# Patient Record
Sex: MALE | NOT HISPANIC OR LATINO | URBAN - METROPOLITAN AREA
[De-identification: names, ages, dates, MRNs, and addresses within clinical notes are randomized per-mention and may not be internally consistent; named-entity substitution may affect disease eponyms.]

---

## 2018-06-05 ENCOUNTER — INPATIENT (INPATIENT)
Facility: HOSPITAL | Age: 73
LOS: 2 days | Discharge: HOME | DRG: 313 | End: 2018-06-08
Attending: INTERNAL MEDICINE | Admitting: INTERNAL MEDICINE
Payer: COMMERCIAL

## 2018-06-05 VITALS
RESPIRATION RATE: 17 BRPM | HEART RATE: 76 BPM | TEMPERATURE: 98 F | DIASTOLIC BLOOD PRESSURE: 98 MMHG | OXYGEN SATURATION: 100 % | SYSTOLIC BLOOD PRESSURE: 185 MMHG

## 2018-06-05 DIAGNOSIS — R07.9 CHEST PAIN, UNSPECIFIED: ICD-10-CM

## 2018-06-05 DIAGNOSIS — H26.9 UNSPECIFIED CATARACT: Chronic | ICD-10-CM

## 2018-06-05 LAB
ALBUMIN SERPL ELPH-MCNC: 4.5 G/DL — SIGNIFICANT CHANGE UP (ref 3.3–5)
ALP SERPL-CCNC: 64 U/L — SIGNIFICANT CHANGE UP (ref 40–120)
ALT FLD-CCNC: 21 U/L — SIGNIFICANT CHANGE UP (ref 10–45)
ANION GAP SERPL CALC-SCNC: 12 MMOL/L — SIGNIFICANT CHANGE UP (ref 5–17)
AST SERPL-CCNC: 18 U/L — SIGNIFICANT CHANGE UP (ref 10–40)
BASOPHILS # BLD AUTO: 0.1 K/UL — SIGNIFICANT CHANGE UP (ref 0–0.2)
BASOPHILS NFR BLD AUTO: 0.9 % — SIGNIFICANT CHANGE UP (ref 0–2)
BILIRUB SERPL-MCNC: 0.3 MG/DL — SIGNIFICANT CHANGE UP (ref 0.2–1.2)
BUN SERPL-MCNC: 14 MG/DL — SIGNIFICANT CHANGE UP (ref 7–23)
CALCIUM SERPL-MCNC: 10 MG/DL — SIGNIFICANT CHANGE UP (ref 8.4–10.5)
CHLORIDE SERPL-SCNC: 97 MMOL/L — SIGNIFICANT CHANGE UP (ref 96–108)
CO2 SERPL-SCNC: 24 MMOL/L — SIGNIFICANT CHANGE UP (ref 22–31)
CREAT SERPL-MCNC: 1.35 MG/DL — HIGH (ref 0.5–1.3)
D DIMER BLD IA.RAPID-MCNC: <150 NG/ML DDU — SIGNIFICANT CHANGE UP
EOSINOPHIL # BLD AUTO: 1 K/UL — HIGH (ref 0–0.5)
EOSINOPHIL NFR BLD AUTO: 10.2 % — HIGH (ref 0–6)
GLUCOSE BLDC GLUCOMTR-MCNC: 95 MG/DL — SIGNIFICANT CHANGE UP (ref 70–99)
GLUCOSE SERPL-MCNC: 115 MG/DL — HIGH (ref 70–99)
HCT VFR BLD CALC: 42.6 % — SIGNIFICANT CHANGE UP (ref 39–50)
HGB BLD-MCNC: 15 G/DL — SIGNIFICANT CHANGE UP (ref 13–17)
LYMPHOCYTES # BLD AUTO: 3.5 K/UL — HIGH (ref 1–3.3)
LYMPHOCYTES # BLD AUTO: 36.9 % — SIGNIFICANT CHANGE UP (ref 13–44)
MCHC RBC-ENTMCNC: 32.3 PG — SIGNIFICANT CHANGE UP (ref 27–34)
MCHC RBC-ENTMCNC: 35.1 GM/DL — SIGNIFICANT CHANGE UP (ref 32–36)
MCV RBC AUTO: 91.9 FL — SIGNIFICANT CHANGE UP (ref 80–100)
MONOCYTES # BLD AUTO: 0.8 K/UL — SIGNIFICANT CHANGE UP (ref 0–0.9)
MONOCYTES NFR BLD AUTO: 8.4 % — SIGNIFICANT CHANGE UP (ref 2–14)
NEUTROPHILS # BLD AUTO: 4.2 K/UL — SIGNIFICANT CHANGE UP (ref 1.8–7.4)
NEUTROPHILS NFR BLD AUTO: 43.5 % — SIGNIFICANT CHANGE UP (ref 43–77)
PLATELET # BLD AUTO: 396 K/UL — SIGNIFICANT CHANGE UP (ref 150–400)
POTASSIUM SERPL-MCNC: 4.7 MMOL/L — SIGNIFICANT CHANGE UP (ref 3.5–5.3)
POTASSIUM SERPL-SCNC: 4.7 MMOL/L — SIGNIFICANT CHANGE UP (ref 3.5–5.3)
PROT SERPL-MCNC: 7.9 G/DL — SIGNIFICANT CHANGE UP (ref 6–8.3)
RBC # BLD: 4.64 M/UL — SIGNIFICANT CHANGE UP (ref 4.2–5.8)
RBC # FLD: 11.7 % — SIGNIFICANT CHANGE UP (ref 10.3–14.5)
SODIUM SERPL-SCNC: 133 MMOL/L — LOW (ref 135–145)
TROPONIN T SERPL-MCNC: <0.01 NG/ML — SIGNIFICANT CHANGE UP (ref 0–0.06)
WBC # BLD: 9.5 K/UL — SIGNIFICANT CHANGE UP (ref 3.8–10.5)
WBC # FLD AUTO: 9.5 K/UL — SIGNIFICANT CHANGE UP (ref 3.8–10.5)

## 2018-06-05 PROCEDURE — 99285 EMERGENCY DEPT VISIT HI MDM: CPT | Mod: 25

## 2018-06-05 PROCEDURE — 74174 CTA ABD&PLVS W/CONTRAST: CPT | Mod: 26

## 2018-06-05 PROCEDURE — 93010 ELECTROCARDIOGRAM REPORT: CPT

## 2018-06-05 PROCEDURE — 71275 CT ANGIOGRAPHY CHEST: CPT | Mod: 26

## 2018-06-05 RX ORDER — SODIUM CHLORIDE 9 MG/ML
3 INJECTION INTRAMUSCULAR; INTRAVENOUS; SUBCUTANEOUS ONCE
Qty: 0 | Refills: 0 | Status: COMPLETED | OUTPATIENT
Start: 2018-06-05 | End: 2018-06-05

## 2018-06-05 RX ORDER — ASPIRIN/CALCIUM CARB/MAGNESIUM 324 MG
325 TABLET ORAL ONCE
Qty: 0 | Refills: 0 | Status: COMPLETED | OUTPATIENT
Start: 2018-06-05 | End: 2018-06-05

## 2018-06-05 RX ADMIN — SODIUM CHLORIDE 3 MILLILITER(S): 9 INJECTION INTRAMUSCULAR; INTRAVENOUS; SUBCUTANEOUS at 18:51

## 2018-06-05 NOTE — ED ADULT NURSE REASSESSMENT NOTE - NS ED NURSE REASSESS COMMENT FT1
Received report from Gifty FUENTES; patient rates chest pain 3/10- PA Suzan at the bedside. 18 g IV in the L AC patent and site WNL. VSS. Will continue to monitor and patient safety maintained.

## 2018-06-05 NOTE — ED PROVIDER NOTE - OBJECTIVE STATEMENT
72y male , PMH HTN,HLD,HLD, PSH Cataracts priscilla. Pt comes to ed complains of chest pain sharp needle rad to left arm, felt slightly unbalnaced, no loc, shortness of breath,nv,abd pain,fc,cough,sob,hemoptysis, See by pmd and referred to ed. 4/10, Improves with deep breath, no change with exercise onset this am.

## 2018-06-05 NOTE — ED PROVIDER NOTE - PROGRESS NOTE DETAILS
PT referred to herminia Mccormack.  Edgar Alejandra MD, Facep Discussed with Dr SIMI sanchez CT chest ro aortic dz  Edgar Alejandra MD, Facep

## 2018-06-06 DIAGNOSIS — E11.8 TYPE 2 DIABETES MELLITUS WITH UNSPECIFIED COMPLICATIONS: ICD-10-CM

## 2018-06-06 DIAGNOSIS — Q44.1 OTHER CONGENITAL MALFORMATIONS OF GALLBLADDER: ICD-10-CM

## 2018-06-06 DIAGNOSIS — N17.9 ACUTE KIDNEY FAILURE, UNSPECIFIED: ICD-10-CM

## 2018-06-06 DIAGNOSIS — R07.89 OTHER CHEST PAIN: ICD-10-CM

## 2018-06-06 DIAGNOSIS — I10 ESSENTIAL (PRIMARY) HYPERTENSION: ICD-10-CM

## 2018-06-06 DIAGNOSIS — E11.9 TYPE 2 DIABETES MELLITUS WITHOUT COMPLICATIONS: ICD-10-CM

## 2018-06-06 DIAGNOSIS — R07.9 CHEST PAIN, UNSPECIFIED: ICD-10-CM

## 2018-06-06 DIAGNOSIS — E04.9 NONTOXIC GOITER, UNSPECIFIED: ICD-10-CM

## 2018-06-06 LAB
ALBUMIN SERPL ELPH-MCNC: 4.2 G/DL — SIGNIFICANT CHANGE UP (ref 3.3–5)
ALP SERPL-CCNC: 59 U/L — SIGNIFICANT CHANGE UP (ref 40–120)
ALT FLD-CCNC: 21 U/L — SIGNIFICANT CHANGE UP (ref 10–45)
ANION GAP SERPL CALC-SCNC: 12 MMOL/L — SIGNIFICANT CHANGE UP (ref 5–17)
APTT BLD: 29.8 SEC — SIGNIFICANT CHANGE UP (ref 27.5–37.4)
AST SERPL-CCNC: 16 U/L — SIGNIFICANT CHANGE UP (ref 10–40)
BASOPHILS # BLD AUTO: 0.1 K/UL — SIGNIFICANT CHANGE UP (ref 0–0.2)
BASOPHILS NFR BLD AUTO: 1 % — SIGNIFICANT CHANGE UP (ref 0–2)
BILIRUB SERPL-MCNC: 0.4 MG/DL — SIGNIFICANT CHANGE UP (ref 0.2–1.2)
BUN SERPL-MCNC: 13 MG/DL — SIGNIFICANT CHANGE UP (ref 7–23)
CALCIUM SERPL-MCNC: 9.7 MG/DL — SIGNIFICANT CHANGE UP (ref 8.4–10.5)
CHLORIDE SERPL-SCNC: 97 MMOL/L — SIGNIFICANT CHANGE UP (ref 96–108)
CHOLEST SERPL-MCNC: 209 MG/DL — HIGH (ref 10–199)
CK MB BLD-MCNC: 1.5 % — SIGNIFICANT CHANGE UP (ref 0–3.5)
CK MB CFR SERPL CALC: 1.5 NG/ML — SIGNIFICANT CHANGE UP (ref 0–6.7)
CK SERPL-CCNC: 103 U/L — SIGNIFICANT CHANGE UP (ref 30–200)
CO2 SERPL-SCNC: 25 MMOL/L — SIGNIFICANT CHANGE UP (ref 22–31)
CREAT SERPL-MCNC: 1.22 MG/DL — SIGNIFICANT CHANGE UP (ref 0.5–1.3)
EOSINOPHIL # BLD AUTO: 1.1 K/UL — HIGH (ref 0–0.5)
EOSINOPHIL NFR BLD AUTO: 11 % — HIGH (ref 0–6)
GAS PNL BLDV: SIGNIFICANT CHANGE UP
GLUCOSE BLDC GLUCOMTR-MCNC: 141 MG/DL — HIGH (ref 70–99)
GLUCOSE BLDC GLUCOMTR-MCNC: 75 MG/DL — SIGNIFICANT CHANGE UP (ref 70–99)
GLUCOSE BLDC GLUCOMTR-MCNC: 96 MG/DL — SIGNIFICANT CHANGE UP (ref 70–99)
GLUCOSE BLDC GLUCOMTR-MCNC: 99 MG/DL — SIGNIFICANT CHANGE UP (ref 70–99)
GLUCOSE SERPL-MCNC: 134 MG/DL — HIGH (ref 70–99)
HBA1C BLD-MCNC: 6.4 % — HIGH (ref 4–5.6)
HBA1C BLD-MCNC: 6.5 % — HIGH (ref 4–5.6)
HCT VFR BLD CALC: 43 % — SIGNIFICANT CHANGE UP (ref 39–50)
HDLC SERPL-MCNC: 39 MG/DL — LOW (ref 40–125)
HGB BLD-MCNC: 14.8 G/DL — SIGNIFICANT CHANGE UP (ref 13–17)
INR BLD: 1.04 RATIO — SIGNIFICANT CHANGE UP (ref 0.88–1.16)
LIPID PNL WITH DIRECT LDL SERPL: 128 MG/DL — SIGNIFICANT CHANGE UP
LYMPHOCYTES # BLD AUTO: 4 K/UL — HIGH (ref 1–3.3)
LYMPHOCYTES # BLD AUTO: 40.3 % — SIGNIFICANT CHANGE UP (ref 13–44)
MAGNESIUM SERPL-MCNC: 2.3 MG/DL — SIGNIFICANT CHANGE UP (ref 1.6–2.6)
MCHC RBC-ENTMCNC: 31.7 PG — SIGNIFICANT CHANGE UP (ref 27–34)
MCHC RBC-ENTMCNC: 34.5 GM/DL — SIGNIFICANT CHANGE UP (ref 32–36)
MCV RBC AUTO: 92 FL — SIGNIFICANT CHANGE UP (ref 80–100)
MONOCYTES # BLD AUTO: 0.8 K/UL — SIGNIFICANT CHANGE UP (ref 0–0.9)
MONOCYTES NFR BLD AUTO: 8.1 % — SIGNIFICANT CHANGE UP (ref 2–14)
NEUTROPHILS # BLD AUTO: 3.9 K/UL — SIGNIFICANT CHANGE UP (ref 1.8–7.4)
NEUTROPHILS NFR BLD AUTO: 39.6 % — LOW (ref 43–77)
NT-PROBNP SERPL-SCNC: 28 PG/ML — SIGNIFICANT CHANGE UP (ref 0–300)
PHOSPHATE SERPL-MCNC: 3.5 MG/DL — SIGNIFICANT CHANGE UP (ref 2.5–4.5)
PLATELET # BLD AUTO: 375 K/UL — SIGNIFICANT CHANGE UP (ref 150–400)
POTASSIUM SERPL-MCNC: 4.5 MMOL/L — SIGNIFICANT CHANGE UP (ref 3.5–5.3)
POTASSIUM SERPL-SCNC: 4.5 MMOL/L — SIGNIFICANT CHANGE UP (ref 3.5–5.3)
PROT SERPL-MCNC: 7.3 G/DL — SIGNIFICANT CHANGE UP (ref 6–8.3)
PROTHROM AB SERPL-ACNC: 11.2 SEC — SIGNIFICANT CHANGE UP (ref 9.8–12.7)
RBC # BLD: 4.68 M/UL — SIGNIFICANT CHANGE UP (ref 4.2–5.8)
RBC # FLD: 11.7 % — SIGNIFICANT CHANGE UP (ref 10.3–14.5)
SODIUM SERPL-SCNC: 134 MMOL/L — LOW (ref 135–145)
T3 SERPL-MCNC: 69 NG/DL — LOW (ref 80–200)
T4 AB SER-ACNC: 4.8 UG/DL — SIGNIFICANT CHANGE UP (ref 4.6–12)
TOTAL CHOLESTEROL/HDL RATIO MEASUREMENT: 5.4 RATIO — SIGNIFICANT CHANGE UP (ref 3.4–9.6)
TRIGL SERPL-MCNC: 208 MG/DL — HIGH (ref 10–149)
TROPONIN T SERPL-MCNC: <0.01 NG/ML — SIGNIFICANT CHANGE UP (ref 0–0.06)
TSH SERPL-MCNC: 2.12 UIU/ML — SIGNIFICANT CHANGE UP (ref 0.27–4.2)
TSH SERPL-MCNC: 2.81 UIU/ML — SIGNIFICANT CHANGE UP (ref 0.27–4.2)
WBC # BLD: 9.8 K/UL — SIGNIFICANT CHANGE UP (ref 3.8–10.5)
WBC # FLD AUTO: 9.8 K/UL — SIGNIFICANT CHANGE UP (ref 3.8–10.5)

## 2018-06-06 PROCEDURE — 99223 1ST HOSP IP/OBS HIGH 75: CPT

## 2018-06-06 PROCEDURE — 93306 TTE W/DOPPLER COMPLETE: CPT | Mod: 26

## 2018-06-06 PROCEDURE — 76536 US EXAM OF HEAD AND NECK: CPT | Mod: 26

## 2018-06-06 PROCEDURE — 76700 US EXAM ABDOM COMPLETE: CPT | Mod: 26

## 2018-06-06 PROCEDURE — 93010 ELECTROCARDIOGRAM REPORT: CPT

## 2018-06-06 RX ORDER — HEPARIN SODIUM 5000 [USP'U]/ML
5000 INJECTION INTRAVENOUS; SUBCUTANEOUS EVERY 8 HOURS
Qty: 0 | Refills: 0 | Status: DISCONTINUED | OUTPATIENT
Start: 2018-06-06 | End: 2018-06-08

## 2018-06-06 RX ORDER — DEXTROSE 50 % IN WATER 50 %
15 SYRINGE (ML) INTRAVENOUS ONCE
Qty: 0 | Refills: 0 | Status: DISCONTINUED | OUTPATIENT
Start: 2018-06-06 | End: 2018-06-08

## 2018-06-06 RX ORDER — SODIUM CHLORIDE 9 MG/ML
1000 INJECTION INTRAMUSCULAR; INTRAVENOUS; SUBCUTANEOUS ONCE
Qty: 0 | Refills: 0 | Status: COMPLETED | OUTPATIENT
Start: 2018-06-06 | End: 2018-06-06

## 2018-06-06 RX ORDER — METFORMIN HYDROCHLORIDE 850 MG/1
1 TABLET ORAL
Qty: 0 | Refills: 0 | COMMUNITY

## 2018-06-06 RX ORDER — PANTOPRAZOLE SODIUM 20 MG/1
40 TABLET, DELAYED RELEASE ORAL
Qty: 0 | Refills: 0 | Status: DISCONTINUED | OUTPATIENT
Start: 2018-06-06 | End: 2018-06-08

## 2018-06-06 RX ORDER — SENNA PLUS 8.6 MG/1
2 TABLET ORAL AT BEDTIME
Qty: 0 | Refills: 0 | Status: DISCONTINUED | OUTPATIENT
Start: 2018-06-06 | End: 2018-06-08

## 2018-06-06 RX ORDER — DOCUSATE SODIUM 100 MG
100 CAPSULE ORAL THREE TIMES A DAY
Qty: 0 | Refills: 0 | Status: DISCONTINUED | OUTPATIENT
Start: 2018-06-06 | End: 2018-06-08

## 2018-06-06 RX ORDER — SODIUM CHLORIDE 9 MG/ML
1000 INJECTION, SOLUTION INTRAVENOUS
Qty: 0 | Refills: 0 | Status: DISCONTINUED | OUTPATIENT
Start: 2018-06-06 | End: 2018-06-08

## 2018-06-06 RX ORDER — INSULIN LISPRO 100/ML
VIAL (ML) SUBCUTANEOUS
Qty: 0 | Refills: 0 | Status: DISCONTINUED | OUTPATIENT
Start: 2018-06-06 | End: 2018-06-08

## 2018-06-06 RX ORDER — LOSARTAN POTASSIUM 100 MG/1
25 TABLET, FILM COATED ORAL DAILY
Qty: 0 | Refills: 0 | Status: DISCONTINUED | OUTPATIENT
Start: 2018-06-06 | End: 2018-06-07

## 2018-06-06 RX ORDER — DEXTROSE 50 % IN WATER 50 %
12.5 SYRINGE (ML) INTRAVENOUS ONCE
Qty: 0 | Refills: 0 | Status: DISCONTINUED | OUTPATIENT
Start: 2018-06-06 | End: 2018-06-08

## 2018-06-06 RX ORDER — LOSARTAN POTASSIUM 100 MG/1
1 TABLET, FILM COATED ORAL
Qty: 0 | Refills: 0 | COMMUNITY

## 2018-06-06 RX ORDER — OMEGA-3 ACID ETHYL ESTERS 1 G
2 CAPSULE ORAL
Qty: 0 | Refills: 0 | COMMUNITY

## 2018-06-06 RX ORDER — INSULIN LISPRO 100/ML
VIAL (ML) SUBCUTANEOUS AT BEDTIME
Qty: 0 | Refills: 0 | Status: DISCONTINUED | OUTPATIENT
Start: 2018-06-06 | End: 2018-06-08

## 2018-06-06 RX ADMIN — PANTOPRAZOLE SODIUM 40 MILLIGRAM(S): 20 TABLET, DELAYED RELEASE ORAL at 05:34

## 2018-06-06 RX ADMIN — HEPARIN SODIUM 5000 UNIT(S): 5000 INJECTION INTRAVENOUS; SUBCUTANEOUS at 05:33

## 2018-06-06 RX ADMIN — Medication 100 MILLIGRAM(S): at 05:33

## 2018-06-06 RX ADMIN — SODIUM CHLORIDE 500 MILLILITER(S): 9 INJECTION INTRAMUSCULAR; INTRAVENOUS; SUBCUTANEOUS at 03:24

## 2018-06-06 RX ADMIN — HEPARIN SODIUM 5000 UNIT(S): 5000 INJECTION INTRAVENOUS; SUBCUTANEOUS at 21:08

## 2018-06-06 RX ADMIN — Medication 100 MILLIGRAM(S): at 21:08

## 2018-06-06 NOTE — CONSULT NOTE ADULT - ASSESSMENT
72 year old male with HTN, DM, HLD, cataract surgery, renal cyst presents with left sided chest pain radiating to his left arm a/w lightheadedness.    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 Baseline creatinine ~ 1.2; received CT I+ on 6/5  Urologic: right renal cyst 6.4 cm  Endocrine: thyroid nodule 3 cm  CV: BP acceptable, euvolemic on exam    RECOMMEND:  - no objection to resume ARB (losartan)  - follow up abdominal ultrasound  - follow up stress test  - send urine creatinine, urine protein  - daily BMP while admitted  - avoid NSAIDs and nephrotoxins as able   - dose medications for a GFR ~ 55 cc/min    Thank you for the courtesy of this consultation.    Dania Michelle NP  [facility]  [phone] 72 year old male with HTN, DM, HLD, cataract surgery, renal cyst presents with left sided chest pain radiating to his left arm a/w lightheadedness.    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 Baseline creatinine ~ 1.2; received CT I+ on 6/5  Urologic: right renal cyst 6.4 cm  Endocrine: thyroid nodule 3 cm  CV: BP elevated, euvolemic on exam, EF 65-70%    RECOMMEND:  - resume losartan 25 mg po daily (home dose)  - follow up abdominal ultrasound  - follow up stress test  - send urine creatinine, urine protein  - daily BMP while admitted  - avoid NSAIDs and nephrotoxins as able   - dose medications for a GFR ~ 55 cc/min    Thank you for the courtesy of this consultation.    Dania Michelle NP  [facility]  [phone] 72 year old male with HTN, DM, HLD, cataract surgery, renal cyst presents with left sided chest pain radiating to his left arm a/w lightheadedness.    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 Baseline creatinine ~ 1.2; received CT I+ on 6/5  Urologic: right renal cyst 6.4 cm  Endocrine: thyroid nodule 3 cm  CV: BP elevated, euvolemic on exam, EF 65-70%    RECOMMEND:  - resume losartan 25 mg po daily (home dose)  - follow up abdominal ultrasound  - follow up stress test  - send urine creatinine, urine protein  - daily BMP while admitted  - avoid NSAIDs and nephrotoxins as able   - dose medications for a GFR ~ 55 cc/min      Thank you for the courtesy of this consultation.    Dania Michelle NP  [facility]  [phone]           ATTENDING NOTE:    Feels well today. No CP/SOB.    PHYSICAL EXAM:  VS: 160s/70s; 70s; 17  Neck:  No JVD  Respiratory: CTA-B/L  Cardiovascular: S1 and S2 RRR no murmur  Gastrointestinal: BS+, soft, NT/ND  Extremities: No peripheral edema    IMPRESSION:  72M w/ HTN, DM, HLD, 6/5/18 a/w CP radiating to LUE and with lightheadedness    (1)Renal - simple right kidney cyst. GFR>60.  (2)Cardiac - a/w CP - undergoing ischemic workup  (3)HTN - on low-dose Cozaar at home.    RECOMMEND:  (1)Cozaar 25qd as taken at home  (2)Potential beta-blocker per Cardiology  (3)No further workup required for renal cyst  (4)Dose new meds for GFR>60  (5)No prophylaxis required if goes for cardiac cath.    Thank you for involving [facility] in this patient's care.  With warm regards,    Yamil Humphreys MD

## 2018-06-06 NOTE — CONSULT NOTE ADULT - ATTENDING COMMENTS
patient seen and examined  ct reviewed patient abnormal gb ? calcifications  need u/s may need mri   will follow with you patient seen and examined  ct reviewed patient abnormal gb ? calcifications  u/s suggestive of porcelain gb  recommend surgery eval for cholecystectomy as this incurs about a 3% risk of malignant transformation

## 2018-06-06 NOTE — H&P ADULT - NSHPREVIEWOFSYSTEMS_GEN_ALL_CORE
REVIEW OF SYSTEMS:  CONSTITUTIONAL: No weakness. No fevers. No chills. No rigors. No weight loss. No poor appetite.  EYES: No visual changes. No eye pain.  ENT: No hearing difficulty. No vertigo. No dysphagia. No Sinusitis/rhinorrhea.  NECK: No pain. No stiffness/rigidity.  CARDIAC: +chest pain. No palpitations.  RESPIRATORY: No cough. No SOB. No hemoptysis.  GASTROINTESTINAL: No abdominal pain. No nausea. No vomiting. No hematemesis. No diarrhea. No constipation. No melena. No hematochezia.  GENITOURINARY: No dysuria. +frequency. +hesitancy. No hematuria.  NEUROLOGICAL: No numbness/tingling. No focal weakness. No incontinence. No headache.  BACK: No back pain.  EXTREMITIES: No lower extremity edema. Full ROM.  SKIN: No rashes. No itching. No other lesions.  PSYCHIATRIC: No depression. No anxiety. No SI/HI.  ALLERGIC: No lip swelling. No hives.  All other review of systems is negative unless indicated above.  Unless indicated above, unable to assess ROS 2/2

## 2018-06-06 NOTE — CONSULT NOTE ADULT - PROBLEM SELECTOR RECOMMENDATION 9
-CTA chest with incidental finding of a foci of calcifications within the gallbladder wall with some areas of tethering. There is a stone within the neck of the gallbladder. No evidence of acute cholecystitis.   -check abdominal U/S  -LFTs are normal, monitor daily  -Pt may require MRI abdomen for further characterization pending results of U/S
Will continue current insulin regimen for now. Will continue monitoring FS, log, will Follow up.  Patient counseled for compliance with consistent low carb diet.

## 2018-06-06 NOTE — H&P ADULT - NSHPLABSRESULTS_GEN_ALL_CORE
Personally reviewed labs.   Personally reviewed imaging.   Personally reviewed EKG. NSR, rate 75, . No ST or TW changes.                          15.0   9.5   )-----------( 396      ( 05 Jun 2018 18:59 )             42.6       06-05    133<L>  |  97  |  14  ----------------------------<  115<H>  4.7   |  24  |  1.35<H>    Ca    10.0      05 Jun 2018 18:59    TPro  7.9  /  Alb  4.5  /  TBili  0.3  /  DBili  x   /  AST  18  /  ALT  21  /  AlkPhos  64  06-05      CARDIAC MARKERS ( 05 Jun 2018 18:59 )  x     / <0.01 ng/mL / x     / x     / x            LIVER FUNCTIONS - ( 05 Jun 2018 18:59 )  Alb: 4.5 g/dL / Pro: 7.9 g/dL / ALK PHOS: 64 U/L / ALT: 21 U/L / AST: 18 U/L / GGT: x

## 2018-06-06 NOTE — CONSULT NOTE ADULT - ASSESSMENT
72 year old male with HTN HLD DM ( dx ~ 7 months ago), chronic low back pain,  with no known h/o CAD/MI who presented with chest pain and admitted for cardiac work up. GI consulted for gallbladder abnormality found on imaging.

## 2018-06-06 NOTE — H&P ADULT - HISTORY OF PRESENT ILLNESS
72M c hx HTN, HLD, DM2, presents with left sided chest pain.    Pt states his chest pain started yesterday morning, with radiation to left arm. The pain is sharp, not exertional, not pleuritic, not positional. It has remained constant throughout the day, and is present currently, but is slowly improving. Only associated symptom is a little lightheadedness. Denies diaphoresis, palpitations, N/V, SOB, fevers, chills, diarrhea, URI symptoms. Reports chronic urinary frequency, weak stream, nocturia. Last stress test was 5 years ago that was reportedly normal. Pt states he went to see his cardiologist yesterday, who performed an EKG and in-office TTE that were both reportedly normal, but told to come to the hospital. He is able to ambulate up multiple flights of stairs without SOB or chest pain.    VS: Tm 98.8, P 82, /98, R 18, 96% RA  In the ED, received .

## 2018-06-06 NOTE — H&P ADULT - PROBLEM SELECTOR PLAN 2
- pt has never been told he has kidney problems  - last took 3 tabs of advil 4 days ago.  - reports BPH symptoms  - will give IVF, since pt received IV contrast  - hold losartan  - recheck BMP  - need to f/u with PMD for recheck of Cr as outpt

## 2018-06-06 NOTE — H&P ADULT - NSHPPHYSICALEXAM_GEN_ALL_CORE
PHYSICAL EXAM:   GENERAL: Alert. Not confused. No acute distress. Not thin. Not cachectic. Not obese.  HEAD:  Atraumatic. Normocephalic.  EYES: EOMI. PERRLA. Normal conjunctiva/sclera.  ENT: Neck supple. No JVD. Moist oral mucosa. Not edentulous. No thrush.  LYMPH: Normal supraclavicular/cervical lymph nodes.   CARDIAC: Not tachy, Not chloe. Regular rate. Not irregularly irregular. S1. S2. No murmur. No rub. No distant heart sounds.  LUNG/CHEST: CTAB. BS equal bilaterally. No wheezes. No rales. No rhonchi.  ABDOMEN: Soft. No tenderness. No distension. No fluid wave. Normal bowel sounds.  BACK: No midline/vertebral tenderness. No flank tenderness.  VASCULAR: +2 b/l radial or ulnar pulses. Palpable DP pulses.  EXTREMITIES:  No clubbing. No cyanosis. No edema. Moving all 4.  NEUROLOGY: A&Ox3. Non-focal exam. Cranial nerves intact. Normal speech. Sensation intact.  PSYCH: Normal behavior. Normal affect.  SKIN: No jaundice. No erythema. No rash/lesion.  Vascular Access:     ICU Vital Signs Last 24 Hrs  T(C): 37.1 (05 Jun 2018 22:39), Max: 37.1 (05 Jun 2018 22:39)  T(F): 98.8 (05 Jun 2018 22:39), Max: 98.8 (05 Jun 2018 22:39)  HR: 72 (05 Jun 2018 22:39) (72 - 82)  BP: 154/83 (05 Jun 2018 22:39) (154/83 - 185/98)  BP(mean): --  ABP: --  ABP(mean): --  RR: 16 (05 Jun 2018 22:39) (16 - 18)  SpO2: 97% (05 Jun 2018 22:39) (96% - 100%)      I&O's Summary

## 2018-06-06 NOTE — CONSULT NOTE ADULT - ATTENDING COMMENTS
Patient seen and examined, agree with above assessment and plan as transcribed above.    - f/u stress  - f/u renal, GI and endo eval  - further recc pending above    Nathanael Ohara MD, FACC

## 2018-06-06 NOTE — CONSULT NOTE ADULT - PROBLEM SELECTOR RECOMMENDATION 2
-The patient has ruled out for ACS  -echo/ Exercise NST pending  -follow cardiology recommendations
Monitored and followed up by primary/cardiology team

## 2018-06-06 NOTE — CONSULT NOTE ADULT - ASSESSMENT
Assessment  DMT2: 72y Male with DM T2 with hyperglycemia on insulin, blood sugars in acceptable range, no hypoglycemic episode,  eating meals, compliant with low carb diet.  Chest pain: On medications, stable, monitored.  Thyroid nodule:  Asymptomatic, TFTs not available at this time.  HTN: Controlled, On med.

## 2018-06-06 NOTE — H&P ADULT - NSHPSOCIALHISTORY_GEN_ALL_CORE
Social History:    Marital Status: (  ) , (  ) Single, (  ) , ( x ) , wife passed last year, (  )   # of Children: 2 daughters  Lives with: ( x ) alone, (  ) children, (  ) spouse, (  ) parents, (  ) siblings, (  ) friends, (  ) other:   Occupation:  with [employer]    Substance Use/Illicit Drugs: (  ) never used vs other:   Tobacco Usage: ( x ) never smoked, (  ) former smoker, (  ) current smoker and Total Pack-Years:   Last Alcohol Usage/Frequency/Amount/Withdrawal/Hx of Abuse:  once a week, last drink saturday  Foreign travel/Animal exposure:

## 2018-06-07 LAB
ANION GAP SERPL CALC-SCNC: 13 MMOL/L — SIGNIFICANT CHANGE UP (ref 5–17)
BUN SERPL-MCNC: 25 MG/DL — HIGH (ref 7–23)
CALCIUM SERPL-MCNC: 9.5 MG/DL — SIGNIFICANT CHANGE UP (ref 8.4–10.5)
CHLORIDE SERPL-SCNC: 101 MMOL/L — SIGNIFICANT CHANGE UP (ref 96–108)
CHOLEST SERPL-MCNC: 181 MG/DL — SIGNIFICANT CHANGE UP (ref 10–199)
CO2 SERPL-SCNC: 22 MMOL/L — SIGNIFICANT CHANGE UP (ref 22–31)
CREAT SERPL-MCNC: 1.79 MG/DL — HIGH (ref 0.5–1.3)
GLUCOSE BLDC GLUCOMTR-MCNC: 100 MG/DL — HIGH (ref 70–99)
GLUCOSE BLDC GLUCOMTR-MCNC: 133 MG/DL — HIGH (ref 70–99)
GLUCOSE BLDC GLUCOMTR-MCNC: 141 MG/DL — HIGH (ref 70–99)
GLUCOSE BLDC GLUCOMTR-MCNC: 148 MG/DL — HIGH (ref 70–99)
GLUCOSE SERPL-MCNC: 103 MG/DL — HIGH (ref 70–99)
HCT VFR BLD CALC: 39.1 % — SIGNIFICANT CHANGE UP (ref 39–50)
HDLC SERPL-MCNC: 32 MG/DL — LOW (ref 40–125)
HGB BLD-MCNC: 13.3 G/DL — SIGNIFICANT CHANGE UP (ref 13–17)
LIPID PNL WITH DIRECT LDL SERPL: 103 MG/DL — SIGNIFICANT CHANGE UP
MAGNESIUM SERPL-MCNC: 2.4 MG/DL — SIGNIFICANT CHANGE UP (ref 1.6–2.6)
MCHC RBC-ENTMCNC: 30.8 PG — SIGNIFICANT CHANGE UP (ref 27–34)
MCHC RBC-ENTMCNC: 34 GM/DL — SIGNIFICANT CHANGE UP (ref 32–36)
MCV RBC AUTO: 90.5 FL — SIGNIFICANT CHANGE UP (ref 80–100)
PLATELET # BLD AUTO: 332 K/UL — SIGNIFICANT CHANGE UP (ref 150–400)
POTASSIUM SERPL-MCNC: 4.9 MMOL/L — SIGNIFICANT CHANGE UP (ref 3.5–5.3)
POTASSIUM SERPL-SCNC: 4.9 MMOL/L — SIGNIFICANT CHANGE UP (ref 3.5–5.3)
RBC # BLD: 4.32 M/UL — SIGNIFICANT CHANGE UP (ref 4.2–5.8)
RBC # FLD: 13.4 % — SIGNIFICANT CHANGE UP (ref 10.3–14.5)
SODIUM SERPL-SCNC: 136 MMOL/L — SIGNIFICANT CHANGE UP (ref 135–145)
TOTAL CHOLESTEROL/HDL RATIO MEASUREMENT: 5.7 RATIO — SIGNIFICANT CHANGE UP (ref 3.4–9.6)
TRIGL SERPL-MCNC: 228 MG/DL — HIGH (ref 10–149)
WBC # BLD: 9.9 K/UL — SIGNIFICANT CHANGE UP (ref 3.8–10.5)
WBC # FLD AUTO: 9.9 K/UL — SIGNIFICANT CHANGE UP (ref 3.8–10.5)

## 2018-06-07 PROCEDURE — 93018 CV STRESS TEST I&R ONLY: CPT

## 2018-06-07 PROCEDURE — 78452 HT MUSCLE IMAGE SPECT MULT: CPT | Mod: 26

## 2018-06-07 PROCEDURE — 93016 CV STRESS TEST SUPVJ ONLY: CPT

## 2018-06-07 RX ORDER — ATORVASTATIN CALCIUM 80 MG/1
40 TABLET, FILM COATED ORAL AT BEDTIME
Qty: 0 | Refills: 0 | Status: DISCONTINUED | OUTPATIENT
Start: 2018-06-07 | End: 2018-06-08

## 2018-06-07 RX ORDER — SODIUM CHLORIDE 9 MG/ML
500 INJECTION INTRAMUSCULAR; INTRAVENOUS; SUBCUTANEOUS ONCE
Qty: 0 | Refills: 0 | Status: COMPLETED | OUTPATIENT
Start: 2018-06-07 | End: 2018-06-07

## 2018-06-07 RX ORDER — ASPIRIN/CALCIUM CARB/MAGNESIUM 324 MG
81 TABLET ORAL DAILY
Qty: 0 | Refills: 0 | Status: DISCONTINUED | OUTPATIENT
Start: 2018-06-07 | End: 2018-06-08

## 2018-06-07 RX ADMIN — HEPARIN SODIUM 5000 UNIT(S): 5000 INJECTION INTRAVENOUS; SUBCUTANEOUS at 13:02

## 2018-06-07 RX ADMIN — LOSARTAN POTASSIUM 25 MILLIGRAM(S): 100 TABLET, FILM COATED ORAL at 05:33

## 2018-06-07 RX ADMIN — HEPARIN SODIUM 5000 UNIT(S): 5000 INJECTION INTRAVENOUS; SUBCUTANEOUS at 05:33

## 2018-06-07 RX ADMIN — PANTOPRAZOLE SODIUM 40 MILLIGRAM(S): 20 TABLET, DELAYED RELEASE ORAL at 05:33

## 2018-06-07 RX ADMIN — SODIUM CHLORIDE 500 MILLILITER(S): 9 INJECTION INTRAMUSCULAR; INTRAVENOUS; SUBCUTANEOUS at 14:53

## 2018-06-07 RX ADMIN — HEPARIN SODIUM 5000 UNIT(S): 5000 INJECTION INTRAVENOUS; SUBCUTANEOUS at 23:49

## 2018-06-07 RX ADMIN — ATORVASTATIN CALCIUM 40 MILLIGRAM(S): 80 TABLET, FILM COATED ORAL at 22:20

## 2018-06-07 RX ADMIN — Medication 100 MILLIGRAM(S): at 05:33

## 2018-06-07 RX ADMIN — Medication 81 MILLIGRAM(S): at 13:02

## 2018-06-07 NOTE — CONSULT NOTE ADULT - ATTENDING COMMENTS
pt seen and examined.  agree with note above  f/u mri to better assess nature of gallbladder calcifications / possibility of malignancy  d/w pt & cards team in detail  will f/u after MRI

## 2018-06-07 NOTE — CONSULT NOTE ADULT - CONSULT REASON
Porcelain gallbladder
Thyroid nodule
abnormal gallbladder imaging
renal cyst, elevated creatinine
chest pain

## 2018-06-07 NOTE — CONSULT NOTE ADULT - SUBJECTIVE AND OBJECTIVE BOX
HISTORY OF PRESENT ILLNESS:  This is a 72 year old male with HTN HLD DM ( dx ~ 7 months ago), chronic low back pain,  with no known h/o CAD/MI - reportedly normal stress test about 5 years ago- admitted with complaints of chest pain. The patient states the chest pain is left sided and sharp in nature and occasionally radiated to his left arm but not to his back or jaw. It began yesterday after he woke up.  It last all day and was not worse with exertion nor was it associated with dyspnea , palpitations, syncope or near syncope. The patient notes he walks 1/2 hour daily with no symptoms.  He went to see his new cardiologist yesterday Dr Francis ([facility]) and although his EKG and TTE were reportedly normal, he was referred to the ER given the new onset of his symptoms and commorbidities.  Currently he is asymptomatic.     PAST MEDICAL & SURGICAL HISTORY:  DM (diabetes mellitus)  HLD (hyperlipidemia)  HTN (hypertension)  Cataract    	    MEDICATIONS:  heparin  Injectable 5000 Unit(s) SubCutaneous every 8 hours  docusate sodium 100 milliGRAM(s) Oral three times a day  pantoprazole    Tablet 40 milliGRAM(s) Oral before breakfast  senna 2 Tablet(s) Oral at bedtime PRN  dextrose 40% Gel 15 Gram(s) Oral once PRN  dextrose 50% Injectable 12.5 Gram(s) IV Push once  insulin lispro (HumaLOG) corrective regimen sliding scale   SubCutaneous three times a day before meals  insulin lispro (HumaLOG) corrective regimen sliding scale   SubCutaneous at bedtime  dextrose 5%. 1000 milliLiter(s) IV Continuous <Continuous>      Allergies  No Known Allergies      FAMILY HISTORY:  no premature CAD  No pertinent family history in first degree relatives      SOCIAL HISTORY:    [+ ] Non-smoker  [ ] Smoker  [+  ] Alcohol - rare use      REVIEW OF SYSTEMS:  chronic low back pain, sharp chest pain non exertional in nature  otherwise negative    PHYSICAL EXAM:  T(C): 36.4 (06-06-18 @ 04:37), Max: 37.1 (06-05-18 @ 22:39)  HR: 64 (06-06-18 @ 04:37) (64 - 82)  BP: 117/58 (06-06-18 @ 04:37) (117/58 - 185/98)  RR: 18 (06-06-18 @ 04:37) (16 - 18)  SpO2: 100% (06-06-18 @ 04:37) (96% - 100%)  Wt(kg): --  I&O's Summary    05 Jun 2018 07:01  -  06 Jun 2018 07:00  --------------------------------------------------------  IN: 1240 mL / OUT: 500 mL / NET: 740 mL        Appearance: Normal	  HEENT:   Normal oral mucosa, PERRL, EOMI	  Lymphatic: No lymphadenopathy  Cardiovascular: Normal S1 S2, No JVD, No murmurs, No edema  Respiratory: Lungs clear to auscultation	  Psychiatry: A & O x 3, Mood & affect appropriate  Gastrointestinal:  Soft, Non-tender, + BS	  Skin: No rashes, No ecchymoses, No cyanosis	  Neurologic: Non-focal  Extremities: Normal range of motion, No clubbing, cyanosis or edema  Vascular: Peripheral pulses palpable 2+ bilaterally    TELEMETRY: NSR 55-80  	    ECG:   sinus chloe 55 no ischemia narrow QRS	    RADIOLOGY:  < from: CT Angio Abdomen and Pelvis w/ IV Cont (06.05.18 @ 20:01) >  IMPRESSION:  No aortic dissection.    KIDNEYS/URETERS: 6.4 cm right renal cyst. Symmetric renal function. No   hydronephrosis or abnormal parenchymal enhancement.      Coronary artery atherosclerotic disease.    Tree-in-bud opacities at the right lung base representing foci of   impacted distal airways which may be due to mucous or may be of   infectious etiology.    Large amount of stool within the rectum.    3 cm nodule within the left lobe of the thyroid gland. Ultrasound is   recommended for further evaluation.    Irregularity of the gallbladder as described above. A nonemergent   ultrasound can be performed for complete evaluation.      	  	  LABS:	 	    CARDIAC MARKERS: negative x 2                            14.8   9.8   )-----------( 375      ( 06 Jun 2018 03:07 )             43.0       06-06    134<L>  |  97  |  13  ----------------------------<  134<H>  4.5   |  25  |  1.22    Ca    9.7      06 Jun 2018 03:07  Phos  3.5     06-06  Mg     2.3     06-06    TPro  7.3  /  Alb  4.2  /  TBili  0.4  /  DBili  x   /  AST  16  /  ALT  21  /  AlkPhos  59  06-06      proBNP: Serum Pro-Brain Natriuretic Peptide: 28 pg/mL (06-06 @ 03:07)      Lipid Profile: pending    HgA1c: pending    TSH: pending    Thyroid U/s : pending  Abdominal/renal u/s : pending    NST/TTE: pending       ASSESSMENT/PLAN: 	This is a 72 year old male with HTN HLD DM ( dx ~ 7 months ago), chronic low back pain,  with no known h/o CAD/MI - reportedly normal stress test about 5 years ago- admitted with complaints of chest pain. The patient states the chest pain is left sided and sharp in nature and occasionally radiated to his left arm but not to his back or jaw. It began yesterday after he woke up.  It last all day and was not worse with exertion nor was it associated with dyspnea , palpitations, syncope or near syncope. The patient notes he walks 1/2 hour daily with no symptoms.  He went to see his new cardiologist yesterday Dr Francis ([place]) and although his EKG and TTE were reportedly normal, he was referred to the ER given the new onset of his symptoms and commorbidities.  Currently he is asymptomatic.     -- The patient has ruled out for acute coronary syndrome with negative serial cardiac enzymes  -- EKG with no ischemia  -- CTA c/a/p with no dissection   -- TTE / Exercise NST pending  -- Thyroid nodule - TFTs and thyroid u/s pending  -- Renal cyst / GB "tethering" - Abdominal u/s pending  -- HD stable no evidence CHF  -- final recs pending above    Soraya Elaine RPA-C
Chief Complaint:  Patient is a 72y old  Male who presents with a chief complaint of chest pain (2018 02:50)      HPI: 72M c hx HTN, HLD, DM2, presents with left sided chest pain. Pt states his chest pain started yesterday morning, with radiation to left arm. The pain is sharp, not exertional, not pleuritic, not positional. It has remained constant throughout the day, and is present currently, but is slowly improving. Only associated symptom is a little lightheadedness. Denies diaphoresis, palpitations, N/V, SOB, fevers, chills, diarrhea, URI symptoms. Reports chronic urinary frequency, weak stream, nocturia. Last stress test was 5 years ago that was reportedly normal. Pt states he went to see his cardiologist yesterday, who performed an EKG and in-office TTE that were both reportedly normal, but told to come to the hospital. He is able to ambulate up multiple flights of stairs without SOB or chest pain.    GI consulted for incidental finding of foci of calcifications within the gallbladder wall with some areas of tethering. There is a stone within the neck of the gallbladder. Pt denies abdominal pain, N/V/D/C BRBPR/ melena, history of EGD. Pt had a colonoscopy 4 years ago which was reportedly normal.       Allergies:  No Known Allergies      Medications:  dextrose 40% Gel 15 Gram(s) Oral once PRN  dextrose 5%. 1000 milliLiter(s) IV Continuous <Continuous>  dextrose 50% Injectable 12.5 Gram(s) IV Push once  docusate sodium 100 milliGRAM(s) Oral three times a day  heparin  Injectable 5000 Unit(s) SubCutaneous every 8 hours  insulin lispro (HumaLOG) corrective regimen sliding scale   SubCutaneous three times a day before meals  insulin lispro (HumaLOG) corrective regimen sliding scale   SubCutaneous at bedtime  pantoprazole    Tablet 40 milliGRAM(s) Oral before breakfast  senna 2 Tablet(s) Oral at bedtime PRN      PMHX/PSHX:  DM (diabetes mellitus)  HLD (hyperlipidemia)  HTN (hypertension)  Cataract      Family history:  No pertinent family history in first degree relatives      Social History: denies tobacco use, + social etoh use.     ROS:     General:  No wt loss, fevers, chills, night sweats, fatigue,   Eyes:  Good vision, no reported pain  ENT:  No sore throat, pain, runny nose, dysphagia  CV:  No pain, palpitations, hypo/hypertension  Resp:  No dyspnea, cough, tachypnea, wheezing  GI:  No pain, No nausea, No vomiting, No diarrhea, No constipation, No weight loss, No fever, No pruritis, No rectal bleeding, No tarry stools, No dysphagia,  :  No pain, bleeding, incontinence, nocturia  Muscle:  No pain, weakness  Neuro:  No weakness, tingling, memory problems  Psych:  No fatigue, insomnia, mood problems, depression  Endocrine:  No polyuria, polydipsia, cold/heat intolerance  Heme:  No petechiae, ecchymosis, easy bruisability  Skin:  No rash, tattoos, scars, edema      PHYSICAL EXAM:   Vital Signs:  Vital Signs Last 24 Hrs  T(C): 36.3 (2018 13:55), Max: 37.1 (2018 22:39)  T(F): 97.4 (2018 13:55), Max: 98.8 (2018 22:39)  HR: 73 (2018 13:55) (64 - 82)  BP: 161/71 (2018 13:55) (117/58 - 185/98)  BP(mean): --  RR: 17 (2018 13:55) (16 - 18)  SpO2: 99% (2018 13:55) (96% - 100%)  Daily Height in cm: 165.1 (2018 22:39)    Daily Weight in k.2 (2018 10:42)    GENERAL:  Appears stated age, well-groomed, well-nourished, no distress  HEENT:  NC/AT,  conjunctivae clear and pink, no thyromegaly, nodules, adenopathy, no JVD, sclera -anicteric  CHEST:  Full & symmetric excursion, no increased effort, breath sounds clear  HEART:  Regular rhythm, S1, S2, no murmur/rub/S3/S4, no abdominal bruit, no edema  ABDOMEN:  Soft, non-tender, non-distended, normoactive bowel sounds,  no masses ,no hepato-splenomegaly, no signs of chronic liver disease  EXTEREMITIES:  no cyanosis,clubbing or edema  SKIN:  No rash/erythema/ecchymoses/petechiae/wounds/abscess/warm/dry  NEURO:  Alert, oriented, no asterixis, no tremor, no encephalopathy    LABS:                        14.8   9.8   )-----------( 375      ( 2018 03:07 )             43.0     06-    134<L>  |  97  |  13  ----------------------------<  134<H>  4.5   |  25  |  1.22    Ca    9.7      2018 03:07  Phos  3.5     -  Mg     2.3     -    TPro  7.3  /  Alb  4.2  /  TBili  0.4  /  DBili  x   /  AST  16  /  ALT  21  /  AlkPhos  59  06-06    LIVER FUNCTIONS - ( 2018 03:07 )  Alb: 4.2 g/dL / Pro: 7.3 g/dL / ALK PHOS: 59 U/L / ALT: 21 U/L / AST: 16 U/L / GGT: x           PT/INR - ( 2018 03:07 )   PT: 11.2 sec;   INR: 1.04 ratio         PTT - ( 2018 03:07 )  PTT:29.8 sec        Imaging:
HPI: 73 yo man with HTN, HLD, DM who presented with chest pain with hospital course c/b JUSTINO likely 2/2 contrast nephropathy. Cardiology evaluation determined that patient does not have ACS; TTE normal, stress test "probably normal study". Patient underwent an ultrasound abdomen and CT that indicated calcifications within the gallbladder fossa. Patient without recent weight loss, abdominal pain, or N/ V. Patient is tolerating PO diet well without a reduction in his appetite. 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     PMHx: HTN, HLD, DM, chronic low back pain     PSHx: Remote cataract surgery    Medications (inpatient): aspirin enteric coated 81 milliGRAM(s) Oral daily  atorvastatin 40 milliGRAM(s) Oral at bedtime  dextrose 5%. 1000 milliLiter(s) IV Continuous <Continuous>  dextrose 50% Injectable 12.5 Gram(s) IV Push once  docusate sodium 100 milliGRAM(s) Oral three times a day  heparin  Injectable 5000 Unit(s) SubCutaneous every 8 hours  insulin lispro (HumaLOG) corrective regimen sliding scale   SubCutaneous three times a day before meals  insulin lispro (HumaLOG) corrective regimen sliding scale   SubCutaneous at bedtime  pantoprazole    Tablet 40 milliGRAM(s) Oral before breakfast    Medications (PRN):dextrose 40% Gel 15 Gram(s) Oral once PRN  senna 2 Tablet(s) Oral at bedtime PRN    Allergies: No Known Allergies  (Intolerances: )    Social Hx: Social cigarette use. Patient has not smoked in approximately 6 years. Social EtOH.     Physical Exam  T(C): 36.3 (06-07-18 @ 14:13)  HR: 68 (06-07-18 @ 14:13) (68 - 71)  BP: 134/62 (06-07-18 @ 14:13) (103/58 - 134/62)  RR: 17 (06-07-18 @ 14:13) (17 - 18)  SpO2: 93% (06-07-18 @ 14:13) (93% - 98%)  Tmax: T(C): , Max: 36.7 (06-07-18 @ 05:00)    06-06-18  -  06-07-18  --------------------------------------------------------  IN:    Oral Fluid: 780 mL  Total IN: 780 mL    OUT:    Voided: 650 mL  Total OUT: 650 mL    Total NET: 130 mL      06-07-18  -  06-07-18  --------------------------------------------------------  IN:    Oral Fluid: 960 mL  Total IN: 960 mL    OUT:  Total OUT: 0 mL    Total NET: 960 mL        General: well developed, well nourished, NAD  Neuro: alert and oriented, no focal deficits, moves all extremities spontaneously  HEENT: NCAT, EOMI, anicteric, mucosa moist  Respiratory: airway patent, respirations unlabored  CVS: regular rate and rhythm  Abdomen: soft, nontender, nondistended. No abdominal scars  Extremities: no edema, sensation and movement grossly intact  Skin: warm, dry, appropriate color    Labs:                        13.3   9.90  )-----------( 332      ( 07 Jun 2018 07:42 )             39.1     PT/INR - ( 06 Jun 2018 03:07 )   PT: 11.2 sec;   INR: 1.04 ratio         PTT - ( 06 Jun 2018 03:07 )  PTT:29.8 sec  06-07    136  |  101  |  25<H>  ----------------------------<  103<H>  4.9   |  22  |  1.79<H>    Ca    9.5      07 Jun 2018 05:47  Phos  3.5     06-06  Mg     2.4     06-07    TPro  7.3  /  Alb  4.2  /  TBili  0.4  /  DBili  x   /  AST  16  /  ALT  21  /  AlkPhos  59  06-06    Imaging and other studies: < from: CT Angio Abdomen and Pelvis w/ IV Cont (06.05.18 @ 20:01) >    EXAM:  CT ANGIO ABD PELV (W)AW IC                          EXAM:  CT ANGIO CHEST (W)AW IC                            PROCEDURE DATE:  06/05/2018            INTERPRETATION:  CLINICAL INFORMATION: Chest pain radiates to left arm.   Dizziness.    COMPARISON: None    PROCEDURE:   Gated noncontrast CT of the chest and CTA of the chest, abdomen and   pelvis.  Precontrast imaging was performed through the chest followed by arterial   phase imaging of the chest, abdomen and pelvis in the arterial phase. MIP   images were obtained.  Intravenous contrast: 90 ml Omnipaque 350. 10 ml discarded.  Oral contrast:None.  Sagittal and coronal reformats were performed.    FINDINGS:    CHEST:     LUNGS, PLEURA, AND LARGE AIRWAYS: Bilateral calcified granulomas  measuring up to 5 mm within the left lower lobe. Right lower lobe   peripheral tree-in-bud opacities or 2 mm nodules representing foci of   impacted distal airways. Few foci of impacted distal airways are also   noted at the left lung base. No pleural effusion or pneumothorax. Patent   central airways. Linear branching opacities in the right lower lobe   likely mucoid impacted airways.  VESSELS: Normal size thoracic aorta and main pulmonary artery. No   intramural hematoma, dissection, or aortic aneurysm. Atherosclerotic   disease of the aorta and the branch vessels. There is atherosclerotic   disease involving the coronary arteries including the proximal and mid   segments of the left anterior descending artery and the proximal segment   of the left circumflex artery. Please note that this study was not   tailored for evaluation of the coronary arteries.  HEART: Normal size. No pericardial effusion.  Coronary artery   calcifications. Atherosclerotic disease of the coronary arteries.   MEDIASTINUM AND CARLOS MANUEL: No lymphadenopathy.  CHEST WALL AND LOWER NECK: Heterogeneous enlargement of the left lobe of   the thyroid gland with left thyroid nodule measuring approximately 3 cm.    ABDOMEN AND PELVIS:    LIVER: Hepatic steatosis.  BILE DUCTS: Normal caliber.  GALLBLADDER: Foci of calcifications within the gallbladder wall with some   areas of tethering. There is a stone within the neck of the gallbladder.   The gallbladder is not dilated. There is no pericholecystic fluid to   suggestacute cholecystitis.  SPLEEN: Within normal limits.  PANCREAS: Within normal limits.  ADRENALS: Within normal limits.  KIDNEYS/URETERS: 6.4 cm right renal cyst. Symmetric renal function. No   hydronephrosis or abnormal parenchymal enhancement.    BLADDER: Within normal limits.  REPRODUCTIVE ORGANS: Mild enlarged prostate. Seminal vesicles are normal.    BOWEL: Large colonic stool burden within the rectum. Normal appendix. No   bowel obstruction or abnormal thickening.   PERITONEUM: No free air orfluid.  VESSELS:  Normal size abdominal aorta. No aortic dissection. Patent   celiac axis, SMA, LINDA, left and right renal arteries. Replaced right   hepatic artery. Mild atherosclerotic disease of the lower abdominal aorta   and the bilateral common iliac arteries.  RETROPERITONEUM: No lymphadenopathy.    ABDOMINAL WALL: Tiny fat-containing umbilical hernia.  BONES: Degenerative changes.    IMPRESSION:  No aortic dissection.    Coronary artery atherosclerotic disease.    Tree-in-bud opacities at the right lung base representing foci of   impacted distal airways which may be due to mucous or may be of   infectious etiology.    Large amount of stool within the rectum.    3 cm nodule within the left lobe of the thyroid gland. Ultrasound is   recommended for further evaluation.    Irregularity of the gallbladder as described above. A nonemergent   ultrasound can be performed for complete evaluation.        SEVERIANO IRVING M.D., RADIOLOGY RESIDENT  This document has been electronically signed.  NIRANJAN GARZA M.D. ATTENDING RADIOLOGIST  This document has been electronically signed. Jun 5 2018  9:45PM                < end of copied text >
NEPHROLOGY - NSN    Patient seen and examined.    HPI:  72 year old male with HTN, DM, HLD, cataract surgery, renal cyst presents with left sided chest pain radiating to his left arm a/w lightheadedness. In ER he received ASA, IVF NS 1L and 90 mL of Omnipaque for a CTA C/A/P which revealed an irregularity in the gallbladder, a thyroid nodule and a right renal cyst (6.4 cm). Found to have an elevated creatinine on admission labs. A renal consult was called for further evaluation of elevated creatinine and renal cyst finding. He reports he followed with a urologist for a renal cyst which he was told was benign ~ 6 years ago. He reports no history of kidney disease, kidney stones or other urologic issues. He has remote use of NSAIDs Advil a few days prior to arrival). His previous medications included an ARB (losartan); denies any other nephrotoxins or herbal medications. 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 His blood pressure is well controlled. He denies chest pain, SOB, REAL, headache, dizziness, abdominal pain, n/v/d, dysuria, hematuria, fever or chills.     PAST MEDICAL & SURGICAL HISTORY:  DM (diabetes mellitus)  HLD (hyperlipidemia)  HTN (hypertension)  Cataract  Renal Cyst    MEDICATIONS  (STANDING):  dextrose 5%. 1000 milliLiter(s) (50 mL/Hr) IV Continuous <Continuous>  dextrose 50% Injectable 12.5 Gram(s) IV Push once  docusate sodium 100 milliGRAM(s) Oral three times a day  heparin  Injectable 5000 Unit(s) SubCutaneous every 8 hours  insulin lispro (HumaLOG) corrective regimen sliding scale   SubCutaneous three times a day before meals  insulin lispro (HumaLOG) corrective regimen sliding scale   SubCutaneous at bedtime  pantoprazole    Tablet 40 milliGRAM(s) Oral before breakfast    Allergies  No Known Allergies    SOCIAL HISTORY:  Denies alcohol abuse, drug abuse or tobacco usage.     FAMILY HISTORY:  No pertinent family history in first degree relatives    REVIEW OF SYSTEMS:  Denies any nausea, vomiting, diarrhea, fever or chills. Denies chest pain, SOB, focal weakness, hematuria or dysuria. Good oral intake and denies fatigue or weakness. All other pertinent systems are reviewed and are negative.    PHYSICAL EXAM:  Constitutional: NAD  HEENT: PERRLA    Neck:  No JVD  Respiratory: CTAB/L  Cardiovascular: S1 and S2 RRR no murmur  Gastrointestinal: BS+, soft, NT/ND  Extremities: No peripheral edema  Neurological: A/O x 3, no focal deficits  Psychiatric: Normal mood, normal affect  : No Mac  Skin: No rashes  Access: Not applicable    VITALS:  T(C): , Max: 37.1 (06-05-18 @ 22:39)  T(F): , Max: 98.8 (06-05-18 @ 22:39)  HR: 73 (06-06-18 @ 13:55)  BP: 161/71 (06-06-18 @ 13:55)  BP(mean): --  RR: 17 (06-06-18 @ 13:55)  SpO2: 99% (06-06-18 @ 13:55)  Wt(kg): --    I and O's:    06-05 @ 07:01  -  06-06 @ 07:00  --------------------------------------------------------  IN: 1240 mL / OUT: 500 mL / NET: 740 mL    06-06 @ 07:01  -  06-06 @ 14:52  --------------------------------------------------------  IN: 240 mL / OUT: 300 mL / NET: -60 mL      Height (cm): 165.1 (06-05 @ 22:39)  Weight (kg): 70.9 (06-05 @ 22:39)  BMI (kg/m2): 26 (06-05 @ 22:39)  BSA (m2): 1.78 (06-05 @ 22:39)    LABS:                        14.8   9.8   )-----------( 375      ( 06 Jun 2018 03:07 )             43.0     06-06    134<L>  |  97  |  13  ----------------------------<  134<H>  4.5   |  25  |  1.22    Ca    9.7      06 Jun 2018 03:07  Phos  3.5     06-06  Mg     2.3     06-06    TPro  7.3  /  Alb  4.2  /  TBili  0.4  /  DBili  x   /  AST  16  /  ALT  21  /  AlkPhos  59  06-06    BASELINE LABS:  10/2015 1.28/10  10/2016 1.16/12  10/2017 1.23/10    RADIOLOGY & ADDITIONAL STUDIES:  < from: CT Angio Abdomen and Pelvis w/ IV Cont (06.05.18 @ 20:01) >    EXAM:  CT ANGIO ABD PELV (W)AW IC                          EXAM:  CT ANGIO CHEST (W)AW IC                            PROCEDURE DATE:  06/05/2018            INTERPRETATION:  CLINICAL INFORMATION: Chest pain radiates to left arm.   Dizziness.    COMPARISON: None    PROCEDURE:   Gated noncontrast CT of the chest and CTA of the chest, abdomen and   pelvis.  Precontrast imaging was performed through the chest followed by arterial   phase imaging of the chest, abdomen and pelvis in the arterial phase. MIP   images were obtained.  Intravenous contrast: 90 ml Omnipaque 350. 10 ml discarded.  Oral contrast:None.  Sagittal and coronal reformats were performed.    FINDINGS:    CHEST:     LUNGS, PLEURA, AND LARGE AIRWAYS: Bilateral calcified granulomas  measuring up to 5 mm within the left lower lobe. Right lower lobe   peripheral tree-in-bud opacities or 2 mm nodules representing foci of   impacted distal airways. Few foci of impacted distal airways are also   noted at the left lung base. No pleural effusion or pneumothorax. Patent   central airways. Linear branching opacities in the right lower lobe   likely mucoid impacted airways.  VESSELS: Normal size thoracic aorta and main pulmonary artery. No   intramural hematoma, dissection, or aortic aneurysm. Atherosclerotic   disease of the aorta and the branch vessels. There is atherosclerotic   disease involving the coronary arteries including the proximal and mid   segments of the left anterior descending artery and the proximal segment   of the left circumflex artery. Please note that this study was not   tailored for evaluation of the coronary arteries.  HEART: Normal size. No pericardial effusion.  Coronary artery   calcifications. Atherosclerotic disease of the coronary arteries.   MEDIASTINUM AND CARLOS MANUEL: No lymphadenopathy.  CHEST WALL AND LOWER NECK: Heterogeneous enlargement of the left lobe of   the thyroid gland with left thyroid nodule measuring approximately 3 cm.    ABDOMEN AND PELVIS:    LIVER: Hepatic steatosis.  BILE DUCTS: Normal caliber.  GALLBLADDER: Foci of calcifications within the gallbladder wall with some   areas of tethering. There is a stone within the neck of the gallbladder.   The gallbladder is not dilated. There is no pericholecystic fluid to   suggestacute cholecystitis.  SPLEEN: Within normal limits.  PANCREAS: Within normal limits.  ADRENALS: Within normal limits.  KIDNEYS/URETERS: 6.4 cm right renal cyst. Symmetric renal function. No   hydronephrosis or abnormal parenchymal enhancement.    BLADDER: Within normal limits.  REPRODUCTIVE ORGANS: Mild enlarged prostate. Seminal vesicles are normal.    BOWEL: Large colonic stool burden within the rectum. Normal appendix. No   bowel obstruction or abnormal thickening.   PERITONEUM: No free air orfluid.  VESSELS:  Normal size abdominal aorta. No aortic dissection. Patent   celiac axis, SMA, LINDA, left and right renal arteries. Replaced right   hepatic artery. Mild atherosclerotic disease of the lower abdominal aorta   and the bilateral common iliac arteries.  RETROPERITONEUM: No lymphadenopathy.    ABDOMINAL WALL: Tiny fat-containing umbilical hernia.  BONES: Degenerative changes.    IMPRESSION:  No aortic dissection.    Coronary artery atherosclerotic disease.    Tree-in-bud opacities at the right lung base representing foci of   impacted distal airways which may be due to mucous or may be of   infectious etiology.    Large amount of stool within the rectum.    3 cm nodule within the left lobe of the thyroid gland. Ultrasound is   recommended for further evaluation.    Irregularity of the gallbladder as described above. A nonemergent   ultrasound can be performed for complete evaluation.                  SEVERIANO IRVING M.D., RADIOLOGY RESIDENT  This document has been electronically signed.  NIRANJAN GARZA M.D. ATTENDING RADIOLOGIST  This document has been electronically signed. Jun 5 2018  9:45PM                < end of copied text >  	  < from: Transthoracic Echocardiogram (06.06.18 @ 13:26) >    Patient name: ISIS CHEEK  YOB: 1945   Age: 72 (M)   MR#: [id number]  Study Date: 6/6/2018  Location: [address] [place], [place] [id number]: Lenore Howell RDCS  Study quality: Technically fair  Referring Physician: Nathanael Ohara MD  Blood Pressure: 131/75 mmHg  Height: 165 cm  Weight: 71 kg  BSA: 1.8 m2  ------------------------------------------------------------------------  PROCEDURE: Transthoracic echocardiogram with 2-D, M-Mode  and complete spectral and color flow Doppler.  INDICATION: Chest pain, unspecified (R07.9)  ------------------------------------------------------------------------  Dimensions:    Normal Values:  LA:     2.9    2.0 - 4.0 cm  Ao:     3.3    2.0 - 3.8 cm  SEPTUM: 0.8    0.6 - 1.2 cm  PWT:    0.9    0.6 - 1.1cm  LVIDd:  4.3    3.0 - 5.6 cm  LVIDs:  2.7    1.8 - 4.0 cm  Derived variables:  LVMI: 64 g/m2  RWT: 0.41  EF (Visual Estimate): 65-70 %  ------------------------------------------------------------------------  Observations:  Mitral Valve: Mitral annular calcification. Minimal mitral  regurgitation.  Aortic Valve/Aorta: Normal trileaflet aortic valve. Minimal  aortic regurgitation.  Normal aortic root size. (Ao: 3.3 cm at the sinuses of  Valsalva).  Left Atrium: Normal left atrium.  LA volume index = 16  cc/m2.  Left Ventricle: Normal left ventricular systolic function.  No segmental wall motion abnormalities. Normal left  ventricular internal dimensions and wall thicknesses.  Reversal of the E-A waves of the mitral inflow pattern  consistent with reduced compliance of the left ventricle.  Right Heart: Normal right atrium. The right ventricle is  not well visualized; grossly normal right ventricular  systolic function. Normal tricuspid valve. Minimal  tricuspid regurgitation. Pulmonic valve not well  visualized, probably normal.  Pericardium/Pleura: No pericardial effusion seen.  Hemodynamic: Estimated right atrial pressure is 8 mm Hg.  Estimated right ventricular systolic pressure equals 27 mm  Hg, assuming right atrial pressure equals 8 mm Hg,  consistent with normal pulmonary pressures.  ------------------------------------------------------------------------  Conclusions:  1. Normal left ventricular systolic function. No segmental  wall motion abnormalities.  2. Reversal of the E-A waves of the mitral inflow pattern  consistent with reduced compliance of the left ventricle.  3. The right ventricle is not well visualized; grossly  normal right ventricular systolic function.  4. No pericardial effusion seen.  *** No previous Echo exam.  ------------------------------------------------------------------------  Confirmed on  6/6/2018 - 15:04:06 by Huseyin Mendoza M.D.  ------------------------------------------------------------------------    < end of copied text >
HPI:  72M c hx HTN, HLD, DM2, presents with left sided chest pain.    Pt states his chest pain started yesterday morning, with radiation to left arm. The pain is sharp, not exertional, not pleuritic, not positional. It has remained constant throughout the day, and is present currently, but is slowly improving. Only associated symptom is a little lightheadedness. Denies diaphoresis, palpitations, N/V, SOB, fevers, chills, diarrhea, URI symptoms. Reports chronic urinary frequency, weak stream, nocturia. Last stress test was 5 years ago that was reportedly normal. Pt states he went to see his cardiologist yesterday, who performed an EKG and in-office TTE that were both reportedly normal, but told to come to the hospital. He is able to ambulate up multiple flights of stairs without SOB or chest pain.    VS: Tm 98.8, P 82, /98, R 18, 96% RA  In the ED, received . (06 Jun 2018 02:50)  Patient has history of diabetes, on oral meds at home, no recent hypoglycemic episodes, no polyuria polydipsia. Patient follows up with PCP for diabetes management. No history of thyroid nodules, no neck pain, no neck swelling, no neck radiation.    PAST MEDICAL & SURGICAL HISTORY:  DM (diabetes mellitus)  HLD (hyperlipidemia)  HTN (hypertension)  Cataract      FAMILY HISTORY:  No pertinent family history in first degree relatives      Social History:    Outpatient Medications:    MEDICATIONS  (STANDING):  dextrose 5%. 1000 milliLiter(s) (50 mL/Hr) IV Continuous <Continuous>  dextrose 50% Injectable 12.5 Gram(s) IV Push once  docusate sodium 100 milliGRAM(s) Oral three times a day  heparin  Injectable 5000 Unit(s) SubCutaneous every 8 hours  insulin lispro (HumaLOG) corrective regimen sliding scale   SubCutaneous three times a day before meals  insulin lispro (HumaLOG) corrective regimen sliding scale   SubCutaneous at bedtime  pantoprazole    Tablet 40 milliGRAM(s) Oral before breakfast    MEDICATIONS  (PRN):  dextrose 40% Gel 15 Gram(s) Oral once PRN Blood Glucose LESS THAN 70 milliGRAM(s)/deciliter  senna 2 Tablet(s) Oral at bedtime PRN Constipation      Allergies    No Known Allergies    Intolerances      Review of Systems:  Constitutional: No fever, no chills  Eyes: No blurry vision  Neuro: No tremors  HEENT: No pain, no neck swelling  Cardiovascular: No chest pain, no palpitations  Respiratory: Has SOB, no cough  GI: No nausea, vomiting, abdominal pain  : No dysuria  Skin: no rash  MSK: Has leg swelling.  Psych: no depression  Endocrine: no polyuria, polydipsia    ALL OTHER SYSTEMS REVIEWED AND NEGATIVE    UNABLE TO OBTAIN    PHYSICAL EXAM:  VITALS: T(C): 36.3 (06-06-18 @ 13:55)  T(F): 97.4 (06-06-18 @ 13:55), Max: 98.8 (06-05-18 @ 22:39)  HR: 73 (06-06-18 @ 13:55) (64 - 82)  BP: 161/71 (06-06-18 @ 13:55) (117/58 - 185/98)  RR:  (16 - 18)  SpO2:  (96% - 100%)  Wt(kg): --  GENERAL: NAD, well-groomed, well-developed  EYES: No proptosis, no lid lag  HEENT:  Atraumatic, Normocephalic  THYROID: Normal size, no palpable nodules  RESPIRATORY: Clear to auscultation bilaterally; No rales, rhonchi, wheezing  CARDIOVASCULAR: Si S2, No murmurs;  GI: Soft, non distended, normal bowel sounds  SKIN: Dry, intact, No rashes or lesions  MUSCULOSKELETAL: Has BL lower extremity edema.  NEURO:  no tremor, sensation decreased in feet BL,    POCT Blood Glucose.: 96 mg/dL (06-06-18 @ 12:00)  POCT Blood Glucose.: 99 mg/dL (06-06-18 @ 07:50)  POCT Blood Glucose.: 95 mg/dL (06-05-18 @ 22:41)                            14.8   9.8   )-----------( 375      ( 06 Jun 2018 03:07 )             43.0       06-06    134<L>  |  97  |  13  ----------------------------<  134<H>  4.5   |  25  |  1.22    EGFR if : 68  EGFR if non : 59<L>    Ca    9.7      06-06  Mg     2.3     06-06  Phos  3.5     06-06    TPro  7.3  /  Alb  4.2  /  TBili  0.4  /  DBili  x   /  AST  16  /  ALT  21  /  AlkPhos  59  06-06      Thyroid Function Tests:  06-06 @ 06:32 TSH 2.81 FreeT4 -- T3 -- Anti TPO -- Anti Thyroglobulin Ab -- TSI --      Hemoglobin A1C, Whole Blood: 6.4 % <H> [4.0 - 5.6] (06-06-18 @ 06:32)      06-06 Chol 209<H>  HDL 39<L> Trig 208<H>    Radiology:

## 2018-06-07 NOTE — CONSULT NOTE ADULT - ASSESSMENT
71 yo man with incidental finding of gallbadder fossa calcifications concerning for porcelain gallbladder    - MR abdomen  - Cardiac stratification for possible lap fátima  - Discussed above with Dr. 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 MILADYS Back MD PGY2  Red: [id number]

## 2018-06-08 ENCOUNTER — TRANSCRIPTION ENCOUNTER (OUTPATIENT)
Age: 73
End: 2018-06-08

## 2018-06-08 VITALS — HEART RATE: 73 BPM | SYSTOLIC BLOOD PRESSURE: 146 MMHG | DIASTOLIC BLOOD PRESSURE: 72 MMHG

## 2018-06-08 LAB
ANION GAP SERPL CALC-SCNC: 9 MMOL/L — SIGNIFICANT CHANGE UP (ref 5–17)
BUN SERPL-MCNC: 14 MG/DL — SIGNIFICANT CHANGE UP (ref 7–23)
CALCIUM SERPL-MCNC: 9.3 MG/DL — SIGNIFICANT CHANGE UP (ref 8.4–10.5)
CHLORIDE SERPL-SCNC: 103 MMOL/L — SIGNIFICANT CHANGE UP (ref 96–108)
CO2 SERPL-SCNC: 24 MMOL/L — SIGNIFICANT CHANGE UP (ref 22–31)
CREAT SERPL-MCNC: 1.3 MG/DL — SIGNIFICANT CHANGE UP (ref 0.5–1.3)
GLUCOSE BLDC GLUCOMTR-MCNC: 100 MG/DL — HIGH (ref 70–99)
GLUCOSE BLDC GLUCOMTR-MCNC: 84 MG/DL — SIGNIFICANT CHANGE UP (ref 70–99)
GLUCOSE BLDC GLUCOMTR-MCNC: 92 MG/DL — SIGNIFICANT CHANGE UP (ref 70–99)
GLUCOSE SERPL-MCNC: 106 MG/DL — HIGH (ref 70–99)
POTASSIUM SERPL-MCNC: 4.8 MMOL/L — SIGNIFICANT CHANGE UP (ref 3.5–5.3)
POTASSIUM SERPL-SCNC: 4.8 MMOL/L — SIGNIFICANT CHANGE UP (ref 3.5–5.3)
SODIUM SERPL-SCNC: 136 MMOL/L — SIGNIFICANT CHANGE UP (ref 135–145)

## 2018-06-08 PROCEDURE — 83880 ASSAY OF NATRIURETIC PEPTIDE: CPT

## 2018-06-08 PROCEDURE — 76700 US EXAM ABDOM COMPLETE: CPT

## 2018-06-08 PROCEDURE — 99285 EMERGENCY DEPT VISIT HI MDM: CPT | Mod: 25

## 2018-06-08 PROCEDURE — 83735 ASSAY OF MAGNESIUM: CPT

## 2018-06-08 PROCEDURE — 74174 CTA ABD&PLVS W/CONTRAST: CPT

## 2018-06-08 PROCEDURE — 74181 MRI ABDOMEN W/O CONTRAST: CPT

## 2018-06-08 PROCEDURE — A9500: CPT

## 2018-06-08 PROCEDURE — 80061 LIPID PANEL: CPT

## 2018-06-08 PROCEDURE — 85610 PROTHROMBIN TIME: CPT

## 2018-06-08 PROCEDURE — 83036 HEMOGLOBIN GLYCOSYLATED A1C: CPT

## 2018-06-08 PROCEDURE — 84295 ASSAY OF SERUM SODIUM: CPT

## 2018-06-08 PROCEDURE — 74181 MRI ABDOMEN W/O CONTRAST: CPT | Mod: 26

## 2018-06-08 PROCEDURE — 76536 US EXAM OF HEAD AND NECK: CPT

## 2018-06-08 PROCEDURE — 93306 TTE W/DOPPLER COMPLETE: CPT

## 2018-06-08 PROCEDURE — 82330 ASSAY OF CALCIUM: CPT

## 2018-06-08 PROCEDURE — 93017 CV STRESS TEST TRACING ONLY: CPT

## 2018-06-08 PROCEDURE — 85730 THROMBOPLASTIN TIME PARTIAL: CPT

## 2018-06-08 PROCEDURE — 71275 CT ANGIOGRAPHY CHEST: CPT

## 2018-06-08 PROCEDURE — 85014 HEMATOCRIT: CPT

## 2018-06-08 PROCEDURE — 85027 COMPLETE CBC AUTOMATED: CPT

## 2018-06-08 PROCEDURE — 82550 ASSAY OF CK (CPK): CPT

## 2018-06-08 PROCEDURE — 82803 BLOOD GASES ANY COMBINATION: CPT

## 2018-06-08 PROCEDURE — 93005 ELECTROCARDIOGRAM TRACING: CPT

## 2018-06-08 PROCEDURE — 84436 ASSAY OF TOTAL THYROXINE: CPT

## 2018-06-08 PROCEDURE — 78452 HT MUSCLE IMAGE SPECT MULT: CPT

## 2018-06-08 PROCEDURE — 84132 ASSAY OF SERUM POTASSIUM: CPT

## 2018-06-08 PROCEDURE — 80053 COMPREHEN METABOLIC PANEL: CPT

## 2018-06-08 PROCEDURE — 80048 BASIC METABOLIC PNL TOTAL CA: CPT

## 2018-06-08 PROCEDURE — 82947 ASSAY GLUCOSE BLOOD QUANT: CPT

## 2018-06-08 PROCEDURE — 84443 ASSAY THYROID STIM HORMONE: CPT

## 2018-06-08 PROCEDURE — G0378: CPT

## 2018-06-08 PROCEDURE — 85379 FIBRIN DEGRADATION QUANT: CPT

## 2018-06-08 PROCEDURE — 82435 ASSAY OF BLOOD CHLORIDE: CPT

## 2018-06-08 PROCEDURE — 84480 ASSAY TRIIODOTHYRONINE (T3): CPT

## 2018-06-08 PROCEDURE — 84484 ASSAY OF TROPONIN QUANT: CPT

## 2018-06-08 PROCEDURE — 84100 ASSAY OF PHOSPHORUS: CPT

## 2018-06-08 PROCEDURE — 82553 CREATINE MB FRACTION: CPT

## 2018-06-08 PROCEDURE — 82962 GLUCOSE BLOOD TEST: CPT

## 2018-06-08 PROCEDURE — 83605 ASSAY OF LACTIC ACID: CPT

## 2018-06-08 RX ORDER — PANTOPRAZOLE SODIUM 20 MG/1
1 TABLET, DELAYED RELEASE ORAL
Qty: 30 | Refills: 0 | OUTPATIENT
Start: 2018-06-08 | End: 2018-07-07

## 2018-06-08 RX ORDER — DOCUSATE SODIUM 100 MG
1 CAPSULE ORAL
Qty: 30 | Refills: 0 | OUTPATIENT
Start: 2018-06-08 | End: 2018-06-17

## 2018-06-08 RX ORDER — ASPIRIN/CALCIUM CARB/MAGNESIUM 324 MG
1 TABLET ORAL
Qty: 30 | Refills: 0 | OUTPATIENT
Start: 2018-06-08 | End: 2018-07-07

## 2018-06-08 RX ORDER — SENNA PLUS 8.6 MG/1
2 TABLET ORAL
Qty: 20 | Refills: 0 | OUTPATIENT
Start: 2018-06-08 | End: 2018-06-17

## 2018-06-08 RX ORDER — ATORVASTATIN CALCIUM 80 MG/1
1 TABLET, FILM COATED ORAL
Qty: 30 | Refills: 0 | OUTPATIENT
Start: 2018-06-08 | End: 2018-07-07

## 2018-06-08 RX ADMIN — Medication 81 MILLIGRAM(S): at 12:49

## 2018-06-08 RX ADMIN — HEPARIN SODIUM 5000 UNIT(S): 5000 INJECTION INTRAVENOUS; SUBCUTANEOUS at 05:17

## 2018-06-08 RX ADMIN — PANTOPRAZOLE SODIUM 40 MILLIGRAM(S): 20 TABLET, DELAYED RELEASE ORAL at 05:18

## 2018-06-08 NOTE — DISCHARGE NOTE ADULT - MEDICATION SUMMARY - MEDICATIONS TO TAKE
I will START or STAY ON the medications listed below when I get home from the hospital:    aspirin 81 mg oral delayed release tablet  -- 1 tab(s) by mouth once a day  -- Indication: For Cardiac protection and stroke prevention    losartan 25 mg oral tablet  -- 1 tab(s) by mouth once a day  -- Indication: For Blood pressure control    metFORMIN 500 mg oral tablet  -- 1 tab(s) by mouth once a day  -- Indication: For Blood sugar control    atorvastatin 40 mg oral tablet  -- 1 tab(s) by mouth once a day (at bedtime)  -- Indication: For Cholesterol control    Lovaza 1000 mg oral capsule  -- 2 cap(s) by mouth 2 times a day  -- Indication: For Cholesterol control    pantoprazole 40 mg oral delayed release tablet  -- 1 tab(s) by mouth once a day (before a meal)  -- Indication: For stomach protection

## 2018-06-08 NOTE — DISCHARGE NOTE ADULT - PLAN OF CARE
Free of chest pain For the next few days, avoid physical activities that bring on chest pain. Continue physical activities as directed.  Do not smoke.  Avoid drinking alcohol.   Only take over-the-counter or prescription medicine for pain, discomfort, or fever as directed by your caregiver.  Follow your caregiver's suggestions for further testing if your chest pain does not go away.  Keep any follow-up appointments you made. If you do not go to an appointment, you could develop lasting (chronic) problems with pain. If there is any problem keeping an appointment, you must call to reschedule.   seek medical help for recurrence of symptoms Follow up with Surgery - Dr. Jerez in 1 week - call for appointment Resolved Thyroid nodule - normal TSH - will need outpatient thryoid biopsy - patient understands and agrees  Follow up PMD Take medications for your blood pressure as recommended.  Eat a heart healthy diet that is low in saturated fats and salt, and includes whole grains, fruits, vegetables and lean protein   Exercise regularly (consult with your physician or cardiologist first); maintain a heart healthy weight.   If you smoke - quit (A resource to help you stop smoking is the [facility] – phone number [phone].). Continue to follow with your primary physician or cardiologist.   Seek medical help for dizziness, Lightheadedness, Blurry vision, Headache, Chest pain, Shortness of breath  Follow up with your medical doctor to establish long term blood pressure treatment goals. Follow up with PMD in 1 week  HgA1C this admission - 6.5  Make sure you get your HgA1c checked every three months.  If you take oral diabetes medications, check your blood glucose two times a day.  It's important not to skip any meals.  Keep a log of your blood glucose results and always take it with you to your doctor appointments.  Keep a list of your current medications including injectables and over the counter medications and bring this medication list with you to all your doctor appointments.  If you have not seen your ophthalmologist this year call for appointment.  Check your feet daily for redness, sores, or openings. Do not self treat. If no improvement in two days call your primary care physician for an appointment.  Low blood sugar (hypoglycemia) is a blood sugar below 70mg/dl. Check your blood sugar if you feel signs/symptoms of hypoglycemia. If your blood sugar is below 70 take 15 grams of carbohydrates (ex 4 oz of apple juice, 3-4 glucose tablets, or 4-6 oz of regular soda) wait 15 minutes and repeat blood sugar to make sure it comes up above 70.  If your blood sugar is above 70 and you are due for a meal, have a meal.  If you are not due for a meal have a snack.  This snack helps keeps your blood sugar at a safe range. Continue with your cholesterol medications. Eat a heart healthy diet that is low in saturated fats and salt, and includes whole grains, fruits, vegetables and lean protein; exercise regularly (consult with your physician or cardiologist first); maintain a heart healthy weight; if you smoke - quit (A resource to help you stop smoking is the [facility] – phone number [phone].). Continue to follow with your primary physician or cardiologist.  Seek medical help for dizziness, Lightheadedness, Blurry vision, Headache, Chest pain, Shortness of breath

## 2018-06-08 NOTE — DISCHARGE NOTE ADULT - SECONDARY DIAGNOSIS.
Porcelain gallbladder JUSTINO (acute kidney injury) Thyroid nodule Essential hypertension Type 2 diabetes mellitus Hyperlipidemia, unspecified hyperlipidemia type

## 2018-06-08 NOTE — DISCHARGE NOTE ADULT - HOSPITAL COURSE
This is a 72 year old male with HTN HLD DM ( dx ~ 7 months ago), chronic low back pain,  with no known h/o CAD/MI - reportedly normal stress test about 5 years ago- admitted with complaints of chest pain. The patient states the chest pain is left sided and sharp in nature and occasionally radiated to his left arm but not to his back or jaw. It began yesterday after he woke up. The patient notes he walks 1/2 hour daily with no symptoms.  He went to see his new cardiologist yesterday Dr Francis ([place]) and although his EKG and TTE were reportedly normal, he was referred to the ER given the new onset of his symptoms and commorbidities.  He ruled out for ACS with no ischemia on EKG and CTA c/a/p with no dissection :   --  TTE with normal BI-V function   -- NST with no evidence of ischemia   -- c/w ASA/statin (no GI objection to statin as LFTS WNL)  -- Porcelain gallbladder - surgery appreciated - MRCP Abdomen showed limited study without intravenous contrast. Contracted gallbladder with a   small focus of restricted diffusion, underlying neoplasm is not excluded.- given 2-3% rate of malignant transformation yearly for possible eventual cholecystectomy. Follow up with Surgery - Dr. Jerez in 1 week   -- JUSTINO - resolved ; likely secondary to contrast induced nephropathy. Renal resumed Cozaar and Metformin and also regarding the right sided kidney cyst   -- Thyroid nodule - normal TSH - will need outpt thryoid biopsy - patient understands and agrees  -- HD Stable  with no evidence of CHF chronically This is a 72 year old male with HTN HLD DM ( dx ~ 7 months ago), chronic low back pain,  with no known h/o CAD/MI - reportedly normal stress test about 5 years ago- admitted with complaints of chest pain. The patient states the chest pain is left sided and sharp in nature and occasionally radiated to his left arm but not to his back or jaw. It began yesterday after he woke up. The patient notes he walks 1/2 hour daily with no symptoms.  He went to see his new cardiologist yesterday Dr Francis ([place]) and although his EKG and TTE were reportedly normal, he was referred to the ER given the new onset of his symptoms and commorbidities.  He ruled out for ACS with no ischemia on EKG and CTA c/a/p with no dissection :   --  TTE with normal BI-V function   -- NST with no evidence of ischemia   -- c/w ASA/statin (no GI objection to statin as LFTS WNL)  -- Porcelain gallbladder - surgery appreciated - MRCP Abdomen showed limited study without intravenous contrast. Contracted gallbladder with a   small focus of restricted diffusion, underlying neoplasm is not excluded.- given 2-3% rate of malignant transformation yearly for possible eventual cholecystectomy. Follow up with Surgery - Dr. Jerez in 1 week   -- JUSTINO - resolved ; likely secondary to contrast induced nephropathy. Renal resumed Cozaar and Metformin and also regarding the right sided kidney cyst   -- Thyroid nodule - normal TSH - will need outpt thryoid biopsy - patient understands and agrees  -- HD Stable  with no evidence of CHF chronically     Medically cleared for discharge with Follow up with Surgery - Dr. Jerez in 1 week This is a 72 year old male with HTN HLD DM ( dx ~ 7 months ago), chronic low back pain,  with no known h/o CAD/MI - reportedly normal stress test about 5 years ago- admitted with complaints of chest pain. The patient states the chest pain is left sided and sharp in nature and occasionally radiated to his left arm but not to his back or jaw. It began yesterday after he woke up. The patient notes he walks 1/2 hour daily with no symptoms.  He went to see his new cardiologist yesterday Dr Francis ([place]) and although his EKG and TTE were reportedly normal, he was referred to the ER given the new onset of his symptoms and commorbidities.  He ruled out for ACS with no ischemia on EKG and CTA c/a/p with no dissection :   --  TTE with normal BI-V function   -- NST with no evidence of ischemia   -- c/w ASA/statin (no GI objection to statin as LFTS WNL)  -- Porcelain gallbladder - surgery appreciated - MRCP Abdomen showed limited study without intravenous contrast. Contracted gallbladder with a   small focus of restricted diffusion, underlying neoplasm is not excluded.- given 2-3% rate of malignant transformation yearly for possible eventual cholecystectomy. Follow up with Surgery - Dr. Jerez in 1 week   -- JUSTINO - resolved ; likely secondary to contrast induced nephropathy. Renal resumed Cozaar and Metformin and also regarding the right sided kidney cyst   -- Thyroid nodule - normal TSH - will need outpt thryoid biopsy - patient understands and agrees  -- HD Stable  with no evidence of CHF chronically     Medically cleared for discharge with Follow up with Surgery - Dr. Jerez in 1 week and cardiology follow up

## 2018-06-08 NOTE — DISCHARGE NOTE ADULT - CARE PLAN
Principal Discharge DX:	Atypical chest pain  Goal:	Free of chest pain  Assessment and plan of treatment:	For the next few days, avoid physical activities that bring on chest pain. Continue physical activities as directed.  Do not smoke.  Avoid drinking alcohol.   Only take over-the-counter or prescription medicine for pain, discomfort, or fever as directed by your caregiver.  Follow your caregiver's suggestions for further testing if your chest pain does not go away.  Keep any follow-up appointments you made. If you do not go to an appointment, you could develop lasting (chronic) problems with pain. If there is any problem keeping an appointment, you must call to reschedule.   seek medical help for recurrence of symptoms  Secondary Diagnosis:	Porcelain gallbladder  Assessment and plan of treatment:	Follow up with Surgery - Dr. Jerez in 1 week - call for appointment  Secondary Diagnosis:	JUSTINO (acute kidney injury)  Assessment and plan of treatment:	Resolved  Secondary Diagnosis:	Thyroid nodule  Assessment and plan of treatment:	Thyroid nodule - normal TSH - will need outpatient thryoid biopsy - patient understands and agrees  Follow up PMD  Secondary Diagnosis:	Essential hypertension  Assessment and plan of treatment:	Take medications for your blood pressure as recommended.  Eat a heart healthy diet that is low in saturated fats and salt, and includes whole grains, fruits, vegetables and lean protein   Exercise regularly (consult with your physician or cardiologist first); maintain a heart healthy weight.   If you smoke - quit (A resource to help you stop smoking is the [facility] – phone number [phone].). Continue to follow with your primary physician or cardiologist.   Seek medical help for dizziness, Lightheadedness, Blurry vision, Headache, Chest pain, Shortness of breath  Follow up with your medical doctor to establish long term blood pressure treatment goals.  Secondary Diagnosis:	Type 2 diabetes mellitus  Assessment and plan of treatment:	Follow up with PMD in 1 week  HgA1C this admission - 6.5  Make sure you get your HgA1c checked every three months.  If you take oral diabetes medications, check your blood glucose two times a day.  It's important not to skip any meals.  Keep a log of your blood glucose results and always take it with you to your doctor appointments.  Keep a list of your current medications including injectables and over the counter medications and bring this medication list with you to all your doctor appointments.  If you have not seen your ophthalmologist this year call for appointment.  Check your feet daily for redness, sores, or openings. Do not self treat. If no improvement in two days call your primary care physician for an appointment.  Low blood sugar (hypoglycemia) is a blood sugar below 70mg/dl. Check your blood sugar if you feel signs/symptoms of hypoglycemia. If your blood sugar is below 70 take 15 grams of carbohydrates (ex 4 oz of apple juice, 3-4 glucose tablets, or 4-6 oz of regular soda) wait 15 minutes and repeat blood sugar to make sure it comes up above 70.  If your blood sugar is above 70 and you are due for a meal, have a meal.  If you are not due for a meal have a snack.  This snack helps keeps your blood sugar at a safe range.  Secondary Diagnosis:	Hyperlipidemia, unspecified hyperlipidemia type  Assessment and plan of treatment:	Continue with your cholesterol medications. Eat a heart healthy diet that is low in saturated fats and salt, and includes whole grains, fruits, vegetables and lean protein; exercise regularly (consult with your physician or cardiologist first); maintain a heart healthy weight; if you smoke - quit (A resource to help you stop smoking is the [facility] – phone number [phone].). Continue to follow with your primary physician or cardiologist.  Seek medical help for dizziness, Lightheadedness, Blurry vision, Headache, Chest pain, Shortness of breath

## 2018-06-08 NOTE — PROGRESS NOTE ADULT - PROBLEM SELECTOR PLAN 3
-defer to primary medicine team.
-defer to primary medicine team.
On meds primary team following up
On meds primary team following up

## 2018-06-08 NOTE — DISCHARGE NOTE ADULT - ADDITIONAL INSTRUCTIONS
Follow up with Surgery - Dr. Jerez in 1 week - call for appointment  Will need outpatient thryoid biopsy - patient understands and agrees. Follow up PMD  Follow up with cardiology - Dr. Francis in 1 week - call for appointment Follow up with Surgery - Dr. Jerez in 1 week - call for appointment  Will need outpatient thryoid biopsy - patient understands and agrees. Follow up PMD  Follow up with cardiology - Dr. Francis in 1 week - call for appointment  Follow up with gastroenterology in 1 week - call for appointment

## 2018-06-08 NOTE — PROGRESS NOTE ADULT - PROBLEM SELECTOR PLAN 1
Will continue current insulin regimen for now. Will continue monitoring FS, log, will Follow up.  DC home on home DM meds. FU 1 month.  Patient counseled for compliance with consistent low carb diet.
Will continue current insulin regimen for now. Will continue monitoring FS, log, will Follow up.  Patient counseled for compliance with consistent low carb diet.
ct reviewed patient abnormal gb ? calcifications  u/s suggestive of porcelain gb  recommend surgery eval for cholecystectomy as this incurs about a 3% risk of malignant transformation
ct reviewed patient abnormal gb ? calcifications  u/s suggestive of porcelain gb  surgery eval for cholecystectomy appreciated as this incurs about a 3% risk of malignant transformation  f/u MRI abd without contrast

## 2018-06-08 NOTE — PROGRESS NOTE ADULT - SUBJECTIVE AND OBJECTIVE BOX
Chief complaint  Patient is a 72y old  Male who presents with a chief complaint of chest pain (06 Jun 2018 02:50)   Review of systems  Patient in bed, looks comfortable, no fever, no hypoglycemia.    Labs and Fingersticks  CAPILLARY BLOOD GLUCOSE      POCT Blood Glucose.: 100 mg/dL (07 Jun 2018 07:46)  POCT Blood Glucose.: 141 mg/dL (06 Jun 2018 21:26)  POCT Blood Glucose.: 75 mg/dL (06 Jun 2018 16:14)  POCT Blood Glucose.: 96 mg/dL (06 Jun 2018 12:00)      Anion Gap, Serum: 13 (06-07 @ 05:47)  Anion Gap, Serum: 12 (06-06 @ 03:07)  Anion Gap, Serum: 12 (06-05 @ 18:59)    Hemoglobin A1C, Whole Blood: 6.5 <H> (06-06 @ 13:48)  Hemoglobin A1C, Whole Blood: 6.4 <H> (06-06 @ 06:32)    Calcium, Total Serum: 9.5 (06-07 @ 05:47)  Calcium, Total Serum: 9.7 (06-06 @ 03:07)  Calcium, Total Serum: 10.0 (06-05 @ 18:59)  Albumin, Serum: 4.2 (06-06 @ 03:07)  Albumin, Serum: 4.5 (06-05 @ 18:59)    Alanine Aminotransferase (ALT/SGPT): 21 (06-06 @ 03:07)  Alanine Aminotransferase (ALT/SGPT): 21 (06-05 @ 18:59)  Alkaline Phosphatase, Serum: 59 (06-06 @ 03:07)  Alkaline Phosphatase, Serum: 64 (06-05 @ 18:59)  Aspartate Aminotransferase (AST/SGOT): 16 (06-06 @ 03:07)  Aspartate Aminotransferase (AST/SGOT): 18 (06-05 @ 18:59)        06-07    136  |  101  |  25<H>  ----------------------------<  103<H>  4.9   |  22  |  1.79<H>    Ca    9.5      07 Jun 2018 05:47  Phos  3.5     06-06  Mg     2.4     06-07    TPro  7.3  /  Alb  4.2  /  TBili  0.4  /  DBili  x   /  AST  16  /  ALT  21  /  AlkPhos  59  06-06                        13.3   9.90  )-----------( 332      ( 07 Jun 2018 07:42 )             39.1     Medications  MEDICATIONS  (STANDING):  aspirin enteric coated 81 milliGRAM(s) Oral daily  dextrose 5%. 1000 milliLiter(s) (50 mL/Hr) IV Continuous <Continuous>  dextrose 50% Injectable 12.5 Gram(s) IV Push once  docusate sodium 100 milliGRAM(s) Oral three times a day  heparin  Injectable 5000 Unit(s) SubCutaneous every 8 hours  insulin lispro (HumaLOG) corrective regimen sliding scale   SubCutaneous three times a day before meals  insulin lispro (HumaLOG) corrective regimen sliding scale   SubCutaneous at bedtime  pantoprazole    Tablet 40 milliGRAM(s) Oral before breakfast      Physical Exam  General: Patient comfortable in bed  Vital Signs Last 12 Hrs  T(F): 98 (06-07-18 @ 05:00), Max: 98 (06-07-18 @ 05:00)  HR: 71 (06-07-18 @ 05:00) (71 - 71)  BP: 109/59 (06-07-18 @ 05:00) (109/59 - 109/59)  BP(mean): --  RR: 18 (06-07-18 @ 05:00) (18 - 18)  SpO2: 98% (06-07-18 @ 05:00) (98% - 98%)  Neck: No palpable thyroid nodules.  CVS: S1S2, No murmurs  Respiratory: No wheezing, no crepitations  GI: Abdomen soft, bowel sounds positive  Musculoskeletal:  edema lower extremities.   Skin: No skin rashes, no ecchymosis    Diagnostics
Chief complaint  Patient is a 72y old  Male who presents with a chief complaint of chest pain (06 Jun 2018 02:50)   Review of systems  Patient in bed, looks comfortable, no fever, no hypoglycemia.    Labs and Fingersticks  CAPILLARY BLOOD GLUCOSE      POCT Blood Glucose.: 100 mg/dL (08 Jun 2018 07:43)  POCT Blood Glucose.: 148 mg/dL (07 Jun 2018 22:16)  POCT Blood Glucose.: 133 mg/dL (07 Jun 2018 16:36)  POCT Blood Glucose.: 141 mg/dL (07 Jun 2018 12:06)      Anion Gap, Serum: 9 (06-08 @ 06:10)  Anion Gap, Serum: 13 (06-07 @ 05:47)    Hemoglobin A1C, Whole Blood: 6.5 <H> (06-06 @ 13:48)    Calcium, Total Serum: 9.3 (06-08 @ 06:10)  Calcium, Total Serum: 9.5 (06-07 @ 05:47)          06-08    136  |  103  |  14  ----------------------------<  106<H>  4.8   |  24  |  1.30    Ca    9.3      08 Jun 2018 06:10  Mg     2.4     06-07                          13.3   9.90  )-----------( 332      ( 07 Jun 2018 07:42 )             39.1     Medications  MEDICATIONS  (STANDING):  aspirin enteric coated 81 milliGRAM(s) Oral daily  atorvastatin 40 milliGRAM(s) Oral at bedtime  dextrose 5%. 1000 milliLiter(s) (50 mL/Hr) IV Continuous <Continuous>  dextrose 50% Injectable 12.5 Gram(s) IV Push once  docusate sodium 100 milliGRAM(s) Oral three times a day  heparin  Injectable 5000 Unit(s) SubCutaneous every 8 hours  insulin lispro (HumaLOG) corrective regimen sliding scale   SubCutaneous three times a day before meals  insulin lispro (HumaLOG) corrective regimen sliding scale   SubCutaneous at bedtime  pantoprazole    Tablet 40 milliGRAM(s) Oral before breakfast      Physical Exam  General: Patient comfortable in bed  Vital Signs Last 12 Hrs  T(F): 97.7 (06-08-18 @ 04:19), Max: 97.7 (06-08-18 @ 04:19)  HR: 70 (06-08-18 @ 04:19) (70 - 70)  BP: 125/81 (06-08-18 @ 04:19) (125/81 - 125/81)  BP(mean): --  RR: 17 (06-08-18 @ 04:19) (17 - 17)  SpO2: 99% (06-08-18 @ 04:19) (99% - 99%)  Neck: No palpable thyroid nodules.  CVS: S1S2, No murmurs  Respiratory: No wheezing, no crepitations  GI: Abdomen soft, bowel sounds positive  Musculoskeletal:  edema lower extremities.   Skin: No skin rashes, no ecchymosis    Diagnostics
INTERVAL HPI/OVERNIGHT EVENTS: Pt seen and examined. Feeling well. Denies abdominal pain. CT and US showed calcified gallbladder, awaiting MRI.     MEDICATIONS  (STANDING):  aspirin enteric coated 81 milliGRAM(s) Oral daily  atorvastatin 40 milliGRAM(s) Oral at bedtime  dextrose 5%. 1000 milliLiter(s) (50 mL/Hr) IV Continuous <Continuous>  dextrose 50% Injectable 12.5 Gram(s) IV Push once  docusate sodium 100 milliGRAM(s) Oral three times a day  heparin  Injectable 5000 Unit(s) SubCutaneous every 8 hours  insulin lispro (HumaLOG) corrective regimen sliding scale   SubCutaneous three times a day before meals  insulin lispro (HumaLOG) corrective regimen sliding scale   SubCutaneous at bedtime  pantoprazole    Tablet 40 milliGRAM(s) Oral before breakfast    MEDICATIONS  (PRN):  dextrose 40% Gel 15 Gram(s) Oral once PRN Blood Glucose LESS THAN 70 milliGRAM(s)/deciliter  senna 2 Tablet(s) Oral at bedtime PRN Constipation      Vital Signs Last 24 Hrs  T(C): 36.5 (08 Jun 2018 04:19), Max: 36.5 (07 Jun 2018 20:02)  T(F): 97.7 (08 Jun 2018 04:19), Max: 97.7 (07 Jun 2018 20:02)  HR: 70 (08 Jun 2018 04:19) (68 - 70)  BP: 125/81 (08 Jun 2018 04:19) (125/81 - 134/66)  BP(mean): --  RR: 17 (08 Jun 2018 04:19) (17 - 17)  SpO2: 99% (08 Jun 2018 04:19) (93% - 99%)    PHYSICAL EXAM:      Constitutional: NAD    Gastrointestinal: Abd soft, NT, ND            I&O's Detail    07 Jun 2018 07:01  -  08 Jun 2018 07:00  --------------------------------------------------------  IN:    Oral Fluid: 1560 mL  Total IN: 1560 mL    OUT:    Voided: 500 mL  Total OUT: 500 mL    Total NET: 1060 mL      08 Jun 2018 07:01  -  08 Jun 2018 10:31  --------------------------------------------------------  IN:    Oral Fluid: 240 mL  Total IN: 240 mL    OUT:  Total OUT: 0 mL    Total NET: 240 mL          LABS:                        13.3   9.90  )-----------( 332      ( 07 Jun 2018 07:42 )             39.1     06-08    136  |  103  |  14  ----------------------------<  106<H>  4.8   |  24  |  1.30    Ca    9.3      08 Jun 2018 06:10  Mg     2.4     06-07            RADIOLOGY & ADDITIONAL STUDIES:
Interval Events: No new overnight event.  No N/V/D.  Tolerating diet.      MEDICATIONS  (STANDING):  aspirin enteric coated 81 milliGRAM(s) Oral daily  dextrose 5%. 1000 milliLiter(s) (50 mL/Hr) IV Continuous <Continuous>  dextrose 50% Injectable 12.5 Gram(s) IV Push once  docusate sodium 100 milliGRAM(s) Oral three times a day  heparin  Injectable 5000 Unit(s) SubCutaneous every 8 hours  insulin lispro (HumaLOG) corrective regimen sliding scale   SubCutaneous three times a day before meals  insulin lispro (HumaLOG) corrective regimen sliding scale   SubCutaneous at bedtime  pantoprazole    Tablet 40 milliGRAM(s) Oral before breakfast    MEDICATIONS  (PRN):  dextrose 40% Gel 15 Gram(s) Oral once PRN Blood Glucose LESS THAN 70 milliGRAM(s)/deciliter  senna 2 Tablet(s) Oral at bedtime PRN Constipation      Allergies    No Known Allergies    Intolerances        Review of Systems:    General:  No wt loss, fevers, chills, night sweats,fatigue,   Eyes:  Good vision, no reported pain  ENT:  No sore throat, pain, runny nose, dysphagia  CV:  No pain, palpitations, hypo/hypertension  Resp:  No dyspnea, cough, tachypnea, wheezing  GI:  No pain, No nausea, No vomiting, No diarrhea, No constipation, No weight loss, No fever, No pruritis, No rectal bleeding, No melena, No dysphagia  :  No pain, bleeding, incontinence, nocturia  Muscle:  No pain, weakness  Neuro:  No weakness, tingling, memory problems  Psych:  No fatigue, insomnia, mood problems, depression  Endocrine:  No polyuria, polydypsia, cold/heat intolerance  Heme:  No petechiae, ecchymosis, easy bruisability  Skin:  No rash, tattoos, scars, edema      Vital Signs Last 24 Hrs  T(C): 36.7 (07 Jun 2018 05:00), Max: 36.7 (07 Jun 2018 05:00)  T(F): 98 (07 Jun 2018 05:00), Max: 98 (07 Jun 2018 05:00)  HR: 71 (07 Jun 2018 05:00) (70 - 73)  BP: 109/59 (07 Jun 2018 05:00) (103/58 - 161/71)  BP(mean): --  RR: 18 (07 Jun 2018 05:00) (17 - 18)  SpO2: 98% (07 Jun 2018 05:00) (97% - 99%)    PHYSICAL EXAM:    Constitutional: NAD, well-developed  HEENT: EOMI, throat clear  Neck: No LAD, supple  Respiratory: CTA and P  Cardiovascular: S1 and S2, RRR, no M  Gastrointestinal: BS+, soft, NT/ND, neg HSM,  Extremities: No peripheral edema, neg clubing, cyanosis  Vascular: 2+ peripheral pulses  Neurological: A/O x 3, no focal deficits  Psychiatric: Normal mood, normal affect  Skin: No rashes      LABS:                        13.3   9.90  )-----------( 332      ( 07 Jun 2018 07:42 )             39.1     06-07    136  |  101  |  25<H>  ----------------------------<  103<H>  4.9   |  22  |  1.79<H>    Ca    9.5      07 Jun 2018 05:47  Phos  3.5     06-06  Mg     2.4     06-07    TPro  7.3  /  Alb  4.2  /  TBili  0.4  /  DBili  x   /  AST  16  /  ALT  21  /  AlkPhos  59  06-06    PT/INR - ( 06 Jun 2018 03:07 )   PT: 11.2 sec;   INR: 1.04 ratio         PTT - ( 06 Jun 2018 03:07 )  PTT:29.8 sec      RADIOLOGY & ADDITIONAL TESTS:
No pain, no shortness of breath      VITAL:  T(C): , Max: 36.5 (06-07-18 @ 20:02)  T(F): , Max: 97.7 (06-07-18 @ 20:02)  HR: 70 (06-08-18 @ 04:19)  BP: 125/81 (06-08-18 @ 04:19)  BP(mean): --  RR: 17 (06-08-18 @ 04:19)  SpO2: 99% (06-08-18 @ 04:19)      PHYSICAL EXAM:  Constitutional: NAD  HEENT: PERRLA    Neck:  No JVD  Respiratory: CTAB/L  Cardiovascular: S1 and S2 RRR no murmur  Gastrointestinal: BS+, soft, NT/ND  Extremities: No peripheral edema  Neurological: A/O x 3, no focal deficits  Psychiatric: Normal mood, normal affect  : No Mac  Skin: No rashes      LABS:                        13.3   9.90  )-----------( 332      ( 07 Jun 2018 07:42 )             39.1     Na(136)/K(4.8)/Cl(103)/HCO3(24)/BUN(14)/Cr(1.30)Glu(106)/Ca(9.3)/Mg(--)/PO4(--)    06-08 @ 06:10  Na(136)/K(4.9)/Cl(101)/HCO3(22)/BUN(25)/Cr(1.79)Glu(103)/Ca(9.5)/Mg(2.4)/PO4(--)    06-07 @ 05:47  Na(134)/K(4.5)/Cl(97)/HCO3(25)/BUN(13)/Cr(1.22)Glu(134)/Ca(9.7)/Mg(2.3)/PO4(3.5)    06-06 @ 03:07  Na(133)/K(4.7)/Cl(97)/HCO3(24)/BUN(14)/Cr(1.35)Glu(115)/Ca(10.0)/Mg(--)/PO4(--)    06-05 @ 18:59      IMPRESSION: 72M w/ HTN, DM, and HLD, 6/5/18 a/w CP/lightheadedness, c/b JUSTINO  (1)Renal - JUSTINO - contrast nephropathy from CT - resolving  (2)Cardiac - a/w CP - no need for cardiac cath at this point; stress test was a "probably normal study"     RECOMMEND:  (1)Can add back low-dose ARB  (2)Can add back metformin  (3)No renal objection to discharge - could f/u at my office in 1-2 months              Yamil Humphreys MD  [facility]  [phone]
No pain, no shortness of breath      VITAL:  T(C): , Max: 36.7 (06-07-18 @ 05:00)  T(F): , Max: 98 (06-07-18 @ 05:00)  HR: 68 (06-07-18 @ 14:13)  BP: 134/62 (06-07-18 @ 14:13)  BP(mean): --  RR: 17 (06-07-18 @ 14:13)  SpO2: 93% (06-07-18 @ 14:13)      PHYSICAL EXAM:  Constitutional: NAD  HEENT: PERRLA    Neck:  No JVD  Respiratory: CTAB/L  Cardiovascular: S1 and S2 RRR no murmur  Gastrointestinal: BS+, soft, NT/ND  Extremities: No peripheral edema  Neurological: A/O x 3, no focal deficits  Psychiatric: Normal mood, normal affect  : No Mac  Skin: No rashes      LABS:                        13.3   9.90  )-----------( 332      ( 07 Jun 2018 07:42 )             39.1     Na(136)/K(4.9)/Cl(101)/HCO3(22)/BUN(25)/Cr(1.79)Glu(103)/Ca(9.5)/Mg(2.4)/PO4(--)    06-07 @ 05:47  Na(134)/K(4.5)/Cl(97)/HCO3(25)/BUN(13)/Cr(1.22)Glu(134)/Ca(9.7)/Mg(2.3)/PO4(3.5)    06-06 @ 03:07  Na(133)/K(4.7)/Cl(97)/HCO3(24)/BUN(14)/Cr(1.35)Glu(115)/Ca(10.0)/Mg(--)/PO4(--)    06-05 @ 18:59        IMPRESSION: 72M w/ HTN, DM, and HLD, 6/5/18 a/w CP/lightheadedness, now c/b JUSTINO    (1)Renal - JUSTINO as of today - highly likely contrast nephropathy  (2)Cardiac - a/w CP - no need for cardiac cath at this point; stress test was a "probably normal study"     RECOMMEND:  (1)No ACEI/ARB for now  (2)No IVF/No diuretics for now  (3)BMP daily  (4)No discharge for now          Yamil Humphreys MD  [facility],   [phone]
SUBJECTIVE: Patient with no anginal chest pain or shortness of breath  ROS otherwise negative       MEDICATIONS  (STANDING):  aspirin enteric coated 81 milliGRAM(s) Oral daily  atorvastatin 40 milliGRAM(s) Oral at bedtime  dextrose 5%. 1000 milliLiter(s) (50 mL/Hr) IV Continuous <Continuous>  dextrose 50% Injectable 12.5 Gram(s) IV Push once  docusate sodium 100 milliGRAM(s) Oral three times a day  heparin  Injectable 5000 Unit(s) SubCutaneous every 8 hours  insulin lispro (HumaLOG) corrective regimen sliding scale   SubCutaneous three times a day before meals  insulin lispro (HumaLOG) corrective regimen sliding scale   SubCutaneous at bedtime  pantoprazole    Tablet 40 milliGRAM(s) Oral before breakfast    MEDICATIONS  (PRN):  dextrose 40% Gel 15 Gram(s) Oral once PRN Blood Glucose LESS THAN 70 milliGRAM(s)/deciliter  senna 2 Tablet(s) Oral at bedtime PRN Constipation      LABS:                        13.3   9.90  )-----------( 332      ( 07 Jun 2018 07:42 )             39.1     Hemoglobin: 13.3 g/dL (06-07 @ 07:42)  Hemoglobin: 14.8 g/dL (06-06 @ 03:07)  Hemoglobin: 15.0 g/dL (06-05 @ 18:59)    06-08    136  |  103  |  14  ----------------------------<  106<H>  4.8   |  24  |  1.30    Ca    9.3      08 Jun 2018 06:10  Mg     2.4     06-07      Creatinine Trend: 1.30<--, 1.79<--, 1.22<--, 1.35<--     CARDIAC MARKERS ( 06 Jun 2018 03:07 )  x     / <0.01 ng/mL / 103 U/L / x     / 1.5 ng/mL  CARDIAC MARKERS ( 05 Jun 2018 18:59 )  x     / <0.01 ng/mL / x     / x     / x            PHYSICAL EXAM  Vital Signs Last 24 Hrs  T(C): 36.5 (08 Jun 2018 04:19), Max: 36.5 (07 Jun 2018 20:02)  T(F): 97.7 (08 Jun 2018 04:19), Max: 97.7 (07 Jun 2018 20:02)  HR: 70 (08 Jun 2018 04:19) (68 - 70)  BP: 125/81 (08 Jun 2018 04:19) (125/81 - 134/66)  BP(mean): --  RR: 17 (08 Jun 2018 04:19) (17 - 17)  SpO2: 99% (08 Jun 2018 04:19) (93% - 99%)          HEENT: Normal Oral mucosa, PERRL, EOMI  Lymphatic: No obvious lymphadenopathy, No edema  Cardiovascular: Normal S1S2, No JVD, 1/6 DARYA, Peripheral pulses palpable 2+ B/L  Respiratory: Lungs clear to auscultation, normal effort  Gastrointestinal: Abdomen soft, ND, NT, +BS  Skin: Warm, dry, intact. No cyanosis, No rash.  Musculoskeletal: Normal ROM, normal strength  Psychiatric: Appropriate Mood and Affect      DIAGNOSTIC DATA  TELEMETRY: NSR 60-70s     RADIOLOGY:    CT Angio Abdomen and Pelvis w/ IV Cont (06.05.18 @ 20:01) >  IMPRESSION:  No aortic dissection.    Coronary artery atherosclerotic disease.    Tree-in-bud opacities at the right lung base representing foci of   impacted distal airways which may be due to mucous or may be of   infectious etiology.    Large amount of stool within the rectum.    3 cm nodule within the left lobe of the thyroid gland. Ultrasound is   recommended for further evaluation.    Irregularity of the gallbladder as described above. A nonemergent   ultrasound can be performed for complete evaluation.    Abdominal U/s:  Diffuse echogenicity of the hepatic parenchyma was consistent with   diffuse hepatic steatosis.    Calcification in the gallbladder fossa posterior acoustical shadowing   appears to represent cholelithiasis and gallbladder wall calcification.   In view of the fact that there is partial calcification of the   gallbladder wall which is identified on CT examination, a porcelain   gallbladder is diagnosed. Recommend continued close follow-up.       Right kidney: 10.4 cm. No hydronephrosis. Cannot exclude mild increase in   the echogenicity of the renal parenchyma. There is a mid pole right renal   cyst measuring 4.6 cm x 4.4 cm x 4.1 cm.    Left kidney: 10.7 cm.  No hydronephrosis. Cannot exclude mild increase in   the echogenicity of the renal parenchyma.        Thyroid U/s:   Left lobe measures 6.4 cm x 3.3 cm x 2.5 cm. There is a solid and cystic   nodule located inferiorly at the left lobe of the thyroid gland measuring   4.1 cm x 4.2 cm x 2.8 cm.   Recommend ultrasound-guided biopsy.      Transthoracic Echocardiogram (06.06.18 @ 13:26) >  Conclusions:  1. Normal left ventricular systolic function. No segmental  wall motion abnormalities.  2. Reversal of the E-A waves of the mitral inflow pattern  consistent with reduced compliance of the left ventricle.  3. The right ventricle is not well visualized; grossly  normal right ventricular systolic function.  4. No pericardial effusion seen.  *** No previous Echo exam.    < from: Nuclear Stress Test-Exercise (06.07.18 @ 10:42) >  NUCLEAR FINDINGS:  Review of raw data shows: Minor motion artifact.  The left ventricle was normal in size. There are mild  defects in the inferior, inferoapical, apical lateral, and  basal anterolateral walls that are fixed, predominantly  correct with prone imaging, and demonstrate preserved wall  motion suggestive of attenuation artifact.  No overt perfusion evidence of myocardial ischemia.    < end of copied text >      ASSESSMENT AND PLAN: This is a 72 year old male with HTN HLD DM ( dx ~ 7 months ago), chronic low back pain,  with no known h/o CAD/MI - reportedly normal stress test about 5 years ago- admitted with complaints of chest pain. The patient states the chest pain is left sided and sharp in nature and occasionally radiated to his left arm but not to his back or jaw. It began yesterday after he woke up.  It last all day and was not worse with exertion nor was it associated with dyspnea , palpitations, syncope or near syncope. The patient notes he walks 1/2 hour daily with no symptoms.  He went to see his new cardiologist yesterday Dr Francis ([place]) and although his EKG and TTE were reportedly normal, he was referred to the ER given the new onset of his symptoms and commorbidities.  He ruled out for ACS with no ischemia on EKG and CTA c/a/p with no dissection :     --  TTE with normal BI-V function   -- NST with no evidence of ischemia   -- c/w ASA/statin (no GI objection to statin as LFTS WNL)  -- Porcelain gallbladder - surgery appreciated - f/u MRCP - given 2-3% rate of malignant transformation yearly for possible eventual cholecystectomy   -- JUSTINO - resolved ; likely secondary to contrast induced nephropathy            - f/u renal regarding when the patient can resume Cozaar and Metformin and also regarding the right sided kidney cyst   -- Thyroid nodule - normal TSH - will need outpt thryoid biopsy - patient understands and agrees  -- HD Stable  with no evidence of CHF chronically   -- dc planning when cleared by surgery and renal    Soraya Elaine RPA-C
SUBJECTIVE: Patient with no anginal chest pain or shortness of breath  ROS otherwise negative       MEDICATIONS  (STANDING):  aspirin enteric coated 81 milliGRAM(s) Oral daily  dextrose 5%. 1000 milliLiter(s) (50 mL/Hr) IV Continuous <Continuous>  dextrose 50% Injectable 12.5 Gram(s) IV Push once  docusate sodium 100 milliGRAM(s) Oral three times a day  heparin  Injectable 5000 Unit(s) SubCutaneous every 8 hours  insulin lispro (HumaLOG) corrective regimen sliding scale   SubCutaneous three times a day before meals  insulin lispro (HumaLOG) corrective regimen sliding scale   SubCutaneous at bedtime  losartan 25 milliGRAM(s) Oral daily  pantoprazole    Tablet 40 milliGRAM(s) Oral before breakfast    MEDICATIONS  (PRN):  dextrose 40% Gel 15 Gram(s) Oral once PRN Blood Glucose LESS THAN 70 milliGRAM(s)/deciliter  senna 2 Tablet(s) Oral at bedtime PRN Constipation      LABS:                        14.8   9.8   )-----------( 375      ( 06 Jun 2018 03:07 )             43.0       06-07    136  |  101  |  25<H>  ----------------------------<  103<H>  4.9   |  22  |  1.79<H>    Ca    9.5      07 Jun 2018 05:47  Phos  3.5     06-06  Mg     2.4     06-07    TPro  7.3  /  Alb  4.2  /  TBili  0.4  /  DBili  x   /  AST  16  /  ALT  21  /  AlkPhos  59  06-06        CARDIAC MARKERS ( 06 Jun 2018 03:07 )  x     / <0.01 ng/mL / 103 U/L / x     / 1.5 ng/mL  CARDIAC MARKERS ( 05 Jun 2018 18:59 )  x     / <0.01 ng/mL / x     / x     / x          Serum Pro-Brain Natriuretic Peptide: 28 pg/mL (06-06 @ 03:07)    Thyroid Stimulating Hormone, Serum: 2.12 uIU/mL (06.06.18 @ 13:32)      PHYSICAL EXAM:  Vital Signs Last 24 Hrs  T(C): 36.7 (07 Jun 2018 05:00), Max: 36.7 (07 Jun 2018 05:00)  T(F): 98 (07 Jun 2018 05:00), Max: 98 (07 Jun 2018 05:00)  HR: 71 (07 Jun 2018 05:00) (70 - 73)  BP: 109/59 (07 Jun 2018 05:00) (103/58 - 161/71)  BP(mean): --  RR: 18 (07 Jun 2018 05:00) (17 - 18)  SpO2: 98% (07 Jun 2018 05:00) (97% - 99%)    HEENT: Normal Oral mucosa, PERRL, EOMI  Lymphatic: No obvious lymphadenopathy, No edema  Cardiovascular: Normal S1S2, No JVD, 1/6 DARYA, Peripheral pulses palpable 2+ B/L  Respiratory: Lungs clear to auscultation, normal effort  Gastrointestinal: Abdomen soft, ND, NT, +BS  Skin: Warm, dry, intact. No cyanosis, No rash.  Musculoskeletal: Normal ROM, normal strength  Psychiatric: Appropriate Mood and Affect      DIAGNOSTIC DATA  TELEMETRY: NSR 60-70s     RADIOLOGY:    CT Angio Abdomen and Pelvis w/ IV Cont (06.05.18 @ 20:01) >  IMPRESSION:  No aortic dissection.    Coronary artery atherosclerotic disease.    Tree-in-bud opacities at the right lung base representing foci of   impacted distal airways which may be due to mucous or may be of   infectious etiology.    Large amount of stool within the rectum.    3 cm nodule within the left lobe of the thyroid gland. Ultrasound is   recommended for further evaluation.    Irregularity of the gallbladder as described above. A nonemergent   ultrasound can be performed for complete evaluation.    Abdominal U/s:  Diffuse echogenicity of the hepatic parenchyma was consistent with   diffuse hepatic steatosis.    Calcification in the gallbladder fossa posterior acoustical shadowing   appears to represent cholelithiasis and gallbladder wall calcification.   In view of the fact that there is partial calcification of the   gallbladder wall which is identified on CT examination, a porcelain   gallbladder is diagnosed. Recommend continued close follow-up.       Right kidney: 10.4 cm. No hydronephrosis. Cannot exclude mild increase in   the echogenicity of the renal parenchyma. There is a mid pole right renal   cyst measuring 4.6 cm x 4.4 cm x 4.1 cm.    Left kidney: 10.7 cm.  No hydronephrosis. Cannot exclude mild increase in   the echogenicity of the renal parenchyma.        Thyroid U/s:   Left lobe measures 6.4 cm x 3.3 cm x 2.5 cm. There is a solid and cystic   nodule located inferiorly at the left lobe of the thyroid gland measuring   4.1 cm x 4.2 cm x 2.8 cm.   Recommend ultrasound-guided biopsy.      Transthoracic Echocardiogram (06.06.18 @ 13:26) >  Conclusions:  1. Normal left ventricular systolic function. No segmental  wall motion abnormalities.  2. Reversal of the E-A waves of the mitral inflow pattern  consistent with reduced compliance of the left ventricle.  3. The right ventricle is not well visualized; grossly  normal right ventricular systolic function.  4. No pericardial effusion seen.  *** No previous Echo exam.        ASSESSMENT AND PLAN: This is a 72 year old male with HTN HLD DM ( dx ~ 7 months ago), chronic low back pain,  with no known h/o CAD/MI - reportedly normal stress test about 5 years ago- admitted with complaints of chest pain. The patient states the chest pain is left sided and sharp in nature and occasionally radiated to his left arm but not to his back or jaw. It began yesterday after he woke up.  It last all day and was not worse with exertion nor was it associated with dyspnea , palpitations, syncope or near syncope. The patient notes he walks 1/2 hour daily with no symptoms.  He went to see his new cardiologist yesterday Dr Francis ([place]) and although his EKG and TTE were reportedly normal, he was referred to the ER given the new onset of his symptoms and commorbidities.  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
Interval Events: Pt seen laying comfortably in bed, he is without complaints.       MEDICATIONS  (STANDING):  aspirin enteric coated 81 milliGRAM(s) Oral daily  atorvastatin 40 milliGRAM(s) Oral at bedtime  dextrose 5%. 1000 milliLiter(s) (50 mL/Hr) IV Continuous <Continuous>  dextrose 50% Injectable 12.5 Gram(s) IV Push once  docusate sodium 100 milliGRAM(s) Oral three times a day  heparin  Injectable 5000 Unit(s) SubCutaneous every 8 hours  insulin lispro (HumaLOG) corrective regimen sliding scale   SubCutaneous three times a day before meals  insulin lispro (HumaLOG) corrective regimen sliding scale   SubCutaneous at bedtime  pantoprazole    Tablet 40 milliGRAM(s) Oral before breakfast    MEDICATIONS  (PRN):  dextrose 40% Gel 15 Gram(s) Oral once PRN Blood Glucose LESS THAN 70 milliGRAM(s)/deciliter  senna 2 Tablet(s) Oral at bedtime PRN Constipation      Allergies    No Known Allergies    Intolerances        Review of Systems:    General:  No wt loss, fevers, chills, night sweats,fatigue,   Eyes:  Good vision, no reported pain  ENT:  No sore throat, pain, runny nose, dysphagia  CV:  No pain, palpitations, hypo/hypertension  Resp:  No dyspnea, cough, tachypnea, wheezing  GI:  No pain, No nausea, No vomiting, No diarrhea, No constipation, No weight loss, No fever, No pruritis, No rectal bleeding, No melena, No dysphagia  :  No pain, bleeding, incontinence, nocturia  Muscle:  No pain, weakness  Neuro:  No weakness, tingling, memory problems  Psych:  No fatigue, insomnia, mood problems, depression  Endocrine:  No polyuria, polydypsia, cold/heat intolerance  Heme:  No petechiae, ecchymosis, easy bruisability  Skin:  No rash, tattoos, scars, edema      Vital Signs Last 24 Hrs  T(C): 36.5 (08 Jun 2018 04:19), Max: 36.5 (07 Jun 2018 20:02)  T(F): 97.7 (08 Jun 2018 04:19), Max: 97.7 (07 Jun 2018 20:02)  HR: 70 (08 Jun 2018 04:19) (68 - 70)  BP: 125/81 (08 Jun 2018 04:19) (125/81 - 134/66)  BP(mean): --  RR: 17 (08 Jun 2018 04:19) (17 - 17)  SpO2: 99% (08 Jun 2018 04:19) (93% - 99%)    PHYSICAL EXAM:    Constitutional: NAD, well-developed  HEENT: EOMI, throat clear  Neck: No LAD, supple  Respiratory: CTA and P  Cardiovascular: S1 and S2, RRR, no M  Gastrointestinal: BS+, soft, NT/ND, neg HSM,  Extremities: No peripheral edema, neg clubing, cyanosis  Vascular: 2+ peripheral pulses  Neurological: A/O x 3, no focal deficits  Psychiatric: Normal mood, normal affect  Skin: No rashes      LABS:                        13.3   9.90  )-----------( 332      ( 07 Jun 2018 07:42 )             39.1     06-08    136  |  103  |  14  ----------------------------<  106<H>  4.8   |  24  |  1.30    Ca    9.3      08 Jun 2018 06:10  Mg     2.4     06-07            RADIOLOGY & ADDITIONAL TESTS:

## 2018-06-08 NOTE — PROGRESS NOTE ADULT - ASSESSMENT
72 year old male with HTN HLD DM ( dx ~ 7 months ago), chronic low back pain,  with no known h/o CAD/MI who presented with chest pain and admitted for cardiac work up. GI consulted for gallbladder abnormality found on imaging.
72M with calcified gallbladder    - Cont care per MD  - Follow up MRI  - Trend LFTs
Assessment  DMT2: 72y Male with DM T2 with hyperglycemia on insulin, blood sugars in acceptable range, no hypoglycemic episode,  eating meals, compliant with low carb diet.  Chest pain: On medications, stable, monitored.  Thyroid nodule:  Asymptomatic, euthyroid.  HTN: Controlled, On med.
Assessment  DMT2: 72y Male with DM T2 with hyperglycemia on insulin, blood sugars in acceptable range, no hypoglycemic episode,  eating meals, compliant with low carb diet.  Chest pain: On medications, stable, monitored.  Thyroid nodule:  Asymptomatic, euthyroid.  HTN: Controlled, On med.
72 year old male with HTN HLD DM ( dx ~ 7 months ago), chronic low back pain,  with no known h/o CAD/MI who presented with chest pain and admitted for cardiac work up. GI consulted for gallbladder abnormality found on imaging.

## 2018-06-08 NOTE — DISCHARGE NOTE ADULT - CARE PROVIDER_API CALL
Chi Jerez (MD), Surgery  [address] [facility]  [address]  [place] [address]  Phone: [phone]  Fax: [phone]    Harish Aponte (DO), Gastroenterology; Internal Medicine  [address] [place] [address]  Phone: [phone]  Fax: [phone]

## 2018-06-08 NOTE — DISCHARGE NOTE ADULT - PATIENT PORTAL LINK FT
You can access the [facility] Patient Portal, offered by [facility], by registering with the following website: [email or web address]

## 2018-06-08 NOTE — CHART NOTE - NSCHARTNOTEFT_GEN_A_CORE
Request from Dr. Mayda Arnold NP to facilitate patient discharge. Medication reconciliation reviewed, revised, and resolved with Dr. Dr. Mayda Arnold NP who had medically cleared patient for discharge with follow-up as advised. Please refer to discharge note for detailed hospital course. Patient is currently stable for discharge to home at this time.    Contact # [phone]

## 2018-06-08 NOTE — PROGRESS NOTE ADULT - ATTENDING COMMENTS
Patient seen and examined, agree with above assessment and plan as transcribed above.    - D/W Dr. augustin results of MRCP, [facility]    Nathanael Ohara MD, FACC
Patient seen and examined, agree with above assessment and plan as transcribed above.    - f/u MRCP per surgery  - D/C planning when ok with renal    Nathanael Ohara MD, FACC
pt seen and examined.    d/w pt mri results.    recommended elective lap fátima to r/o gb mass.    pt to f/u in office for further or planning.    contact info given to pt.    [phone]

## 2018-06-08 NOTE — PROGRESS NOTE ADULT - PROBLEM SELECTOR PLAN 2
The patient has ruled out for ACS  - TTE with normal BI-V function   - NST without ischemia  - follow cardiology recommendations.
On medications, monitored and followed up by primary/cardiology team
On medications, monitored and followed up by primary/cardiology team
The patient has ruled out for ACS  -underwent echo/ Exercise NST, f/u results  -follow cardiology recommendations.

## 2018-06-08 NOTE — PROGRESS NOTE ADULT - PROVIDER SPECIALTY LIST ADULT
Cardiology
Cardiology
Endocrinology
Endocrinology
Gastroenterology
Nephrology
Nephrology
Surgery
Gastroenterology

## 2018-12-24 ENCOUNTER — RESULT REVIEW (OUTPATIENT)
Age: 73
End: 2018-12-24

## 2019-02-18 NOTE — DISCHARGE NOTE ADULT - VISION (WITH CORRECTIVE LENSES IF THE PATIENT USUALLY WEARS THEM):
----- Message from Denise Cadet sent at 2/18/2019  1:10 PM EST -----  Contact: ANAIS, PATIENT  PATIENT CURRENTLY WEIGHS 178 POUNDS HE  POUNDS, HE WAS DISCHARGED FROM [facility] WITH CONFLICTING DOSAGES FOR  HIS MEDS, CAN WE CALL IN 20MG FLUOXETINE (PROZAC) THEY CALLED IN 10MG FOR SOME REASON    [facility] [address]     [phone] MAY LEAVE A MESSAGE  
Sent in  
Normal vision: sees adequately in most situations; can see medication labels, newsprint

## 2022-01-10 PROBLEM — I10 ESSENTIAL (PRIMARY) HYPERTENSION: Chronic | Status: ACTIVE | Noted: 2018-06-05

## 2022-01-10 PROBLEM — E78.5 HYPERLIPIDEMIA, UNSPECIFIED: Chronic | Status: ACTIVE | Noted: 2018-06-05

## 2022-01-10 PROBLEM — E11.9 TYPE 2 DIABETES MELLITUS WITHOUT COMPLICATIONS: Chronic | Status: ACTIVE | Noted: 2018-06-05

## 2022-01-11 ENCOUNTER — APPOINTMENT (OUTPATIENT)
Dept: UROLOGY | Facility: CLINIC | Age: 77
End: 2022-01-11
Payer: MEDICARE

## 2022-01-11 DIAGNOSIS — Z78.9 OTHER SPECIFIED HEALTH STATUS: ICD-10-CM

## 2022-01-11 PROCEDURE — 99204 OFFICE O/P NEW MOD 45 MIN: CPT

## 2022-01-11 RX ORDER — TAMSULOSIN HYDROCHLORIDE 0.4 MG/1
0.4 CAPSULE ORAL
Qty: 30 | Refills: 2 | Status: ACTIVE | COMMUNITY
Start: 2022-01-11 | End: 1900-01-01

## 2022-01-11 RX ORDER — METFORMIN HYDROCHLORIDE 625 MG/1
TABLET ORAL
Refills: 0 | Status: ACTIVE | COMMUNITY

## 2022-01-11 NOTE — HISTORY OF PRESENT ILLNESS
[FreeTextEntry1] : cc bph\par pt is  yo male referred for bph . The patient reports frequency, urgency and uui  incomplete emptying, intermittency, straining,weak stream. . He denies  hematuria and dysuria  The patient states symptoms are of moderate severity. The symptoms have been present for years . He reports a history of no complicating symptoms. He denies flank pain, gross hematuria, kidney stones and recurrent UTI.\par has chronic low back pain \par  Prior treatments include none\par last psa 2.6\par The is no family history of prostate cancer\par

## 2022-01-11 NOTE — REVIEW OF SYSTEMS
[both] : pain during and after intercourse [denies] : denies pain with orgasm [base] : pain in base of penis [Slow urine stream] : slow urine stream [Leakage of urine with urgency] : leakage of urine with urgency [Leakage of urine with straining, coughing, laughing] : leakage of urine with straining, coughing, laughing [Negative] : Heme/Lymph

## 2022-01-18 ENCOUNTER — APPOINTMENT (OUTPATIENT)
Dept: UROLOGY | Facility: CLINIC | Age: 77
End: 2022-01-18
Payer: MEDICARE

## 2022-01-18 PROCEDURE — 99214 OFFICE O/P EST MOD 30 MIN: CPT

## 2022-01-18 NOTE — HISTORY OF PRESENT ILLNESS
[FreeTextEntry1] : cc bph\par pt is  yo male referred for bph . The patient reports frequency, urgency and uui  incomplete emptying, intermittency, straining,weak stream. . He denies  hematuria and dysuria  The patient states symptoms are of moderate severity. The symptoms have been present for years . He reports a history of no complicating symptoms. He denies flank pain, gross hematuria, kidney stones and recurrent UTI.\par has chronic low back pain \par last psa 2.6\par The is no family history of prostate cancer\par started on tamsulosin but c/o congestion/decreased ejaculation/cloudiness/blood in stool \par

## 2022-01-18 NOTE — ASSESSMENT
[FreeTextEntry1] : will cont to hold tamsulosin\par will start daily tadalfil - already taking intermittnet for ed \par f/u gi for blood in stool

## 2022-04-08 ENCOUNTER — NON-APPOINTMENT (OUTPATIENT)
Age: 77
End: 2022-04-08

## 2022-04-11 ENCOUNTER — APPOINTMENT (OUTPATIENT)
Dept: VASCULAR SURGERY | Facility: CLINIC | Age: 77
End: 2022-04-11
Payer: MEDICARE

## 2022-04-11 VITALS
DIASTOLIC BLOOD PRESSURE: 75 MMHG | BODY MASS INDEX: 26.66 KG/M2 | SYSTOLIC BLOOD PRESSURE: 152 MMHG | WEIGHT: 160 LBS | HEART RATE: 92 BPM | HEIGHT: 65 IN

## 2022-04-11 DIAGNOSIS — R73.03 PREDIABETES.: ICD-10-CM

## 2022-04-11 DIAGNOSIS — I10 ESSENTIAL (PRIMARY) HYPERTENSION: ICD-10-CM

## 2022-04-11 PROCEDURE — 93970 EXTREMITY STUDY: CPT

## 2022-04-11 PROCEDURE — 99204 OFFICE O/P NEW MOD 45 MIN: CPT

## 2022-04-11 NOTE — CONSULT LETTER
[Dear  ___] : Dear  [unfilled], [Consult Letter:] : I had the pleasure of evaluating your patient, [unfilled]. [Please see my note below.] : Please see my note below. [Consult Closing:] : Thank you very much for allowing me to participate in the care of this patient.  If you have any questions, please do not hesitate to contact me. [Sincerely,] : Sincerely, [FreeTextEntry3] : Sly Cabrera M.D., F.KIKES., R.P.SIMI.I.\par  of Vascular Surgery\par Assistant Professor of Radiology\par Director of Endovascular Program/ Vascular Access Center\par [facility]
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
## 2022-04-11 NOTE — PHYSICAL EXAM
[JVD] : no jugular venous distention  [Normal Breath Sounds] : Normal breath sounds [Normal Heart Sounds] : normal heart sounds [2+] : left 2+ [Varicose Veins Of Lower Extremities] : bilaterally [Ankle Swelling On The Right] : mild [Abdomen Masses] : No abdominal masses [Skin Ulcer] : ulcer

## 2022-04-11 NOTE — ASSESSMENT
[FreeTextEntry1] : Patient with slowly healing right lateral malleolus ulceration with history of prediabetic.  No evidence of significant arterial insufficiency.  No evidence of DVT or venous insufficiency.\par \par Recommend continuation of Metformin for good control of diabetes and/sugars.\par \par No vascular intervention necessary.\par \par Follow-up with the podiatry team.

## 2022-04-12 ENCOUNTER — APPOINTMENT (OUTPATIENT)
Dept: UROLOGY | Facility: CLINIC | Age: 77
End: 2022-04-12

## 2022-05-20 ENCOUNTER — APPOINTMENT (OUTPATIENT)
Dept: UROLOGY | Facility: CLINIC | Age: 77
End: 2022-05-20
Payer: MEDICARE

## 2022-05-20 PROCEDURE — 99213 OFFICE O/P EST LOW 20 MIN: CPT

## 2022-05-31 NOTE — HISTORY OF PRESENT ILLNESS
[FreeTextEntry1] : cc bph\par pt is  yo male referred for bph . The patient reports frequency, urgency and uui  incomplete emptying, intermittency, straining,weak stream. . He denies  hematuria and dysuria  The patient states symptoms are of moderate severity. The symptoms have been present for years . He reports a history of no complicating symptoms. He denies flank pain, gross hematuria, kidney stones and recurrent UTI.\par has chronic low back pain \par last psa 2.6\par The is no family history of prostate cancer\par started on tamsulosin but c/o congestion/decreased ejaculation/cloudiness/blood in stool \par \par doing well with daily tadalfil \par erections not great \par

## 2023-03-01 NOTE — ED PROVIDER NOTE - TOBACCO USE
Patient calls back stating he meant to ask for Clobetasol, not Lotrisone. Patient states he is no longer using Lotrisone. New script sent for Clobetasol. Patient has an upcoming CPE.    Never smoker

## 2023-03-17 ENCOUNTER — NON-APPOINTMENT (OUTPATIENT)
Age: 78
End: 2023-03-17

## 2023-03-20 ENCOUNTER — APPOINTMENT (OUTPATIENT)
Dept: PULMONOLOGY | Facility: CLINIC | Age: 78
End: 2023-03-20
Payer: MEDICARE

## 2023-03-20 VITALS
WEIGHT: 163 LBS | SYSTOLIC BLOOD PRESSURE: 152 MMHG | DIASTOLIC BLOOD PRESSURE: 52 MMHG | OXYGEN SATURATION: 99 % | HEART RATE: 59 BPM | HEIGHT: 65 IN | TEMPERATURE: 97.5 F | BODY MASS INDEX: 27.16 KG/M2

## 2023-03-20 PROCEDURE — 99204 OFFICE O/P NEW MOD 45 MIN: CPT | Mod: 25

## 2023-03-20 PROCEDURE — 94060 EVALUATION OF WHEEZING: CPT

## 2023-03-20 PROCEDURE — 94727 GAS DIL/WSHOT DETER LNG VOL: CPT

## 2023-03-20 PROCEDURE — 94729 DIFFUSING CAPACITY: CPT

## 2023-03-21 NOTE — REVIEW OF SYSTEMS
[Cough] : cough [Fever] : no fever [Nasal Congestion] : no nasal congestion [Postnasal Drip] : no postnasal drip [Hemoptysis] : no hemoptysis [Sputum] : no sputum [Dyspnea] : no dyspnea [Wheezing] : no wheezing [Chest Discomfort] : no chest discomfort [GERD] : no gerd [Arthralgias] : no arthralgias [Myalgias] : no myalgias [Rash] : no rash [Anemia] : no anemia [Headache] : no headache [Anxiety] : no anxiety [Thyroid Problem] : no thyroid problem

## 2023-03-21 NOTE — PHYSICAL EXAM
[No Acute Distress] : no acute distress [Normal Appearance] : normal appearance [No Neck Mass] : no neck mass [Normal Rate/Rhythm] : normal rate/rhythm [Normal S1, S2] : normal s1, s2 [No Resp Distress] : no resp distress [Clear to Auscultation Bilaterally] : clear to auscultation bilaterally [No HSM] : no hsm [Normal Gait] : normal gait [No Clubbing] : no clubbing [No Edema] : no edema [Normal Turgor] : normal turgor [Oriented x3] : oriented x3 [Normal Affect] : normal affect

## 2023-03-21 NOTE — PROCEDURE
[FreeTextEntry1] : CT Chest 3/16/23: Calcified and non calcified pulmonary nodules (measuring up to 7 mm). \par \par PFT 3/20/23: Minimal obstruction. Significant improvement after bronchodilator noted.

## 2023-03-21 NOTE — DISCUSSION/SUMMARY
[FreeTextEntry1] : He is a 77-year-old man.  He has been coughing for the past several months.  The cough is nonproductive.  He denied any wheezing or shortness of breath.  No constitutional symptoms.  No prior history of pulmonary disease.  He never smoked.His wife  from pulmonary fibrosis and he is concerned about this condition.\par \par On physical examination his lungs were clear.  Pulmonary function testing is consistent with reactive airways disease.  He was given a trial of Breztri. \par \par CT showed pulmonary nodules. Follow up CT of the Chest in three months.

## 2023-06-19 ENCOUNTER — APPOINTMENT (OUTPATIENT)
Dept: PULMONOLOGY | Facility: CLINIC | Age: 78
End: 2023-06-19

## 2023-06-20 ENCOUNTER — APPOINTMENT (OUTPATIENT)
Dept: UROLOGY | Facility: CLINIC | Age: 78
End: 2023-06-20
Payer: COMMERCIAL

## 2023-06-20 DIAGNOSIS — N52.9 MALE ERECTILE DYSFUNCTION, UNSPECIFIED: ICD-10-CM

## 2023-06-20 DIAGNOSIS — N13.8 BENIGN PROSTATIC HYPERPLASIA WITH LOWER URINARY TRACT SYMPMS: ICD-10-CM

## 2023-06-20 DIAGNOSIS — N40.1 BENIGN PROSTATIC HYPERPLASIA WITH LOWER URINARY TRACT SYMPMS: ICD-10-CM

## 2023-06-20 PROCEDURE — 99214 OFFICE O/P EST MOD 30 MIN: CPT

## 2023-06-20 RX ORDER — TADALAFIL 5 MG/1
5 TABLET ORAL
Qty: 90 | Refills: 3 | Status: ACTIVE | COMMUNITY
Start: 2022-01-18 | End: 1900-01-01

## 2023-06-20 NOTE — ASSESSMENT
[FreeTextEntry1] : will cont  daily tadalafil \par increase dose for intercourse \par reduce caffeine intake

## 2023-06-23 ENCOUNTER — APPOINTMENT (OUTPATIENT)
Dept: PULMONOLOGY | Facility: CLINIC | Age: 78
End: 2023-06-23
Payer: MEDICARE

## 2023-06-23 VITALS
OXYGEN SATURATION: 99 % | HEART RATE: 53 BPM | WEIGHT: 163 LBS | DIASTOLIC BLOOD PRESSURE: 52 MMHG | BODY MASS INDEX: 27.12 KG/M2 | SYSTOLIC BLOOD PRESSURE: 136 MMHG | TEMPERATURE: 96.4 F

## 2023-06-23 PROCEDURE — 99213 OFFICE O/P EST LOW 20 MIN: CPT

## 2023-06-23 NOTE — REVIEW OF SYSTEMS
[Fever] : no fever [Nasal Congestion] : no nasal congestion [Postnasal Drip] : no postnasal drip [Cough] : no cough [Sputum] : no sputum [Wheezing] : no wheezing [Chest Discomfort] : no chest discomfort [GERD] : no gerd [Arthralgias] : no arthralgias [Myalgias] : no myalgias [Rash] : no rash [Anemia] : no anemia [Headache] : no headache [Anxiety] : no anxiety [Thyroid Problem] : no thyroid problem

## 2023-06-23 NOTE — PHYSICAL EXAM
[No Acute Distress] : no acute distress [Normal Rate/Rhythm] : normal rate/rhythm [Normal S1, S2] : normal s1, s2 [No Resp Distress] : no resp distress [Clear to Auscultation Bilaterally] : clear to auscultation bilaterally [No HSM] : no hsm [Normal Gait] : normal gait [No Clubbing] : no clubbing [No Edema] : no edema [Normal Turgor] : normal turgor [Oriented x3] : oriented x3 [Normal Affect] : normal affect [Normal Appearance] : normal appearance [Benign] : benign

## 2023-06-23 NOTE — PROCEDURE
[FreeTextEntry1] : CT Chest 3/16/23: Calcified and non calcified pulmonary nodules (measuring up to 7 mm). \par \par CT Chest 6/13/23: Multiple nodules, stable. \par \par PFT 3/20/23: Minimal obstruction. Significant improvement after bronchodilator noted. Mild restriction. .scn Significant improvement after bronchodilator noted.

## 2023-06-23 NOTE — DISCUSSION/SUMMARY
[FreeTextEntry1] : He is a 77 year-old man.  He has been coughing for the past several months.  The cough is nonproductive.  He denied any wheezing or shortness of breath.  No constitutional symptoms.  No prior history of pulmonary disease.  He never smoked.His wife  from pulmonary fibrosis and he is concerned about this condition.\par \par Impression: \par Small pulmonary nodules, stable\par Minimal reticular infiltrates\par - unchanged over three months\par \par Recommend: \par Start on albuterol as needed \par - was on Breztri\par - had stopped it\par Follow up CT in 6 months\par Will see him at that time

## 2024-01-11 ENCOUNTER — APPOINTMENT (OUTPATIENT)
Dept: PULMONOLOGY | Facility: CLINIC | Age: 79
End: 2024-01-11
Payer: MEDICARE

## 2024-01-11 VITALS
OXYGEN SATURATION: 99 % | TEMPERATURE: 97.1 F | DIASTOLIC BLOOD PRESSURE: 62 MMHG | HEART RATE: 90 BPM | WEIGHT: 157 LBS | SYSTOLIC BLOOD PRESSURE: 150 MMHG | BODY MASS INDEX: 26.13 KG/M2

## 2024-01-11 DIAGNOSIS — R05.3 CHRONIC COUGH: ICD-10-CM

## 2024-01-11 DIAGNOSIS — R91.8 OTHER NONSPECIFIC ABNORMAL FINDING OF LUNG FIELD: ICD-10-CM

## 2024-01-11 PROCEDURE — 99214 OFFICE O/P EST MOD 30 MIN: CPT

## 2024-01-11 RX ORDER — BUDESONIDE, GLYCOPYRROLATE, AND FORMOTEROL FUMARATE 160; 9; 4.8 UG/1; UG/1; UG/1
160-9-4.8 AEROSOL, METERED RESPIRATORY (INHALATION)
Qty: 1 | Refills: 2 | Status: COMPLETED | COMMUNITY
Start: 2023-03-20 | End: 2024-01-11

## 2024-01-11 RX ORDER — ALBUTEROL SULFATE 90 UG/1
108 (90 BASE) INHALANT RESPIRATORY (INHALATION)
Qty: 1 | Refills: 2 | Status: COMPLETED | COMMUNITY
Start: 2023-06-23 | End: 2024-01-11

## 2024-01-11 NOTE — REVIEW OF SYSTEMS
[Fever] : no fever [Nasal Congestion] : no nasal congestion [Postnasal Drip] : no postnasal drip [Cough] : no cough [Sputum] : no sputum [Dyspnea] : no dyspnea [Wheezing] : no wheezing [Chest Discomfort] : no chest discomfort [GERD] : no gerd [Arthralgias] : no arthralgias [Myalgias] : no myalgias [Rash] : no rash [Anemia] : no anemia [Headache] : no headache [Anxiety] : no anxiety [Thyroid Problem] : no thyroid problem

## 2024-01-11 NOTE — DISCUSSION/SUMMARY
[FreeTextEntry1] : He is a 78 year-old man.  He presented with a cough which has resolved.  He is not using any medication for it.  He complains of postnasal drip.  No constitutional symptoms.  No prior history of pulmonary disease.  He never smoked. His wife  from pulmonary fibrosis and he is concerned about this condition.  Impression:  Cough -Resolved Small pulmonary nodules -Stable on follow-up CT 2023 Postnasal drip  Recommend:  Follow-up CT of the chest requested -I will call him with the results Can try nasal steroid -Can also try nasal rinses ENT follow-up suggested Follow-up in 6 months -Sooner if necessary

## 2024-01-11 NOTE — PHYSICAL EXAM
[No Acute Distress] : no acute distress [Normal Appearance] : normal appearance [Normal Rate/Rhythm] : normal rate/rhythm [Normal S1, S2] : normal s1, s2 [No Resp Distress] : no resp distress [Clear to Auscultation Bilaterally] : clear to auscultation bilaterally [Normal Gait] : normal gait [No Clubbing] : no clubbing [No Edema] : no edema [Normal Turgor] : normal turgor [Oriented x3] : oriented x3 [Normal Affect] : normal affect [Not Tender] : not tender

## 2024-01-11 NOTE — HISTORY OF PRESENT ILLNESS
[Never] : never [TextBox_4] : He is feeling well. No complaint of cough, wheezing or shortness of breath. No fever, chills or sweats.   He is feeling well.  He denies any cough, wheezing or shortness of breath.  He is not using any inhalers.  He complains of a postnasal drip.  He has not seen an ENT physician in the recent past. [Difficulty Initiating Sleep] : does not have difficulty initiating sleep [Difficulty Maintaining Sleep] : does not have difficulty maintaining sleep

## 2024-01-19 ENCOUNTER — NON-APPOINTMENT (OUTPATIENT)
Age: 79
End: 2024-01-19

## 2024-07-09 ENCOUNTER — RX RENEWAL (OUTPATIENT)
Age: 79
End: 2024-07-09

## 2024-07-09 ENCOUNTER — APPOINTMENT (OUTPATIENT)
Dept: UROLOGY | Facility: CLINIC | Age: 79
End: 2024-07-09
Payer: MEDICARE

## 2024-07-09 VITALS
WEIGHT: 160 LBS | DIASTOLIC BLOOD PRESSURE: 63 MMHG | TEMPERATURE: 97.2 F | HEART RATE: 51 BPM | SYSTOLIC BLOOD PRESSURE: 156 MMHG | BODY MASS INDEX: 26.63 KG/M2

## 2024-07-09 DIAGNOSIS — N13.8 BENIGN PROSTATIC HYPERPLASIA WITH LOWER URINARY TRACT SYMPMS: ICD-10-CM

## 2024-07-09 DIAGNOSIS — N52.9 MALE ERECTILE DYSFUNCTION, UNSPECIFIED: ICD-10-CM

## 2024-07-09 DIAGNOSIS — N40.1 BENIGN PROSTATIC HYPERPLASIA WITH LOWER URINARY TRACT SYMPMS: ICD-10-CM

## 2024-07-09 PROCEDURE — G2211 COMPLEX E/M VISIT ADD ON: CPT

## 2024-07-09 PROCEDURE — 99214 OFFICE O/P EST MOD 30 MIN: CPT

## 2024-07-09 RX ORDER — ATORVASTATIN CALCIUM 20 MG/1
20 TABLET, FILM COATED ORAL DAILY
Refills: 0 | Status: ACTIVE | COMMUNITY

## 2024-07-09 RX ORDER — LOSARTAN POTASSIUM 100 MG/1
100 TABLET, FILM COATED ORAL DAILY
Refills: 0 | Status: ACTIVE | COMMUNITY

## 2024-07-09 RX ORDER — DAPAGLIFLOZIN 10 MG/1
10 TABLET, FILM COATED ORAL DAILY
Refills: 0 | Status: ACTIVE | COMMUNITY

## 2024-07-11 ENCOUNTER — APPOINTMENT (OUTPATIENT)
Dept: PULMONOLOGY | Facility: CLINIC | Age: 79
End: 2024-07-11
Payer: MEDICARE

## 2024-07-11 VITALS
SYSTOLIC BLOOD PRESSURE: 138 MMHG | HEART RATE: 92 BPM | DIASTOLIC BLOOD PRESSURE: 64 MMHG | BODY MASS INDEX: 26.63 KG/M2 | OXYGEN SATURATION: 99 % | WEIGHT: 160 LBS | TEMPERATURE: 97.9 F

## 2024-07-11 DIAGNOSIS — R05.3 CHRONIC COUGH: ICD-10-CM

## 2024-07-11 DIAGNOSIS — R91.8 OTHER NONSPECIFIC ABNORMAL FINDING OF LUNG FIELD: ICD-10-CM

## 2024-07-11 PROCEDURE — 94060 EVALUATION OF WHEEZING: CPT

## 2024-07-11 PROCEDURE — 94729 DIFFUSING CAPACITY: CPT

## 2024-07-11 PROCEDURE — 99214 OFFICE O/P EST MOD 30 MIN: CPT | Mod: 25

## 2024-07-11 PROCEDURE — 94727 GAS DIL/WSHOT DETER LNG VOL: CPT

## 2024-07-12 LAB
ANION GAP SERPL CALC-SCNC: 13 MMOL/L
APPEARANCE: CLEAR
BACTERIA: NEGATIVE /HPF
BILIRUBIN URINE: NEGATIVE
BLOOD URINE: NEGATIVE
BUN SERPL-MCNC: 10 MG/DL
CALCIUM SERPL-MCNC: 9.8 MG/DL
CAST: 0 /LPF
CHLORIDE SERPL-SCNC: 98 MMOL/L
CO2 SERPL-SCNC: 22 MMOL/L
COLOR: YELLOW
CREAT SERPL-MCNC: 1.21 MG/DL
EGFR: 61 ML/MIN/1.73M2
EPITHELIAL CELLS: 0 /HPF
GLUCOSE QUALITATIVE U: 100 MG/DL
GLUCOSE SERPL-MCNC: 117 MG/DL
KETONES URINE: NEGATIVE MG/DL
LEUKOCYTE ESTERASE URINE: NEGATIVE
MICROSCOPIC-UA: NORMAL
NITRITE URINE: NEGATIVE
PH URINE: 7.5
POTASSIUM SERPL-SCNC: 5.5 MMOL/L
PROTEIN URINE: 30 MG/DL
PSA SERPL-MCNC: 2.97 NG/ML
RED BLOOD CELLS URINE: 0 /HPF
SODIUM SERPL-SCNC: 133 MMOL/L
SPECIFIC GRAVITY URINE: 1.01
UROBILINOGEN URINE: 0.2 MG/DL
WHITE BLOOD CELLS URINE: 0 /HPF

## 2024-07-16 ENCOUNTER — NON-APPOINTMENT (OUTPATIENT)
Age: 79
End: 2024-07-16

## 2024-07-27 ENCOUNTER — TRANSCRIPTION ENCOUNTER (OUTPATIENT)
Age: 79
End: 2024-07-27

## 2025-01-27 ENCOUNTER — APPOINTMENT (OUTPATIENT)
Dept: PULMONOLOGY | Facility: CLINIC | Age: 80
End: 2025-01-27
Payer: MEDICARE

## 2025-01-27 VITALS
WEIGHT: 158 LBS | OXYGEN SATURATION: 95 % | TEMPERATURE: 97.7 F | SYSTOLIC BLOOD PRESSURE: 140 MMHG | BODY MASS INDEX: 26.33 KG/M2 | DIASTOLIC BLOOD PRESSURE: 60 MMHG | HEART RATE: 88 BPM | HEIGHT: 65 IN

## 2025-01-27 DIAGNOSIS — R05.3 CHRONIC COUGH: ICD-10-CM

## 2025-01-27 DIAGNOSIS — R91.8 OTHER NONSPECIFIC ABNORMAL FINDING OF LUNG FIELD: ICD-10-CM

## 2025-01-27 PROCEDURE — 99214 OFFICE O/P EST MOD 30 MIN: CPT

## 2025-04-23 ENCOUNTER — EMERGENCY (EMERGENCY)
Facility: HOSPITAL | Age: 80
LOS: 1 days | End: 2025-04-23
Attending: EMERGENCY MEDICINE
Payer: COMMERCIAL

## 2025-04-23 VITALS
DIASTOLIC BLOOD PRESSURE: 75 MMHG | SYSTOLIC BLOOD PRESSURE: 153 MMHG | HEIGHT: 65 IN | RESPIRATION RATE: 20 BRPM | WEIGHT: 139.99 LBS | OXYGEN SATURATION: 97 % | TEMPERATURE: 98 F | HEART RATE: 128 BPM

## 2025-04-23 DIAGNOSIS — E78.5 HYPERLIPIDEMIA, UNSPECIFIED: ICD-10-CM

## 2025-04-23 DIAGNOSIS — E11.9 TYPE 2 DIABETES MELLITUS WITHOUT COMPLICATIONS: ICD-10-CM

## 2025-04-23 DIAGNOSIS — M46.40 DISCITIS, UNSPECIFIED, SITE UNSPECIFIED: ICD-10-CM

## 2025-04-23 DIAGNOSIS — I10 ESSENTIAL (PRIMARY) HYPERTENSION: ICD-10-CM

## 2025-04-23 DIAGNOSIS — G06.2 EXTRADURAL AND SUBDURAL ABSCESS, UNSPECIFIED: ICD-10-CM

## 2025-04-23 DIAGNOSIS — H26.9 UNSPECIFIED CATARACT: Chronic | ICD-10-CM

## 2025-04-23 LAB
ALBUMIN SERPL ELPH-MCNC: 2.8 G/DL — LOW (ref 3.3–5)
ALP SERPL-CCNC: 141 U/L — HIGH (ref 40–120)
ALT FLD-CCNC: 44 U/L — SIGNIFICANT CHANGE UP (ref 10–45)
ANION GAP SERPL CALC-SCNC: 15 MMOL/L — SIGNIFICANT CHANGE UP (ref 5–17)
APPEARANCE UR: CLEAR — SIGNIFICANT CHANGE UP
APTT BLD: 32.6 SEC — SIGNIFICANT CHANGE UP (ref 26.1–36.8)
AST SERPL-CCNC: 26 U/L — SIGNIFICANT CHANGE UP (ref 10–40)
BASOPHILS # BLD AUTO: 0 K/UL — SIGNIFICANT CHANGE UP (ref 0–0.2)
BASOPHILS NFR BLD AUTO: 0 % — SIGNIFICANT CHANGE UP (ref 0–2)
BILIRUB SERPL-MCNC: 0.4 MG/DL — SIGNIFICANT CHANGE UP (ref 0.2–1.2)
BILIRUB UR-MCNC: NEGATIVE — SIGNIFICANT CHANGE UP
BUN SERPL-MCNC: 30 MG/DL — HIGH (ref 7–23)
CALCIUM SERPL-MCNC: 10.5 MG/DL — SIGNIFICANT CHANGE UP (ref 8.4–10.5)
CHLORIDE SERPL-SCNC: 100 MMOL/L — SIGNIFICANT CHANGE UP (ref 96–108)
CO2 SERPL-SCNC: 18 MMOL/L — LOW (ref 22–31)
COLOR SPEC: YELLOW — SIGNIFICANT CHANGE UP
CREAT SERPL-MCNC: 0.88 MG/DL — SIGNIFICANT CHANGE UP (ref 0.5–1.3)
CRP SERPL-MCNC: 137 MG/L — HIGH (ref 0–4)
DIFF PNL FLD: NEGATIVE — SIGNIFICANT CHANGE UP
EGFR: 87 ML/MIN/1.73M2 — SIGNIFICANT CHANGE UP
EGFR: 87 ML/MIN/1.73M2 — SIGNIFICANT CHANGE UP
EOSINOPHIL # BLD AUTO: 0 K/UL — SIGNIFICANT CHANGE UP (ref 0–0.5)
EOSINOPHIL NFR BLD AUTO: 0 % — SIGNIFICANT CHANGE UP (ref 0–6)
ERYTHROCYTE [SEDIMENTATION RATE] IN BLOOD: 120 MM/HR — HIGH (ref 0–20)
GAS PNL BLDV: SIGNIFICANT CHANGE UP
GIANT PLATELETS BLD QL SMEAR: PRESENT — SIGNIFICANT CHANGE UP
GLUCOSE SERPL-MCNC: 286 MG/DL — HIGH (ref 70–99)
GLUCOSE UR QL: >=1000 MG/DL
HCT VFR BLD CALC: 36.6 % — LOW (ref 39–50)
HGB BLD-MCNC: 11.9 G/DL — LOW (ref 13–17)
INR BLD: 1.16 RATIO — SIGNIFICANT CHANGE UP (ref 0.85–1.16)
KETONES UR-MCNC: NEGATIVE MG/DL — SIGNIFICANT CHANGE UP
LEUKOCYTE ESTERASE UR-ACNC: NEGATIVE — SIGNIFICANT CHANGE UP
LYMPHOCYTES # BLD AUTO: 0.51 K/UL — LOW (ref 1–3.3)
LYMPHOCYTES # BLD AUTO: 3.8 % — LOW (ref 13–44)
MANUAL SMEAR VERIFICATION: SIGNIFICANT CHANGE UP
MCHC RBC-ENTMCNC: 29.5 PG — SIGNIFICANT CHANGE UP (ref 27–34)
MCHC RBC-ENTMCNC: 32.5 G/DL — SIGNIFICANT CHANGE UP (ref 32–36)
MCV RBC AUTO: 90.8 FL — SIGNIFICANT CHANGE UP (ref 80–100)
METAMYELOCYTES # FLD: 1.9 % — HIGH (ref 0–0)
METAMYELOCYTES NFR BLD: 1.9 % — HIGH (ref 0–0)
MONOCYTES # BLD AUTO: 0.26 K/UL — SIGNIFICANT CHANGE UP (ref 0–0.9)
MONOCYTES NFR BLD AUTO: 1.9 % — LOW (ref 2–14)
NEUTROPHILS # BLD AUTO: 12.43 K/UL — HIGH (ref 1.8–7.4)
NEUTROPHILS NFR BLD AUTO: 80 % — HIGH (ref 43–77)
NEUTS BAND # BLD: 12.4 % — HIGH (ref 0–8)
NEUTS BAND NFR BLD: 12.4 % — HIGH (ref 0–8)
NITRITE UR-MCNC: NEGATIVE — SIGNIFICANT CHANGE UP
PH UR: 6.5 — SIGNIFICANT CHANGE UP (ref 5–8)
PLAT MORPH BLD: NORMAL — SIGNIFICANT CHANGE UP
PLATELET # BLD AUTO: 543 K/UL — HIGH (ref 150–400)
POTASSIUM SERPL-MCNC: 4.7 MMOL/L — SIGNIFICANT CHANGE UP (ref 3.5–5.3)
POTASSIUM SERPL-SCNC: 4.7 MMOL/L — SIGNIFICANT CHANGE UP (ref 3.5–5.3)
PROT SERPL-MCNC: 7.3 G/DL — SIGNIFICANT CHANGE UP (ref 6–8.3)
PROT UR-MCNC: SIGNIFICANT CHANGE UP MG/DL
PROTHROM AB SERPL-ACNC: 13.3 SEC — SIGNIFICANT CHANGE UP (ref 9.9–13.4)
RBC # BLD: 4.03 M/UL — LOW (ref 4.2–5.8)
RBC # FLD: 14 % — SIGNIFICANT CHANGE UP (ref 10.3–14.5)
RBC BLD AUTO: SIGNIFICANT CHANGE UP
SODIUM SERPL-SCNC: 133 MMOL/L — LOW (ref 135–145)
SP GR SPEC: 1.02 — SIGNIFICANT CHANGE UP (ref 1–1.03)
UROBILINOGEN FLD QL: 0.2 MG/DL — SIGNIFICANT CHANGE UP (ref 0.2–1)
WBC # BLD: 13.45 K/UL — HIGH (ref 3.8–10.5)
WBC # FLD AUTO: 13.45 K/UL — HIGH (ref 3.8–10.5)

## 2025-04-23 PROCEDURE — 82947 ASSAY GLUCOSE BLOOD QUANT: CPT

## 2025-04-23 PROCEDURE — 81003 URINALYSIS AUTO W/O SCOPE: CPT

## 2025-04-23 PROCEDURE — 85652 RBC SED RATE AUTOMATED: CPT

## 2025-04-23 PROCEDURE — 82330 ASSAY OF CALCIUM: CPT

## 2025-04-23 PROCEDURE — 99285 EMERGENCY DEPT VISIT HI MDM: CPT

## 2025-04-23 PROCEDURE — 72149 MRI LUMBAR SPINE W/DYE: CPT | Mod: 26

## 2025-04-23 PROCEDURE — 84295 ASSAY OF SERUM SODIUM: CPT

## 2025-04-23 PROCEDURE — 72149 MRI LUMBAR SPINE W/DYE: CPT | Mod: MC

## 2025-04-23 PROCEDURE — 87150 DNA/RNA AMPLIFIED PROBE: CPT

## 2025-04-23 PROCEDURE — 96376 TX/PRO/DX INJ SAME DRUG ADON: CPT

## 2025-04-23 PROCEDURE — 72147 MRI CHEST SPINE W/DYE: CPT | Mod: 26

## 2025-04-23 PROCEDURE — 85730 THROMBOPLASTIN TIME PARTIAL: CPT

## 2025-04-23 PROCEDURE — 72147 MRI CHEST SPINE W/DYE: CPT | Mod: MC

## 2025-04-23 PROCEDURE — 85610 PROTHROMBIN TIME: CPT

## 2025-04-23 PROCEDURE — 71045 X-RAY EXAM CHEST 1 VIEW: CPT | Mod: 26

## 2025-04-23 PROCEDURE — 83605 ASSAY OF LACTIC ACID: CPT

## 2025-04-23 PROCEDURE — 82803 BLOOD GASES ANY COMBINATION: CPT

## 2025-04-23 PROCEDURE — 85014 HEMATOCRIT: CPT

## 2025-04-23 PROCEDURE — 93005 ELECTROCARDIOGRAM TRACING: CPT

## 2025-04-23 PROCEDURE — 71045 X-RAY EXAM CHEST 1 VIEW: CPT

## 2025-04-23 PROCEDURE — 74177 CT ABD & PELVIS W/CONTRAST: CPT | Mod: MC

## 2025-04-23 PROCEDURE — 72142 MRI NECK SPINE W/DYE: CPT | Mod: MC

## 2025-04-23 PROCEDURE — 84132 ASSAY OF SERUM POTASSIUM: CPT

## 2025-04-23 PROCEDURE — 74177 CT ABD & PELVIS W/CONTRAST: CPT | Mod: 26

## 2025-04-23 PROCEDURE — 72142 MRI NECK SPINE W/DYE: CPT | Mod: 26

## 2025-04-23 PROCEDURE — 96374 THER/PROPH/DIAG INJ IV PUSH: CPT

## 2025-04-23 PROCEDURE — 82962 GLUCOSE BLOOD TEST: CPT

## 2025-04-23 PROCEDURE — 93010 ELECTROCARDIOGRAM REPORT: CPT

## 2025-04-23 PROCEDURE — 82435 ASSAY OF BLOOD CHLORIDE: CPT

## 2025-04-23 PROCEDURE — 87040 BLOOD CULTURE FOR BACTERIA: CPT

## 2025-04-23 PROCEDURE — A9585: CPT

## 2025-04-23 PROCEDURE — 93010 ELECTROCARDIOGRAM REPORT: CPT | Mod: 77

## 2025-04-23 PROCEDURE — 85018 HEMOGLOBIN: CPT

## 2025-04-23 PROCEDURE — 99285 EMERGENCY DEPT VISIT HI MDM: CPT | Mod: 25

## 2025-04-23 PROCEDURE — 80053 COMPREHEN METABOLIC PANEL: CPT

## 2025-04-23 PROCEDURE — 96375 TX/PRO/DX INJ NEW DRUG ADDON: CPT

## 2025-04-23 PROCEDURE — 85025 COMPLETE CBC W/AUTO DIFF WBC: CPT

## 2025-04-23 PROCEDURE — 86140 C-REACTIVE PROTEIN: CPT

## 2025-04-23 PROCEDURE — 36415 COLL VENOUS BLD VENIPUNCTURE: CPT

## 2025-04-23 PROCEDURE — 87077 CULTURE AEROBIC IDENTIFY: CPT

## 2025-04-23 RX ORDER — CEFTRIAXONE 500 MG/1
1000 INJECTION, POWDER, FOR SOLUTION INTRAMUSCULAR; INTRAVENOUS ONCE
Refills: 0 | Status: COMPLETED | OUTPATIENT
Start: 2025-04-23 | End: 2025-04-23

## 2025-04-23 RX ORDER — SODIUM CHLORIDE 9 G/1000ML
2000 INJECTION, SOLUTION INTRAVENOUS ONCE
Refills: 0 | Status: COMPLETED | OUTPATIENT
Start: 2025-04-23 | End: 2025-04-23

## 2025-04-23 RX ORDER — ACETAMINOPHEN 500 MG/5ML
1000 LIQUID (ML) ORAL ONCE
Refills: 0 | Status: COMPLETED | OUTPATIENT
Start: 2025-04-23 | End: 2025-04-23

## 2025-04-23 RX ORDER — POLYETHYLENE GLYCOL 3350 17 G/17G
17 POWDER, FOR SOLUTION ORAL ONCE
Refills: 0 | Status: COMPLETED | OUTPATIENT
Start: 2025-04-23 | End: 2025-04-23

## 2025-04-23 RX ORDER — VANCOMYCIN HCL IN 5 % DEXTROSE 1.5G/250ML
1000 PLASTIC BAG, INJECTION (ML) INTRAVENOUS ONCE
Refills: 0 | Status: COMPLETED | OUTPATIENT
Start: 2025-04-23 | End: 2025-04-23

## 2025-04-23 RX ADMIN — POLYETHYLENE GLYCOL 3350 17 GRAM(S): 17 POWDER, FOR SOLUTION ORAL at 21:41

## 2025-04-23 RX ADMIN — Medication 250 MILLIGRAM(S): at 11:57

## 2025-04-23 RX ADMIN — CEFTRIAXONE 100 MILLIGRAM(S): 500 INJECTION, POWDER, FOR SOLUTION INTRAMUSCULAR; INTRAVENOUS at 15:44

## 2025-04-23 RX ADMIN — Medication 400 MILLIGRAM(S): at 12:15

## 2025-04-23 RX ADMIN — Medication 400 MILLIGRAM(S): at 21:43

## 2025-04-23 RX ADMIN — SODIUM CHLORIDE 2000 MILLILITER(S): 9 INJECTION, SOLUTION INTRAVENOUS at 13:18

## 2025-04-23 RX ADMIN — CEFTRIAXONE 100 MILLIGRAM(S): 500 INJECTION, POWDER, FOR SOLUTION INTRAMUSCULAR; INTRAVENOUS at 19:25

## 2025-04-23 RX ADMIN — Medication 1000 MILLIGRAM(S): at 13:55

## 2025-04-23 NOTE — ED PROVIDER NOTE - CLINICAL SUMMARY MEDICAL DECISION MAKING FREE TEXT BOX
dominic steele - concern for spinal infection. attending cedric - concern for spinal infection. I read ekg as sinus tach rate 121, no st elevation or depression, qtc 414, narrow qrs, normal axis.

## 2025-04-23 NOTE — ED PROVIDER NOTE - NSRISKOFTRANSFER_ED_A_ED
Deterioration of Condition En Route/Increased Pain/Transportation Risk (There is always a risk of traffic delays resulting in deterioration of condition.)/Other:

## 2025-04-23 NOTE — CONSULT NOTE ADULT - SUBJECTIVE AND OBJECTIVE BOX
HPI  79yMale w/ HTN, HLD, T2DM, chronic low back pain presented to [facility] ED sent by his pain medicine physician due to recent MRI findings concerning for L1-L2 discitis/OM with epidural abscess, bilateral psoas abscesses. Patient initially saw pain specialist in February for chronic back pain with radiations to bilateral lateral thighs (R>L). MRI at that time showed degenerative changes. He subsequently had epidural injections x2 (2/27, 3/11) with moderate pain relief. Pain began to worsen on 4/10. He had radiofrequency ablation performed on 4/11 and has since has severe worsening of pain and radiation to R lateral thigh. He reports recent bowel/bladder "incontinence" requiring diaper due to his inability to get to the bathroom in time due to pain limitations but states urge and sensation are intact. Denies fevers, chills, night sweats, weakness, numbness/tingling, saddle anesthesia, recent falls/trauma. He uses a cane for ambulation.  c/o _ for [duration] a/w _. First noticed it _ ago while _. Exacerbated by _; improved with _. Radiates _. Denies weakness, numbness/tingling, hand clumsiness, or radicular sxs. Denies fevers/chills, weight loss, or night pain. Denies epidural steroid injections or IV drug use. Denies change in bowel/bladder function or saddle anesthesia. Denies recent falls/trauma or any other ortho injuries. Community ambulator w/o assistive devices at baseline.    ROS  Negative unless otherwise specified in HPI.    PAST MEDICAL & SURGICAL Hx  PAST MEDICAL & SURGICAL HISTORY:  HTN (hypertension)      HLD (hyperlipidemia)      DM (diabetes mellitus)      Cataract          MEDICATIONS  Home Medications:  losartan 25 mg oral tablet: 1 tab(s) orally once a day (06 Jun 2018 10:23)  Lovaza 1000 mg oral capsule: 2 cap(s) orally 2 times a day (06 Jun 2018 10:23)  metFORMIN 500 mg oral tablet: 1 tab(s) orally once a day (06 Jun 2018 10:23)      ALLERGIES  No Known Allergies      FAMILY Hx  FAMILY HISTORY:  No pertinent family history in first degree relatives        SOCIAL Hx  Social History:      VITALS  Vital Signs Last 24 Hrs  T(C): 37.4 (23 Apr 2025 12:13), Max: 37.4 (23 Apr 2025 11:54)  T(F): 99.3 (23 Apr 2025 12:13), Max: 99.3 (23 Apr 2025 11:54)  HR: 114 (23 Apr 2025 11:54) (114 - 128)  BP: 134/62 (23 Apr 2025 11:54) (134/62 - 153/75)  BP(mean): --  RR: 18 (23 Apr 2025 11:54) (18 - 20)  SpO2: 97% (23 Apr 2025 11:54) (97% - 97%)    Parameters below as of 23 Apr 2025 11:54  Patient On (Oxygen Delivery Method): room air        PHYSICAL EXAM  Gen: Lying in bed, NAD  Resp: No increased WOB  Spine:  Skin intact, bruising over lower spine  No TTP over lumbar spine max at L4/5 and R paraspinal muscles   Motor:                   C5                C6              C7               C8           T1   R            5/5                5/5            5/5             5/5          5/5  L             5/5               5/5             5/5             5/5          5/5                L2             L3             L4               L5            S1  R         4/5           5/5          5/5             5/5           5/5  L          5/5          5/5           5/5             5/5           5/5    Sensory:            C5         C6         C7      C8       T1        (0=absent, 1=impaired, 2=normal, NT=not testable)  R         2            2           2        2         2  L          2            2           2        2         2               L2          L3         L4      L5       S1         (0=absent, 1=impaired, 2=normal, NT=not testable)  R         2            2            2        2        2  L          2            2           2        2         2    (+) Rectal tone, saddle/perianal SILT  (+) Straight leg raise test right  (-) Ankle clonus b/l  Gait grossly unremarkable    LABS                        11.9   13.45 )-----------( 543      ( 23 Apr 2025 11:52 )             36.6     04-23    133[L]  |  100  |  30[H]  ----------------------------<  286[H]  4.7   |  18[L]  |  0.88    Ca    10.5      23 Apr 2025 11:52    TPro  7.3  /  Alb  2.8[L]  /  TBili  0.4  /  DBili  x   /  AST  26  /  ALT  44  /  AlkPhos  141[H]  04-23    PT/INR - ( 23 Apr 2025 11:52 )   PT: 13.3 sec;   INR: 1.16 ratio         PTT - ( 23 Apr 2025 11:52 )  PTT:32.6 sec    IMAGING  MRI from [facility] reviewed.     ASSESSMENT & PLAN  79yMale w/ L1-L2 discitis, epidural abscess, psoas abcsess. Low suspicion for conus medullaris/cauda equina syndromes.  -WBAT  -f/u MRI C/T/L spine w & wo contrast   -pain control  -CBC, BMP, ESR, CRP, blood culture x2, type & screen, CXR, EKG  -PVR  -recs pending imaging     For all questions related to patient care, please reach out via the on-call pager.*     Camila Delgado PGY2  Orthopedic Surgery  [facility]: [id number]  [facility]: [id number]  [facility]: [id number]  HPI  79yMale w/ HTN, HLD, T2DM, chronic low back pain presented to [facility] ED sent by his pain medicine physician due to recent MRI findings concerning for L1-L2 discitis/OM with epidural abscess, bilateral psoas abscesses. Patient initially saw pain specialist in February for chronic back pain with radiations to bilateral lateral thighs (R>L). MRI at that time showed degenerative changes. He subsequently had epidural injections x2 (2/27, 3/11) with moderate pain relief. Pain began to worsen on 4/10. He had radiofrequency ablation performed on 4/11 and has since has severe worsening of pain and radiation to R lateral thigh. He reports recent bowel/bladder "incontinence" requiring diaper due to his inability to get to the bathroom in time due to pain limitations but states urge and sensation are intact. Denies fevers, chills, night sweats, weakness, numbness/tingling, saddle anesthesia, recent falls/trauma. He uses a cane for ambulation.  c/o _ for [duration] a/w _. First noticed it _ ago while _. Exacerbated by _; improved with _. Radiates _. Denies weakness, numbness/tingling, hand clumsiness, or radicular sxs. Denies fevers/chills, weight loss, or night pain. Denies epidural steroid injections or IV drug use. Denies change in bowel/bladder function or saddle anesthesia. Denies recent falls/trauma or any other ortho injuries. Community ambulator w/o assistive devices at baseline.    ROS  Negative unless otherwise specified in HPI.    PAST MEDICAL & SURGICAL Hx  PAST MEDICAL & SURGICAL HISTORY:  HTN (hypertension)      HLD (hyperlipidemia)      DM (diabetes mellitus)      Cataract          MEDICATIONS  Home Medications:  losartan 25 mg oral tablet: 1 tab(s) orally once a day (06 Jun 2018 10:23)  Lovaza 1000 mg oral capsule: 2 cap(s) orally 2 times a day (06 Jun 2018 10:23)  metFORMIN 500 mg oral tablet: 1 tab(s) orally once a day (06 Jun 2018 10:23)      ALLERGIES  No Known Allergies      FAMILY Hx  FAMILY HISTORY:  No pertinent family history in first degree relatives        SOCIAL Hx  Social History:      VITALS  Vital Signs Last 24 Hrs  T(C): 37.4 (23 Apr 2025 12:13), Max: 37.4 (23 Apr 2025 11:54)  T(F): 99.3 (23 Apr 2025 12:13), Max: 99.3 (23 Apr 2025 11:54)  HR: 114 (23 Apr 2025 11:54) (114 - 128)  BP: 134/62 (23 Apr 2025 11:54) (134/62 - 153/75)  BP(mean): --  RR: 18 (23 Apr 2025 11:54) (18 - 20)  SpO2: 97% (23 Apr 2025 11:54) (97% - 97%)    Parameters below as of 23 Apr 2025 11:54  Patient On (Oxygen Delivery Method): room air        PHYSICAL EXAM  Gen: Lying in bed, NAD  Resp: No increased WOB  Spine:  Skin intact, bruising over lower spine  No TTP over lumbar spine max at L4/5 and R paraspinal muscles   Motor:                   C5                C6              C7               C8           T1   R            5/5                5/5            5/5             5/5          5/5  L             5/5               5/5             5/5             5/5          5/5                L2             L3             L4               L5            S1  R         4/5           5/5          5/5             5/5           5/5  L          5/5          5/5           5/5             5/5           5/5    Sensory:            C5         C6         C7      C8       T1        (0=absent, 1=impaired, 2=normal, NT=not testable)  R         2            2           2        2         2  L          2            2           2        2         2               L2          L3         L4      L5       S1         (0=absent, 1=impaired, 2=normal, NT=not testable)  R         2            2            2        2        2  L          2            2           2        2         2    (+) Rectal tone, saddle/perianal SILT  (+) Straight leg raise test right  (-) Ankle clonus b/l  Gait grossly unremarkable    LABS                        11.9   13.45 )-----------( 543      ( 23 Apr 2025 11:52 )             36.6     04-23    133[L]  |  100  |  30[H]  ----------------------------<  286[H]  4.7   |  18[L]  |  0.88    Ca    10.5      23 Apr 2025 11:52    TPro  7.3  /  Alb  2.8[L]  /  TBili  0.4  /  DBili  x   /  AST  26  /  ALT  44  /  AlkPhos  141[H]  04-23    PT/INR - ( 23 Apr 2025 11:52 )   PT: 13.3 sec;   INR: 1.16 ratio         PTT - ( 23 Apr 2025 11:52 )  PTT:32.6 sec    IMAGING  MRI from [facility] reviewed.     ASSESSMENT & PLAN  79yMale w/ L1-L2 discitis, epidural abscess, psoas abcsess. Low suspicion for conus medullaris/cauda equina syndromes.  -WBAT  -f/u MRI C/T/L spine w & wo contrast   -pain control  -CBC, BMP, ESR, CRP, blood culture x2, type & screen, CXR, EKG  -PVR  -recs pending imaging     For all questions related to patient care, please reach out via the on-call pager.*     [facility]ila [facility] PGY2  Orthopedic Surgery  [facility]: [id number]  [facility]: [id number]  [facility] [place]: [id number]       ADDENDUM    Case and imaging reviewed with Dr. Yadav and Dr. Boston. Recommendation is for [id number] with Dr. Boston. Discussed with patient and family who are in agreement with plan. Patient to be transferred to [facility] as direct admit to Dr. Boston for OR tomorrow.     For all questions related to patient care, please reach out via the on-call pager.*     Camila [facility] Y2  Orthopedic Surgery  [facility]: [id number]  [facility]: [id number]  [facility] [place]: [id number]

## 2025-04-23 NOTE — ED PROVIDER NOTE - OBJECTIVE STATEMENT
The patient presented with severe low back pain, the most severe episode he has experienced in a history of chronic, intermittent lower back pain over the past 25 years. The current severe episode of pain started on April 10th. Two epidurals were administered in February and March with partial relief, followed by radio frequency ablation on April 11th. A recent MRI report indicated a possible spinal infection.  - Chief Complaint (CC) : Severe Low back pain  - History of Present Illness (HPI) : The patient has had chronic, intermittent low back pain for 25 years. The current, severe episode began on April 10th. Partial relief was accomplished with two epidurals in February and March. A radio frequency ablation was performed on April 11th. A recent MRI indicates the possibility of an infection in the spine.  - Past Medical History : Chronic intermittent lower back pain for 25 years. Comorbidities include hypertension and diabetes.  - Past Surgical History : No surgical history mentioned  - Family History : Information not provided  - Social History : The patient does not smoke, consume alcohol, or use other illicit drugs.  - Review of Systems : Negative for fever, nausea, vomiting, chills, tingling down the legs and recent falls.  - Medications : The patient is on Valsartan, Amlodipine, and Farciga for blood pressure and sugar. Additionally, he takes Lavaza and 5mg of Lipitor daily.  - Allergies : No known allergies

## 2025-04-23 NOTE — ED ADULT TRIAGE NOTE - CHIEF COMPLAINT QUOTE
lower back pain since 4/11  hx herniated discs, received 2 epidural injections  had outpatient MRI and was told "there is an infection and to come to ED for IV antibiotics"

## 2025-04-23 NOTE — ED PROVIDER NOTE - PHYSICAL EXAMINATION
Gen: Alert, in a lot of pain  Head: NC, AT   Eyes: PERRL, EOMI, normal lids/conjunctiva  ENT: normal hearing, patent oropharynx without erythema/exudate, uvula midline  Neck: supple, no tenderness, Trachea midline  Pulm: Bilateral BS, normal resp effort, no wheeze/stridor/retractions  CV: RRR, no M/R/G, 2+ radial and dp pulses bl, no edema  Abd: soft, NT/ND, +BS, no hepatosplenomegaly  Mskel: extremities x4 with normal ROM and no joint effusions. some L spine ttp. unable to straight leg raise   Skin: no rash, no bruising   Neuro: AAOx3, no sensory/motor deficits, CN 2-12 intact

## 2025-04-23 NOTE — ED ADULT NURSE NOTE - OBJECTIVE STATEMENT
Received pt via EMS from home with c/o "lower back pain which has been going on since Jan. I had an MRI done 2 days ago at [facility] and received a call this am from the pain management doctor to come in due to an infection in the spine. I had Epidurals in Jan and Feb last took Advil last night. No numbness/tingling/loss of bowel or bladder."

## 2025-04-23 NOTE — CONSULT NOTE ADULT - PROBLEM SELECTOR RECOMMENDATION 9
IV Abxs started .Blood cultures pending.    Planned for surgery tomorrow AM at [facility].   Spinal surgery help appreciated.

## 2025-04-23 NOTE — ED PROVIDER NOTE - PROGRESS NOTE DETAILS
MRI report reviewed, pt with "large collection involving L1-L2 intervertebral disc space, b/l psoas muscles and extending superiorly in the epidural space beyond level of T10. Findings most consistent with severe discitis osteomyelitis with b/l psoas and epidural abscess." CT a/p ordered for further eval and ortho/spine consulted. - Katt Spivey PA-C Pt seen by spine, requesting repeat MRI C/T/L w/wo contrast. Order placed and MRI called to expedite. - Katt Spivey PA-C Attending Fabio: I received sign out on this patient. I am aware of the previously determined ongoing plan of care and what, if any, tests/consults are pending from the previous provider. I am available for supervision of the ongoing plan of care for the Resident/RICARDO/Fellow/Student. MRI results reviewed, spoke with ortho resident, reports MRIs sent to Dr. Yadav, will call back with recommendations. - Katt Spivey PA-C Attending Fabio: seen by ortho. davy discussed w/ their attending. ortho would like to operate on pt however no OR space available at [facility] tomorrow for procedure. ortho discussed w/ Dr. Gil at [facility] who is willing to accept pt there to perform surgery. discussed w/ transfer center to initiate transfer to Dr. Boston's service at [facility]. Transfer center to confirm w/ Dr. Boston and initiate transfer if he agrees as reported. Genie Mccarthy: pt accepted to [facility] under Dr. Boston

## 2025-04-23 NOTE — CONSULT NOTE ADULT - ASSESSMENT
79yMale w/ HTN, HLD, T2DM,CKD,  chronic low back pain presented to [facility] ED sent by his pain medicine physician due to recent MRI findings concerning for L1-L2 discitis/OM with epidural abscess, bilateral psoas abscesses. Patient initially saw pain specialist in February for chronic back pain with radiations to bilateral lateral thighs (R>L). MRI at that time showed degenerative changes. He subsequently had epidural injections x2 (2/27, 3/11) with moderate pain relief. Pain began to worsen on 4/10. He had radiofrequency ablation performed on 4/11 and has since has severe worsening of pain and radiation to R lateral thigh. He reports recent bowel/bladder "incontinence" requiring diaper due to his inability to get to the bathroom in time due to pain limitations but states urge and sensation are intact. Denies fevers, chills, night sweats, weakness, numbness/tingling, saddle anesthesia, recent falls/trauma. He uses a cane for ambulation.  c/o _ for [duration] a/w _. First noticed it _ ago while _. Exacerbated by _; improved with _. Radiates _. Denies weakness, numbness/tingling, hand clumsiness, or radicular sxs. Denies fevers/chills, weight loss, or night pain. Denies epidural steroid injections or IV drug use. Denies change in bowel/bladder function or saddle anesthesia. Denies recent falls/trauma or any other ortho injuries. Denies any chest pain or SOB on ambulation .

## 2025-04-23 NOTE — CONSULT NOTE ADULT - SUBJECTIVE AND OBJECTIVE BOX
Full consult later..     Patient is a 79y old  Male who presents with a chief complaint of Back pain with discitis /Osteomyelitis and Epidural abscess .        HPI:   79yMale w/ HTN, HLD, T2DM,CKD,  chronic low back pain presented to [facility] ED sent by his pain medicine physician due to recent MRI findings concerning for L1-L2 discitis/OM with epidural abscess, bilateral psoas abscesses. Patient initially saw pain specialist in February for chronic back pain with radiations to bilateral lateral thighs (R>L). MRI at that time showed degenerative changes. He subsequently had epidural injections x2 (, 3/11) with moderate pain relief. Pain began to worsen on 4/10. He had radiofrequency ablation performed on  and has since has severe worsening of pain and radiation to R lateral thigh. He reports recent bowel/bladder "incontinence" requiring diaper due to his inability to get to the bathroom in time due to pain limitations but states urge and sensation are intact. Denies fevers, chills, night sweats, weakness, numbness/tingling, saddle anesthesia, recent falls/trauma. He uses a cane for ambulation.  c/o _ for [duration] a/w _. First noticed it _ ago while _. Exacerbated by _; improved with _. Radiates _. Denies weakness, numbness/tingling, hand clumsiness, or radicular sxs. Denies fevers/chills, weight loss, or night pain. Denies epidural steroid injections or IV drug use. Denies change in bowel/bladder function or saddle anesthesia. Denies recent falls/trauma or any other ortho injuries. Denies any chest pain or SOB on ambulation .      PAST MEDICAL & SURGICAL HISTORY:  HTN (hypertension)      HLD (hyperlipidemia)      DM (diabetes mellitus)      Cataract      Social History: NO smoking etc.    FAMILY HISTORY:  No pertinent family history in first degree relatives        Allergies    No Known Allergies    Intolerances        REVIEW OF SYSTEMS:    CONSTITUTIONAL: No fever, weight loss, or fatigue  EYES: No eye pain, visual disturbances, or discharge  RESPIRATORY: No cough, wheezing, chills or hemoptysis; No shortness of breath  CARDIOVASCULAR: No chest pain, palpitations, dizziness, or leg swelling  GASTROINTESTINAL: No abdominal or epigastric pain. No nausea, vomiting, or hematemesis; No diarrhea or constipation. No melena or hematochezia.  GENITOURINARY: No dysuria, frequency, hematuria, or incontinence  NEUROLOGICAL: No headaches, memory loss, loss of strength, numbness, or tremors  SKIN: No itching, burning, rashes, or lesions   ENDOCRINE: No heat or cold intolerance; No hair loss  MUSCULOSKELETAL: back, and RLE  extremity pain  PSYCHIATRIC: No depression, anxiety, mood swings, or difficulty sleeping      MEDICATIONS  (STANDING):    MEDICATIONS  (PRN):      Vital Signs Last 24 Hrs  T(C): 37.2 (2025 19:14), Max: 37.4 (2025 11:54)  T(F): 99 (2025 19:14), Max: 99.3 (2025 11:54)  HR: 98 (2025 19:14) (98 - 128)  BP: 127/75 (2025 19:14) (114/64 - 153/75)  BP(mean): --  RR: 18 (2025 19:14) (17 - 20)  SpO2: 97% (2025 19:14) (97% - 98%)    Parameters below as of 2025 19:14  Patient On (Oxygen Delivery Method): room air        PHYSICAL EXAM:    GENERAL: NAD, well-groomed, well-developed  HEAD:  Atraumatic, Normocephalic  EYES: EOMI, PERRLA, conjunctiva and sclera clear  ENMT: No tonsillar erythema, exudates, or enlargement; Moist mucous membranes, Good dentition, No lesions  NECK: Supple, No JVD, Normal thyroid  NERVOUS SYSTEM:  Alert & Oriented X3, No new  focal sign but tenderness Lumbar spine and SLR + right side. .  CHEST/LUNG: Air entry good bilaterally; No rales, rhonchi, wheezing, or rubs  HEART: Regular rate and rhythm; No murmurs, rubs, or gallops  ABDOMEN: Soft, Nontender, Nondistended; Bowel sounds present  EXTREMITIES:  2+ Peripheral Pulses, No clubbing, cyanosis, or edema      LABS:                        11.9   13.45 )-----------( 543      ( 2025 11:52 )             36.6     04-23    133[L]  |  100  |  30[H]  ----------------------------<  286[H]  4.7   |  18[L]  |  0.88    Ca    10.5      2025 11:52    TPro  7.3  /  Alb  2.8[L]  /  TBili  0.4  /  DBili  x   /  AST  26  /  ALT  44  /  AlkPhos  141[H]  04-23    PT/INR - ( 2025 11:52 )   PT: 13.3 sec;   INR: 1.16 ratio         PTT - ( 2025 11:52 )  PTT:32.6 sec  Urinalysis Basic - ( 2025 12:26 )    Color: Yellow / Appearance: Clear / S.023 / pH: x  Gluc: x / Ketone: Negative mg/dL  / Bili: Negative / Urobili: 0.2 mg/dL   Blood: x / Protein: Trace mg/dL / Nitrite: Negative   Leuk Esterase: Negative / RBC: x / WBC x   Sq Epi: x / Non Sq Epi: x / Bacteria: x          RADIOLOGY & ADDITIONAL STUDIES:

## 2025-04-24 ENCOUNTER — RESULT REVIEW (OUTPATIENT)
Age: 80
End: 2025-04-24

## 2025-04-24 ENCOUNTER — INPATIENT (INPATIENT)
Facility: HOSPITAL | Age: 80
LOS: 21 days | Discharge: NURSING, REHAB OR LONG-TERM CARE | End: 2025-05-16
Attending: STUDENT IN AN ORGANIZED HEALTH CARE EDUCATION/TRAINING PROGRAM | Admitting: STUDENT IN AN ORGANIZED HEALTH CARE EDUCATION/TRAINING PROGRAM
Payer: MEDICARE

## 2025-04-24 ENCOUNTER — TRANSCRIPTION ENCOUNTER (OUTPATIENT)
Age: 80
End: 2025-04-24

## 2025-04-24 VITALS
TEMPERATURE: 98 F | RESPIRATION RATE: 18 BRPM | HEART RATE: 101 BPM | DIASTOLIC BLOOD PRESSURE: 73 MMHG | OXYGEN SATURATION: 98 % | SYSTOLIC BLOOD PRESSURE: 149 MMHG

## 2025-04-24 VITALS
SYSTOLIC BLOOD PRESSURE: 159 MMHG | DIASTOLIC BLOOD PRESSURE: 75 MMHG | RESPIRATION RATE: 18 BRPM | OXYGEN SATURATION: 97 % | TEMPERATURE: 98 F | HEART RATE: 103 BPM

## 2025-04-24 DIAGNOSIS — G06.2 EXTRADURAL AND SUBDURAL ABSCESS, UNSPECIFIED: ICD-10-CM

## 2025-04-24 DIAGNOSIS — M46.26 OSTEOMYELITIS OF VERTEBRA, LUMBAR REGION: ICD-10-CM

## 2025-04-24 DIAGNOSIS — E78.5 HYPERLIPIDEMIA, UNSPECIFIED: ICD-10-CM

## 2025-04-24 DIAGNOSIS — M54.50 LOW BACK PAIN, UNSPECIFIED: ICD-10-CM

## 2025-04-24 DIAGNOSIS — M46.46 DISCITIS, UNSPECIFIED, LUMBAR REGION: ICD-10-CM

## 2025-04-24 DIAGNOSIS — I10 ESSENTIAL (PRIMARY) HYPERTENSION: ICD-10-CM

## 2025-04-24 DIAGNOSIS — H26.9 UNSPECIFIED CATARACT: Chronic | ICD-10-CM

## 2025-04-24 DIAGNOSIS — E11.9 TYPE 2 DIABETES MELLITUS WITHOUT COMPLICATIONS: ICD-10-CM

## 2025-04-24 LAB
-  BACTEROIDES FRAGILIS: SIGNIFICANT CHANGE UP
A1C WITH ESTIMATED AVERAGE GLUCOSE RESULT: 7.3 % — HIGH (ref 4–5.6)
ADD ON TEST-SPECIMEN IN LAB: SIGNIFICANT CHANGE UP
ANION GAP SERPL CALC-SCNC: 12 MMOL/L — SIGNIFICANT CHANGE UP (ref 7–14)
ANION GAP SERPL CALC-SCNC: 15 MMOL/L — HIGH (ref 7–14)
ANISOCYTOSIS BLD QL: SLIGHT — SIGNIFICANT CHANGE UP
APTT BLD: 32.7 SEC — SIGNIFICANT CHANGE UP (ref 26.1–36.8)
BASOPHILS # BLD AUTO: 0 K/UL — SIGNIFICANT CHANGE UP (ref 0–0.2)
BASOPHILS NFR BLD AUTO: 0 % — SIGNIFICANT CHANGE UP (ref 0–2)
BLD GP AB SCN SERPL QL: NEGATIVE — SIGNIFICANT CHANGE UP
BLOOD GAS ARTERIAL - LYTES,HGB,ICA,LACT RESULT: SIGNIFICANT CHANGE UP
BLOOD GAS ARTERIAL COMPREHENSIVE RESULT: SIGNIFICANT CHANGE UP
BLOOD GAS ARTERIAL COMPREHENSIVE RESULT: SIGNIFICANT CHANGE UP
BUN SERPL-MCNC: 18 MG/DL — SIGNIFICANT CHANGE UP (ref 7–23)
BUN SERPL-MCNC: 21 MG/DL — SIGNIFICANT CHANGE UP (ref 7–23)
CALCIUM SERPL-MCNC: 10.1 MG/DL — SIGNIFICANT CHANGE UP (ref 8.4–10.5)
CALCIUM SERPL-MCNC: 9 MG/DL — SIGNIFICANT CHANGE UP (ref 8.4–10.5)
CHLORIDE SERPL-SCNC: 102 MMOL/L — SIGNIFICANT CHANGE UP (ref 98–107)
CHLORIDE SERPL-SCNC: 106 MMOL/L — SIGNIFICANT CHANGE UP (ref 98–107)
CO2 SERPL-SCNC: 17 MMOL/L — LOW (ref 22–31)
CO2 SERPL-SCNC: 21 MMOL/L — LOW (ref 22–31)
CREAT SERPL-MCNC: 0.75 MG/DL — SIGNIFICANT CHANGE UP (ref 0.5–1.3)
CREAT SERPL-MCNC: 0.8 MG/DL — SIGNIFICANT CHANGE UP (ref 0.5–1.3)
EGFR: 90 ML/MIN/1.73M2 — SIGNIFICANT CHANGE UP
EGFR: 90 ML/MIN/1.73M2 — SIGNIFICANT CHANGE UP
EGFR: 92 ML/MIN/1.73M2 — SIGNIFICANT CHANGE UP
EGFR: 92 ML/MIN/1.73M2 — SIGNIFICANT CHANGE UP
EOSINOPHIL # BLD AUTO: 0 K/UL — SIGNIFICANT CHANGE UP (ref 0–0.5)
EOSINOPHIL NFR BLD AUTO: 0 % — SIGNIFICANT CHANGE UP (ref 0–6)
ESTIMATED AVERAGE GLUCOSE: 163 — SIGNIFICANT CHANGE UP
GAS PNL BLDA: SIGNIFICANT CHANGE UP
GLUCOSE BLDC GLUCOMTR-MCNC: 135 MG/DL — HIGH (ref 70–99)
GLUCOSE BLDC GLUCOMTR-MCNC: 169 MG/DL — HIGH (ref 70–99)
GLUCOSE SERPL-MCNC: 159 MG/DL — HIGH (ref 70–99)
GLUCOSE SERPL-MCNC: 166 MG/DL — HIGH (ref 70–99)
GRAM STN FLD: ABNORMAL
GRAM STN FLD: ABNORMAL
HCT VFR BLD CALC: 29.5 % — LOW (ref 39–50)
HCT VFR BLD CALC: 34 % — LOW (ref 39–50)
HGB BLD-MCNC: 10 G/DL — LOW (ref 13–17)
HGB BLD-MCNC: 11.5 G/DL — LOW (ref 13–17)
IANC: 10.35 K/UL — HIGH (ref 1.8–7.4)
INR BLD: 1.16 RATIO — SIGNIFICANT CHANGE UP (ref 0.85–1.16)
LYMPHOCYTES # BLD AUTO: 1.82 K/UL — SIGNIFICANT CHANGE UP (ref 1–3.3)
LYMPHOCYTES # BLD AUTO: 12.2 % — LOW (ref 13–44)
MAGNESIUM SERPL-MCNC: 2 MG/DL — SIGNIFICANT CHANGE UP (ref 1.6–2.6)
MCHC RBC-ENTMCNC: 28.9 PG — SIGNIFICANT CHANGE UP (ref 27–34)
MCHC RBC-ENTMCNC: 29.6 PG — SIGNIFICANT CHANGE UP (ref 27–34)
MCHC RBC-ENTMCNC: 33.8 G/DL — SIGNIFICANT CHANGE UP (ref 32–36)
MCHC RBC-ENTMCNC: 33.9 G/DL — SIGNIFICANT CHANGE UP (ref 32–36)
MCV RBC AUTO: 85.3 FL — SIGNIFICANT CHANGE UP (ref 80–100)
MCV RBC AUTO: 87.6 FL — SIGNIFICANT CHANGE UP (ref 80–100)
METHOD TYPE: SIGNIFICANT CHANGE UP
MONOCYTES # BLD AUTO: 0.64 K/UL — SIGNIFICANT CHANGE UP (ref 0–0.9)
MONOCYTES NFR BLD AUTO: 4.3 % — SIGNIFICANT CHANGE UP (ref 2–14)
MYELOCYTES NFR BLD: 4.3 % — HIGH (ref 0–0)
NEUTROPHILS # BLD AUTO: 11.41 K/UL — HIGH (ref 1.8–7.4)
NEUTROPHILS NFR BLD AUTO: 75.7 % — SIGNIFICANT CHANGE UP (ref 43–77)
NEUTS BAND # BLD: 0.9 % — SIGNIFICANT CHANGE UP (ref 0–6)
NEUTS BAND NFR BLD: 0.9 % — SIGNIFICANT CHANGE UP (ref 0–6)
NRBC # BLD AUTO: 0 K/UL — SIGNIFICANT CHANGE UP (ref 0–0)
NRBC # FLD: 0 K/UL — SIGNIFICANT CHANGE UP (ref 0–0)
NRBC BLD AUTO-RTO: 0 /100 WBCS — SIGNIFICANT CHANGE UP (ref 0–0)
PHOSPHATE SERPL-MCNC: 3.3 MG/DL — SIGNIFICANT CHANGE UP (ref 2.5–4.5)
PLAT MORPH BLD: NORMAL — SIGNIFICANT CHANGE UP
PLATELET # BLD AUTO: 349 K/UL — SIGNIFICANT CHANGE UP (ref 150–400)
PLATELET # BLD AUTO: 561 K/UL — HIGH (ref 150–400)
PLATELET COUNT - ESTIMATE: NORMAL — SIGNIFICANT CHANGE UP
POIKILOCYTOSIS BLD QL AUTO: SIGNIFICANT CHANGE UP
POLYCHROMASIA BLD QL SMEAR: SLIGHT — SIGNIFICANT CHANGE UP
POTASSIUM SERPL-MCNC: 3.8 MMOL/L — SIGNIFICANT CHANGE UP (ref 3.5–5.3)
POTASSIUM SERPL-MCNC: 4.1 MMOL/L — SIGNIFICANT CHANGE UP (ref 3.5–5.3)
POTASSIUM SERPL-SCNC: 3.8 MMOL/L — SIGNIFICANT CHANGE UP (ref 3.5–5.3)
POTASSIUM SERPL-SCNC: 4.1 MMOL/L — SIGNIFICANT CHANGE UP (ref 3.5–5.3)
PROTHROM AB SERPL-ACNC: 13.8 SEC — HIGH (ref 9.9–13.4)
RBC # BLD: 3.46 M/UL — LOW (ref 4.2–5.8)
RBC # BLD: 3.88 M/UL — LOW (ref 4.2–5.8)
RBC # FLD: 14.1 % — SIGNIFICANT CHANGE UP (ref 10.3–14.5)
RBC # FLD: 14.6 % — HIGH (ref 10.3–14.5)
RBC BLD AUTO: ABNORMAL
RH IG SCN BLD-IMP: POSITIVE — SIGNIFICANT CHANGE UP
SODIUM SERPL-SCNC: 135 MMOL/L — SIGNIFICANT CHANGE UP (ref 135–145)
SODIUM SERPL-SCNC: 138 MMOL/L — SIGNIFICANT CHANGE UP (ref 135–145)
SPECIMEN SOURCE: SIGNIFICANT CHANGE UP
SPECIMEN SOURCE: SIGNIFICANT CHANGE UP
VARIANT LYMPHS # BLD: 2.6 % — SIGNIFICANT CHANGE UP (ref 0–6)
VARIANT LYMPHS NFR BLD MANUAL: 2.6 % — SIGNIFICANT CHANGE UP (ref 0–6)
WBC # BLD: 12.5 K/UL — HIGH (ref 3.8–10.5)
WBC # BLD: 14.9 K/UL — HIGH (ref 3.8–10.5)
WBC # FLD AUTO: 12.5 K/UL — HIGH (ref 3.8–10.5)
WBC # FLD AUTO: 14.9 K/UL — HIGH (ref 3.8–10.5)

## 2025-04-24 PROCEDURE — 15002 WOUND PREP TRK/ARM/LEG: CPT

## 2025-04-24 PROCEDURE — 22216 INCIS ADDL SPINE SEGMENT: CPT

## 2025-04-24 PROCEDURE — 22610 ARTHRD PST TQ 1NTRSPC THRC: CPT

## 2025-04-24 PROCEDURE — 22614 ARTHRD PST TQ 1NTRSPC EA ADD: CPT

## 2025-04-24 PROCEDURE — 63056 DECOMPRESS SPINAL CORD LMBR: CPT | Mod: 59

## 2025-04-24 PROCEDURE — 15003 WOUND PREP ADDL 100 CM: CPT

## 2025-04-24 PROCEDURE — 22843 INSERT SPINE FIXATION DEVICE: CPT

## 2025-04-24 PROCEDURE — 93306 TTE W/DOPPLER COMPLETE: CPT | Mod: 26

## 2025-04-24 PROCEDURE — 15734 MUSCLE-SKIN GRAFT TRUNK: CPT

## 2025-04-24 PROCEDURE — 61783 SCAN PROC SPINAL: CPT

## 2025-04-24 PROCEDURE — 14302 TIS TRNFR ADDL 30 SQ CM: CPT

## 2025-04-24 PROCEDURE — 76376 3D RENDER W/INTRP POSTPROCES: CPT | Mod: 26

## 2025-04-24 PROCEDURE — 99291 CRITICAL CARE FIRST HOUR: CPT

## 2025-04-24 PROCEDURE — 14301 TIS TRNFR ANY 30.1-60 SQ CM: CPT

## 2025-04-24 PROCEDURE — 22848 INSERT PELV FIXATION DEVICE: CPT

## 2025-04-24 PROCEDURE — 22212 INCIS 1 VERTEBRAL SEG THORAC: CPT

## 2025-04-24 DEVICE — GRAFT BONE INFUSE KIT LG: Type: IMPLANTABLE DEVICE | Status: FUNCTIONAL

## 2025-04-24 DEVICE — BONE WAX 2.5GM: Type: IMPLANTABLE DEVICE | Status: FUNCTIONAL

## 2025-04-24 DEVICE — FIBERGRAFT BG MATRIX 12.5CC LRG: Type: IMPLANTABLE DEVICE | Status: FUNCTIONAL

## 2025-04-24 DEVICE — SURGIFLO MATRIX WITH THROMBIN KIT: Type: IMPLANTABLE DEVICE | Status: FUNCTIONAL

## 2025-04-24 DEVICE — IMPLANTABLE DEVICE: Type: IMPLANTABLE DEVICE | Status: FUNCTIONAL

## 2025-04-24 DEVICE — SCREW POLY 6.5X45MM: Type: IMPLANTABLE DEVICE | Status: FUNCTIONAL

## 2025-04-24 DEVICE — SET SCREW EVEREST ATR: Type: IMPLANTABLE DEVICE | Status: FUNCTIONAL

## 2025-04-24 DEVICE — FIBERGRAFT BG MATRIX 17CC LNG: Type: IMPLANTABLE DEVICE | Status: FUNCTIONAL

## 2025-04-24 DEVICE — SCREW 6.5X40MM: Type: IMPLANTABLE DEVICE | Status: FUNCTIONAL

## 2025-04-24 RX ORDER — GABAPENTIN 400 MG/1
100 CAPSULE ORAL EVERY 8 HOURS
Refills: 0 | Status: DISCONTINUED | OUTPATIENT
Start: 2025-04-24 | End: 2025-05-02

## 2025-04-24 RX ORDER — POVIDONE-IODINE 7.5 %
1 SOLUTION, NON-ORAL TOPICAL ONCE
Refills: 0 | Status: COMPLETED | OUTPATIENT
Start: 2025-04-24 | End: 2025-04-24

## 2025-04-24 RX ORDER — OXYCODONE HYDROCHLORIDE 30 MG/1
5 TABLET ORAL ONCE
Refills: 0 | Status: DISCONTINUED | OUTPATIENT
Start: 2025-04-24 | End: 2025-04-24

## 2025-04-24 RX ORDER — LOSARTAN POTASSIUM 100 MG/1
25 TABLET, FILM COATED ORAL DAILY
Refills: 0 | Status: DISCONTINUED | OUTPATIENT
Start: 2025-04-24 | End: 2025-04-24

## 2025-04-24 RX ORDER — NALBUPHINE HYDROCHLORIDE 10 MG/ML
1 INJECTION INTRAMUSCULAR; INTRAVENOUS; SUBCUTANEOUS EVERY 6 HOURS
Refills: 0 | Status: DISCONTINUED | OUTPATIENT
Start: 2025-04-24 | End: 2025-04-25

## 2025-04-24 RX ORDER — OXYCODONE HYDROCHLORIDE 30 MG/1
5 TABLET ORAL EVERY 4 HOURS
Refills: 0 | Status: DISCONTINUED | OUTPATIENT
Start: 2025-04-24 | End: 2025-04-24

## 2025-04-24 RX ORDER — HYDROMORPHONE/SOD CHLOR,ISO/PF 2 MG/10 ML
0.5 SYRINGE (ML) INJECTION
Refills: 0 | Status: DISCONTINUED | OUTPATIENT
Start: 2025-04-24 | End: 2025-04-25

## 2025-04-24 RX ORDER — DEXTROSE 50 % IN WATER 50 %
12.5 SYRINGE (ML) INTRAVENOUS ONCE
Refills: 0 | Status: DISCONTINUED | OUTPATIENT
Start: 2025-04-24 | End: 2025-04-25

## 2025-04-24 RX ORDER — BISACODYL 5 MG
5 TABLET, DELAYED RELEASE (ENTERIC COATED) ORAL EVERY 12 HOURS
Refills: 0 | Status: DISCONTINUED | OUTPATIENT
Start: 2025-04-24 | End: 2025-04-30

## 2025-04-24 RX ORDER — OXYCODONE HYDROCHLORIDE 30 MG/1
10 TABLET ORAL EVERY 4 HOURS
Refills: 0 | Status: DISCONTINUED | OUTPATIENT
Start: 2025-04-24 | End: 2025-04-25

## 2025-04-24 RX ORDER — ONDANSETRON HCL/PF 4 MG/2 ML
4 VIAL (ML) INJECTION EVERY 6 HOURS
Refills: 0 | Status: DISCONTINUED | OUTPATIENT
Start: 2025-04-24 | End: 2025-04-27

## 2025-04-24 RX ORDER — DEXTROSE 50 % IN WATER 50 %
25 SYRINGE (ML) INTRAVENOUS ONCE
Refills: 0 | Status: DISCONTINUED | OUTPATIENT
Start: 2025-04-24 | End: 2025-04-25

## 2025-04-24 RX ORDER — SENNA 187 MG
2 TABLET ORAL AT BEDTIME
Refills: 0 | Status: DISCONTINUED | OUTPATIENT
Start: 2025-04-24 | End: 2025-05-06

## 2025-04-24 RX ORDER — ACETAMINOPHEN 500 MG/5ML
975 LIQUID (ML) ORAL EVERY 8 HOURS
Refills: 0 | Status: DISCONTINUED | OUTPATIENT
Start: 2025-04-24 | End: 2025-04-25

## 2025-04-24 RX ORDER — ACETAMINOPHEN 500 MG/5ML
975 LIQUID (ML) ORAL EVERY 6 HOURS
Refills: 0 | Status: DISCONTINUED | OUTPATIENT
Start: 2025-04-24 | End: 2025-05-16

## 2025-04-24 RX ORDER — SODIUM CHLORIDE 9 G/1000ML
1000 INJECTION, SOLUTION INTRAVENOUS
Refills: 0 | Status: DISCONTINUED | OUTPATIENT
Start: 2025-04-24 | End: 2025-04-25

## 2025-04-24 RX ORDER — ATORVASTATIN CALCIUM 80 MG/1
40 TABLET, FILM COATED ORAL AT BEDTIME
Refills: 0 | Status: DISCONTINUED | OUTPATIENT
Start: 2025-04-24 | End: 2025-05-16

## 2025-04-24 RX ORDER — CEFAZOLIN SODIUM IN 0.9 % NACL 3 G/100 ML
2000 INTRAVENOUS SOLUTION, PIGGYBACK (ML) INTRAVENOUS EVERY 8 HOURS
Refills: 0 | Status: DISCONTINUED | OUTPATIENT
Start: 2025-04-24 | End: 2025-04-24

## 2025-04-24 RX ORDER — HYDROMORPHONE/SOD CHLOR,ISO/PF 2 MG/10 ML
30 SYRINGE (ML) INJECTION
Refills: 0 | Status: DISCONTINUED | OUTPATIENT
Start: 2025-04-24 | End: 2025-04-25

## 2025-04-24 RX ORDER — AMLODIPINE BESYLATE 10 MG/1
10 TABLET ORAL ONCE
Refills: 0 | Status: COMPLETED | OUTPATIENT
Start: 2025-04-24 | End: 2025-04-24

## 2025-04-24 RX ORDER — INSULIN LISPRO 100 U/ML
INJECTION, SOLUTION INTRAVENOUS; SUBCUTANEOUS EVERY 6 HOURS
Refills: 0 | Status: DISCONTINUED | OUTPATIENT
Start: 2025-04-24 | End: 2025-04-26

## 2025-04-24 RX ORDER — SODIUM CHLORIDE 9 G/1000ML
1000 INJECTION, SOLUTION INTRAVENOUS
Refills: 0 | Status: DISCONTINUED | OUTPATIENT
Start: 2025-04-24 | End: 2025-04-26

## 2025-04-24 RX ORDER — PIPERACILLIN-TAZO-DEXTROSE,ISO 2.25G/50ML
3.38 IV SOLUTION, PIGGYBACK PREMIX FROZEN(ML) INTRAVENOUS EVERY 8 HOURS
Refills: 0 | Status: DISCONTINUED | OUTPATIENT
Start: 2025-04-25 | End: 2025-04-29

## 2025-04-24 RX ORDER — CYCLOBENZAPRINE HYDROCHLORIDE 15 MG/1
10 CAPSULE, EXTENDED RELEASE ORAL THREE TIMES A DAY
Refills: 0 | Status: DISCONTINUED | OUTPATIENT
Start: 2025-04-24 | End: 2025-04-25

## 2025-04-24 RX ORDER — ONDANSETRON HCL/PF 4 MG/2 ML
4 VIAL (ML) INJECTION ONCE
Refills: 0 | Status: DISCONTINUED | OUTPATIENT
Start: 2025-04-24 | End: 2025-04-25

## 2025-04-24 RX ORDER — GLUCAGON 3 MG/1
1 POWDER NASAL ONCE
Refills: 0 | Status: DISCONTINUED | OUTPATIENT
Start: 2025-04-24 | End: 2025-05-16

## 2025-04-24 RX ORDER — OXYCODONE HYDROCHLORIDE 30 MG/1
5 TABLET ORAL EVERY 4 HOURS
Refills: 0 | Status: DISCONTINUED | OUTPATIENT
Start: 2025-04-24 | End: 2025-04-25

## 2025-04-24 RX ORDER — OMEGA-3-ACID ETHYL ESTERS CAPSULES 1 G/1
2 CAPSULE, LIQUID FILLED ORAL
Refills: 0 | Status: DISCONTINUED | OUTPATIENT
Start: 2025-04-24 | End: 2025-05-16

## 2025-04-24 RX ORDER — VANCOMYCIN HCL IN 5 % DEXTROSE 1.5G/250ML
1000 PLASTIC BAG, INJECTION (ML) INTRAVENOUS EVERY 12 HOURS
Refills: 0 | Status: DISCONTINUED | OUTPATIENT
Start: 2025-04-24 | End: 2025-04-25

## 2025-04-24 RX ORDER — PHENYLEPHRINE HCL IN 0.9% NACL 0.5 MG/5ML
0.8 SYRINGE (ML) INTRAVENOUS
Qty: 40 | Refills: 0 | Status: DISCONTINUED | OUTPATIENT
Start: 2025-04-24 | End: 2025-04-25

## 2025-04-24 RX ORDER — DEXTROSE 50 % IN WATER 50 %
15 SYRINGE (ML) INTRAVENOUS ONCE
Refills: 0 | Status: DISCONTINUED | OUTPATIENT
Start: 2025-04-24 | End: 2025-04-25

## 2025-04-24 RX ORDER — NALOXONE HYDROCHLORIDE 0.4 MG/ML
0.1 INJECTION, SOLUTION INTRAMUSCULAR; INTRAVENOUS; SUBCUTANEOUS
Refills: 0 | Status: DISCONTINUED | OUTPATIENT
Start: 2025-04-24 | End: 2025-04-27

## 2025-04-24 RX ORDER — MAGNESIUM HYDROXIDE 400 MG/5ML
30 SUSPENSION ORAL EVERY 12 HOURS
Refills: 0 | Status: DISCONTINUED | OUTPATIENT
Start: 2025-04-24 | End: 2025-05-16

## 2025-04-24 RX ADMIN — Medication 0.5 MILLIGRAM(S): at 23:00

## 2025-04-24 RX ADMIN — Medication 0.5 MILLIGRAM(S): at 22:39

## 2025-04-24 RX ADMIN — Medication 1 APPLICATION(S): at 11:37

## 2025-04-24 RX ADMIN — Medication 100 MILLILITER(S): at 09:47

## 2025-04-24 RX ADMIN — AMLODIPINE BESYLATE 10 MILLIGRAM(S): 10 TABLET ORAL at 09:33

## 2025-04-24 RX ADMIN — Medication 975 MILLIGRAM(S): at 07:18

## 2025-04-24 RX ADMIN — Medication 1 APPLICATION(S): at 12:00

## 2025-04-24 RX ADMIN — Medication 19.1 MICROGRAM(S)/KG/MIN: at 23:00

## 2025-04-24 RX ADMIN — Medication 40 MILLIGRAM(S): at 04:41

## 2025-04-24 RX ADMIN — CYCLOBENZAPRINE HYDROCHLORIDE 10 MILLIGRAM(S): 15 CAPSULE, EXTENDED RELEASE ORAL at 22:44

## 2025-04-24 RX ADMIN — Medication 40 MILLIGRAM(S): at 09:47

## 2025-04-24 RX ADMIN — Medication 975 MILLIGRAM(S): at 04:38

## 2025-04-24 RX ADMIN — Medication 30 MILLILITER(S): at 23:37

## 2025-04-24 NOTE — DIETITIAN INITIAL EVALUATION ADULT - NS FNS DIET ORDER
Diet, NPO:   Except Medications (04-24-25 @ 03:58)   Diet, Clear Liquid (04-25-25 @ 01:31)  Diet, Regular (04-24-25 @ 14:34)

## 2025-04-24 NOTE — CONSULT NOTE ADULT - SUBJECTIVE AND OBJECTIVE BOX
SICU Consultation Note  =====================================================    79yMale w/ HTN, HLD, T2DM, chronic low back pain presents as a transfer from [facility] ED for surgery today (Tetantive for T10-pelvis PSF ) Per records patient was  sent into ED earlier this week  by his pain medicine physician due to recent MRI findings concerning for L1-L2 discitis/OM with epidural abscess, bilateral psoas abscesses. Patient initially saw pain specialist in February for chronic back pain with radiations to bilateral lateral thighs (R>L). MRI at that time showed degenerative changes. He subsequently had epidural injections x2 (2/27, 3/11) with moderate pain relief. Pain began to worsen on 4/10. He had radiofrequency ablation performed on 4/11 and has since has severe worsening of pain and radiation to R lateral thigh. He reports recent bowel/bladder "incontinence" requiring diaper due to his inability to get to the bathroom in time due to pain limitations but states urge and sensation are intact. Denies fevers, chills, night sweats, weakness, numbness/tingling, saddle anesthesia, recent falls/trauma. He uses a cane for ambulation at baseline.      ROS  Negative unless otherwise specified in HPI.    PAST MEDICAL & SURGICAL Hx  PAST MEDICAL & SURGICAL HISTORY:  HTN (hypertension)      HLD (hyperlipidemia)      DM (diabetes mellitus)      Cataract          MEDICATIONS  Home Medications:  losartan 25 mg oral tablet: 1 tab(s) orally once a day (06 Jun 2018 10:23)  Lovaza 1000 mg oral capsule: 2 cap(s) orally 2 times a day (06 Jun 2018 10:23)  metFORMIN 500 mg oral tablet: 1 tab(s) orally once a day (06 Jun 2018 10:23)      ALLERGIES  No Known Allergies      FAMILY Hx  FAMILY HISTORY:  No pertinent family history in first degree relatives        SOCIAL Hx  Social History:      VITALS  Vital Signs Last 24 Hrs  T(C): 36.8 (24 Apr 2025 03:30), Max: 37.4 (23 Apr 2025 11:54)  T(F): 98.2 (24 Apr 2025 03:30), Max: 99.3 (23 Apr 2025 11:54)  HR: 103 (24 Apr 2025 03:30) (97 - 128)  BP: 159/75 (24 Apr 2025 03:30) (114/64 - 159/75)  BP(mean): --  RR: 18 (24 Apr 2025 03:30) (17 - 20)  SpO2: 97% (24 Apr 2025 03:30) (96% - 98%)        PHYSICAL EXAM  Gen: Lying in bed, NAD  Resp: No increased WOB  Spine:  Skin intact  No bony TTP or step-offs along c-, t-, l-spine, or sacrum; has ttp of R paraspinal L spine    Motor:                   C5                C6              C7               C8           T1   R            5/5                5/5            5/5             5/5          5/5  L             5/5               5/5             5/5             5/5          5/5                L2             L3             L4               L5            S1  R         3+/5 **          5/5          5/5             5/5           5/5  L          5/5          5/5           5/5             5/5           5/5    **limited by pain    Sensory:            C5         C6         C7      C8       T1        (0=absent, 1=impaired, 2=normal, NT=not testable)  R         2            2           2        2         2  L          2            2           2        2         2               L2          L3         L4      L5       S1         (0=absent, 1=impaired, 2=normal, NT=not testable)  R         2            2            2        2        2  L          2            2           2        2         2    (+) Rectal tone, saddle/perianal SILT  (-) Rivera test b/l  (-) Straight leg raise test b/l  (-) Babinski sign b/l  (-) Ankle clonus b/l    LABS                        11.5   12.50 )-----------( 561      ( 24 Apr 2025 05:00 )             34.0     04-24    135  |  102  |  21  ----------------------------<  166[H]  4.1   |  21[L]  |  0.80    Ca    10.1      24 Apr 2025 05:00    TPro  7.3  /  Alb  2.8[L]  /  TBili  0.4  /  DBili  x   /  AST  26  /  ALT  44  /  AlkPhos  141[H]  04-23    PT/INR - ( 24 Apr 2025 05:00 )   PT: 13.8 sec;   INR: 1.16 ratio         PTT - ( 24 Apr 2025 05:00 )  PTT:32.7 sec    IMAGING       (24 Apr 2025 05:52)      Surgery Information  OR time:      EBL:          IV Fluids:       Blood Products:   UOP:          PAST MEDICAL & SURGICAL HISTORY:  HTN (hypertension)      HLD (hyperlipidemia)      DM (diabetes mellitus)      Cataract        Home Meds: Home Medications:  losartan 25 mg oral tablet: 1 tab(s) orally once a day (06 Jun 2018 10:23)  Lovaza 1000 mg oral capsule: 2 cap(s) orally 2 times a day (06 Jun 2018 10:23)  metFORMIN 500 mg oral tablet: 1 tab(s) orally once a day (06 Jun 2018 10:23)    Allergies: Allergies    No Known Allergies    Intolerances      Soc:   Advanced Directives: Presumed Full Code     ROS:    REVIEW OF SYSTEMS    [ ] A ten-point review of systems was otherwise negative except as noted.  [ ] Due to altered mental status/intubation, subjective information were not able to be obtained from the patient. History was obtained, to the extent possible, from review of the chart and collateral sources of information.      CURRENT MEDICATIONS:   --------------------------------------------------------------------------------------  Neurologic Medications  acetaminophen     Tablet .. 975 milliGRAM(s) Oral every 8 hours PRN Mild Pain (1 - 3)  oxyCODONE    IR 5 milliGRAM(s) Oral every 4 hours PRN Moderate Pain (4 - 6)  oxyCODONE    IR 10 milliGRAM(s) Oral every 4 hours PRN Severe Pain (7 - 10)    Respiratory Medications    Cardiovascular Medications  valsartan 40 milliGRAM(s) Oral daily    Gastrointestinal Medications  dextrose 5%. 1000 milliLiter(s) IV Continuous <Continuous>  dextrose 5%. 1000 milliLiter(s) IV Continuous <Continuous>  pantoprazole    Tablet 40 milliGRAM(s) Oral before breakfast  sodium chloride 0.9%. 1000 milliLiter(s) IV Continuous <Continuous>    Genitourinary Medications    Hematologic/Oncologic Medications    Antimicrobial/Immunologic Medications    Endocrine/Metabolic Medications  atorvastatin 40 milliGRAM(s) Oral at bedtime  dextrose 50% Injectable 25 Gram(s) IV Push once  dextrose 50% Injectable 12.5 Gram(s) IV Push once  dextrose 50% Injectable 25 Gram(s) IV Push once  dextrose Oral Gel 15 Gram(s) Oral once PRN Blood Glucose LESS THAN 70 milliGRAM(s)/deciliter  glucagon  Injectable 1 milliGRAM(s) IntraMuscular once  insulin lispro (ADMELOG) corrective regimen sliding scale   SubCutaneous every 6 hours    Topical/Other Medications  omega-3-Acid Ethyl Esters 2 Gram(s) Oral two times a day    --------------------------------------------------------------------------------------    VITAL SIGNS, INS/OUTS (last 24 hours):  --------------------------------------------------------------------------------------  ICU Vital Signs Last 24 Hrs  T(C): 36.7 (24 Apr 2025 12:14), Max: 36.8 (24 Apr 2025 01:05)  T(F): 98 (24 Apr 2025 12:14), Max: 98.2 (24 Apr 2025 01:05)  HR: 102 (24 Apr 2025 12:14) (97 - 109)  BP: 140/69 (24 Apr 2025 12:14) (136/62 - 159/75)  BP(mean): --  ABP: --  ABP(mean): --  RR: 16 (24 Apr 2025 12:14) (16 - 18)  SpO2: 100% (24 Apr 2025 12:14) (96% - 100%)    O2 Parameters below as of 24 Apr 2025 12:11  Patient On (Oxygen Delivery Method): room air          I&O's Summary    23 Apr 2025 07:01  -  24 Apr 2025 07:00  --------------------------------------------------------  IN: 0 mL / OUT: 200 mL / NET: -200 mL    24 Apr 2025 07:01  -  24 Apr 2025 21:38  --------------------------------------------------------  IN: 400 mL / OUT: 0 mL / NET: 400 mL      --------------------------------------------------------------------------------------    EXAM    :   Exam: Mac catheter in place.     LABS  --------------------------------------------------------------------------------------  Labs:  CAPILLARY BLOOD GLUCOSE      POCT Blood Glucose.: 135 mg/dL (24 Apr 2025 11:48)                          11.5   12.50 )-----------( 561      ( 24 Apr 2025 05:00 )             34.0         04-24    135  |  102  |  21  ----------------------------<  166[H]  4.1   |  21[L]  |  0.80      Calcium: 10.1 mg/dL (04-24-25 @ 05:00)      LFTs:             7.3  | 0.4  | 26       ------------------[141     ( 23 Apr 2025 11:52 )  2.8  | x    | 44          Lipase:x      Amylase:x         Blood Gas Arterial, Lactate: 0.8 mmol/L (04-24-25 @ 20:48)  Blood Gas Arterial, Lactate: 0.7 mmol/L (04-24-25 @ 18:33)  Blood Gas Arterial, Lactate: 0.8 mmol/L (04-24-25 @ 16:20)  Lactate, Blood: 1.8 mmol/L (04-23-25 @ 15:52)  Blood Gas Venous - Lactate: 2.1 mmol/L (04-23-25 @ 11:44)    ABG - ( 24 Apr 2025 20:48 )  pH: 7.33  /  pCO2: 38    /  pO2: 171   / HCO3: 20    / Base Excess: -5.4  /  SaO2: 99.2            ABG - ( 24 Apr 2025 18:33 )  pH: 7.29  /  pCO2: 40    /  pO2: 175   / HCO3: 19    / Base Excess: -6.9  /  SaO2: 98.5            ABG - ( 24 Apr 2025 16:20 )  pH: 7.31  /  pCO2: 42    /  pO2: 336   / HCO3: 21    / Base Excess: -4.9  /  SaO2: 98.5              Coags:     13.8   ----< 1.16    ( 24 Apr 2025 05:00 )     32.7                Urinalysis Basic - ( 24 Apr 2025 05:00 )    Color: x / Appearance: x / SG: x / pH: x  Gluc: 166 mg/dL / Ketone: x  / Bili: x / Urobili: x   Blood: x / Protein: x / Nitrite: x   Leuk Esterase: x / RBC: x / WBC x   Sq Epi: x / Non Sq Epi: x / Bacteria: x        Urinalysis with Rflx Culture (collected 23 Apr 2025 12:26)    Culture - Blood (collected 23 Apr 2025 11:45)  Source: Blood Blood-Peripheral  Gram Stain (24 Apr 2025 15:40):    Growth in anaerobic bottle: Gram Negative Rods  Preliminary Report (24 Apr 2025 15:41):    Growth in anaerobic bottle: Gram Negative Rods    Culture - Blood (collected 23 Apr 2025 11:43)  Source: Blood Blood-Peripheral  Gram Stain (24 Apr 2025 15:39):    Growth in anaerobic bottle: Gram Negative Rods  Preliminary Report (24 Apr 2025 15:39):    Growth in anaerobic bottle: Gram Negative Rods    Direct identification is available within approximately 3-5    hours either by Blood Panel Multiplexed PCR or Direct    MALDI-TOF. Details: [email or web address]  Organism: Blood Culture PCR (24 Apr 2025 16:49)  Organism: Blood Culture PCR (24 Apr 2025 16:49)        --------------------------------------------------------------------------------------    OTHER LABS    IMAGING RESULTS       SICU Consultation Note  =====================================================    79yMale w/ HTN, HLD, T2DM, chronic low back pain presents as a transfer from [facility] ED for surgery today (Tetantive for T10-pelvis PSF ) Per records patient was  sent into ED earlier this week  by his pain medicine physician due to recent MRI findings concerning for L1-L2 discitis/OM with epidural abscess, bilateral psoas abscesses. Patient initially saw pain specialist in February for chronic back pain with radiations to bilateral lateral thighs (R>L). MRI at that time showed degenerative changes. He subsequently had epidural injections x2 (2/27, 3/11) with moderate pain relief. Pain began to worsen on 4/10. He had radiofrequency ablation performed on 4/11 and has since has severe worsening of pain and radiation to R lateral thigh. He reports recent bowel/bladder "incontinence" requiring diaper due to his inability to get to the bathroom in time due to pain limitations but states urge and sensation are intact. Denies fevers, chills, night sweats, weakness, numbness/tingling, saddle anesthesia, recent falls/trauma. He uses a cane for ambulation at baseline.      TTE 4/24 LVEF 64% , no concerning valvular disease     ROS  Negative unless otherwise specified in HPI.    PAST MEDICAL & SURGICAL Hx  PAST MEDICAL & SURGICAL HISTORY:  HTN (hypertension)      HLD (hyperlipidemia)      DM (diabetes mellitus)      Cataract          MEDICATIONS  Home Medications:  losartan 25 mg oral tablet: 1 tab(s) orally once a day (06 Jun 2018 10:23)  Lovaza 1000 mg oral capsule: 2 cap(s) orally 2 times a day (06 Jun 2018 10:23)  metFORMIN 500 mg oral tablet: 1 tab(s) orally once a day (06 Jun 2018 10:23)      ALLERGIES  No Known Allergies      FAMILY Hx  FAMILY HISTORY:  No pertinent family history in first degree relatives        SOCIAL Hx  Social History:      VITALS  Vital Signs Last 24 Hrs  T(C): 36.8 (24 Apr 2025 03:30), Max: 37.4 (23 Apr 2025 11:54)  T(F): 98.2 (24 Apr 2025 03:30), Max: 99.3 (23 Apr 2025 11:54)  HR: 103 (24 Apr 2025 03:30) (97 - 128)  BP: 159/75 (24 Apr 2025 03:30) (114/64 - 159/75)  BP(mean): --  RR: 18 (24 Apr 2025 03:30) (17 - 20)  SpO2: 97% (24 Apr 2025 03:30) (96% - 98%)        PHYSICAL EXAM  Gen: Lying in bed, NAD  Resp: No increased WOB  Spine:  Skin intact  No bony TTP or step-offs along c-, t-, l-spine, or sacrum; has ttp of R paraspinal L spine    Motor:                   C5                C6              C7               C8           T1   R            5/5                5/5            5/5             5/5          5/5  L             5/5               5/5             5/5             5/5          5/5                L2             L3             L4               L5            S1  R         3+/5 **          5/5          5/5             5/5           5/5  L          5/5          5/5           5/5             5/5           5/5    **limited by pain    Sensory:            C5         C6         C7      C8       T1        (0=absent, 1=impaired, 2=normal, NT=not testable)  R         2            2           2        2         2  L          2            2           2        2         2               L2          L3         L4      L5       S1         (0=absent, 1=impaired, 2=normal, NT=not testable)  R         2            2            2        2        2  L          2            2           2        2         2    (+) Rectal tone, saddle/perianal SILT  (-) Rivera test b/l  (-) Straight leg raise test b/l  (-) Babinski sign b/l  (-) Ankle clonus b/l    LABS                        11.5   12.50 )-----------( 561      ( 24 Apr 2025 05:00 )             34.0     04-24    135  |  102  |  21  ----------------------------<  166[H]  4.1   |  21[L]  |  0.80    Ca    10.1      24 Apr 2025 05:00    TPro  7.3  /  Alb  2.8[L]  /  TBili  0.4  /  DBili  x   /  AST  26  /  ALT  44  /  AlkPhos  141[H]  04-23    PT/INR - ( 24 Apr 2025 05:00 )   PT: 13.8 sec;   INR: 1.16 ratio         PTT - ( 24 Apr 2025 05:00 )  PTT:32.7 sec    IMAGING       (24 Apr 2025 05:52)      Surgery Information  OR time:      EBL:          IV Fluids:       Blood Products:   UOP:          PAST MEDICAL & SURGICAL HISTORY:  HTN (hypertension)      HLD (hyperlipidemia)      DM (diabetes mellitus)      Cataract        Home Meds: Home Medications:  losartan 25 mg oral tablet: 1 tab(s) orally once a day (06 Jun 2018 10:23)  Lovaza 1000 mg oral capsule: 2 cap(s) orally 2 times a day (06 Jun 2018 10:23)  metFORMIN 500 mg oral tablet: 1 tab(s) orally once a day (06 Jun 2018 10:23)    Allergies: Allergies    No Known Allergies    Intolerances      Soc:   Advanced Directives: Presumed Full Code     ROS:    REVIEW OF SYSTEMS    [ ] A ten-point review of systems was otherwise negative except as noted.  [ ] Due to altered mental status/intubation, subjective information were not able to be obtained from the patient. History was obtained, to the extent possible, from review of the chart and collateral sources of information.      CURRENT MEDICATIONS:   --------------------------------------------------------------------------------------  Neurologic Medications  acetaminophen     Tablet .. 975 milliGRAM(s) Oral every 8 hours PRN Mild Pain (1 - 3)  oxyCODONE    IR 5 milliGRAM(s) Oral every 4 hours PRN Moderate Pain (4 - 6)  oxyCODONE    IR 10 milliGRAM(s) Oral every 4 hours PRN Severe Pain (7 - 10)    Respiratory Medications    Cardiovascular Medications  valsartan 40 milliGRAM(s) Oral daily    Gastrointestinal Medications  dextrose 5%. 1000 milliLiter(s) IV Continuous <Continuous>  dextrose 5%. 1000 milliLiter(s) IV Continuous <Continuous>  pantoprazole    Tablet 40 milliGRAM(s) Oral before breakfast  sodium chloride 0.9%. 1000 milliLiter(s) IV Continuous <Continuous>    Genitourinary Medications    Hematologic/Oncologic Medications    Antimicrobial/Immunologic Medications    Endocrine/Metabolic Medications  atorvastatin 40 milliGRAM(s) Oral at bedtime  dextrose 50% Injectable 25 Gram(s) IV Push once  dextrose 50% Injectable 12.5 Gram(s) IV Push once  dextrose 50% Injectable 25 Gram(s) IV Push once  dextrose Oral Gel 15 Gram(s) Oral once PRN Blood Glucose LESS THAN 70 milliGRAM(s)/deciliter  glucagon  Injectable 1 milliGRAM(s) IntraMuscular once  insulin lispro (ADMELOG) corrective regimen sliding scale   SubCutaneous every 6 hours    Topical/Other Medications  omega-3-Acid Ethyl Esters 2 Gram(s) Oral two times a day    --------------------------------------------------------------------------------------    VITAL SIGNS, INS/OUTS (last 24 hours):  --------------------------------------------------------------------------------------  ICU Vital Signs Last 24 Hrs  T(C): 36.7 (24 Apr 2025 12:14), Max: 36.8 (24 Apr 2025 01:05)  T(F): 98 (24 Apr 2025 12:14), Max: 98.2 (24 Apr 2025 01:05)  HR: 102 (24 Apr 2025 12:14) (97 - 109)  BP: 140/69 (24 Apr 2025 12:14) (136/62 - 159/75)  BP(mean): --  ABP: --  ABP(mean): --  RR: 16 (24 Apr 2025 12:14) (16 - 18)  SpO2: 100% (24 Apr 2025 12:14) (96% - 100%)    O2 Parameters below as of 24 Apr 2025 12:11  Patient On (Oxygen Delivery Method): room air          I&O's Summary    23 Apr 2025 07:01  -  24 Apr 2025 07:00  --------------------------------------------------------  IN: 0 mL / OUT: 200 mL / NET: -200 mL    24 Apr 2025 07:01  -  24 Apr 2025 21:38  --------------------------------------------------------  IN: 400 mL / OUT: 0 mL / NET: 400 mL      --------------------------------------------------------------------------------------    EXAM    :   Exam: Mac catheter in place.     LABS  --------------------------------------------------------------------------------------  Labs:  CAPILLARY BLOOD GLUCOSE      POCT Blood Glucose.: 135 mg/dL (24 Apr 2025 11:48)                          11.5   12.50 )-----------( 561      ( 24 Apr 2025 05:00 )             34.0         04-24    135  |  102  |  21  ----------------------------<  166[H]  4.1   |  21[L]  |  0.80      Calcium: 10.1 mg/dL (04-24-25 @ 05:00)      LFTs:             7.3  | 0.4  | 26       ------------------[141     ( 23 Apr 2025 11:52 )  2.8  | x    | 44          Lipase:x      Amylase:x         Blood Gas Arterial, Lactate: 0.8 mmol/L (04-24-25 @ 20:48)  Blood Gas Arterial, Lactate: 0.7 mmol/L (04-24-25 @ 18:33)  Blood Gas Arterial, Lactate: 0.8 mmol/L (04-24-25 @ 16:20)  Lactate, Blood: 1.8 mmol/L (04-23-25 @ 15:52)  Blood Gas Venous - Lactate: 2.1 mmol/L (04-23-25 @ 11:44)    ABG - ( 24 Apr 2025 20:48 )  pH: 7.33  /  pCO2: 38    /  pO2: 171   / HCO3: 20    / Base Excess: -5.4  /  SaO2: 99.2            ABG - ( 24 Apr 2025 18:33 )  pH: 7.29  /  pCO2: 40    /  pO2: 175   / HCO3: 19    / Base Excess: -6.9  /  SaO2: 98.5            ABG - ( 24 Apr 2025 16:20 )  pH: 7.31  /  pCO2: 42    /  pO2: 336   / HCO3: 21    / Base Excess: -4.9  /  SaO2: 98.5              Coags:     13.8   ----< 1.16    ( 24 Apr 2025 05:00 )     32.7                Urinalysis Basic - ( 24 Apr 2025 05:00 )    Color: x / Appearance: x / SG: x / pH: x  Gluc: 166 mg/dL / Ketone: x  / Bili: x / Urobili: x   Blood: x / Protein: x / Nitrite: x   Leuk Esterase: x / RBC: x / WBC x   Sq Epi: x / Non Sq Epi: x / Bacteria: x        Urinalysis with Rflx Culture (collected 23 Apr 2025 12:26)    Culture - Blood (collected 23 Apr 2025 11:45)  Source: Blood Blood-Peripheral  Gram Stain (24 Apr 2025 15:40):    Growth in anaerobic bottle: Gram Negative Rods  Preliminary Report (24 Apr 2025 15:41):    Growth in anaerobic bottle: Gram Negative Rods    Culture - Blood (collected 23 Apr 2025 11:43)  Source: Blood Blood-Peripheral  Gram Stain (24 Apr 2025 15:39):    Growth in anaerobic bottle: Gram Negative Rods  Preliminary Report (24 Apr 2025 15:39):    Growth in anaerobic bottle: Gram Negative Rods    Direct identification is available within approximately 3-5    hours either by Blood Panel Multiplexed PCR or Direct    MALDI-TOF. Details: [email or web address]  Organism: Blood Culture PCR (24 Apr 2025 16:49)  Organism: Blood Culture PCR (24 Apr 2025 16:49)        --------------------------------------------------------------------------------------    OTHER LABS    IMAGING RESULTS       SICU Consultation Note  =====================================================    79yMale w/ HTN, HLD, T2DM, chronic low back pain presents as a transfer from [facility] ED for surgery today (For T10-pelvis PSF ) Per records patient was  sent into ED earlier this week  by his pain medicine physician due to recent MRI findings concerning for L1-L2 discitis/OM with epidural abscess, bilateral psoas abscesses. Patient initially saw pain specialist in February for chronic back pain with radiations to bilateral lateral thighs (R>L). MRI at that time showed degenerative changes. He subsequently had epidural injections x2 (2/27, 3/11) with moderate pain relief. Pain began to worsen on 4/10. He had radiofrequency ablation performed on 4/11 and has since has severe worsening of pain and radiation to R lateral thigh. He reports recent bowel/bladder "incontinence" requiring diaper due to his inability to get to the bathroom in time due to pain limitations but states urge and sensation are intact. Denies fevers, chills, night sweats, weakness, numbness/tingling, saddle anesthesia, recent falls/trauma. He uses a cane for ambulation at baseline.      TTE 4/24 LVEF 64% , no concerning valvular disease       ROS  Negative unless otherwise specified in HPI.    PAST MEDICAL & SURGICAL Hx  PAST MEDICAL & SURGICAL HISTORY:  HTN (hypertension)      HLD (hyperlipidemia)      DM (diabetes mellitus)      Cataract          MEDICATIONS  Home Medications:  losartan 25 mg oral tablet: 1 tab(s) orally once a day (06 Jun 2018 10:23)  Lovaza 1000 mg oral capsule: 2 cap(s) orally 2 times a day (06 Jun 2018 10:23)  metFORMIN 500 mg oral tablet: 1 tab(s) orally once a day (06 Jun 2018 10:23)      ALLERGIES  No Known Allergies      FAMILY Hx  FAMILY HISTORY:  No pertinent family history in first degree relatives        SOCIAL Hx  Social History:      VITALS  Vital Signs Last 24 Hrs  T(C): 36.8 (24 Apr 2025 03:30), Max: 37.4 (23 Apr 2025 11:54)  T(F): 98.2 (24 Apr 2025 03:30), Max: 99.3 (23 Apr 2025 11:54)  HR: 103 (24 Apr 2025 03:30) (97 - 128)  BP: 159/75 (24 Apr 2025 03:30) (114/64 - 159/75)  BP(mean): --  RR: 18 (24 Apr 2025 03:30) (17 - 20)  SpO2: 97% (24 Apr 2025 03:30) (96% - 98%)        PHYSICAL EXAM  Gen: Lying in bed, NAD  Resp: No increased WOB , extubated to face tent  Abd: NTND ,  Mark x 2 (bilaterally) L- deep ,R Superficial   Gu + Mac   Spine:  Skin intact  No bony TTP or step-offs along c-, t-, l-spine, or sacrum; has ttp of R paraspinal L spine        LABS                        11.5   12.50 )-----------( 561      ( 24 Apr 2025 05:00 )             34.0     04-24    135  |  102  |  21  ----------------------------<  166[H]  4.1   |  21[L]  |  0.80    Ca    10.1      24 Apr 2025 05:00    TPro  7.3  /  Alb  2.8[L]  /  TBili  0.4  /  DBili  x   /  AST  26  /  ALT  44  /  AlkPhos  141[H]  04-23    PT/INR - ( 24 Apr 2025 05:00 )   PT: 13.8 sec;   INR: 1.16 ratio         PTT - ( 24 Apr 2025 05:00 )  PTT:32.7 sec    IMAGING       (24 Apr 2025 05:52)      Surgery Information  OR time:      EBL:          IV Fluids:       Blood Products:   UOP:          PAST MEDICAL & SURGICAL HISTORY:  HTN (hypertension)      HLD (hyperlipidemia)      DM (diabetes mellitus)      Cataract        Home Meds: Home Medications:  losartan 25 mg oral tablet: 1 tab(s) orally once a day (06 Jun 2018 10:23)  Lovaza 1000 mg oral capsule: 2 cap(s) orally 2 times a day (06 Jun 2018 10:23)  metFORMIN 500 mg oral tablet: 1 tab(s) orally once a day (06 Jun 2018 10:23)    Allergies: Allergies    No Known Allergies    Intolerances      Soc:   Advanced Directives: Presumed Full Code     ROS:    REVIEW OF SYSTEMS    [x] A ten-point review of systems was otherwise negative except as noted.      CURRENT MEDICATIONS:   --------------------------------------------------------------------------------------  Neurologic Medications  acetaminophen     Tablet .. 975 milliGRAM(s) Oral every 8 hours PRN Mild Pain (1 - 3)  oxyCODONE    IR 5 milliGRAM(s) Oral every 4 hours PRN Moderate Pain (4 - 6)  oxyCODONE    IR 10 milliGRAM(s) Oral every 4 hours PRN Severe Pain (7 - 10)    Respiratory Medications    Cardiovascular Medications  valsartan 40 milliGRAM(s) Oral daily    Gastrointestinal Medications  dextrose 5%. 1000 milliLiter(s) IV Continuous <Continuous>  dextrose 5%. 1000 milliLiter(s) IV Continuous <Continuous>  pantoprazole    Tablet 40 milliGRAM(s) Oral before breakfast  sodium chloride 0.9%. 1000 milliLiter(s) IV Continuous <Continuous>    Genitourinary Medications    Hematologic/Oncologic Medications    Antimicrobial/Immunologic Medications    Endocrine/Metabolic Medications  atorvastatin 40 milliGRAM(s) Oral at bedtime  dextrose 50% Injectable 25 Gram(s) IV Push once  dextrose 50% Injectable 12.5 Gram(s) IV Push once  dextrose 50% Injectable 25 Gram(s) IV Push once  dextrose Oral Gel 15 Gram(s) Oral once PRN Blood Glucose LESS THAN 70 milliGRAM(s)/deciliter  glucagon  Injectable 1 milliGRAM(s) IntraMuscular once  insulin lispro (ADMELOG) corrective regimen sliding scale   SubCutaneous every 6 hours    Topical/Other Medications  omega-3-Acid Ethyl Esters 2 Gram(s) Oral two times a day    --------------------------------------------------------------------------------------    VITAL SIGNS, INS/OUTS (last 24 hours):  --------------------------------------------------------------------------------------  ICU Vital Signs Last 24 Hrs  T(C): 36.7 (24 Apr 2025 12:14), Max: 36.8 (24 Apr 2025 01:05)  T(F): 98 (24 Apr 2025 12:14), Max: 98.2 (24 Apr 2025 01:05)  HR: 102 (24 Apr 2025 12:14) (97 - 109)  BP: 140/69 (24 Apr 2025 12:14) (136/62 - 159/75)  BP(mean): --  ABP: --  ABP(mean): --  RR: 16 (24 Apr 2025 12:14) (16 - 18)  SpO2: 100% (24 Apr 2025 12:14) (96% - 100%)    O2 Parameters below as of 24 Apr 2025 12:11  Patient On (Oxygen Delivery Method): room air          I&O's Summary    23 Apr 2025 07:01  -  24 Apr 2025 07:00  --------------------------------------------------------  IN: 0 mL / OUT: 200 mL / NET: -200 mL    24 Apr 2025 07:01  -  24 Apr 2025 21:38  --------------------------------------------------------  IN: 400 mL / OUT: 0 mL / NET: 400 mL      --------------------------------------------------------------------------------------      LABS  --------------------------------------------------------------------------------------  Labs:  CAPILLARY BLOOD GLUCOSE      POCT Blood Glucose.: 135 mg/dL (24 Apr 2025 11:48)                          11.5   12.50 )-----------( 561      ( 24 Apr 2025 05:00 )             34.0         04-24    135  |  102  |  21  ----------------------------<  166[H]  4.1   |  21[L]  |  0.80      Calcium: 10.1 mg/dL (04-24-25 @ 05:00)      LFTs:             7.3  | 0.4  | 26       ------------------[141     ( 23 Apr 2025 11:52 )  2.8  | x    | 44          Lipase:x      Amylase:x         Blood Gas Arterial, Lactate: 0.8 mmol/L (04-24-25 @ 20:48)  Blood Gas Arterial, Lactate: 0.7 mmol/L (04-24-25 @ 18:33)  Blood Gas Arterial, Lactate: 0.8 mmol/L (04-24-25 @ 16:20)  Lactate, Blood: 1.8 mmol/L (04-23-25 @ 15:52)  Blood Gas Venous - Lactate: 2.1 mmol/L (04-23-25 @ 11:44)    ABG - ( 24 Apr 2025 20:48 )  pH: 7.33  /  pCO2: 38    /  pO2: 171   / HCO3: 20    / Base Excess: -5.4  /  SaO2: 99.2            ABG - ( 24 Apr 2025 18:33 )  pH: 7.29  /  pCO2: 40    /  pO2: 175   / HCO3: 19    / Base Excess: -6.9  /  SaO2: 98.5            ABG - ( 24 Apr 2025 16:20 )  pH: 7.31  /  pCO2: 42    /  pO2: 336   / HCO3: 21    / Base Excess: -4.9  /  SaO2: 98.5              Coags:     13.8   ----< 1.16    ( 24 Apr 2025 05:00 )     32.7                Urinalysis Basic - ( 24 Apr 2025 05:00 )    Color: x / Appearance: x / SG: x / pH: x  Gluc: 166 mg/dL / Ketone: x  / Bili: x / Urobili: x   Blood: x / Protein: x / Nitrite: x   Leuk Esterase: x / RBC: x / WBC x   Sq Epi: x / Non Sq Epi: x / Bacteria: x        Urinalysis with Rflx Culture (collected 23 Apr 2025 12:26)    Culture - Blood (collected 23 Apr 2025 11:45)  Source: Blood Blood-Peripheral  Gram Stain (24 Apr 2025 15:40):    Growth in anaerobic bottle: Gram Negative Rods  Preliminary Report (24 Apr 2025 15:41):    Growth in anaerobic bottle: Gram Negative Rods    Culture - Blood (collected 23 Apr 2025 11:43)  Source: Blood Blood-Peripheral  Gram Stain (24 Apr 2025 15:39):    Growth in anaerobic bottle: Gram Negative Rods  Preliminary Report (24 Apr 2025 15:39):    Growth in anaerobic bottle: Gram Negative Rods    Direct identification is available within approximately 3-5    hours either by Blood Panel Multiplexed PCR or Direct    MALDI-TOF. Details: [email or web address]  Organism: Blood Culture PCR (24 Apr 2025 16:49)  Organism: Blood Culture PCR (24 Apr 2025 16:49)        --------------------------------------------------------------------------------------    OTHER LABS    IMAGING RESULTS       SICU Consultation Note  =====================================================    79yMale w/ HTN, HLD, T2DM, chronic low back pain presents as a transfer from [facility] ED for surgery today (For T10-pelvis PSF ) Per records patient was  sent into ED earlier this week  by his pain medicine physician due to recent MRI findings concerning for L1-L2 discitis/OM with epidural abscess, bilateral psoas abscesses. Patient initially saw pain specialist in February for chronic back pain with radiations to bilateral lateral thighs (R>L). MRI at that time showed degenerative changes. He subsequently had epidural injections x2 (2/27, 3/11) with moderate pain relief. Pain began to worsen on 4/10. He had radiofrequency ablation performed on 4/11 and has since has severe worsening of pain and radiation to R lateral thigh. He reports recent bowel/bladder "incontinence" requiring diaper due to his inability to get to the bathroom in time due to pain limitations but states urge and sensation are intact. Denies fevers, chills, night sweats, weakness, numbness/tingling, saddle anesthesia, recent falls/trauma. He uses a cane for ambulation at baseline.      TTE 4/24 LVEF 64% , no concerning valvular disease       ROS  Negative unless otherwise specified in HPI.    PAST MEDICAL & SURGICAL Hx  PAST MEDICAL & SURGICAL HISTORY:  HTN (hypertension)      HLD (hyperlipidemia)      DM (diabetes mellitus)      Cataract          MEDICATIONS  Home Medications:  losartan 25 mg oral tablet: 1 tab(s) orally once a day (06 Jun 2018 10:23)  Lovaza 1000 mg oral capsule: 2 cap(s) orally 2 times a day (06 Jun 2018 10:23)  metFORMIN 500 mg oral tablet: 1 tab(s) orally once a day (06 Jun 2018 10:23)      ALLERGIES  No Known Allergies      FAMILY Hx  FAMILY HISTORY:  No pertinent family history in first degree relatives        SOCIAL Hx  Social History:      VITALS  Vital Signs Last 24 Hrs  T(C): 36.8 (24 Apr 2025 03:30), Max: 37.4 (23 Apr 2025 11:54)  T(F): 98.2 (24 Apr 2025 03:30), Max: 99.3 (23 Apr 2025 11:54)  HR: 103 (24 Apr 2025 03:30) (97 - 128)  BP: 159/75 (24 Apr 2025 03:30) (114/64 - 159/75)  BP(mean): --  RR: 18 (24 Apr 2025 03:30) (17 - 20)  SpO2: 97% (24 Apr 2025 03:30) (96% - 98%)        PHYSICAL EXAM  Gen: Lying in bed, NAD  Resp: No increased WOB , extubated to face tent  Abd: NTND ,  Mark x 2 (bilaterally) L- deep ,R Superficial   Gu + Mac   Spine:  Skin intact  No bony TTP or step-offs along c-, t-, l-spine, or sacrum; has ttp of R paraspinal L spine        LABS                                                10.0                  Neurophils% (auto):   x      (04-24 @ 22:30):    14.90)-----------(349          Lymphocytes% (auto):  x                                             29.5                   Eosinphils% (auto):   x        Manual%: Neutrophils x    ; Lymphocytes x    ; Eosinophils x    ; Bands%: x    ; Blasts x                                    138    |  106    |  18                  Calcium: 9.0   / iCa: x      (04-24 @ 22:30)    ----------------------------<  159       Magnesium: 2.00                             3.8     |  17     |  0.75             Phosphorous: 3.3        ( 04-24 @ 05:00 )   PT: 13.8 sec;   INR: 1.16 ratio  aPTT: 32.7 sec    LIVER FUNCTIONS - ( 23 Apr 2025 11:52 )  Alb: 2.8 g/dL / Pro: 7.3 g/dL / ALK PHOS: 141 U/L / ALT: 44 U/L / AST: 26 U/L / GGT: x           Urinalysis Basic - ( 24 Apr 2025 22:30 )    Color: x / Appearance: x / SG: x / pH: x  Gluc: 159 mg/dL / Ketone: x  / Bili: x / Urobili: x   Blood: x / Protein: x / Nitrite: x   Leuk Esterase: x / RBC: x / WBC x   Sq Epi: x / Non Sq Epi: x / Bacteria: x      ABO Interpretation: AB (24 Apr 2025 07:44)  ABO Interpretation: AB (24 Apr 2025 06:01)      IMAGING       (24 Apr 2025 05:52)      Surgery Information  OR time:      EBL:          IV Fluids:       Blood Products:   UOP:          PAST MEDICAL & SURGICAL HISTORY:  HTN (hypertension)      HLD (hyperlipidemia)      DM (diabetes mellitus)      Cataract        Home Meds: Home Medications:  losartan 25 mg oral tablet: 1 tab(s) orally once a day (06 Jun 2018 10:23)  Lovaza 1000 mg oral capsule: 2 cap(s) orally 2 times a day (06 Jun 2018 10:23)  metFORMIN 500 mg oral tablet: 1 tab(s) orally once a day (06 Jun 2018 10:23)    Allergies: Allergies    No Known Allergies    Intolerances      Soc:   Advanced Directives: Presumed Full Code     ROS:    REVIEW OF SYSTEMS    [x] A ten-point review of systems was otherwise negative except as noted.      CURRENT MEDICATIONS:   --------------------------------------------------------------------------------------  Neurologic Medications  acetaminophen     Tablet .. 975 milliGRAM(s) Oral every 8 hours PRN Mild Pain (1 - 3)  oxyCODONE    IR 5 milliGRAM(s) Oral every 4 hours PRN Moderate Pain (4 - 6)  oxyCODONE    IR 10 milliGRAM(s) Oral every 4 hours PRN Severe Pain (7 - 10)    Respiratory Medications    Cardiovascular Medications  valsartan 40 milliGRAM(s) Oral daily    Gastrointestinal Medications  dextrose 5%. 1000 milliLiter(s) IV Continuous <Continuous>  dextrose 5%. 1000 milliLiter(s) IV Continuous <Continuous>  pantoprazole    Tablet 40 milliGRAM(s) Oral before breakfast  sodium chloride 0.9%. 1000 milliLiter(s) IV Continuous <Continuous>    Genitourinary Medications    Hematologic/Oncologic Medications    Antimicrobial/Immunologic Medications    Endocrine/Metabolic Medications  atorvastatin 40 milliGRAM(s) Oral at bedtime  dextrose 50% Injectable 25 Gram(s) IV Push once  dextrose 50% Injectable 12.5 Gram(s) IV Push once  dextrose 50% Injectable 25 Gram(s) IV Push once  dextrose Oral Gel 15 Gram(s) Oral once PRN Blood Glucose LESS THAN 70 milliGRAM(s)/deciliter  glucagon  Injectable 1 milliGRAM(s) IntraMuscular once  insulin lispro (ADMELOG) corrective regimen sliding scale   SubCutaneous every 6 hours    Topical/Other Medications  omega-3-Acid Ethyl Esters 2 Gram(s) Oral two times a day    --------------------------------------------------------------------------------------    VITAL SIGNS, INS/OUTS (last 24 hours):  --------------------------------------------------------------------------------------  ICU Vital Signs Last 24 Hrs  T(C): 36.7 (24 Apr 2025 12:14), Max: 36.8 (24 Apr 2025 01:05)  T(F): 98 (24 Apr 2025 12:14), Max: 98.2 (24 Apr 2025 01:05)  HR: 102 (24 Apr 2025 12:14) (97 - 109)  BP: 140/69 (24 Apr 2025 12:14) (136/62 - 159/75)  BP(mean): --  ABP: --  ABP(mean): --  RR: 16 (24 Apr 2025 12:14) (16 - 18)  SpO2: 100% (24 Apr 2025 12:14) (96% - 100%)    O2 Parameters below as of 24 Apr 2025 12:11  Patient On (Oxygen Delivery Method): room air          I&O's Summary    23 Apr 2025 07:01  -  24 Apr 2025 07:00  --------------------------------------------------------  IN: 0 mL / OUT: 200 mL / NET: -200 mL    24 Apr 2025 07:01  -  24 Apr 2025 21:38  --------------------------------------------------------  IN: 400 mL / OUT: 0 mL / NET: 400 mL      --------------------------------------------------------------------------------------      LABS  --------------------------------------------------------------------------------------  Labs:  CAPILLARY BLOOD GLUCOSE      POCT Blood Glucose.: 135 mg/dL (24 Apr 2025 11:48)                          11.5   12.50 )-----------( 561      ( 24 Apr 2025 05:00 )             34.0         04-24    135  |  102  |  21  ----------------------------<  166[H]  4.1   |  21[L]  |  0.80      Calcium: 10.1 mg/dL (04-24-25 @ 05:00)      LFTs:             7.3  | 0.4  | 26       ------------------[141     ( 23 Apr 2025 11:52 )  2.8  | x    | 44          Lipase:x      Amylase:x         Blood Gas Arterial, Lactate: 0.8 mmol/L (04-24-25 @ 20:48)  Blood Gas Arterial, Lactate: 0.7 mmol/L (04-24-25 @ 18:33)  Blood Gas Arterial, Lactate: 0.8 mmol/L (04-24-25 @ 16:20)  Lactate, Blood: 1.8 mmol/L (04-23-25 @ 15:52)  Blood Gas Venous - Lactate: 2.1 mmol/L (04-23-25 @ 11:44)    ABG - ( 24 Apr 2025 20:48 )  pH: 7.33  /  pCO2: 38    /  pO2: 171   / HCO3: 20    / Base Excess: -5.4  /  SaO2: 99.2            ABG - ( 24 Apr 2025 18:33 )  pH: 7.29  /  pCO2: 40    /  pO2: 175   / HCO3: 19    / Base Excess: -6.9  /  SaO2: 98.5            ABG - ( 24 Apr 2025 16:20 )  pH: 7.31  /  pCO2: 42    /  pO2: 336   / HCO3: 21    / Base Excess: -4.9  /  SaO2: 98.5              Coags:     13.8   ----< 1.16    ( 24 Apr 2025 05:00 )     32.7                Urinalysis Basic - ( 24 Apr 2025 05:00 )    Color: x / Appearance: x / SG: x / pH: x  Gluc: 166 mg/dL / Ketone: x  / Bili: x / Urobili: x   Blood: x / Protein: x / Nitrite: x   Leuk Esterase: x / RBC: x / WBC x   Sq Epi: x / Non Sq Epi: x / Bacteria: x        Urinalysis with Rflx Culture (collected 23 Apr 2025 12:26)    Culture - Blood (collected 23 Apr 2025 11:45)  Source: Blood Blood-Peripheral  Gram Stain (24 Apr 2025 15:40):    Growth in anaerobic bottle: Gram Negative Rods  Preliminary Report (24 Apr 2025 15:41):    Growth in anaerobic bottle: Gram Negative Rods    Culture - Blood (collected 23 Apr 2025 11:43)  Source: Blood Blood-Peripheral  Gram Stain (24 Apr 2025 15:39):    Growth in anaerobic bottle: Gram Negative Rods  Preliminary Report (24 Apr 2025 15:39):    Growth in anaerobic bottle: Gram Negative Rods    Direct identification is available within approximately 3-5    hours either by Blood Panel Multiplexed PCR or Direct    MALDI-TOF. Details: [email or web address]  Organism: Blood Culture PCR (24 Apr 2025 16:49)  Organism: Blood Culture PCR (24 Apr 2025 16:49)        --------------------------------------------------------------------------------------    OTHER LABS    IMAGING RESULTS

## 2025-04-24 NOTE — DIETITIAN INITIAL EVALUATION ADULT - ORAL NUTRITION SUPPLEMENTS
Consider provision of Glucerna Therapeutic Nutrition 240mls 2x daily (440kcals, 20g protein) upon diet advancement for nutrition optimization efforts.

## 2025-04-24 NOTE — CONSULT NOTE ADULT - CRITICAL CARE ATTENDING COMMENT
ASSESSMENT:  79yMale w/ HTN, HLD, T2DM, chronic low back pain presents as a transfer from [facility] ED for surgery 4/24 ( T10-pelvis posterior spinal fusion) 4/24/25. SICU consulted post operatively for neurological checks hourly (q1h) and hemodynamic monitoring  as patient is requiring vasopressor  to achieve map goals above 80mmhg.    # L1-L2 discitis/OM, Epidural abscess, Psoas abscesses s/p Spinal Fusion  # POD 0  - MAP goal > 80  - Continue Vanc/Zosyn pending cultures.     # DM  - F/u A1c    # PPX  - PPI  - Possible chemical VTE in AM.     # LINES  - rad a line  - PIV  - Mac    Disposition: SICU    I have personally seen, examined, reviewed pertinent labs/imaging, and fully participated in the care of this patient on this date.  I have made amendments to the documentation where necessary, and otherwise agree with the history, physical exam, and plan as documented by the SICU resident/PA team.     45 minutes in total were spent in providing direct critical care for the diagnoses/assessment/plan as outlined in this note, documentation, and decision-making.  This patient suffers from a critical illness that acutely impairs one or more vital organ systems such that there is a high probability of life threatening or imminent deterioration of the patient's condition.      Additionally, time spent in teaching or the performance of separately billable procedures was not counted toward my critical care time.  There is no overlap.  Time included review of vitals, labs, imaging, discussion with consultants. ASSESSMENT:  79yMale w/ HTN, HLD, T2DM, chronic low back pain presents as a transfer from [facility] ED for surgery 4/24 ( T10-pelvis posterior spinal fusion) 4/24/25. SICU consulted post operatively for neurological checks hourly (q1h) and hemodynamic monitoring  as patient is requiring vasopressor  to achieve map goals above 80mmhg.    # L1-L2 discitis/OM, Epidural abscess s/p Spinal Fusion  # POD 0  - MAP goal > 80  - Continue Vanc/Zosyn pending cultures.     # Psoas Abscess  - IR consultation?    # DM  - F/u A1c    # PPX  - PPI  - Possible chemical VTE in AM.     # LINES  - rad a line  - PIV  - Mac    Disposition: SICU    I have personally seen, examined, reviewed pertinent labs/imaging, and fully participated in the care of this patient on this date.  I have made amendments to the documentation where necessary, and otherwise agree with the history, physical exam, and plan as documented by the SICU resident/PA team.     45 minutes in total were spent in providing direct critical care for the diagnoses/assessment/plan as outlined in this note, documentation, and decision-making.  This patient suffers from a critical illness that acutely impairs one or more vital organ systems such that there is a high probability of life threatening or imminent deterioration of the patient's condition.      Additionally, time spent in teaching or the performance of separately billable procedures was not counted toward my critical care time.  There is no overlap.  Time included review of vitals, labs, imaging, discussion with consultants. ASSESSMENT:  79yMale w/ HTN, HLD, T2DM, chronic low back pain presents as a transfer from [facility] ED for surgery 4/24 ( T10-pelvis posterior spinal fusion) 4/24/25. SICU consulted post operatively for neurological checks hourly (q1h) and hemodynamic monitoring  as patient is requiring vasopressor  to achieve map goals above 80mmhg.    # L1-L2 discitis/OM, Epidural abscess s/p Spinal Fusion  # POD 0  - MAP goal > 80    # Bacteroides Bacteremia  - BCx 4/23  - Continue Vanc/Zosyn pending cultures.     # Psoas Abscess  - IR consultation?    # DM  - F/u A1c    # PPX  - PPI  - Possible chemical VTE in AM.     # LINES  - rad a line  - PIV  - Mac    Disposition: SICU    I have personally seen, examined, reviewed pertinent labs/imaging, and fully participated in the care of this patient on this date.  I have made amendments to the documentation where necessary, and otherwise agree with the history, physical exam, and plan as documented by the SICU resident/PA team.     45 minutes in total were spent in providing direct critical care for the diagnoses/assessment/plan as outlined in this note, documentation, and decision-making.  This patient suffers from a critical illness that acutely impairs one or more vital organ systems such that there is a high probability of life threatening or imminent deterioration of the patient's condition.      Additionally, time spent in teaching or the performance of separately billable procedures was not counted toward my critical care time.  There is no overlap.  Time included review of vitals, labs, imaging, discussion with consultants.

## 2025-04-24 NOTE — PATIENT PROFILE ADULT - FALL HARM RISK - HARM RISK INTERVENTIONS

## 2025-04-24 NOTE — DIETITIAN INITIAL EVALUATION ADULT - REASON FOR ADMISSION
Chief Complaint  Chief Complaint Free Text Note Form: Patient is here today for MWM Consultation  Stop Shayne Fenton: 1/8  History of Present Illness  Free Text HPI: Obesity-  Severity: Mild  Onset: Most of her adult life but and added 30-40 pounds since pregnancy  Modifiers: Has tried different diet and excise modifications including low carb with minimal results  Recently started strides program  Associated Symptoms: Wants to be healthier, wants to feel better, wants to look better, feels tired  Past Medical History  Active Problems And Past Medical History Reviewed: The active problems and past medical history were reviewed and updated today  Assessment    1  Weight gain (783 1) (R63 5)   2  Tonic-clonic epileptic seizures (345 10) (G40 409)   3  Obesity (278 00) (E66 9)   4  Body mass index (BMI) of 34 0 to 34 9 in adult (V85 34) (L31 47)    Discussion/Summary  Discussion Summary:   49-year-old female with epilepsy and obesity here to pursue medical weight management to improve weight and health  Obesity class 1:  -discussed options of conservative vs MARGIE program +/- meal replacement vs VLCD  -initial focus of 5-10% weight loss over 3-6 mos for improved health  -screening labs-patient states she had labs done in January ordered by her PCP but these are not in the system  We will recheck with [facility]     Epilepsy: Stable  -Seizure-free past 6 years  -Follows with [facility] neurology  -Continue with Topamax  -Not a candidate for Contrave(contains bupropion)    Patient is interested in starting very low calorie diet when she returns from her weekend trip next week  Patient's Capacity to Self-Care: Patient is able to Self-Care  Understands and agrees with treatment plan: The treatment plan was reviewed with the patient/guardian  The patient/guardian understands and agrees with the treatment plan      Review of Systems  Focused-Female:   Constitutional: feeling tired, but no fever     ENT: no sore throat  Cardiovascular: no chest pain  Respiratory: no shortness of breath  Gastrointestinal: no abdominal pain  Genitourinary: no dysuria  Musculoskeletal: no arthralgias  Integumentary: no rashes  Neurological: no headache  Other Symptoms: Psych: Denies depression/anxiety  Active Problems    1  Encounter for gynecological examination (V72 31) (Z01 419)   2  Fatigue (780 79) (R53 83)   3  Obesity (278 00) (E66 9)   4  Post-term Pregnancy - Antepartum Condition Or Prior Complicated Delivery (896 62)   5  Screening for diabetes mellitus (V77 1) (Z13 1)   6  Screening for hypertension (V81 1) (Z13 6)   7  Tonic-clonic epileptic seizures (345 10) (G40 409)    Surgical History    1  History of  Section Low Transverse  Surgical History Reviewed: The surgical history was reviewed and updated today  Family History  Mother    1  Family history of High blood cholesterol level  Father    2  Family history of diabetes mellitus (V18 0) (Z83 3)  Family History Reviewed: The family history was reviewed and updated today  Social History    · Never A Smoker  Social History Reviewed: The social history was reviewed and updated today  Current Meds   1  Multiple Vitamin TABS; Therapy: (Recorded:[id number]) to Recorded   2  Topiramate 100 MG Oral Tablet; TAKE 1 TABLET TWICE DAILY  Requested for:   [id number]; Last Rx:[id number] Ordered  Medication List Reviewed: The medication list was reviewed and updated today  Allergies    1  Sulfa Drugs    2  Nuts   3  No Known Environmental Allergies    Vitals  Vital Signs    Recorded: 2017 08:57AM   Temperature 97 9 F, Tympanic    Heart Rate 68    Systolic 487, LUE, Sitting    Diastolic 64, LUE, Sitting    Height 5 ft 7 in    Weight 218 lb 11 2 oz    BMI Calculated 34 25    BSA Calculated 2 1    Waist Circumference  41 in  Neck Circumference  15 in       Physical Exam    Constitutional   General appearance: Abnormal   well developed and obese  Eyes No conjunctival pallor  Ears, Nose, Mouth, and Throat Moist oral mucosa  Pulmonary   Respiratory effort: No increased work of breathing or signs of respiratory distress  Auscultation of lungs: Clear to auscultation  Cardiovascular   Auscultation of heart: Normal rate and rhythm, normal S1 and S2, without murmurs  Abdomen   Abdomen: Abnormal   The abdomen was obese  The abdomen was soft and nontender  Musculoskeletal   Gait and station: Normal     Psychiatric   Orientation to person, place, and time: Normal     Mood and affect: Normal          Results/Data  STOP BANG Questionnaire 27Apr2017 09:10AM User, Ahs     Test Name Result Flag Reference   STOP BANG Questionnaire Risk of SUNITA Low Risk     Snoring: No  Tired: Yes  Observed: No  Blood Pressure: No  BMI: No  Age: No  Neck Circumference: No  Gender: No   STOP BANG Questionnaire SUNITA Total Score 1     Snoring: No  Tired: Yes  Observed: No  Blood Pressure: No  BMI: No  Age: No  Neck Circumference: No  Gender: No       Future Appointments    Date/Time Provider Specialty Site   05/08/2017 09:30 AM Tigist Crook  [facility]   02/27/2018 11:00 AM DREA Clarke   [facility] [place] [facility]     Signatures   Electronically signed by : DREA Epps ; Apr 27 2017 10:08AM EST                       (Author) L1-L2 discitis/OM with epidural abscess and BL psoas abscesses

## 2025-04-24 NOTE — DIETITIAN INITIAL EVALUATION ADULT - PERTINENT LABORATORY DATA
04-24    135  |  102  |  21  ----------------------------<  166[H]  4.1   |  21[L]  |  0.80    Ca    10.1      24 Apr 2025 05:00    TPro  7.3  /  Alb  2.8[L]  /  TBili  0.4  /  DBili  x   /  AST  26  /  ALT  44  /  AlkPhos  141[H]  04-23    CAPILLARY BLOOD GLUCOSE  POCT Blood Glucose.: 214 mg/dL (23 Apr 2025 16:32)   04-24    135  |  102  |  21  ----------------------------<  166[H]  4.1   |  21[L]  |  0.80    Ca    10.1      24 Apr 2025 05:00    TPro  7.3  /  Alb  2.8[L]  /  TBili  0.4  /  DBili  x   /  AST  26  /  ALT  44  /  AlkPhos  141[H]  04-23    CAPILLARY BLOOD GLUCOSE  POCT Blood Glucose.: 134 mg/dL (25 Apr 2025 06:29)  POCT Blood Glucose.: 145 mg/dL (25 Apr 2025 01:21)  POCT Blood Glucose.: 169 mg/dL (24 Apr 2025 21:49)  POCT Blood Glucose.: 135 mg/dL (24 Apr 2025 11:48)

## 2025-04-24 NOTE — DIETITIAN INITIAL EVALUATION ADULT - ETIOLOGY
likely in setting of chronic pain  increased physiological demand for postsurgical healing / infection

## 2025-04-24 NOTE — DIETITIAN INITIAL EVALUATION ADULT - ADD RECOMMEND
1) Monitor weights, PO intake/diet tolerance, skin integrity, pertinent labs.   2) Please consistently document % PO intake in nursing flowsheets to assess adequacy of nutritional intake/monitor need for further nutritional intervention(s).     Kinza Vivas MS, RDN, CDN  Pager [phone]  Also available on MS Teams

## 2025-04-24 NOTE — DIETITIAN INITIAL EVALUATION ADULT - REASON INDICATOR FOR ASSESSMENT
mst score 2 or greater (weight loss 14-23 lbs) mst score 2 or greater (weight loss 14-23 lbs) per RN profile

## 2025-04-24 NOTE — H&P ADULT - HISTORY OF PRESENT ILLNESS
HPI  79yMale w/ HTN, HLD, T2DM, chronic low back pain presents as a transfer from [facility] ED for surgery today. Pt was sent into ED yesterday by his pain medicine physician due to recent MRI findings concerning for L1-L2 discitis/OM with epidural abscess, bilateral psoas abscesses. Patient initially saw pain specialist in February for chronic back pain with radiations to bilateral lateral thighs (R>L). MRI at that time showed degenerative changes. He subsequently had epidural injections x2 (2/27, 3/11) with moderate pain relief. Pain began to worsen on 4/10. He had radiofrequency ablation performed on 4/11 and has since has severe worsening of pain and radiation to R lateral thigh. He reports recent bowel/bladder "incontinence" requiring diaper due to his inability to get to the bathroom in time due to pain limitations but states urge and sensation are intact. Denies fevers, chills, night sweats, weakness, numbness/tingling, saddle anesthesia, recent falls/trauma. He uses a cane for ambulation. Denies recent falls/trauma or any other ortho injuries.     ROS  Negative unless otherwise specified in HPI.    PAST MEDICAL & SURGICAL Hx  PAST MEDICAL & SURGICAL HISTORY:  HTN (hypertension)      HLD (hyperlipidemia)      DM (diabetes mellitus)      Cataract          MEDICATIONS  Home Medications:  losartan 25 mg oral tablet: 1 tab(s) orally once a day (06 Jun 2018 10:23)  Lovaza 1000 mg oral capsule: 2 cap(s) orally 2 times a day (06 Jun 2018 10:23)  metFORMIN 500 mg oral tablet: 1 tab(s) orally once a day (06 Jun 2018 10:23)      ALLERGIES  No Known Allergies      FAMILY Hx  FAMILY HISTORY:  No pertinent family history in first degree relatives        SOCIAL Hx  Social History:      VITALS  Vital Signs Last 24 Hrs  T(C): 36.8 (24 Apr 2025 03:30), Max: 37.4 (23 Apr 2025 11:54)  T(F): 98.2 (24 Apr 2025 03:30), Max: 99.3 (23 Apr 2025 11:54)  HR: 103 (24 Apr 2025 03:30) (97 - 128)  BP: 159/75 (24 Apr 2025 03:30) (114/64 - 159/75)  BP(mean): --  RR: 18 (24 Apr 2025 03:30) (17 - 20)  SpO2: 97% (24 Apr 2025 03:30) (96% - 98%)        PHYSICAL EXAM  Gen: Lying in bed, NAD  Resp: No increased WOB  Spine:  Skin intact  No bony TTP or step-offs along c-, t-, l-spine, or sacrum; has ttp of R paraspinal L spine    Motor:                   C5                C6              C7               C8           T1   R            5/5                5/5            5/5             5/5          5/5  L             5/5               5/5             5/5             5/5          5/5                L2             L3             L4               L5            S1  R         3+/5 **          5/5          5/5             5/5           5/5  L          5/5          5/5           5/5             5/5           5/5    **limited by pain    Sensory:            C5         C6         C7      C8       T1        (0=absent, 1=impaired, 2=normal, NT=not testable)  R         2            2           2        2         2  L          2            2           2        2         2               L2          L3         L4      L5       S1         (0=absent, 1=impaired, 2=normal, NT=not testable)  R         2            2            2        2        2  L          2            2           2        2         2    (+) Rectal tone, saddle/perianal SILT  (-) Rivera test b/l  (-) Straight leg raise test b/l  (-) Babinski sign b/l  (-) Ankle clonus b/l    LABS                        11.5   12.50 )-----------( 561      ( 24 Apr 2025 05:00 )             34.0     04-24    135  |  102  |  21  ----------------------------<  166[H]  4.1   |  21[L]  |  0.80    Ca    10.1      24 Apr 2025 05:00    TPro  7.3  /  Alb  2.8[L]  /  TBili  0.4  /  DBili  x   /  AST  26  /  ALT  44  /  AlkPhos  141[H]  04-23    PT/INR - ( 24 Apr 2025 05:00 )   PT: 13.8 sec;   INR: 1.16 ratio         PTT - ( 24 Apr 2025 05:00 )  PTT:32.7 sec    IMAGING

## 2025-04-24 NOTE — DIETITIAN INITIAL EVALUATION ADULT - PERTINENT MEDS FT
MEDICATIONS  (STANDING):  atorvastatin 40 milliGRAM(s) Oral at bedtime  chlorhexidine 2% Cloths 1 Application(s) Topical once  dextrose 5%. 1000 milliLiter(s) (100 mL/Hr) IV Continuous <Continuous>  dextrose 5%. 1000 milliLiter(s) (50 mL/Hr) IV Continuous <Continuous>  dextrose 50% Injectable 25 Gram(s) IV Push once  dextrose 50% Injectable 12.5 Gram(s) IV Push once  dextrose 50% Injectable 25 Gram(s) IV Push once  glucagon  Injectable 1 milliGRAM(s) IntraMuscular once  insulin lispro (ADMELOG) corrective regimen sliding scale   SubCutaneous every 6 hours  omega-3-Acid Ethyl Esters 2 Gram(s) Oral two times a day  pantoprazole    Tablet 40 milliGRAM(s) Oral before breakfast  sodium chloride 0.9%. 1000 milliLiter(s) (100 mL/Hr) IV Continuous <Continuous>  valsartan 40 milliGRAM(s) Oral daily    MEDICATIONS  (PRN):  acetaminophen     Tablet .. 975 milliGRAM(s) Oral every 8 hours PRN Mild Pain (1 - 3)  dextrose Oral Gel 15 Gram(s) Oral once PRN Blood Glucose LESS THAN 70 milliGRAM(s)/deciliter  oxyCODONE    IR 5 milliGRAM(s) Oral every 4 hours PRN Moderate Pain (4 - 6)  oxyCODONE    IR 10 milliGRAM(s) Oral every 4 hours PRN Severe Pain (7 - 10)

## 2025-04-24 NOTE — DIETITIAN INITIAL EVALUATION ADULT - DIET TYPE
Advance diet as tolerated per surgery team discretion towards CSTCHO / DASH/TLC (cholesterol and sodium restricted); defer diet consistencies to team.

## 2025-04-24 NOTE — PACU DISCHARGE NOTE - COMMENTS
no apparent anesthesia complications. patient is a primary sicu patient with care being dictated by primary team

## 2025-04-24 NOTE — BRIEF OPERATIVE NOTE - NSICDXBRIEFPROCEDURE_GEN_ALL_CORE_FT
PROCEDURES:  Decompression, spine, lumbar, posterior approach, with fusion of posterior spinal column 24-Apr-2025 21:40:18  Juan C Trammell

## 2025-04-24 NOTE — CONSULT NOTE ADULT - ASSESSMENT
ASSESSMENT:  79yMale w/ HTN, HLD, T2DM, chronic low back pain presents as a transfer from [facility] ED for surgery today (Tetantive for T10-pelvis PSF   SICU consulted post operatively for neurological checks hourly (q1h 0        PLAN  NEUROLOGIC   - AAOx   - neurological checks hourly q1h  - Pain control with ofirmev/ dilaudid IVP    RESPIRATORY   - Currently on   - Monitor SpO2 goal >92%      CARDIOVASCULAR   - Monitor hemodynamics   - currently on vasopressors x 1, phenylephedrine 0.8 gtt  - MAP goal > 65    GASTROINTESTINAL   - Diet:  NPO  - GI PPX: protonix    /RENAL   - IV fluids: LR @100cc/hr  - Monitor BMP  - Strict I/Os  - Monitor electrolytes, replete PRN  - Maintain maradiaga catheter    HEMATOLOGIC  - Monitor H/H   - DVT prophylaxis: SCDs , possible restart chemical vte in AM , will follow w/ Ortho Spine team     INFECTIOUS DISEASE  - Monitor fever/WC  - Antiobtiotics ; Zosyn/Vanco for empiric coverage   - check MSSA/MRSA swab ,   - will deescalate vs broaden abx selection pending OR cultures sensitives    ENDOCRINE  - Monitor gluc  - ISS    LINES  - rad a line  - PIV  - Maradiaga    Disposition:   SICU  --------------------------------------------------------------------------------------    Critical Care Diagnoses:  s/p spinal fusion requiring neurological checks hourly ,q1h    ASSESSMENT:  79yMale w/ HTN, HLD, T2DM, chronic low back pain presents as a transfer from [facility] ED for surgery today ( T10-pelvis posterior spinal fusion) 4/24/25    SICU consulted post operatively for neurological checks hourly (q1h) and hemodynamic monitoring  as patient is requiring vasopressor  to achieve map goals above 80mmhg         PLAN  NEUROLOGIC   - AAOx 3  - neurological checks hourly q1h  - Pain control with ofirmev/ dilaudid IVP    RESPIRATORY   - Currently on   - Monitor SpO2 goal >92%      CARDIOVASCULAR   - Monitor hemodynamics   - currently on vasopressors x 1, phenylephrine  0.8 gtt  to achieve map goals X>80mmhg   - MAP goal > 65    GASTROINTESTINAL   - Diet:  NPO for now   - GI PPX: protonix IVP    /RENAL   - IV fluids: LR @100cc/hr  - Monitor BMP  - Strict I/Os  - Monitor electrolytes, replete PRN  - Maintain maradigaa catheter    HEMATOLOGIC  - Monitor H/H   - DVT prophylaxis: SCDs , possible restart chemical vte in AM , will follow w/ Ortho Spine team     INFECTIOUS DISEASE  - Monitor fever/WC  - Antibiotic ; Zosyn/Vanco for empiric coverage  * started on 4/24/25  + blood cultures  *4/23  - check MSSA/MRSA swab  [ ]   - will deescalate vs broaden abx selection pending OR cultures sensitives    ENDOCRINE  - hx of diabetes type 2   - Monitor gluc, check ha1c  - ISS    LINES  - rad a line  - PIV  - Maradiaga    Disposition:   SICU  --------------------------------------------------------------------------------------    Critical Care Diagnoses:  s/p spinal fusion requiring neurological checks hourly ,q1h    ASSESSMENT:  79yMale w/ HTN, HLD, T2DM, chronic low back pain presents as a transfer from [facility] ED for surgery today ( T10-pelvis posterior spinal fusion) 4/24/25    SICU consulted post operatively for neurological checks hourly (q1h) and hemodynamic monitoring  as patient is requiring vasopressor  to achieve map goals above 80mmhg         PLAN  NEUROLOGIC   - AAOx 3  - neurological checks hourly q1h  - Pain control with ofirmev/ dilaudid PCA    RESPIRATORY   - Currently on   - Monitor SpO2 goal >92%      CARDIOVASCULAR   - Monitor hemodynamics   - currently on vasopressors x 1, phenylephrine  0.8 gtt  to achieve map goals X>80mmhg per Orthospine  - MAP goal > 65     GASTROINTESTINAL   - Diet:  NPO for now   - GI PPX: protonix IVP    /RENAL   - IV fluids: LR @100cc/hr  - Monitor BMP  - Strict I/Os  - Monitor electrolytes, replete PRN  - Maintain maradiaga catheter    HEMATOLOGIC  - Monitor H/H   - DVT prophylaxis: SCDs , possible restart chemical vte in AM , will follow w/ Ortho Spine team     INFECTIOUS DISEASE  - Monitor fever/WC  - Antibiotic ; Zosyn/Vanco for empiric coverage  * started on 4/24/25  + blood cultures  *4/23  - check MSSA/MRSA swab  [ ]   - will deescalate vs broaden abx selection pending OR cultures sensitives  - ID consult 4/24   bilateral psoas abscesses in the setting of epidural /bacteremia ,d/w ortho team   will consult IR 4/25/25 for evaluation and possible intervention/drainage     ENDOCRINE  - hx of diabetes type 2   - Monitor gluc, check ha1c  - ISS    LINES  - rad a line  - PIV  - Maradiaga    Disposition:   SICU  --------------------------------------------------------------------------------------    Critical Care Diagnoses:  s/p spinal fusion requiring neurological checks hourly ,q1h    ASSESSMENT:  79yMale w/ HTN, HLD, T2DM, chronic low back pain presents as a transfer from [facility] ED for surgery today ( T10-pelvis posterior spinal fusion) 4/24/25    SICU consulted post operatively for neurological checks hourly (q1h) and hemodynamic monitoring  as patient is requiring vasopressor  to achieve map goals above 80mmhg         PLAN  NEUROLOGIC   - AAOx 3  - neurological checks hourly q1h  - Pain control with ofirmev/ dilaudid PCA    RESPIRATORY   - Currently extubated to face tent   - Monitor SpO2 goal >92%      CARDIOVASCULAR   - Monitor hemodynamics   - currently on vasopressors x 1, phenylephrine  0.8 gtt  to achieve map goals X>80mmhg per Ortho-spine  - MAP goal > 80 per Ortho/Spine team     GASTROINTESTINAL   - Diet:  NPO for now   - GI PPX: protonix ppi    /RENAL   - IV fluids: LR @100cc/hr , will cut rate once tolerating CLD   - Monitor BMP  - Strict I/Os  - Monitor electrolytes, replete PRN  - Maintain maradiaga catheter    HEMATOLOGIC  - Monitor H/H   - DVT prophylaxis: SCDs , possible restart chemical vte in AM , will follow w/ Ortho Spine team in AM     INFECTIOUS DISEASE  - Monitor fever/WC  - Antibiotic ; Zosyn/Vanco for empiric coverage  * started on 4/24/25  + blood cultures  *4/23  - check MSSA/MRSA swab  [ ]   - will deescalate vs broaden abx selection pending OR cultures sensitives  - ID consult 4/25 for  +Bacteroides fragilis in blood cultures from 4/23/25  bilateral psoas abscesses in the setting of epidural /bacteremia ,d/w ortho team   will consult IR 4/25/25 for evaluation and possible intervention/drainage     ENDOCRINE  - hx of diabetes type 2   - Monitor gluc, check ha1c  - ISS    LINES  - rad a line  - PIV  - Maradiaga    Disposition:   SICU  --------------------------------------------------------------------------------------    Critical Care Diagnoses:  s/p spinal fusion requiring neurological checks hourly ,q1h    ASSESSMENT:  79yMale w/ HTN, HLD, T2DM, chronic low back pain presents as a transfer from [facility] ED for surgery today ( T10-pelvis posterior spinal fusion) 4/24/25    SICU consulted post operatively for neurological checks hourly (q1h) and hemodynamic monitoring  as patient is requiring vasopressor  to achieve map goals above 80mmhg         PLAN  NEUROLOGIC   - AAOx 3  - neurological checks hourly q1h  - Pain control with ofirmev/ dilaudid PCA    RESPIRATORY   - Currently extubated to face tent   - Monitor SpO2 goal >92%      CARDIOVASCULAR   - Monitor hemodynamics   - currently on vasopressors x 1, phenylephrine  0.8 gtt  to achieve map goals X>80mmhg per Ortho-spine  - MAP goal > 80 per Ortho/Spine team     GASTROINTESTINAL   - Diet:  NPO except meds, will revisit diet advancements in AM   - GI PPX: protonix ppi    /RENAL   - IV fluids: LR @100cc/hr , will cut rate once tolerating CLD   - Monitor BMP  - Strict I/Os  - Monitor electrolytes, replete PRN  - Maintain maradiaga catheter    HEMATOLOGIC  - Monitor H/H   - DVT prophylaxis: SCDs , possible restart chemical vte in AM , will follow w/ Ortho Spine team in AM     INFECTIOUS DISEASE  - Monitor fever/WC  - Antibiotic ; Zosyn/Vanco for empiric coverage  * started on 4/24/25  + blood cultures  *4/23  - check MSSA/MRSA swab  [ ]   - will deescalate vs broaden abx selection pending OR cultures sensitives  - ID consult 4/25 for  +Bacteroides fragilis in blood cultures from 4/23/25  bilateral psoas abscesses in the setting of epidural /bacteremia ,d/w ortho team   will consult IR 4/25/25 for evaluation and possible intervention/drainage     ENDOCRINE  - hx of diabetes type 2   - Monitor gluc, check ha1c  - ISS    LINES  - rad a line  - PIV  - Maradiaga    Disposition:   SICU  --------------------------------------------------------------------------------------    Critical Care Diagnoses:  s/p spinal fusion requiring neurological checks hourly ,q1h  hemodynamic parameters to keep MAP above 80mmhg, currently requiring vasopressor to achieve goal   ASSESSMENT:  79yMale w/ HTN, HLD, T2DM, chronic low back pain presents as a transfer from [facility] ED for surgery today ( T10-pelvis posterior spinal fusion) 4/24/25    SICU consulted post operatively for neurological checks hourly (q1h) and hemodynamic monitoring  as patient is requiring vasopressor  to achieve map goals above 80mmhg         PLAN  NEUROLOGIC   - AAOx 3  - neurological checks hourly q1h  - Pain control with ofirmev/ dilaudid PCA    RESPIRATORY   - Currently extubated to face tent   - Monitor SpO2 goal >92%      CARDIOVASCULAR   - Monitor hemodynamics   - currently on vasopressors x 1, phenylephrine  0.8 gtt  to achieve map goals X>80mmhg per Ortho-spine  - MAP goal > 80 per Ortho/Spine team     GASTROINTESTINAL   - Diet:  NPO except meds, will revisit diet advancements in AM   - GI PPX: protonix ppi    /RENAL   - IV fluids: LR @100cc/hr , will cut rate once tolerating CLD   - Monitor BMP  - Strict I/Os  - Monitor electrolytes, replete PRN  - Maintain maradiaga catheter    HEMATOLOGIC  - Monitor H/H   - DVT prophylaxis: SCDs , possible restart chemical vte in AM , will follow w/ Ortho Spine team in AM     INFECTIOUS DISEASE  - Monitor fever/WC  - Antibiotic ; Zosyn/Vanco for empiric coverage  * started on 4/24/25  + blood cultures  *4/23  - check MSSA/MRSA swab  [ ]   - will deescalate vs broaden abx selection pending OR cultures sensitives  - ID consult 4/25 for  +Bacteroides fragilis in blood cultures from 4/23/25  bilateral psoas abscesses in the setting of epidural /bacteremia ,d/w ortho team   will consult IR 4/25/25 for evaluation and possible intervention/drainage     ENDOCRINE  - hx of diabetes type 2   - Monitor gluc, 4/24/25 ha1c * 7.3   - ISS    LINES  - rad a line  - PIV  - Maradiaga    Disposition:   SICU  --------------------------------------------------------------------------------------    Critical Care Diagnoses:  s/p spinal fusion requiring neurological checks hourly ,q1h  hemodynamic parameters to keep MAP above 80mmhg, currently requiring vasopressor to achieve goal

## 2025-04-24 NOTE — H&P ADULT - ATTENDING COMMENTS
Patient seen and examined.  Progressive back pain.  Denies numbness/tingling/weakness.  MRI with L1/2 Discitis/OM with EA.  Discussed with patient and family nonsurgical and surgical options.  The nature of the planned surgery, other options available to the patient, the risks and possible complications of this surgery and the other options, including but not limited to death, paralysis, nerve damage, infection, bleeding, failure of the surgery to relieve  symptoms, failure of fusion, dislodgement of interbody graft, hardware failure/breakage/loosening, use of DBM and BMP and its off-label use and inherent risks, blindness, wound healing complications, proximal/distal junctional failure, pulmonary and/or cardiac complications, stroke, blindness, brain injury, DVT, PE, UTI, spinal fluid leakage, possible need for further surgery in the future, adjacent segment deterioration, etc were discussed with the patient at length and the patient understands and wishes to proceed.

## 2025-04-24 NOTE — H&P ADULT - ASSESSMENT
ASSESSMENT & PLAN  79yMale w/ L1-L2 discitis/OM with epidural abscess and BL psoas abscesses. , .    Plan:  -OR today for T10-pelvis PSF  -NPO/IVF  -f/u preop labs  -f/u blood cx  -please document medical clearance for OR today  -f/u TTE  -pain control  -incentive spirometry        Ana Luisa Cervantes, PGY-2  Orthopedic Surgery    [facility]: [id number]  [facility]: [id number]  [facility]:  [id number]/ [phone]

## 2025-04-24 NOTE — PRE-OP CHECKLIST - 1.
fabys 1240 pt admitted to asu spot 23 via stretcher vss ns infusing well via lt. arm no redness or swelling noted

## 2025-04-24 NOTE — DIETITIAN INITIAL EVALUATION ADULT - OTHER INFO
Nutrition assessment for consult as above.    4/24/25 - H&P: 79yMale w/ L1-L2 discitis/OM with epidural abscess and BL psoas abscesses. , . Nutrition assessment for consult as above.    4/24/25 - H&P: 79yMale w/ L1-L2 discitis/OM with epidural abscess and BL psoas abscesses.  Patient not in room x2 attempts 4/24/25 - now in PACU postsurgery.  Weight Hx: per HIE - Jan 2025 - 71.7kg; admit weight - 63.5kg; weight loss of 8.2kg / 11.4% x 3 months, which is significant. Weight loss possibly in setting of pain, as patient noted with progressive worsening back pain, however, unable to assess adequacy of PO intake at this time.  Patient likely at malnutrition risk, RD to monitor and conduct nutrition focused physical exam as feasible / medically appropriate.    DM managed with Metformin @ home PTA.  No food allergies noted or reported. Fecal incontinence noted with last BM 4/24/25 per RN flowsheet.   Nutrition assessment for consult as above.    4/24/25 - H&P: 79yMale w/ L1-L2 discitis/OM with epidural abscess and BL psoas abscesses.  Patient not in room x2 attempts on 4/24/25 - now in PACU postsurgery.      Weight Hx: per HIE - Jan 2025 - 71.7kg; admit weight - 63.5kg; weight loss of 8.2kg / 11.4% x 3 months, which is significant. Weight loss possibly in setting of pain, as patient noted with progressive worsening back pain, however, unable to assess adequacy of PO intake PTA at this time. Patient diet was progressed from NPO (presurgery) to CLD (postsurgery); further diet advancement as tolerated at discretion of team. Per RN profile, no changes in appetite PTA.  Patient likely at malnutrition risk given weight loss, RD to monitor and conduct nutrition focused physical exam as feasible / medically appropriate.    DM managed with Metformin @ home PTA.  No food allergies noted or reported. Fecal incontinence noted with last BM 4/24/25 per RN flowsheet.

## 2025-04-24 NOTE — CONSULT NOTE ADULT - ASSESSMENT
79yMale w/ HTN, HLD, T2DM, chronic low back pain presents as a transfer from [facility] ED for surgery today. Pt was sent into ED yesterday by his pain medicine physician due to recent MRI findings concerning for L1-L2 discitis/OM with epidural abscess, bilateral psoas abscesses. Patient initially saw pain specialist in February for chronic back pain with radiations to bilateral lateral thighs (R>L). MRI at that time showed degenerative changes. He subsequently had epidural injections x2 (2/27, 3/11) with moderate pain relief. Pain began to worsen on 4/10. He had radiofrequency ablation performed on 4/11 and has since has severe worsening of pain and radiation to R lateral thigh. He reports recent bowel/bladder "incontinence" requiring diaper due to his inability to get to the bathroom in time due to pain limitations but states urge and sensation are intact. Denies fevers, chills, night sweats, weakness, numbness/tingling, saddle anesthesia, recent falls/trauma. He uses a cane for ambulation. Denies recent falls/trauma or any other ortho injuries. Before back pain was very active walked half an hour , going up and down stairs and doing grocery etc with no Chest pain or SOB.

## 2025-04-24 NOTE — CONSULT NOTE ADULT - SUBJECTIVE AND OBJECTIVE BOX
Patient is a 79y old  Male who presents with a chief complaint of L1-L2 discitis/OM with epidural abscess and BL psoas abscesses .      HPI:  79yMale w/ HTN, HLD, T2DM, chronic low back pain presents as a transfer from [facility] ED for surgery today. Pt was sent into ED yesterday by his pain medicine physician due to recent MRI findings concerning for L1-L2 discitis/OM with epidural abscess, bilateral psoas abscesses. Patient initially saw pain specialist in February for chronic back pain with radiations to bilateral lateral thighs (R>L). MRI at that time showed degenerative changes. He subsequently had epidural injections x2 (2/27, 3/11) with moderate pain relief. Pain began to worsen on 4/10. He had radiofrequency ablation performed on 4/11 and has since has severe worsening of pain and radiation to R lateral thigh. He reports recent bowel/bladder "incontinence" requiring diaper due to his inability to get to the bathroom in time due to pain limitations but states urge and sensation are intact. Denies fevers, chills, night sweats, weakness, numbness/tingling, saddle anesthesia, recent falls/trauma. He uses a cane for ambulation. Denies recent falls/trauma or any other ortho injuries. Before back pain was very active waled half an hour , going up and down stairs and doing grocery etc with no Chest pain or SOB.       FAMILY HISTORY:  No pertinent family history in first degree relatives    Social History: Non smoker .       PAST MEDICAL & SURGICAL HISTORY:  HTN (hypertension)      HLD (hyperlipidemia)      DM (diabetes mellitus)      Cataract              REVIEW OF SYSTEMS:    CONSTITUTIONAL: No fever, weight loss, or fatigue  EYES: No eye pain, visual disturbances, or discharge  RESPIRATORY: No cough, wheezing, chills or hemoptysis; No shortness of breath  CARDIOVASCULAR: No chest pain, palpitations, dizziness, or leg swelling  GASTROINTESTINAL: No abdominal or epigastric pain. No nausea, vomiting, or hematemesis; No diarrhea or constipation. No melena or hematochezia.  GENITOURINARY: No dysuria, frequency, hematuria, or incontinence  NEUROLOGICAL: No headaches, memory loss, loss of strength, numbness, or tremors  SKIN: No itching, burning, rashes, or lesions   ENDOCRINE: No heat or cold intolerance; No hair loss  MUSCULOSKELETAL: No joint pain or swelling; No muscle, but  back, or RL extremity pain  PSYCHIATRIC: No depression, anxiety, mood swings, or difficulty sleeping      MEDICATIONS  (STANDING):  amLODIPine   Tablet 10 milliGRAM(s) Oral once  atorvastatin 40 milliGRAM(s) Oral at bedtime  chlorhexidine 2% Cloths 1 Application(s) Topical once  omega-3-Acid Ethyl Esters 2 Gram(s) Oral two times a day  pantoprazole    Tablet 40 milliGRAM(s) Oral before breakfast  sodium chloride 0.9%. 1000 milliLiter(s) (100 mL/Hr) IV Continuous <Continuous>  valsartan 40 milliGRAM(s) Oral daily    MEDICATIONS  (PRN):  acetaminophen     Tablet .. 975 milliGRAM(s) Oral every 8 hours PRN Mild Pain (1 - 3)  oxyCODONE    IR 5 milliGRAM(s) Oral every 4 hours PRN Moderate Pain (4 - 6)  oxyCODONE    IR 10 milliGRAM(s) Oral every 4 hours PRN Severe Pain (7 - 10)      Vital Signs Last 24 Hrs  T(C): 36.8 (24 Apr 2025 03:30), Max: 37.4 (23 Apr 2025 11:54)  T(F): 98.2 (24 Apr 2025 03:30), Max: 99.3 (23 Apr 2025 11:54)  HR: 103 (24 Apr 2025 03:30) (97 - 128)  BP: 159/75 (24 Apr 2025 03:30) (114/64 - 159/75)  BP(mean): --  RR: 18 (24 Apr 2025 03:30) (17 - 20)  SpO2: 97% (24 Apr 2025 03:30) (96% - 98%)        PHYSICAL EXAM:    GENERAL: NAD, well-groomed, well-developed  HEAD:  Atraumatic, Normocephalic  EYES: EOMI, PERRLA, conjunctiva and sclera clear  ENMT: No tonsillar erythema, exudates, or enlargement; Moist mucous membranes, Good dentition, No lesions  NECK: Supple, No JVD, Normal thyroid  NERVOUS SYSTEM:  Alert & Oriented X3, No new  focal sign .Unable to move RLE as in pain.   CHEST/LUNG: Air entry good bilaterally; No rales, rhonchi, wheezing, or rubs  HEART: Regular rate and rhythm; No murmurs, rubs, or gallops  ABDOMEN: Soft, Nontender, Nondistended; Bowel sounds present  EXTREMITIES:  2+ Peripheral Pulses, No clubbing, cyanosis, or edema    LABS:                        11.5   12.50 )-----------( 561      ( 24 Apr 2025 05:00 )             34.0     04-24    135  |  102  |  21  ----------------------------<  166[H]  4.1   |  21[L]  |  0.80    Ca    10.1      24 Apr 2025 05:00    TPro  7.3  /  Alb  2.8[L]  /  TBili  0.4  /  DBili  x   /  AST  26  /  ALT  44  /  AlkPhos  141[H]  04-23    PT/INR - ( 24 Apr 2025 05:00 )   PT: 13.8 sec;   INR: 1.16 ratio         PTT - ( 24 Apr 2025 05:00 )  PTT:32.7 sec  Urinalysis Basic - ( 24 Apr 2025 05:00 )    Color: x / Appearance: x / SG: x / pH: x  Gluc: 166 mg/dL / Ketone: x  / Bili: x / Urobili: x   Blood: x / Protein: x / Nitrite: x   Leuk Esterase: x / RBC: x / WBC x   Sq Epi: x / Non Sq Epi: x / Bacteria: x          RADIOLOGY & ADDITIONAL STUDIES:

## 2025-04-25 LAB
A1C WITH ESTIMATED AVERAGE GLUCOSE RESULT: 7.2 % — HIGH (ref 4–5.6)
ANION GAP SERPL CALC-SCNC: 13 MMOL/L — SIGNIFICANT CHANGE UP (ref 7–14)
BUN SERPL-MCNC: 17 MG/DL — SIGNIFICANT CHANGE UP (ref 7–23)
CALCIUM SERPL-MCNC: 9 MG/DL — SIGNIFICANT CHANGE UP (ref 8.4–10.5)
CHLORIDE SERPL-SCNC: 107 MMOL/L — SIGNIFICANT CHANGE UP (ref 98–107)
CO2 SERPL-SCNC: 18 MMOL/L — LOW (ref 22–31)
CREAT SERPL-MCNC: 0.74 MG/DL — SIGNIFICANT CHANGE UP (ref 0.5–1.3)
CULTURE RESULTS: ABNORMAL
CULTURE RESULTS: ABNORMAL
EGFR: 92 ML/MIN/1.73M2 — SIGNIFICANT CHANGE UP
EGFR: 92 ML/MIN/1.73M2 — SIGNIFICANT CHANGE UP
ESTIMATED AVERAGE GLUCOSE: 160 — SIGNIFICANT CHANGE UP
GLUCOSE BLDC GLUCOMTR-MCNC: 123 MG/DL — HIGH (ref 70–99)
GLUCOSE BLDC GLUCOMTR-MCNC: 134 MG/DL — HIGH (ref 70–99)
GLUCOSE BLDC GLUCOMTR-MCNC: 145 MG/DL — HIGH (ref 70–99)
GLUCOSE BLDC GLUCOMTR-MCNC: 159 MG/DL — HIGH (ref 70–99)
GLUCOSE BLDC GLUCOMTR-MCNC: 199 MG/DL — HIGH (ref 70–99)
GLUCOSE SERPL-MCNC: 134 MG/DL — HIGH (ref 70–99)
GRAM STN FLD: ABNORMAL
HCT VFR BLD CALC: 29 % — LOW (ref 39–50)
HGB BLD-MCNC: 9.8 G/DL — LOW (ref 13–17)
MAGNESIUM SERPL-MCNC: 2 MG/DL — SIGNIFICANT CHANGE UP (ref 1.6–2.6)
MCHC RBC-ENTMCNC: 28.9 PG — SIGNIFICANT CHANGE UP (ref 27–34)
MCHC RBC-ENTMCNC: 33.8 G/DL — SIGNIFICANT CHANGE UP (ref 32–36)
MCV RBC AUTO: 85.5 FL — SIGNIFICANT CHANGE UP (ref 80–100)
MRSA PCR RESULT.: SIGNIFICANT CHANGE UP
NIGHT BLUE STAIN TISS: SIGNIFICANT CHANGE UP
NRBC # BLD AUTO: 0 K/UL — SIGNIFICANT CHANGE UP (ref 0–0)
NRBC # FLD: 0 K/UL — SIGNIFICANT CHANGE UP (ref 0–0)
NRBC BLD AUTO-RTO: 0 /100 WBCS — SIGNIFICANT CHANGE UP (ref 0–0)
ORGANISM # SPEC MICROSCOPIC CNT: ABNORMAL
ORGANISM # SPEC MICROSCOPIC CNT: ABNORMAL
PHOSPHATE SERPL-MCNC: 3.3 MG/DL — SIGNIFICANT CHANGE UP (ref 2.5–4.5)
PLATELET # BLD AUTO: 382 K/UL — SIGNIFICANT CHANGE UP (ref 150–400)
POTASSIUM SERPL-MCNC: 3.7 MMOL/L — SIGNIFICANT CHANGE UP (ref 3.5–5.3)
POTASSIUM SERPL-SCNC: 3.7 MMOL/L — SIGNIFICANT CHANGE UP (ref 3.5–5.3)
RBC # BLD: 3.39 M/UL — LOW (ref 4.2–5.8)
RBC # FLD: 15.5 % — HIGH (ref 10.3–14.5)
S AUREUS DNA NOSE QL NAA+PROBE: SIGNIFICANT CHANGE UP
SODIUM SERPL-SCNC: 138 MMOL/L — SIGNIFICANT CHANGE UP (ref 135–145)
SPECIMEN SOURCE: SIGNIFICANT CHANGE UP
WBC # BLD: 14.15 K/UL — HIGH (ref 3.8–10.5)
WBC # FLD AUTO: 14.15 K/UL — HIGH (ref 3.8–10.5)

## 2025-04-25 PROCEDURE — G0545: CPT

## 2025-04-25 PROCEDURE — 74177 CT ABD & PELVIS W/CONTRAST: CPT | Mod: 26

## 2025-04-25 PROCEDURE — 99291 CRITICAL CARE FIRST HOUR: CPT

## 2025-04-25 PROCEDURE — 99223 1ST HOSP IP/OBS HIGH 75: CPT

## 2025-04-25 RX ORDER — METHOCARBAMOL 500 MG/1
500 TABLET, FILM COATED ORAL ONCE
Refills: 0 | Status: COMPLETED | OUTPATIENT
Start: 2025-04-25 | End: 2025-04-25

## 2025-04-25 RX ORDER — OXYCODONE HYDROCHLORIDE 30 MG/1
10 TABLET ORAL
Refills: 0 | Status: DISCONTINUED | OUTPATIENT
Start: 2025-04-25 | End: 2025-04-27

## 2025-04-25 RX ORDER — OXYCODONE HYDROCHLORIDE 30 MG/1
5 TABLET ORAL
Refills: 0 | Status: DISCONTINUED | OUTPATIENT
Start: 2025-04-25 | End: 2025-04-27

## 2025-04-25 RX ORDER — PHENYLEPHRINE HCL IN 0.9% NACL 0.5 MG/5ML
1.6 SYRINGE (ML) INTRAVENOUS
Qty: 40 | Refills: 0 | Status: DISCONTINUED | OUTPATIENT
Start: 2025-04-25 | End: 2025-04-25

## 2025-04-25 RX ORDER — PHENYLEPHRINE HCL IN 0.9% NACL 0.5 MG/5ML
2 SYRINGE (ML) INTRAVENOUS
Qty: 160 | Refills: 0 | Status: DISCONTINUED | OUTPATIENT
Start: 2025-04-25 | End: 2025-04-26

## 2025-04-25 RX ORDER — METHOCARBAMOL 500 MG/1
500 TABLET, FILM COATED ORAL EVERY 12 HOURS
Refills: 0 | Status: DISCONTINUED | OUTPATIENT
Start: 2025-04-25 | End: 2025-05-16

## 2025-04-25 RX ORDER — LIDOCAINE HYDROCHLORIDE 20 MG/ML
1 JELLY TOPICAL ONCE
Refills: 0 | Status: DISCONTINUED | OUTPATIENT
Start: 2025-04-25 | End: 2025-04-27

## 2025-04-25 RX ORDER — LIDOCAINE HYDROCHLORIDE 20 MG/ML
1 JELLY TOPICAL ONCE
Refills: 0 | Status: DISCONTINUED | OUTPATIENT
Start: 2025-04-25 | End: 2025-04-25

## 2025-04-25 RX ORDER — PHENYLEPHRINE HCL IN 0.9% NACL 0.5 MG/5ML
2 SYRINGE (ML) INTRAVENOUS
Qty: 160 | Refills: 0 | Status: DISCONTINUED | OUTPATIENT
Start: 2025-04-25 | End: 2025-04-25

## 2025-04-25 RX ADMIN — SODIUM CHLORIDE 100 MILLILITER(S): 9 INJECTION, SOLUTION INTRAVENOUS at 01:25

## 2025-04-25 RX ADMIN — Medication 25 GRAM(S): at 13:31

## 2025-04-25 RX ADMIN — METHOCARBAMOL 500 MILLIGRAM(S): 500 TABLET, FILM COATED ORAL at 20:05

## 2025-04-25 RX ADMIN — Medication 25 GRAM(S): at 20:56

## 2025-04-25 RX ADMIN — Medication 975 MILLIGRAM(S): at 04:01

## 2025-04-25 RX ADMIN — SODIUM CHLORIDE 75 MILLILITER(S): 9 INJECTION, SOLUTION INTRAVENOUS at 17:50

## 2025-04-25 RX ADMIN — GABAPENTIN 100 MILLIGRAM(S): 400 CAPSULE ORAL at 05:14

## 2025-04-25 RX ADMIN — Medication 20 MILLIEQUIVALENT(S): at 08:26

## 2025-04-25 RX ADMIN — Medication 975 MILLIGRAM(S): at 04:31

## 2025-04-25 RX ADMIN — INSULIN LISPRO 1: 100 INJECTION, SOLUTION INTRAVENOUS; SUBCUTANEOUS at 23:15

## 2025-04-25 RX ADMIN — OXYCODONE HYDROCHLORIDE 10 MILLIGRAM(S): 30 TABLET ORAL at 18:30

## 2025-04-25 RX ADMIN — GABAPENTIN 100 MILLIGRAM(S): 400 CAPSULE ORAL at 14:06

## 2025-04-25 RX ADMIN — Medication 30 MILLILITER(S): at 11:15

## 2025-04-25 RX ADMIN — Medication 25 GRAM(S): at 05:12

## 2025-04-25 RX ADMIN — Medication 975 MILLIGRAM(S): at 10:00

## 2025-04-25 RX ADMIN — Medication 20 MILLIEQUIVALENT(S): at 10:15

## 2025-04-25 RX ADMIN — OXYCODONE HYDROCHLORIDE 5 MILLIGRAM(S): 30 TABLET ORAL at 14:45

## 2025-04-25 RX ADMIN — Medication 30 MILLILITER(S): at 07:17

## 2025-04-25 RX ADMIN — Medication 975 MILLIGRAM(S): at 15:33

## 2025-04-25 RX ADMIN — METHOCARBAMOL 500 MILLIGRAM(S): 500 TABLET, FILM COATED ORAL at 08:32

## 2025-04-25 RX ADMIN — Medication 2 TABLET(S): at 21:18

## 2025-04-25 RX ADMIN — SODIUM CHLORIDE 75 MILLILITER(S): 9 INJECTION, SOLUTION INTRAVENOUS at 19:55

## 2025-04-25 RX ADMIN — Medication 250 MILLIGRAM(S): at 06:20

## 2025-04-25 RX ADMIN — OXYCODONE HYDROCHLORIDE 10 MILLIGRAM(S): 30 TABLET ORAL at 18:00

## 2025-04-25 RX ADMIN — Medication 975 MILLIGRAM(S): at 09:45

## 2025-04-25 RX ADMIN — GABAPENTIN 100 MILLIGRAM(S): 400 CAPSULE ORAL at 21:26

## 2025-04-25 RX ADMIN — Medication 38.1 MICROGRAM(S)/KG/MIN: at 17:50

## 2025-04-25 RX ADMIN — ATORVASTATIN CALCIUM 40 MILLIGRAM(S): 80 TABLET, FILM COATED ORAL at 21:18

## 2025-04-25 RX ADMIN — INSULIN LISPRO 1: 100 INJECTION, SOLUTION INTRAVENOUS; SUBCUTANEOUS at 17:48

## 2025-04-25 RX ADMIN — Medication 23.8 MICROGRAM(S)/KG/MIN: at 19:54

## 2025-04-25 RX ADMIN — Medication 975 MILLIGRAM(S): at 21:18

## 2025-04-25 RX ADMIN — Medication 975 MILLIGRAM(S): at 21:52

## 2025-04-25 RX ADMIN — OXYCODONE HYDROCHLORIDE 5 MILLIGRAM(S): 30 TABLET ORAL at 14:06

## 2025-04-25 NOTE — OCCUPATIONAL THERAPY INITIAL EVALUATION ADULT - PERTINENT HX OF CURRENT PROBLEM, REHAB EVAL
Pt is a 79 year old Male w/ a past medical history of HTN, HLD, T2DM, chronic low back pain presents as a transfer from [facility] ED for surgery today. He underwent T10-Pelvis PSF T12-L2 lami 4/24/25 (Ludy/DAVID)   SICU consulted post operatively for neurological checks hourly (q1h) and hemodynamic monitoring  as patient is requiring vasopressor  to achieve map goals above 80mmhg

## 2025-04-25 NOTE — PROGRESS NOTE ADULT - SUBJECTIVE AND OBJECTIVE BOX
Date of Service  : 04-25-25    INTERVAL HPI/OVERNIGHT EVENTS: Seen and examined in PICU . I did not eat and was in pain .   Vital Signs Last 24 Hrs  T(C): 36.2 (25 Apr 2025 13:15), Max: 36.7 (25 Apr 2025 04:00)  T(F): 97.1 (25 Apr 2025 13:15), Max: 98.1 (25 Apr 2025 04:00)  HR: 73 (25 Apr 2025 14:00) (63 - 105)  BP: 127/60 (25 Apr 2025 14:00) (112/64 - 146/62)  BP(mean): 80 (25 Apr 2025 14:00) (75 - 103)  RR: 13 (25 Apr 2025 14:00) (9 - 20)  SpO2: 99% (25 Apr 2025 14:00) (93% - 100%)    Parameters below as of 25 Apr 2025 14:00  Patient On (Oxygen Delivery Method): room air      I&O's Summary    24 Apr 2025 07:01  -  25 Apr 2025 07:00  --------------------------------------------------------  IN: 1352.6 mL / OUT: 1232 mL / NET: 120.6 mL    25 Apr 2025 07:01  -  25 Apr 2025 14:25  --------------------------------------------------------  IN: 1234.8 mL / OUT: 881 mL / NET: 353.8 mL      MEDICATIONS  (STANDING):  acetaminophen     Tablet .. 975 milliGRAM(s) Oral every 6 hours  atorvastatin 40 milliGRAM(s) Oral at bedtime  gabapentin 100 milliGRAM(s) Oral every 8 hours  glucagon  Injectable 1 milliGRAM(s) IntraMuscular once  insulin lispro (ADMELOG) corrective regimen sliding scale   SubCutaneous every 6 hours  lactated ringers. 1000 milliLiter(s) (75 mL/Hr) IV Continuous <Continuous>  methocarbamol 500 milliGRAM(s) Oral every 12 hours  omega-3-Acid Ethyl Esters 2 Gram(s) Oral two times a day  pantoprazole    Tablet 40 milliGRAM(s) Oral before breakfast  phenylephrine    Infusion 1.6 MICROgram(s)/kG/Min (38.1 mL/Hr) IV Continuous <Continuous>  piperacillin/tazobactam IVPB.. 3.375 Gram(s) IV Intermittent every 8 hours  senna 2 Tablet(s) Oral at bedtime    MEDICATIONS  (PRN):  acetaminophen     Tablet .. 975 milliGRAM(s) Oral every 8 hours PRN Mild Pain (1 - 3)  bisacodyl 5 milliGRAM(s) Oral every 12 hours PRN Constipation  magnesium hydroxide Suspension 30 milliLiter(s) Oral every 12 hours PRN Constipation  naloxone Injectable 0.1 milliGRAM(s) IV Push every 3 minutes PRN For ANY of the following changes in patient status:  A. RR LESS THAN 10 breaths per minute, B. Oxygen saturation LESS THAN 90%, C. Sedation score of 6  ondansetron   Disintegrating Tablet 4 milliGRAM(s) Oral every 6 hours PRN Nausea and/or Vomiting  oxyCODONE    IR 5 milliGRAM(s) Oral every 3 hours PRN Moderate Pain (4 - 6)  oxyCODONE    IR 10 milliGRAM(s) Oral every 3 hours PRN Severe Pain (7 - 10)    LABS:                        9.8    14.15 )-----------( 382      ( 25 Apr 2025 02:45 )             29.0     04-25    138  |  107  |  17  ----------------------------<  134[H]  3.7   |  18[L]  |  0.74    Ca    9.0      25 Apr 2025 02:45  Phos  3.3     04-25  Mg     2.00     04-25      PT/INR - ( 24 Apr 2025 05:00 )   PT: 13.8 sec;   INR: 1.16 ratio         PTT - ( 24 Apr 2025 05:00 )  PTT:32.7 sec  Urinalysis Basic - ( 25 Apr 2025 02:45 )    Color: x / Appearance: x / SG: x / pH: x  Gluc: 134 mg/dL / Ketone: x  / Bili: x / Urobili: x   Blood: x / Protein: x / Nitrite: x   Leuk Esterase: x / RBC: x / WBC x   Sq Epi: x / Non Sq Epi: x / Bacteria: x      CAPILLARY BLOOD GLUCOSE      POCT Blood Glucose.: 123 mg/dL (25 Apr 2025 12:29)  POCT Blood Glucose.: 134 mg/dL (25 Apr 2025 06:29)  POCT Blood Glucose.: 145 mg/dL (25 Apr 2025 01:21)  POCT Blood Glucose.: 169 mg/dL (24 Apr 2025 21:49)      ABG - ( 24 Apr 2025 22:30 )  pH, Arterial: 7.39  pH, Blood: x     /  pCO2: 33    /  pO2: 92    / HCO3: 20    / Base Excess: -4.3  /  SaO2: 99.5              Urinalysis Basic - ( 25 Apr 2025 02:45 )    Color: x / Appearance: x / SG: x / pH: x  Gluc: 134 mg/dL / Ketone: x  / Bili: x / Urobili: x   Blood: x / Protein: x / Nitrite: x   Leuk Esterase: x / RBC: x / WBC x   Sq Epi: x / Non Sq Epi: x / Bacteria: x      REVIEW OF SYSTEMS:  CONSTITUTIONAL: No fever, weight loss, or fatigue  EYES: No eye pain, visual disturbances, or discharge  ENMT:  No difficulty hearing, tinnitus, vertigo; No sinus or throat pain  NECK: No pain or stiffness  RESPIRATORY: No cough, wheezing, chills or hemoptysis; No shortness of breath  CARDIOVASCULAR: No chest pain, palpitations, dizziness, or leg swelling  GASTROINTESTINAL: No abdominal or epigastric pain. No nausea, vomiting, or hematemesis; No diarrhea or constipation. No melena or hematochezia.  GENITOURINARY: No dysuria, frequency, hematuria, or incontinence  NEUROLOGICAL: No headaches, memory loss, loss of strength, numbness, or tremors  SKIN: No itching, burning, rashes, or lesions   ENDOCRINE: No heat or cold intolerance; No hair loss  MUSCULOSKELETAL:  back, or RL extremity pain      RADIOLOGY & ADDITIONAL TESTS:    Consultant(s) Notes Reviewed:  [x ] YES  [ ] NO    PHYSICAL EXAM:  GENERAL: NAD, well-groomed, well-developed,not in any distress ,  HEAD:  Atraumatic, Normocephalic  NECK: Supple, No JVD, Normal thyroid  NERVOUS SYSTEM:  Alert & Oriented X3,   CHEST/LUNG: Good air entry bilateral with no  rales, rhonchi, wheezing, or rubs  HEART: Regular rate and rhythm; No murmurs, rubs, or gallops  ABDOMEN: Soft, Nontender, Nondistended; Bowel sounds present  EXTREMITIES:  2+ Peripheral Pulses, No clubbing, cyanosis, or edema    Care Discussed with Consultants/Other Providers [ x] YES  [ ] NO

## 2025-04-25 NOTE — CONSULT NOTE ADULT - SUBJECTIVE AND OBJECTIVE BOX
Patient is a 79y old  Male who presents with a chief complaint of L1-L2 discitis/OM with epidural abscess and BL psoas abscesses (25 Apr 2025 09:42)    HPI:  79yMale w/ HTN, HLD, T2DM, chronic low back pain presents as a transfer from [facility] ED for surgery today. Pt was sent into ED yesterday by his pain medicine physician due to recent MRI findings concerning for L1-L2 discitis/OM with epidural abscess, bilateral psoas abscesses. Patient initially saw pain specialist in February for chronic back pain with radiations to bilateral lateral thighs (R>L). MRI at that time showed degenerative changes. He subsequently had epidural injections x2 (2/27, 3/11) with moderate pain relief. Pain began to worsen on 4/10. He had radiofrequency ablation performed on 4/11 and has since has severe worsening of pain and radiation to R lateral thigh. He reports recent bowel/bladder "incontinence" requiring diaper due to his inability to get to the bathroom in time due to pain limitations but states urge and sensation are intact. Denies fevers, chills, night sweats, weakness, numbness/tingling, saddle anesthesia, recent falls/trauma. He uses a cane for ambulation. Denies recent falls/trauma or any other ortho injuries.     ED/Hospital course: Has been afebrile, WBC 14, A1C 7.5, labs otherwise WNL. Underwent decompression, spine, lumbar, posterior approach, with fusion of posterior spinal column on 4/24. Cultures sent. Started on Vancomycin and Zosyn. IR consulted for abscess drainage. ID consulted for antibiotic management.      prior hospital charts reviewed [  ]  primary team notes reviewed [x  ]  other consultant notes reviewed [x  ]    PAST MEDICAL & SURGICAL HISTORY:  HTN (hypertension)      HLD (hyperlipidemia)      DM (diabetes mellitus)      Cataract          Allergies  No Known Allergies    ANTIMICROBIALS (past 90 days)  MEDICATIONS  (STANDING):    piperacillin/tazobactam IVPB..   25 mL/Hr IV Intermittent (04-25-25 @ 05:12)    vancomycin  IVPB   250 mL/Hr IV Intermittent (04-25-25 @ 06:20)        piperacillin/tazobactam IVPB.. 3.375 every 8 hours  vancomycin  IVPB 1000 every 12 hours    MEDICATIONS  (STANDING):  acetaminophen     Tablet .. 975 every 6 hours  acetaminophen     Tablet .. 975 every 8 hours PRN  atorvastatin 40 at bedtime  bisacodyl 5 every 12 hours PRN  gabapentin 100 every 8 hours  glucagon  Injectable 1 once  HYDROmorphone  Injectable 0.5 every 10 minutes PRN  HYDROmorphone PCA (1 mG/mL) 30 PCA Continuous  HYDROmorphone PCA (1 mG/mL) Rescue Clinician Bolus 0.5 every 15 minutes PRN  insulin lispro (ADMELOG) corrective regimen sliding scale  every 6 hours  magnesium hydroxide Suspension 30 every 12 hours PRN  nalbuphine Injectable 1 every 6 hours PRN  ondansetron   Disintegrating Tablet 4 every 6 hours PRN  pantoprazole    Tablet 40 before breakfast  phenylephrine    Infusion 1.6 <Continuous>  senna 2 at bedtime    SOCIAL HISTORY:       FAMILY HISTORY:  No pertinent family history in first degree relatives      REVIEW OF SYSTEMS  [  ] ROS unobtainable because:    [  ] All other systems negative except as noted below:	    Constitutional:  [ ] fever [ ] chills  [ ] weight loss  [ ] weakness  Skin:  [ ] rash [ ] phlebitis	  Eyes: [ ] icterus [ ] pain  [ ] discharge	  ENMT: [ ] sore throat  [ ] thrush [ ] ulcers [ ] exudates  Respiratory: [ ] dyspnea [ ] hemoptysis [ ] cough [ ] sputum	  Cardiovascular:  [ ] chest pain [ ] palpitations [ ] edema	  Gastrointestinal:  [ ] nausea [ ] vomiting [ ] diarrhea [ ] constipation [ ] pain	  Genitourinary:  [ ] dysuria [ ] frequency [ ] hematuria [ ] discharge [ ] flank pain  [ ] incontinence  Musculoskeletal:  [ ] myalgias [ ] arthralgias [ ] arthritis  [ ] back pain  Neurological:  [ ] headache [ ] seizures  [ ] confusion/altered mental status  Psychiatric:  [ ] anxiety [ ] depression	  Hematology/Lymphatics:  [ ] lymphadenopathy  Endocrine:  [ ] adrenal [ ] thyroid  Allergic/Immunologic:	 [ ] transplant [ ] seasonal    Vital Signs Last 24 Hrs  T(F): 97.3 (04-25-25 @ 07:00), Max: 99.3 (04-23-25 @ 11:54)  Vital Signs Last 24 Hrs  HR: 73 (04-25-25 @ 10:45) (64 - 102)  BP: 113/62 (04-25-25 @ 10:00) (112/64 - 143/55)  RR: 13 (04-25-25 @ 10:45)  SpO2: 98% (04-25-25 @ 10:45) (93% - 100%)  Wt(kg): --    PHYSICAL EXAM:  Constitutional: non-toxic, no distress  HEAD/EYES: anicteric, no conjunctival injection  ENT:  supple, no thrush  Cardiovascular:   normal S1, S2, no murmur, no edema  Respiratory:  clear BS bilaterally, no wheezes, no rales  GI:  soft, non-tender, normal bowel sounds  :  no maradiaga, no CVA tenderness  Musculoskeletal:  no synovitis, normal ROM  Neurologic: awake and alert, normal strength, no focal findings  Skin:  no rash, no erythema, no phlebitis  Heme/Onc: no lymphadenopathy   Psychiatric:  awake, alert, appropriate mood                            9.8    14.15 )-----------( 382      ( 25 Apr 2025 02:45 )             29.0   04-25    138  |  107  |  17  ----------------------------<  134[H]  3.7   |  18[L]  |  0.74    Ca    9.0      25 Apr 2025 02:45  Phos  3.3     04-25  Mg     2.00     04-25    TPro  7.3  /  Alb  2.8[L]  /  TBili  0.4  /  DBili  x   /  AST  26  /  ALT  44  /  AlkPhos  141[H]  04-23    Urinalysis Basic - ( 25 Apr 2025 02:45 )    Color: x / Appearance: x / SG: x / pH: x  Gluc: 134 mg/dL / Ketone: x  / Bili: x / Urobili: x   Blood: x / Protein: x / Nitrite: x   Leuk Esterase: x / RBC: x / WBC x   Sq Epi: x / Non Sq Epi: x / Bacteria: x    MICROBIOLOGY:  Culture - Fungal, Other (collected 24 Apr 2025 20:24)  Source: Other #3 EPIDURAL ABSCESS  Preliminary Report (25 Apr 2025 10:25):    Testing in progress    Culture - Abscess with Gram Stain (collected 24 Apr 2025 20:24)  Source: Abscess #3 EPIDURAL ABSCESS  Gram Stain (25 Apr 2025 06:24):    Moderate polymorphonuclear leukocytes seen per low power field    Rare Gram Negative Rods seen per oil power field    Culture - Fungal, Other (collected 24 Apr 2025 19:46)  Source: Other L1 L2 DISC SPACE MANE #4  Preliminary Report (25 Apr 2025 10:34):    Testing in progress    Culture - Fungal, Other (collected 24 Apr 2025 19:46)  Source: Other L1 L2 DISC SPALE #5  Preliminary Report (25 Apr 2025 10:25):    Testing in progress    Culture - Fungal, Other (collected 24 Apr 2025 18:39)  Source: Other #2 EPIDURAL ABSCESS  Preliminary Report (25 Apr 2025 10:25):    Testing in progress    Culture - Abscess with Gram Stain (collected 24 Apr 2025 18:39)  Source: Abscess #2 EPIDURAL ABSCESS  Gram Stain (25 Apr 2025 06:24):    Moderate polymorphonuclear leukocytes seen per low power field    Few Gram Negative Rods seen per oil power field    Culture - Fungal, Other (collected 24 Apr 2025 18:38)  Source: Other #1 EPIDURAL ABSCESS  Preliminary Report (25 Apr 2025 10:25):    Testing in progress    Culture - Abscess with Gram Stain (collected 24 Apr 2025 18:38)  Source: Abscess #1 EPIDURAL ABSCESS  Gram Stain (25 Apr 2025 06:25):    Moderate polymorphonuclear leukocytes seen per low power field    Few Gram Negative Rods seen per oil power field    Urinalysis with Rflx Culture (collected 23 Apr 2025 12:26)    Culture - Blood (collected 23 Apr 2025 11:45)  Source: Blood Blood-Peripheral  Gram Stain (24 Apr 2025 15:40):    Growth in anaerobic bottle: Gram Negative Rods  Preliminary Report (24 Apr 2025 15:41):    Growth in anaerobic bottle: Gram Negative Rods    Culture - Blood (collected 23 Apr 2025 11:43)  Source: Blood Blood-Peripheral  Gram Stain (24 Apr 2025 15:39):    Growth in anaerobic bottle: Gram Negative Rods  Preliminary Report (24 Apr 2025 15:39):    Growth in anaerobic bottle: Gram Negative Rods    Direct identification is available within approximately 3-5    hours either by Blood Panel Multiplexed PCR or Direct    MALDI-TOF. Details: [email or web address]  Organism: Blood Culture PCR (24 Apr 2025 16:49)  Organism: Blood Culture PCR (24 Apr 2025 16:49)      Method Type: PCR      -  Bacteroides fragilis: Detec                  RADIOLOGY:  imaging below personally reviewed and agree with findings    < from: MR Thoracic Spine w/ IV Cont (04.23.25 @ 15:14) >  ACC: [id number] EXAM:  MR SPINE CERVICAL IC   ORDERED BY: ZANE REYNOSO     ACC: [id number] EXAM:  MR SPINE THORACIC IC   ORDERED BY: ZANE REYNOSO     ACC: [id number] EXAM:  MR SPINE LUMBAR IC   ORDERED BY: ZANE REYNOSO     PROCEDURE DATE:04/23/2025          INTERPRETATION:  Three examinations were performed:  1. MR cervical spine with gadolinium  2. MR thoracic spine with gadolinium  3. MR lumbar spine with gadolinium      CLINICAL INFORMATION (all three examinations): Epidural access    TECHNIQUE (all three examinations): Post gadolinium fat saturated   sagittal and axial T1-weighted images were obtained following intravenous   administration of 6 cc of Gadavist/1.5 cc discarded.    FINDINGS:   CT abdomen and pelvis dated 04/23/2025 available for review.      1.  Cervical spine:    Cervical vertebral alignment is intact.  Cervical vertebral body heights   are maintained.  Marrow signal intensity within cervical vertebral bodies   and posterior elements is significant for a 1.5 cm enhancing trabeculated   lesion within T7 likely a hemangioma..  No osseous expansion, epidural   disease or paraspinal abnormalities found.    Cervical intervertebral discs show moderate degenerative disc disease and   spondylosis at C4-5 through C6/7 with loss of disc height and associated   degenerative endplate changes.    The cervical cord maintains intact morphology.   No cord signal intensity   change is seen.  No abnormal enhancement occurs within the cervical canal.      2.  Thoracic spine:    Thoracic vertebral alignment is preserved.  Thoracic vertebral body   heights are maintained.  Marrow signal intensity within thoracic   vertebral bodies and posterior elements is unremarkable.  There is no   abnormal vertebral or paraspinal enhancement.  No osseous expansion,   epidural disease or paraspinal abnormality is found.    Thoracic intervertebral discs demonstrates minimal enhancement within   T7-8 which may reflect early discitis. No central canal or foraminal   compromise is recognized.    The thoracic cord maintains intact morphology.   No cord signal intensity   change is seen.  No abnormal enhancement occurs within the thoracic canal.      3.  Lumbar spine:    Lumbar vertebral alignment is preserved.  Lumbar vertebral body heights   are maintained.  Marrow signal intensity within lumbar vertebral bodies   and posterior elements is significant for osteomyelitis and discitis at   L1-2 with erosions of the inferior L1 vertebral body and L2 vertebral   body consistent with osteomyelitis and discitis. Ventral epidural abscess   is noted extending from the L1-2 level superiorly to the T10 level with   mass effect on the ventral thecal sac most prominently at the L1-2 level   resulting in marked compression. Multiple abscesses within the BILATERAL   psoas muscles, the largest measuring 4.3 cm on the RIGHT and 2.8 cm on   the LEFT.      The distal cord maintains intact morphology and signal intensity.  The   conus is normally positioned at T12.  Nerve roots of the cauda equina   demonstrate no enhancement.  IMPRESSION:        1.   Cervical spine:   Moderate degenerative disc disease and   spondylosis at C4-5 through C6/7 with loss of disc height and associated   degenerative endplate changes.        2.   Thoracic spine:   Minimal enhancement within T7-8 which may   reflect early discitis.        3.   Lumbar spine:   Osteomyelitis and discitis at L1-2 with erosions   of the inferior L1 vertebral body and L2 vertebral body consistent with   osteomyelitis and discitis. Ventral epidural abscess is noted extending   from the L1-2 level superiorly to the T10 level with mass effect on the   ventral thecal sac, most prominently at the L1-2 level resulting in   marked compression. Multiple abscesses within the BILATERAL psoas   muscles, the largest measuring 4.3 cm on the RIGHT and 2.8 cm on the LEFT.      --- End of Report ---    < end of copied text >   Patient is a 79y old  Male who presents with a chief complaint of L1-L2 discitis/OM with epidural abscess and BL psoas abscesses (25 Apr 2025 09:42)    HPI:  79yMale w/ HTN, HLD, T2DM, chronic low back pain presents as a transfer from [facility] ED for surgery today. Pt was sent into ED yesterday by his pain medicine physician due to recent MRI findings concerning for L1-L2 discitis/OM with epidural abscess, bilateral psoas abscesses. Patient initially saw pain specialist in February for chronic back pain with radiations to bilateral lateral thighs (R>L). MRI at that time showed degenerative changes. He subsequently had epidural injections x2 (2/27, 3/11) with moderate pain relief. Pain began to worsen on 4/10. He had radiofrequency ablation performed on 4/11 and has since has severe worsening of pain and radiation to R lateral thigh. He reports recent bowel/bladder "incontinence" requiring diaper due to his inability to get to the bathroom in time due to pain limitations but states urge and sensation are intact. Denies fevers, chills, night sweats, weakness, numbness/tingling, saddle anesthesia, recent falls/trauma. He uses a cane for ambulation. Denies recent falls/trauma or any other ortho injuries. He was born in [place], has frequent travels back, no known TB exposure. NO recent infections, antibiotic use.    ED/Hospital course: Has been afebrile, WBC 14, A1C 7.5, labs otherwise WNL. Underwent decompression, spine, lumbar, posterior approach, with fusion of posterior spinal column on 4/24. Cultures sent. Started on Vancomycin and Zosyn. IR consulted for abscess drainage. ID consulted for antibiotic management.    Blood cultures B fragilis  Abscess cx GNR      prior hospital charts reviewed [  ]  primary team notes reviewed [x  ]  other consultant notes reviewed [x  ]    PAST MEDICAL & SURGICAL HISTORY:  HTN (hypertension)      HLD (hyperlipidemia)      DM (diabetes mellitus)      Cataract          Allergies  No Known Allergies    ANTIMICROBIALS (past 90 days)  MEDICATIONS  (STANDING):    piperacillin/tazobactam IVPB..   25 mL/Hr IV Intermittent (04-25-25 @ 05:12)    vancomycin  IVPB   250 mL/Hr IV Intermittent (04-25-25 @ 06:20)        piperacillin/tazobactam IVPB.. 3.375 every 8 hours  vancomycin  IVPB 1000 every 12 hours    MEDICATIONS  (STANDING):  acetaminophen     Tablet .. 975 every 6 hours  acetaminophen     Tablet .. 975 every 8 hours PRN  atorvastatin 40 at bedtime  bisacodyl 5 every 12 hours PRN  gabapentin 100 every 8 hours  glucagon  Injectable 1 once  HYDROmorphone  Injectable 0.5 every 10 minutes PRN  HYDROmorphone PCA (1 mG/mL) 30 PCA Continuous  HYDROmorphone PCA (1 mG/mL) Rescue Clinician Bolus 0.5 every 15 minutes PRN  insulin lispro (ADMELOG) corrective regimen sliding scale  every 6 hours  magnesium hydroxide Suspension 30 every 12 hours PRN  nalbuphine Injectable 1 every 6 hours PRN  ondansetron   Disintegrating Tablet 4 every 6 hours PRN  pantoprazole    Tablet 40 before breakfast  phenylephrine    Infusion 1.6 <Continuous>  senna 2 at bedtime    SOCIAL HISTORY:   Lives with family, no etoh, tobacco use    FAMILY HISTORY:  No pertinent family history in first degree relatives      REVIEW OF SYSTEMS  [  ] ROS unobtainable because:    [  ] All other systems negative except as noted below:	    Constitutional:  [ ] fever [ ] chills  [ ] weight loss  [ ] weakness  Skin:  [ ] rash [ ] phlebitis	  Eyes: [ ] icterus [ ] pain  [ ] discharge	  ENMT: [ ] sore throat  [ ] thrush [ ] ulcers [ ] exudates  Respiratory: [ ] dyspnea [ ] hemoptysis [ ] cough [ ] sputum	  Cardiovascular:  [ ] chest pain [ ] palpitations [ ] edema	  Gastrointestinal:  [ ] nausea [ ] vomiting [ ] diarrhea [ ] constipation [ ] pain	  Genitourinary:  [ ] dysuria [ ] frequency [ ] hematuria [ ] discharge [ ] flank pain  [ ] incontinence  Musculoskeletal:  [ ] myalgias [ ] arthralgias [ ] arthritis  [ ] back pain  Neurological:  [ ] headache [ ] seizures  [ ] confusion/altered mental status  Psychiatric:  [ ] anxiety [ ] depression	  Hematology/Lymphatics:  [ ] lymphadenopathy  Endocrine:  [ ] adrenal [ ] thyroid  Allergic/Immunologic:	 [ ] transplant [ ] seasonal    Vital Signs Last 24 Hrs  T(F): 97.3 (04-25-25 @ 07:00), Max: 99.3 (04-23-25 @ 11:54)  Vital Signs Last 24 Hrs  HR: 73 (04-25-25 @ 10:45) (64 - 102)  BP: 113/62 (04-25-25 @ 10:00) (112/64 - 143/55)  RR: 13 (04-25-25 @ 10:45)  SpO2: 98% (04-25-25 @ 10:45) (93% - 100%)  Wt(kg): --    PHYSICAL EXAM:  Constitutional: non-toxic, no distress  HEAD/EYES: anicteric, no conjunctival injection  ENT:  supple, no thrush  Cardiovascular:   normal S1, S2, no murmur, no edema  Respiratory:  clear BS bilaterally, no wheezes, no rales  GI:  soft, non-tender, normal bowel sounds  :  no maradiaga, no CVA tenderness  Musculoskeletal:  no synovitis, normal ROM  Neurologic: awake and alert, normal strength, no focal findings  Skin:  no rash, no erythema, no phlebitis  Heme/Onc: no lymphadenopathy   Psychiatric:  awake, alert, appropriate mood                            9.8    14.15 )-----------( 382      ( 25 Apr 2025 02:45 )             29.0   04-25    138  |  107  |  17  ----------------------------<  134[H]  3.7   |  18[L]  |  0.74    Ca    9.0      25 Apr 2025 02:45  Phos  3.3     04-25  Mg     2.00     04-25    TPro  7.3  /  Alb  2.8[L]  /  TBili  0.4  /  DBili  x   /  AST  26  /  ALT  44  /  AlkPhos  141[H]  04-23    Urinalysis Basic - ( 25 Apr 2025 02:45 )    Color: x / Appearance: x / SG: x / pH: x  Gluc: 134 mg/dL / Ketone: x  / Bili: x / Urobili: x   Blood: x / Protein: x / Nitrite: x   Leuk Esterase: x / RBC: x / WBC x   Sq Epi: x / Non Sq Epi: x / Bacteria: x    MICROBIOLOGY:  Culture - Fungal, Other (collected 24 Apr 2025 20:24)  Source: Other #3 EPIDURAL ABSCESS  Preliminary Report (25 Apr 2025 10:25):    Testing in progress    Culture - Abscess with Gram Stain (collected 24 Apr 2025 20:24)  Source: Abscess #3 EPIDURAL ABSCESS  Gram Stain (25 Apr 2025 06:24):    Moderate polymorphonuclear leukocytes seen per low power field    Rare Gram Negative Rods seen per oil power field    Culture - Fungal, Other (collected 24 Apr 2025 19:46)  Source: Other L1 L2 DISC SPACE MANE #4  Preliminary Report (25 Apr 2025 10:34):    Testing in progress    Culture - Fungal, Other (collected 24 Apr 2025 19:46)  Source: Other L1 L2 DISC SPALE #5  Preliminary Report (25 Apr 2025 10:25):    Testing in progress    Culture - Fungal, Other (collected 24 Apr 2025 18:39)  Source: Other #2 EPIDURAL ABSCESS  Preliminary Report (25 Apr 2025 10:25):    Testing in progress    Culture - Abscess with Gram Stain (collected 24 Apr 2025 18:39)  Source: Abscess #2 EPIDURAL ABSCESS  Gram Stain (25 Apr 2025 06:24):    Moderate polymorphonuclear leukocytes seen per low power field    Few Gram Negative Rods seen per oil power field    Culture - Fungal, Other (collected 24 Apr 2025 18:38)  Source: Other #1 EPIDURAL ABSCESS  Preliminary Report (25 Apr 2025 10:25):    Testing in progress    Culture - Abscess with Gram Stain (collected 24 Apr 2025 18:38)  Source: Abscess #1 EPIDURAL ABSCESS  Gram Stain (25 Apr 2025 06:25):    Moderate polymorphonuclear leukocytes seen per low power field    Few Gram Negative Rods seen per oil power field    Urinalysis with Rflx Culture (collected 23 Apr 2025 12:26)    Culture - Blood (collected 23 Apr 2025 11:45)  Source: Blood Blood-Peripheral  Gram Stain (24 Apr 2025 15:40):    Growth in anaerobic bottle: Gram Negative Rods  Preliminary Report (24 Apr 2025 15:41):    Growth in anaerobic bottle: Gram Negative Rods    Culture - Blood (collected 23 Apr 2025 11:43)  Source: Blood Blood-Peripheral  Gram Stain (24 Apr 2025 15:39):    Growth in anaerobic bottle: Gram Negative Rods  Preliminary Report (24 Apr 2025 15:39):    Growth in anaerobic bottle: Gram Negative Rods    Direct identification is available within approximately 3-5    hours either by Blood Panel Multiplexed PCR or Direct    MALDI-TOF. Details: [email or web address]  Organism: Blood Culture PCR (24 Apr 2025 16:49)  Organism: Blood Culture PCR (24 Apr 2025 16:49)      Method Type: PCR      -  Bacteroides fragilis: Detec                  RADIOLOGY:  imaging below personally reviewed and agree with findings    < from: MR Thoracic Spine w/ IV Cont (04.23.25 @ 15:14) >  ACC: [id number] EXAM:  MR SPINE CERVICAL IC   ORDERED BY: ZANE REYNOSO     ACC: [id number] EXAM:  MR SPINE THORACIC IC   ORDERED BY: ZANE REYNOSO     ACC: [id number] EXAM:  MR SPINE LUMBAR IC   ORDERED BY: ZANE REYNOSO     PROCEDURE DATE:04/23/2025          INTERPRETATION:  Three examinations were performed:  1. MR cervical spine with gadolinium  2. MR thoracic spine with gadolinium  3. MR lumbar spine with gadolinium      CLINICAL INFORMATION (all three examinations): Epidural access    TECHNIQUE (all three examinations): Post gadolinium fat saturated   sagittal and axial T1-weighted images were obtained following intravenous   administration of 6 cc of Gadavist/1.5 cc discarded.    FINDINGS:   CT abdomen and pelvis dated 04/23/2025 available for review.      1.  Cervical spine:    Cervical vertebral alignment is intact.  Cervical vertebral body heights   are maintained.  Marrow signal intensity within cervical vertebral bodies   and posterior elements is significant for a 1.5 cm enhancing trabeculated   lesion within T7 likely a hemangioma..  No osseous expansion, epidural   disease or paraspinal abnormalities found.    Cervical intervertebral discs show moderate degenerative disc disease and   spondylosis at C4-5 through C6/7 with loss of disc height and associated   degenerative endplate changes.    The cervical cord maintains intact morphology.   No cord signal intensity   change is seen.  No abnormal enhancement occurs within the cervical canal.      2.  Thoracic spine:    Thoracic vertebral alignment is preserved.  Thoracic vertebral body   heights are maintained.  Marrow signal intensity within thoracic   vertebral bodies and posterior elements is unremarkable.  There is no   abnormal vertebral or paraspinal enhancement.  No osseous expansion,   epidural disease or paraspinal abnormality is found.    Thoracic intervertebral discs demonstrates minimal enhancement within   T7-8 which may reflect early discitis. No central canal or foraminal   compromise is recognized.    The thoracic cord maintains intact morphology.   No cord signal intensity   change is seen.  No abnormal enhancement occurs within the thoracic canal.      3.  Lumbar spine:    Lumbar vertebral alignment is preserved.  Lumbar vertebral body heights   are maintained.  Marrow signal intensity within lumbar vertebral bodies   and posterior elements is significant for osteomyelitis and discitis at   L1-2 with erosions of the inferior L1 vertebral body and L2 vertebral   body consistent with osteomyelitis and discitis. Ventral epidural abscess   is noted extending from the L1-2 level superiorly to the T10 level with   mass effect on the ventral thecal sac most prominently at the L1-2 level   resulting in marked compression. Multiple abscesses within the BILATERAL   psoas muscles, the largest measuring 4.3 cm on the RIGHT and 2.8 cm on   the LEFT.      The distal cord maintains intact morphology and signal intensity.  The   conus is normally positioned at T12.  Nerve roots of the cauda equina   demonstrate no enhancement.  IMPRESSION:        1.   Cervical spine:   Moderate degenerative disc disease and   spondylosis at C4-5 through C6/7 with loss of disc height and associated   degenerative endplate changes.        2.   Thoracic spine:   Minimal enhancement within T7-8 which may   reflect early discitis.        3.   Lumbar spine:   Osteomyelitis and discitis at L1-2 with erosions   of the inferior L1 vertebral body and L2 vertebral body consistent with   osteomyelitis and discitis. Ventral epidural abscess is noted extending   from the L1-2 level superiorly to the T10 level with mass effect on the   ventral thecal sac, most prominently at the L1-2 level resulting in   marked compression. Multiple abscesses within the BILATERAL psoas   muscles, the largest measuring 4.3 cm on the RIGHT and 2.8 cm on the LEFT.      --- End of Report ---      < from: CT Abdomen and Pelvis w/ IV Cont (04.23.25 @ 13:24) >  INTERPRETATION:  CLINICAL INFORMATION: Psoas abscess.    COMPARISON: 6/8/2018 MR and 6/5/2018 CT        CONTRAST/COMPLICATIONS:  IV Contrast: Omnipaque 350  90 cc administered   10 cc discarded  Oral Contrast: NONE  .    PROCEDURE:  CT of the Abdomen and Pelvis was performed.  Sagittal and coronal reformats were performed.    FINDINGS:  LOWER CHEST: Within normal limits.    LIVER: Within normal limits.  BILE DUCTS: Normal caliber.  GALLBLADDER: Stable. Under distended gallbladder with wall   calcification/calculi. No pericholecystic inflammatory changes or fluid.  SPLEEN: Within normal limits.  PANCREAS: Within normal limits.  ADRENALS: Within normal limits.  KIDNEYS/URETERS: No renal stones or hydronephrosis. Renal cysts and   subcentimeter hypodense foci is are noted.    BLADDER: Within normal limits.  REPRODUCTIVE ORGANS: Prostate is enlarged.    BOWEL: No bowelobstruction. Appendix is normal. Small hiatal hernia.  PERITONEUM/RETROPERITONEUM: No free fluid. Multilobulated ill-defined   heterogeneous low-density collection/lesion noted in the right psoas   muscle measuring 6.1 x 4.9 x 13.2 cm with adjacent wall enhancement and   incompletely septations. Additional heterogeneous predominantly   low-density lesion noted in the left psoas muscle measuring 8.4 x 4.7 x   9.1 cm. These 2 collections are contiguous with the perivertebral   collection at L1/L2.  VESSELS: Mild atherosclerotic calcification.  LYMPH NODES: No lymphadenopathy.  ABDOMINAL WALL: Within normal limits.  BONES: Degenerative changes. Loss of L1 inferior and L2 superior endplate   with irregularity of the disc space and a perivertebralcollection   measuring approximately 2.7 x 6.3 x 3.6 cm. Retropulsion into the spinal   canal noted with enhancing collection.    IMPRESSION:  *  L1/L2 discitis/osteomyelitis with paravertebral collection both   ventrally and dorsally into the spinalcanal.  *  Bilateral heterogeneous complex psoas collection which are contiguous   with the perivertebral collection at the L1/L2.  *  Stable cholelithiasis/calcified gallbladder wall.        --- End of Report ---        < end of copied text >

## 2025-04-25 NOTE — PHYSICAL THERAPY INITIAL EVALUATION ADULT - RANGE OF MOTION EXAMINATION, REHAB EVAL
right knee flexion limited due to back pain/bilateral upper extremity ROM was WFL (within functional limits)

## 2025-04-25 NOTE — PHYSICAL THERAPY INITIAL EVALUATION ADULT - PERTINENT HX OF CURRENT PROBLEM, REHAB EVAL
Pt is a 79 year old Male w/ a past medical history of HTN, HLD, T2DM, chronic low back pain presents as a transfer from [facility] ED for surgery today. He underwent T10-Pelvis PSF T12-L2 lami 4/24/25 (Ludy/DAVID)   SICU consulted post operatively for neurological checks hourly (q1h) and hemodynamic monitoring  as patient is requiring vasopressor  to achieve map goals above 80mmhg

## 2025-04-25 NOTE — PROGRESS NOTE ADULT - SUBJECTIVE AND OBJECTIVE BOX
Plastic Surgery Progress Note (pg [facility]: [id number], [facility]: [phone])    SUBJECTIVE  The patient was seen and examined.     OBJECTIVE  ___________________________________________________  VITAL SIGNS / I&O's   Vital Signs Last 24 Hrs  T(C): 36.3 (25 Apr 2025 07:00), Max: 36.7 (24 Apr 2025 12:11)  T(F): 97.3 (25 Apr 2025 07:00), Max: 98.1 (25 Apr 2025 04:00)  HR: 73 (25 Apr 2025 10:45) (64 - 102)  BP: 113/62 (25 Apr 2025 10:00) (112/64 - 143/55)  BP(mean): 79 (25 Apr 2025 10:00) (75 - 103)  RR: 13 (25 Apr 2025 10:45) (9 - 20)  SpO2: 98% (25 Apr 2025 10:45) (93% - 100%)    Parameters below as of 25 Apr 2025 10:15  Patient On (Oxygen Delivery Method): room air          24 Apr 2025 07:01  -  25 Apr 2025 07:00  --------------------------------------------------------  IN:    Lactated Ringers: 800 mL    Oral Fluid: 105 mL    Phenylephrine: 47.6 mL    sodium chloride 0.9%: 400 mL  Total IN: 1352.6 mL    OUT:    Bulb (mL): 11 mL    Bulb (mL): 311 mL    Ureteral Catheter (mL): 910 mL  Total OUT: 1232 mL    Total NET: 120.6 mL      25 Apr 2025 07:01  -  25 Apr 2025 11:01  --------------------------------------------------------  IN:    Lactated Ringers: 300 mL    Oral Fluid: 100 mL    Phenylephrine: 80 mL  Total IN: 480 mL    OUT:    Ureteral Catheter (mL): 210 mL  Total OUT: 210 mL    Total NET: 270 mL        ___________________________________________________  PHYSICAL EXAM    -- CONSTITUTIONAL: NAD, lying in bed  -- NEURO: Awake, alert  -- BACK: dressing with minimal strikethrough. JPx2 ss and holding suction   ___________________________________________________  LABS                        9.8    14.15 )-----------( 382      ( 25 Apr 2025 02:45 )             29.0     25 Apr 2025 02:45    138    |  107    |  17     ----------------------------<  134    3.7     |  18     |  0.74     Ca    9.0        25 Apr 2025 02:45  Phos  3.3       25 Apr 2025 02:45  Mg     2.00      25 Apr 2025 02:45    TPro  7.3    /  Alb  2.8    /  TBili  0.4    /  DBili  x      /  AST  26     /  ALT  44     /  AlkPhos  141    23 Apr 2025 11:52    PT/INR - ( 24 Apr 2025 05:00 )   PT: 13.8 sec;   INR: 1.16 ratio         PTT - ( 24 Apr 2025 05:00 )  PTT:32.7 sec  CAPILLARY BLOOD GLUCOSE      POCT Blood Glucose.: 134 mg/dL (25 Apr 2025 06:29)  POCT Blood Glucose.: 145 mg/dL (25 Apr 2025 01:21)  POCT Blood Glucose.: 169 mg/dL (24 Apr 2025 21:49)  POCT Blood Glucose.: 135 mg/dL (24 Apr 2025 11:48)        Urinalysis Basic - ( 25 Apr 2025 02:45 )    Color: x / Appearance: x / SG: x / pH: x  Gluc: 134 mg/dL / Ketone: x  / Bili: x / Urobili: x   Blood: x / Protein: x / Nitrite: x   Leuk Esterase: x / RBC: x / WBC x   Sq Epi: x / Non Sq Epi: x / Bacteria: x      ___________________________________________________  MICRO  Recent Cultures:  Specimen Source: Abscess #3 EPIDURAL ABSCESS, 04-24 @ 20:24; Results   Testing in progress; Gram Stain:   Moderate polymorphonuclear leukocytes seen per low power field  Rare Gram Negative Rods seen per oil power field[!]; Organism: --  Specimen Source: Other L1 L2 DISC SPALE #5, 04-24 @ 19:46; Results   Testing in progress; Gram Stain: --; Organism: --  Specimen Source: Abscess #2 EPIDURAL ABSCESS, 04-24 @ 18:39; Results   Testing in progress; Gram Stain:   Moderate polymorphonuclear leukocytes seen per low power field  Few Gram Negative Rods seen per oil power field[!]; Organism: --  Specimen Source: Abscess #1 EPIDURAL ABSCESS, 04-24 @ 18:38; Results   Testing in progress; Gram Stain:   Moderate polymorphonuclear leukocytes seen per low power field  Few Gram Negative Rods seen per oil power field[!]; Organism: --  Specimen Source: Blood Blood-Peripheral, 04-23 @ 11:45; Results   Growth in anaerobic bottle: Gram Negative Rods[!]; Gram Stain:   Growth in anaerobic bottle: Gram Negative Rods[!]; Organism: --  Specimen Source: Blood Blood-Peripheral, 04-23 @ 11:43; Results   Growth in anaerobic bottle: Gram Negative Rods  Direct identification is available within approximately 3-5  hours either by Blood Panel Multiplexed PCR or Direct  MALDI-TOF. Details: [email or web address]]; Gram Stain:   Growth in anaerobic bottle: Gram Negative Rods[!]; Organism: Blood Culture PCR[!]    ___________________________________________________  MEDICATIONS  (STANDING):  acetaminophen     Tablet .. 975 milliGRAM(s) Oral every 6 hours  atorvastatin 40 milliGRAM(s) Oral at bedtime  gabapentin 100 milliGRAM(s) Oral every 8 hours  glucagon  Injectable 1 milliGRAM(s) IntraMuscular once  HYDROmorphone PCA (1 mG/mL) 30 milliLiter(s) PCA Continuous PCA Continuous  insulin lispro (ADMELOG) corrective regimen sliding scale   SubCutaneous every 6 hours  lactated ringers. 1000 milliLiter(s) (100 mL/Hr) IV Continuous <Continuous>  omega-3-Acid Ethyl Esters 2 Gram(s) Oral two times a day  pantoprazole    Tablet 40 milliGRAM(s) Oral before breakfast  phenylephrine    Infusion 1.6 MICROgram(s)/kG/Min (38.1 mL/Hr) IV Continuous <Continuous>  piperacillin/tazobactam IVPB.. 3.375 Gram(s) IV Intermittent every 8 hours  senna 2 Tablet(s) Oral at bedtime  vancomycin  IVPB 1000 milliGRAM(s) IV Intermittent every 12 hours    MEDICATIONS  (PRN):  acetaminophen     Tablet .. 975 milliGRAM(s) Oral every 8 hours PRN Mild Pain (1 - 3)  bisacodyl 5 milliGRAM(s) Oral every 12 hours PRN Constipation  HYDROmorphone  Injectable 0.5 milliGRAM(s) IV Push every 10 minutes PRN Severe Pain (7 - 10)  HYDROmorphone PCA (1 mG/mL) Rescue Clinician Bolus 0.5 milliGRAM(s) IV Push every 15 minutes PRN for Pain Scale GREATER THAN 6  magnesium hydroxide Suspension 30 milliLiter(s) Oral every 12 hours PRN Constipation  nalbuphine Injectable 1 milliGRAM(s) IV Push every 6 hours PRN Pruritus  naloxone Injectable 0.1 milliGRAM(s) IV Push every 3 minutes PRN For ANY of the following changes in patient status:  A. RR LESS THAN 10 breaths per minute, B. Oxygen saturation LESS THAN 90%, C. Sedation score of 6  ondansetron   Disintegrating Tablet 4 milliGRAM(s) Oral every 6 hours PRN Nausea and/or Vomiting

## 2025-04-25 NOTE — CONSULT NOTE ADULT - ATTENDING COMMENTS
79yMale w/ HTN, HLD, T2DM, chronic low back pain presents as a transfer from [facility] ED for surgery today. Pt was sent into ED yesterday by his pain medicine physician due to recent MRI findings concerning for L1-L2 discitis/OM with epidural abscess, bilateral psoas abscesses. Patient initially saw pain specialist in February for chronic back pain with radiations to bilateral lateral thighs (R>L). MRI at that time showed degenerative changes. He subsequently had epidural injections x2 (2/27, 3/11) with moderate pain relief. Pain began to worsen on 4/10. He had radiofrequency ablation performed on 4/11 and has since has severe worsening of pain and radiation to R lateral thigh. He reports recent bowel/bladder "incontinence" requiring diaper due to his inability to get to the bathroom in time due to pain limitations but states urge and sensation are intact. Denies fevers, chills, night sweats, weakness, numbness/tingling, saddle anesthesia, recent falls/trauma. He uses a cane for ambulation. Denies recent falls/trauma or any other ortho injuries. He was born in [place], has frequent travels back, no known TB exposure. NO recent infections, antibiotic use.    ED/Hospital course: Has been afebrile, WBC 14, A1C 7.5, labs otherwise WNL. Underwent decompression, spine, lumbar, posterior approach, with fusion of posterior spinal column on 4/24. Cultures sent. Started on Vancomycin and Zosyn. IR consulted for abscess drainage. ID consulted for antibiotic management.    Blood cultures B fragilis  Abscess cx GNR    # L1/L2 discitis OM s/p decompression/fusion with cx thus far with bacteroides   # Psoas abscess  # leucocytosis     MICRO:   04/23 BCX- 2/4 GNR, bacteroides   04/24 OR cx- GNR   04/25 MRSA nares- negative     Recommendations;   - Cultures growing Bacteroides; not typical pathogens for spine infection  - No GI/ pathology on imaging  - Can stop vancomycin  - Continue zosyn 3.375 gm Q8h pending  further ID and susceptibility  - check baseline ESR/CRP  - repeat blood cultures X 2 to document clearance of bacteremia.   - continue to trend CBC/fever curve  - Noted plans for IR drainage of abscess; please send routine bacterial/fungal/AFB cultures    In addition to reviewing history, imaging, documents, labs, microbiology, took into account antibiotic stewardship, local antibiogram and infection control strategies and potential transmission issues.    Discussed with the primary team.    Alem Russell MD  Attending Physician, Division of Infectious Diseases  Department of Medicine   [facility]    Contact on TEAMS messaging from 9am - 5pm  Office: [phone] (after 5 PM or weekend) 79yMale w/ HTN, HLD, T2DM, chronic low back pain presents as a transfer from [facility] ED for surgery today. Pt was sent into ED yesterday by his pain medicine physician due to recent MRI findings concerning for L1-L2 discitis/OM with epidural abscess, bilateral psoas abscesses. Patient initially saw pain specialist in February for chronic back pain with radiations to bilateral lateral thighs (R>L). MRI at that time showed degenerative changes. He subsequently had epidural injections x2 (2/27, 3/11) with moderate pain relief. Pain began to worsen on 4/10. He had radiofrequency ablation performed on 4/11 and has since has severe worsening of pain and radiation to R lateral thigh. He reports recent bowel/bladder "incontinence" requiring diaper due to his inability to get to the bathroom in time due to pain limitations but states urge and sensation are intact. Denies fevers, chills, night sweats, weakness, numbness/tingling, saddle anesthesia, recent falls/trauma. He uses a cane for ambulation. Denies recent falls/trauma or any other ortho injuries. He was born in [place], has frequent travels back, no known TB exposure. NO recent infections, antibiotic use.    ED/Hospital course: Has been afebrile, WBC 14, A1C 7.5, labs otherwise WNL. Underwent decompression, spine, lumbar, posterior approach, with fusion of posterior spinal column on 4/24. Cultures sent. Started on Vancomycin and Zosyn. IR consulted for abscess drainage. ID consulted for antibiotic management.    Blood cultures B fragilis  Abscess cx GNR    # L1/L2 discitis OM s/p decompression/fusion with cx thus far with bacteroides   # Multiloculated bilateral psoas abscesses  # leucocytosis     MICRO:   04/23 BCX- 2/4 GNR, bacteroides   04/24 OR cx- GNR   04/25 MRSA nares- negative     Recommendations;   - Cultures growing Bacteroides; not typical pathogens for spine infection  - No GI/ pathology on imaging ( enlarged prostate, UA negative)   - Can stop vancomycin  - Continue zosyn 3.375 gm Q8h pending  further ID and susceptibility  - check baseline ESR/CRP  - repeat blood cultures X 2 to document clearance of bacteremia.   - continue to trend CBC/fever curve  - Noted plans for IR drainage of abscess; please send routine bacterial/fungal/AFB cultures    In addition to reviewing history, imaging, documents, labs, microbiology, took into account antibiotic stewardship, local antibiogram and infection control strategies and potential transmission issues.    Discussed with the primary team.    Alem Russell MD  Attending Physician, Division of Infectious Diseases  Department of Medicine   [facility]    Contact on TEAMS messaging from 9am - 5pm  Office: [phone] (after 5 PM or weekend)

## 2025-04-25 NOTE — PROGRESS NOTE ADULT - ASSESSMENT
79M s/p T10-pelvis PSF w/ PRS closure 4/24    - pain control PRN  - dressing changes PRN  - Monitor DHARA - both holding suction  - appreciate care per primary    PRS

## 2025-04-25 NOTE — PROGRESS NOTE ADULT - ASSESSMENT
79yMale w/ HTN, HLD, T2DM, chronic low back pain presents as a transfer from [facility] ED for surgery today. Pt was sent into ED yesterday by his pain medicine physician due to recent MRI findings concerning for L1-L2 discitis/OM with epidural abscess, bilateral psoas abscesses. Patient initially saw pain specialist in February for chronic back pain with radiations to bilateral lateral thighs (R>L). MRI at that time showed degenerative changes. He subsequently had epidural injections x2 (2/27, 3/11) with moderate pain relief. Pain began to worsen on 4/10. He had radiofrequency ablation performed on 4/11 and has since has severe worsening of pain and radiation to R lateral thigh. He reports recent bowel/bladder "incontinence" requiring diaper due to his inability to get to the bathroom in time due to pain limitations but states urge and sensation are intact. Denies fevers, chills, night sweats, weakness, numbness/tingling, saddle anesthesia, recent falls/trauma. He uses a cane for ambulation. Denies recent falls/trauma or any other ortho injuries. Before back pain was very active walked half an hour , going up and down stairs and doing grocery etc with no Chest pain or SOB.        Problem/Recommendation - 1:  ·  Problem: Epidural abscess.   ·  Recommendation: S/P Decompression, spine, lumbar, posterior approach, with fusion of posterior spinal column.  On IV Abxs per ID  .  Will defer management to  Neurosurgery.    < from: MR Lumbar Spine w/ IV Cont (04.23.25 @ 15:13) >  IMPRESSION:        1.   Cervical spine:   Moderate degenerative disc disease and   spondylosis at C4-5 through C6/7 with loss of disc height and associated   degenerative endplate changes.        2.   Thoracic spine:   Minimal enhancement within T7-8 which may   reflect early discitis.        3.   Lumbar spine:   Osteomyelitis and discitis at L1-2 with erosions   of the inferior L1 vertebral body and L2 vertebral body consistent with   osteomyelitis and discitis. Ventral epidural abscess is noted extending   from the L1-2 level superiorly to the T10 level with mass effect on the   ventral thecal sac, most prominently at the L1-2 level resulting in   marked compression. Multiple abscesses within the BILATERAL psoas   muscles, the largest measuring 4.3 cm on the RIGHT and 2.8 cm on the LEFT.     Problem/Recommendation - 2:  ·  Problem: Acute osteomyelitis of lumbar spine.   ·  Recommendation: L1 &L2 vertebrae .  On IV Abxs per ID .  Will defer management Neurosurgery.     Problem/Recommendation - 3:  ·  Problem: Low back pain radiating to right lower extremity.   ·  Recommendation: Pain control.     Problem/Recommendation - 4:  ·  Problem: . BILATERAL psoas Abscess  ·  Recommendation: ON IV Abxs .  IR consulted for drain        Problem/Recommendation - 5:  ·  Problem: HTN (hypertension).   ·  Recommendation: Takes Amlodipine and ARB so continue with hold parameters.  < from: TTE W or WO Ultrasound Enhancing Agent (04.24.25 @ 10:10) >  CONCLUSIONS:      1. Left ventricular cavity is normal in size. Left ventricular wall thickness is normal. Left ventricular systolic function is normal with an ejection fraction of 64 % by Carpenter's method of disks. There are no regional wall motion abnormalities seen.   2. Normal right ventricular cavity size and normal right ventricular systolic function. Tricuspid annular plane systolic excursion (TAPSE) is 2.2 cm (normal >=1.7 cm).   3. Structurally normal mitral valve with normal leaflet excursion. There is calcification of the mitral valve annulus. There is tracemitral regurgitation.   4. The aortic valve appears trileaflet with normal systolic excursion. There is calcification of the aortic valve leaflets. There is mild aortic regurgitation.         Problem/Recommendation - 6:  ·  Problem: HLD (hyperlipidemia).   ·  Recommendation: Continue Atorvastatin.     Problem/Recommendation - 7:  ·  Problem: DM (diabetes mellitus).   ·  Recommendation: ON Farxiga and holding.  SSI for now as NPO.    Over all  management per SICU .

## 2025-04-25 NOTE — PROGRESS NOTE ADULT - SUBJECTIVE AND OBJECTIVE BOX
POST ANESTHESIA EVALUATION    79y Male POSTOP DAY 1 S/P [id number] decompression fusion and instrumentation     MENTAL STATUS: Patient participation [x ] Awake     [  ] Arousable     [  ] Sedated    AIRWAY PATENCY: [ x ] Satisfactory  [  ] Other:     Vital Signs Last 24 Hrs  T(C): 36.3 (25 Apr 2025 07:00), Max: 36.7 (24 Apr 2025 12:11)  T(F): 97.3 (25 Apr 2025 07:00), Max: 98.1 (25 Apr 2025 04:00)  HR: 73 (25 Apr 2025 10:45) (64 - 102)  BP: 113/62 (25 Apr 2025 10:00) (112/64 - 143/55)  BP(mean): 79 (25 Apr 2025 10:00) (75 - 103)  RR: 13 (25 Apr 2025 10:45) (9 - 20)  SpO2: 98% (25 Apr 2025 10:45) (93% - 100%)    Parameters below as of 25 Apr 2025 10:15  Patient On (Oxygen Delivery Method): room air      I&O's Summary    24 Apr 2025 07:01  -  25 Apr 2025 07:00  --------------------------------------------------------  IN: 1352.6 mL / OUT: 1232 mL / NET: 120.6 mL    25 Apr 2025 07:01  -  25 Apr 2025 11:13  --------------------------------------------------------  IN: 480 mL / OUT: 210 mL / NET: 270 mL          NAUSEA/ VOMITTING:  [ x ] NONE  [  ] CONTROLLED [  ] OTHER     PAIN: [ x ] CONTROLLED WITH CURRENT REGIMEN  [  ] OTHER    [ x ] NO APPARENT ANESTHESIA COMPLICATIONS      Comments:

## 2025-04-25 NOTE — PHYSICAL THERAPY INITIAL EVALUATION ADULT - GENERAL OBSERVATIONS, REHAB EVAL
Chart reviewed and cleared for PT by GINA Kunz. Pt received semi supine in bed in NAD, all lines intact +IV, telemetry, a line, maradiaga, HR 90, /53.

## 2025-04-25 NOTE — PROGRESS NOTE ADULT - SUBJECTIVE AND OBJECTIVE BOX
Anesthesia Pain Management Service    SUBJECTIVE: Patient reports back pain. Reports drowsiness from PCA.  Pain Scale Score	At rest: ___ 	With Activity: ___ 	[X ] Refer to charted pain scores    THERAPY:    [ ] IV PCA Morphine		[ ] 5 mg/mL	[ ] 1 mg/mL  [X ] IV PCA Hydromorphone	[ ] 5 mg/mL	[X ] 1 mg/mL  [ ] IV PCA Fentanyl		[ ] 50 micrograms/mL    Demand dose __0.2_ lockout __6_ (minutes) Continuous Rate _0__ Total: __2.9_   mg used (in past 24 hrs)      MEDICATIONS  (STANDING):  acetaminophen     Tablet .. 975 milliGRAM(s) Oral every 6 hours  atorvastatin 40 milliGRAM(s) Oral at bedtime  gabapentin 100 milliGRAM(s) Oral every 8 hours  glucagon  Injectable 1 milliGRAM(s) IntraMuscular once  insulin lispro (ADMELOG) corrective regimen sliding scale   SubCutaneous every 6 hours  lactated ringers. 1000 milliLiter(s) (75 mL/Hr) IV Continuous <Continuous>  methocarbamol 500 milliGRAM(s) Oral every 12 hours  omega-3-Acid Ethyl Esters 2 Gram(s) Oral two times a day  pantoprazole    Tablet 40 milliGRAM(s) Oral before breakfast  phenylephrine    Infusion 1.6 MICROgram(s)/kG/Min (38.1 mL/Hr) IV Continuous <Continuous>  piperacillin/tazobactam IVPB.. 3.375 Gram(s) IV Intermittent every 8 hours  senna 2 Tablet(s) Oral at bedtime    MEDICATIONS  (PRN):  acetaminophen     Tablet .. 975 milliGRAM(s) Oral every 8 hours PRN Mild Pain (1 - 3)  bisacodyl 5 milliGRAM(s) Oral every 12 hours PRN Constipation  magnesium hydroxide Suspension 30 milliLiter(s) Oral every 12 hours PRN Constipation  naloxone Injectable 0.1 milliGRAM(s) IV Push every 3 minutes PRN For ANY of the following changes in patient status:  A. RR LESS THAN 10 breaths per minute, B. Oxygen saturation LESS THAN 90%, C. Sedation score of 6  ondansetron   Disintegrating Tablet 4 milliGRAM(s) Oral every 6 hours PRN Nausea and/or Vomiting  oxyCODONE    IR 5 milliGRAM(s) Oral every 3 hours PRN Moderate Pain (4 - 6)  oxyCODONE    IR 10 milliGRAM(s) Oral every 3 hours PRN Severe Pain (7 - 10)      OBJECTIVE: Patient laying in bed.    Sedation Score:	[ X] Alert	[ ] Drowsy 	[ ] Arousable	[ ] Asleep	[ ] Unresponsive    Side Effects:	[X ] None	[ ] Nausea	[ ] Vomiting	[ ] Pruritus  		[ ] Other:    Vital Signs Last 24 Hrs  T(C): 36.2 (25 Apr 2025 13:15), Max: 36.7 (25 Apr 2025 04:00)  T(F): 97.1 (25 Apr 2025 13:15), Max: 98.1 (25 Apr 2025 04:00)  HR: 65 (25 Apr 2025 13:15) (63 - 105)  BP: 135/56 (25 Apr 2025 13:15) (112/64 - 146/62)  BP(mean): 78 (25 Apr 2025 13:15) (75 - 103)  RR: 19 (25 Apr 2025 13:15) (9 - 20)  SpO2: 99% (25 Apr 2025 13:15) (93% - 100%)    Parameters below as of 25 Apr 2025 13:00  Patient On (Oxygen Delivery Method): room air        ASSESSMENT/ PLAN    Therapy to  be:	[ ] Continue   [ X] Discontinued   [X ] Change to prn Analgesics    Documentation and Verification of current medications:   [X] Done	[ ] Not done, not elligible    Comments: IV PCA discontinued. PRN Oral/IV opioids and/or Adjuvant non-opioid medication to be ordered at this point.    Progress Note written now but Patient was seen earlier.

## 2025-04-25 NOTE — PROGRESS NOTE ADULT - SUBJECTIVE AND OBJECTIVE BOX
Orthopedic Surgery Progress Note     S: Patient seen and examined today. Continues to complain of pain, pt on PCA. Still on pressors.     MEDICATIONS  (STANDING):  acetaminophen     Tablet .. 975 milliGRAM(s) Oral every 6 hours  atorvastatin 40 milliGRAM(s) Oral at bedtime  dextrose 5%. 1000 milliLiter(s) (50 mL/Hr) IV Continuous <Continuous>  dextrose 5%. 1000 milliLiter(s) (100 mL/Hr) IV Continuous <Continuous>  dextrose 50% Injectable 25 Gram(s) IV Push once  dextrose 50% Injectable 12.5 Gram(s) IV Push once  dextrose 50% Injectable 25 Gram(s) IV Push once  gabapentin 100 milliGRAM(s) Oral every 8 hours  glucagon  Injectable 1 milliGRAM(s) IntraMuscular once  HYDROmorphone PCA (1 mG/mL) 30 milliLiter(s) PCA Continuous PCA Continuous  insulin lispro (ADMELOG) corrective regimen sliding scale   SubCutaneous every 6 hours  lactated ringers. 1000 milliLiter(s) (100 mL/Hr) IV Continuous <Continuous>  omega-3-Acid Ethyl Esters 2 Gram(s) Oral two times a day  pantoprazole    Tablet 40 milliGRAM(s) Oral before breakfast  phenylephrine    Infusion 1.6 MICROgram(s)/kG/Min (38.1 mL/Hr) IV Continuous <Continuous>  piperacillin/tazobactam IVPB.. 3.375 Gram(s) IV Intermittent every 8 hours  potassium chloride   Powder 20 milliEquivalent(s) Oral every 2 hours  senna 2 Tablet(s) Oral at bedtime  vancomycin  IVPB 1000 milliGRAM(s) IV Intermittent every 12 hours    MEDICATIONS  (PRN):  acetaminophen     Tablet .. 975 milliGRAM(s) Oral every 8 hours PRN Mild Pain (1 - 3)  bisacodyl 5 milliGRAM(s) Oral every 12 hours PRN Constipation  cyclobenzaprine 10 milliGRAM(s) Oral three times a day PRN Muscle Spasm  dextrose Oral Gel 15 Gram(s) Oral once PRN Blood Glucose LESS THAN 70 milliGRAM(s)/deciliter  HYDROmorphone  Injectable 0.5 milliGRAM(s) IV Push every 10 minutes PRN Severe Pain (7 - 10)  HYDROmorphone PCA (1 mG/mL) Rescue Clinician Bolus 0.5 milliGRAM(s) IV Push every 15 minutes PRN for Pain Scale GREATER THAN 6  magnesium hydroxide Suspension 30 milliLiter(s) Oral every 12 hours PRN Constipation  nalbuphine Injectable 1 milliGRAM(s) IV Push every 6 hours PRN Pruritus  naloxone Injectable 0.1 milliGRAM(s) IV Push every 3 minutes PRN For ANY of the following changes in patient status:  A. RR LESS THAN 10 breaths per minute, B. Oxygen saturation LESS THAN 90%, C. Sedation score of 6  ondansetron   Disintegrating Tablet 4 milliGRAM(s) Oral every 6 hours PRN Nausea and/or Vomiting  ondansetron Injectable 4 milliGRAM(s) IV Push once PRN Nausea and/or Vomiting  oxyCODONE    IR 10 milliGRAM(s) Oral every 4 hours PRN Severe Pain (7 - 10)  oxyCODONE    IR 5 milliGRAM(s) Oral every 4 hours PRN Moderate Pain (4 - 6)  oxyCODONE    IR 10 milliGRAM(s) Oral every 4 hours PRN Severe Pain (7 - 10)      Vital Signs Last 24 Hrs  T(C): 36.7 (25 Apr 2025 04:00), Max: 36.7 (24 Apr 2025 12:11)  T(F): 98.1 (25 Apr 2025 04:00), Max: 98.1 (25 Apr 2025 04:00)  HR: 85 (25 Apr 2025 05:00) (64 - 109)  BP: 132/59 (25 Apr 2025 05:00) (112/64 - 143/55)  BP(mean): 82 (25 Apr 2025 05:00) (75 - 103)  RR: 17 (25 Apr 2025 05:00) (11 - 20)  SpO2: 93% (25 Apr 2025 05:00) (93% - 100%)    Parameters below as of 25 Apr 2025 03:00  Patient On (Oxygen Delivery Method): room air        04-23-25 @ 07:01  -  04-24-25 @ 07:00  --------------------------------------------------------  IN: 0 mL / OUT: 200 mL / NET: -200 mL    04-24-25 @ 07:01  -  04-25-25 @ 06:48  --------------------------------------------------------  IN: 1352.6 mL / OUT: 1182 mL / NET: 170.6 mL        Physical Exam:  Gen: NAD  Spine:  Dressing CDI  2x DHARA drains in place w SS output    Motor:                   C5                C6              C7               C8           T1   R            5/5                5/5            5/5             5/5          5/5  L             5/5               5/5             5/5             5/5          5/5                L2             L3             L4               L5            S1  R         2/5          4/5          4/5             4/5           4/5  L          3+/5          4/5           4/5           4/5           4/5    *LE motor exam limited by pain    Sensory:            C5         C6         C7      C8       T1        (0=absent, 1=impaired, 2=normal, NT=not testable)  R         2            2           2        2         2  L          2            2           2        2         2               L2          L3         L4      L5       S1         (0=absent, 1=impaired, 2=normal, NT=not testable)  R         2            2            2        2        2  L          2            2           2        2         2      BLLE compartments soft and nontender    LABS:                        9.8    14.15 )-----------( 382      ( 25 Apr 2025 02:45 )             29.0     04-25    138  |  107  |  17  ----------------------------<  134[H]  3.7   |  18[L]  |  0.74    Ca    9.0      25 Apr 2025 02:45  Phos  3.3     04-25  Mg     2.00     04-25    TPro  7.3  /  Alb  2.8[L]  /  TBili  0.4  /  DBili  x   /  AST  26  /  ALT  44  /  AlkPhos  141[H]  04-23

## 2025-04-25 NOTE — PHYSICAL THERAPY INITIAL EVALUATION ADULT - ADDITIONAL COMMENTS
Pt lives with his wife in a house with 6 steps to enter. Pt uses a cane and required some assistance with ADLs. Pt reports no falls in the past 6 months.  Pt left semi supine in bed in NAD, all lines intact, and GINA Kunz made aware.

## 2025-04-25 NOTE — OCCUPATIONAL THERAPY INITIAL EVALUATION ADULT - GENERAL OBSERVATIONS, REHAB EVAL
Patient found semi-reclined in bed, NAD, and able to follow directions. Vitals: HR 90 BPM. Patient agreeable to participate in skilled OT evaluation.

## 2025-04-25 NOTE — CONSULT NOTE ADULT - ASSESSMENT
79yMale w/ HTN, HLD, T2DM, chronic low back pain presents as a transfer from [facility] ED for surgery today. Pt was sent into ED yesterday by his pain medicine physician due to recent MRI findings concerning for L1-L2 discitis/OM with epidural abscess, bilateral psoas abscesses. Patient initially saw pain specialist in February for chronic back pain with radiations to bilateral lateral thighs (R>L). MRI at that time showed degenerative changes. He subsequently had epidural injections x2 (2/27, 3/11) with moderate pain relief. Pain began to worsen on 4/10. He had radiofrequency ablation performed on 4/11 and has since has severe worsening of pain and radiation to R lateral thigh. He reports recent bowel/bladder "incontinence" requiring diaper due to his inability to get to the bathroom in time due to pain limitations but states urge and sensation are intact. Denies fevers, chills, night sweats, weakness, numbness/tingling, saddle anesthesia, recent falls/trauma. He uses a cane for ambulation. Denies recent falls/trauma or any other ortho injuries.     ED/Hospital course: Has been afebrile, WBC 14, A1C 7.5, labs otherwise WNL. Underwent decompression, spine, lumbar, posterior approach, with fusion of posterior spinal column on 4/24. Cultures sent. Started on Vancomycin and Zosyn. IR consulted for abscess drainage. ID consulted for antibiotic management.    # L1/L2 discitis OM s/p decompression/fusion  # Psoas abscess    - On Vancomycin and Zosyn, will discuss antibiotics  - check blood cx, baseline ESR/CRP      All recommendations are tentative pending Attending Attestation.    Naeem Sofia MD, PGY-5  ID Fellow  [employer] Teams Preferred  After 5pm/weekends call [phone]   79yMale w/ HTN, HLD, T2DM, chronic low back pain presents as a transfer from [facility] ED for surgery today. Pt was sent into ED yesterday by his pain medicine physician due to recent MRI findings concerning for L1-L2 discitis/OM with epidural abscess, bilateral psoas abscesses. Patient initially saw pain specialist in February for chronic back pain with radiations to bilateral lateral thighs (R>L). MRI at that time showed degenerative changes. He subsequently had epidural injections x2 (2/27, 3/11) with moderate pain relief. Pain began to worsen on 4/10. He had radiofrequency ablation performed on 4/11 and has since has severe worsening of pain and radiation to R lateral thigh. He reports recent bowel/bladder "incontinence" requiring diaper due to his inability to get to the bathroom in time due to pain limitations but states urge and sensation are intact. Denies fevers, chills, night sweats, weakness, numbness/tingling, saddle anesthesia, recent falls/trauma. He uses a cane for ambulation. Denies recent falls/trauma or any other ortho injuries. He was born in [place], has frequent travels back, no known TB exposure. NO recent infections, antibiotic use.    ED/Hospital course: Has been afebrile, WBC 14, A1C 7.5, labs otherwise WNL. Underwent decompression, spine, lumbar, posterior approach, with fusion of posterior spinal column on 4/24. Cultures sent. Started on Vancomycin and Zosyn. IR consulted for abscess drainage. ID consulted for antibiotic management.    Blood cultures B fragilis  Abscess cx GNR      # L1/L2 discitis OM s/p decompression/fusion  # Psoas abscess    - Cultures growing Bacteroides/ GNR; not typical pathogens for spine infection  - No GI/ pathology on imaging  - Can stop vancomycin  - Continue zosyn 3.375 gm Q8h pending  further ID and susceptibility  - check baseline ESR/CRP  - continue to trend CBC/fever curve  - Noted plans for IR drainage of abscess; please send routine/fungal/AFB cultures      Case d/w Attending and Primary team.     Naeem Sofia MD, PGY-5  ID Fellow  [employer] Teams Preferred  After 5pm/weekends call [phone]

## 2025-04-25 NOTE — CONSULT NOTE ADULT - SUBJECTIVE AND OBJECTIVE BOX
Interventional Radiology    Evaluate for Procedure:     HPI:  79y Male s/p spinal decompression and posterior spinal fusion 4/24 for epidural abscess. Consulted for psoas abscess drainage.     Allergies: No Known Allergies    Medications (Abx/Cardiac/Anticoagulation/Blood Products)  amLODIPine   Tablet: 10 milliGRAM(s) Oral (04-24 @ 09:33)  cefTRIAXone   IVPB: 100 mL/Hr IV Intermittent (04-23 @ 15:44)  cefTRIAXone   IVPB: 100 mL/Hr IV Intermittent (04-23 @ 19:25)  phenylephrine    Infusion: 19.1 mL/Hr IV Continuous (04-24 @ 22:22)  piperacillin/tazobactam IVPB..: 25 mL/Hr IV Intermittent (04-25 @ 05:12)  valsartan: 40 milliGRAM(s) Oral (04-24 @ 09:47)  vancomycin  IVPB: 250 mL/Hr IV Intermittent (04-25 @ 06:20)  vancomycin  IVPB.: 250 mL/Hr IV Intermittent (04-23 @ 11:57)    Data:  165.1  63.5  T(C): 36.3  HR: 67  BP: 128/61  RR: 12  SpO2: 100%    -WBC 14.15 / HgB 9.8 / Hct 29.0 / Plt 382  -Na 138 / Cl 107 / BUN 17 / Glucose 134  -K 3.7 / CO2 18 / Cr 0.74  -ALT -- / Alk Phos -- / T.Bili --  -INR 1.16 / PTT 32.7      Radiology:     Assessment/Plan: 79y Male s/p spinal decompression and posterior spinal fusion 4/24 for epidural abscess. Consulted for psoas abscess drainage.     -- Please obtain a CT abdomen and pelvis with IV contrast since there is no cross sectional postoperative imaging. Will reassess after imaging obtained.     --  Federico Murphy MD  Vascular and Interventional Radiology   Available on [employer] Teams    - Non-emergent consults: Place IR consult order in [place]  - Emergent issues (pager): [facility] [phone]; [facility] [phone]; [phone]  - Scheduling questions: [facility] [phone]; [facility] [phone]  - Clinic/outpatient booking: [facility] [phone]; [facility] [phone]

## 2025-04-25 NOTE — PROGRESS NOTE ADULT - ASSESSMENT
79yMale w/ HTN, HLD, T2DM, chronic low back pain presents as a transfer from [facility] ED for surgery today ( T10-pelvis posterior spinal fusion) 4/24/25    SICU consulted post operatively for neurological checks hourly (q1h) and hemodynamic monitoring  as patient is requiring vasopressor  to achieve map goals above 80mmhg         PLAN  NEUROLOGIC   - AAOx 3  - neurological checks hourly q1h  - Pain control with ofirmev/ dilaudid PCA  - dPCA placed 4/24/25 by anesthesia covering acute pain overnight     RESPIRATORY   - Currently extubated to face tent   - Monitor SpO2 goal >92%      CARDIOVASCULAR   - Monitor hemodynamics   - currently on vasopressors x 1, phenylephrine gtt  to achieve map goals X>80mmhg per Ortho-spine  - MAP goal > 80 per Ortho/Spine team     GASTROINTESTINAL   - Diet:  NPO except meds, will revisit diet advancements in AM   - GI PPX: protonix ppi    /RENAL   - IV fluids: LR @100cc/hr , will cut rate once tolerating CLD   - Monitor BMP  - Strict I/Os  - Monitor electrolytes, replete PRN  - Maintain maradiaga catheter    HEMATOLOGIC  - Monitor H/H   - DVT prophylaxis: SCDs , possible restart chemical vte in AM , will follow w/ Ortho Spine team in AM     INFECTIOUS DISEASE  - Monitor fever/WC  - Antibiotic ; Zosyn/Vanco for empiric coverage  * started on 4/24/25  + blood cultures  *4/23  - check MSSA/MRSA swab  [ ]   - will deescalate vs broaden abx selection pending OR cultures sensitives  - ID consult 4/25/25   +Bacteroides fragilis in blood cultures from 4/23/25  bilateral psoas abscesses in the setting of epidural /bacteremia ,d/w ortho team   -IR consult 4/25/25 for evaluation and possible intervention/drainage     ENDOCRINE  - hx of diabetes type 2   - Monitor gluc, 4/24/25 ha1c * 7.3   - ISS    LINES  - rad a line  - PIV  - DHARA drains x 2 (PRS )  - Maradiaga    Disposition:   SICU   79yMale w/ HTN, HLD, T2DM, chronic low back pain presents as a transfer from [facility] ED for surgery today. He underwent T10-Pelvis PSF T12-L2 lami 4/24/25 ([place]/PRS)   SICU consulted post operatively for neurological checks hourly (q1h) and hemodynamic monitoring  as patient is requiring vasopressor  to achieve map goals above 80mmhg         PLAN  NEUROLOGIC   - AAOx 3  - neurological checks hourly q1h  - Pain control with ofirmev/ dilaudid PCA  - dPCA placed 4/24/25 by anesthesia covering acute pain overnight     RESPIRATORY   - Currently extubated to face tent   - Monitor SpO2 goal >92%      CARDIOVASCULAR   - Monitor hemodynamics   - currently on vasopressors x 1, phenylephrine gtt  to achieve map goals X>80mmhg per Ortho-spine  - MAP goal > 80 per Ortho/Spine team     GASTROINTESTINAL   - Diet:  NPO except meds, will revisit diet advancements in AM   - GI PPX: protonix ppi    /RENAL   - IV fluids: LR @100cc/hr , will cut rate once tolerating CLD   - Monitor BMP  - Strict I/Os  - Monitor electrolytes, replete PRN  - Maintain maradiaga catheter    HEMATOLOGIC  - Monitor H/H   - DVT prophylaxis: SCDs , possible restart chemical vte in AM , will follow w/ Ortho Spine team in AM     INFECTIOUS DISEASE  - Monitor fever/WC  - Antibiotic ; Zosyn/Vanco for empiric coverage  * started on 4/24/25  + blood cultures  *4/23  - check MSSA/MRSA swab  [ ]   - will deescalate vs broaden abx selection pending OR cultures sensitives  - ID consult 4/25/25   +Bacteroides fragilis in blood cultures from 4/23/25  bilateral psoas abscesses in the setting of epidural /bacteremia ,d/w ortho team   -IR consult 4/25/25 for evaluation and possible intervention/drainage     ENDOCRINE  - hx of diabetes type 2   - Monitor gluc, 4/24/25 ha1c * 7.3   - ISS    LINES  - rad a line  - PIV  - DHARA drains x 2 (PRS )  - Maradiaga    Disposition:   SICU   79yMale w/ HTN, HLD, T2DM, chronic low back pain presents as a transfer from [facility] ED for surgery today. He underwent T10-Pelvis PSF T12-L2 lami 4/24/25 ([place]/PRS)   SICU consulted post operatively for neurological checks hourly (q1h) and hemodynamic monitoring  as patient is requiring vasopressor  to achieve map goals above 80mmhg         PLAN  NEUROLOGIC   - AAOx 3  - neurological checks hourly q1h  - Pain control with ofirmev/ dilaudid PCA  - dPCA placed 4/24/25 by anesthesia covering acute pain overnight     RESPIRATORY   - Currently extubated to face tent   - Monitor SpO2 goal >92%      CARDIOVASCULAR   - Monitor hemodynamics   - currently on vasopressors x 1, phenylephrine gtt  to achieve map goals X>80mmhg per Ortho-spine  - MAP goal > 80 per Ortho/Spine team     GASTROINTESTINAL   - Diet:  NPO except meds, will revisit diet advancements in AM   - GI PPX: protonix ppi    /RENAL   - IV fluids: LR @100cc/hr , will cut rate once tolerating CLD   - Monitor BMP  - Strict I/Os  - Monitor electrolytes, replete PRN  - Maintain maradiaga catheter    HEMATOLOGIC  - Monitor H/H   - DVT prophylaxis: SCDs , possible restart chemical vte in AM , will follow w/ Ortho Spine team in AM     INFECTIOUS DISEASE  - Monitor fever/WC  - Antibiotic ; Zosyn/Vanco for empiric coverage  * started on 4/24/25  + blood cultures  *4/23  - check MSSA/MRSA swab  [ ]   - will deescalate vs broaden abx selection pending OR cultures sensitives  - ID consult 4/25/25   +Bacteroides fragilis in blood cultures from 4/23/25  bilateral psoas abscesses in the setting of epidural /bacteremia ,d/w ortho team   -IR consult 4/25/25 for evaluation and possible intervention/drainage of psoas abscesses     ENDOCRINE  - hx of diabetes type 2   - Monitor gluc, 4/24/25 ha1c * 7.3   - ISS    LINES  - rad a line  - PIV  - DHARA drains x 2 (PRS )  - Maradiaga    Disposition:   SICU

## 2025-04-25 NOTE — PHYSICAL THERAPY INITIAL EVALUATION ADULT - FUNCTIONAL LIMITATIONS, PT EVAL
Left message for patient to call back.    
Left message for patient to call back.      Clinic supervisor notified of lab error.  
Patient called back and was reminded to have weight measured tomorrow and to complete repeat labs.    She also felt comfortable calling scheduling and changing the date of her US. Will follow up tomorrow to make sure this was completed.  
Patient notified and verbalized understanding.    
Please call back   
Pls call lab - it appears the wrong tests were released. Pt was sent to lab with sheet of paper with hand written by writer to release BMP and BNP. A CMP was also run, which had a future date set of 6/29/18 and is unnecessary to run with the BMP as well.  Pt should be credited for the BMP as the BMP is contained within the CMP.   New order is now placed for repeat CMP for 6/29/18.       Please let pt know that her kidney function and potassium was good. The ALT (one of her liver tests) has returned to normal range, but bilirubin is slightly elevated.  We will be evaluating this with the abdominal ultrasound that was ordered.  Her BNP (the fluid number related to heart failure) is essentially stable.   She should continue with the same medication changes we discussed during her office visit.   
Pt does still need repeat BMP on Friday when seen at wound clinic.  Order placed.  Please remind pt to have weight measured and labs.    Also, I see that she was scheduled for abdominal US on 7/17/18 on the same day as her ECHO.  Is there a way that she can be seen sooner for the abdominal US as we want to evaluate for ascites (fluid in abdomen)? The sooner the better with that imaging.  Please call pt and assist in arranging abdominal US sooner appt.  
self-care/home management

## 2025-04-25 NOTE — PHYSICAL THERAPY INITIAL EVALUATION ADULT - STANDING BALANCE: STATIC
[address], [place]  Discharge- Natalio Ill 1948, 76 y.o. female MRN: [id number]  Unit/Bed#: [facility] 212-01 Encounter: [id number]  Primary Care Provider: Therese Huerta MD   Date and time admitted to hospital: 8/19/2023  7:06 AM    * Abnormal EKG  Assessment & Plan  · Presented to the ED with symptoms of fatigue, dizziness, nausea. No vomiting, no chest pain or shortness of breath. · Evaluated in the ED and found to be dehydrated with new EKG changes  · Given her recent covid infection the differential includes CAD or PE  · ddimer 0.95 is positive. CTA PE study completed negative for PE  · Telemetry monitoring  · Troponin normal with negative delta  · Aspirin 325mg x1, followed by 81mg daily for now  · Continue statin  · Echo completed normal findings   · EKG changes per personally discussion with on call cardiology appear chronic    History of COVID-19  Assessment & Plan  · Tested positive on 8/7/23  · Completed a medrol dose pack  · Cough has resolved    Acute renal failure superimposed on stage 3 chronic kidney disease [facility])  Assessment & Plan  Lab Results   Component Value Date    EGFR 55 08/20/2023    EGFR 38 08/19/2023    EGFR 59 02/24/2023    CREATININE 1.00 08/20/2023    CREATININE 1.36 (H) 08/19/2023    CREATININE 0.95 02/24/2023   · baseline creatinine is 0.9-1.0  · likey due to poor po intake and HCTZ use  · IV hydration with isolyte given  · Cr.  Now at baseline  · JJ resolved    Hypertension  Assessment & Plan  · BP is acceptable  · Holding losartan and HCTZ due to JJ        Medical Problems     Resolved Problems  Date Reviewed: 8/10/2023          Resolved    Dehydration 8/19/2023     Resolved by  Radha Crisostomo MD        Discharging Physician / Practitioner: CHOLO Ward  PCP: Therese Huerta MD  Admission Date:   Admission Orders (From admission, onward)     Ordered        08/19/23 1216  Place in Observation  Once                      Discharge Date: 08/20/23    Consultations During Hospital Stay:  · none    Procedures Performed:   · none    Significant Findings / Test Results:   · Abnormal EKG  · JJ likely in setting dehydration     Incidental Findings:   · none     Test Results Pending at Discharge (will require follow up):   · none     Outpatient Tests Requested:  · Per pcp    Complications:  none    Reason for Admission:  JJ/EKG changes    Hospital Course:   Orville Anderson is a 76 y.o. female patient who originally presented to the hospital on 8/19/2023 due to recent covid dx not eating or drinking at home started with nausea and dizziness came to ED for further evaluation. EKG showing some abnormality so patient was monitored overnight on telemetry with no events discussed the case the follow day with cardiology who felt repeat echo and consult if abnormal ask EKG changes appeared chronic. Echo completed that was normal no events on telemetry and JJ resolved with IVF. Patient felt overall better and was discharged home. Patient did request to travel this week starting tomorrow did advise patient to check in with her PCP. Please see above list of diagnoses and related plan for additional information. Condition at Discharge: good    Discharge Day Visit / Exam:   Subjective:  Patient reports her symptoms have since resolved since starting the IVF. Patient reports she was getting worse once she was over most of her COVID symptoms but had a very poor appetite and was not eating or tricking. Patient made aware and agreeable to hydrate did request to travel starting tomorrow and agreeable to reaching out to her PCP prior to traveling.     Vitals: Blood Pressure: 134/55 (08/20/23 1035)  Pulse: 62 (08/20/23 1035)  Temperature: 97.7 °F (36.5 °C) (08/20/23 1035)  Temp Source: Oral (08/20/23 1035)  Respirations: 20 (08/20/23 1035)  Height: 5' 5" (165.1 cm) (08/20/23 1035)  Weight - Scale: 78 kg (172 lb) (08/20/23 1035)  SpO2: 99 % (08/20/23 1035)  Exam: Physical Exam  Vitals and nursing note reviewed. Constitutional:       General: She is not in acute distress. Appearance: She is well-developed. HENT:      Head: Normocephalic and atraumatic. Eyes:      Conjunctiva/sclera: Conjunctivae normal.   Cardiovascular:      Rate and Rhythm: Normal rate and regular rhythm. Heart sounds: No murmur heard. Pulmonary:      Effort: Pulmonary effort is normal. No respiratory distress. Breath sounds: Normal breath sounds. Abdominal:      Palpations: Abdomen is soft. Tenderness: There is no abdominal tenderness. Musculoskeletal:         General: No swelling. Cervical back: Neck supple. Skin:     General: Skin is warm and dry. Capillary Refill: Capillary refill takes less than 2 seconds. Neurological:      Mental Status: She is alert and oriented to person, place, and time. Psychiatric:         Mood and Affect: Mood normal.          Discussion with Family: Updated  () at bedside. Discharge instructions/Information to patient and family:   See after visit summary for information provided to patient and family. Provisions for Follow-Up Care:  See after visit summary for information related to follow-up care and any pertinent home health orders. Disposition:   Home    Planned Readmission: none     Discharge Statement:  I spent 45 minutes discharging the patient. This time was spent on the day of discharge. I had direct contact with the patient on the day of discharge. Greater than 50% of the total time was spent examining patient, answering all patient questions, arranging and discussing plan of care with patient as well as directly providing post-discharge instructions. Additional time then spent on discharge activities. Discharge Medications:  See after visit summary for reconciled discharge medications provided to patient and/or family.       **Please Note: This note may have been constructed using a voice recognition system** Unable to assess

## 2025-04-25 NOTE — PROGRESS NOTE ADULT - ASSESSMENT
A: 79M s/p T10-pelvis PSF on 4/24    Plan:  -WBAT BLLE  -appreciate ID recs  -appreciate IR consult for psoas abscess drainage  -f/u blood cx  -PT/OT  -pain control (PCA)  -dvt ppx: venodynes  -c/w maradiaga  -monitor drain outputs - drains per PRS  -dispo: pending    Ana Luisa Cervantes, PGY-2  Orthopedic Surgery    [facility] [id number]  [facility]: [id number]  [facility]:  [id number]/ [phone].

## 2025-04-25 NOTE — CHART NOTE - NSCHARTNOTEFT_GEN_A_CORE
POST-OP NOTE    ISIS CHEEK | [id number] | [facility] PAC 01    Procedure: s/p T10-pelvis PSF    Subjective: Patient resting comfortably in bed, still somnolent. Pain controlled when pt lying still, otherwise has incisional pain w movement. Denies fever, chills, nausea, vomiting, chest pain, SOB.    Vital Signs Last 24 Hrs  T(C): 36.5 (25 Apr 2025 00:00), Max: 36.8 (24 Apr 2025 03:30)  T(F): 97.7 (25 Apr 2025 00:00), Max: 98.2 (24 Apr 2025 03:30)  HR: 84 (25 Apr 2025 01:00) (69 - 109)  BP: 122/67 (25 Apr 2025 01:00) (114/58 - 159/75)  BP(mean): 85 (25 Apr 2025 01:00) (75 - 103)  RR: 12 (25 Apr 2025 01:00) (12 - 20)  SpO2: 100% (25 Apr 2025 01:00) (97% - 100%)    Parameters below as of 24 Apr 2025 22:45  Patient On (Oxygen Delivery Method): room air      I&O's Summary    23 Apr 2025 07:01  -  24 Apr 2025 07:00  --------------------------------------------------------  IN: 0 mL / OUT: 200 mL / NET: -200 mL    24 Apr 2025 07:01  -  25 Apr 2025 01:34  --------------------------------------------------------  IN: 825 mL / OUT: 704 mL / NET: 121 mL                            10.0   14.90 )-----------( 349      ( 24 Apr 2025 22:30 )             29.5     04-24    138  |  106  |  18  ----------------------------<  159[H]  3.8   |  17[L]  |  0.75    Ca    9.0      24 Apr 2025 22:30  Phos  3.3     04-24  Mg     2.00     04-24    TPro  7.3  /  Alb  2.8[L]  /  TBili  0.4  /  DBili  x   /  AST  26  /  ALT  44  /  AlkPhos  141[H]  04-23   PT/INR - ( 24 Apr 2025 05:00 )   PT: 13.8 sec;   INR: 1.16 ratio         PTT - ( 24 Apr 2025 05:00 )  PTT:32.7 sec    PHYSICAL EXAM:  Gen: Lying in bed, NAD  Spine:  Dressing CDI  2x DHARA drains in place w SS output    Motor:                   C5                C6              C7               C8           T1   R            5/5                5/5            5/5             5/5          5/5  L             5/5               5/5             5/5             5/5          5/5                L2             L3             L4               L5            S1  R         2/5          4/5          4/5             4/5           4/5  L          3+/5          4/5           4/5           4/5           4/5    *LE motor exam limited by pain    Sensory:            C5         C6         C7      C8       T1        (0=absent, 1=impaired, 2=normal, NT=not testable)  R         2            2           2        2         2  L          2            2           2        2         2               L2          L3         L4      L5       S1         (0=absent, 1=impaired, 2=normal, NT=not testable)  R         2            2            2        2        2  L          2            2           2        2         2      BLLE compartments soft and nontender      A: 79M s/p T10-pelvis PSF on 4/24    Plan:  -WBAT BLLE  -appreciate ID recs  -appreciate IR consult for psoas abscess drainage  -f/u blood cx  -PT/OT  -pain control (PCA)  -dvt ppx: venodynes  -c/w maradiaga  -monitor drain outputs - drains per PRS  -dispo: pending    Ana Luisa Cervantes, PGY-2  Orthopedic Surgery    [facility] [place] [place]: [id number]  [facility]: [id number]  [facility]:  [id number]/ [phone].

## 2025-04-25 NOTE — PROGRESS NOTE ADULT - SUBJECTIVE AND OBJECTIVE BOX
SICU Daily Progress Note  =====================================================    remains on antonio gtt to achieve map goals of 80  NPO excepts medications but ok for ADAT per Ortho/Spine  no chemical DVT ppx for now  started on dPCA for severe pain post op     MEDICATIONS:   --------------------------------------------------------------------------------------  Neurologic Medications  acetaminophen     Tablet .. 975 milliGRAM(s) Oral every 6 hours  acetaminophen     Tablet .. 975 milliGRAM(s) Oral every 8 hours PRN Mild Pain (1 - 3)  cyclobenzaprine 10 milliGRAM(s) Oral three times a day PRN Muscle Spasm  gabapentin 100 milliGRAM(s) Oral every 8 hours  HYDROmorphone  Injectable 0.5 milliGRAM(s) IV Push every 10 minutes PRN Severe Pain (7 - 10)  HYDROmorphone PCA (1 mG/mL) 30 milliLiter(s) PCA Continuous PCA Continuous  HYDROmorphone PCA (1 mG/mL) Rescue Clinician Bolus 0.5 milliGRAM(s) IV Push every 15 minutes PRN for Pain Scale GREATER THAN 6  nalbuphine Injectable 1 milliGRAM(s) IV Push every 6 hours PRN Pruritus  ondansetron   Disintegrating Tablet 4 milliGRAM(s) Oral every 6 hours PRN Nausea and/or Vomiting  ondansetron Injectable 4 milliGRAM(s) IV Push once PRN Nausea and/or Vomiting  oxyCODONE    IR 10 milliGRAM(s) Oral every 4 hours PRN Severe Pain (7 - 10)  oxyCODONE    IR 5 milliGRAM(s) Oral every 4 hours PRN Moderate Pain (4 - 6)  oxyCODONE    IR 10 milliGRAM(s) Oral every 4 hours PRN Severe Pain (7 - 10)    Respiratory Medications    Cardiovascular Medications  phenylephrine    Infusion 0.8 MICROgram(s)/kG/Min IV Continuous <Continuous>    Gastrointestinal Medications  bisacodyl 5 milliGRAM(s) Oral every 12 hours PRN Constipation  dextrose 5%. 1000 milliLiter(s) IV Continuous <Continuous>  dextrose 5%. 1000 milliLiter(s) IV Continuous <Continuous>  lactated ringers. 1000 milliLiter(s) IV Continuous <Continuous>  magnesium hydroxide Suspension 30 milliLiter(s) Oral every 12 hours PRN Constipation  pantoprazole    Tablet 40 milliGRAM(s) Oral before breakfast  senna 2 Tablet(s) Oral at bedtime    Genitourinary Medications    Hematologic/Oncologic Medications    Antimicrobial/Immunologic Medications  piperacillin/tazobactam IVPB.. 3.375 Gram(s) IV Intermittent every 8 hours  vancomycin  IVPB 1000 milliGRAM(s) IV Intermittent every 12 hours    Endocrine/Metabolic Medications  atorvastatin 40 milliGRAM(s) Oral at bedtime  dextrose 50% Injectable 25 Gram(s) IV Push once  dextrose 50% Injectable 12.5 Gram(s) IV Push once  dextrose 50% Injectable 25 Gram(s) IV Push once  dextrose Oral Gel 15 Gram(s) Oral once PRN Blood Glucose LESS THAN 70 milliGRAM(s)/deciliter  glucagon  Injectable 1 milliGRAM(s) IntraMuscular once  insulin lispro (ADMELOG) corrective regimen sliding scale   SubCutaneous every 6 hours    Topical/Other Medications  naloxone Injectable 0.1 milliGRAM(s) IV Push every 3 minutes PRN For ANY of the following changes in patient status:  A. RR LESS THAN 10 breaths per minute, B. Oxygen saturation LESS THAN 90%, C. Sedation score of 6  omega-3-Acid Ethyl Esters 2 Gram(s) Oral two times a day        ALLERGIES:  No Known Allergies    -------------------------------------------------------------------------------------      HOME MEDS :  Home Medications:  losartan 25 mg oral tablet: 1 tab(s) orally once a day (06 Jun 2018 10:23)  Lovaza 1000 mg oral capsule: 2 cap(s) orally 2 times a day (06 Jun 2018 10:23)  metFORMIN 500 mg oral tablet: 1 tab(s) orally once a day (06 Jun 2018 10:23)        --------------------------------------------------------------------------------------    VITAL SIGNS:  T(C): 36.5 (04-25-25 @ 00:00), Max: 36.8 (04-24-25 @ 01:05)  HR: 81 (04-25-25 @ 00:15) (69 - 109)  BP: 119/60 (04-25-25 @ 00:15) (116/63 - 159/75)  RR: 12 (04-25-25 @ 00:15) (12 - 20)  SpO2: 100% (04-25-25 @ 00:15) (96% - 100%)  --------------------------------------------------------------------------------------    INS AND OUTS:    I&O's Detail    23 Apr 2025 07:01  -  24 Apr 2025 07:00  --------------------------------------------------------  IN:  Total IN: 0 mL    OUT:    Voided (mL): 200 mL  Total OUT: 200 mL    Total NET: -200 mL      24 Apr 2025 07:01  -  25 Apr 2025 00:58  --------------------------------------------------------  IN:    Lactated Ringers: 400 mL    Oral Fluid: 25 mL    sodium chloride 0.9%: 400 mL  Total IN: 825 mL    OUT:    Bulb (mL): 155 mL    Bulb (mL): 6 mL    Ureteral Catheter (mL): 475 mL  Total OUT: 636 mL    Total NET: 189 mL        --------------------------------------------------------------------------------------   SICU Daily Progress Note  =====================================================    Interval Events   s/p T10-Pelvis PSF T12-L2 lami 4/24/25 ([place]/PRS)   remains on antonio gtt to achieve map goals of 80  NPO excepts medications but ok for ADAT per Ortho/Spine  no chemical DVT ppx for now  started on dPCA for severe pain post op       MEDICATIONS:   --------------------------------------------------------------------------------------  Neurologic Medications  acetaminophen     Tablet .. 975 milliGRAM(s) Oral every 6 hours  acetaminophen     Tablet .. 975 milliGRAM(s) Oral every 8 hours PRN Mild Pain (1 - 3)  cyclobenzaprine 10 milliGRAM(s) Oral three times a day PRN Muscle Spasm  gabapentin 100 milliGRAM(s) Oral every 8 hours  HYDROmorphone  Injectable 0.5 milliGRAM(s) IV Push every 10 minutes PRN Severe Pain (7 - 10)  HYDROmorphone PCA (1 mG/mL) 30 milliLiter(s) PCA Continuous PCA Continuous  HYDROmorphone PCA (1 mG/mL) Rescue Clinician Bolus 0.5 milliGRAM(s) IV Push every 15 minutes PRN for Pain Scale GREATER THAN 6  nalbuphine Injectable 1 milliGRAM(s) IV Push every 6 hours PRN Pruritus  ondansetron   Disintegrating Tablet 4 milliGRAM(s) Oral every 6 hours PRN Nausea and/or Vomiting  ondansetron Injectable 4 milliGRAM(s) IV Push once PRN Nausea and/or Vomiting  oxyCODONE    IR 10 milliGRAM(s) Oral every 4 hours PRN Severe Pain (7 - 10)  oxyCODONE    IR 5 milliGRAM(s) Oral every 4 hours PRN Moderate Pain (4 - 6)  oxyCODONE    IR 10 milliGRAM(s) Oral every 4 hours PRN Severe Pain (7 - 10)    Respiratory Medications    Cardiovascular Medications  phenylephrine    Infusion 0.8 MICROgram(s)/kG/Min IV Continuous <Continuous>    Gastrointestinal Medications  bisacodyl 5 milliGRAM(s) Oral every 12 hours PRN Constipation  dextrose 5%. 1000 milliLiter(s) IV Continuous <Continuous>  dextrose 5%. 1000 milliLiter(s) IV Continuous <Continuous>  lactated ringers. 1000 milliLiter(s) IV Continuous <Continuous>  magnesium hydroxide Suspension 30 milliLiter(s) Oral every 12 hours PRN Constipation  pantoprazole    Tablet 40 milliGRAM(s) Oral before breakfast  senna 2 Tablet(s) Oral at bedtime    Genitourinary Medications    Hematologic/Oncologic Medications    Antimicrobial/Immunologic Medications  piperacillin/tazobactam IVPB.. 3.375 Gram(s) IV Intermittent every 8 hours  vancomycin  IVPB 1000 milliGRAM(s) IV Intermittent every 12 hours    Endocrine/Metabolic Medications  atorvastatin 40 milliGRAM(s) Oral at bedtime  dextrose 50% Injectable 25 Gram(s) IV Push once  dextrose 50% Injectable 12.5 Gram(s) IV Push once  dextrose 50% Injectable 25 Gram(s) IV Push once  dextrose Oral Gel 15 Gram(s) Oral once PRN Blood Glucose LESS THAN 70 milliGRAM(s)/deciliter  glucagon  Injectable 1 milliGRAM(s) IntraMuscular once  insulin lispro (ADMELOG) corrective regimen sliding scale   SubCutaneous every 6 hours    Topical/Other Medications  naloxone Injectable 0.1 milliGRAM(s) IV Push every 3 minutes PRN For ANY of the following changes in patient status:  A. RR LESS THAN 10 breaths per minute, B. Oxygen saturation LESS THAN 90%, C. Sedation score of 6  omega-3-Acid Ethyl Esters 2 Gram(s) Oral two times a day        ALLERGIES:  No Known Allergies    -------------------------------------------------------------------------------------      HOME MEDS :  Home Medications:  losartan 25 mg oral tablet: 1 tab(s) orally once a day (06 Jun 2018 10:23)  Lovaza 1000 mg oral capsule: 2 cap(s) orally 2 times a day (06 Jun 2018 10:23)  metFORMIN 500 mg oral tablet: 1 tab(s) orally once a day (06 Jun 2018 10:23)        --------------------------------------------------------------------------------------    VITAL SIGNS:  T(C): 36.5 (04-25-25 @ 00:00), Max: 36.8 (04-24-25 @ 01:05)  HR: 81 (04-25-25 @ 00:15) (69 - 109)  BP: 119/60 (04-25-25 @ 00:15) (116/63 - 159/75)  RR: 12 (04-25-25 @ 00:15) (12 - 20)  SpO2: 100% (04-25-25 @ 00:15) (96% - 100%)  --------------------------------------------------------------------------------------    INS AND OUTS:    I&O's Detail    23 Apr 2025 07:01  -  24 Apr 2025 07:00  --------------------------------------------------------  IN:  Total IN: 0 mL    OUT:    Voided (mL): 200 mL  Total OUT: 200 mL    Total NET: -200 mL      24 Apr 2025 07:01  -  25 Apr 2025 00:58  --------------------------------------------------------  IN:    Lactated Ringers: 400 mL    Oral Fluid: 25 mL    sodium chloride 0.9%: 400 mL  Total IN: 825 mL    OUT:    Bulb (mL): 155 mL    Bulb (mL): 6 mL    Ureteral Catheter (mL): 475 mL  Total OUT: 636 mL    Total NET: 189 mL        --------------------------------------------------------------------------------------

## 2025-04-26 LAB
ANION GAP SERPL CALC-SCNC: 9 MMOL/L — SIGNIFICANT CHANGE UP (ref 7–14)
BUN SERPL-MCNC: 16 MG/DL — SIGNIFICANT CHANGE UP (ref 7–23)
CALCIUM SERPL-MCNC: 8.6 MG/DL — SIGNIFICANT CHANGE UP (ref 8.4–10.5)
CHLORIDE SERPL-SCNC: 109 MMOL/L — HIGH (ref 98–107)
CO2 SERPL-SCNC: 18 MMOL/L — LOW (ref 22–31)
CREAT SERPL-MCNC: 0.96 MG/DL — SIGNIFICANT CHANGE UP (ref 0.5–1.3)
CRP SERPL-MCNC: 185.2 MG/L — HIGH
EGFR: 80 ML/MIN/1.73M2 — SIGNIFICANT CHANGE UP
EGFR: 80 ML/MIN/1.73M2 — SIGNIFICANT CHANGE UP
ERYTHROCYTE [SEDIMENTATION RATE] IN BLOOD: 82 MM/HR — HIGH (ref 1–15)
GLUCOSE BLDC GLUCOMTR-MCNC: 115 MG/DL — HIGH (ref 70–99)
GLUCOSE BLDC GLUCOMTR-MCNC: 263 MG/DL — HIGH (ref 70–99)
GLUCOSE BLDC GLUCOMTR-MCNC: 95 MG/DL — SIGNIFICANT CHANGE UP (ref 70–99)
GLUCOSE BLDC GLUCOMTR-MCNC: 99 MG/DL — SIGNIFICANT CHANGE UP (ref 70–99)
GLUCOSE SERPL-MCNC: 141 MG/DL — HIGH (ref 70–99)
GRAM STN FLD: ABNORMAL
GRAM STN FLD: ABNORMAL
HCT VFR BLD CALC: 26.5 % — LOW (ref 39–50)
HGB BLD-MCNC: 9.1 G/DL — LOW (ref 13–17)
MAGNESIUM SERPL-MCNC: 2 MG/DL — SIGNIFICANT CHANGE UP (ref 1.6–2.6)
MCHC RBC-ENTMCNC: 29.4 PG — SIGNIFICANT CHANGE UP (ref 27–34)
MCHC RBC-ENTMCNC: 34.3 G/DL — SIGNIFICANT CHANGE UP (ref 32–36)
MCV RBC AUTO: 85.5 FL — SIGNIFICANT CHANGE UP (ref 80–100)
NRBC # BLD AUTO: 0 K/UL — SIGNIFICANT CHANGE UP (ref 0–0)
NRBC # FLD: 0 K/UL — SIGNIFICANT CHANGE UP (ref 0–0)
NRBC BLD AUTO-RTO: 0 /100 WBCS — SIGNIFICANT CHANGE UP (ref 0–0)
PHOSPHATE SERPL-MCNC: 2.4 MG/DL — LOW (ref 2.5–4.5)
PLATELET # BLD AUTO: 371 K/UL — SIGNIFICANT CHANGE UP (ref 150–400)
POTASSIUM SERPL-MCNC: 3.9 MMOL/L — SIGNIFICANT CHANGE UP (ref 3.5–5.3)
POTASSIUM SERPL-SCNC: 3.9 MMOL/L — SIGNIFICANT CHANGE UP (ref 3.5–5.3)
RBC # BLD: 3.1 M/UL — LOW (ref 4.2–5.8)
RBC # FLD: 15.9 % — HIGH (ref 10.3–14.5)
SODIUM SERPL-SCNC: 136 MMOL/L — SIGNIFICANT CHANGE UP (ref 135–145)
SPECIMEN SOURCE: SIGNIFICANT CHANGE UP
SPECIMEN SOURCE: SIGNIFICANT CHANGE UP
WBC # BLD: 14.41 K/UL — HIGH (ref 3.8–10.5)
WBC # FLD AUTO: 14.41 K/UL — HIGH (ref 3.8–10.5)

## 2025-04-26 PROCEDURE — 99233 SBSQ HOSP IP/OBS HIGH 50: CPT | Mod: GC

## 2025-04-26 PROCEDURE — 49406 IMAGE CATH FLUID PERI/RETRO: CPT | Mod: XS

## 2025-04-26 RX ORDER — SOD PHOS DI, MONO/K PHOS MONO 250 MG
1 TABLET ORAL ONCE
Refills: 0 | Status: COMPLETED | OUTPATIENT
Start: 2025-04-26 | End: 2025-04-26

## 2025-04-26 RX ORDER — INSULIN LISPRO 100 U/ML
INJECTION, SOLUTION INTRAVENOUS; SUBCUTANEOUS AT BEDTIME
Refills: 0 | Status: DISCONTINUED | OUTPATIENT
Start: 2025-04-26 | End: 2025-04-26

## 2025-04-26 RX ORDER — INSULIN LISPRO 100 U/ML
INJECTION, SOLUTION INTRAVENOUS; SUBCUTANEOUS
Refills: 0 | Status: DISCONTINUED | OUTPATIENT
Start: 2025-04-26 | End: 2025-04-29

## 2025-04-26 RX ORDER — INSULIN LISPRO 100 U/ML
INJECTION, SOLUTION INTRAVENOUS; SUBCUTANEOUS
Refills: 0 | Status: DISCONTINUED | OUTPATIENT
Start: 2025-04-26 | End: 2025-04-26

## 2025-04-26 RX ORDER — HEPARIN SODIUM 1000 [USP'U]/ML
5000 INJECTION INTRAVENOUS; SUBCUTANEOUS EVERY 8 HOURS
Refills: 0 | Status: DISCONTINUED | OUTPATIENT
Start: 2025-04-26 | End: 2025-05-04

## 2025-04-26 RX ORDER — INSULIN LISPRO 100 U/ML
INJECTION, SOLUTION INTRAVENOUS; SUBCUTANEOUS AT BEDTIME
Refills: 0 | Status: DISCONTINUED | OUTPATIENT
Start: 2025-04-26 | End: 2025-04-29

## 2025-04-26 RX ADMIN — Medication 975 MILLIGRAM(S): at 03:34

## 2025-04-26 RX ADMIN — OXYCODONE HYDROCHLORIDE 10 MILLIGRAM(S): 30 TABLET ORAL at 08:10

## 2025-04-26 RX ADMIN — INSULIN LISPRO 1: 100 INJECTION, SOLUTION INTRAVENOUS; SUBCUTANEOUS at 21:25

## 2025-04-26 RX ADMIN — GABAPENTIN 100 MILLIGRAM(S): 400 CAPSULE ORAL at 21:08

## 2025-04-26 RX ADMIN — Medication 25 GRAM(S): at 21:07

## 2025-04-26 RX ADMIN — METHOCARBAMOL 500 MILLIGRAM(S): 500 TABLET, FILM COATED ORAL at 20:12

## 2025-04-26 RX ADMIN — METHOCARBAMOL 500 MILLIGRAM(S): 500 TABLET, FILM COATED ORAL at 09:41

## 2025-04-26 RX ADMIN — HEPARIN SODIUM 5000 UNIT(S): 1000 INJECTION INTRAVENOUS; SUBCUTANEOUS at 21:08

## 2025-04-26 RX ADMIN — SODIUM CHLORIDE 75 MILLILITER(S): 9 INJECTION, SOLUTION INTRAVENOUS at 05:06

## 2025-04-26 RX ADMIN — OMEGA-3-ACID ETHYL ESTERS CAPSULES 2 GRAM(S): 1 CAPSULE, LIQUID FILLED ORAL at 18:02

## 2025-04-26 RX ADMIN — Medication 975 MILLIGRAM(S): at 09:46

## 2025-04-26 RX ADMIN — Medication 975 MILLIGRAM(S): at 04:09

## 2025-04-26 RX ADMIN — Medication 1 PACKET(S): at 05:05

## 2025-04-26 RX ADMIN — GABAPENTIN 100 MILLIGRAM(S): 400 CAPSULE ORAL at 13:36

## 2025-04-26 RX ADMIN — Medication 2 TABLET(S): at 21:08

## 2025-04-26 RX ADMIN — Medication 975 MILLIGRAM(S): at 22:30

## 2025-04-26 RX ADMIN — Medication 975 MILLIGRAM(S): at 21:07

## 2025-04-26 RX ADMIN — ATORVASTATIN CALCIUM 40 MILLIGRAM(S): 80 TABLET, FILM COATED ORAL at 21:07

## 2025-04-26 RX ADMIN — OXYCODONE HYDROCHLORIDE 10 MILLIGRAM(S): 30 TABLET ORAL at 21:40

## 2025-04-26 RX ADMIN — Medication 23.8 MICROGRAM(S)/KG/MIN: at 05:08

## 2025-04-26 RX ADMIN — OXYCODONE HYDROCHLORIDE 10 MILLIGRAM(S): 30 TABLET ORAL at 21:10

## 2025-04-26 RX ADMIN — Medication 975 MILLIGRAM(S): at 16:37

## 2025-04-26 RX ADMIN — GABAPENTIN 100 MILLIGRAM(S): 400 CAPSULE ORAL at 05:05

## 2025-04-26 RX ADMIN — Medication 25 GRAM(S): at 05:07

## 2025-04-26 RX ADMIN — Medication 975 MILLIGRAM(S): at 10:15

## 2025-04-26 RX ADMIN — Medication 40 MILLIGRAM(S): at 06:01

## 2025-04-26 RX ADMIN — Medication 975 MILLIGRAM(S): at 17:54

## 2025-04-26 RX ADMIN — Medication 25 GRAM(S): at 13:35

## 2025-04-26 RX ADMIN — OXYCODONE HYDROCHLORIDE 10 MILLIGRAM(S): 30 TABLET ORAL at 08:20

## 2025-04-26 NOTE — PROGRESS NOTE ADULT - SUBJECTIVE AND OBJECTIVE BOX
Plastic Surgery Progress Note (pg [facility]: [id number], [facility]: [phone])    SUBJECTIVE  The patient was seen and examined. Schedule for psoas abscess drainage today. Right drain with 84cc output and left drain with 297cc output.    OBJECTIVE  ___________________________________________________  VITAL SIGNS / I&O's   Vital Signs Last 24 Hrs  T(C): 36.3 (25 Apr 2025 07:00), Max: 36.7 (24 Apr 2025 12:11)  T(F): 97.3 (25 Apr 2025 07:00), Max: 98.1 (25 Apr 2025 04:00)  HR: 73 (25 Apr 2025 10:45) (64 - 102)  BP: 113/62 (25 Apr 2025 10:00) (112/64 - 143/55)  BP(mean): 79 (25 Apr 2025 10:00) (75 - 103)  RR: 13 (25 Apr 2025 10:45) (9 - 20)  SpO2: 98% (25 Apr 2025 10:45) (93% - 100%)    Parameters below as of 25 Apr 2025 10:15  Patient On (Oxygen Delivery Method): room air          24 Apr 2025 07:01  -  25 Apr 2025 07:00  --------------------------------------------------------  IN:    Lactated Ringers: 800 mL    Oral Fluid: 105 mL    Phenylephrine: 47.6 mL    sodium chloride 0.9%: 400 mL  Total IN: 1352.6 mL    OUT:    Bulb (mL): 11 mL    Bulb (mL): 311 mL    Ureteral Catheter (mL): 910 mL  Total OUT: 1232 mL    Total NET: 120.6 mL      25 Apr 2025 07:01  -  25 Apr 2025 11:01  --------------------------------------------------------  IN:    Lactated Ringers: 300 mL    Oral Fluid: 100 mL    Phenylephrine: 80 mL  Total IN: 480 mL    OUT:    Ureteral Catheter (mL): 210 mL  Total OUT: 210 mL    Total NET: 270 mL        ___________________________________________________  PHYSICAL EXAM    -- CONSTITUTIONAL: NAD, lying in bed  -- NEURO: Awake, alert  -- BACK: dressing with minimal strikethrough. JPx2 ss and holding suction   ___________________________________________________  LABS                        9.8    14.15 )-----------( 382      ( 25 Apr 2025 02:45 )             29.0     25 Apr 2025 02:45    138    |  107    |  17     ----------------------------<  134    3.7     |  18     |  0.74     Ca    9.0        25 Apr 2025 02:45  Phos  3.3       25 Apr 2025 02:45  Mg     2.00      25 Apr 2025 02:45    TPro  7.3    /  Alb  2.8    /  TBili  0.4    /  DBili  x      /  AST  26     /  ALT  44     /  AlkPhos  141    23 Apr 2025 11:52    PT/INR - ( 24 Apr 2025 05:00 )   PT: 13.8 sec;   INR: 1.16 ratio         PTT - ( 24 Apr 2025 05:00 )  PTT:32.7 sec  CAPILLARY BLOOD GLUCOSE      POCT Blood Glucose.: 134 mg/dL (25 Apr 2025 06:29)  POCT Blood Glucose.: 145 mg/dL (25 Apr 2025 01:21)  POCT Blood Glucose.: 169 mg/dL (24 Apr 2025 21:49)  POCT Blood Glucose.: 135 mg/dL (24 Apr 2025 11:48)        Urinalysis Basic - ( 25 Apr 2025 02:45 )    Color: x / Appearance: x / SG: x / pH: x  Gluc: 134 mg/dL / Ketone: x  / Bili: x / Urobili: x   Blood: x / Protein: x / Nitrite: x   Leuk Esterase: x / RBC: x / WBC x   Sq Epi: x / Non Sq Epi: x / Bacteria: x      ___________________________________________________  MICRO  Recent Cultures:  Specimen Source: Abscess #3 EPIDURAL ABSCESS, 04-24 @ 20:24; Results   Testing in progress; Gram Stain:   Moderate polymorphonuclear leukocytes seen per low power field  Rare Gram Negative Rods seen per oil power field[!]; Organism: --  Specimen Source: Other L1 L2 DISC SPALE #5, 04-24 @ 19:46; Results   Testing in progress; Gram Stain: --; Organism: --  Specimen Source: Abscess #2 EPIDURAL ABSCESS, 04-24 @ 18:39; Results   Testing in progress; Gram Stain:   Moderate polymorphonuclear leukocytes seen per low power field  Few Gram Negative Rods seen per oil power field[!]; Organism: --  Specimen Source: Abscess #1 EPIDURAL ABSCESS, 04-24 @ 18:38; Results   Testing in progress; Gram Stain:   Moderate polymorphonuclear leukocytes seen per low power field  Few Gram Negative Rods seen per oil power field[!]; Organism: --  Specimen Source: Blood Blood-Peripheral, 04-23 @ 11:45; Results   Growth in anaerobic bottle: Gram Negative Rods[!]; Gram Stain:   Growth in anaerobic bottle: Gram Negative Rods[!]; Organism: --  Specimen Source: Blood Blood-Peripheral, 04-23 @ 11:43; Results   Growth in anaerobic bottle: Gram Negative Rods  Direct identification is available within approximately 3-5  hours either by Blood Panel Multiplexed PCR or Direct  MALDI-TOF. Details: [email or web address]]; Gram Stain:   Growth in anaerobic bottle: Gram Negative Rods[!]; Organism: Blood Culture PCR[!]    ___________________________________________________  MEDICATIONS  (STANDING):  acetaminophen     Tablet .. 975 milliGRAM(s) Oral every 6 hours  atorvastatin 40 milliGRAM(s) Oral at bedtime  gabapentin 100 milliGRAM(s) Oral every 8 hours  glucagon  Injectable 1 milliGRAM(s) IntraMuscular once  HYDROmorphone PCA (1 mG/mL) 30 milliLiter(s) PCA Continuous PCA Continuous  insulin lispro (ADMELOG) corrective regimen sliding scale   SubCutaneous every 6 hours  lactated ringers. 1000 milliLiter(s) (100 mL/Hr) IV Continuous <Continuous>  omega-3-Acid Ethyl Esters 2 Gram(s) Oral two times a day  pantoprazole    Tablet 40 milliGRAM(s) Oral before breakfast  phenylephrine    Infusion 1.6 MICROgram(s)/kG/Min (38.1 mL/Hr) IV Continuous <Continuous>  piperacillin/tazobactam IVPB.. 3.375 Gram(s) IV Intermittent every 8 hours  senna 2 Tablet(s) Oral at bedtime  vancomycin  IVPB 1000 milliGRAM(s) IV Intermittent every 12 hours    MEDICATIONS  (PRN):  acetaminophen     Tablet .. 975 milliGRAM(s) Oral every 8 hours PRN Mild Pain (1 - 3)  bisacodyl 5 milliGRAM(s) Oral every 12 hours PRN Constipation  HYDROmorphone  Injectable 0.5 milliGRAM(s) IV Push every 10 minutes PRN Severe Pain (7 - 10)  HYDROmorphone PCA (1 mG/mL) Rescue Clinician Bolus 0.5 milliGRAM(s) IV Push every 15 minutes PRN for Pain Scale GREATER THAN 6  magnesium hydroxide Suspension 30 milliLiter(s) Oral every 12 hours PRN Constipation  nalbuphine Injectable 1 milliGRAM(s) IV Push every 6 hours PRN Pruritus  naloxone Injectable 0.1 milliGRAM(s) IV Push every 3 minutes PRN For ANY of the following changes in patient status:  A. RR LESS THAN 10 breaths per minute, B. Oxygen saturation LESS THAN 90%, C. Sedation score of 6  ondansetron   Disintegrating Tablet 4 milliGRAM(s) Oral every 6 hours PRN Nausea and/or Vomiting

## 2025-04-26 NOTE — CONSULT NOTE ADULT - SUBJECTIVE AND OBJECTIVE BOX
C A R D I O L O G Y  *********************    DATE OF SERVICE: 04-26-25    HISTORY OF PRESENT ILLNESS: HPI: Pt is a  79 year old male w/ HTN, HLD, T2DM, chronic low back pain presents as a transfer from [facility] ED for surgery. . Pt was sent into ED yesterday by his pain medicine physician due to recent MRI findings concerning for L1-L2 discitis/OM with epidural abscess, bilateral psoas abscesses. Patient initially saw pain specialist in February for chronic back pain with radiations to bilateral lateral thighs (R>L). MRI at that time showed degenerative changes. He subsequently had epidural injections x2 (2/27, 3/11) with moderate pain relief. Pain began to worsen on 4/10. He had radiofrequency ablation performed on 4/11 and has since has severe worsening of pain and radiation to R lateral thigh. He reports recent bowel/bladder "incontinence" requiring diaper due to his inability to get to the bathroom in time due to pain limitations but states urge and sensation are intact. Denies fevers, chills, night sweats, weakness, numbness/tingling, saddle anesthesia, recent falls/trauma. He uses a cane for ambulation. Denies recent falls/trauma or any other ortho injuries.   Found to have epidural abscess now s/p Decompression, spine, lumbar, posterior approach, with fusion of posterior spinal column, planned for psoas abscess drainage.      PAST MEDICAL & SURGICAL HISTORY:  HTN (hypertension)  HLD (hyperlipidemia)  DM (diabetes mellitus)  Cataract    MEDICATIONS:  MEDICATIONS  (STANDING):  acetaminophen     Tablet .. 975 milliGRAM(s) Oral every 6 hours  atorvastatin 40 milliGRAM(s) Oral at bedtime  gabapentin 100 milliGRAM(s) Oral every 8 hours  glucagon  Injectable 1 milliGRAM(s) IntraMuscular once  insulin lispro (ADMELOG) corrective regimen sliding scale   SubCutaneous every 6 hours  lactated ringers. 1000 milliLiter(s) (75 mL/Hr) IV Continuous <Continuous>  lidocaine 4% Cream 1 Application(s) Topical once  methocarbamol 500 milliGRAM(s) Oral every 12 hours  omega-3-Acid Ethyl Esters 2 Gram(s) Oral two times a day  pantoprazole    Tablet 40 milliGRAM(s) Oral before breakfast  piperacillin/tazobactam IVPB.. 3.375 Gram(s) IV Intermittent every 8 hours  senna 2 Tablet(s) Oral at bedtime      Allergies: No Known Allergies    FAMILY HISTORY:  No pertinent family history in first degree relatives  Non-contributary for premature coronary disease or sudden cardiac death    SOCIAL HISTORY:    [X ] Non-smoker  [ ] Smoker  [ ] Alcohol    REVIEW OF SYSTEMS:  [ ]chest pain  [  ]shortness of breath  [  ]palpitations  [  ]syncope  [ ]near syncope [ ]upper extremity weakness   [ ] lower extremity weakness  [  ]diplopia  [  ]altered mental status   [  ]fevers  [ ]chills [ ]nausea  [ ]vomiting  [  ]dysphagia    [ ]abdominal pain  [ ]melena  [ ]BRBPR    [  ]epistaxis  [  ]rash    [ ]lower extremity edema    [X] All others negative	  [ ] Unable to obtain      LABS:	 	    CARDIAC MARKERS:             9.1    14.41 )-----------( 371      ( 26 Apr 2025 00:53 )             26.5     Hb Trend: 9.1<--    04-26    136  |  109[H]  |  16  ----------------------------<  141[H]  3.9   |  18[L]  |  0.96    Ca    8.6      26 Apr 2025 00:53  Phos  2.4     04-26  Mg     2.00     04-26    Creatinine Trend: 0.96<--, 0.74<--, 0.75<--, 0.80<--, 0.88<--    PHYSICAL EXAM:  T(C): 36.3 (04-26-25 @ 12:00), Max: 37 (04-25-25 @ 16:50)  HR: 90 (04-26-25 @ 12:00) (59 - 100)  BP: 87/57 (04-26-25 @ 12:00) (87/57 - 154/55)  RR: 10 (04-26-25 @ 12:00) (9 - 20)  SpO2: 99% (04-26-25 @ 12:00) (97% - 99%)  Wt(kg): --   BMI (kg/m2): 23.3 (04-24-25 @ 12:51)  I&O's Summary    25 Apr 2025 07:01  -  26 Apr 2025 07:00  --------------------------------------------------------  IN: 3066.4 mL / OUT: 2051 mL / NET: 1015.4 mL    26 Apr 2025 07:01  -  26 Apr 2025 15:24  --------------------------------------------------------  IN: 570 mL / OUT: 180 mL / NET: 390 mL        Gen: NAD  HEENT:  (-)icterus (-)pallor  CV: N S1 S2 1/6 DARYA (+)2 Pulses B/l  Resp:  Clear to auscultation B/L, normal effort  GI: (+) BS Soft, NT, ND  Lymph:  (-)Edema, (-)obvious lymphadenopathy  Skin: Warm to touch, Normal turgor  Psych: Appropriate mood and affect      TELEMETRY: 	  NSR    ECG:  	NSR      ASSESSMENT/PLAN: 	Pt is a  79 year old male w/ HTN, HLD, T2DM, chronic low back pain presents as a transfer from [facility] ED for surgery. . Pt was sent into ED yesterday by his pain medicine physician due to recent MRI findings concerning for L1-L2 discitis/OM with epidural abscess, bilateral psoas abscesses. Patient initially saw pain specialist in February for chronic back pain with radiations to bilateral lateral thighs (R>L). MRI at that time showed degenerative changes. He subsequently had epidural injections x2 (2/27, 3/11) with moderate pain relief. Pain began to worsen on 4/10. He had radiofrequency ablation performed on 4/11 and has since has severe worsening of pain and radiation to R lateral thigh. He reports recent bowel/bladder "incontinence" requiring diaper due to his inability to get to the bathroom in time due to pain limitations but states urge and sensation are intact. Denies fevers, chills, night sweats, weakness, numbness/tingling, saddle anesthesia, recent falls/trauma. He uses a cane for ambulation. Denies recent falls/trauma or any other ortho injuries.   Found to have epidural abscess now s/p Decompression, spine, lumbar, posterior approach, with fusion of posterior spinal column, planned for psoas abscess drainage.      - Tele NSR, EKG NSR  - s/p s/p Decompression, spine, lumbar, posterior approach, with fusion of posterior spinal column, planned for psoas abscess drainage.  - for psoas dranage today  - pain control per ICU  - c/w statin  - abx per ICU    Janet Roy MD  Pager: [phone]  Office: [phone]

## 2025-04-26 NOTE — PROGRESS NOTE ADULT - ASSESSMENT
79yMale w/ HTN, HLD, T2DM, chronic low back pain presents as a transfer from [facility] ED for surgery today. Pt was sent into ED yesterday by his pain medicine physician due to recent MRI findings concerning for L1-L2 discitis/OM with epidural abscess, bilateral psoas abscesses. Patient initially saw pain specialist in February for chronic back pain with radiations to bilateral lateral thighs (R>L). MRI at that time showed degenerative changes. He subsequently had epidural injections x2 (2/27, 3/11) with moderate pain relief. Pain began to worsen on 4/10. He had radiofrequency ablation performed on 4/11 and has since has severe worsening of pain and radiation to R lateral thigh. He reports recent bowel/bladder "incontinence" requiring diaper due to his inability to get to the bathroom in time due to pain limitations but states urge and sensation are intact. Denies fevers, chills, night sweats, weakness, numbness/tingling, saddle anesthesia, recent falls/trauma. He uses a cane for ambulation. Denies recent falls/trauma or any other ortho injuries. Before back pain was very active walked half an hour , going up and down stairs and doing grocery etc with no Chest pain or SOB.        Problem/Recommendation - 1:  ·  Problem: Epidural abscess.   ·  Recommendation: S/P Decompression, spine, lumbar, posterior approach, with fusion of posterior spinal column.  On IV Abxs per ID  .  Will defer management to  Neurosurgery.    < from: MR Lumbar Spine w/ IV Cont (04.23.25 @ 15:13) >  IMPRESSION:        1.   Cervical spine:   Moderate degenerative disc disease and   spondylosis at C4-5 through C6/7 with loss of disc height and associated   degenerative endplate changes.        2.   Thoracic spine:   Minimal enhancement within T7-8 which may   reflect early discitis.        3.   Lumbar spine:   Osteomyelitis and discitis at L1-2 with erosions   of the inferior L1 vertebral body and L2 vertebral body consistent with   osteomyelitis and discitis. Ventral epidural abscess is noted extending   from the L1-2 level superiorly to the T10 level with mass effect on the   ventral thecal sac, most prominently at the L1-2 level resulting in   marked compression. Multiple abscesses within the BILATERAL psoas   muscles, the largest measuring 4.3 cm on the RIGHT and 2.8 cm on the LEFT.     Problem/Recommendation - 2:  ·  Problem: Acute osteomyelitis of lumbar spine.   ·  Recommendation: L1 &L2 vertebrae .  On IV Abxs per ID .  Will defer management to Neurosurgery.     Problem/Recommendation - 3:  ·  Problem: Low back pain radiating to right lower extremity.   ·  Recommendation: Pain control.     Problem/Recommendation - 4:  ·  Problem: . BILATERAL psoas Abscess  ·  Recommendation: ON IV Abxs per ID .  IR planning drain today .       Problem/Recommendation - 5:  ·  Problem: HTN (hypertension).   ·  Recommendation: Takes Amlodipine and ARB so continue with hold parameters.  < from: TTE W or WO Ultrasound Enhancing Agent (04.24.25 @ 10:10) >  CONCLUSIONS:      1. Left ventricular cavity is normal in size. Left ventricular wall thickness is normal. Left ventricular systolic function is normal with an ejection fraction of 64 % by Carpenter's method of disks. There are no regional wall motion abnormalities seen.   2. Normal right ventricular cavity size and normal right ventricular systolic function. Tricuspid annular plane systolic excursion (TAPSE) is 2.2 cm (normal >=1.7 cm).   3. Structurally normal mitral valve with normal leaflet excursion. There is calcification of the mitral valve annulus. There is tracemitral regurgitation.   4. The aortic valve appears trileaflet with normal systolic excursion. There is calcification of the aortic valve leaflets. There is mild aortic regurgitation.     Problem/Recommendation - 6:  ·  Problem: HLD (hyperlipidemia).   ·  Recommendation: Continue Atorvastatin.     Problem/Recommendation - 7:  ·  Problem: DM (diabetes mellitus).   ·  Recommendation: ON Farxiga and holding.  SSI and Lantus in patient .    Over all  management per SICU .

## 2025-04-26 NOTE — CHART NOTE - NSCHARTNOTEFT_GEN_A_CORE
PRE-INTERVENTIONAL RADIOLOGY PROCEDURE NOTE    Procedure: psoas abscess drainage     Diagnosis/Indication: psoas abscess     Interventional Radiology Attending Physician: Rand    Ordering Attending Physician: Ludy     Pertinent Medical History:    Pertinent labs:                      9.1    14.41 )-----------( 371      ( 26 Apr 2025 00:53 )             26.5       04-26    136  |  109[H]  |  16  ----------------------------<  141[H]  3.9   |  18[L]  |  0.96    Ca    8.6      26 Apr 2025 00:53  Phos  2.4     04-26  Mg     2.00     04-26                Patient and Family Aware ? Yes

## 2025-04-26 NOTE — PROGRESS NOTE ADULT - ASSESSMENT
79yMale w/ HTN, HLD, T2DM, chronic low back pain 2/2 epidural abscess presents as a transfer from [facility] ED for surgery today. He underwent T10-Pelvis PSF T12-L2 lami 4/24/25 (Ludy/DAVID) SICU consulted post operatively for neurological checks hourly (q1h) and hemodynamic monitoring.    PLAN  NEUROLOGIC   - AAOx 3  - neurological checks hourly q1h  - Pain control: tylenol, PRN oxycodone    RESPIRATORY   - Room air  - Monitor SpO2 goal >92%    CARDIOVASCULAR   - Monitor hemodynamics   - antonio for MAP > 65    GASTROINTESTINAL   - Diet: Reg, NPO at MN  - GI PPX: protonix ppi  - bowel regimen    /RENAL   - IV fluids: LR @75  - Monitor BMP  - Strict I/Os  - Monitor electrolytes, replete PRN  - Maintain maradiaga catheter    HEMATOLOGIC  - Monitor H/H   - DVT prophylaxis: SCDs  - Holding chemical VTE ppx    INFECTIOUS DISEASE  - Monitor fever/WC  - Antibiotic ; Zosyn/Vanco for empiric coverage  - Blood cx 4/23 + gram negative  - Repeat blood cx pending  - IR drainage tmrw 4/26    ENDOCRINE  - hx of diabetes type 2   - Monitor gluc, check ha1c  - ISS    LINES  - right rad a line  - PIV x2  - DHARA drains x 2   - Maradiaga    Disposition:   SICU   79yMale w/ HTN, HLD, T2DM, chronic low back pain 2/2 epidural abscess presents as a transfer from [facility] ED for surgery today. He underwent T10-Pelvis PSF T12-L2 lami 4/24/25 (Ludy/DAVID) SICU consulted post operatively for neurological checks hourly (q1h) and hemodynamic monitoring.    PLAN  NEUROLOGIC   - AAOx 3  - q4hr neurochecks  - Pain control: tylenol, PRN oxycodone    RESPIRATORY   - Room air  - Monitor SpO2 goal >92%    CARDIOVASCULAR   - Monitor hemodynamics   - antonio for MAP > 65    GASTROINTESTINAL   - Diet: Reg, NPO at MN  - GI PPX: protonix ppi  - bowel regimen    /RENAL   - IV fluids: LR @75  - Monitor BMP  - Strict I/Os  - Monitor electrolytes, replete PRN  - Maintain maardiaga catheter    HEMATOLOGIC  - Monitor H/H   - DVT prophylaxis: SCDs  - Holding chemical VTE ppx    INFECTIOUS DISEASE  - Monitor fever/WC  - Antibiotic ; Zosyn/Vanco for empiric coverage  - Blood cx 4/23 + gram negative  - Repeat blood cx pending  - IR drainage tmrw 4/26    ENDOCRINE  - hx of diabetes type 2   - Monitor gluc, check ha1c  - ISS    LINES  - right rad a line  - PIV x2  - DHARA drains x 2   - Maradiaga    Disposition:   SICU

## 2025-04-26 NOTE — PROGRESS NOTE ADULT - SUBJECTIVE AND OBJECTIVE BOX
Date of Service  : 04-26-25 @ 16:22    INTERVAL HPI/OVERNIGHT EVENTS: Seen and examined earlier today . Going for Drains with IR.  Vital Signs Last 24 Hrs  T(C): 36.3 (26 Apr 2025 12:00), Max: 37 (25 Apr 2025 16:50)  T(F): 97.3 (26 Apr 2025 12:00), Max: 98.6 (25 Apr 2025 16:50)  HR: 90 (26 Apr 2025 12:00) (59 - 100)  BP: 87/57 (26 Apr 2025 12:00) (87/57 - 154/55)  BP(mean): 68 (26 Apr 2025 12:00) (66 - 93)  RR: 10 (26 Apr 2025 12:00) (9 - 20)  SpO2: 99% (26 Apr 2025 12:00) (97% - 99%)    Parameters below as of 26 Apr 2025 12:00  Patient On (Oxygen Delivery Method): room air      I&O's Summary    25 Apr 2025 07:01  -  26 Apr 2025 07:00  --------------------------------------------------------  IN: 3066.4 mL / OUT: 2051 mL / NET: 1015.4 mL    26 Apr 2025 07:01  -  26 Apr 2025 16:22  --------------------------------------------------------  IN: 570 mL / OUT: 180 mL / NET: 390 mL      MEDICATIONS  (STANDING):  acetaminophen     Tablet .. 975 milliGRAM(s) Oral every 6 hours  atorvastatin 40 milliGRAM(s) Oral at bedtime  gabapentin 100 milliGRAM(s) Oral every 8 hours  glucagon  Injectable 1 milliGRAM(s) IntraMuscular once  insulin lispro (ADMELOG) corrective regimen sliding scale   SubCutaneous every 6 hours  lactated ringers. 1000 milliLiter(s) (75 mL/Hr) IV Continuous <Continuous>  lidocaine 4% Cream 1 Application(s) Topical once  methocarbamol 500 milliGRAM(s) Oral every 12 hours  omega-3-Acid Ethyl Esters 2 Gram(s) Oral two times a day  pantoprazole    Tablet 40 milliGRAM(s) Oral before breakfast  piperacillin/tazobactam IVPB.. 3.375 Gram(s) IV Intermittent every 8 hours  senna 2 Tablet(s) Oral at bedtime    MEDICATIONS  (PRN):  bisacodyl 5 milliGRAM(s) Oral every 12 hours PRN Constipation  magnesium hydroxide Suspension 30 milliLiter(s) Oral every 12 hours PRN Constipation  naloxone Injectable 0.1 milliGRAM(s) IV Push every 3 minutes PRN For ANY of the following changes in patient status:  A. RR LESS THAN 10 breaths per minute, B. Oxygen saturation LESS THAN 90%, C. Sedation score of 6  ondansetron   Disintegrating Tablet 4 milliGRAM(s) Oral every 6 hours PRN Nausea and/or Vomiting  oxyCODONE    IR 5 milliGRAM(s) Oral every 3 hours PRN Moderate Pain (4 - 6)  oxyCODONE    IR 10 milliGRAM(s) Oral every 3 hours PRN Severe Pain (7 - 10)    LABS:                        9.1    14.41 )-----------( 371      ( 26 Apr 2025 00:53 )             26.5     04-26    136  |  109[H]  |  16  ----------------------------<  141[H]  3.9   |  18[L]  |  0.96    Ca    8.6      26 Apr 2025 00:53  Phos  2.4     04-26  Mg     2.00     04-26        Urinalysis Basic - ( 26 Apr 2025 00:53 )    Color: x / Appearance: x / SG: x / pH: x  Gluc: 141 mg/dL / Ketone: x  / Bili: x / Urobili: x   Blood: x / Protein: x / Nitrite: x   Leuk Esterase: x / RBC: x / WBC x   Sq Epi: x / Non Sq Epi: x / Bacteria: x      CAPILLARY BLOOD GLUCOSE      POCT Blood Glucose.: 99 mg/dL (26 Apr 2025 12:42)  POCT Blood Glucose.: 95 mg/dL (26 Apr 2025 05:12)  POCT Blood Glucose.: 159 mg/dL (25 Apr 2025 23:14)  POCT Blood Glucose.: 199 mg/dL (25 Apr 2025 17:12)      ABG - ( 24 Apr 2025 22:30 )  pH, Arterial: 7.39  pH, Blood: x     /  pCO2: 33    /  pO2: 92    / HCO3: 20    / Base Excess: -4.3  /  SaO2: 99.5              Urinalysis Basic - ( 26 Apr 2025 00:53 )    Color: x / Appearance: x / SG: x / pH: x  Gluc: 141 mg/dL / Ketone: x  / Bili: x / Urobili: x   Blood: x / Protein: x / Nitrite: x   Leuk Esterase: x / RBC: x / WBC x   Sq Epi: x / Non Sq Epi: x / Bacteria: x      REVIEW OF SYSTEMS:  CONSTITUTIONAL: No fever, weight loss, or fatigue  EYES: No eye pain, visual disturbances, or discharge  ENMT:  No difficulty hearing, tinnitus, vertigo; No sinus or throat pain  NECK: No pain or stiffness  RESPIRATORY: No cough, wheezing, chills or hemoptysis; No shortness of breath  CARDIOVASCULAR: No chest pain, palpitations, dizziness, or leg swelling  GASTROINTESTINAL: No abdominal or epigastric pain. No nausea, vomiting, or hematemesis; No diarrhea or constipation. No melena or hematochezia.  GENITOURINARY: No dysuria, frequency, hematuria, or incontinence      RADIOLOGY & ADDITIONAL TESTS:    Consultant(s) Notes Reviewed:  [x ] YES  [ ] NO    PHYSICAL EXAM:  GENERAL: NAD, well-groomed, well-developed,not in any distress ,  HEAD:  Atraumatic, Normocephalic  EYES: EOMI, PERRLA, conjunctiva and sclera clear  ENMT: No tonsillar erythema, exudates, or enlargement; Moist mucous membranes, Good dentition, No lesions  NECK: Supple, No JVD, Normal thyroid  NERVOUS SYSTEM:  Alert & Oriented X3,  CHEST/LUNG: Good air entry bilateral with no  rales, rhonchi, wheezing, or rubs  HEART: Regular rate and rhythm; No murmurs, rubs, or gallops  ABDOMEN: Soft, Nontender, Nondistended; Bowel sounds present  EXTREMITIES:  2+ Peripheral Pulses, No clubbing, cyanosis, or edema      Care Discussed with Consultants/Other Providers [ x] YES  [ ] NO

## 2025-04-26 NOTE — PROGRESS NOTE ADULT - SUBJECTIVE AND OBJECTIVE BOX
24 HOUR EVENTS:  Interval Events   - CT AP ordered to assess psoas abscess > IR drainage Saturday 4/25  - Diet adv to reg, NPO at MN   - MAP goal 80 now >65    SUBJECTIVE/ROS:  Patient seen at bedside this AM.     OBJECTIVE:    VITALS:  Vital Signs Last 24 Hrs  T(C): 36.4 (25 Apr 2025 20:00), Max: 37 (25 Apr 2025 16:50)  T(F): 97.6 (25 Apr 2025 20:00), Max: 98.6 (25 Apr 2025 16:50)  HR: 64 (25 Apr 2025 23:45) (60 - 105)  BP: 104/53 (25 Apr 2025 23:45) (104/53 - 154/55)  BP(mean): 68 (25 Apr 2025 23:45) (68 - 93)  RR: 11 (25 Apr 2025 23:45) (9 - 20)  SpO2: 98% (25 Apr 2025 23:45) (93% - 100%)    Parameters below as of 25 Apr 2025 22:00  Patient On (Oxygen Delivery Method): room air        I&O's Summary    24 Apr 2025 07:01  -  25 Apr 2025 07:00  --------------------------------------------------------  IN: 1352.6 mL / OUT: 1232 mL / NET: 120.6 mL    25 Apr 2025 07:01  -  26 Apr 2025 00:21  --------------------------------------------------------  IN: 2381.8 mL / OUT: 1644 mL / NET: 737.8 mL        ACCESS DEVICES:  [ ] Peripheral IV  [ ] Central Venous Line	[ ] R	[ ] L	[ ] IJ	[ ] Fem	[ ] SC	Placed:   [ ] Arterial Line		[ ] R	[ ] L	[ ] Fem	[ ] Rad	[ ] Ax	Placed:   [ ] PICC:					[ ] Mediport  [ ] Urinary Catheter, Date Placed:   [x] Necessity of urinary, arterial, and venous catheters discussed      LABS:                        9.8    14.15 )-----------( 382      ( 25 Apr 2025 02:45 )             29.0   04-25    138  |  107  |  17  ----------------------------<  134[H]  3.7   |  18[L]  |  0.74    Ca    9.0      25 Apr 2025 02:45  Phos  3.3     04-25  Mg     2.00     04-25      CAPILLARY BLOOD GLUCOSE      POCT Blood Glucose.: 159 mg/dL (25 Apr 2025 23:14)  POCT Blood Glucose.: 199 mg/dL (25 Apr 2025 17:12)  POCT Blood Glucose.: 123 mg/dL (25 Apr 2025 12:29)  POCT Blood Glucose.: 134 mg/dL (25 Apr 2025 06:29)  POCT Blood Glucose.: 145 mg/dL (25 Apr 2025 01:21)      MEDICATIONS:   MEDICATIONS  (STANDING):  acetaminophen     Tablet .. 975 milliGRAM(s) Oral every 6 hours  atorvastatin 40 milliGRAM(s) Oral at bedtime  gabapentin 100 milliGRAM(s) Oral every 8 hours  glucagon  Injectable 1 milliGRAM(s) IntraMuscular once  insulin lispro (ADMELOG) corrective regimen sliding scale   SubCutaneous every 6 hours  lactated ringers. 1000 milliLiter(s) (75 mL/Hr) IV Continuous <Continuous>  lidocaine 4% Cream 1 Application(s) Topical once  methocarbamol 500 milliGRAM(s) Oral every 12 hours  omega-3-Acid Ethyl Esters 2 Gram(s) Oral two times a day  pantoprazole    Tablet 40 milliGRAM(s) Oral before breakfast  phenylephrine    Infusion 1.999 MICROgram(s)/kG/Min (23.8 mL/Hr) IV Continuous <Continuous>  piperacillin/tazobactam IVPB.. 3.375 Gram(s) IV Intermittent every 8 hours  senna 2 Tablet(s) Oral at bedtime    MEDICATIONS  (PRN):  bisacodyl 5 milliGRAM(s) Oral every 12 hours PRN Constipation  magnesium hydroxide Suspension 30 milliLiter(s) Oral every 12 hours PRN Constipation  naloxone Injectable 0.1 milliGRAM(s) IV Push every 3 minutes PRN For ANY of the following changes in patient status:  A. RR LESS THAN 10 breaths per minute, B. Oxygen saturation LESS THAN 90%, C. Sedation score of 6  ondansetron   Disintegrating Tablet 4 milliGRAM(s) Oral every 6 hours PRN Nausea and/or Vomiting  oxyCODONE    IR 5 milliGRAM(s) Oral every 3 hours PRN Moderate Pain (4 - 6)  oxyCODONE    IR 10 milliGRAM(s) Oral every 3 hours PRN Severe Pain (7 - 10)                   24 HOUR EVENTS:  Interval Events   - CT AP ordered to assess psoas abscess > IR drainage Saturday 4/25  - Diet adv to reg, NPO at MN   - MAP goal >65, off pressors  - Q4hr neurochecks    SUBJECTIVE/ROS:  Patient seen at bedside this AM.     OBJECTIVE:    VITALS:  Vital Signs Last 24 Hrs  T(C): 36.4 (25 Apr 2025 20:00), Max: 37 (25 Apr 2025 16:50)  T(F): 97.6 (25 Apr 2025 20:00), Max: 98.6 (25 Apr 2025 16:50)  HR: 64 (25 Apr 2025 23:45) (60 - 105)  BP: 104/53 (25 Apr 2025 23:45) (104/53 - 154/55)  BP(mean): 68 (25 Apr 2025 23:45) (68 - 93)  RR: 11 (25 Apr 2025 23:45) (9 - 20)  SpO2: 98% (25 Apr 2025 23:45) (93% - 100%)    Parameters below as of 25 Apr 2025 22:00  Patient On (Oxygen Delivery Method): room air        I&O's Summary    24 Apr 2025 07:01  -  25 Apr 2025 07:00  --------------------------------------------------------  IN: 1352.6 mL / OUT: 1232 mL / NET: 120.6 mL    25 Apr 2025 07:01  -  26 Apr 2025 00:21  --------------------------------------------------------  IN: 2381.8 mL / OUT: 1644 mL / NET: 737.8 mL        ACCESS DEVICES:  [ ] Peripheral IV  [ ] Central Venous Line	[ ] R	[ ] L	[ ] IJ	[ ] Fem	[ ] SC	Placed:   [ ] Arterial Line		[ ] R	[ ] L	[ ] Fem	[ ] Rad	[ ] Ax	Placed:   [ ] PICC:					[ ] Mediport  [ ] Urinary Catheter, Date Placed:   [x] Necessity of urinary, arterial, and venous catheters discussed      LABS:                        9.8    14.15 )-----------( 382      ( 25 Apr 2025 02:45 )             29.0   04-25    138  |  107  |  17  ----------------------------<  134[H]  3.7   |  18[L]  |  0.74    Ca    9.0      25 Apr 2025 02:45  Phos  3.3     04-25  Mg     2.00     04-25      CAPILLARY BLOOD GLUCOSE      POCT Blood Glucose.: 159 mg/dL (25 Apr 2025 23:14)  POCT Blood Glucose.: 199 mg/dL (25 Apr 2025 17:12)  POCT Blood Glucose.: 123 mg/dL (25 Apr 2025 12:29)  POCT Blood Glucose.: 134 mg/dL (25 Apr 2025 06:29)  POCT Blood Glucose.: 145 mg/dL (25 Apr 2025 01:21)      MEDICATIONS:   MEDICATIONS  (STANDING):  acetaminophen     Tablet .. 975 milliGRAM(s) Oral every 6 hours  atorvastatin 40 milliGRAM(s) Oral at bedtime  gabapentin 100 milliGRAM(s) Oral every 8 hours  glucagon  Injectable 1 milliGRAM(s) IntraMuscular once  insulin lispro (ADMELOG) corrective regimen sliding scale   SubCutaneous every 6 hours  lactated ringers. 1000 milliLiter(s) (75 mL/Hr) IV Continuous <Continuous>  lidocaine 4% Cream 1 Application(s) Topical once  methocarbamol 500 milliGRAM(s) Oral every 12 hours  omega-3-Acid Ethyl Esters 2 Gram(s) Oral two times a day  pantoprazole    Tablet 40 milliGRAM(s) Oral before breakfast  phenylephrine    Infusion 1.999 MICROgram(s)/kG/Min (23.8 mL/Hr) IV Continuous <Continuous>  piperacillin/tazobactam IVPB.. 3.375 Gram(s) IV Intermittent every 8 hours  senna 2 Tablet(s) Oral at bedtime    MEDICATIONS  (PRN):  bisacodyl 5 milliGRAM(s) Oral every 12 hours PRN Constipation  magnesium hydroxide Suspension 30 milliLiter(s) Oral every 12 hours PRN Constipation  naloxone Injectable 0.1 milliGRAM(s) IV Push every 3 minutes PRN For ANY of the following changes in patient status:  A. RR LESS THAN 10 breaths per minute, B. Oxygen saturation LESS THAN 90%, C. Sedation score of 6  ondansetron   Disintegrating Tablet 4 milliGRAM(s) Oral every 6 hours PRN Nausea and/or Vomiting  oxyCODONE    IR 5 milliGRAM(s) Oral every 3 hours PRN Moderate Pain (4 - 6)  oxyCODONE    IR 10 milliGRAM(s) Oral every 3 hours PRN Severe Pain (7 - 10)

## 2025-04-26 NOTE — PROGRESS NOTE ADULT - SUBJECTIVE AND OBJECTIVE BOX
Orthopedic Surgery Progress Note     S: Patient seen and examined today. No acute events overnight. Pain is well controlled. Denies f/c, chest pain, shortness of breath, dizziness.    MEDICATIONS  (STANDING):  acetaminophen     Tablet .. 975 milliGRAM(s) Oral every 6 hours  atorvastatin 40 milliGRAM(s) Oral at bedtime  gabapentin 100 milliGRAM(s) Oral every 8 hours  glucagon  Injectable 1 milliGRAM(s) IntraMuscular once  insulin lispro (ADMELOG) corrective regimen sliding scale   SubCutaneous every 6 hours  lactated ringers. 1000 milliLiter(s) (75 mL/Hr) IV Continuous <Continuous>  lidocaine 4% Cream 1 Application(s) Topical once  methocarbamol 500 milliGRAM(s) Oral every 12 hours  omega-3-Acid Ethyl Esters 2 Gram(s) Oral two times a day  pantoprazole    Tablet 40 milliGRAM(s) Oral before breakfast  phenylephrine    Infusion 1.999 MICROgram(s)/kG/Min (23.8 mL/Hr) IV Continuous <Continuous>  piperacillin/tazobactam IVPB.. 3.375 Gram(s) IV Intermittent every 8 hours  senna 2 Tablet(s) Oral at bedtime    MEDICATIONS  (PRN):  bisacodyl 5 milliGRAM(s) Oral every 12 hours PRN Constipation  magnesium hydroxide Suspension 30 milliLiter(s) Oral every 12 hours PRN Constipation  naloxone Injectable 0.1 milliGRAM(s) IV Push every 3 minutes PRN For ANY of the following changes in patient status:  A. RR LESS THAN 10 breaths per minute, B. Oxygen saturation LESS THAN 90%, C. Sedation score of 6  ondansetron   Disintegrating Tablet 4 milliGRAM(s) Oral every 6 hours PRN Nausea and/or Vomiting  oxyCODONE    IR 5 milliGRAM(s) Oral every 3 hours PRN Moderate Pain (4 - 6)  oxyCODONE    IR 10 milliGRAM(s) Oral every 3 hours PRN Severe Pain (7 - 10)      Physical Exam:  Gen: NAD  Spine:  Dressing CDI  2x DHARA drains in place w SS output    Motor:                   C5                C6              C7               C8           T1   R            5/5                5/5            5/5             5/5          5/5  L             5/5               5/5             5/5             5/5          5/5                L2             L3             L4               L5            S1  R         2/5          4/5          4/5             4/5           4/5  L          3+/5          4/5           4/5           4/5           4/5    *LE motor exam limited by pain    Sensory:            C5         C6         C7      C8       T1        (0=absent, 1=impaired, 2=normal, NT=not testable)  R         2            2           2        2         2  L          2            2           2        2         2               L2          L3         L4      L5       S1         (0=absent, 1=impaired, 2=normal, NT=not testable)  R         2            2            2        2        2  L          2            2           2        2         2    Vital Signs Last 24 Hrs  T(C): 36.5 (26 Apr 2025 04:00), Max: 37 (25 Apr 2025 16:50)  T(F): 97.7 (26 Apr 2025 04:00), Max: 98.6 (25 Apr 2025 16:50)  HR: 72 (26 Apr 2025 06:15) (59 - 105)  BP: 112/64 (26 Apr 2025 06:00) (101/53 - 154/55)  BP(mean): 79 (26 Apr 2025 06:00) (66 - 93)  RR: 13 (26 Apr 2025 06:15) (9 - 20)  SpO2: 98% (26 Apr 2025 06:15) (97% - 100%)    Parameters below as of 26 Apr 2025 04:00  Patient On (Oxygen Delivery Method): room air        04-24-25 @ 07:01  -  04-25-25 @ 07:00  --------------------------------------------------------  IN: 1352.6 mL / OUT: 1232 mL / NET: 120.6 mL    04-25-25 @ 07:01  -  04-26-25 @ 06:58  --------------------------------------------------------  IN: 3066.4 mL / OUT: 2051 mL / NET: 1015.4 mL                    LABS:                        9.1    14.41 )-----------( 371      ( 26 Apr 2025 00:53 )             26.5     04-26    136  |  109[H]  |  16  ----------------------------<  141[H]  3.9   |  18[L]  |  0.96    Ca    8.6      26 Apr 2025 00:53  Phos  2.4     04-26  Mg     2.00     04-26

## 2025-04-26 NOTE — PROGRESS NOTE ADULT - ASSESSMENT
A: 79M s/p T10-pelvis PSF on 4/24    Plan:  -WBAT BLLE  -appreciate ID recs  -appreciate IR for psoas abscess drainage  -f/u blood cx  -PT/OT  -pain control (PCA)  -dvt ppx: venodynes  -monitor drain outputs - drains per PRS  -dispo: pending    Imelda Garcia MD  Orthopaedic Surgery Resident    For all questions, please reach out via the following numbers for the on-call resident; do not reach out via Teams.  [facility] [place] [id number]  J        [id number]  [facility]  [phone]/ [phone]

## 2025-04-27 LAB
ANION GAP SERPL CALC-SCNC: 11 MMOL/L — SIGNIFICANT CHANGE UP (ref 7–14)
ANION GAP SERPL CALC-SCNC: 12 MMOL/L — SIGNIFICANT CHANGE UP (ref 7–14)
BUN SERPL-MCNC: 24 MG/DL — HIGH (ref 7–23)
BUN SERPL-MCNC: 26 MG/DL — HIGH (ref 7–23)
CALCIUM SERPL-MCNC: 8.3 MG/DL — LOW (ref 8.4–10.5)
CALCIUM SERPL-MCNC: 8.8 MG/DL — SIGNIFICANT CHANGE UP (ref 8.4–10.5)
CHLORIDE SERPL-SCNC: 100 MMOL/L — SIGNIFICANT CHANGE UP (ref 98–107)
CHLORIDE SERPL-SCNC: 99 MMOL/L — SIGNIFICANT CHANGE UP (ref 98–107)
CO2 SERPL-SCNC: 16 MMOL/L — LOW (ref 22–31)
CO2 SERPL-SCNC: 17 MMOL/L — LOW (ref 22–31)
CREAT SERPL-MCNC: 1.09 MG/DL — SIGNIFICANT CHANGE UP (ref 0.5–1.3)
CREAT SERPL-MCNC: 1.21 MG/DL — SIGNIFICANT CHANGE UP (ref 0.5–1.3)
CULTURE RESULTS: ABNORMAL
EGFR: 61 ML/MIN/1.73M2 — SIGNIFICANT CHANGE UP
EGFR: 61 ML/MIN/1.73M2 — SIGNIFICANT CHANGE UP
EGFR: 69 ML/MIN/1.73M2 — SIGNIFICANT CHANGE UP
EGFR: 69 ML/MIN/1.73M2 — SIGNIFICANT CHANGE UP
GLUCOSE BLDC GLUCOMTR-MCNC: 159 MG/DL — HIGH (ref 70–99)
GLUCOSE BLDC GLUCOMTR-MCNC: 182 MG/DL — HIGH (ref 70–99)
GLUCOSE BLDC GLUCOMTR-MCNC: 188 MG/DL — HIGH (ref 70–99)
GLUCOSE BLDC GLUCOMTR-MCNC: 227 MG/DL — HIGH (ref 70–99)
GLUCOSE SERPL-MCNC: 174 MG/DL — HIGH (ref 70–99)
GLUCOSE SERPL-MCNC: 218 MG/DL — HIGH (ref 70–99)
HCT VFR BLD CALC: 26.6 % — LOW (ref 39–50)
HGB BLD-MCNC: 8.9 G/DL — LOW (ref 13–17)
MAGNESIUM SERPL-MCNC: 2.1 MG/DL — SIGNIFICANT CHANGE UP (ref 1.6–2.6)
MAGNESIUM SERPL-MCNC: 2.4 MG/DL — SIGNIFICANT CHANGE UP (ref 1.6–2.6)
MCHC RBC-ENTMCNC: 28.6 PG — SIGNIFICANT CHANGE UP (ref 27–34)
MCHC RBC-ENTMCNC: 33.5 G/DL — SIGNIFICANT CHANGE UP (ref 32–36)
MCV RBC AUTO: 85.5 FL — SIGNIFICANT CHANGE UP (ref 80–100)
NRBC # BLD AUTO: 0 K/UL — SIGNIFICANT CHANGE UP (ref 0–0)
NRBC # FLD: 0 K/UL — SIGNIFICANT CHANGE UP (ref 0–0)
NRBC BLD AUTO-RTO: 0 /100 WBCS — SIGNIFICANT CHANGE UP (ref 0–0)
PHOSPHATE SERPL-MCNC: 2.8 MG/DL — SIGNIFICANT CHANGE UP (ref 2.5–4.5)
PHOSPHATE SERPL-MCNC: 3.2 MG/DL — SIGNIFICANT CHANGE UP (ref 2.5–4.5)
PLATELET # BLD AUTO: 362 K/UL — SIGNIFICANT CHANGE UP (ref 150–400)
POTASSIUM SERPL-MCNC: 3.5 MMOL/L — SIGNIFICANT CHANGE UP (ref 3.5–5.3)
POTASSIUM SERPL-MCNC: 3.9 MMOL/L — SIGNIFICANT CHANGE UP (ref 3.5–5.3)
POTASSIUM SERPL-SCNC: 3.5 MMOL/L — SIGNIFICANT CHANGE UP (ref 3.5–5.3)
POTASSIUM SERPL-SCNC: 3.9 MMOL/L — SIGNIFICANT CHANGE UP (ref 3.5–5.3)
RBC # BLD: 3.11 M/UL — LOW (ref 4.2–5.8)
RBC # FLD: 15.4 % — HIGH (ref 10.3–14.5)
SODIUM SERPL-SCNC: 127 MMOL/L — LOW (ref 135–145)
SODIUM SERPL-SCNC: 128 MMOL/L — LOW (ref 135–145)
SPECIMEN SOURCE: SIGNIFICANT CHANGE UP
WBC # BLD: 14.08 K/UL — HIGH (ref 3.8–10.5)
WBC # FLD AUTO: 14.08 K/UL — HIGH (ref 3.8–10.5)

## 2025-04-27 PROCEDURE — 99232 SBSQ HOSP IP/OBS MODERATE 35: CPT | Mod: GC

## 2025-04-27 RX ORDER — SIMETHICONE 80 MG
80 TABLET,CHEWABLE ORAL EVERY 12 HOURS
Refills: 0 | Status: DISCONTINUED | OUTPATIENT
Start: 2025-04-27 | End: 2025-05-02

## 2025-04-27 RX ORDER — BISACODYL 5 MG
10 TABLET, DELAYED RELEASE (ENTERIC COATED) ORAL DAILY
Refills: 0 | Status: DISCONTINUED | OUTPATIENT
Start: 2025-04-27 | End: 2025-04-30

## 2025-04-27 RX ORDER — OXYCODONE HYDROCHLORIDE 30 MG/1
5 TABLET ORAL EVERY 4 HOURS
Refills: 0 | Status: DISCONTINUED | OUTPATIENT
Start: 2025-04-27 | End: 2025-04-29

## 2025-04-27 RX ORDER — OXYCODONE HYDROCHLORIDE 30 MG/1
2.5 TABLET ORAL EVERY 4 HOURS
Refills: 0 | Status: DISCONTINUED | OUTPATIENT
Start: 2025-04-27 | End: 2025-04-29

## 2025-04-27 RX ADMIN — Medication 975 MILLIGRAM(S): at 19:04

## 2025-04-27 RX ADMIN — HEPARIN SODIUM 5000 UNIT(S): 1000 INJECTION INTRAVENOUS; SUBCUTANEOUS at 15:00

## 2025-04-27 RX ADMIN — INSULIN LISPRO 4: 100 INJECTION, SOLUTION INTRAVENOUS; SUBCUTANEOUS at 07:48

## 2025-04-27 RX ADMIN — Medication 975 MILLIGRAM(S): at 11:10

## 2025-04-27 RX ADMIN — Medication 25 GRAM(S): at 21:55

## 2025-04-27 RX ADMIN — Medication 10 MILLIGRAM(S): at 20:32

## 2025-04-27 RX ADMIN — MAGNESIUM HYDROXIDE 30 MILLILITER(S): 400 SUSPENSION ORAL at 05:57

## 2025-04-27 RX ADMIN — Medication 25 GRAM(S): at 14:49

## 2025-04-27 RX ADMIN — OXYCODONE HYDROCHLORIDE 5 MILLIGRAM(S): 30 TABLET ORAL at 19:27

## 2025-04-27 RX ADMIN — Medication 25 GRAM(S): at 05:07

## 2025-04-27 RX ADMIN — Medication 975 MILLIGRAM(S): at 05:37

## 2025-04-27 RX ADMIN — OMEGA-3-ACID ETHYL ESTERS CAPSULES 2 GRAM(S): 1 CAPSULE, LIQUID FILLED ORAL at 18:05

## 2025-04-27 RX ADMIN — MAGNESIUM HYDROXIDE 30 MILLILITER(S): 400 SUSPENSION ORAL at 18:05

## 2025-04-27 RX ADMIN — Medication 2 TABLET(S): at 21:55

## 2025-04-27 RX ADMIN — Medication 80 MILLIGRAM(S): at 18:04

## 2025-04-27 RX ADMIN — GABAPENTIN 100 MILLIGRAM(S): 400 CAPSULE ORAL at 21:55

## 2025-04-27 RX ADMIN — Medication 40 MILLIEQUIVALENT(S): at 05:16

## 2025-04-27 RX ADMIN — HEPARIN SODIUM 5000 UNIT(S): 1000 INJECTION INTRAVENOUS; SUBCUTANEOUS at 21:55

## 2025-04-27 RX ADMIN — Medication 975 MILLIGRAM(S): at 18:04

## 2025-04-27 RX ADMIN — OXYCODONE HYDROCHLORIDE 5 MILLIGRAM(S): 30 TABLET ORAL at 20:30

## 2025-04-27 RX ADMIN — HEPARIN SODIUM 5000 UNIT(S): 1000 INJECTION INTRAVENOUS; SUBCUTANEOUS at 05:07

## 2025-04-27 RX ADMIN — Medication 40 MILLIGRAM(S): at 05:07

## 2025-04-27 RX ADMIN — INSULIN LISPRO 2: 100 INJECTION, SOLUTION INTRAVENOUS; SUBCUTANEOUS at 16:16

## 2025-04-27 RX ADMIN — GABAPENTIN 100 MILLIGRAM(S): 400 CAPSULE ORAL at 14:49

## 2025-04-27 RX ADMIN — METHOCARBAMOL 500 MILLIGRAM(S): 500 TABLET, FILM COATED ORAL at 11:11

## 2025-04-27 RX ADMIN — Medication 975 MILLIGRAM(S): at 12:00

## 2025-04-27 RX ADMIN — INSULIN LISPRO 2: 100 INJECTION, SOLUTION INTRAVENOUS; SUBCUTANEOUS at 11:11

## 2025-04-27 RX ADMIN — Medication 975 MILLIGRAM(S): at 05:07

## 2025-04-27 RX ADMIN — GABAPENTIN 100 MILLIGRAM(S): 400 CAPSULE ORAL at 05:06

## 2025-04-27 NOTE — PROGRESS NOTE ADULT - SUBJECTIVE AND OBJECTIVE BOX
DATE OF SERVICE: 04-27-25    Patient denies chest pain or shortness of breath.   Review of symptoms otherwise negative.    MEDICATIONS:  acetaminophen     Tablet .. 975 milliGRAM(s) Oral every 6 hours  atorvastatin 40 milliGRAM(s) Oral at bedtime  bisacodyl 5 milliGRAM(s) Oral every 12 hours PRN  gabapentin 100 milliGRAM(s) Oral every 8 hours  glucagon  Injectable 1 milliGRAM(s) IntraMuscular once  heparin   Injectable 5000 Unit(s) SubCutaneous every 8 hours  insulin lispro (ADMELOG) corrective regimen sliding scale   SubCutaneous three times a day before meals  insulin lispro (ADMELOG) corrective regimen sliding scale   SubCutaneous at bedtime  magnesium hydroxide Suspension 30 milliLiter(s) Oral every 12 hours PRN  methocarbamol 500 milliGRAM(s) Oral every 12 hours  omega-3-Acid Ethyl Esters 2 Gram(s) Oral two times a day  oxyCODONE    IR 2.5 milliGRAM(s) Oral every 4 hours PRN  oxyCODONE    IR 5 milliGRAM(s) Oral every 4 hours PRN  pantoprazole    Tablet 40 milliGRAM(s) Oral before breakfast  piperacillin/tazobactam IVPB.. 3.375 Gram(s) IV Intermittent every 8 hours  senna 2 Tablet(s) Oral at bedtime      LABS:                        8.9    14.08 )-----------( 362      ( 27 Apr 2025 01:00 )             26.6       Hemoglobin: 8.9 g/dL (04-27 @ 01:00)  Hemoglobin: 9.1 g/dL (04-26 @ 00:53)  Hemoglobin: 9.8 g/dL (04-25 @ 02:45)  Hemoglobin: 10.0 g/dL (04-24 @ 22:30)  Hemoglobin: 11.5 g/dL (04-24 @ 05:00)      04-27    128[L]  |  99  |  26[H]  ----------------------------<  174[H]  3.9   |  17[L]  |  1.21    Ca    8.8      27 Apr 2025 12:30  Phos  2.8     04-27  Mg     2.40     04-27      Creatinine Trend: 1.21<--, 1.09<--, 0.96<--, 0.74<--, 0.75<--, 0.80<--    COAGS:           PHYSICAL EXAM:  T(C): 36.2 (04-27-25 @ 08:00), Max: 36.7 (04-26-25 @ 20:00)  HR: 102 (04-27-25 @ 12:00) (87 - 112)  BP: --  RR: 16 (04-27-25 @ 12:00) (9 - 24)  SpO2: 99% (04-27-25 @ 12:00) (94% - 100%)  Wt(kg): --    I&O's Summary    26 Apr 2025 07:01  -  27 Apr 2025 07:00  --------------------------------------------------------  IN: 1975 mL / OUT: 585.5 mL / NET: 1389.5 mL    27 Apr 2025 07:01  -  27 Apr 2025 14:53  --------------------------------------------------------  IN: 200 mL / OUT: 0 mL / NET: 200 mL      Gen: NAD  HEENT:  (-)icterus (-)pallor  CV: N S1 S2 1/6 DARYA (+)2 Pulses B/l  Resp:  Clear to auscultation B/L, normal effort  GI: (+) BS Soft, NT, ND  Lymph:  (-)Edema, (-)obvious lymphadenopathy  Skin: Warm to touch, Normal turgor  Psych: Appropriate mood and affect      TELEMETRY: 	  NSR    ECG:  	NSR      ASSESSMENT/PLAN: 	Pt is a  79 year old male w/ HTN, HLD, T2DM, chronic low back pain presents as a transfer from [facility] ED for surgery. . Pt was sent into ED yesterday by his pain medicine physician due to recent MRI findings concerning for L1-L2 discitis/OM with epidural abscess, bilateral psoas abscesses. Patient initially saw pain specialist in February for chronic back pain with radiations to bilateral lateral thighs (R>L). MRI at that time showed degenerative changes. He subsequently had epidural injections x2 (2/27, 3/11) with moderate pain relief. Pain began to worsen on 4/10. He had radiofrequency ablation performed on 4/11 and has since has severe worsening of pain and radiation to R lateral thigh. He reports recent bowel/bladder "incontinence" requiring diaper due to his inability to get to the bathroom in time due to pain limitations but states urge and sensation are intact. Denies fevers, chills, night sweats, weakness, numbness/tingling, saddle anesthesia, recent falls/trauma. He uses a cane for ambulation. Denies recent falls/trauma or any other ortho injuries.   Found to have epidural abscess now s/p Decompression, spine, lumbar, posterior approach, with fusion of posterior spinal column, planned for psoas abscess drainage.      - Tele NSR, EKG NSR  -  s/p Decompression, spine, lumbar, posterior approach, with fusion of posterior spinal column, planned for psoas abscess drainage.  - s/p psoas dranage today  - pain control per ICU  - c/w statin  - abx per ICU    Janet Roy MD  Pager: [phone]  Office: [phone]

## 2025-04-27 NOTE — PROGRESS NOTE ADULT - ASSESSMENT
79yMale w/ HTN, HLD, T2DM, chronic low back pain 2/2 epidural abscess presents as a transfer from [facility] ED for surgery today. He underwent T10-Pelvis PSF T12-L2 lami 4/24/25 (Ludy/DAVID) SICU consulted post operatively for neurological checks hourly (q1h) and hemodynamic monitoring.  4/25/2025 s/p IR drainage of psoas abscess.    PLAN  NEUROLOGIC   - AAOx 3  - q4hr neurochecks  - Pain control: tylenol, PRN oxycodone    RESPIRATORY   - Room air  - Monitor SpO2 goal >92%    CARDIOVASCULAR   - Monitor hemodynamics   - antonio for MAP > 65    GASTROINTESTINAL   - Diet: Reg, NPO at MN  - GI PPX: protonix ppi  - bowel regimen    /RENAL   - IV fluids: LR @75  - Monitor BMP  - Strict I/Os  - Monitor electrolytes, replete PRN  -d/c Mac    HEMATOLOGIC  - Monitor H/H   - DVT prophylaxis: SCDs  - Holding chemical VTE ppx    INFECTIOUS DISEASE  - Monitor fever/WC  - Antibiotic ; Zosyn/Vanco for empiric coverage  - Blood cx 4/23 + gram negative  - Repeat blood cx pending  - IR drainage tmrw 4/26    ENDOCRINE  - hx of diabetes type 2   - Monitor gluc, check ha1c  - ISS    LINES  - right rad a line  - PIV x2  - DHARA drains x 2       Disposition:   Listed       79yMale w/ HTN, HLD, T2DM, chronic low back pain 2/2 epidural abscess presents as a transfer from [facility] ED for surgery today. He underwent T10-Pelvis PSF T12-L2 lami 4/24/25 (Ludy/DAVID) SICU consulted post operatively for neurological checks hourly (q1h) and hemodynamic monitoring.  4/25/2025 s/p IR drainage of psoas abscess.    PLAN  NEUROLOGIC   - AAOx 3  - neurological checks hourly q4h  - Pain control: tylenol, PRN oxycodone    RESPIRATORY   - Room air  - Monitor SpO2 goal >92%    CARDIOVASCULAR   - Monitor hemodynamics     GASTROINTESTINAL   - Diet: Reg  - GI PPX: protonix ppi  - bowel regimen, f/u bowel fxn    /RENAL   - IVL  - Monitor BMP  - Strict I/Os  - Monitor electrolytes, replete PRN  - Mac removed, passed TOV    HEMATOLOGIC  - Monitor H/H   - DVT prophylaxis: SCDs and SQH    INFECTIOUS DISEASE  - Monitor fever/WC  - Antibiotic ; Zosyn/Vanco for empiric coverage  - Blood cx 4/23 + gram negative  - Repeat blood cx pending  - IR drainage +GNRs    ENDOCRINE  - hx of diabetes type 2   - Monitor gluc, check ha1c  - ISS    LINES  - PIV x2  - DHARA drains x 2     Disposition:   Listed

## 2025-04-27 NOTE — CHART NOTE - NSCHARTNOTEFT_GEN_A_CORE
Patient downgraded from SICU to [id number].     - VSS, H/H stable  - Tolerating diet, pain controlled with PO pain meds  - Bowel reg started for constipation  - Mac removed and passed TOV  - Patient signed out to floor team (Dr. Garcia)      SICU   Dave Lees MD ([id number]) Patient downgraded from SICU to  [id number].     - VSS, H/H stable  - Tolerating diet, pain controlled with PO pain meds  - Bowel reg started for constipation  - Mac removed and passed TOV  - Patient signed out to floor team (Dr. Garcia)      SICU   Dave Lees MD ([id number])

## 2025-04-27 NOTE — PROGRESS NOTE ADULT - SUBJECTIVE AND OBJECTIVE BOX
Plastic Surgery Progress Note (pg [facility]: [id number], [facility]: [phone])    SUBJECTIVE  The patient was seen and examined. IR placed two drains forr b/l psoas abscesses 04/26. Pain controlled, afebrile w/ stable vitals. Passed ToV.     OBJECTIVE  ___________________________________________________  VITAL SIGNS / I&O's   Vital Signs Last 24 Hrs  T(C): 36 (27 Apr 2025 04:00), Max: 36.7 (26 Apr 2025 20:00)  T(F): 96.8 (27 Apr 2025 04:00), Max: 98 (26 Apr 2025 20:00)  HR: 101 (27 Apr 2025 04:00) (85 - 112)  BP: 87/57 (26 Apr 2025 12:00) (87/57 - 116/58)  BP(mean): 68 (26 Apr 2025 12:00) (68 - 77)  RR: 13 (27 Apr 2025 04:00) (9 - 24)  SpO2: 100% (27 Apr 2025 04:00) (94% - 100%)    Parameters below as of 27 Apr 2025 04:00  Patient On (Oxygen Delivery Method): room air          26 Apr 2025 07:01  -  27 Apr 2025 07:00  --------------------------------------------------------  IN:    IV PiggyBack: 100 mL    Lactated Ringers: 825 mL    Oral Fluid: 1050 mL  Total IN: 1975 mL    OUT:    Bulb (mL): 32.5 mL    Bulb (mL): 140 mL    Bulb (mL): 13 mL    Bulb (mL): 20 mL    Indwelling Catheter - Urethral (mL): 180 mL    Phenylephrine: 0 mL    Voided (mL): 200 mL  Total OUT: 585.5 mL    Total NET: 1389.5 mL        ___________________________________________________  PHYSICAL EXAM  NEURO: NAD,   HEENT: NC/AT  RESPIRATORY: nonlabored respirations, normal CW expansion  BACK: dressing changed, incisions well appearing. DHARA x2 SS output, IR drains somewhat murky serous drainage.   CARDIO: RRR, S1S2  ABDOMEN: soft, nontender, nondistended  EXTREMITIES: normal strength, no deformities    ___________________________________________________  LABS                        8.9    14.08 )-----------( 362      ( 27 Apr 2025 01:00 )             26.6     27 Apr 2025 01:00    127    |  100    |  24     ----------------------------<  218    3.5     |  16     |  1.09     Ca    8.3        27 Apr 2025 01:00  Phos  3.2       27 Apr 2025 01:00  Mg     2.10      27 Apr 2025 01:00        CAPILLARY BLOOD GLUCOSE      POCT Blood Glucose.: 227 mg/dL (27 Apr 2025 07:45)  POCT Blood Glucose.: 263 mg/dL (26 Apr 2025 21:24)  POCT Blood Glucose.: 115 mg/dL (26 Apr 2025 17:25)  POCT Blood Glucose.: 99 mg/dL (26 Apr 2025 12:42)        Urinalysis Basic - ( 27 Apr 2025 01:00 )    Color: x / Appearance: x / SG: x / pH: x  Gluc: 218 mg/dL / Ketone: x  / Bili: x / Urobili: x   Blood: x / Protein: x / Nitrite: x   Leuk Esterase: x / RBC: x / WBC x   Sq Epi: x / Non Sq Epi: x / Bacteria: x      ___________________________________________________  MICRO  Recent Cultures:  Specimen Source: Abscess, 04-26 @ 15:44; Results --; Gram Stain:   Numerous polymorphonuclear leukocytes per low power field  Moderate Gram Negative Rods per oil power field[!]; Organism: --  Specimen Source: Abscess, 04-26 @ 15:40; Results --; Gram Stain:   Numerous polymorphonuclear leukocytes per low power field  Few Gram Negative Rods per oil power field[!]; Organism: --  Specimen Source: Blood Blood, 04-25 @ 12:15; Results   No growth at 24 hours; Gram Stain: --; Organism: --  Specimen Source: Blood Blood-Venous, 04-25 @ 12:00; Results   No growth at 24 hours; Gram Stain: --; Organism: --  Specimen Source: Abscess #3 EPIDURAL ABSCESS, 04-24 @ 20:24; Results   No growth[!]; Gram Stain:   Moderate polymorphonuclear leukocytes seen per low power field  Rare Gram Negative Rods seen per oil power field[!]; Organism: --  Specimen Source: Surgical Swab #5 L1 L2 DISC SPALE, 04-24 @ 19:46; Results   No growth; Gram Stain: --; Organism: --  Specimen Source: Abscess #2 EPIDURAL ABSCESS, 04-24 @ 18:39; Results   No growth[!]; Gram Stain:   Moderate polymorphonuclear leukocytes seen per low power field  Few Gram Negative Rods seen per oil power field[!]; Organism: --  Specimen Source: Abscess #1 EPIDURAL ABSCESS, 04-24 @ 18:38; Results   No growth[!]; Gram Stain:   Moderate polymorphonuclear leukocytes seen per low power field  Few Gram Negative Rods seen per oil power field[!]; Organism: --  Specimen Source: Blood Blood-Peripheral, 04-23 @ 11:45; Results   Growth in anaerobic bottle: Bacteroides fragilis "Susceptibilities not  performed"[!]; Gram Stain:   Growth in anaerobic bottle: Gram Negative Rods[!]; Organism: --  Specimen Source: Blood Blood-Peripheral, 04-23 @ 11:43; Results   Growth in anaerobic bottle: Bacteroides fragilis "Susceptibilities not  performed"  Direct identification is available within approximately 3-5  hours either by Blood Panel Multiplexed PCR or Direct  MALDI-TOF. Details: [email or web address][!]; Gram Stain:   Growth in anaerobic bottle: Gram Negative Rods[!]; Organism: Blood Culture PCR[!]    ___________________________________________________  MEDICATIONS  (STANDING):  acetaminophen     Tablet .. 975 milliGRAM(s) Oral every 6 hours  atorvastatin 40 milliGRAM(s) Oral at bedtime  gabapentin 100 milliGRAM(s) Oral every 8 hours  glucagon  Injectable 1 milliGRAM(s) IntraMuscular once  heparin   Injectable 5000 Unit(s) SubCutaneous every 8 hours  insulin lispro (ADMELOG) corrective regimen sliding scale   SubCutaneous three times a day before meals  insulin lispro (ADMELOG) corrective regimen sliding scale   SubCutaneous at bedtime  lidocaine 4% Cream 1 Application(s) Topical once  methocarbamol 500 milliGRAM(s) Oral every 12 hours  omega-3-Acid Ethyl Esters 2 Gram(s) Oral two times a day  pantoprazole    Tablet 40 milliGRAM(s) Oral before breakfast  piperacillin/tazobactam IVPB.. 3.375 Gram(s) IV Intermittent every 8 hours  senna 2 Tablet(s) Oral at bedtime    MEDICATIONS  (PRN):  bisacodyl 5 milliGRAM(s) Oral every 12 hours PRN Constipation  magnesium hydroxide Suspension 30 milliLiter(s) Oral every 12 hours PRN Constipation  naloxone Injectable 0.1 milliGRAM(s) IV Push every 3 minutes PRN For ANY of the following changes in patient status:  A. RR LESS THAN 10 breaths per minute, B. Oxygen saturation LESS THAN 90%, C. Sedation score of 6  ondansetron   Disintegrating Tablet 4 milliGRAM(s) Oral every 6 hours PRN Nausea and/or Vomiting  oxyCODONE    IR 5 milliGRAM(s) Oral every 3 hours PRN Moderate Pain (4 - 6)  oxyCODONE    IR 10 milliGRAM(s) Oral every 3 hours PRN Severe Pain (7 - 10)

## 2025-04-27 NOTE — PROGRESS NOTE ADULT - ASSESSMENT
79yMale w/ HTN, HLD, T2DM, chronic low back pain presents as a transfer from [facility] ED for surgery today. Pt was sent into ED yesterday by his pain medicine physician due to recent MRI findings concerning for L1-L2 discitis/OM with epidural abscess, bilateral psoas abscesses. Patient initially saw pain specialist in February for chronic back pain with radiations to bilateral lateral thighs (R>L). MRI at that time showed degenerative changes. He subsequently had epidural injections x2 (2/27, 3/11) with moderate pain relief. Pain began to worsen on 4/10. He had radiofrequency ablation performed on 4/11 and has since has severe worsening of pain and radiation to R lateral thigh. He reports recent bowel/bladder "incontinence" requiring diaper due to his inability to get to the bathroom in time due to pain limitations but states urge and sensation are intact. Denies fevers, chills, night sweats, weakness, numbness/tingling, saddle anesthesia, recent falls/trauma. He uses a cane for ambulation. Denies recent falls/trauma or any other ortho injuries. Before back pain was very active walked half an hour , going up and down stairs and doing grocery etc with no Chest pain or SOB.        Problem/Recommendation - 1:  ·  Problem: Epidural abscess.   ·  Recommendation: S/P Decompression, spine, lumbar, posterior approach, with fusion of posterior spinal column.  On IV Abxs per ID  .  Will defer management to  Neurosurgery.    < from: MR Lumbar Spine w/ IV Cont (04.23.25 @ 15:13) >  IMPRESSION:        1.   Cervical spine:   Moderate degenerative disc disease and   spondylosis at C4-5 through C6/7 with loss of disc height and associated   degenerative endplate changes.        2.   Thoracic spine:   Minimal enhancement within T7-8 which may   reflect early discitis.        3.   Lumbar spine:   Osteomyelitis and discitis at L1-2 with erosions   of the inferior L1 vertebral body and L2 vertebral body consistent with   osteomyelitis and discitis. Ventral epidural abscess is noted extending   from the L1-2 level superiorly to the T10 level with mass effect on the   ventral thecal sac, most prominently at the L1-2 level resulting in   marked compression. Multiple abscesses within the BILATERAL psoas   muscles, the largest measuring 4.3 cm on the RIGHT and 2.8 cm on the LEFT.     Problem/Recommendation - 2:  ·  Problem: Acute osteomyelitis of lumbar spine.   ·  Recommendation: L1 &L2 vertebrae .  On IV Abxs per ID .  Will defer management to Neurosurgery.     Problem/Recommendation - 3:  ·  Problem: Low back pain radiating to right lower extremity.   ·  Recommendation: Pain control.     Problem/Recommendation - 4:  ·  Problem: . BILATERAL psoas Abscess  ·  Recommendation: ON IV Abxs per ID .  IR placed drains after draining  .       Problem/Recommendation - 5:  ·  Problem: HTN (hypertension).   ·  Recommendation: Takes Amlodipine and ARB so continue with hold parameters.  < from: TTE W or WO Ultrasound Enhancing Agent (04.24.25 @ 10:10) >  CONCLUSIONS:      1. Left ventricular cavity is normal in size. Left ventricular wall thickness is normal. Left ventricular systolic function is normal with an ejection fraction of 64 % by Carpenter's method of disks. There are no regional wall motion abnormalities seen.   2. Normal right ventricular cavity size and normal right ventricular systolic function. Tricuspid annular plane systolic excursion (TAPSE) is 2.2 cm (normal >=1.7 cm).   3. Structurally normal mitral valve with normal leaflet excursion. There is calcification of the mitral valve annulus. There is tracemitral regurgitation.   4. The aortic valve appears trileaflet with normal systolic excursion. There is calcification of the aortic valve leaflets. There is mild aortic regurgitation.     Problem/Recommendation - 6:  ·  Problem: HLD (hyperlipidemia).   ·  Recommendation: Continue Atorvastatin.     Problem/Recommendation - 7:  ·  Problem: DM (diabetes mellitus).   ·  Recommendation: ON Farxiga and holding.  SSI and Lantus in patient .     Problem/Recommendation - 8:  ·  Problem: Abdominal distention .   ·  Recommendation: Had BM and passing flatus .    If worsens then will need Xray  abdomen .      Over all  management per SICU .

## 2025-04-27 NOTE — PROGRESS NOTE ADULT - SUBJECTIVE AND OBJECTIVE BOX
Date of Service  : 04-27-25     INTERVAL HPI/OVERNIGHT EVENTS: Looks I ate lot last night so belly distended.   Vital Signs Last 24 Hrs  T(C): 36.2 (27 Apr 2025 08:00), Max: 36.7 (26 Apr 2025 20:00)  T(F): 97.1 (27 Apr 2025 08:00), Max: 98 (26 Apr 2025 20:00)  HR: 102 (27 Apr 2025 12:00) (87 - 112)  BP: --  BP(mean): --  RR: 16 (27 Apr 2025 12:00) (9 - 24)  SpO2: 99% (27 Apr 2025 12:00) (94% - 100%)    Parameters below as of 27 Apr 2025 12:00  Patient On (Oxygen Delivery Method): room air      I&O's Summary    26 Apr 2025 07:01  -  27 Apr 2025 07:00  --------------------------------------------------------  IN: 1975 mL / OUT: 585.5 mL / NET: 1389.5 mL    27 Apr 2025 07:01  -  27 Apr 2025 16:23  --------------------------------------------------------  IN: 690 mL / OUT: 200 mL / NET: 490 mL      MEDICATIONS  (STANDING):  acetaminophen     Tablet .. 975 milliGRAM(s) Oral every 6 hours  atorvastatin 40 milliGRAM(s) Oral at bedtime  gabapentin 100 milliGRAM(s) Oral every 8 hours  glucagon  Injectable 1 milliGRAM(s) IntraMuscular once  heparin   Injectable 5000 Unit(s) SubCutaneous every 8 hours  insulin lispro (ADMELOG) corrective regimen sliding scale   SubCutaneous three times a day before meals  insulin lispro (ADMELOG) corrective regimen sliding scale   SubCutaneous at bedtime  methocarbamol 500 milliGRAM(s) Oral every 12 hours  omega-3-Acid Ethyl Esters 2 Gram(s) Oral two times a day  pantoprazole    Tablet 40 milliGRAM(s) Oral before breakfast  piperacillin/tazobactam IVPB.. 3.375 Gram(s) IV Intermittent every 8 hours  senna 2 Tablet(s) Oral at bedtime    MEDICATIONS  (PRN):  bisacodyl 5 milliGRAM(s) Oral every 12 hours PRN Constipation  magnesium hydroxide Suspension 30 milliLiter(s) Oral every 12 hours PRN Constipation  oxyCODONE    IR 2.5 milliGRAM(s) Oral every 4 hours PRN Moderate Pain (4 - 6)  oxyCODONE    IR 5 milliGRAM(s) Oral every 4 hours PRN Severe Pain (7 - 10)    LABS:                        8.9    14.08 )-----------( 362      ( 27 Apr 2025 01:00 )             26.6     04-27    128[L]  |  99  |  26[H]  ----------------------------<  174[H]  3.9   |  17[L]  |  1.21    Ca    8.8      27 Apr 2025 12:30  Phos  2.8     04-27  Mg     2.40     04-27        Urinalysis Basic - ( 27 Apr 2025 12:30 )    Color: x / Appearance: x / SG: x / pH: x  Gluc: 174 mg/dL / Ketone: x  / Bili: x / Urobili: x   Blood: x / Protein: x / Nitrite: x   Leuk Esterase: x / RBC: x / WBC x   Sq Epi: x / Non Sq Epi: x / Bacteria: x      CAPILLARY BLOOD GLUCOSE      POCT Blood Glucose.: 188 mg/dL (27 Apr 2025 16:14)  POCT Blood Glucose.: 182 mg/dL (27 Apr 2025 11:08)  POCT Blood Glucose.: 227 mg/dL (27 Apr 2025 07:45)  POCT Blood Glucose.: 263 mg/dL (26 Apr 2025 21:24)  POCT Blood Glucose.: 115 mg/dL (26 Apr 2025 17:25)        Urinalysis Basic - ( 27 Apr 2025 12:30 )    Color: x / Appearance: x / SG: x / pH: x  Gluc: 174 mg/dL / Ketone: x  / Bili: x / Urobili: x   Blood: x / Protein: x / Nitrite: x   Leuk Esterase: x / RBC: x / WBC x   Sq Epi: x / Non Sq Epi: x / Bacteria: x      REVIEW OF SYSTEMS:  CONSTITUTIONAL: No fever, weight loss, or fatigue  EYES: No eye pain, visual disturbances, or discharge  ENMT:  No difficulty hearing, tinnitus, vertigo; No sinus or throat pain  NECK: No pain or stiffness  RESPIRATORY: No cough, wheezing, chills or hemoptysis; No shortness of breath  CARDIOVASCULAR: No chest pain, palpitations, dizziness, or leg swelling  GASTROINTESTINAL: No abdominal or epigastric pain. No nausea, vomiting, or hematemesis; No diarrhea or constipation. No melena or hematochezia.  GENITOURINARY: No dysuria, frequency, hematuria, or incontinence  NEUROLOGICAL: No headaches, memory loss, loss of strength, numbness, or tremors    RADIOLOGY & ADDITIONAL TESTS:    Consultant(s) Notes Reviewed:  [x ] YES  [ ] NO    PHYSICAL EXAM:  GENERAL: NAD, well-groomed, well-developed,not in any distress ,  HEAD:  Atraumatic, Normocephalic  NECK: Supple, No JVD, Normal thyroid  NERVOUS SYSTEM:  Alert & Oriented X3,   CHEST/LUNG: Good air entry bilateral with no  rales, rhonchi, wheezing, or rubs  HEART: Regular rate and rhythm; No murmurs, rubs, or gallops  ABDOMEN: Soft, Nontender, +distended; Bowel sounds present  EXTREMITIES:  2+ Peripheral Pulses, No clubbing, cyanosis, or edema      Care Discussed with Consultants/Other Providers [ x] YES  [ ] NO

## 2025-04-27 NOTE — PROGRESS NOTE ADULT - SUBJECTIVE AND OBJECTIVE BOX
SICU Daily Progress Note  =====================================================  Interval/Overnight Events:        - q4hr neurochecks  - IR today; Successful image-guided drainage of bilateral psoas abscesses with 10.2F   -D/C maradiaga, passed TOV    No acute event overnight     Pt. seen in bed appear comfortable, offered no complaint.     ALLERGIES:  No Known Allergies      --------------------------------------------------------------------------------------    MEDICATIONS:    Neurologic Medications  acetaminophen     Tablet .. 975 milliGRAM(s) Oral every 6 hours  gabapentin 100 milliGRAM(s) Oral every 8 hours  methocarbamol 500 milliGRAM(s) Oral every 12 hours  ondansetron   Disintegrating Tablet 4 milliGRAM(s) Oral every 6 hours PRN Nausea and/or Vomiting  oxyCODONE    IR 5 milliGRAM(s) Oral every 3 hours PRN Moderate Pain (4 - 6)  oxyCODONE    IR 10 milliGRAM(s) Oral every 3 hours PRN Severe Pain (7 - 10)    Respiratory Medications    Cardiovascular Medications    Gastrointestinal Medications  bisacodyl 5 milliGRAM(s) Oral every 12 hours PRN Constipation  magnesium hydroxide Suspension 30 milliLiter(s) Oral every 12 hours PRN Constipation  pantoprazole    Tablet 40 milliGRAM(s) Oral before breakfast  senna 2 Tablet(s) Oral at bedtime    Genitourinary Medications    Hematologic/Oncologic Medications  heparin   Injectable 5000 Unit(s) SubCutaneous every 8 hours    Antimicrobial/Immunologic Medications  piperacillin/tazobactam IVPB.. 3.375 Gram(s) IV Intermittent every 8 hours    Endocrine/Metabolic Medications  atorvastatin 40 milliGRAM(s) Oral at bedtime  glucagon  Injectable 1 milliGRAM(s) IntraMuscular once  insulin lispro (ADMELOG) corrective regimen sliding scale   SubCutaneous three times a day before meals  insulin lispro (ADMELOG) corrective regimen sliding scale   SubCutaneous at bedtime    Topical/Other Medications  lidocaine 4% Cream 1 Application(s) Topical once  naloxone Injectable 0.1 milliGRAM(s) IV Push every 3 minutes PRN For ANY of the following changes in patient status:  A. RR LESS THAN 10 breaths per minute, B. Oxygen saturation LESS THAN 90%, C. Sedation score of 6  omega-3-Acid Ethyl Esters 2 Gram(s) Oral two times a day    --------------------------------------------------------------------------------------    VITAL SIGNS:  T(C): 36.4 (04-27-25 @ 00:00), Max: 36.7 (04-26-25 @ 20:00)  HR: 98 (04-27-25 @ 01:00) (63 - 112)  BP: 87/57 (04-26-25 @ 12:00) (87/57 - 126/72)  RR: 15 (04-27-25 @ 01:00) (9 - 24)  SpO2: 99% (04-27-25 @ 01:00) (94% - 100%)  --------------------------------------------------------------------------------------    INS AND OUTS:    04-25-25 @ 07:01  -  04-26-25 @ 07:00  --------------------------------------------------------  IN: 3066.4 mL / OUT: 2051 mL / NET: 1015.4 mL    04-26-25 @ 07:01  -  04-27-25 @ 01:37  --------------------------------------------------------  IN: 1675 mL / OUT: 510 mL / NET: 1165 mL      --------------------------------------------------------------------------------------    EXAM    NEURO: PAMELA PHILIP: NC/AT  RESPIRATORY: nonlabored respirations, normal CW expansion  CARDIO: RRR, S1S2  ABDOMEN: soft, nontender, nondistended  EXTREMITIES: normal strength, no deformities    --------------------------------------------------------------------------------------    LABS                          8.9    14.08 )-----------( 362      ( 27 Apr 2025 01:00 )             26.6   04-27    x   |  x   |  24[H]  ----------------------------<  218[H]  x    |  16[L]  |  1.09    Ca    8.3[L]      27 Apr 2025 01:00  Phos  2.4     04-26  Mg     2.10     04-27      --------------------------------------------------------------------------------------

## 2025-04-27 NOTE — PROGRESS NOTE ADULT - SUBJECTIVE AND OBJECTIVE BOX
Orthopedic Surgery Progress Note     S: Patient seen and examined today. No acute events overnight. Pain is well controlled. Denies f/c, chest pain, shortness of breath, dizziness. Received IR asp yesterday.       Physical Exam:  Gen: NAD  Spine:  Dressing CDI  4x LATISHA drains in place w SS output    Motor:                   C5                C6              C7               C8           T1   R            5/5                5/5            5/5             5/5          5/5  L             5/5               5/5             5/5             5/5          5/5                L2             L3             L4               L5            S1  R         2/5          4/5          4/5             4/5           4/5  L          3+/5          4/5           4/5           4/5           4/5    *LE motor exam limited by pain    Sensory:            C5         C6         C7      C8       T1        (0=absent, 1=impaired, 2=normal, NT=not testable)  R         2            2           2        2         2  L          2            2           2        2         2               L2          L3         L4      L5       S1         (0=absent, 1=impaired, 2=normal, NT=not testable)  R         2            2            2        2        2  L          2            2           2        2         2      Vital Signs Last 24 Hrs  T(C): 36.2 (27 Apr 2025 08:00), Max: 36.7 (26 Apr 2025 20:00)  T(F): 97.1 (27 Apr 2025 08:00), Max: 98 (26 Apr 2025 20:00)  HR: 102 (27 Apr 2025 08:00) (85 - 112)  BP: 87/57 (26 Apr 2025 12:00) (87/57 - 87/57)  BP(mean): 68 (26 Apr 2025 12:00) (68 - 68)  RR: 16 (27 Apr 2025 08:00) (9 - 24)  SpO2: 99% (27 Apr 2025 08:00) (94% - 100%)    Parameters below as of 27 Apr 2025 08:00  Patient On (Oxygen Delivery Method): room air        04-26-25 @ 07:01  -  04-27-25 @ 07:00  --------------------------------------------------------  IN: 1975 mL / OUT: 585.5 mL / NET: 1389.5 mL    04-27-25 @ 07:01  -  04-27-25 @ 10:12  --------------------------------------------------------  IN: 200 mL / OUT: 0 mL / NET: 200 mL        LABS:                        8.9    14.08 )-----------( 362      ( 27 Apr 2025 01:00 )             26.6     04-27    127[L]  |  100  |  24[H]  ----------------------------<  218[H]  3.5   |  16[L]  |  1.09    Ca    8.3[L]      27 Apr 2025 01:00  Phos  3.2     04-27  Mg     2.10     04-27        PAST MEDICAL & SURGICAL Hx  PAST MEDICAL & SURGICAL HISTORY:  HTN (hypertension)      HLD (hyperlipidemia)      DM (diabetes mellitus)      Cataract          MEDICATIONS  (STANDING):  acetaminophen     Tablet .. 975 milliGRAM(s) Oral every 6 hours  atorvastatin 40 milliGRAM(s) Oral at bedtime  gabapentin 100 milliGRAM(s) Oral every 8 hours  glucagon  Injectable 1 milliGRAM(s) IntraMuscular once  heparin   Injectable 5000 Unit(s) SubCutaneous every 8 hours  insulin lispro (ADMELOG) corrective regimen sliding scale   SubCutaneous three times a day before meals  insulin lispro (ADMELOG) corrective regimen sliding scale   SubCutaneous at bedtime  lidocaine 4% Cream 1 Application(s) Topical once  methocarbamol 500 milliGRAM(s) Oral every 12 hours  omega-3-Acid Ethyl Esters 2 Gram(s) Oral two times a day  pantoprazole    Tablet 40 milliGRAM(s) Oral before breakfast  piperacillin/tazobactam IVPB.. 3.375 Gram(s) IV Intermittent every 8 hours  senna 2 Tablet(s) Oral at bedtime    MEDICATIONS  (PRN):  bisacodyl 5 milliGRAM(s) Oral every 12 hours PRN Constipation  magnesium hydroxide Suspension 30 milliLiter(s) Oral every 12 hours PRN Constipation  naloxone Injectable 0.1 milliGRAM(s) IV Push every 3 minutes PRN For ANY of the following changes in patient status:  A. RR LESS THAN 10 breaths per minute, B. Oxygen saturation LESS THAN 90%, C. Sedation score of 6  oxyCODONE    IR 5 milliGRAM(s) Oral every 3 hours PRN Moderate Pain (4 - 6)  oxyCODONE    IR 10 milliGRAM(s) Oral every 3 hours PRN Severe Pain (7 - 10)        A/P:  79M s/p T10-pelvis PSF on 4/24    Plan:  -WBAT BLLE  -appreciate ID recs  -appreciate IR for psoas abscess drainage     - 2 latisha drains per IR     - Asp: + GNR  -f/u blood cx  -PT/OT  -pain control (PCA)  -dvt ppx: venodynes  -monitor drain outputs - drains per PRS  -dispo: pending  - Abx: Zosyn     For all questions related to patient care, please reach out via the on-call pager.*     Janessa Stroud, PGY1  Orthopedic Surgery  [facility]: [id number]  [facility]: [id number]  [facility]: [id number]

## 2025-04-27 NOTE — PROGRESS NOTE ADULT - ASSESSMENT
79M s/p T10-pelvis PSF w/ PRS closure 4/24    - pain control PRN  - dressing changes PRN, done today  - Monitor DHARA - both holding suction; drain care   - appreciate care per primary    Erik Gómez MD   [facility] Surgery Resident PGY1    For any questions, please DO NOT reach out via Teams.    Plastic Surgery   [facility]: [id number]  [facility]: [phone]

## 2025-04-28 LAB
ANION GAP SERPL CALC-SCNC: 9 MMOL/L — SIGNIFICANT CHANGE UP (ref 7–14)
BUN SERPL-MCNC: 27 MG/DL — HIGH (ref 7–23)
CA-I BLD-SCNC: 1.29 MMOL/L — SIGNIFICANT CHANGE UP (ref 1.15–1.29)
CALCIUM SERPL-MCNC: 9 MG/DL — SIGNIFICANT CHANGE UP (ref 8.4–10.5)
CHLORIDE SERPL-SCNC: 101 MMOL/L — SIGNIFICANT CHANGE UP (ref 98–107)
CO2 SERPL-SCNC: 19 MMOL/L — LOW (ref 22–31)
CREAT SERPL-MCNC: 1.36 MG/DL — HIGH (ref 0.5–1.3)
CULTURE RESULTS: ABNORMAL
CULTURE RESULTS: ABNORMAL
EGFR: 53 ML/MIN/1.73M2 — LOW
EGFR: 53 ML/MIN/1.73M2 — LOW
GLUCOSE BLDC GLUCOMTR-MCNC: 137 MG/DL — HIGH (ref 70–99)
GLUCOSE BLDC GLUCOMTR-MCNC: 151 MG/DL — HIGH (ref 70–99)
GLUCOSE BLDC GLUCOMTR-MCNC: 227 MG/DL — HIGH (ref 70–99)
GLUCOSE BLDC GLUCOMTR-MCNC: 301 MG/DL — HIGH (ref 70–99)
GLUCOSE SERPL-MCNC: 130 MG/DL — HIGH (ref 70–99)
HCT VFR BLD CALC: 28.6 % — LOW (ref 39–50)
HGB BLD-MCNC: 9.6 G/DL — LOW (ref 13–17)
MCHC RBC-ENTMCNC: 28.9 PG — SIGNIFICANT CHANGE UP (ref 27–34)
MCHC RBC-ENTMCNC: 33.6 G/DL — SIGNIFICANT CHANGE UP (ref 32–36)
MCV RBC AUTO: 86.1 FL — SIGNIFICANT CHANGE UP (ref 80–100)
NRBC # BLD AUTO: 0 K/UL — SIGNIFICANT CHANGE UP (ref 0–0)
NRBC # FLD: 0 K/UL — SIGNIFICANT CHANGE UP (ref 0–0)
NRBC BLD AUTO-RTO: 0 /100 WBCS — SIGNIFICANT CHANGE UP (ref 0–0)
PLATELET # BLD AUTO: 488 K/UL — HIGH (ref 150–400)
POTASSIUM SERPL-MCNC: 4.8 MMOL/L — SIGNIFICANT CHANGE UP (ref 3.5–5.3)
POTASSIUM SERPL-SCNC: 4.8 MMOL/L — SIGNIFICANT CHANGE UP (ref 3.5–5.3)
RBC # BLD: 3.32 M/UL — LOW (ref 4.2–5.8)
RBC # FLD: 14.9 % — HIGH (ref 10.3–14.5)
SODIUM SERPL-SCNC: 129 MMOL/L — LOW (ref 135–145)
SPECIMEN SOURCE: SIGNIFICANT CHANGE UP
SPECIMEN SOURCE: SIGNIFICANT CHANGE UP
WBC # BLD: 11.94 K/UL — HIGH (ref 3.8–10.5)
WBC # FLD AUTO: 11.94 K/UL — HIGH (ref 3.8–10.5)

## 2025-04-28 PROCEDURE — G0545: CPT

## 2025-04-28 PROCEDURE — 99233 SBSQ HOSP IP/OBS HIGH 50: CPT

## 2025-04-28 RX ORDER — SIMETHICONE 80 MG
80 TABLET,CHEWABLE ORAL ONCE
Refills: 0 | Status: COMPLETED | OUTPATIENT
Start: 2025-04-28 | End: 2025-04-28

## 2025-04-28 RX ADMIN — Medication 2 TABLET(S): at 22:55

## 2025-04-28 RX ADMIN — Medication 80 MILLIGRAM(S): at 22:54

## 2025-04-28 RX ADMIN — Medication 975 MILLIGRAM(S): at 06:00

## 2025-04-28 RX ADMIN — Medication 975 MILLIGRAM(S): at 12:06

## 2025-04-28 RX ADMIN — Medication 975 MILLIGRAM(S): at 23:41

## 2025-04-28 RX ADMIN — Medication 80 MILLIGRAM(S): at 19:16

## 2025-04-28 RX ADMIN — METHOCARBAMOL 500 MILLIGRAM(S): 500 TABLET, FILM COATED ORAL at 23:41

## 2025-04-28 RX ADMIN — OMEGA-3-ACID ETHYL ESTERS CAPSULES 2 GRAM(S): 1 CAPSULE, LIQUID FILLED ORAL at 10:46

## 2025-04-28 RX ADMIN — Medication 25 GRAM(S): at 13:16

## 2025-04-28 RX ADMIN — Medication 975 MILLIGRAM(S): at 18:52

## 2025-04-28 RX ADMIN — GABAPENTIN 100 MILLIGRAM(S): 400 CAPSULE ORAL at 05:02

## 2025-04-28 RX ADMIN — INSULIN LISPRO 2: 100 INJECTION, SOLUTION INTRAVENOUS; SUBCUTANEOUS at 07:28

## 2025-04-28 RX ADMIN — OMEGA-3-ACID ETHYL ESTERS CAPSULES 2 GRAM(S): 1 CAPSULE, LIQUID FILLED ORAL at 18:52

## 2025-04-28 RX ADMIN — HEPARIN SODIUM 5000 UNIT(S): 1000 INJECTION INTRAVENOUS; SUBCUTANEOUS at 05:00

## 2025-04-28 RX ADMIN — METHOCARBAMOL 500 MILLIGRAM(S): 500 TABLET, FILM COATED ORAL at 10:45

## 2025-04-28 RX ADMIN — GABAPENTIN 100 MILLIGRAM(S): 400 CAPSULE ORAL at 13:16

## 2025-04-28 RX ADMIN — Medication 975 MILLIGRAM(S): at 19:52

## 2025-04-28 RX ADMIN — GABAPENTIN 100 MILLIGRAM(S): 400 CAPSULE ORAL at 22:55

## 2025-04-28 RX ADMIN — Medication 25 GRAM(S): at 22:55

## 2025-04-28 RX ADMIN — INSULIN LISPRO 8: 100 INJECTION, SOLUTION INTRAVENOUS; SUBCUTANEOUS at 12:04

## 2025-04-28 RX ADMIN — Medication 975 MILLIGRAM(S): at 13:10

## 2025-04-28 RX ADMIN — OXYCODONE HYDROCHLORIDE 5 MILLIGRAM(S): 30 TABLET ORAL at 15:10

## 2025-04-28 RX ADMIN — HEPARIN SODIUM 5000 UNIT(S): 1000 INJECTION INTRAVENOUS; SUBCUTANEOUS at 22:55

## 2025-04-28 RX ADMIN — Medication 25 GRAM(S): at 05:00

## 2025-04-28 RX ADMIN — HEPARIN SODIUM 5000 UNIT(S): 1000 INJECTION INTRAVENOUS; SUBCUTANEOUS at 13:16

## 2025-04-28 RX ADMIN — OXYCODONE HYDROCHLORIDE 5 MILLIGRAM(S): 30 TABLET ORAL at 12:10

## 2025-04-28 RX ADMIN — Medication 40 MILLIGRAM(S): at 05:00

## 2025-04-28 RX ADMIN — Medication 975 MILLIGRAM(S): at 05:00

## 2025-04-28 NOTE — PROGRESS NOTE ADULT - SUBJECTIVE AND OBJECTIVE BOX
Plastic Surgery Progress Note (pg [facility]: [id number], [facility]: [phone])    SUBJECTIVE  The patient was seen and examined. No acute events overnight. Pain controlled, afebrile w/ stable vitals.     OBJECTIVE  ___________________________________________________  VITAL SIGNS / I&O's   Vital Signs Last 24 Hrs  T(C): 36.5 (28 Apr 2025 05:10), Max: 37 (27 Apr 2025 22:13)  T(F): 97.7 (28 Apr 2025 05:10), Max: 98.6 (27 Apr 2025 22:13)  HR: 97 (28 Apr 2025 05:10) (63 - 107)  BP: 124/64 (28 Apr 2025 05:10) (103/57 - 136/71)  BP(mean): 84 (27 Apr 2025 16:00) (84 - 84)  RR: 17 (28 Apr 2025 05:10) (16 - 18)  SpO2: 98% (28 Apr 2025 05:10) (98% - 100%)    Parameters below as of 28 Apr 2025 05:10  Patient On (Oxygen Delivery Method): room air          27 Apr 2025 07:01  -  28 Apr 2025 07:00  --------------------------------------------------------  IN:    IV PiggyBack: 100 mL    Oral Fluid: 590 mL  Total IN: 690 mL    OUT:    Bulb (mL): 10 mL    Bulb (mL): 37 mL    Bulb (mL): 13.5 mL    Bulb (mL): 62 mL    Voided (mL): 200 mL  Total OUT: 322.5 mL    Total NET: 367.5 mL        ___________________________________________________  PHYSICAL EXAM  NEURO: NAD,   HEENT: NC/AT  RESPIRATORY: nonlabored respirations, normal CW expansion  BACK: dressing changed, incisions well appearing. DHARA x2 SS output, IR drains somewhat murky serous drainage.   CARDIO: RRR, S1S2  ABDOMEN: soft, nontender, nondistended  EXTREMITIES: normal strength, no deformities      ___________________________________________________  LABS                        9.6    11.94 )-----------( 488      ( 28 Apr 2025 06:00 )             28.6     28 Apr 2025 06:00    129    |  101    |  27     ----------------------------<  130    4.8     |  19     |  1.36     Ca    9.0        28 Apr 2025 06:00  Phos  2.8       27 Apr 2025 12:30  Mg     2.40      27 Apr 2025 12:30        CAPILLARY BLOOD GLUCOSE      POCT Blood Glucose.: 159 mg/dL (27 Apr 2025 21:57)  POCT Blood Glucose.: 188 mg/dL (27 Apr 2025 16:14)  POCT Blood Glucose.: 182 mg/dL (27 Apr 2025 11:08)  POCT Blood Glucose.: 227 mg/dL (27 Apr 2025 07:45)        Urinalysis Basic - ( 28 Apr 2025 06:00 )    Color: x / Appearance: x / SG: x / pH: x  Gluc: 130 mg/dL / Ketone: x  / Bili: x / Urobili: x   Blood: x / Protein: x / Nitrite: x   Leuk Esterase: x / RBC: x / WBC x   Sq Epi: x / Non Sq Epi: x / Bacteria: x      ___________________________________________________  MICRO  Recent Cultures:  Specimen Source: Abscess, 04-26 @ 15:44; Results   Growth in fluid media only Gram Negative Rods[!]; Gram Stain:   Numerous polymorphonuclear leukocytes per low power field  Moderate Gram Negative Rods per oil power field[!]; Organism: --  Specimen Source: Abscess, 04-26 @ 15:40; Results   Growth in fluid media only Gram Negative Rods[!]; Gram Stain:   Numerous polymorphonuclear leukocytes per low power field  Few Gram Negative Rods per oil power field[!]; Organism: --  Specimen Source: Blood Blood, 04-25 @ 12:15; Results   No growth at 48 Hours; Gram Stain: --; Organism: --  Specimen Source: Blood Blood-Venous, 04-25 @ 12:00; Results   No growth at 48 Hours; Gram Stain: --; Organism: --  Specimen Source: Abscess #3 EPIDURAL ABSCESS, 04-24 @ 20:24; Results   Moderate Bacteroides fragilis "Susceptibilities not performed"[!]; Gram Stain:   Moderate polymorphonuclear leukocytes seen per low power field  Rare Gram Negative Rods seen per oil power field[!]; Organism: --  Specimen Source: Surgical Swab #5 L1 L2 DISC SPALE, 04-24 @ 19:46; Results   Numerous Bacteroides fragilis "Susceptibilities not performed"[!]; Gram Stain: --; Organism: --  Specimen Source: Abscess #2 EPIDURAL ABSCESS, 04-24 @ 18:39; Results   Moderate Bacteroides fragilis "Susceptibilities not performed"[!]; Gram Stain:   Moderate polymorphonuclear leukocytes seen per low power field  Few Gram Negative Rods seen per oil power field[!]; Organism: --  Specimen Source: Abscess #1 EPIDURAL ABSCESS, 04-24 @ 18:38; Results   Numerous Bacteroides fragilis "Susceptibilities not performed"[!]; Gram Stain:   Moderate polymorphonuclear leukocytes seen per low power field  Few Gram Negative Rods seen per oil power field[!]; Organism: --  Specimen Source: Blood Blood-Peripheral, 04-23 @ 11:45; Results   Growth in anaerobic bottle: Bacteroides fragilis "Susceptibilities not  performed"[!]; Gram Stain:   Growth in anaerobic bottle: Gram Negative Rods[!]; Organism: --  Specimen Source: Blood Blood-Peripheral, 04-23 @ 11:43; Results   Growth in anaerobic bottle: Bacteroides fragilis "Susceptibilities not  performed"  Direct identification is available within approximately 3-5  hours either by Blood Panel Multiplexed PCR or Direct  MALDI-TOF. Details: [email or web address][!]; Gram Stain:   Growth in anaerobic bottle: Gram Negative Rods[!]; Organism: Blood Culture PCR[!]    ___________________________________________________  MEDICATIONS  (STANDING):  acetaminophen     Tablet .. 975 milliGRAM(s) Oral every 6 hours  atorvastatin 40 milliGRAM(s) Oral at bedtime  gabapentin 100 milliGRAM(s) Oral every 8 hours  glucagon  Injectable 1 milliGRAM(s) IntraMuscular once  heparin   Injectable 5000 Unit(s) SubCutaneous every 8 hours  insulin lispro (ADMELOG) corrective regimen sliding scale   SubCutaneous three times a day before meals  insulin lispro (ADMELOG) corrective regimen sliding scale   SubCutaneous at bedtime  methocarbamol 500 milliGRAM(s) Oral every 12 hours  omega-3-Acid Ethyl Esters 2 Gram(s) Oral two times a day  pantoprazole    Tablet 40 milliGRAM(s) Oral before breakfast  piperacillin/tazobactam IVPB.. 3.375 Gram(s) IV Intermittent every 8 hours  senna 2 Tablet(s) Oral at bedtime    MEDICATIONS  (PRN):  bisacodyl 5 milliGRAM(s) Oral every 12 hours PRN Constipation  bisacodyl Suppository 10 milliGRAM(s) Rectal daily PRN Constipation  magnesium hydroxide Suspension 30 milliLiter(s) Oral every 12 hours PRN Constipation  oxyCODONE    IR 2.5 milliGRAM(s) Oral every 4 hours PRN Moderate Pain (4 - 6)  oxyCODONE    IR 5 milliGRAM(s) Oral every 4 hours PRN Severe Pain (7 - 10)  simethicone 80 milliGRAM(s) Chew every 12 hours PRN Upset Stomach

## 2025-04-28 NOTE — PROGRESS NOTE ADULT - SUBJECTIVE AND OBJECTIVE BOX
DATE OF SERVICE: 04-28-25    Patient denies chest pain or shortness of breath.   Review of symptoms otherwise negative.    MEDICATIONS:  acetaminophen     Tablet .. 975 milliGRAM(s) Oral every 6 hours  atorvastatin 40 milliGRAM(s) Oral at bedtime  bisacodyl 5 milliGRAM(s) Oral every 12 hours PRN  bisacodyl Suppository 10 milliGRAM(s) Rectal daily PRN  gabapentin 100 milliGRAM(s) Oral every 8 hours  glucagon  Injectable 1 milliGRAM(s) IntraMuscular once  heparin   Injectable 5000 Unit(s) SubCutaneous every 8 hours  insulin lispro (ADMELOG) corrective regimen sliding scale   SubCutaneous three times a day before meals  insulin lispro (ADMELOG) corrective regimen sliding scale   SubCutaneous at bedtime  magnesium hydroxide Suspension 30 milliLiter(s) Oral every 12 hours PRN  methocarbamol 500 milliGRAM(s) Oral every 12 hours  omega-3-Acid Ethyl Esters 2 Gram(s) Oral two times a day  oxyCODONE    IR 2.5 milliGRAM(s) Oral every 4 hours PRN  oxyCODONE    IR 5 milliGRAM(s) Oral every 4 hours PRN  pantoprazole    Tablet 40 milliGRAM(s) Oral before breakfast  piperacillin/tazobactam IVPB.. 3.375 Gram(s) IV Intermittent every 8 hours  senna 2 Tablet(s) Oral at bedtime  simethicone 80 milliGRAM(s) Chew every 12 hours PRN                            9.6    11.94 )-----------( 488      ( 28 Apr 2025 06:00 )             28.6     129[L]  |  101  |  27[H]  ----------------------------<  130[H]  4.8   |  19[L]  |  1.36[H]    Ca    9.0      28 Apr 2025 06:00  Phos  2.8     04-27  Mg     2.40     04-27  Creatinine Trend: 1.36<--, 1.21<--, 1.09<--, 0.96<--, 0.74<--, 0.75<--    T(C): 37.1 (04-28-25 @ 13:23), Max: 37.1 (04-28-25 @ 08:58)  HR: 100 (04-28-25 @ 13:23) (63 - 107)  BP: 111/68 (04-28-25 @ 13:23) (103/57 - 136/71)  RR: 18 (04-28-25 @ 13:23) (17 - 18)  SpO2: 100% (04-28-25 @ 13:23) (98% - 100%)  Wt(kg): --    I&O's Summary    27 Apr 2025 07:01  -  28 Apr 2025 07:00  --------------------------------------------------------  IN: 690 mL / OUT: 322.5 mL / NET: 367.5 mL      Gen: NAD  HEENT:  (-)icterus (-)pallor  CV: N S1 S2 1/6 DARYA (+)2 Pulses B/l  Resp:  Clear to auscultation B/L, normal effort  GI: (+) BS Soft, NT, ND  Lymph:  (-)Edema, (-)obvious lymphadenopathy  Skin: Warm to touch, Normal turgor  Psych: Appropriate mood and affect      TELEMETRY: 	  NSR    ECG:  	NSR      ASSESSMENT/PLAN: 	Pt is a  79 year old male w/ HTN, HLD, T2DM, chronic low back pain presents as a transfer from [facility] ED for surgery. . Pt was sent into ED yesterday by his pain medicine physician due to recent MRI findings concerning for L1-L2 discitis/OM with epidural abscess, bilateral psoas abscesses. Patient initially saw pain specialist in February for chronic back pain with radiations to bilateral lateral thighs (R>L). MRI at that time showed degenerative changes. He subsequently had epidural injections x2 (2/27, 3/11) with moderate pain relief. Pain began to worsen on 4/10. He had radiofrequency ablation performed on 4/11 and has since has severe worsening of pain and radiation to R lateral thigh. He reports recent bowel/bladder "incontinence" requiring diaper due to his inability to get to the bathroom in time due to pain limitations but states urge and sensation are intact. Denies fevers, chills, night sweats, weakness, numbness/tingling, saddle anesthesia, recent falls/trauma. He uses a cane for ambulation. Denies recent falls/trauma or any other ortho injuries.   Found to have epidural abscess now s/p Decompression, spine, lumbar, posterior approach, with fusion of posterior spinal column, planned for psoas abscess drainage.    - s/p Decompression, spine, lumbar, posterior approach, with fusion of posterior spinal column  - s/p psoas drainage   - pain control  - c/w statin  - abx per ICU

## 2025-04-28 NOTE — PROGRESS NOTE ADULT - ASSESSMENT
79M s/p T10-pelvis PSF w/ PRS closure 4/24    - pain control PRN  - dressing changes PRN, done today  - Monitor DHARA - both holding suction; drain care. Will discuss w/ Dr. Escamilla the plan for drains.    - appreciate care per primary    Erik Gómez MD   [facility] Surgery Resident PGY1    For any questions, please DO NOT reach out via Teams.    Plastic Surgery   [facility]: [id number]  [facility]: [phone]

## 2025-04-28 NOTE — PROGRESS NOTE ADULT - SUBJECTIVE AND OBJECTIVE BOX
Patient Resting without complaints /events overnight.     Exam:   Alert/Oriented, No Acute Distress          Dressing: [x] clean/dry/intact  [ ] Other:           Drains: x4 drains; PRS drains: right 17/27, left 22/62 - IR drains: Left 10/10, Right 8.5/13.5,          Motor exam: [  ]          [ ] Upper extremity                      Bi          Tri        Delt                                                    R         5/5        5/5        5/5                                               L          5/5        5/5        5/5                  [ ] Lower extremeity            PF          DF         EHL       FHL                                                                                            R        5/5        5/5        5/5       5/5                                                        L         5/5        5/5        5/5       5/5                   Sensation: [x] intact to light touch  [ ] decreased                                             Calves Soft/Non-tender bilaterally           Distal extremities warm well perfused; capillary refill <3 seconds              LABS:                        8.9    14.08 )-----------( 362      ( 27 Apr 2025 01:00 )             26.6     04-27    128[L]  |  99  |  26[H]  ----------------------------<  174[H]  3.9   |  17[L]  |  1.21    Ca    8.8      27 Apr 2025 12:30  Phos  2.8     04-27  Mg     2.40     04-27          RADIOLOGY & ADDITIONAL STUDIES:

## 2025-04-28 NOTE — PROGRESS NOTE ADULT - ASSESSMENT
79yMale w/ HTN, HLD, T2DM, chronic low back pain presents as a transfer from [facility] ED for surgery today. Pt was sent into ED yesterday by his pain medicine physician due to recent MRI findings concerning for L1-L2 discitis/OM with epidural abscess, bilateral psoas abscesses. Patient initially saw pain specialist in February for chronic back pain with radiations to bilateral lateral thighs (R>L). MRI at that time showed degenerative changes. He subsequently had epidural injections x2 (2/27, 3/11) with moderate pain relief. Pain began to worsen on 4/10. He had radiofrequency ablation performed on 4/11 and has since has severe worsening of pain and radiation to R lateral thigh. He reports recent bowel/bladder "incontinence" requiring diaper due to his inability to get to the bathroom in time due to pain limitations but states urge and sensation are intact. Denies fevers, chills, night sweats, weakness, numbness/tingling, saddle anesthesia, recent falls/trauma. He uses a cane for ambulation. Denies recent falls/trauma or any other ortho injuries. He was born in [place], has frequent travels back, no known TB exposure. NO recent infections, antibiotic use.    ED/Hospital course: Has been afebrile, WBC 14, A1C 7.5, labs otherwise WNL. Underwent decompression, spine, lumbar, posterior approach, with fusion of posterior spinal column on 4/24. Cultures sent. Started on Vancomycin and Zosyn. IR consulted for abscess drainage. ID consulted for antibiotic management.    Blood cultures B fragilis  Abscess cx GNR    # L1/L2 discitis OM s/p decompression/fusion with cx thus far with bacteroides   # Multiloculated bilateral psoas abscesses S/P drainage on 04/26  # leucocytosis     MICRO:   04/23 BCX- 2/4 GNR, bacteroides   04/24 OR cx-  bacteroides   04/25 MRSA nares- negative   04/25 BCX- NGTD  04/26 Psoas abscess cx- GNR     Recommendations;   - Cultures growing Bacteroides; not typical pathogens for spine infection, suspect transient gut translocation into the blood stream causing bacteremia, epidural abscess and B/L Psoas abscess   - No GI/ pathology on imaging ( enlarged prostate, UA negative)   - Continue zosyn 3.375 gm Q8h pending finalization of cultures from 04/26 ( GNR)   - continue to trend CBC/fever curve  - Will need atleast 6 weeks of IV antibiotics from 04/26/25 ( final choice of antibiotics based on cultures)- would likely do ertapenem 1 gm Q24H upon discharge.     In addition to reviewing history, imaging, documents, labs, microbiology, took into account antibiotic stewardship, local antibiogram and infection control strategies and potential transmission issues.    Discussed with the primary team.    Alem Russell MD  Attending Physician, Division of Infectious Diseases  Department of Medicine   [facility]    Contact on TEAMS messaging from 9am - 5pm  Office: [phone] (after 5 PM or weekend).

## 2025-04-28 NOTE — PROGRESS NOTE ADULT - ASSESSMENT
79y Male s/p spinal decompression and posterior spinal fusion 4/24 for epidural abscess now found to have bilateral psoas abscesses. Patient is now s/p b/l psoas abscess drainages on 4/26 in IR.      Plan:  - Continue drainage, monitor output  - Flush drain with 5cc NS qd  - Will need noncontrast CT + IR tube check once outputs < 10cc/48hr, can be arranged as inpateint vs. outpatient pending clincal course.   - If patient is planned to be discharged with drain, please call IR [facility] [place] [phone] prior to discharge, to set up outpatient appointment.     [id number]

## 2025-04-28 NOTE — PROGRESS NOTE ADULT - SUBJECTIVE AND OBJECTIVE BOX
Date of Service  : 04-28-25     INTERVAL HPI/OVERNIGHT EVENTS: PT trying to make  him stand . Said still distended.  Vital Signs Last 24 Hrs  T(C): 36.6 (28 Apr 2025 17:31), Max: 37.1 (28 Apr 2025 08:58)  T(F): 97.8 (28 Apr 2025 17:31), Max: 98.7 (28 Apr 2025 08:58)  HR: 96 (28 Apr 2025 17:31) (63 - 106)  BP: 119/73 (28 Apr 2025 17:31) (103/57 - 128/66)  BP(mean): --  RR: 18 (28 Apr 2025 17:31) (17 - 18)  SpO2: 100% (28 Apr 2025 17:31) (98% - 100%)    Parameters below as of 28 Apr 2025 17:31  Patient On (Oxygen Delivery Method): room air      I&O's Summary    27 Apr 2025 07:01  -  28 Apr 2025 07:00  --------------------------------------------------------  IN: 690 mL / OUT: 322.5 mL / NET: 367.5 mL    28 Apr 2025 07:01  -  28 Apr 2025 19:10  --------------------------------------------------------  IN: 0 mL / OUT: 47.5 mL / NET: -47.5 mL      MEDICATIONS  (STANDING):  acetaminophen     Tablet .. 975 milliGRAM(s) Oral every 6 hours  atorvastatin 40 milliGRAM(s) Oral at bedtime  gabapentin 100 milliGRAM(s) Oral every 8 hours  glucagon  Injectable 1 milliGRAM(s) IntraMuscular once  heparin   Injectable 5000 Unit(s) SubCutaneous every 8 hours  insulin lispro (ADMELOG) corrective regimen sliding scale   SubCutaneous three times a day before meals  insulin lispro (ADMELOG) corrective regimen sliding scale   SubCutaneous at bedtime  methocarbamol 500 milliGRAM(s) Oral every 12 hours  omega-3-Acid Ethyl Esters 2 Gram(s) Oral two times a day  pantoprazole    Tablet 40 milliGRAM(s) Oral before breakfast  piperacillin/tazobactam IVPB.. 3.375 Gram(s) IV Intermittent every 8 hours  senna 2 Tablet(s) Oral at bedtime    MEDICATIONS  (PRN):  bisacodyl 5 milliGRAM(s) Oral every 12 hours PRN Constipation  bisacodyl Suppository 10 milliGRAM(s) Rectal daily PRN Constipation  magnesium hydroxide Suspension 30 milliLiter(s) Oral every 12 hours PRN Constipation  oxyCODONE    IR 2.5 milliGRAM(s) Oral every 4 hours PRN Moderate Pain (4 - 6)  oxyCODONE    IR 5 milliGRAM(s) Oral every 4 hours PRN Severe Pain (7 - 10)  simethicone 80 milliGRAM(s) Chew every 12 hours PRN Upset Stomach    LABS:                        9.6    11.94 )-----------( 488      ( 28 Apr 2025 06:00 )             28.6     04-28    129[L]  |  101  |  27[H]  ----------------------------<  130[H]  4.8   |  19[L]  |  1.36[H]    Ca    9.0      28 Apr 2025 06:00  Phos  2.8     04-27  Mg     2.40     04-27        Urinalysis Basic - ( 28 Apr 2025 06:00 )    Color: x / Appearance: x / SG: x / pH: x  Gluc: 130 mg/dL / Ketone: x  / Bili: x / Urobili: x   Blood: x / Protein: x / Nitrite: x   Leuk Esterase: x / RBC: x / WBC x   Sq Epi: x / Non Sq Epi: x / Bacteria: x      CAPILLARY BLOOD GLUCOSE      POCT Blood Glucose.: 137 mg/dL (28 Apr 2025 16:42)  POCT Blood Glucose.: 301 mg/dL (28 Apr 2025 11:30)  POCT Blood Glucose.: 151 mg/dL (28 Apr 2025 07:22)  POCT Blood Glucose.: 159 mg/dL (27 Apr 2025 21:57)        Urinalysis Basic - ( 28 Apr 2025 06:00 )    Color: x / Appearance: x / SG: x / pH: x  Gluc: 130 mg/dL / Ketone: x  / Bili: x / Urobili: x   Blood: x / Protein: x / Nitrite: x   Leuk Esterase: x / RBC: x / WBC x   Sq Epi: x / Non Sq Epi: x / Bacteria: x      REVIEW OF SYSTEMS:  CONSTITUTIONAL: No fever, weight loss, or fatigue  EYES: No eye pain, visual disturbances, or discharge  ENMT:  No difficulty hearing, tinnitus, vertigo; No sinus or throat pain  NECK: No pain or stiffness  RESPIRATORY: No cough, wheezing, chills or hemoptysis; No shortness of breath  CARDIOVASCULAR: No chest pain, palpitations, dizziness, or leg swelling  GASTROINTESTINAL: No abdominal or epigastric pain. No nausea, vomiting, or hematemesis; No diarrhea or constipation. No melena or hematochezia.  GENITOURINARY: No dysuria, frequency, hematuria, or incontinence  NEUROLOGICAL: No headaches, memory loss, loss of strength, numbness, or tremors      RADIOLOGY & ADDITIONAL TESTS:    Consultant(s) Notes Reviewed:  [x ] YES  [ ] NO    PHYSICAL EXAM:  GENERAL: NAD, well-groomed, well-developed,not in any distress ,  HEAD:  Atraumatic, Normocephalic  EYES: EOMI, PERRLA, conjunctiva and sclera clear  ENMT: No tonsillar erythema, exudates, or enlargement; Moist mucous membranes, Good dentition, No lesions  NECK: Supple, No JVD, Normal thyroid  NERVOUS SYSTEM:  Alert & Oriented X3,    CHEST/LUNG: Good air entry bilateral with no  rales, rhonchi, wheezing, or rubs  HEART: Regular rate and rhythm; No murmurs, rubs, or gallops  ABDOMEN: Soft, Nontender, +distended; Bowel sounds present  EXTREMITIES:  2+ Peripheral Pulses, No clubbing, cyanosis, or edema  SKIN:  Drain +++  back     Care Discussed with Consultants/Other Providers [ x] YES  [ ] NO

## 2025-04-28 NOTE — CHART NOTE - NSCHARTNOTEFT_GEN_A_CORE
RD revisited patient as was previously out of room at time of initial assessment / consult due to surgery.     Patient reported to be currently tolerating diet well, completing >50-75% of meals. Per RN flowsheets.  Asking to receive oral supplement - after discussing various supplement options and patient would like to receive Ensure Max, as it is lowest in sugar and highest in protein.  Recommended Ensure Max Protein Nutrition Shake (150kcal, 30gm protein, 6gm carbohydrate per 11 fl oz) 2x day be provided (300kcal, 60gm protein/day)- communicated same to Ortho team.   Patient verbalized concern that his usual diet does not provide him with enough protein given he eats mostly [place] foods i.e. beans / lentils / rice. Discussed complimentary proteins and adding nuts, nut butters, nutritional supplements into diet to help optimize protein intake. Patient also eats fish. He feels he is eating more nutritious food in the hospital compared to at home.  RD confirmed with patient previous nutrition dx of unintentional weight loss. Patient reported usual body weight for several years being ~155-158 pounds and then recently, checked his weight at home, during which time he weighed himself in at ~140 pounds --- this is consistent with admission weight 63.kg (4/24); subsequent weights 4/26 - 63.8kg; 4/27 - 65kg.   Patient reported no change in diet or PO intake and was puzzled by weight loss PTA as he had no change in eating habits.  RD conducted nutrition focused physical exam and did not identify any overt muscle or fat depletions at this time (4/28/25).  Continue to monitor inpatient weights and f/u for further nutrition interventions accordingly.      Kinza Vivas, MS, RDN, CDN  Pager [phone]  Also available on MS Teams

## 2025-04-28 NOTE — PROGRESS NOTE ADULT - SUBJECTIVE AND OBJECTIVE BOX
80yo Male s/p bilateral psoas abscess drainages on 4/26 in Interventional Radiology.     Patient seen and examined at bedside, resting comfortably. No complaints offered.    T(F): 98.7 (04-28-25 @ 08:58), Max: 98.7 (04-28-25 @ 08:58)  HR: 98 (04-28-25 @ 08:58) (63 - 107)  BP: 128/66 (04-28-25 @ 08:58) (103/57 - 136/71)  RR: 18 (04-28-25 @ 08:58) (16 - 18)  SpO2: 98% (04-28-25 @ 08:58) (98% - 100%)    LABS:                        9.6    11.94 )-----------( 488      ( 28 Apr 2025 06:00 )             28.6     04-28    129[L]  |  101  |  27[H]  ----------------------------<  130[H]  4.8   |  19[L]  |  1.36[H]    Ca    9.0      28 Apr 2025 06:00  Phos  2.8     04-27  Mg     2.40     04-27      I&O's Detail    27 Apr 2025 07:01  -  28 Apr 2025 07:00  --------------------------------------------------------  IN:    IV PiggyBack: 100 mL    Oral Fluid: 590 mL  Total IN: 690 mL    OUT:    Bulb (mL): 10 mL    Bulb (mL): 37 mL    Bulb (mL): 13.5 mL    Bulb (mL): 62 mL    Voided (mL): 200 mL  Total OUT: 322.5 mL    Total NET: 367.5 mL    PHYSICAL EXAM:  General: Nontoxic, in NAD  Right psoas drain: dressing c/d/i, drain bulb w/ SS output, flushed with 5cc NS  Left psoas drain:  dressing c/d/i, drain bulb w/ SS output, flushed with 5cc NS

## 2025-04-28 NOTE — PROGRESS NOTE ADULT - ASSESSMENT
79yMale w/ HTN, HLD, T2DM, chronic low back pain presents as a transfer from [facility] ED for surgery today. Pt was sent into ED yesterday by his pain medicine physician due to recent MRI findings concerning for L1-L2 discitis/OM with epidural abscess, bilateral psoas abscesses. Patient initially saw pain specialist in February for chronic back pain with radiations to bilateral lateral thighs (R>L). MRI at that time showed degenerative changes. He subsequently had epidural injections x2 (2/27, 3/11) with moderate pain relief. Pain began to worsen on 4/10. He had radiofrequency ablation performed on 4/11 and has since has severe worsening of pain and radiation to R lateral thigh. He reports recent bowel/bladder "incontinence" requiring diaper due to his inability to get to the bathroom in time due to pain limitations but states urge and sensation are intact. Denies fevers, chills, night sweats, weakness, numbness/tingling, saddle anesthesia, recent falls/trauma. He uses a cane for ambulation. Denies recent falls/trauma or any other ortho injuries. Before back pain was very active walked half an hour , going up and down stairs and doing grocery etc with no Chest pain or SOB.        Problem/Recommendation - 1:  ·  Problem: Epidural abscess.   ·  Recommendation: S/P Decompression, spine, lumbar, posterior approach, with fusion of posterior spinal column.  On IV Abxs Zosyn per ID  .  Will defer management to  Neurosurgery.    < from: MR Lumbar Spine w/ IV Cont (04.23.25 @ 15:13) >  IMPRESSION:        1.   Cervical spine:   Moderate degenerative disc disease and   spondylosis at C4-5 through C6/7 with loss of disc height and associated   degenerative endplate changes.        2.   Thoracic spine:   Minimal enhancement within T7-8 which may   reflect early discitis.        3.   Lumbar spine:   Osteomyelitis and discitis at L1-2 with erosions   of the inferior L1 vertebral body and L2 vertebral body consistent with   osteomyelitis and discitis. Ventral epidural abscess is noted extending   from the L1-2 level superiorly to the T10 level with mass effect on the   ventral thecal sac, most prominently at the L1-2 level resulting in   marked compression. Multiple abscesses within the BILATERAL psoas   muscles, the largest measuring 4.3 cm on the RIGHT and 2.8 cm on the LEFT.     Problem/Recommendation - 2:  ·  Problem: Acute osteomyelitis of lumbar spine.   ·  Recommendation: L1 &L2 vertebrae .  On IV Abxs per ID .  Will defer management to Neurosurgery.     Problem/Recommendation - 3:  ·  Problem: Low back pain radiating to right lower extremity.   ·  Recommendation: Pain control.     Problem/Recommendation - 4:  ·  Problem: . BILATERAL psoas Abscess  ·  Recommendation: ON IV Abxs per ID .  IR placed drains after draining  .       Problem/Recommendation - 5:  ·  Problem: HTN (hypertension).   ·  Recommendation: Takes Amlodipine and ARB so continue with hold parameters.  < from: TTE W or WO Ultrasound Enhancing Agent (04.24.25 @ 10:10) >  CONCLUSIONS:      1. Left ventricular cavity is normal in size. Left ventricular wall thickness is normal. Left ventricular systolic function is normal with an ejection fraction of 64 % by Carpenter's method of disks. There are no regional wall motion abnormalities seen.   2. Normal right ventricular cavity size and normal right ventricular systolic function. Tricuspid annular plane systolic excursion (TAPSE) is 2.2 cm (normal >=1.7 cm).   3. Structurally normal mitral valve with normal leaflet excursion. There is calcification of the mitral valve annulus. There is tracemitral regurgitation.   4. The aortic valve appears trileaflet with normal systolic excursion. There is calcification of the aortic valve leaflets. There is mild aortic regurgitation.     Problem/Recommendation - 6:  ·  Problem: HLD (hyperlipidemia).   ·  Recommendation: Continue Atorvastatin.     Problem/Recommendation - 7:  ·  Problem: DM (diabetes mellitus).   ·  Recommendation: ON Farxiga and holding.  SSI and Lantus in patient .     Problem/Recommendation - 8:  ·  Problem: Abdominal distention .   ·  Recommendation: Had BM and passing flatus .    If worsens then will need Xray  abdomen .     Problem/Recommendation - 9:  ·  Problem: JUSTINO with Hyponatremia .   ·  Recommendation: Trending BMP.

## 2025-04-28 NOTE — PROGRESS NOTE ADULT - SUBJECTIVE AND OBJECTIVE BOX
Patient is a 79y old  Male who presents with a chief complaint of L1-L2 discitis/OM with epidural abscess and BL psoas abscesses (28 Apr 2025 14:22)    Being followed by ID for epidural abscess     Interval history:  No other acute events  Reports pain   Drains in place   Cultures reviewed     Antimicrobials:  piperacillin/tazobactam IVPB.. 3.375 Gram(s) IV Intermittent every 8 hours      Vital Signs Last 24 Hrs  T(C): 37.1 (04-28-25 @ 13:23), Max: 37.1 (04-28-25 @ 08:58)  T(F): 98.7 (04-28-25 @ 13:23), Max: 98.7 (04-28-25 @ 08:58)  HR: 100 (04-28-25 @ 13:23) (63 - 107)  BP: 111/68 (04-28-25 @ 13:23) (103/57 - 136/71)  BP(mean): --  RR: 18 (04-28-25 @ 13:23) (17 - 18)  SpO2: 100% (04-28-25 @ 13:23) (98% - 100%)    PHYSICAL EXAM:  Constitutional: non-toxic, no distress  HEAD/EYES: anicteric, no conjunctival injection  ENT:  supple, no thrush  Cardiovascular:   normal S1, S2, no murmur, no edema  Respiratory:  clear BS bilaterally, no wheezes, no rales  GI:  distended   Musculoskeletal:  no synovitis, normal ROM  Neurologic: awake and alert, normal strength, no focal findings  Skin:  no rash, no erythema, no phlebitis  Back: drains +     Lab Data:                        9.6    11.94 )-----------( 488      ( 28 Apr 2025 06:00 )             28.6     04-28    129[L]  |  101  |  27[H]  ----------------------------<  130[H]  4.8   |  19[L]  |  1.36[H]    Ca    9.0      28 Apr 2025 06:00  Phos  2.8     04-27  Mg     2.40     04-27        Abscess  04-26-25   Growth in fluid media only Gram Negative Rods  --    Numerous polymorphonuclear leukocytes per low power field  Moderate Gram Negative Rods per oil power field      Abscess  04-26-25   Growth in fluid media only Gram Negative Rods  --    Numerous polymorphonuclear leukocytes per low power field  Few Gram Negative Rods per oil power field      Blood Blood  04-25-25   No growth at 72 Hours  --  --      Blood Blood-Venous  04-25-25   No growth at 72 Hours  --  --      Abscess #3 EPIDURAL ABSCESS  04-24-25   Moderate Bacteroides fragilis "Susceptibilities not performed"  --    Moderate polymorphonuclear leukocytes seen per low power field  Rare Gram Negative Rods seen per oil power field      Surgical Swab #5 L1 L2 DISC SPALE  04-24-25   Numerous Bacteroides fragilis "Susceptibilities not performed"  --  --      Abscess #2 EPIDURAL ABSCESS  04-24-25   Moderate Bacteroides fragilis "Susceptibilities not performed"  --    Moderate polymorphonuclear leukocytes seen per low power field  Few Gram Negative Rods seen per oil power field      Abscess #1 EPIDURAL ABSCESS  04-24-25   Numerous Bacteroides fragilis "Susceptibilities not performed"  --    Moderate polymorphonuclear leukocytes seen per low power field  Few Gram Negative Rods seen per oil power field      Blood Blood-Peripheral  04-23-25   Growth in anaerobic bottle: Bacteroides fragilis "Susceptibilities not  performed"  --    Growth in anaerobic bottle: Gram Negative Rods      Blood Blood-Peripheral  04-23-25   Growth in anaerobic bottle: Bacteroides fragilis "Susceptibilities not  performed"  Direct identification is available within approximately 3-5  hours either by Blood Panel Multiplexed PCR or Direct  MALDI-TOF. Details: [email or web address]  --  Blood Culture PCR                  RADIOLOGY:  below reviewed        RADIOLOGY:  imaging below personally reviewed and agree with findings    < from: MR Thoracic Spine w/ IV Cont (04.23.25 @ 15:14) >  ACC: [id number] EXAM:  MR SPINE CERVICAL IC   ORDERED BY: ZANE REYNOSO     ACC: [id number] EXAM:  MR SPINE THORACIC IC   ORDERED BY: ZANE REYNOSO     ACC: [id number] EXAM:  MR SPINE LUMBAR IC   ORDERED BY: ZANE REYNOSO     PROCEDURE DATE:04/23/2025          INTERPRETATION:  Three examinations were performed:  1. MR cervical spine with gadolinium  2. MR thoracic spine with gadolinium  3. MR lumbar spine with gadolinium      CLINICAL INFORMATION (all three examinations): Epidural access    TECHNIQUE (all three examinations): Post gadolinium fat saturated   sagittal and axial T1-weighted images were obtained following intravenous   administration of 6 cc of Gadavist/1.5 cc discarded.    FINDINGS:   CT abdomen and pelvis dated 04/23/2025 available for review.      1.  Cervical spine:    Cervical vertebral alignment is intact.  Cervical vertebral body heights   are maintained.  Marrow signal intensity within cervical vertebral bodies   and posterior elements is significant for a 1.5 cm enhancing trabeculated   lesion within T7 likely a hemangioma..  No osseous expansion, epidural   disease or paraspinal abnormalities found.    Cervical intervertebral discs show moderate degenerative disc disease and   spondylosis at C4-5 through C6/7 with loss of disc height and associated   degenerative endplate changes.    The cervical cord maintains intact morphology.   No cord signal intensity   change is seen.  No abnormal enhancement occurs within the cervical canal.      2.  Thoracic spine:    Thoracic vertebral alignment is preserved.  Thoracic vertebral body   heights are maintained.  Marrow signal intensity within thoracic   vertebral bodies and posterior elements is unremarkable.  There is no   abnormal vertebral or paraspinal enhancement.  No osseous expansion,   epidural disease or paraspinal abnormality is found.    Thoracic intervertebral discs demonstrates minimal enhancement within   T7-8 which may reflect early discitis. No central canal or foraminal   compromise is recognized.    The thoracic cord maintains intact morphology.   No cord signal intensity   change is seen.  No abnormal enhancement occurs within the thoracic canal.      3.  Lumbar spine:    Lumbar vertebral alignment is preserved.  Lumbar vertebral body heights   are maintained.  Marrow signal intensity within lumbar vertebral bodies   and posterior elements is significant for osteomyelitis and discitis at   L1-2 with erosions of the inferior L1 vertebral body and L2 vertebral   body consistent with osteomyelitis and discitis. Ventral epidural abscess   is noted extending from the L1-2 level superiorly to the T10 level with   mass effect on the ventral thecal sac most prominently at the L1-2 level   resulting in marked compression. Multiple abscesses within the BILATERAL   psoas muscles, the largest measuring 4.3 cm on the RIGHT and 2.8 cm on   the LEFT.      The distal cord maintains intact morphology and signal intensity.  The   conus is normally positioned at T12.  Nerve roots of the cauda equina   demonstrate no enhancement.  IMPRESSION:        1.   Cervical spine:   Moderate degenerative disc disease and   spondylosis at C4-5 through C6/7 with loss of disc height and associated   degenerative endplate changes.        2.   Thoracic spine:   Minimal enhancement within T7-8 which may   reflect early discitis.        3.   Lumbar spine:   Osteomyelitis and discitis at L1-2 with erosions   of the inferior L1 vertebral body and L2 vertebral body consistent with   osteomyelitis and discitis. Ventral epidural abscess is noted extending   from the L1-2 level superiorly to the T10 level with mass effect on the   ventral thecal sac, most prominently at the L1-2 level resulting in   marked compression. Multiple abscesses within the BILATERAL psoas   muscles, the largest measuring 4.3 cm on the RIGHT and 2.8 cm on the LEFT.      --- End of Report ---      < from: CT Abdomen and Pelvis w/ IV Cont (04.23.25 @ 13:24) >  INTERPRETATION:  CLINICAL INFORMATION: Psoas abscess.    COMPARISON: 6/8/2018 MR and 6/5/2018 CT        CONTRAST/COMPLICATIONS:  IV Contrast: Omnipaque 350  90 cc administered   10 cc discarded  Oral Contrast: NONE  .    PROCEDURE:  CT of the Abdomen and Pelvis was performed.  Sagittal and coronal reformats were performed.    FINDINGS:  LOWER CHEST: Within normal limits.    LIVER: Within normal limits.  BILE DUCTS: Normal caliber.  GALLBLADDER: Stable. Under distended gallbladder with wall   calcification/calculi. No pericholecystic inflammatory changes or fluid.  SPLEEN: Within normal limits.  PANCREAS: Within normal limits.  ADRENALS: Within normal limits.  KIDNEYS/URETERS: No renal stones or hydronephrosis. Renal cysts and   subcentimeter hypodense foci is are noted.    BLADDER: Within normal limits.  REPRODUCTIVE ORGANS: Prostate is enlarged.    BOWEL: No bowelobstruction. Appendix is normal. Small hiatal hernia.  PERITONEUM/RETROPERITONEUM: No free fluid. Multilobulated ill-defined   heterogeneous low-density collection/lesion noted in the right psoas   muscle measuring 6.1 x 4.9 x 13.2 cm with adjacent wall enhancement and   incompletely septations. Additional heterogeneous predominantly   low-density lesion noted in the left psoas muscle measuring 8.4 x 4.7 x   9.1 cm. These 2 collections are contiguous with the perivertebral   collection at L1/L2.  VESSELS: Mild atherosclerotic calcification.  LYMPH NODES: No lymphadenopathy.  ABDOMINAL WALL: Within normal limits.  BONES: Degenerative changes. Loss of L1 inferior and L2 superior endplate   with irregularity of the disc space and a perivertebralcollection   measuring approximately 2.7 x 6.3 x 3.6 cm. Retropulsion into the spinal   canal noted with enhancing collection.    IMPRESSION:  *  L1/L2 discitis/osteomyelitis with paravertebral collection both   ventrally and dorsally into the spinalcanal.  *  Bilateral heterogeneous complex psoas collection which are contiguous   with the perivertebral collection at the L1/L2.  *  Stable cholelithiasis/calcified gallbladder wall.        --- End of Report ---        < end of copied text >

## 2025-04-29 LAB
GLUCOSE BLDC GLUCOMTR-MCNC: 112 MG/DL — HIGH (ref 70–99)
GLUCOSE BLDC GLUCOMTR-MCNC: 135 MG/DL — HIGH (ref 70–99)
GLUCOSE BLDC GLUCOMTR-MCNC: 167 MG/DL — HIGH (ref 70–99)
GLUCOSE BLDC GLUCOMTR-MCNC: 302 MG/DL — HIGH (ref 70–99)

## 2025-04-29 PROCEDURE — 99232 SBSQ HOSP IP/OBS MODERATE 35: CPT

## 2025-04-29 PROCEDURE — G0545: CPT

## 2025-04-29 PROCEDURE — 74177 CT ABD & PELVIS W/CONTRAST: CPT | Mod: 26

## 2025-04-29 PROCEDURE — 74019 RADEX ABDOMEN 2 VIEWS: CPT | Mod: 26

## 2025-04-29 PROCEDURE — 74018 RADEX ABDOMEN 1 VIEW: CPT | Mod: 26,XE

## 2025-04-29 PROCEDURE — 71045 X-RAY EXAM CHEST 1 VIEW: CPT | Mod: 26

## 2025-04-29 RX ORDER — ERTAPENEM SODIUM 1 G/1
INJECTION, POWDER, LYOPHILIZED, FOR SOLUTION INTRAMUSCULAR; INTRAVENOUS
Refills: 0 | Status: DISCONTINUED | OUTPATIENT
Start: 2025-04-29 | End: 2025-05-16

## 2025-04-29 RX ORDER — INSULIN LISPRO 100 U/ML
INJECTION, SOLUTION INTRAVENOUS; SUBCUTANEOUS EVERY 6 HOURS
Refills: 0 | Status: DISCONTINUED | OUTPATIENT
Start: 2025-04-29 | End: 2025-05-01

## 2025-04-29 RX ORDER — ERTAPENEM SODIUM 1 G/1
1000 INJECTION, POWDER, LYOPHILIZED, FOR SOLUTION INTRAMUSCULAR; INTRAVENOUS EVERY 24 HOURS
Refills: 0 | Status: DISCONTINUED | OUTPATIENT
Start: 2025-04-30 | End: 2025-05-16

## 2025-04-29 RX ORDER — SODIUM CHLORIDE 9 G/1000ML
1000 INJECTION, SOLUTION INTRAVENOUS
Refills: 0 | Status: DISCONTINUED | OUTPATIENT
Start: 2025-04-29 | End: 2025-05-01

## 2025-04-29 RX ORDER — ERTAPENEM SODIUM 1 G/1
1000 INJECTION, POWDER, LYOPHILIZED, FOR SOLUTION INTRAMUSCULAR; INTRAVENOUS ONCE
Refills: 0 | Status: COMPLETED | OUTPATIENT
Start: 2025-04-29 | End: 2025-04-29

## 2025-04-29 RX ADMIN — Medication 975 MILLIGRAM(S): at 12:44

## 2025-04-29 RX ADMIN — HEPARIN SODIUM 5000 UNIT(S): 1000 INJECTION INTRAVENOUS; SUBCUTANEOUS at 15:49

## 2025-04-29 RX ADMIN — ERTAPENEM SODIUM 100 MILLIGRAM(S): 1 INJECTION, POWDER, LYOPHILIZED, FOR SOLUTION INTRAMUSCULAR; INTRAVENOUS at 14:34

## 2025-04-29 RX ADMIN — Medication 975 MILLIGRAM(S): at 07:18

## 2025-04-29 RX ADMIN — METHOCARBAMOL 500 MILLIGRAM(S): 500 TABLET, FILM COATED ORAL at 11:40

## 2025-04-29 RX ADMIN — Medication 975 MILLIGRAM(S): at 11:44

## 2025-04-29 RX ADMIN — OMEGA-3-ACID ETHYL ESTERS CAPSULES 2 GRAM(S): 1 CAPSULE, LIQUID FILLED ORAL at 11:40

## 2025-04-29 RX ADMIN — GABAPENTIN 100 MILLIGRAM(S): 400 CAPSULE ORAL at 15:47

## 2025-04-29 RX ADMIN — INSULIN LISPRO 8: 100 INJECTION, SOLUTION INTRAVENOUS; SUBCUTANEOUS at 11:41

## 2025-04-29 RX ADMIN — GABAPENTIN 100 MILLIGRAM(S): 400 CAPSULE ORAL at 06:18

## 2025-04-29 RX ADMIN — Medication 25 GRAM(S): at 06:18

## 2025-04-29 RX ADMIN — GABAPENTIN 100 MILLIGRAM(S): 400 CAPSULE ORAL at 22:05

## 2025-04-29 RX ADMIN — Medication 40 MILLIGRAM(S): at 06:18

## 2025-04-29 RX ADMIN — Medication 975 MILLIGRAM(S): at 18:23

## 2025-04-29 RX ADMIN — INSULIN LISPRO 2: 100 INJECTION, SOLUTION INTRAVENOUS; SUBCUTANEOUS at 07:35

## 2025-04-29 RX ADMIN — SODIUM CHLORIDE 100 MILLILITER(S): 9 INJECTION, SOLUTION INTRAVENOUS at 11:38

## 2025-04-29 RX ADMIN — METHOCARBAMOL 500 MILLIGRAM(S): 500 TABLET, FILM COATED ORAL at 22:05

## 2025-04-29 RX ADMIN — HEPARIN SODIUM 5000 UNIT(S): 1000 INJECTION INTRAVENOUS; SUBCUTANEOUS at 06:18

## 2025-04-29 RX ADMIN — HEPARIN SODIUM 5000 UNIT(S): 1000 INJECTION INTRAVENOUS; SUBCUTANEOUS at 22:05

## 2025-04-29 RX ADMIN — Medication 975 MILLIGRAM(S): at 19:23

## 2025-04-29 RX ADMIN — Medication 2 TABLET(S): at 22:05

## 2025-04-29 RX ADMIN — OMEGA-3-ACID ETHYL ESTERS CAPSULES 2 GRAM(S): 1 CAPSULE, LIQUID FILLED ORAL at 18:24

## 2025-04-29 RX ADMIN — Medication 975 MILLIGRAM(S): at 06:18

## 2025-04-29 RX ADMIN — Medication 975 MILLIGRAM(S): at 00:42

## 2025-04-29 NOTE — PROGRESS NOTE ADULT - ASSESSMENT
79yMale w/ HTN, HLD, T2DM, chronic low back pain presents as a transfer from [facility] ED for surgery today. Pt was sent into ED yesterday by his pain medicine physician due to recent MRI findings concerning for L1-L2 discitis/OM with epidural abscess, bilateral psoas abscesses. Patient initially saw pain specialist in February for chronic back pain with radiations to bilateral lateral thighs (R>L). MRI at that time showed degenerative changes. He subsequently had epidural injections x2 (2/27, 3/11) with moderate pain relief. Pain began to worsen on 4/10. He had radiofrequency ablation performed on 4/11 and has since has severe worsening of pain and radiation to R lateral thigh. He reports recent bowel/bladder "incontinence" requiring diaper due to his inability to get to the bathroom in time due to pain limitations but states urge and sensation are intact. Denies fevers, chills, night sweats, weakness, numbness/tingling, saddle anesthesia, recent falls/trauma. He uses a cane for ambulation. Denies recent falls/trauma or any other ortho injuries. Before back pain was very active walked half an hour , going up and down stairs and doing grocery etc with no Chest pain or SOB.        Problem/Recommendation - 1:  ·  Problem: Epidural abscess.   ·  Recommendation: S/P Decompression, spine, lumbar, posterior approach, with fusion of posterior spinal column.  On IV Abxs Zosyn per ID  .  Will defer management to  Neurosurgery.    < from: MR Lumbar Spine w/ IV Cont (04.23.25 @ 15:13) >  IMPRESSION:        1.   Cervical spine:   Moderate degenerative disc disease and   spondylosis at C4-5 through C6/7 with loss of disc height and associated   degenerative endplate changes.        2.   Thoracic spine:   Minimal enhancement within T7-8 which may   reflect early discitis.        3.   Lumbar spine:   Osteomyelitis and discitis at L1-2 with erosions   of the inferior L1 vertebral body and L2 vertebral body consistent with   osteomyelitis and discitis. Ventral epidural abscess is noted extending   from the L1-2 level superiorly to the T10 level with mass effect on the   ventral thecal sac, most prominently at the L1-2 level resulting in   marked compression. Multiple abscesses within the BILATERAL psoas   muscles, the largest measuring 4.3 cm on the RIGHT and 2.8 cm on the LEFT.     Problem/Recommendation - 2:  ·  Problem: Acute osteomyelitis of lumbar spine.   ·  Recommendation: L1 &L2 vertebrae .  On IV Abxs per ID .  Will defer management to Neurosurgery.     Problem/Recommendation - 3:  ·  Problem: Low back pain radiating to right lower extremity.   ·  Recommendation: Pain control.     Problem/Recommendation - 4:  ·  Problem: . BILATERAL psoas Abscess  ·  Recommendation: ON IV Abxs per ID .  IR placed drains after draining  .       Problem/Recommendation - 5:  ·  Problem: HTN (hypertension).   ·  Recommendation: Takes Amlodipine and ARB so continue with hold parameters.  < from: TTE W or WO Ultrasound Enhancing Agent (04.24.25 @ 10:10) >  CONCLUSIONS:      1. Left ventricular cavity is normal in size. Left ventricular wall thickness is normal. Left ventricular systolic function is normal with an ejection fraction of 64 % by Carpenter's method of disks. There are no regional wall motion abnormalities seen.   2. Normal right ventricular cavity size and normal right ventricular systolic function. Tricuspid annular plane systolic excursion (TAPSE) is 2.2 cm (normal >=1.7 cm).   3. Structurally normal mitral valve with normal leaflet excursion. There is calcification of the mitral valve annulus. There is tracemitral regurgitation.   4. The aortic valve appears trileaflet with normal systolic excursion. There is calcification of the aortic valve leaflets. There is mild aortic regurgitation.     Problem/Recommendation - 6:  ·  Problem: HLD (hyperlipidemia).   ·  Recommendation: Continue Atorvastatin.     Problem/Recommendation - 7:  ·  Problem: DM (diabetes mellitus).   ·  Recommendation: ON Farxiga and holding.  SSI and Lantus in patient .     Problem/Recommendation - 8:  ·  Problem: Abdominal distention with Ileus ? pneumoperitoneum .   ·  Recommendation: Had BM and passing flatus .    Xray  abdomen noted.   D/W ortho surgery team .     < from: Xray Abdomen 1 View PORTABLE -Urgent (Xray Abdomen 1 View PORTABLE -Urgent .) (04.29.25 @ 10:09) >  IMPRESSION:  Multiple dilated loops of small and large bowel with question of   pneumoperitoneum. Recommend upright chest radiograph or decubitus   abdominal radiograph for further evaluation.    < end of copied text >   Problem/Recommendation - 9:  ·  Problem: JUSTINO with Hyponatremia .   ·  Recommendation: Trending BMP.    Rpt labs pending.

## 2025-04-29 NOTE — CONSULT NOTE ADULT - ASSESSMENT
80yo M with Hx HTN, DM, and chronic low back pain who was transferred from [facility] for management of L1-L2 discitis/osteomyelitis with bilateral psoas abscesses, s/p T12-L2 laminectomy via RP approach with Ortho on 4/24 with PRS closure, and s/p percutaneous drainage of bilateral psoas abscesses by IR on 4/26. Post-op course c/b abdominal distension. General Surgery consulted for imaging findings of possible pneumoperitoneum. He is tolerating a regular diet, having bowel function, and has a benign abdominal exam.     PLAN:   - Upright AXR/CXR films reviewed -- cannot r/o free air  - CT AP with IV/PO contrast ordered -- will f/u   - Global care per Ortho     Discussed with Dr. Federico Juarez, PGY-3  B Team Surgery  #[id number]  80yo M with Hx HTN, DM, and chronic low back pain who was transferred from [facility] for management of L1-L2 discitis/osteomyelitis with bilateral psoas abscesses, s/p T12-L2 laminectomy via RP approach with Ortho on 4/24 with PRS closure, and s/p percutaneous drainage of bilateral psoas abscesses by IR on 4/26. Post-op course c/b abdominal distension. General Surgery consulted for imaging findings of possible pneumoperitoneum. He is tolerating a regular diet, having bowel function, and has a benign abdominal exam.     PLAN:   - Upright AXR/CXR films reviewed -- cannot r/o free air  - CT AP with IV/PO contrast ordered -- will f/u   - Global care per Ortho     Discussed with Dr. Federico Juarez, PGY-3  B Team Surgery  #[id number]     -------------------------------------------------------------------------------------    ADDENDUM:     CT AP reviewed -- large stool burden on CT, no evidence of free air. Recommend aggressive bowel regimen.     Discussed with Dr. Pool Juarez, PGY-3  B Team Surgery  #[id number]

## 2025-04-29 NOTE — PROGRESS NOTE ADULT - ASSESSMENT
79M s/p T10-pelvis PSF w/ PRS closure 4/24    - pain control PRN  - dressing changes PRN, done today  - Monitor DHARA - both holding suction; drain care. DC'd one drain.  - appreciate care per primary    Erik Gómez MD   [facility] Surgery Resident PGY1    For any questions, please DO NOT reach out via Teams.    Plastic Surgery   [facility]: [id number]  [facility]: [phone]

## 2025-04-29 NOTE — PROGRESS NOTE ADULT - ASSESSMENT
79yMale w/ HTN, HLD, T2DM, chronic low back pain presents as a transfer from [facility] ED for surgery today. Pt was sent into ED yesterday by his pain medicine physician due to recent MRI findings concerning for L1-L2 discitis/OM with epidural abscess, bilateral psoas abscesses. Patient initially saw pain specialist in February for chronic back pain with radiations to bilateral lateral thighs (R>L). MRI at that time showed degenerative changes. He subsequently had epidural injections x2 (2/27, 3/11) with moderate pain relief. Pain began to worsen on 4/10. He had radiofrequency ablation performed on 4/11 and has since has severe worsening of pain and radiation to R lateral thigh. He reports recent bowel/bladder "incontinence" requiring diaper due to his inability to get to the bathroom in time due to pain limitations but states urge and sensation are intact. Denies fevers, chills, night sweats, weakness, numbness/tingling, saddle anesthesia, recent falls/trauma. He uses a cane for ambulation. Denies recent falls/trauma or any other ortho injuries. He was born in [place], has frequent travels back, no known TB exposure. NO recent infections, antibiotic use.    ED/Hospital course: Has been afebrile, WBC 14, A1C 7.5, labs otherwise WNL. Underwent decompression, spine, lumbar, posterior approach, with fusion of posterior spinal column on 4/24. Cultures sent. Started on Vancomycin and Zosyn. IR consulted for abscess drainage. ID consulted for antibiotic management.    Blood cultures B fragilis  Abscess cx GNR    # L1/L2 discitis OM s/p decompression/fusion with cx thus far with bacteroides   # Multiloculated bilateral psoas abscesses S/P drainage on 04/26-  cx thus far with bacteroides   # leucocytosis   # JUSTINO    MICRO:   04/23 BCX- 2/4 GNR, bacteroides   04/24 OR cx-  bacteroides   04/25 MRSA nares- negative   04/25 BCX- NGTD  04/26 Psoas abscess cx- GNR, Bacteroides     Recommendations;   - Cultures growing Bacteroides; not typical pathogens for spine infection, suspect transient gut translocation into the blood stream causing bacteremia, epidural abscess and B/L Psoas abscess   - No GI/ pathology on imaging ( enlarged prostate, UA negative)   - Can stop Zosyn and switch to Ertapenem 1 gm Q24H ( current crcl is 39)   - Follow up with cultures from 04/26 (bacteroides)   - continue to trend CBC/fever curve  - Will need atleast 6 weeks of IV antibiotics from 04/26/25   - Trend ESR/CRP weekly     In addition to reviewing history, imaging, documents, labs, microbiology, took into account antibiotic stewardship, local antibiogram and infection control strategies and potential transmission issues.    Discussed with the primary team.    Alem Russell MD  Attending Physician, Division of Infectious Diseases  Department of Medicine   [facility]    Contact on TEAMS messaging from 9am - 5pm  Office: [phone] (after 5 PM or weekend). 79yMale w/ HTN, HLD, T2DM, chronic low back pain presents as a transfer from [facility] ED for surgery today. Pt was sent into ED yesterday by his pain medicine physician due to recent MRI findings concerning for L1-L2 discitis/OM with epidural abscess, bilateral psoas abscesses. Patient initially saw pain specialist in February for chronic back pain with radiations to bilateral lateral thighs (R>L). MRI at that time showed degenerative changes. He subsequently had epidural injections x2 (2/27, 3/11) with moderate pain relief. Pain began to worsen on 4/10. He had radiofrequency ablation performed on 4/11 and has since has severe worsening of pain and radiation to R lateral thigh. He reports recent bowel/bladder "incontinence" requiring diaper due to his inability to get to the bathroom in time due to pain limitations but states urge and sensation are intact. Denies fevers, chills, night sweats, weakness, numbness/tingling, saddle anesthesia, recent falls/trauma. He uses a cane for ambulation. Denies recent falls/trauma or any other ortho injuries. He was born in [place], has frequent travels back, no known TB exposure. NO recent infections, antibiotic use.    ED/Hospital course: Has been afebrile, WBC 14, A1C 7.5, labs otherwise WNL. Underwent decompression, spine, lumbar, posterior approach, with fusion of posterior spinal column on 4/24. Cultures sent. Started on Vancomycin and Zosyn. IR consulted for abscess drainage. ID consulted for antibiotic management.    Blood cultures B fragilis  Abscess cx GNR    # L1/L2 discitis OM s/p decompression/fusion with cx thus far with bacteroides   # Multiloculated bilateral psoas abscesses S/P drainage on 04/26-  cx thus far with bacteroides   # leucocytosis   # JUSTINO    MICRO:   04/23 BCX- 2/4 GNR, bacteroides   04/24 OR cx-  bacteroides   04/25 MRSA nares- negative   04/25 BCX- NGTD  04/26 Psoas abscess cx- GNR, Bacteroides     Recommendations;   - Abnormal abd Xray, CTAP pending   - Cultures growing Bacteroides; not typical pathogens for spine infection, suspect transient gut translocation into the blood stream causing bacteremia, epidural abscess and B/L Psoas abscess   - No GI/ pathology on imaging ( enlarged prostate, UA negative)   - Can stop Zosyn and switch to Ertapenem 1 gm Q24H ( current crcl is 39)   - Follow up with cultures from 04/26 (bacteroides)   - continue to trend CBC/fever curve  - Will need atleast 6 weeks of IV antibiotics from 04/26/25   - Trend ESR/CRP weekly     In addition to reviewing history, imaging, documents, labs, microbiology, took into account antibiotic stewardship, local antibiogram and infection control strategies and potential transmission issues.    Discussed with the primary team.    Alem Russell MD  Attending Physician, Division of Infectious Diseases  Department of Medicine   [facility]    Contact on TEAMS messaging from 9am - 5pm  Office: [phone] (after 5 PM or weekend).

## 2025-04-29 NOTE — PROGRESS NOTE ADULT - SUBJECTIVE AND OBJECTIVE BOX
Plastic Surgery Progress Note (pg [facility]: [id number], [facility]: [phone])    SUBJECTIVE  The patient was seen and examined. No acute events overnight. Pain controlled, afebrile w/ stable vitals.     OBJECTIVE  ___________________________________________________  VITAL SIGNS / I&O's   Vital Signs Last 24 Hrs  T(C): 36.4 (29 Apr 2025 14:00), Max: 36.6 (28 Apr 2025 17:31)  T(F): 97.6 (29 Apr 2025 14:00), Max: 97.9 (29 Apr 2025 05:45)  HR: 99 (29 Apr 2025 14:00) (96 - 118)  BP: 118/76 (29 Apr 2025 14:00) (118/76 - 147/80)  BP(mean): --  RR: 18 (29 Apr 2025 14:00) (17 - 18)  SpO2: 99% (29 Apr 2025 14:00) (99% - 100%)    Parameters below as of 29 Apr 2025 14:00  Patient On (Oxygen Delivery Method): room air          28 Apr 2025 07:01  -  29 Apr 2025 07:00  --------------------------------------------------------  IN:  Total IN: 0 mL    OUT:    Bulb (mL): 15.5 mL    Bulb (mL): 20.5 mL    Bulb (mL): 20 mL    Bulb (mL): 13 mL    Voided (mL): 150 mL  Total OUT: 219 mL    Total NET: -219 mL        ___________________________________________________  PHYSICAL EXAM  NEURO: NAD,   HEENT: NC/AT  RESPIRATORY: nonlabored respirations, normal CW expansion  BACK: dressing changed, incisions well appearing. DHARA x2 SS output, IR drains murky serous drainage. 1 DHARA removed.  CARDIO: RRR, S1S2  ABDOMEN: soft, nontender, nondistended  EXTREMITIES: normal strength, no deformities    ___________________________________________________  LABS                        9.6    11.94 )-----------( 488      ( 28 Apr 2025 06:00 )             28.6     28 Apr 2025 06:00    129    |  101    |  27     ----------------------------<  130    4.8     |  19     |  1.36     Ca    9.0        28 Apr 2025 06:00        CAPILLARY BLOOD GLUCOSE      POCT Blood Glucose.: 302 mg/dL (29 Apr 2025 11:28)  POCT Blood Glucose.: 167 mg/dL (29 Apr 2025 07:28)  POCT Blood Glucose.: 227 mg/dL (28 Apr 2025 22:16)  POCT Blood Glucose.: 137 mg/dL (28 Apr 2025 16:42)        Urinalysis Basic - ( 28 Apr 2025 06:00 )    Color: x / Appearance: x / SG: x / pH: x  Gluc: 130 mg/dL / Ketone: x  / Bili: x / Urobili: x   Blood: x / Protein: x / Nitrite: x   Leuk Esterase: x / RBC: x / WBC x   Sq Epi: x / Non Sq Epi: x / Bacteria: x      ___________________________________________________  MICRO  Recent Cultures:  Specimen Source: Abscess, 04-26 @ 15:44; Results   Numerous Bacteroides fragilis "Susceptibilities not performed"[!]; Gram Stain:   Numerous polymorphonuclear leukocytes per low power field  Moderate Gram Negative Rods per oil power field[!]; Organism: --  Specimen Source: Abscess, 04-26 @ 15:40; Results   Numerous Bacteroides fragilis "Susceptibilities not performed"[!]; Gram Stain:   Numerous polymorphonuclear leukocytes per low power field  Few Gram Negative Rods per oil power field[!]; Organism: --  Specimen Source: Blood Blood, 04-25 @ 12:15; Results   No growth at 72 Hours; Gram Stain: --; Organism: --  Specimen Source: Blood Blood-Venous, 04-25 @ 12:00; Results   No growth at 72 Hours; Gram Stain: --; Organism: --  Specimen Source: Abscess #3 EPIDURAL ABSCESS, 04-24 @ 20:24; Results   Moderate Bacteroides fragilis "Susceptibilities not performed"[!]; Gram Stain:   Moderate polymorphonuclear leukocytes seen per low power field  Rare Gram Negative Rods seen per oil power field[!]; Organism: --  Specimen Source: Surgical Swab #5 L1 L2 DISC SPALE, 04-24 @ 19:46; Results   Numerous Bacteroides fragilis "Susceptibilities not performed"[!]; Gram Stain: --; Organism: --  Specimen Source: Abscess #2 EPIDURAL ABSCESS, 04-24 @ 18:39; Results   Moderate Bacteroides fragilis "Susceptibilities not performed"[!]; Gram Stain:   Moderate polymorphonuclear leukocytes seen per low power field  Few Gram Negative Rods seen per oil power field[!]; Organism: --  Specimen Source: Abscess #1 EPIDURAL ABSCESS, 04-24 @ 18:38; Results   Numerous Bacteroides fragilis "Susceptibilities not performed"[!]; Gram Stain:   Moderate polymorphonuclear leukocytes seen per low power field  Few Gram Negative Rods seen per oil power field[!]; Organism: --  Specimen Source: Blood Blood-Peripheral, 04-23 @ 11:45; Results   Growth in anaerobic bottle: Bacteroides fragilis "Susceptibilities not  performed"[!]; Gram Stain:   Growth in anaerobic bottle: Gram Negative Rods[!]; Organism: --  Specimen Source: Blood Blood-Peripheral, 04-23 @ 11:43; Results   Growth in anaerobic bottle: Bacteroides fragilis "Susceptibilities not  performed"  Direct identification is available within approximately 3-5  hours either by Blood Panel Multiplexed PCR or Direct  MALDI-TOF. Details: [email or web address][!]; Gram Stain:   Growth in anaerobic bottle: Gram Negative Rods[!]; Organism: Blood Culture PCR[!]    ___________________________________________________  MEDICATIONS  (STANDING):  acetaminophen     Tablet .. 975 milliGRAM(s) Oral every 6 hours  atorvastatin 40 milliGRAM(s) Oral at bedtime  ertapenem  IVPB      ertapenem  IVPB 1000 milliGRAM(s) IV Intermittent once  gabapentin 100 milliGRAM(s) Oral every 8 hours  glucagon  Injectable 1 milliGRAM(s) IntraMuscular once  heparin   Injectable 5000 Unit(s) SubCutaneous every 8 hours  insulin lispro (ADMELOG) corrective regimen sliding scale   SubCutaneous every 6 hours  lactated ringers. 1000 milliLiter(s) (100 mL/Hr) IV Continuous <Continuous>  methocarbamol 500 milliGRAM(s) Oral every 12 hours  omega-3-Acid Ethyl Esters 2 Gram(s) Oral two times a day  pantoprazole    Tablet 40 milliGRAM(s) Oral before breakfast  senna 2 Tablet(s) Oral at bedtime    MEDICATIONS  (PRN):  bisacodyl 5 milliGRAM(s) Oral every 12 hours PRN Constipation  bisacodyl Suppository 10 milliGRAM(s) Rectal daily PRN Constipation  magnesium hydroxide Suspension 30 milliLiter(s) Oral every 12 hours PRN Constipation  simethicone 80 milliGRAM(s) Chew every 12 hours PRN Upset Stomach

## 2025-04-29 NOTE — PROGRESS NOTE ADULT - SUBJECTIVE AND OBJECTIVE BOX
DATE OF SERVICE: 04-29-25    c/o abdominal bloating, tolerating PO and reports BM and passing gas yesterday.   Review of symptoms otherwise negative.    MEDICATIONS:  acetaminophen     Tablet .. 975 milliGRAM(s) Oral every 6 hours  atorvastatin 40 milliGRAM(s) Oral at bedtime  bisacodyl 5 milliGRAM(s) Oral every 12 hours PRN  bisacodyl Suppository 10 milliGRAM(s) Rectal daily PRN   gabapentin 100 milliGRAM(s) Oral every 8 hours  glucagon  Injectable 1 milliGRAM(s) IntraMuscular once  heparin   Injectable 5000 Unit(s) SubCutaneous every 8 hours  insulin lispro (ADMELOG) corrective regimen sliding scale   SubCutaneous every 6 hours  lactated ringers. 1000 milliLiter(s) IV Continuous <Continuous>  magnesium hydroxide Suspension 30 milliLiter(s) Oral every 12 hours PRN  methocarbamol 500 milliGRAM(s) Oral every 12 hours  omega-3-Acid Ethyl Esters 2 Gram(s) Oral two times a day  pantoprazole    Tablet 40 milliGRAM(s) Oral before breakfast  senna 2 Tablet(s) Oral at bedtime  simethicone 80 milliGRAM(s) Chew every 12 hours PRN                            9.6    11.94 )-----------( 488      ( 28 Apr 2025 06:00 )             28.6     129[L]  |  101  |  27[H]  ----------------------------<  130[H]  4.8   |  19[L]  |  1.36[H]    Ca    9.0      28 Apr 2025 06:00      Creatinine Trend: 1.36<--, 1.21<--, 1.09<--, 0.96<--, 0.74<--, 0.75<--      T(C): 36.2 (04-29-25 @ 10:23), Max: 37.1 (04-28-25 @ 13:23)  HR: 110 (04-29-25 @ 10:23) (96 - 118)  BP: 147/80 (04-29-25 @ 10:23) (111/68 - 147/80)  RR: 18 (04-29-25 @ 10:23) (17 - 18)  SpO2: 100% (04-29-25 @ 10:23) (99% - 100%)  Wt(kg): --    I&O's Summary    28 Apr 2025 07:01  -  29 Apr 2025 07:00  --------------------------------------------------------  IN: 0 mL / OUT: 219 mL / NET: -219 mL    Gen: NAD  HEENT:  (-)icterus (-)pallor  CV: N S1 S2 1/6 DARYA (+)2 Pulses B/l  Resp:  Clear to auscultation B/L, normal effort  GI: (+) BS Soft, NT, ND  Lymph:  (-)Edema, (-)obvious lymphadenopathy  Skin: Warm to touch, Normal turgor  Psych: Appropriate mood and affect      TELEMETRY: 	  NSR    ECG:  	NSR    < from: Xray Abdomen 1 View PORTABLE -Urgent (Xray Abdomen 1 View PORTABLE -Urgent .) (04.29.25 @ 10:09) >  IMPRESSION:  Multiple dilated loops of small and large bowel with question of   pneumoperitoneum. Recommend upright chest radiograph or decubitus   abdominal radiograph for further evaluation.    < end of copied text >      ASSESSMENT/PLAN: 	Pt is a  79 year old male w/ HTN, HLD, T2DM, chronic low back pain presents as a transfer from [facility] ED for surgery. . Pt was sent into ED yesterday by his pain medicine physician due to recent MRI findings concerning for L1-L2 discitis/OM with epidural abscess, bilateral psoas abscesses. Patient initially saw pain specialist in February for chronic back pain with radiations to bilateral lateral thighs (R>L). MRI at that time showed degenerative changes. He subsequently had epidural injections x2 (2/27, 3/11) with moderate pain relief. Pain began to worsen on 4/10. He had radiofrequency ablation performed on 4/11 and has since has severe worsening of pain and radiation to R lateral thigh. He reports recent bowel/bladder "incontinence" requiring diaper due to his inability to get to the bathroom in time due to pain limitations but states urge and sensation are intact. Denies fevers, chills, night sweats, weakness, numbness/tingling, saddle anesthesia, recent falls/trauma. He uses a cane for ambulation. Denies recent falls/trauma or any other ortho injuries.   Found to have epidural abscess now s/p Decompression, spine, lumbar, posterior approach, with fusion of posterior spinal column, planned for psoas abscess drainage.    - s/p Decompression, spine, lumbar, posterior approach, with fusion of posterior spinal column  - s/p psoas drainage   - pain control  - c/w statin  - abdominal xray noted, f/u further imaging

## 2025-04-29 NOTE — PROGRESS NOTE ADULT - SUBJECTIVE AND OBJECTIVE BOX
Date of Service  : 04-29-25     INTERVAL HPI/OVERNIGHT EVENTS: I feel bloated. Did not eat or pass gas   Vital Signs Last 24 Hrs  T(C): 36.2 (29 Apr 2025 10:23), Max: 37.1 (28 Apr 2025 13:23)  T(F): 97.2 (29 Apr 2025 10:23), Max: 98.7 (28 Apr 2025 13:23)  HR: 110 (29 Apr 2025 10:23) (96 - 118)  BP: 147/80 (29 Apr 2025 10:23) (111/68 - 147/80)  BP(mean): --  RR: 18 (29 Apr 2025 10:23) (17 - 18)  SpO2: 100% (29 Apr 2025 10:23) (99% - 100%)    Parameters below as of 29 Apr 2025 10:23  Patient On (Oxygen Delivery Method): room air      I&O's Summary    28 Apr 2025 07:01  -  29 Apr 2025 07:00  --------------------------------------------------------  IN: 0 mL / OUT: 219 mL / NET: -219 mL      MEDICATIONS  (STANDING):  acetaminophen     Tablet .. 975 milliGRAM(s) Oral every 6 hours  atorvastatin 40 milliGRAM(s) Oral at bedtime  gabapentin 100 milliGRAM(s) Oral every 8 hours  glucagon  Injectable 1 milliGRAM(s) IntraMuscular once  heparin   Injectable 5000 Unit(s) SubCutaneous every 8 hours  insulin lispro (ADMELOG) corrective regimen sliding scale   SubCutaneous every 6 hours  lactated ringers. 1000 milliLiter(s) (100 mL/Hr) IV Continuous <Continuous>  methocarbamol 500 milliGRAM(s) Oral every 12 hours  omega-3-Acid Ethyl Esters 2 Gram(s) Oral two times a day  pantoprazole    Tablet 40 milliGRAM(s) Oral before breakfast  piperacillin/tazobactam IVPB.. 3.375 Gram(s) IV Intermittent every 8 hours  senna 2 Tablet(s) Oral at bedtime    MEDICATIONS  (PRN):  bisacodyl 5 milliGRAM(s) Oral every 12 hours PRN Constipation  bisacodyl Suppository 10 milliGRAM(s) Rectal daily PRN Constipation  magnesium hydroxide Suspension 30 milliLiter(s) Oral every 12 hours PRN Constipation  simethicone 80 milliGRAM(s) Chew every 12 hours PRN Upset Stomach    LABS:                        9.6    11.94 )-----------( 488      ( 28 Apr 2025 06:00 )             28.6     04-28    129[L]  |  101  |  27[H]  ----------------------------<  130[H]  4.8   |  19[L]  |  1.36[H]    Ca    9.0      28 Apr 2025 06:00  Phos  2.8     04-27  Mg     2.40     04-27        Urinalysis Basic - ( 28 Apr 2025 06:00 )    Color: x / Appearance: x / SG: x / pH: x  Gluc: 130 mg/dL / Ketone: x  / Bili: x / Urobili: x   Blood: x / Protein: x / Nitrite: x   Leuk Esterase: x / RBC: x / WBC x   Sq Epi: x / Non Sq Epi: x / Bacteria: x      CAPILLARY BLOOD GLUCOSE      POCT Blood Glucose.: 302 mg/dL (29 Apr 2025 11:28)  POCT Blood Glucose.: 167 mg/dL (29 Apr 2025 07:28)  POCT Blood Glucose.: 227 mg/dL (28 Apr 2025 22:16)  POCT Blood Glucose.: 137 mg/dL (28 Apr 2025 16:42)        Urinalysis Basic - ( 28 Apr 2025 06:00 )    Color: x / Appearance: x / SG: x / pH: x  Gluc: 130 mg/dL / Ketone: x  / Bili: x / Urobili: x   Blood: x / Protein: x / Nitrite: x   Leuk Esterase: x / RBC: x / WBC x   Sq Epi: x / Non Sq Epi: x / Bacteria: x      REVIEW OF SYSTEMS:  CONSTITUTIONAL: No fever, weight loss, or fatigue  EYES: No eye pain, visual disturbances, or discharge  ENMT:  No difficulty hearing, tinnitus, vertigo; No sinus or throat pain  NECK: No pain or stiffness  RESPIRATORY: No cough, wheezing, chills or hemoptysis; No shortness of breath  CARDIOVASCULAR: No chest pain, palpitations, dizziness, or leg swelling  GASTROINTESTINAL: abdominal bloating . No nausea, vomiting, or hematemesis; No diarrhea or constipation. No melena or hematochezia.  GENITOURINARY: No dysuria, frequency, hematuria, or incontinence  NEUROLOGICAL: No headaches, memory loss, loss of strength, numbness, or tremors  SKIN: No itching, burning, rashes, or lesions   ENDOCRINE: No heat or cold intolerance; No hair loss  MUSCULOSKELETAL: No joint pain or swelling; No muscle, back, or extremity pain      RADIOLOGY & ADDITIONAL TESTS:    Consultant(s) Notes Reviewed:  [x ] YES  [ ] NO    PHYSICAL EXAM:  GENERAL: NAD, well-groomed, well-developed,not in any distress ,  HEAD:  Atraumatic, Normocephalic  EYES: EOMI, PERRLA, conjunctiva and sclera clear  ENMT: No tonsillar erythema, exudates, or enlargement; Moist mucous membranes, Good dentition, No lesions  NECK: Supple, No JVD, Normal thyroid  NERVOUS SYSTEM:  Alert & Oriented X3, No focal deficit   CHEST/LUNG: Good air entry bilateral with no  rales, rhonchi, wheezing, or rubs  HEART: Regular rate and rhythm; No murmurs, rubs, or gallops  ABDOMEN: Soft, Nontender, +distended; Bowel sounds present  EXTREMITIES:  2+ Peripheral Pulses, No clubbing, cyanosis, or edema      Care Discussed with Consultants/Other Providers [ x] YES  [ ] NO

## 2025-04-29 NOTE — PROGRESS NOTE ADULT - SUBJECTIVE AND OBJECTIVE BOX
POST OPERATIVE DAY #: 5    Patient Resting in bed, complaints of abdominal pain and distension. States having small bowel movements, but denies flatulence.     Exam:   Alert/Oriented, No Acute Distress  ABD- Distended, moderately tender to palpation          Dressing: [x] clean/dry/intact  [ ] Other:           Drains: PRS HV- Right: 5.5/13, Left: 5.5/20.5; IR Drains- Right: 5.5/15.5; Left: 5/20.         Motor exam: [  ]          [ ] Upper extremity                      Bi          Tri        Delt                                                    R         5/5        5/5        5/5                                               L          5/5        5/5        5/5                  [ ] Lower extremity                  PF          DF         EHL       FHL                                                                                            R        5/5        5/5        5/5       5/5                                                        L         5/5        5/5        5/5       5/5                   Sensation: [x] intact to light touch  [ ] decreased                                             Calves Soft/Non-tender bilaterally           [ ] warm well perfused; capillary refill <3 seconds              LABS:                        9.6    11.94 )-----------( 488      ( 28 Apr 2025 06:00 )             28.6     04-28    129[L]  |  101  |  27[H]  ----------------------------<  130[H]  4.8   |  19[L]  |  1.36[H]    Ca    9.0      28 Apr 2025 06:00  Phos  2.8     04-27  Mg     2.40     04-27          RADIOLOGY & ADDITIONAL STUDIES:

## 2025-04-29 NOTE — CONSULT NOTE ADULT - SUBJECTIVE AND OBJECTIVE BOX
GENERAL SURGERY CONSULT NOTE  Consulting surgical team: B Team Surgery  Consulting attending: Dr. Federico Lanza    HPI:  78yo M with Hx HTN, DM, and chronic low back pain who was transferred from [facility] for management of L1-L2 discitis/osteomyelitis with bilateral psoas abscesses. He is s/p T12-L2 laminectomy via RP approach with Ortho on 4/24 with PRS closure, and s/p percutaneous drainage of bilateral psoas abscesses by IR on 4/26. His postoperative course had been uncomplicated until 2 days ago when he developed abdominal pain and distension. He states that his pain is generalized and feels like gas pain; he cannot localize where the pain is in his belly but states that if he had to pick, it would be on his left side. He continues to tolerate a regular diet without N/V. He states that he passed flatus yesterday evening with his BM. He last had a BM this morning, which was watery. He had a supine AXR today with a read of possible pneumoperitoneum for which General Surgery has been consulted.     LABS             11.5   12.50 )-----------( 561      ( 24 Apr 2025 05:00 )             34.0     135  |  102  |  21  ----------------------------<  166[H]  4.1   |  21[L]  |  0.80    Ca    10.1      24 Apr 2025 05:00    TPro  7.3  /  Alb  2.8[L]  /  TBili  0.4  /  DBili  x   /  AST  26  /  ALT  44  /  AlkPhos  141[H]  04-23    PT/INR - ( 24 Apr 2025 05:00 )   PT: 13.8 sec;   INR: 1.16 ratio    PTT - ( 24 Apr 2025 05:00 )  PTT:32.7 sec      PAST MEDICAL HISTORY:  HTN (hypertension)  HLD (hyperlipidemia)  DM (diabetes mellitus)    PAST SURGICAL HISTORY:  Cataract    MEDICATIONS:  acetaminophen     Tablet .. 975 milliGRAM(s) Oral every 6 hours  atorvastatin 40 milliGRAM(s) Oral at bedtime  bisacodyl 5 milliGRAM(s) Oral every 12 hours PRN  bisacodyl Suppository 10 milliGRAM(s) Rectal daily PRN  ertapenem  IVPB      gabapentin 100 milliGRAM(s) Oral every 8 hours  glucagon  Injectable 1 milliGRAM(s) IntraMuscular once  heparin   Injectable 5000 Unit(s) SubCutaneous every 8 hours  insulin lispro (ADMELOG) corrective regimen sliding scale   SubCutaneous every 6 hours  lactated ringers. 1000 milliLiter(s) IV Continuous <Continuous>  magnesium hydroxide Suspension 30 milliLiter(s) Oral every 12 hours PRN  methocarbamol 500 milliGRAM(s) Oral every 12 hours  omega-3-Acid Ethyl Esters 2 Gram(s) Oral two times a day  pantoprazole    Tablet 40 milliGRAM(s) Oral before breakfast  senna 2 Tablet(s) Oral at bedtime  simethicone 80 milliGRAM(s) Chew every 12 hours PRN    ALLERGIES:  No Known Allergies    VITALS & I/Os:  Vital Signs Last 24 Hrs  T(C): 36.4 (29 Apr 2025 14:00), Max: 36.6 (28 Apr 2025 17:31)  T(F): 97.6 (29 Apr 2025 14:00), Max: 97.9 (29 Apr 2025 05:45)  HR: 99 (29 Apr 2025 14:00) (96 - 118)  BP: 118/76 (29 Apr 2025 14:00) (118/76 - 147/80)  BP(mean): --  RR: 18 (29 Apr 2025 14:00) (17 - 18)  SpO2: 99% (29 Apr 2025 14:00) (99% - 100%)    Parameters below as of 29 Apr 2025 14:00  Patient On (Oxygen Delivery Method): room air    I&O's Summary    28 Apr 2025 07:01  -  29 Apr 2025 07:00  --------------------------------------------------------  IN: 0 mL / OUT: 219 mL / NET: -219 mL    29 Apr 2025 07:01  -  29 Apr 2025 15:48  --------------------------------------------------------  IN: 450 mL / OUT: 550 mL / NET: -100 mL        PHYSICAL EXAM:  GEN: resting in bed comfortably in NAD  NEURO: awake, alert  CV: warm, well-perfused  RESP: no increased WOB  ABD: softly distended, very mildly tender in L mid-abd without rebound tenderness or guarding  EXTR: no gross deformities; spontaneous movement in b/l U/L extrem       LABS:             9.6    11.94 )-----------( 488      ( 28 Apr 2025 06:00 )             28.6     129[L]  |  101  |  27[H]  ----------------------------<  130[H]  4.8   |  19[L]  |  1.36[H]    Ca    9.0      28 Apr 2025 06:00    Urinalysis Basic - ( 28 Apr 2025 06:00 )    Color: x / Appearance: x / SG: x / pH: x  Gluc: 130 mg/dL / Ketone: x  / Bili: x / Urobili: x   Blood: x / Protein: x / Nitrite: x   Leuk Esterase: x / RBC: x / WBC x   Sq Epi: x / Non Sq Epi: x / Bacteria: x      IMAGING:  < from: Xray Abdomen 1 View PORTABLE -Urgent (Xray Abdomen 1 View PORTABLE -Urgent .) (04.29.25 @ 10:09) >  FINDINGS:  Thoracolumbar fusion hardware.  Bilateral midline pigtail catheters.  Multiple dilated loops of small and large bowel with question of   pneumoperitoneum.  Degenerative changes.    IMPRESSION:  Multiple dilated loops of small and large bowel with question of   pneumoperitoneum. Recommend upright chest radiograph or decubitus   abdominal radiograph for further evaluation.    < end of copied text >  < from: Xray Abdomen 2 Views (04.29.25 @ 13:23) >  FINDINGS:  Thoracolumbar fusion hardware and surgical drain.  Bilateral midline pigtail catheters.  Multiple dilated loops of small and large bowel with air-fluid levels.  Degenerative changes.    IMPRESSION:  Multiple dilated loops of small and large bowel. No definite free air is   appreciated, although the hemidiaphragms are not included.    Further information may be obtained from the patient's pending CT of the   abdomen and pelvis.    < end of copied text >  < from: Xray Chest 1 View AP/PA (04.29.25 @ 13:23) >  FINDINGS:    The heart is normal in size.  The lungs are clear.  There is no pneumothorax or pleural effusion.  Gaseous distention of bowel, most prominent in the left upper quadrant.  Partially visualized lumbar fusion hardware.    IMPRESSION:  Gaseous distention of bowel, most prominent in the left upper quadrant.   No definite free air. Correlate with scheduled CT abdomen and pelvis.    < end of copied text >

## 2025-04-29 NOTE — PROGRESS NOTE ADULT - SUBJECTIVE AND OBJECTIVE BOX
Patient is a 79y old  Male who presents with a chief complaint of L1-L2 discitis/OM with epidural abscess and BL psoas abscesses (29 Apr 2025 08:32)    Being followed by ID for epidural abscess     Interval history:  abd tenderness   Distended +   Creat trending up   Creatinine Trend  1.36 mg/dL (04-28-25 @ 06:00)  1.21 mg/dL (04-27-25 @ 12:30)  1.09 mg/dL (04-27-25 @ 01:00)  0.96 mg/dL (04-26-25 @ 00:53)  0.74 mg/dL (04-25-25 @ 02:45)  0.75 mg/dL (04-24-25 @ 22:30)  0.80 mg/dL (04-24-25 @ 05:00)  0.88 mg/dL (04-23-25 @ 11:52)      Antimicrobials:  piperacillin/tazobactam IVPB.. 3.375 Gram(s) IV Intermittent every 8 hours      Vital Signs Last 24 Hrs  T(C): 36.2 (04-29-25 @ 10:23), Max: 37.1 (04-28-25 @ 13:23)  T(F): 97.2 (04-29-25 @ 10:23), Max: 98.7 (04-28-25 @ 13:23)  HR: 110 (04-29-25 @ 10:23) (96 - 118)  BP: 147/80 (04-29-25 @ 10:23) (111/68 - 147/80)  BP(mean): --  RR: 18 (04-29-25 @ 10:23) (17 - 18)  SpO2: 100% (04-29-25 @ 10:23) (99% - 100%)    PHYSICAL EXAM:  Constitutional: non-toxic, no distress  HEAD/EYES: anicteric, no conjunctival injection  ENT:  supple, no thrush  Cardiovascular:   normal S1, S2, no murmur, no edema  Respiratory:  clear BS bilaterally, no wheezes, no rales  GI:  distended and TTP  Musculoskeletal:  no synovitis, normal ROM  Neurologic: awake and alert, normal strength, no focal findings  Skin:  no rash, no erythema, no phlebitis  Back: drains +     Lab Data:                        9.6    11.94 )-----------( 488      ( 28 Apr 2025 06:00 )             28.6     04-28    129[L]  |  101  |  27[H]  ----------------------------<  130[H]  4.8   |  19[L]  |  1.36[H]    Ca    9.0      28 Apr 2025 06:00  Phos  2.8     04-27  Mg     2.40     04-27        Abscess  04-26-25   Numerous Bacteroides fragilis "Susceptibilities not performed"  --    Numerous polymorphonuclear leukocytes per low power field  Moderate Gram Negative Rods per oil power field      Abscess  04-26-25   Numerous Bacteroides fragilis "Susceptibilities not performed"  --    Numerous polymorphonuclear leukocytes per low power field  Few Gram Negative Rods per oil power field      Blood Blood  04-25-25   No growth at 72 Hours  --  --      Blood Blood-Venous  04-25-25   No growth at 72 Hours  --  --      Abscess #3 EPIDURAL ABSCESS  04-24-25   Moderate Bacteroides fragilis "Susceptibilities not performed"  --    Moderate polymorphonuclear leukocytes seen per low power field  Rare Gram Negative Rods seen per oil power field      Surgical Swab #5 L1 L2 DISC SPALE  04-24-25   Numerous Bacteroides fragilis "Susceptibilities not performed"  --  --      Abscess #2 EPIDURAL ABSCESS  04-24-25   Moderate Bacteroides fragilis "Susceptibilities not performed"  --    Moderate polymorphonuclear leukocytes seen per low power field  Few Gram Negative Rods seen per oil power field      Abscess #1 EPIDURAL ABSCESS  04-24-25   Numerous Bacteroides fragilis "Susceptibilities not performed"  --    Moderate polymorphonuclear leukocytes seen per low power field  Few Gram Negative Rods seen per oil power field      Blood Blood-Peripheral  04-23-25   Growth in anaerobic bottle: Bacteroides fragilis "Susceptibilities not  performed"  --    Growth in anaerobic bottle: Gram Negative Rods      Blood Blood-Peripheral  04-23-25   Growth in anaerobic bottle: Bacteroides fragilis "Susceptibilities not  performed"  Direct identification is available within approximately 3-5  hours either by Blood Panel Multiplexed PCR or Direct  MALDI-TOF. Details: [email or web address]  --  Blood Culture PCR              RADIOLOGY:  below reviewed        RADIOLOGY:  imaging below personally reviewed and agree with findings    < from: MR Thoracic Spine w/ IV Cont (04.23.25 @ 15:14) >  ACC: [id number] EXAM:  MR SPINE CERVICAL IC   ORDERED BY: ZANE REYNOSO     ACC: [id number] EXAM:  MR SPINE THORACIC IC   ORDERED BY: ZANE REYNOSO     ACC: [id number] EXAM:  MR SPINE LUMBAR IC   ORDERED BY: ZANE REYNOSO     PROCEDURE DATE:04/23/2025          INTERPRETATION:  Three examinations were performed:  1. MR cervical spine with gadolinium  2. MR thoracic spine with gadolinium  3. MR lumbar spine with gadolinium      CLINICAL INFORMATION (all three examinations): Epidural access    TECHNIQUE (all three examinations): Post gadolinium fat saturated   sagittal and axial T1-weighted images were obtained following intravenous   administration of 6 cc of Gadavist/1.5 cc discarded.    FINDINGS:   CT abdomen and pelvis dated 04/23/2025 available for review.      1.  Cervical spine:    Cervical vertebral alignment is intact.  Cervical vertebral body heights   are maintained.  Marrow signal intensity within cervical vertebral bodies   and posterior elements is significant for a 1.5 cm enhancing trabeculated   lesion within T7 likely a hemangioma..  No osseous expansion, epidural   disease or paraspinal abnormalities found.    Cervical intervertebral discs show moderate degenerative disc disease and   spondylosis at C4-5 through C6/7 with loss of disc height and associated   degenerative endplate changes.    The cervical cord maintains intact morphology.   No cord signal intensity   change is seen.  No abnormal enhancement occurs within the cervical canal.      2.  Thoracic spine:    Thoracic vertebral alignment is preserved.  Thoracic vertebral body   heights are maintained.  Marrow signal intensity within thoracic   vertebral bodies and posterior elements is unremarkable.  There is no   abnormal vertebral or paraspinal enhancement.  No osseous expansion,   epidural disease or paraspinal abnormality is found.    Thoracic intervertebral discs demonstrates minimal enhancement within   T7-8 which may reflect early discitis. No central canal or foraminal   compromise is recognized.    The thoracic cord maintains intact morphology.   No cord signal intensity   change is seen.  No abnormal enhancement occurs within the thoracic canal.      3.  Lumbar spine:    Lumbar vertebral alignment is preserved.  Lumbar vertebral body heights   are maintained.  Marrow signal intensity within lumbar vertebral bodies   and posterior elements is significant for osteomyelitis and discitis at   L1-2 with erosions of the inferior L1 vertebral body and L2 vertebral   body consistent with osteomyelitis and discitis. Ventral epidural abscess   is noted extending from the L1-2 level superiorly to the T10 level with   mass effect on the ventral thecal sac most prominently at the L1-2 level   resulting in marked compression. Multiple abscesses within the BILATERAL   psoas muscles, the largest measuring 4.3 cm on the RIGHT and 2.8 cm on   the LEFT.      The distal cord maintains intact morphology and signal intensity.  The   conus is normally positioned at T12.  Nerve roots of the cauda equina   demonstrate no enhancement.  IMPRESSION:        1.   Cervical spine:   Moderate degenerative disc disease and   spondylosis at C4-5 through C6/7 with loss of disc height and associated   degenerative endplate changes.        2.   Thoracic spine:   Minimal enhancement within T7-8 which may   reflect early discitis.        3.   Lumbar spine:   Osteomyelitis and discitis at L1-2 with erosions   of the inferior L1 vertebral body and L2 vertebral body consistent with   osteomyelitis and discitis. Ventral epidural abscess is noted extending   from the L1-2 level superiorly to the T10 level with mass effect on the   ventral thecal sac, most prominently at the L1-2 level resulting in   marked compression. Multiple abscesses within the BILATERAL psoas   muscles, the largest measuring 4.3 cm on the RIGHT and 2.8 cm on the LEFT.      --- End of Report ---      < from: CT Abdomen and Pelvis w/ IV Cont (04.23.25 @ 13:24) >  INTERPRETATION:  CLINICAL INFORMATION: Psoas abscess.    COMPARISON: 6/8/2018 MR and 6/5/2018 CT        CONTRAST/COMPLICATIONS:  IV Contrast: Omnipaque 350  90 cc administered   10 cc discarded  Oral Contrast: NONE  .    PROCEDURE:  CT of the Abdomen and Pelvis was performed.  Sagittal and coronal reformats were performed.    FINDINGS:  LOWER CHEST: Within normal limits.    LIVER: Within normal limits.  BILE DUCTS: Normal caliber.  GALLBLADDER: Stable. Under distended gallbladder with wall   calcification/calculi. No pericholecystic inflammatory changes or fluid.  SPLEEN: Within normal limits.  PANCREAS: Within normal limits.  ADRENALS: Within normal limits.  KIDNEYS/URETERS: No renal stones or hydronephrosis. Renal cysts and   subcentimeter hypodense foci is are noted.    BLADDER: Within normal limits.  REPRODUCTIVE ORGANS: Prostate is enlarged.    BOWEL: No bowelobstruction. Appendix is normal. Small hiatal hernia.  PERITONEUM/RETROPERITONEUM: No free fluid. Multilobulated ill-defined   heterogeneous low-density collection/lesion noted in the right psoas   muscle measuring 6.1 x 4.9 x 13.2 cm with adjacent wall enhancement and   incompletely septations. Additional heterogeneous predominantly   low-density lesion noted in the left psoas muscle measuring 8.4 x 4.7 x   9.1 cm. These 2 collections are contiguous with the perivertebral   collection at L1/L2.  VESSELS: Mild atherosclerotic calcification.  LYMPH NODES: No lymphadenopathy.  ABDOMINAL WALL: Within normal limits.  BONES: Degenerative changes. Loss of L1 inferior and L2 superior endplate   with irregularity of the disc space and a perivertebralcollection   measuring approximately 2.7 x 6.3 x 3.6 cm. Retropulsion into the spinal   canal noted with enhancing collection.    IMPRESSION:  *  L1/L2 discitis/osteomyelitis with paravertebral collection both   ventrally and dorsally into the spinalcanal.  *  Bilateral heterogeneous complex psoas collection which are contiguous   with the perivertebral collection at the L1/L2.  *  Stable cholelithiasis/calcified gallbladder wall.        --- End of Report ---        < end of copied text >           Patient is a 79y old  Male who presents with a chief complaint of L1-L2 discitis/OM with epidural abscess and BL psoas abscesses (29 Apr 2025 08:32)    Being followed by ID for epidural abscess     Interval history:  abd tenderness   Distended +   Creat trending up   Creatinine Trend  1.36 mg/dL (04-28-25 @ 06:00)  1.21 mg/dL (04-27-25 @ 12:30)  1.09 mg/dL (04-27-25 @ 01:00)  0.96 mg/dL (04-26-25 @ 00:53)  0.74 mg/dL (04-25-25 @ 02:45)  0.75 mg/dL (04-24-25 @ 22:30)  0.80 mg/dL (04-24-25 @ 05:00)  0.88 mg/dL (04-23-25 @ 11:52)    Abd Xray- with Multiple dilated loops of small and large bowel with question of   pneumoperitoneum. surgery to follow   CTAP pending       Antimicrobials:  piperacillin/tazobactam IVPB.. 3.375 Gram(s) IV Intermittent every 8 hours      Vital Signs Last 24 Hrs  T(C): 36.2 (04-29-25 @ 10:23), Max: 37.1 (04-28-25 @ 13:23)  T(F): 97.2 (04-29-25 @ 10:23), Max: 98.7 (04-28-25 @ 13:23)  HR: 110 (04-29-25 @ 10:23) (96 - 118)  BP: 147/80 (04-29-25 @ 10:23) (111/68 - 147/80)  BP(mean): --  RR: 18 (04-29-25 @ 10:23) (17 - 18)  SpO2: 100% (04-29-25 @ 10:23) (99% - 100%)    PHYSICAL EXAM:  Constitutional: non-toxic, no distress  HEAD/EYES: anicteric, no conjunctival injection  ENT:  supple, no thrush  Cardiovascular:   normal S1, S2, no murmur, no edema  Respiratory:  clear BS bilaterally, no wheezes, no rales  GI:  distended and TTP  Musculoskeletal:  no synovitis, normal ROM  Neurologic: awake and alert, normal strength, no focal findings  Skin:  no rash, no erythema, no phlebitis  Back: drains +     Lab Data:                        9.6    11.94 )-----------( 488      ( 28 Apr 2025 06:00 )             28.6     04-28    129[L]  |  101  |  27[H]  ----------------------------<  130[H]  4.8   |  19[L]  |  1.36[H]    Ca    9.0      28 Apr 2025 06:00  Phos  2.8     04-27  Mg     2.40     04-27        Abscess  04-26-25   Numerous Bacteroides fragilis "Susceptibilities not performed"  --    Numerous polymorphonuclear leukocytes per low power field  Moderate Gram Negative Rods per oil power field      Abscess  04-26-25   Numerous Bacteroides fragilis "Susceptibilities not performed"  --    Numerous polymorphonuclear leukocytes per low power field  Few Gram Negative Rods per oil power field      Blood Blood  04-25-25   No growth at 72 Hours  --  --      Blood Blood-Venous  04-25-25   No growth at 72 Hours  --  --      Abscess #3 EPIDURAL ABSCESS  04-24-25   Moderate Bacteroides fragilis "Susceptibilities not performed"  --    Moderate polymorphonuclear leukocytes seen per low power field  Rare Gram Negative Rods seen per oil power field      Surgical Swab #5 L1 L2 DISC SPALE  04-24-25   Numerous Bacteroides fragilis "Susceptibilities not performed"  --  --      Abscess #2 EPIDURAL ABSCESS  04-24-25   Moderate Bacteroides fragilis "Susceptibilities not performed"  --    Moderate polymorphonuclear leukocytes seen per low power field  Few Gram Negative Rods seen per oil power field      Abscess #1 EPIDURAL ABSCESS  04-24-25   Numerous Bacteroides fragilis "Susceptibilities not performed"  --    Moderate polymorphonuclear leukocytes seen per low power field  Few Gram Negative Rods seen per oil power field      Blood Blood-Peripheral  04-23-25   Growth in anaerobic bottle: Bacteroides fragilis "Susceptibilities not  performed"  --    Growth in anaerobic bottle: Gram Negative Rods      Blood Blood-Peripheral  04-23-25   Growth in anaerobic bottle: Bacteroides fragilis "Susceptibilities not  performed"  Direct identification is available within approximately 3-5  hours either by Blood Panel Multiplexed PCR or Direct  MALDI-TOF. Details: [email or web address]  --  Blood Culture PCR              RADIOLOGY:  below reviewed        RADIOLOGY:  imaging below personally reviewed and agree with findings    < from: MR Thoracic Spine w/ IV Cont (04.23.25 @ 15:14) >  ACC: [id number] EXAM:  MR SPINE CERVICAL IC   ORDERED BY: ZANE REYNOSO     ACC: [id number] EXAM:  MR SPINE THORACIC IC   ORDERED BY: ZANE REYNOSO     ACC: [id number] EXAM:  MR SPINE LUMBAR IC   ORDERED BY: ZANE REYNOSO     PROCEDURE DATE:04/23/2025          INTERPRETATION:  Three examinations were performed:  1. MR cervical spine with gadolinium  2. MR thoracic spine with gadolinium  3. MR lumbar spine with gadolinium      CLINICAL INFORMATION (all three examinations): Epidural access    TECHNIQUE (all three examinations): Post gadolinium fat saturated   sagittal and axial T1-weighted images were obtained following intravenous   administration of 6 cc of Gadavist/1.5 cc discarded.    FINDINGS:   CT abdomen and pelvis dated 04/23/2025 available for review.      1.  Cervical spine:    Cervical vertebral alignment is intact.  Cervical vertebral body heights   are maintained.  Marrow signal intensity within cervical vertebral bodies   and posterior elements is significant for a 1.5 cm enhancing trabeculated   lesion within T7 likely a hemangioma..  No osseous expansion, epidural   disease or paraspinal abnormalities found.    Cervical intervertebral discs show moderate degenerative disc disease and   spondylosis at C4-5 through C6/7 with loss of disc height and associated   degenerative endplate changes.    The cervical cord maintains intact morphology.   No cord signal intensity   change is seen.  No abnormal enhancement occurs within the cervical canal.      2.  Thoracic spine:    Thoracic vertebral alignment is preserved.  Thoracic vertebral body   heights are maintained.  Marrow signal intensity within thoracic   vertebral bodies and posterior elements is unremarkable.  There is no   abnormal vertebral or paraspinal enhancement.  No osseous expansion,   epidural disease or paraspinal abnormality is found.    Thoracic intervertebral discs demonstrates minimal enhancement within   T7-8 which may reflect early discitis. No central canal or foraminal   compromise is recognized.    The thoracic cord maintains intact morphology.   No cord signal intensity   change is seen.  No abnormal enhancement occurs within the thoracic canal.      3.  Lumbar spine:    Lumbar vertebral alignment is preserved.  Lumbar vertebral body heights   are maintained.  Marrow signal intensity within lumbar vertebral bodies   and posterior elements is significant for osteomyelitis and discitis at   L1-2 with erosions of the inferior L1 vertebral body and L2 vertebral   body consistent with osteomyelitis and discitis. Ventral epidural abscess   is noted extending from the L1-2 level superiorly to the T10 level with   mass effect on the ventral thecal sac most prominently at the L1-2 level   resulting in marked compression. Multiple abscesses within the BILATERAL   psoas muscles, the largest measuring 4.3 cm on the RIGHT and 2.8 cm on   the LEFT.      The distal cord maintains intact morphology and signal intensity.  The   conus is normally positioned at T12.  Nerve roots of the cauda equina   demonstrate no enhancement.  IMPRESSION:        1.   Cervical spine:   Moderate degenerative disc disease and   spondylosis at C4-5 through C6/7 with loss of disc height and associated   degenerative endplate changes.        2.   Thoracic spine:   Minimal enhancement within T7-8 which may   reflect early discitis.        3.   Lumbar spine:   Osteomyelitis and discitis at L1-2 with erosions   of the inferior L1 vertebral body and L2 vertebral body consistent with   osteomyelitis and discitis. Ventral epidural abscess is noted extending   from the L1-2 level superiorly to the T10 level with mass effect on the   ventral thecal sac, most prominently at the L1-2 level resulting in   marked compression. Multiple abscesses within the BILATERAL psoas   muscles, the largest measuring 4.3 cm on the RIGHT and 2.8 cm on the LEFT.      --- End of Report ---      < from: CT Abdomen and Pelvis w/ IV Cont (04.23.25 @ 13:24) >  INTERPRETATION:  CLINICAL INFORMATION: Psoas abscess.    COMPARISON: 6/8/2018 MR and 6/5/2018 CT        CONTRAST/COMPLICATIONS:  IV Contrast: Omnipaque 350  90 cc administered   10 cc discarded  Oral Contrast: NONE  .    PROCEDURE:  CT of the Abdomen and Pelvis was performed.  Sagittal and coronal reformats were performed.    FINDINGS:  LOWER CHEST: Within normal limits.    LIVER: Within normal limits.  BILE DUCTS: Normal caliber.  GALLBLADDER: Stable. Under distended gallbladder with wall   calcification/calculi. No pericholecystic inflammatory changes or fluid.  SPLEEN: Within normal limits.  PANCREAS: Within normal limits.  ADRENALS: Within normal limits.  KIDNEYS/URETERS: No renal stones or hydronephrosis. Renal cysts and   subcentimeter hypodense foci is are noted.    BLADDER: Within normal limits.  REPRODUCTIVE ORGANS: Prostate is enlarged.    BOWEL: No bowelobstruction. Appendix is normal. Small hiatal hernia.  PERITONEUM/RETROPERITONEUM: No free fluid. Multilobulated ill-defined   heterogeneous low-density collection/lesion noted in the right psoas   muscle measuring 6.1 x 4.9 x 13.2 cm with adjacent wall enhancement and   incompletely septations. Additional heterogeneous predominantly   low-density lesion noted in the left psoas muscle measuring 8.4 x 4.7 x   9.1 cm. These 2 collections are contiguous with the perivertebral   collection at L1/L2.  VESSELS: Mild atherosclerotic calcification.  LYMPH NODES: No lymphadenopathy.  ABDOMINAL WALL: Within normal limits.  BONES: Degenerative changes. Loss of L1 inferior and L2 superior endplate   with irregularity of the disc space and a perivertebralcollection   measuring approximately 2.7 x 6.3 x 3.6 cm. Retropulsion into the spinal   canal noted with enhancing collection.    IMPRESSION:  *  L1/L2 discitis/osteomyelitis with paravertebral collection both   ventrally and dorsally into the spinalcanal.  *  Bilateral heterogeneous complex psoas collection which are contiguous   with the perivertebral collection at the L1/L2.  *  Stable cholelithiasis/calcified gallbladder wall.        --- End of Report ---        < end of copied text >

## 2025-04-30 LAB
ANION GAP SERPL CALC-SCNC: 14 MMOL/L — SIGNIFICANT CHANGE UP (ref 7–14)
ANISOCYTOSIS BLD QL: SLIGHT — SIGNIFICANT CHANGE UP
BASOPHILS # BLD AUTO: 0 K/UL — SIGNIFICANT CHANGE UP (ref 0–0.2)
BASOPHILS NFR BLD AUTO: 0 % — SIGNIFICANT CHANGE UP (ref 0–2)
BUN SERPL-MCNC: 24 MG/DL — HIGH (ref 7–23)
BURR CELLS BLD QL SMEAR: PRESENT — SIGNIFICANT CHANGE UP
CALCIUM SERPL-MCNC: 8.7 MG/DL — SIGNIFICANT CHANGE UP (ref 8.4–10.5)
CHLORIDE SERPL-SCNC: 98 MMOL/L — SIGNIFICANT CHANGE UP (ref 98–107)
CHLORIDE UR-SCNC: <20 MMOL/L — SIGNIFICANT CHANGE UP
CO2 SERPL-SCNC: 16 MMOL/L — LOW (ref 22–31)
CREAT SERPL-MCNC: 1.14 MG/DL — SIGNIFICANT CHANGE UP (ref 0.5–1.3)
CULTURE RESULTS: SIGNIFICANT CHANGE UP
CULTURE RESULTS: SIGNIFICANT CHANGE UP
EGFR: 65 ML/MIN/1.73M2 — SIGNIFICANT CHANGE UP
EGFR: 65 ML/MIN/1.73M2 — SIGNIFICANT CHANGE UP
EOSINOPHIL # BLD AUTO: 0.28 K/UL — SIGNIFICANT CHANGE UP (ref 0–0.5)
EOSINOPHIL NFR BLD AUTO: 1.8 % — SIGNIFICANT CHANGE UP (ref 0–6)
GIANT PLATELETS BLD QL SMEAR: PRESENT — SIGNIFICANT CHANGE UP
GLUCOSE BLDC GLUCOMTR-MCNC: 105 MG/DL — HIGH (ref 70–99)
GLUCOSE BLDC GLUCOMTR-MCNC: 109 MG/DL — HIGH (ref 70–99)
GLUCOSE BLDC GLUCOMTR-MCNC: 127 MG/DL — HIGH (ref 70–99)
GLUCOSE BLDC GLUCOMTR-MCNC: 91 MG/DL — SIGNIFICANT CHANGE UP (ref 70–99)
GLUCOSE SERPL-MCNC: 100 MG/DL — HIGH (ref 70–99)
HCT VFR BLD CALC: 27.9 % — LOW (ref 39–50)
HGB BLD-MCNC: 9.6 G/DL — LOW (ref 13–17)
IANC: 9.49 K/UL — HIGH (ref 1.8–7.4)
LACTATE SERPL-SCNC: 0.9 MMOL/L — SIGNIFICANT CHANGE UP (ref 0.5–2)
LYMPHOCYTES # BLD AUTO: 13.3 % — SIGNIFICANT CHANGE UP (ref 13–44)
LYMPHOCYTES # BLD AUTO: 2.05 K/UL — SIGNIFICANT CHANGE UP (ref 1–3.3)
MACROCYTES BLD QL: SLIGHT — SIGNIFICANT CHANGE UP
MAGNESIUM SERPL-MCNC: 2.4 MG/DL — SIGNIFICANT CHANGE UP (ref 1.6–2.6)
MANUAL SMEAR VERIFICATION: SIGNIFICANT CHANGE UP
MCHC RBC-ENTMCNC: 29.1 PG — SIGNIFICANT CHANGE UP (ref 27–34)
MCHC RBC-ENTMCNC: 34.4 G/DL — SIGNIFICANT CHANGE UP (ref 32–36)
MCV RBC AUTO: 84.5 FL — SIGNIFICANT CHANGE UP (ref 80–100)
METAMYELOCYTES # FLD: 4.4 % — HIGH (ref 0–1)
METAMYELOCYTES NFR BLD: 4.4 % — HIGH (ref 0–1)
MONOCYTES # BLD AUTO: 0.68 K/UL — SIGNIFICANT CHANGE UP (ref 0–0.9)
MONOCYTES NFR BLD AUTO: 4.4 % — SIGNIFICANT CHANGE UP (ref 2–14)
MYELOCYTES NFR BLD: 2.6 % — HIGH (ref 0–0)
NEUTROPHILS # BLD AUTO: 11.35 K/UL — HIGH (ref 1.8–7.4)
NEUTROPHILS NFR BLD AUTO: 72.6 % — SIGNIFICANT CHANGE UP (ref 43–77)
NEUTS BAND # BLD: 0.9 % — SIGNIFICANT CHANGE UP (ref 0–6)
NEUTS BAND NFR BLD: 0.9 % — SIGNIFICANT CHANGE UP (ref 0–6)
OSMOLALITY UR: 325 MOSM/KG — SIGNIFICANT CHANGE UP (ref 50–1200)
PHOSPHATE SERPL-MCNC: 3.9 MG/DL — SIGNIFICANT CHANGE UP (ref 2.5–4.5)
PLAT MORPH BLD: ABNORMAL
PLATELET # BLD AUTO: 624 K/UL — HIGH (ref 150–400)
PLATELET COUNT - ESTIMATE: ABNORMAL
POIKILOCYTOSIS BLD QL AUTO: SLIGHT — SIGNIFICANT CHANGE UP
POLYCHROMASIA BLD QL SMEAR: SLIGHT — SIGNIFICANT CHANGE UP
POTASSIUM SERPL-MCNC: 3.3 MMOL/L — LOW (ref 3.5–5.3)
POTASSIUM SERPL-SCNC: 3.3 MMOL/L — LOW (ref 3.5–5.3)
POTASSIUM UR-SCNC: 9.4 MMOL/L — SIGNIFICANT CHANGE UP
RBC # BLD: 3.3 M/UL — LOW (ref 4.2–5.8)
RBC # FLD: 14.2 % — SIGNIFICANT CHANGE UP (ref 10.3–14.5)
RBC BLD AUTO: SIGNIFICANT CHANGE UP
SMUDGE CELLS # BLD: PRESENT — SIGNIFICANT CHANGE UP
SODIUM SERPL-SCNC: 128 MMOL/L — LOW (ref 135–145)
SODIUM UR-SCNC: <20 MMOL/L — SIGNIFICANT CHANGE UP
SPECIMEN SOURCE: SIGNIFICANT CHANGE UP
SPECIMEN SOURCE: SIGNIFICANT CHANGE UP
WBC # BLD: 15.44 K/UL — HIGH (ref 3.8–10.5)
WBC # FLD AUTO: 15.44 K/UL — HIGH (ref 3.8–10.5)

## 2025-04-30 PROCEDURE — 99232 SBSQ HOSP IP/OBS MODERATE 35: CPT

## 2025-04-30 PROCEDURE — 99238 HOSP IP/OBS DSCHRG MGMT 30/<: CPT

## 2025-04-30 PROCEDURE — G0545: CPT

## 2025-04-30 RX ORDER — BACITRACIN 500 UNIT/G
1 OINTMENT (GRAM) TOPICAL THREE TIMES A DAY
Refills: 0 | Status: DISCONTINUED | OUTPATIENT
Start: 2025-04-30 | End: 2025-05-16

## 2025-04-30 RX ORDER — BISACODYL 5 MG
10 TABLET, DELAYED RELEASE (ENTERIC COATED) ORAL ONCE
Refills: 0 | Status: COMPLETED | OUTPATIENT
Start: 2025-04-30 | End: 2025-04-30

## 2025-04-30 RX ORDER — BISACODYL 5 MG
10 TABLET, DELAYED RELEASE (ENTERIC COATED) ORAL DAILY
Refills: 0 | Status: DISCONTINUED | OUTPATIENT
Start: 2025-04-30 | End: 2025-05-05

## 2025-04-30 RX ORDER — POLYETHYLENE GLYCOL 3350 17 G/17G
17 POWDER, FOR SOLUTION ORAL
Refills: 0 | Status: DISCONTINUED | OUTPATIENT
Start: 2025-04-30 | End: 2025-05-05

## 2025-04-30 RX ADMIN — Medication 975 MILLIGRAM(S): at 23:19

## 2025-04-30 RX ADMIN — Medication 1 APPLICATION(S): at 23:20

## 2025-04-30 RX ADMIN — Medication 1 APPLICATION(S): at 13:40

## 2025-04-30 RX ADMIN — ERTAPENEM SODIUM 100 MILLIGRAM(S): 1 INJECTION, POWDER, LYOPHILIZED, FOR SOLUTION INTRAMUSCULAR; INTRAVENOUS at 13:41

## 2025-04-30 RX ADMIN — Medication 40 MILLIEQUIVALENT(S): at 23:19

## 2025-04-30 RX ADMIN — Medication 975 MILLIGRAM(S): at 07:06

## 2025-04-30 RX ADMIN — Medication 975 MILLIGRAM(S): at 13:39

## 2025-04-30 RX ADMIN — SODIUM CHLORIDE 100 MILLILITER(S): 9 INJECTION, SOLUTION INTRAVENOUS at 12:39

## 2025-04-30 RX ADMIN — Medication 975 MILLIGRAM(S): at 19:07

## 2025-04-30 RX ADMIN — METHOCARBAMOL 500 MILLIGRAM(S): 500 TABLET, FILM COATED ORAL at 11:03

## 2025-04-30 RX ADMIN — GABAPENTIN 100 MILLIGRAM(S): 400 CAPSULE ORAL at 13:40

## 2025-04-30 RX ADMIN — OMEGA-3-ACID ETHYL ESTERS CAPSULES 2 GRAM(S): 1 CAPSULE, LIQUID FILLED ORAL at 06:35

## 2025-04-30 RX ADMIN — HEPARIN SODIUM 5000 UNIT(S): 1000 INJECTION INTRAVENOUS; SUBCUTANEOUS at 23:20

## 2025-04-30 RX ADMIN — HEPARIN SODIUM 5000 UNIT(S): 1000 INJECTION INTRAVENOUS; SUBCUTANEOUS at 06:39

## 2025-04-30 RX ADMIN — Medication 40 MILLIGRAM(S): at 06:36

## 2025-04-30 RX ADMIN — GABAPENTIN 100 MILLIGRAM(S): 400 CAPSULE ORAL at 23:18

## 2025-04-30 RX ADMIN — OMEGA-3-ACID ETHYL ESTERS CAPSULES 2 GRAM(S): 1 CAPSULE, LIQUID FILLED ORAL at 18:17

## 2025-04-30 RX ADMIN — HEPARIN SODIUM 5000 UNIT(S): 1000 INJECTION INTRAVENOUS; SUBCUTANEOUS at 13:40

## 2025-04-30 RX ADMIN — Medication 975 MILLIGRAM(S): at 12:39

## 2025-04-30 RX ADMIN — GABAPENTIN 100 MILLIGRAM(S): 400 CAPSULE ORAL at 06:36

## 2025-04-30 RX ADMIN — Medication 10 MILLIGRAM(S): at 18:08

## 2025-04-30 RX ADMIN — Medication 975 MILLIGRAM(S): at 18:07

## 2025-04-30 RX ADMIN — Medication 975 MILLIGRAM(S): at 06:36

## 2025-04-30 RX ADMIN — POLYETHYLENE GLYCOL 3350 17 GRAM(S): 17 POWDER, FOR SOLUTION ORAL at 23:18

## 2025-04-30 NOTE — CONSULT NOTE ADULT - ASSESSMENT
ASSESSMENT:  79yMale w/ HTN, HLD, T2DM, chronic low back pain presents as a transfer from [facility] ED for surgery today. Pt was sent into ED yesterday by his pain medicine physician due to recent MRI findings concerning for L1-L2 discitis/OM with epidural abscess, bilateral psoas abscesses    (1)JUSTINO- resolved  (2)Hyponatremia  (3)Hypokalemia   (4)ID - on iv abx ASSESSMENT:  79yMale w/ HTN, HLD, T2DM, chronic low back pain presents as a transfer from [facility] ED for surgery today. Pt was sent into ED yesterday by his pain medicine physician due to recent MRI findings concerning for L1-L2 discitis/OM with epidural abscess, bilateral psoas abscesses    (1)JUSTINO- resolved  (2)Hyponatremia  (3)Hypokalemia   (4)ID - on iv abx    Mare Cameron NP      RENAL ATTENDING NOTE  Patient seen and examined with NP. Agree with assessment and plan as above.    (1)Renal - CKD2, early diabetic nephropathy? Follows as outpatient with Dr. 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 labs  (4)BMP at least daily for now  (5)Pain control per Ortho    Yamil Humphreys MD  [facility]  [phone] ASSESSMENT:  79yMale w/ HTN, HLD, T2DM, chronic low back pain presents as a transfer from [facility] ED for surgery today. Pt was sent into ED yesterday by his pain medicine physician due to recent MRI findings concerning for L1-L2 discitis/OM with epidural abscess, bilateral psoas abscesses    (1)JUSTINO- resolved  (2)Hyponatremia  (3)Hypokalemia   (4)ID - on iv abx    Mare Cameron NP      RENAL ATTENDING NOTE  Patient seen and examined with NP. Agree with assessment and plan as above.    (1)Renal - CKD2, early diabetic nephropathy? Follows as outpatient with Dr. 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 labs  (5)BMP at least daily for now  (6)Pain control per Ortho    Yamil Humphreys MD  [facility]  [phone] ASSESSMENT:  79yMale w/ HTN, HLD, T2DM, chronic low back pain presents as a transfer from [facility] ED for surgery today. Pt was sent into ED yesterday by his pain medicine physician due to recent MRI findings concerning for L1-L2 discitis/OM with epidural abscess, bilateral psoas abscesses    (1)Renal - CKD2, early diabetic nephropathy? Follows as outpatient with Dr. 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 labs  (5)BMP at least daily for now  (6)Pain control per Ortho      RENAL ATTENDING NOTE  Patient seen and examined with NP. Agree with assessment and plan as above.    (1)Renal - CKD2, early diabetic nephropathy? Follows as outpatient with Dr. 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 labs  (5)BMP at least daily for now  (6)Pain control per Ortho    Yamil Humphreys MD  [facility]  [phone]

## 2025-04-30 NOTE — PROGRESS NOTE ADULT - ASSESSMENT
79M PMH HTN, DM, and chronic low back pain who was transferred from [facility] for management of L1-L2 discitis/osteomyelitis with bilateral psoas abscesses, s/p T12-L2 laminectomy via RP approach with Ortho on 4/24 with PRS closure, and s/p percutaneous drainage of bilateral psoas abscesses by IR on 4/26. Post-op course c/b abdominal distension. General Surgery consulted for imaging findings of possible pneumoperitoneum. He is tolerating a regular diet, having bowel function, and has a benign abdominal exam.     PLAN:   - CT AP with IV/PO contrast ordered -- Diffuse colonic dilatation, likely secondary to pseudoobstruction or ileus.  - Management of pseudoobstruction per GI; Rectal tube deferring likely in setting of large BM and gas 4/30.   - Recommend rpt axr 5/1 am to monitor distension  - Global care per Ortho     B Team surgery   [id number]

## 2025-04-30 NOTE — PROGRESS NOTE ADULT - SUBJECTIVE AND OBJECTIVE BOX
TEAM [ B ] Surgery Daily Progress Note  =====================================================  SUBJECTIVE: Patient seen and examined at bedside on AM rounds. Patient reports that they're feeling well. Tolerating diet, reportedly had large flatus and BM this morning. No N/V.     PMH:   PAST MEDICAL & SURGICAL HISTORY:  HTN (hypertension)    HLD (hyperlipidemia)    DM (diabetes mellitus)    Cataract    ALLERGIES:  No Known Allergies  --------------------------------------------------------------------------------------  MEDICATIONS:    Neurologic Medications  acetaminophen     Tablet .. 975 milliGRAM(s) Oral every 6 hours  gabapentin 100 milliGRAM(s) Oral every 8 hours  methocarbamol 500 milliGRAM(s) Oral every 12 hours    Respiratory Medications    Cardiovascular Medications    Gastrointestinal Medications  bisacodyl Suppository 10 milliGRAM(s) Rectal daily  bisacodyl Suppository 10 milliGRAM(s) Rectal once  lactated ringers. 1000 milliLiter(s) IV Continuous <Continuous>  magnesium hydroxide Suspension 30 milliLiter(s) Oral every 12 hours PRN Constipation  pantoprazole    Tablet 40 milliGRAM(s) Oral before breakfast  polyethylene glycol 3350 17 Gram(s) Oral <User Schedule>  senna 2 Tablet(s) Oral at bedtime  simethicone 80 milliGRAM(s) Chew every 12 hours PRN Upset Stomach    Genitourinary Medications    Hematologic/Oncologic Medications  heparin   Injectable 5000 Unit(s) SubCutaneous every 8 hours    Antimicrobial/Immunologic Medications  ertapenem  IVPB      ertapenem  IVPB 1000 milliGRAM(s) IV Intermittent every 24 hours    Endocrine/Metabolic Medications  atorvastatin 40 milliGRAM(s) Oral at bedtime  glucagon  Injectable 1 milliGRAM(s) IntraMuscular once  insulin lispro (ADMELOG) corrective regimen sliding scale   SubCutaneous every 6 hours    Topical/Other Medications  bacitracin   Ointment 1 Application(s) Topical three times a day  omega-3-Acid Ethyl Esters 2 Gram(s) Oral two times a day  --------------------------------------------------------------------------------------  VITAL SIGNS:    T(C): 36.7 (04-30-25 @ 13:46), Max: 36.9 (04-29-25 @ 22:25)  HR: 106 (04-30-25 @ 13:46) (100 - 106)  BP: 133/75 (04-30-25 @ 13:46) (123/73 - 147/68)  RR: 17 (04-30-25 @ 13:46) (17 - 18)  SpO2: 100% (04-30-25 @ 13:46) (99% - 100%)    --------------------------------------------------------------------------------------  PHYSICAL EXAM:  GEN: resting in bed comfortably in NAD  NEURO: awake, alert  CV: warm, well-perfused  RESP: no increased WOB, saturating well on RA  ABD: softly distended, not tender, without rebound tenderness or guarding;   EXTR: no gross deformities; spontaneous movement in b/l U/L extrem     --------------------------------------------------------------------------------------  LABS                        9.6    15.44 )-----------( 624      ( 30 Apr 2025 05:41 )             27.9   04-30    128[L]  |  98  |  24[H]  ----------------------------<  100[H]  3.3[L]   |  16[L]  |  1.14    Ca    8.7      30 Apr 2025 05:41  Phos  3.9     04-30  Mg     2.40     04-30      --------------------------------------------------------------------------------------    INS AND OUTS:    04-29-25 @ 07:01  -  04-30-25 @ 07:00  --------------------------------------------------------  IN: 2000 mL / OUT: 985 mL / NET: 1015 mL    04-30-25 @ 07:01  -  04-30-25 @ 17:18  --------------------------------------------------------  IN: 0 mL / OUT: 490 mL / NET: -490 mL        --------------------------------------------------------------------------------------

## 2025-04-30 NOTE — CONSULT NOTE ADULT - SUBJECTIVE AND OBJECTIVE BOX
NEPHROLOGY     HPI:  HPI  79yMale w/ HTN, HLD, T2DM, chronic low back pain presents as a transfer from [facility] ED for surgery today. Pt was sent into ED yesterday by his pain medicine physician due to recent MRI findings concerning for L1-L2 discitis/OM with epidural abscess, bilateral psoas abscesses. Patient initially saw pain specialist in February for chronic back pain with radiations to bilateral lateral thighs (R>L). MRI at that time showed degenerative changes. He subsequently had epidural injections x2 (2/27, 3/11) with moderate pain relief. Pain began to worsen on 4/10. He had radiofrequency ablation performed on 4/11 and has since has severe worsening of pain and radiation to R lateral thigh. He reports recent bowel/bladder "incontinence" requiring diaper due to his inability to get to the bathroom in time due to pain limitations but states urge and sensation are intact. Denies fevers, chills, night sweats, weakness, numbness/tingling, saddle anesthesia, recent falls/trauma. He uses a cane for ambulation. Denies recent falls/trauma or any other ortho injuries.     ROS  Negative unless otherwise specified in HPI.    PAST MEDICAL & SURGICAL Hx  PAST MEDICAL & SURGICAL HISTORY:  HTN (hypertension)      HLD (hyperlipidemia)      DM (diabetes mellitus)      Cataract          MEDICATIONS  Home Medications:  losartan 25 mg oral tablet: 1 tab(s) orally once a day (06 Jun 2018 10:23)  Lovaza 1000 mg oral capsule: 2 cap(s) orally 2 times a day (06 Jun 2018 10:23)  metFORMIN 500 mg oral tablet: 1 tab(s) orally once a day (06 Jun 2018 10:23)      ALLERGIES  No Known Allergies      FAMILY Hx  FAMILY HISTORY:  No pertinent family history in first degree relatives        SOCIAL Hx  Social History:      VITALS  Vital Signs Last 24 Hrs  T(C): 36.8 (24 Apr 2025 03:30), Max: 37.4 (23 Apr 2025 11:54)  T(F): 98.2 (24 Apr 2025 03:30), Max: 99.3 (23 Apr 2025 11:54)  HR: 103 (24 Apr 2025 03:30) (97 - 128)  BP: 159/75 (24 Apr 2025 03:30) (114/64 - 159/75)  BP(mean): --  RR: 18 (24 Apr 2025 03:30) (17 - 20)  SpO2: 97% (24 Apr 2025 03:30) (96% - 98%)      LABS                        11.5   12.50 )-----------( 561      ( 24 Apr 2025 05:00 )             34.0     04-24    135  |  102  |  21  ----------------------------<  166[H]  4.1   |  21[L]  |  0.80    Ca    10.1      24 Apr 2025 05:00    TPro  7.3  /  Alb  2.8[L]  /  TBili  0.4  /  DBili  x   /  AST  26  /  ALT  44  /  AlkPhos  141[H]  04-23    PT/INR - ( 24 Apr 2025 05:00 )   PT: 13.8 sec;   INR: 1.16 ratio         PTT - ( 24 Apr 2025 05:00 )  PTT:32.7 sec      PAST MEDICAL & SURGICAL HISTORY:  HTN (hypertension)      HLD (hyperlipidemia)      DM (diabetes mellitus)      Cataract          MEDICATIONS  (STANDING):  acetaminophen     Tablet .. 975 milliGRAM(s) Oral every 6 hours  atorvastatin 40 milliGRAM(s) Oral at bedtime  bacitracin   Ointment 1 Application(s) Topical three times a day  ertapenem  IVPB      ertapenem  IVPB 1000 milliGRAM(s) IV Intermittent every 24 hours  gabapentin 100 milliGRAM(s) Oral every 8 hours  glucagon  Injectable 1 milliGRAM(s) IntraMuscular once  heparin   Injectable 5000 Unit(s) SubCutaneous every 8 hours  insulin lispro (ADMELOG) corrective regimen sliding scale   SubCutaneous every 6 hours  lactated ringers. 1000 milliLiter(s) (100 mL/Hr) IV Continuous <Continuous>  methocarbamol 500 milliGRAM(s) Oral every 12 hours  omega-3-Acid Ethyl Esters 2 Gram(s) Oral two times a day  pantoprazole    Tablet 40 milliGRAM(s) Oral before breakfast  senna 2 Tablet(s) Oral at bedtime      Allergies    No Known Allergies    Intolerances        SOCIAL HISTORY:  Denies alcohol abuse, drug abuse or tobacco usage.     FAMILY HISTORY:  No pertinent family history in first degree relatives        VITALS:  T(C): 36.4 (04-30-25 @ 09:41), Max: 36.9 (04-29-25 @ 22:25)  HR: 104 (04-30-25 @ 09:41) (99 - 105)  BP: 123/73 (04-30-25 @ 09:41) (118/76 - 147/68)  RR: 18 (04-30-25 @ 09:41) (17 - 18)  SpO2: 100% (04-30-25 @ 09:41) (99% - 100%)    REVIEW OF SYSTEMS:  Denies any nausea, vomiting, diarrhea, fever or chills. Denies chest pain, SOB, focal weakness, hematuria or dysuria. NPO. Denies fatigue or weakness. All other pertinent systems are reviewed and are negative.    PHYSICAL EXAM:  Constitutional: NAD  HEENT: EOMI  Neck:  No JVD, supple   Respiratory: CTA B/L  Cardiovascular: S1 and S2, RRR  Gastrointestinal: distended  Extremities: tr peripheral edema, + peripheral pulses  Neurological: A/O x 3, CN2-12 intact  Psychiatric: Normal mood, normal affect  : No Mac  Skin: No rashes, C/D/I      I and O's:    04-28 @ 07:01  -  04-29 @ 07:00  --------------------------------------------------------  IN: 0 mL / OUT: 219 mL / NET: -219 mL    04-29 @ 07:01  -  04-30 @ 07:00  --------------------------------------------------------  IN: 2000 mL / OUT: 985 mL / NET: 1015 mL    04-30 @ 07:01  -  04-30 @ 13:20  --------------------------------------------------------  IN: 0 mL / OUT: 430 mL / NET: -430 mL          LABS:                        9.6    15.44 )-----------( 624      ( 30 Apr 2025 05:41 )             27.9     04-30    128[L]  |  98  |  24[H]  ----------------------------<  100[H]  3.3[L]   |  16[L]  |  1.14    Ca    8.7      30 Apr 2025 05:41  Phos  3.9     04-30  Mg     2.40     04-30          RADIOLOGY & ADDITIONAL STUDIES:  rad< from: Xray Abdomen 1 View PORTABLE -Urgent (Xray Abdomen 1 View PORTABLE -Urgent .) (04.29.25 @ 10:09) >  IMPRESSION:  Multiple dilated loops of small and large bowel with question of   pneumoperitoneum. Recommend upright chest radiograph or decubitus   abdominal radiograph for further evaluation.    --- End of Report ---          HERBERTH JIMENEZ MD; Resident Radiologist  This document has been electronically signed.  KATHERIN ENAMORADO MD; Attending Interventional Radiologist  This document has been electronically signed. Apr 29 2025 11:06AM    < end of copied text >  < from: Xray Chest 1 View AP/PA (04.29.25 @ 13:23) >  IMPRESSION:  Gaseous distention of bowel, most prominent in the left upper quadrant.   No definite free air. Correlate with scheduled CT abdomen and pelvis.    --- End of Report ---          GATITO CASTILLO MD; Resident Radiologist  This document has been electronically signed.  KATHERIN ENAMORADO MD; Attending Interventional Radiologist  This document has been electronically signed. Apr 29 2025  2:25PM    < end of copied text >  < from: CT Abdomen and Pelvis w/ Oral Cont and w/ IV Cont (04.29.25 @ 17:16) >  IMPRESSION:      Diffuse colonic dilatation, worse on today's study, likely secondary to   pseudoobstruction or ileus.    No free air.    Reduction in the bilateral psoas collections with catheters in place.    PRELIMINARY IMPRESSION: Study limited secondary to extensive streak   artifact from patient's spinal hardware. Diffusely dilated colon to the   level of the rectum, increased since 4/25/2025, without evidence of   mechanical obstruction. Possible etiologies include pseudoobstruction   versus ileus. No small bowel obstruction. No pneumoperitoneum.   Percutaneous posterior approach drainage catheters within the psoas   muscles bilaterally with interval decrease of previously seen abscess.   Redemonstrated erosive endplate changes at L1-L2 compatible with discitis   osteomyelitis.    Follow-up final report in the a.m.    --- End of Report ---          RUSSEL SUTHERLAND DO; Resident Radiologist  This document has been electronically signed.  JUAN PABLO COBOS MD; Attending Interventional Radiologist  This document has been electronically signed. Apr 29 2025  6:51PM    < end of copied text >   NEPHROLOGY     HPI  79yMale w/ HTN, HLD, T2DM, chronic low back pain presents as a transfer from [facility] ED for surgery today. Pt was sent into ED yesterday by his pain medicine physician due to recent MRI findings concerning for L1-L2 discitis/OM with epidural abscess, bilateral psoas abscesses. Patient initially saw pain specialist in February for chronic back pain with radiations to bilateral lateral thighs (R>L). MRI at that time showed degenerative changes. He subsequently had epidural injections x2 (2/27, 3/11) with moderate pain relief. Pain began to worsen on 4/10. He had radiofrequency ablation performed on 4/11 and has since has severe worsening of pain and radiation to R lateral thigh. He reports recent bowel/bladder "incontinence" requiring diaper due to his inability to get to the bathroom in time due to pain limitations but states urge and sensation are intact. Denies fevers, chills, night sweats, weakness, numbness/tingling, saddle anesthesia, recent falls/trauma. He uses a cane for ambulation. Denies recent falls/trauma or any other ortho injuries.     Pt seen and examined on room air, denies cp or sob, reports of mild abdominal discomfort. States he is voiding without issues, admits to use of ibuprofen for pain relief in the past as Tylenol did not help relieve pain.     ROS  Negative unless otherwise specified in HPI.    PAST MEDICAL & SURGICAL Hx  PAST MEDICAL & SURGICAL HISTORY:  HTN (hypertension)      HLD (hyperlipidemia)      DM (diabetes mellitus)      Cataract          MEDICATIONS  Home Medications:  losartan 25 mg oral tablet: 1 tab(s) orally once a day (06 Jun 2018 10:23)  Lovaza 1000 mg oral capsule: 2 cap(s) orally 2 times a day (06 Jun 2018 10:23)  metFORMIN 500 mg oral tablet: 1 tab(s) orally once a day (06 Jun 2018 10:23)      ALLERGIES  No Known Allergies      FAMILY Hx  FAMILY HISTORY:  No pertinent family history in first degree relatives        SOCIAL Hx  Social History:      VITALS  Vital Signs Last 24 Hrs  T(C): 36.8 (24 Apr 2025 03:30), Max: 37.4 (23 Apr 2025 11:54)  T(F): 98.2 (24 Apr 2025 03:30), Max: 99.3 (23 Apr 2025 11:54)  HR: 103 (24 Apr 2025 03:30) (97 - 128)  BP: 159/75 (24 Apr 2025 03:30) (114/64 - 159/75)  BP(mean): --  RR: 18 (24 Apr 2025 03:30) (17 - 20)  SpO2: 97% (24 Apr 2025 03:30) (96% - 98%)      LABS                        11.5   12.50 )-----------( 561      ( 24 Apr 2025 05:00 )             34.0     04-24    135  |  102  |  21  ----------------------------<  166[H]  4.1   |  21[L]  |  0.80    Ca    10.1      24 Apr 2025 05:00    TPro  7.3  /  Alb  2.8[L]  /  TBili  0.4  /  DBili  x   /  AST  26  /  ALT  44  /  AlkPhos  141[H]  04-23    PT/INR - ( 24 Apr 2025 05:00 )   PT: 13.8 sec;   INR: 1.16 ratio         PTT - ( 24 Apr 2025 05:00 )  PTT:32.7 sec      PAST MEDICAL & SURGICAL HISTORY:  HTN (hypertension)      HLD (hyperlipidemia)      DM (diabetes mellitus)      Cataract          MEDICATIONS  (STANDING):  acetaminophen     Tablet .. 975 milliGRAM(s) Oral every 6 hours  atorvastatin 40 milliGRAM(s) Oral at bedtime  bacitracin   Ointment 1 Application(s) Topical three times a day  ertapenem  IVPB      ertapenem  IVPB 1000 milliGRAM(s) IV Intermittent every 24 hours  gabapentin 100 milliGRAM(s) Oral every 8 hours  glucagon  Injectable 1 milliGRAM(s) IntraMuscular once  heparin   Injectable 5000 Unit(s) SubCutaneous every 8 hours  insulin lispro (ADMELOG) corrective regimen sliding scale   SubCutaneous every 6 hours  lactated ringers. 1000 milliLiter(s) (100 mL/Hr) IV Continuous <Continuous>  methocarbamol 500 milliGRAM(s) Oral every 12 hours  omega-3-Acid Ethyl Esters 2 Gram(s) Oral two times a day  pantoprazole    Tablet 40 milliGRAM(s) Oral before breakfast  senna 2 Tablet(s) Oral at bedtime      Allergies    No Known Allergies    Intolerances        SOCIAL HISTORY:  Denies alcohol abuse, drug abuse or tobacco usage.     FAMILY HISTORY:  No pertinent family history in first degree relatives        VITALS:  T(C): 36.4 (04-30-25 @ 09:41), Max: 36.9 (04-29-25 @ 22:25)  HR: 104 (04-30-25 @ 09:41) (99 - 105)  BP: 123/73 (04-30-25 @ 09:41) (118/76 - 147/68)  RR: 18 (04-30-25 @ 09:41) (17 - 18)  SpO2: 100% (04-30-25 @ 09:41) (99% - 100%)    REVIEW OF SYSTEMS:  Denies any nausea, vomiting, diarrhea, fever or chills. Denies chest pain, SOB, focal weakness, hematuria or dysuria. NPO. Denies fatigue or weakness. All other pertinent systems are reviewed and are negative.    PHYSICAL EXAM:  Constitutional: NAD  HEENT: EOMI  Neck:  No JVD, supple   Respiratory: CTA B/L  Cardiovascular: S1 and S2, RRR  Gastrointestinal: distended  Extremities: tr peripheral edema, + peripheral pulses  Neurological: A/O x 3, CN2-12 intact  Psychiatric: Normal mood, normal affect  : No Mac  Skin: No rashes, C/D/I      I and O's:    04-28 @ 07:01  -  04-29 @ 07:00  --------------------------------------------------------  IN: 0 mL / OUT: 219 mL / NET: -219 mL    04-29 @ 07:01  -  04-30 @ 07:00  --------------------------------------------------------  IN: 2000 mL / OUT: 985 mL / NET: 1015 mL    04-30 @ 07:01  -  04-30 @ 13:20  --------------------------------------------------------  IN: 0 mL / OUT: 430 mL / NET: -430 mL          LABS:                        9.6    15.44 )-----------( 624      ( 30 Apr 2025 05:41 )             27.9     04-30    128[L]  |  98  |  24[H]  ----------------------------<  100[H]  3.3[L]   |  16[L]  |  1.14    Ca    8.7      30 Apr 2025 05:41  Phos  3.9     04-30  Mg     2.40     04-30          RADIOLOGY & ADDITIONAL STUDIES:  rad< from: Xray Abdomen 1 View PORTABLE -Urgent (Xray Abdomen 1 View PORTABLE -Urgent .) (04.29.25 @ 10:09) >  IMPRESSION:  Multiple dilated loops of small and large bowel with question of   pneumoperitoneum. Recommend upright chest radiograph or decubitus   abdominal radiograph for further evaluation.    --- End of Report ---          HERBERTH JIMENEZ MD; Resident Radiologist  This document has been electronically signed.  KATHERIN ENAMORADO MD; Attending Interventional Radiologist  This document has been electronically signed. Apr 29 2025 11:06AM    < end of copied text >  < from: Xray Chest 1 View AP/PA (04.29.25 @ 13:23) >  IMPRESSION:  Gaseous distention of bowel, most prominent in the left upper quadrant.   No definite free air. Correlate with scheduled CT abdomen and pelvis.    --- End of Report ---          GATITO CASTILLO MD; Resident Radiologist  This document has been electronically signed.  KATHERIN ENAMORADO MD; Attending Interventional Radiologist  This document has been electronically signed. Apr 29 2025  2:25PM    < end of copied text >  < from: CT Abdomen and Pelvis w/ Oral Cont and w/ IV Cont (04.29.25 @ 17:16) >  IMPRESSION:      Diffuse colonic dilatation, worse on today's study, likely secondary to   pseudoobstruction or ileus.    No free air.    Reduction in the bilateral psoas collections with catheters in place.    PRELIMINARY IMPRESSION: Study limited secondary to extensive streak   artifact from patient's spinal hardware. Diffusely dilated colon to the   level of the rectum, increased since 4/25/2025, without evidence of   mechanical obstruction. Possible etiologies include pseudoobstruction   versus ileus. No small bowel obstruction. No pneumoperitoneum.   Percutaneous posterior approach drainage catheters within the psoas   muscles bilaterally with interval decrease of previously seen abscess.   Redemonstrated erosive endplate changes at L1-L2 compatible with discitis   osteomyelitis.    Follow-up final report in the a.m.    --- End of Report ---          RUSSEL SUTHERLAND DO; Resident Radiologist  This document has been electronically signed.  JUAN PABLO COBOS MD; Attending Interventional Radiologist  This document has been electronically signed. Apr 29 2025  6:51PM    < end of copied text >

## 2025-04-30 NOTE — PROGRESS NOTE ADULT - ASSESSMENT
79yMale w/ HTN, HLD, T2DM, chronic low back pain presents as a transfer from [facility] for surgery today. Pt was sent into ED yesterday by his pain medicine physician due to recent MRI findings concerning for L1-L2 discitis/OM with epidural abscess, bilateral psoas abscesses. Patient initially saw pain specialist in February for chronic back pain with radiations to bilateral lateral thighs (R>L). MRI at that time showed degenerative changes. He subsequently had epidural injections x2 (2/27, 3/11) with moderate pain relief. Pain began to worsen on 4/10. He had radiofrequency ablation performed on 4/11 and has since has severe worsening of pain and radiation to R lateral thigh. He reports recent bowel/bladder "incontinence" requiring diaper due to his inability to get to the bathroom in time due to pain limitations but states urge and sensation are intact. Denies fevers, chills, night sweats, weakness, numbness/tingling, saddle anesthesia, recent falls/trauma. He uses a cane for ambulation. Denies recent falls/trauma or any other ortho injuries. Before back pain was very active walked half an hour , going up and down stairs and doing grocery etc with no Chest pain or SOB.        Problem/Recommendation - 1:  ·  Problem: Epidural abscess.   ·  Recommendation: S/P Decompression, spine, lumbar, posterior approach, with fusion of posterior spinal column.  On IV Abxs Ertapenem now  per ID x 6 weeks  .  Will defer management to  Neurosurgery.    < from: MR Lumbar Spine w/ IV Cont (04.23.25 @ 15:13) >  IMPRESSION:        1.   Cervical spine:   Moderate degenerative disc disease and   spondylosis at C4-5 through C6/7 with loss of disc height and associated   degenerative endplate changes.        2.   Thoracic spine:   Minimal enhancement within T7-8 which may   reflect early discitis.        3.   Lumbar spine:   Osteomyelitis and discitis at L1-2 with erosions   of the inferior L1 vertebral body and L2 vertebral body consistent with   osteomyelitis and discitis. Ventral epidural abscess is noted extending   from the L1-2 level superiorly to the T10 level with mass effect on the   ventral thecal sac, most prominently at the L1-2 level resulting in   marked compression. Multiple abscesses within the BILATERAL psoas   muscles, the largest measuring 4.3 cm on the RIGHT and 2.8 cm on the LEFT.     Problem/Recommendation - 2:  ·  Problem: Acute osteomyelitis of lumbar spine.   ·  Recommendation: L1 &L2 vertebrae .  On IV Abxs per ID .  Will defer management to Neurosurgery.     Problem/Recommendation - 3:  ·  Problem: Low back pain radiating to right lower extremity.   ·  Recommendation: Pain control.     Problem/Recommendation - 4:  ·  Problem: . BILATERAL psoas Abscess  ·  Recommendation: ON IV Abxs per ID .  IR placed drains after draining  .       Problem/Recommendation - 5:  ·  Problem: HTN (hypertension).   ·  Recommendation: Takes Amlodipine and ARB so continue with hold parameters.  < from: TTE W or WO Ultrasound Enhancing Agent (04.24.25 @ 10:10) >  CONCLUSIONS:      1. Left ventricular cavity is normal in size. Left ventricular wall thickness is normal. Left ventricular systolic function is normal with an ejection fraction of 64 % by Carpenter's method of disks. There are no regional wall motion abnormalities seen.   2. Normal right ventricular cavity size and normal right ventricular systolic function. Tricuspid annular plane systolic excursion (TAPSE) is 2.2 cm (normal >=1.7 cm).   3. Structurally normal mitral valve with normal leaflet excursion. There is calcification of the mitral valve annulus. There is tracemitral regurgitation.   4. The aortic valve appears trileaflet with normal systolic excursion. There is calcification of the aortic valve leaflets. There is mild aortic regurgitation.     Problem/Recommendation - 6:  ·  Problem: HLD (hyperlipidemia).   ·  Recommendation: Continue Atorvastatin.     Problem/Recommendation - 7:  ·  Problem: DM (diabetes mellitus).   ·  Recommendation: ON Farxiga and holding.  SSI and Lantus in patient .     Problem/Recommendation - 8:  ·  Problem:  Ileus /Pseudoobustraction.   ·  Recommendation: Had BM and  passing flatus .  Surgery & GI input appreciated .  < from: CT Abdomen and Pelvis w/ Oral Cont and w/ IV Cont (04.29.25 @ 17:16) >  IMPRESSION:  Diffuse colonic dilatation, worse on today's study, likely secondary to   pseudoobstruction or ileus.    No free air.    Reduction in the bilateral psoas collections with catheters in place.    < from: Xray Abdomen 1 View PORTABLE -Urgent (Xray Abdomen 1 View PORTABLE -Urgent .) (04.29.25 @ 10:09) >  IMPRESSION:  Multiple dilated loops of small and large bowel with question of   pneumoperitoneum. Recommend upright chest radiograph or decubitus   abdominal radiograph for further evaluation.    Paged surgery team and waiting for response .      Problem/Recommendation - 9:  ·  Problem: JUSTINO with Hyponatremia .   ·  Recommendation: Trending BMP.    Rpt labs noted.   JUSTINO resolved .   Hyponatremia   Renal help appreciated.      Problem/Recommendation - 10:  ·  Problem: Hypokalemia .   ·  Recommendation: Please replace .   .    D/W patient in detail and family.

## 2025-04-30 NOTE — PROGRESS NOTE ADULT - ASSESSMENT
79yMale w/ HTN, HLD, T2DM, chronic low back pain presents as a transfer from [facility] ED for surgery today. Pt was sent into ED yesterday by his pain medicine physician due to recent MRI findings concerning for L1-L2 discitis/OM with epidural abscess, bilateral psoas abscesses. Patient initially saw pain specialist in February for chronic back pain with radiations to bilateral lateral thighs (R>L). MRI at that time showed degenerative changes. He subsequently had epidural injections x2 (2/27, 3/11) with moderate pain relief. Pain began to worsen on 4/10. He had radiofrequency ablation performed on 4/11 and has since has severe worsening of pain and radiation to R lateral thigh. He reports recent bowel/bladder "incontinence" requiring diaper due to his inability to get to the bathroom in time due to pain limitations but states urge and sensation are intact. Denies fevers, chills, night sweats, weakness, numbness/tingling, saddle anesthesia, recent falls/trauma. He uses a cane for ambulation. Denies recent falls/trauma or any other ortho injuries. He was born in [place], has frequent travels back, no known TB exposure. NO recent infections, antibiotic use.    ED/Hospital course: Has been afebrile, WBC 14, A1C 7.5, labs otherwise WNL. Underwent decompression, spine, lumbar, posterior approach, with fusion of posterior spinal column on 4/24. Cultures sent. Started on Vancomycin and Zosyn. IR consulted for abscess drainage. ID consulted for antibiotic management.    Blood cultures B fragilis  Abscess cx GNR- B fragilis      # L1/L2 discitis OM s/p decompression/fusion with cx thus far with bacteroides   # Multiloculated bilateral psoas abscesses S/P drainage on 04/26-  cx thus far with bacteroides   # leucocytosis   # JUSTINO    MICRO:   04/23 BCX- 2/4 GNR, bacteroides   04/24 OR cx-  bacteroides   04/25 MRSA nares- negative   04/25 BCX- NGTD  04/26 Psoas abscess cx- GNR, Bacteroides     Recommendations;     - Cultures growing Bacteroides; not typical pathogens for spine infection, suspect transient gut translocation into the blood stream causing bacteremia, epidural abscess and B/L Psoas abscess   - No GI/ pathology on imaging ( enlarged prostate, UA negative)   - Continue Ertapenem 1 gm Q24H ( current crcl is 39)   - Follow up with cultures from 04/26 (bacteroides)   - continue to trend CBC/fever curve  - Will need atleast 6 weeks of IV antibiotics from 04/26/25   - Trend ESR/CRP weekly     In addition to reviewing history, imaging, documents, labs, microbiology, took into account antibiotic stewardship, local antibiogram and infection control strategies and potential transmission issues.    Discussed with the primary team.    Alem Russell MD  Attending Physician, Division of Infectious Diseases  Department of Medicine   [facility]    Contact on TEAMS messaging from 9am - 5pm  Office: [phone] (after 5 PM or weekend)

## 2025-04-30 NOTE — CONSULT NOTE ADULT - SUBJECTIVE AND OBJECTIVE BOX
Chief Complaint:  L1-L2 discitis/OM with epidural abscess and BL psoas abscesses    HPI:    Mr. 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     No Known Allergies      Hospital Medications:  acetaminophen     Tablet .. 975 milliGRAM(s) Oral every 6 hours  atorvastatin 40 milliGRAM(s) Oral at bedtime  bacitracin   Ointment 1 Application(s) Topical three times a day  bisacodyl 5 milliGRAM(s) Oral every 12 hours PRN  bisacodyl Suppository 10 milliGRAM(s) Rectal daily  bisacodyl Suppository 10 milliGRAM(s) Rectal daily PRN  bisacodyl Suppository 10 milliGRAM(s) Rectal once  ertapenem  IVPB      ertapenem  IVPB 1000 milliGRAM(s) IV Intermittent every 24 hours  gabapentin 100 milliGRAM(s) Oral every 8 hours  glucagon  Injectable 1 milliGRAM(s) IntraMuscular once  heparin   Injectable 5000 Unit(s) SubCutaneous every 8 hours  insulin lispro (ADMELOG) corrective regimen sliding scale   SubCutaneous every 6 hours  lactated ringers. 1000 milliLiter(s) IV Continuous <Continuous>  magnesium hydroxide Suspension 30 milliLiter(s) Oral every 12 hours PRN  methocarbamol 500 milliGRAM(s) Oral every 12 hours  omega-3-Acid Ethyl Esters 2 Gram(s) Oral two times a day  pantoprazole    Tablet 40 milliGRAM(s) Oral before breakfast  polyethylene glycol 3350 17 Gram(s) Oral <User Schedule>  senna 2 Tablet(s) Oral at bedtime  simethicone 80 milliGRAM(s) Chew every 12 hours PRN      PMHX/PSHX:  HTN (hypertension)    HLD (hyperlipidemia)    DM (diabetes mellitus)    Cataract    Family history:  No pertinent family history in first degree relatives    Social History:   No alcohol or smoking    ROS:   See HPI    PHYSICAL EXAM:   GENERAL:  NAD, resting comfortably in bed  HEENT:  Sclera anicteric  RESPIRATORY:  Normal effort  CARDIAC:  HDS  ABDOMEN:  Distended. Firm. Positive bowel sounds  EXTREMITIES:  No edema  SKIN:  Warm & Dry. No jaundice.   NEURO:  Alert, conversant, no focal deficit    Vital Signs:  Vital Signs Last 24 Hrs  T(C): 36.7 (2025 13:46), Max: 36.9 (2025 22:25)  T(F): 98 (2025 13:46), Max: 98.4 (2025 22:25)  HR: 106 (:46) (100 - 106)  BP: 133/75 (2025 13:46) (123/73 - 147/68)  BP(mean): --  RR: 17 (2025 13:46) (17 - 18)  SpO2: 100% (:46) (99% - 100%)    Parameters below as of 2025 13:46  Patient On (Oxygen Delivery Method): room air      Daily     Daily Weight in k.1 (2025 01:52)    LABS:                        9.6    15.44 )-----------( 624      ( 2025 05:41 )             27.9     Mean Cell Volume: 84.5 fL (25 @ 05:41)    04-30    128[L]  |  98  |  24[H]  ----------------------------<  100[H]  3.3[L]   |  16[L]  |  1.14    Ca    8.7      2025 05:41  Phos  3.9     04-30  Mg     2.40     04-30          Urinalysis Basic - ( 2025 05:41 )    Color: x / Appearance: x / SG: x / pH: x  Gluc: 100 mg/dL / Ketone: x  / Bili: x / Urobili: x   Blood: x / Protein: x / Nitrite: x   Leuk Esterase: x / RBC: x / WBC x   Sq Epi: x / Non Sq Epi: x / Bacteria: x                              9.6    15.44 )-----------( 624      ( 2025 05:41 )             27.9                         9.6    11.94 )-----------( 488      ( 2025 06:00 )             28.6     Imaging:   Chief Complaint:  L1-L2 discitis/OM with epidural abscess and BL psoas abscesses    HPI:    Mr. 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 Patient's immediate post-operative course was uncomplicated. Over the past three days, patient has experienced progress abdominal distension with generalized abdominal discomfort and decreased frequency of BM. At baseline he moves his bowels daily with Miralax x2. He has been eating bites of food without nausea or vomiting. CT A/P  demonstrated worsening colonic distension for which GI is consulted. Patient states that he is rotating back and forth in bed and today had a successful semi-liquid/solid and lots of flatus. No signs of bleeding. No similar prior episodes.     No Known Allergies      Hospital Medications:  acetaminophen     Tablet .. 975 milliGRAM(s) Oral every 6 hours  atorvastatin 40 milliGRAM(s) Oral at bedtime  bacitracin   Ointment 1 Application(s) Topical three times a day  bisacodyl 5 milliGRAM(s) Oral every 12 hours PRN  bisacodyl Suppository 10 milliGRAM(s) Rectal daily  bisacodyl Suppository 10 milliGRAM(s) Rectal daily PRN  bisacodyl Suppository 10 milliGRAM(s) Rectal once  ertapenem  IVPB      ertapenem  IVPB 1000 milliGRAM(s) IV Intermittent every 24 hours  gabapentin 100 milliGRAM(s) Oral every 8 hours  glucagon  Injectable 1 milliGRAM(s) IntraMuscular once  heparin   Injectable 5000 Unit(s) SubCutaneous every 8 hours  insulin lispro (ADMELOG) corrective regimen sliding scale   SubCutaneous every 6 hours  lactated ringers. 1000 milliLiter(s) IV Continuous <Continuous>  magnesium hydroxide Suspension 30 milliLiter(s) Oral every 12 hours PRN  methocarbamol 500 milliGRAM(s) Oral every 12 hours  omega-3-Acid Ethyl Esters 2 Gram(s) Oral two times a day  pantoprazole    Tablet 40 milliGRAM(s) Oral before breakfast  polyethylene glycol 3350 17 Gram(s) Oral <User Schedule>  senna 2 Tablet(s) Oral at bedtime  simethicone 80 milliGRAM(s) Chew every 12 hours PRN      PMHX/PSHX:  HTN (hypertension)    HLD (hyperlipidemia)    DM (diabetes mellitus)    Cataract    Family history:  No pertinent family history in first degree relatives    Social History:   No alcohol or smoking    ROS:   See HPI    PHYSICAL EXAM:   GENERAL:  NAD, resting comfortably in bed  HEENT:  Sclera anicteric  RESPIRATORY:  Normal effort  CARDIAC:  HDS  ABDOMEN:  Distended. Firm. Positive bowel sounds  EXTREMITIES:  No edema  SKIN:  Warm & Dry. No jaundice.   NEURO:  Alert, conversant, no focal deficit    Vital Signs:  Vital Signs Last 24 Hrs  T(C): 36.7 (2025 13:46), Max: 36.9 (2025 22:25)  T(F): 98 (2025 13:46), Max: 98.4 (2025 22:25)  HR: 106 (2025 13:46) (100 - 106)  BP: 133/75 (2025 13:46) (123/73 - 147/68)  BP(mean): --  RR: 17 (2025 13:46) (17 - 18)  SpO2: 100% (2025 13:46) (99% - 100%)    Parameters below as of 2025 13:46  Patient On (Oxygen Delivery Method): room air      Daily     Daily Weight in k.1 (2025 01:52)    LABS:                        9.6    15.44 )-----------( 624      ( 2025 05:41 )             27.9     Mean Cell Volume: 84.5 fL (- @ 05:41)        128[L]  |  98  |  24[H]  ----------------------------<  100[H]  3.3[L]   |  16[L]  |  1.14    Ca    8.7      2025 05:41  Phos  3.9       Mg     2.40     30          Urinalysis Basic - ( 2025 05:41 )    Color: x / Appearance: x / SG: x / pH: x  Gluc: 100 mg/dL / Ketone: x  / Bili: x / Urobili: x   Blood: x / Protein: x / Nitrite: x   Leuk Esterase: x / RBC: x / WBC x   Sq Epi: x / Non Sq Epi: x / Bacteria: x                              9.6    15.44 )-----------( 624      ( 2025 05:41 )             27.9                         9.6    11.94 )-----------( 488      ( 2025 06:00 )             28.6     Imaging:

## 2025-04-30 NOTE — PROGRESS NOTE ADULT - ASSESSMENT
79yMale w/ HTN, HLD, T2DM, chronic low back pain presents as a transfer from [facility] ED for surgery today. Pt was sent into ED yesterday by his pain medicine physician due to recent MRI findings concerning for L1-L2 discitis/OM with epidural abscess, bilateral psoas abscesses. Patient initially saw pain specialist in February for chronic back pain with radiations to bilateral lateral thighs (R>L). MRI at that time showed degenerative changes. He subsequently had epidural injections x2 (2/27, 3/11) with moderate pain relief. Pain began to worsen on 4/10. He had radiofrequency ablation performed on 4/11 and has since has severe worsening of pain and radiation to R lateral thigh. He reports recent bowel/bladder "incontinence" requiring diaper due to his inability to get to the bathroom in time due to pain limitations but states urge and sensation are intact. Denies fevers, chills, night sweats, weakness, numbness/tingling, saddle anesthesia, recent falls/trauma. He uses a cane for ambulation. Denies recent falls/trauma or any other ortho injuries. Before back pain was very active walked half an hour , going up and down stairs and doing grocery etc with no Chest pain or SOB.        Problem/Recommendation - 1:  ·  Problem: Epidural abscess.   ·  Recommendation: S/P Decompression, spine, lumbar, posterior approach, with fusion of posterior spinal column.  On IV Abxs Ertapenem now  per ID x 6 weeks  .  Will defer management to  Neurosurgery.    < from: MR Lumbar Spine w/ IV Cont (04.23.25 @ 15:13) >  IMPRESSION:        1.   Cervical spine:   Moderate degenerative disc disease and   spondylosis at C4-5 through C6/7 with loss of disc height and associated   degenerative endplate changes.        2.   Thoracic spine:   Minimal enhancement within T7-8 which may   reflect early discitis.        3.   Lumbar spine:   Osteomyelitis and discitis at L1-2 with erosions   of the inferior L1 vertebral body and L2 vertebral body consistent with   osteomyelitis and discitis. Ventral epidural abscess is noted extending   from the L1-2 level superiorly to the T10 level with mass effect on the   ventral thecal sac, most prominently at the L1-2 level resulting in   marked compression. Multiple abscesses within the BILATERAL psoas   muscles, the largest measuring 4.3 cm on the RIGHT and 2.8 cm on the LEFT.     Problem/Recommendation - 2:  ·  Problem: Acute osteomyelitis of lumbar spine.   ·  Recommendation: L1 &L2 vertebrae .  On IV Abxs per ID .  Will defer management to Neurosurgery.     Problem/Recommendation - 3:  ·  Problem: Low back pain radiating to right lower extremity.   ·  Recommendation: Pain control.     Problem/Recommendation - 4:  ·  Problem: . BILATERAL psoas Abscess  ·  Recommendation: ON IV Abxs per ID .  IR placed drains after draining  .       Problem/Recommendation - 5:  ·  Problem: HTN (hypertension).   ·  Recommendation: Takes Amlodipine and ARB so continue with hold parameters.  < from: TTE W or WO Ultrasound Enhancing Agent (04.24.25 @ 10:10) >  CONCLUSIONS:      1. Left ventricular cavity is normal in size. Left ventricular wall thickness is normal. Left ventricular systolic function is normal with an ejection fraction of 64 % by Carpenter's method of disks. There are no regional wall motion abnormalities seen.   2. Normal right ventricular cavity size and normal right ventricular systolic function. Tricuspid annular plane systolic excursion (TAPSE) is 2.2 cm (normal >=1.7 cm).   3. Structurally normal mitral valve with normal leaflet excursion. There is calcification of the mitral valve annulus. There is tracemitral regurgitation.   4. The aortic valve appears trileaflet with normal systolic excursion. There is calcification of the aortic valve leaflets. There is mild aortic regurgitation.     Problem/Recommendation - 6:  ·  Problem: HLD (hyperlipidemia).   ·  Recommendation: Continue Atorvastatin.     Problem/Recommendation - 7:  ·  Problem: DM (diabetes mellitus).   ·  Recommendation: ON Farxiga and holding.  SSI and Lantus in patient .     Problem/Recommendation - 8:  ·  Problem:  Ileus /Pseudoobustraction.   ·  Recommendation: Had BM and not  passing flatus .  Surgery input appreciated .  Please check TSH   < from: CT Abdomen and Pelvis w/ Oral Cont and w/ IV Cont (04.29.25 @ 17:16) >  IMPRESSION:      Diffuse colonic dilatation, worse on today's study, likely secondary to   pseudoobstruction or ileus.    No free air.    Reduction in the bilateral psoas collections with catheters in place.    < from: Xray Abdomen 1 View PORTABLE -Urgent (Xray Abdomen 1 View PORTABLE -Urgent .) (04.29.25 @ 10:09) >  IMPRESSION:  Multiple dilated loops of small and large bowel with question of   pneumoperitoneum. Recommend upright chest radiograph or decubitus   abdominal radiograph for further evaluation.    Paged surgery team and waiting for response .      Problem/Recommendation - 9:  ·  Problem: JUSTINO with Hyponatremia .   ·  Recommendation: Trending BMP.    Rpt labs noted.   JUSTINO resolved .   Hyponatremia    Recommend renal in put.     D/W patient in detail . Will update family.

## 2025-04-30 NOTE — PROGRESS NOTE ADULT - ASSESSMENT
79M s/p T10-pelvis PSF w/ PRS closure 4/24    - pain control PRN  - dressing changes PRN, done today  - Monitor DHARA - both holding suction; drain care. DC'd one drain.  - appreciate care per primary    Erik Gómez MD   [facility] Surgery Resident PGY1    For any questions, please DO NOT reach out via Teams.    Plastic Surgery   [facility]: [id number]  [facility]: [phone]

## 2025-04-30 NOTE — CONSULT NOTE ADULT - ASSESSMENT
Mr. 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       #Colonic distension      s/p BM & flatus today      Recommendations:  - Aggressive bowel regimen: Miralax TID, Senna QHS, dulcolax suppository daily  - Track all stool output   - Serial abdominal examination   - Encouraged out of bed to chair for the majority of the day. Ambulation as able and tolerated. Frequent movements while in bed  - Replenish electrolytes to goal K>4, Mg>2  - NPO  - Avoid narcotics   - PT support  - IVF hydration     Bennett Kenney MD  Gastroenterology/Hepatology Fellow  Available via [employer] Teams  Pager: [phone]    On Weekdays after 5 PM to 8AM and Weekends/Holidays (All day)  For non-urgent consults, please email [email or web address] or [email or web address]  For urgent consults, please contact on call GI team.  See Amion schedule ([facility]), [employer] paging system - [phone] ([facility]), or call hospital  ([facility]) Mr. 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       #Colonic dilation  #Ileus  #S/p spinal surgery 4/24  GI is consulted for post-operative abdominal distension and radiographic findings of colonic dilation. XR Abdomen and CT demonstrate multiple dilated loops of small and large bowel without transition point or free air. Patient does not have nausea or vomiting. His abdomen is distended on exam without peritoneal signs. Bowel sounds are present. Today he had a successful semi-liquid/solid brown BM and flatus. Not clinically obstructed. Suspect symptoms due to post-operative ileus/colonic pseudoobstruction. Clinically stable.     Recommendations:  - Aggressive bowel regimen: Miralax TID, Senna QHS, dulcolax suppository daily  - Track all stool output   - Serial abdominal examination   - Encouraged out of bed to chair for the majority of the day. Ambulation as able and tolerated. Frequent movements while in bed  - Replenish electrolytes to goal K>4, Mg>2  - NPO for now. May advance to clears as patient begins to move his bowels more frequently   - Avoid narcotics   - PT support  - IVF hydration     Bennett Kenney MD  Gastroenterology/Hepatology Fellow  Available via [employer] Teams  Pager: [phone]    On Weekdays after 5 PM to 8AM and Weekends/Holidays (All day)  For non-urgent consults, please email [email or web address]  For urgent consults, please contact on call GI team.  See Amion schedule ([facility]), [employer] system - [phone] ([facility]), or call hospital  ([facility])

## 2025-04-30 NOTE — PROGRESS NOTE ADULT - SUBJECTIVE AND OBJECTIVE BOX
Plastic Surgery Progress Note (pg [facility]: [id number], [facility]: [phone])    SUBJECTIVE  The patient was seen and examined. No acute events overnight. Pain controlled, afebrile w/ stable vitals.     OBJECTIVE  ___________________________________________________  VITAL SIGNS / I&O's   Vital Signs Last 24 Hrs  T(C): 36.4 (30 Apr 2025 09:41), Max: 36.9 (29 Apr 2025 22:25)  T(F): 97.5 (30 Apr 2025 09:41), Max: 98.4 (29 Apr 2025 22:25)  HR: 104 (30 Apr 2025 09:41) (99 - 110)  BP: 123/73 (30 Apr 2025 09:41) (118/76 - 147/80)  BP(mean): --  RR: 18 (30 Apr 2025 09:41) (17 - 18)  SpO2: 100% (30 Apr 2025 09:41) (99% - 100%)    Parameters below as of 30 Apr 2025 09:41  Patient On (Oxygen Delivery Method): room air          29 Apr 2025 07:01  -  30 Apr 2025 07:00  --------------------------------------------------------  IN:    IV PiggyBack: 50 mL    Lactated Ringers: 1800 mL    Oral Fluid: 150 mL  Total IN: 2000 mL    OUT:    Bulb (mL): 5 mL    Bulb (mL): 120 mL    Bulb (mL): 10 mL    Voided (mL): 850 mL  Total OUT: 985 mL    Total NET: 1015 mL      30 Apr 2025 07:01  -  30 Apr 2025 09:57  --------------------------------------------------------  IN:  Total IN: 0 mL    OUT:    Bulb (mL): 5 mL    Bulb (mL): 5 mL    Bulb (mL): 20 mL    Voided (mL): 400 mL  Total OUT: 430 mL    Total NET: -430 mL        ___________________________________________________  PHYSICAL EXAM  NEURO: NAD,   HEENT: NC/AT  RESPIRATORY: nonlabored respirations, normal CW expansion  BACK: dressing changed, incisions well appearing. DHARA x1 SS output, IR drains murky serous drainage.  CARDIO: RRR, S1S2  ABDOMEN: soft, nontender, nondistended  EXTREMITIES: normal strength, no deformities    ___________________________________________________  LABS                        9.6    15.44 )-----------( 624      ( 30 Apr 2025 05:41 )             27.9     30 Apr 2025 05:41    128    |  98     |  24     ----------------------------<  100    3.3     |  16     |  1.14     Ca    8.7        30 Apr 2025 05:41  Phos  3.9       30 Apr 2025 05:41  Mg     2.40      30 Apr 2025 05:41        CAPILLARY BLOOD GLUCOSE      POCT Blood Glucose.: 105 mg/dL (30 Apr 2025 07:43)  POCT Blood Glucose.: 135 mg/dL (29 Apr 2025 22:50)  POCT Blood Glucose.: 112 mg/dL (29 Apr 2025 17:54)  POCT Blood Glucose.: 302 mg/dL (29 Apr 2025 11:28)        Urinalysis Basic - ( 30 Apr 2025 05:41 )    Color: x / Appearance: x / SG: x / pH: x  Gluc: 100 mg/dL / Ketone: x  / Bili: x / Urobili: x   Blood: x / Protein: x / Nitrite: x   Leuk Esterase: x / RBC: x / WBC x   Sq Epi: x / Non Sq Epi: x / Bacteria: x      ___________________________________________________  MICRO  Recent Cultures:  Specimen Source: Abscess, 04-26 @ 15:44; Results   Numerous Bacteroides fragilis "Susceptibilities not performed"[!]; Gram Stain:   Numerous polymorphonuclear leukocytes per low power field  Moderate Gram Negative Rods per oil power field[!]; Organism: --  Specimen Source: Abscess, 04-26 @ 15:40; Results   Numerous Bacteroides fragilis "Susceptibilities not performed"[!]; Gram Stain:   Numerous polymorphonuclear leukocytes per low power field  Few Gram Negative Rods per oil power field[!]; Organism: --  Specimen Source: Blood Blood, 04-25 @ 12:15; Results   No growth at 4 days; Gram Stain: --; Organism: --  Specimen Source: Blood Blood-Venous, 04-25 @ 12:00; Results   No growth at 4 days; Gram Stain: --; Organism: --  Specimen Source: Abscess #3 EPIDURAL ABSCESS, 04-24 @ 20:24; Results   Moderate Bacteroides fragilis "Susceptibilities not performed"[!]; Gram Stain:   Moderate polymorphonuclear leukocytes seen per low power field  Rare Gram Negative Rods seen per oil power field[!]; Organism: --  Specimen Source: Surgical Swab #5 L1 L2 DISC SPALE, 04-24 @ 19:46; Results   Numerous Bacteroides fragilis "Susceptibilities not performed"[!]; Gram Stain: --; Organism: --  Specimen Source: Abscess #2 EPIDURAL ABSCESS, 04-24 @ 18:39; Results   Moderate Bacteroides fragilis "Susceptibilities not performed"[!]; Gram Stain:   Moderate polymorphonuclear leukocytes seen per low power field  Few Gram Negative Rods seen per oil power field[!]; Organism: --  Specimen Source: Abscess #1 EPIDURAL ABSCESS, 04-24 @ 18:38; Results   Numerous Bacteroides fragilis "Susceptibilities not performed"[!]; Gram Stain:   Moderate polymorphonuclear leukocytes seen per low power field  Few Gram Negative Rods seen per oil power field[!]; Organism: --  Specimen Source: Blood Blood-Peripheral, 04-23 @ 11:45; Results   Growth in anaerobic bottle: Bacteroides fragilis "Susceptibilities not  performed"[!]; Gram Stain:   Growth in anaerobic bottle: Gram Negative Rods[!]; Organism: --  Specimen Source: Blood Blood-Peripheral, 04-23 @ 11:43; Results   Growth in anaerobic bottle: Bacteroides fragilis "Susceptibilities not  performed"  Direct identification is available within approximately 3-5  hours either by Blood Panel Multiplexed PCR or Direct  MALDI-TOF. Details: [email or web address][!]; Gram Stain:   Growth in anaerobic bottle: Gram Negative Rods[!]; Organism: Blood Culture PCR[!]    ___________________________________________________  MEDICATIONS  (STANDING):  acetaminophen     Tablet .. 975 milliGRAM(s) Oral every 6 hours  atorvastatin 40 milliGRAM(s) Oral at bedtime  ertapenem  IVPB      ertapenem  IVPB 1000 milliGRAM(s) IV Intermittent every 24 hours  gabapentin 100 milliGRAM(s) Oral every 8 hours  glucagon  Injectable 1 milliGRAM(s) IntraMuscular once  heparin   Injectable 5000 Unit(s) SubCutaneous every 8 hours  insulin lispro (ADMELOG) corrective regimen sliding scale   SubCutaneous every 6 hours  lactated ringers. 1000 milliLiter(s) (100 mL/Hr) IV Continuous <Continuous>  methocarbamol 500 milliGRAM(s) Oral every 12 hours  omega-3-Acid Ethyl Esters 2 Gram(s) Oral two times a day  pantoprazole    Tablet 40 milliGRAM(s) Oral before breakfast  senna 2 Tablet(s) Oral at bedtime    MEDICATIONS  (PRN):  bisacodyl 5 milliGRAM(s) Oral every 12 hours PRN Constipation  bisacodyl Suppository 10 milliGRAM(s) Rectal daily PRN Constipation  magnesium hydroxide Suspension 30 milliLiter(s) Oral every 12 hours PRN Constipation  simethicone 80 milliGRAM(s) Chew every 12 hours PRN Upset Stomach

## 2025-04-30 NOTE — PROGRESS NOTE ADULT - SUBJECTIVE AND OBJECTIVE BOX
DATE OF SERVICE: 04-30-25    c/o ongoing abdominal bloating, tolerating PO and reports BM and passing gas.   Review of symptoms otherwise negative.    MEDICATIONS:  acetaminophen     Tablet .. 975 milliGRAM(s) Oral every 6 hours  atorvastatin 40 milliGRAM(s) Oral at bedtime  bacitracin   Ointment 1 Application(s) Topical three times a day  bisacodyl 5 milliGRAM(s) Oral every 12 hours PRN  bisacodyl Suppository 10 milliGRAM(s) Rectal daily  bisacodyl Suppository 10 milliGRAM(s) Rectal daily PRN  bisacodyl Suppository 10 milliGRAM(s) Rectal once  ertapenem  IVPB 1000 milliGRAM(s) IV Intermittent every 24 hours  gabapentin 100 milliGRAM(s) Oral every 8 hours  glucagon  Injectable 1 milliGRAM(s) IntraMuscular once  heparin   Injectable 5000 Unit(s) SubCutaneous every 8 hours  insulin lispro (ADMELOG) corrective regimen sliding scale   SubCutaneous every 6 hours  lactated ringers. 1000 milliLiter(s) IV Continuous <Continuous>  magnesium hydroxide Suspension 30 milliLiter(s) Oral every 12 hours PRN  methocarbamol 500 milliGRAM(s) Oral every 12 hours  omega-3-Acid Ethyl Esters 2 Gram(s) Oral two times a day  pantoprazole    Tablet 40 milliGRAM(s) Oral before breakfast  polyethylene glycol 3350 17 Gram(s) Oral <User Schedule>  senna 2 Tablet(s) Oral at bedtime  simethicone 80 milliGRAM(s) Chew every 12 hours PRN                            9.6    15.44 )-----------( 624      ( 30 Apr 2025 05:41 )             27.9     128[L]  |  98  |  24[H]  ----------------------------<  100[H]  3.3[L]   |  16[L]  |  1.14    Ca    8.7      30 Apr 2025 05:41  Phos  3.9     04-30  Mg     2.40     04-30    Creatinine Trend: 1.14<--, 1.36<--, 1.21<--, 1.09<--, 0.96<--, 0.74<--      T(C): 36.7 (04-30-25 @ 13:46), Max: 36.9 (04-29-25 @ 22:25)  HR: 106 (04-30-25 @ 13:46) (99 - 106)  BP: 133/75 (04-30-25 @ 13:46) (118/76 - 147/68)  RR: 17 (04-30-25 @ 13:46) (17 - 18)  SpO2: 100% (04-30-25 @ 13:46) (99% - 100%)  Wt(kg): --    I&O's Summary    29 Apr 2025 07:01  -  30 Apr 2025 07:00  --------------------------------------------------------  IN: 2000 mL / OUT: 985 mL / NET: 1015 mL    30 Apr 2025 07:01  -  30 Apr 2025 13:47  --------------------------------------------------------  IN: 0 mL / OUT: 430 mL / NET: -430 mL    Gen: NAD  HEENT:  (-)icterus (-)pallor  CV: N S1 S2 1/6 DARYA (+)2 Pulses B/l  Resp:  Clear to auscultation B/L, normal effort  GI: (+) BS Soft, NT, ND  Lymph:  (-)Edema, (-)obvious lymphadenopathy  Skin: Warm to touch, Normal turgor  Psych: Appropriate mood and affect      TELEMETRY: 	  NSR    ECG:  	NSR    < from: Xray Abdomen 1 View PORTABLE -Urgent (Xray Abdomen 1 View PORTABLE -Urgent .) (04.29.25 @ 10:09) >  IMPRESSION:  Multiple dilated loops of small and large bowel with question of   pneumoperitoneum. Recommend upright chest radiograph or decubitus   abdominal radiograph for further evaluation.    < end of copied text >    < from: CT Abdomen and Pelvis w/ Oral Cont and w/ IV Cont (04.29.25 @ 17:16) >  IMPRESSION:      Diffuse colonic dilatation, worse on today's study, likely secondary to   pseudoobstruction or ileus.    No free air.    Reduction in the bilateral psoas collections with catheters in place.    PRELIMINARY IMPRESSION: Study limited secondary to extensive streak   artifact from patient's spinal hardware. Diffusely dilated colon to the   level of the rectum, increased since 4/25/2025, without evidence of   mechanical obstruction. Possible etiologies include pseudoobstruction   versus ileus. No small bowel obstruction. No pneumoperitoneum.   Percutaneous posterior approach drainage catheters within the psoas   muscles bilaterally with interval decrease of previously seen abscess.   Redemonstrated erosive endplate changes at L1-L2 compatible with discitis   osteomyelitis.    Follow-up final report in the a.m.    < end of copied text >      ASSESSMENT/PLAN: 	Pt is a  79 year old male w/ HTN, HLD, T2DM, chronic low back pain presents as a transfer from [facility] ED for surgery. . Pt was sent into ED yesterday by his pain medicine physician due to recent MRI findings concerning for L1-L2 discitis/OM with epidural abscess, bilateral psoas abscesses. Patient initially saw pain specialist in February for chronic back pain with radiations to bilateral lateral thighs (R>L). MRI at that time showed degenerative changes. He subsequently had epidural injections x2 (2/27, 3/11) with moderate pain relief. Pain began to worsen on 4/10. He had radiofrequency ablation performed on 4/11 and has since has severe worsening of pain and radiation to R lateral thigh. He reports recent bowel/bladder "incontinence" requiring diaper due to his inability to get to the bathroom in time due to pain limitations but states urge and sensation are intact. Denies fevers, chills, night sweats, weakness, numbness/tingling, saddle anesthesia, recent falls/trauma. He uses a cane for ambulation. Denies recent falls/trauma or any other ortho injuries.   Found to have epidural abscess now s/p Decompression, spine, lumbar, posterior approach, with fusion of posterior spinal column, planned for psoas abscess drainage.    - s/p Decompression, spine, lumbar, posterior approach, with fusion of posterior spinal column  - s/p psoas drainage   - pain control  - c/w statin

## 2025-04-30 NOTE — PROGRESS NOTE ADULT - SUBJECTIVE AND OBJECTIVE BOX
Date of Service  : 04-30-25    INTERVAL HPI/OVERNIGHT EVENTS: I feel better as had BM .  Vital Signs Last 24 Hrs  T(C): 36.6 (30 Apr 2025 17:45), Max: 36.9 (29 Apr 2025 22:25)  T(F): 97.9 (30 Apr 2025 17:45), Max: 98.4 (29 Apr 2025 22:25)  HR: 104 (30 Apr 2025 17:45) (100 - 106)  BP: 128/71 (30 Apr 2025 17:45) (123/73 - 147/68)  BP(mean): --  RR: 18 (30 Apr 2025 17:45) (17 - 18)  SpO2: 100% (30 Apr 2025 17:45) (99% - 100%)    Parameters below as of 30 Apr 2025 17:45  Patient On (Oxygen Delivery Method): room air      I&O's Summary    29 Apr 2025 07:01  -  30 Apr 2025 07:00  --------------------------------------------------------  IN: 2000 mL / OUT: 985 mL / NET: 1015 mL    30 Apr 2025 07:01  -  30 Apr 2025 18:23  --------------------------------------------------------  IN: 0 mL / OUT: 902 mL / NET: -902 mL      MEDICATIONS  (STANDING):  acetaminophen     Tablet .. 975 milliGRAM(s) Oral every 6 hours  atorvastatin 40 milliGRAM(s) Oral at bedtime  bacitracin   Ointment 1 Application(s) Topical three times a day  bisacodyl Suppository 10 milliGRAM(s) Rectal daily  ertapenem  IVPB      ertapenem  IVPB 1000 milliGRAM(s) IV Intermittent every 24 hours  gabapentin 100 milliGRAM(s) Oral every 8 hours  glucagon  Injectable 1 milliGRAM(s) IntraMuscular once  heparin   Injectable 5000 Unit(s) SubCutaneous every 8 hours  insulin lispro (ADMELOG) corrective regimen sliding scale   SubCutaneous every 6 hours  lactated ringers. 1000 milliLiter(s) (100 mL/Hr) IV Continuous <Continuous>  methocarbamol 500 milliGRAM(s) Oral every 12 hours  omega-3-Acid Ethyl Esters 2 Gram(s) Oral two times a day  pantoprazole    Tablet 40 milliGRAM(s) Oral before breakfast  polyethylene glycol 3350 17 Gram(s) Oral <User Schedule>  senna 2 Tablet(s) Oral at bedtime    MEDICATIONS  (PRN):  magnesium hydroxide Suspension 30 milliLiter(s) Oral every 12 hours PRN Constipation  simethicone 80 milliGRAM(s) Chew every 12 hours PRN Upset Stomach    LABS:                        9.6    15.44 )-----------( 624      ( 30 Apr 2025 05:41 )             27.9     04-30    128[L]  |  98  |  24[H]  ----------------------------<  100[H]  3.3[L]   |  16[L]  |  1.14    Ca    8.7      30 Apr 2025 05:41  Phos  3.9     04-30  Mg     2.40     04-30        Urinalysis Basic - ( 30 Apr 2025 05:41 )    Color: x / Appearance: x / SG: x / pH: x  Gluc: 100 mg/dL / Ketone: x  / Bili: x / Urobili: x   Blood: x / Protein: x / Nitrite: x   Leuk Esterase: x / RBC: x / WBC x   Sq Epi: x / Non Sq Epi: x / Bacteria: x      CAPILLARY BLOOD GLUCOSE      POCT Blood Glucose.: 109 mg/dL (30 Apr 2025 16:25)  POCT Blood Glucose.: 127 mg/dL (30 Apr 2025 11:19)  POCT Blood Glucose.: 105 mg/dL (30 Apr 2025 07:43)  POCT Blood Glucose.: 135 mg/dL (29 Apr 2025 22:50)        Urinalysis Basic - ( 30 Apr 2025 05:41 )    Color: x / Appearance: x / SG: x / pH: x  Gluc: 100 mg/dL / Ketone: x  / Bili: x / Urobili: x   Blood: x / Protein: x / Nitrite: x   Leuk Esterase: x / RBC: x / WBC x   Sq Epi: x / Non Sq Epi: x / Bacteria: x      REVIEW OF SYSTEMS:  CONSTITUTIONAL: No fever, weight loss, or fatigue  EYES: No eye pain, visual disturbances, or discharge  ENMT:  No difficulty hearing, tinnitus, vertigo; No sinus or throat pain  NECK: No pain or stiffness  RESPIRATORY: No cough, wheezing, chills or hemoptysis; No shortness of breath  CARDIOVASCULAR: No chest pain, palpitations, dizziness, or leg swelling  GASTROINTESTINAL: No abdominal or epigastric pain. No nausea, vomiting, or hematemesis; No diarrhea or constipation. No melena or hematochezia.  GENITOURINARY: No dysuria, frequency, hematuria, or incontinence  NEUROLOGICAL: No headaches, memory loss, loss of strength, numbness, or tremors      RADIOLOGY & ADDITIONAL TESTS:    Consultant(s) Notes Reviewed:  [x ] YES  [ ] NO    PHYSICAL EXAM:  GENERAL: NAD, well-groomed, well-developed,not in any distress ,  HEAD:  Atraumatic, Normocephalic  EYES: EOMI, PERRLA, conjunctiva and sclera clear  ENMT: No tonsillar erythema, exudates, or enlargement; Moist mucous membranes, Good dentition, No lesions  NECK: Supple, No JVD, Normal thyroid  NERVOUS SYSTEM:  Alert & Oriented X3, No focal deficit   CHEST/LUNG: Good air entry bilateral with no  rales, rhonchi, wheezing, or rubs  HEART: Regular rate and rhythm; No murmurs, rubs, or gallops  ABDOMEN: Soft, Nontender, +distended; Bowel sounds present  EXTREMITIES:  2+ Peripheral Pulses, No clubbing, cyanosis, or edema    Care Discussed with Consultants/Other Providers [ x] YES  [ ] NO

## 2025-04-30 NOTE — PROGRESS NOTE ADULT - SUBJECTIVE AND OBJECTIVE BOX
Patient is a 79y old  Male who presents with a chief complaint of L1-L2 discitis/OM with epidural abscess and BL psoas abscesses (30 Apr 2025 09:56)    Being followed by ID for OM    Interval history:  Abd distended,   bowel sound +   Reports passing gas   as per nurse, had a bowel movement yesterday       ROS:  No cough,SOB,CP  No urinary complaints  No HA      Antimicrobials:  ertapenem  IVPB      ertapenem  IVPB 1000 milliGRAM(s) IV Intermittent every 24 hours      Vital Signs Last 24 Hrs  T(C): 36.4 (04-30-25 @ 09:41), Max: 36.9 (04-29-25 @ 22:25)  T(F): 97.5 (04-30-25 @ 09:41), Max: 98.4 (04-29-25 @ 22:25)  HR: 104 (04-30-25 @ 09:41) (99 - 105)  BP: 123/73 (04-30-25 @ 09:41) (118/76 - 147/68)  BP(mean): --  RR: 18 (04-30-25 @ 09:41) (17 - 18)  SpO2: 100% (04-30-25 @ 09:41) (99% - 100%)    PHYSICAL EXAM:  Constitutional: non-toxic, no distress  HEAD/EYES: anicteric, no conjunctival injection  ENT:  supple, no thrush  Cardiovascular:   normal S1, S2, no murmur, no edema  Respiratory:  clear BS bilaterally, no wheezes, no rales  GI:  distended and TTP  Musculoskeletal:  no synovitis, normal ROM  Neurologic: awake and alert, normal strength, no focal findings  Skin:  no rash, no erythema, no phlebitis  Back: drains +     Lab Data:                        9.6    15.44 )-----------( 624      ( 30 Apr 2025 05:41 )             27.9     04-30    128[L]  |  98  |  24[H]  ----------------------------<  100[H]  3.3[L]   |  16[L]  |  1.14    Ca    8.7      30 Apr 2025 05:41  Phos  3.9     04-30  Mg     2.40     04-30        Abscess  04-26-25   Numerous Bacteroides fragilis "Susceptibilities not performed"  --    Numerous polymorphonuclear leukocytes per low power field  Moderate Gram Negative Rods per oil power field      Abscess  04-26-25   Numerous Bacteroides fragilis "Susceptibilities not performed"  --    Numerous polymorphonuclear leukocytes per low power field  Few Gram Negative Rods per oil power field      Blood Blood  04-25-25   No growth at 4 days  --  --      Blood Blood-Venous  04-25-25   No growth at 4 days  --  --      Abscess #3 EPIDURAL ABSCESS  04-24-25   Moderate Bacteroides fragilis "Susceptibilities not performed"  --    Moderate polymorphonuclear leukocytes seen per low power field  Rare Gram Negative Rods seen per oil power field      Surgical Swab #5 L1 L2 DISC SPALE  04-24-25   Numerous Bacteroides fragilis "Susceptibilities not performed"  --  --      Abscess #2 EPIDURAL ABSCESS  04-24-25   Moderate Bacteroides fragilis "Susceptibilities not performed"  --    Moderate polymorphonuclear leukocytes seen per low power field  Few Gram Negative Rods seen per oil power field      Abscess #1 EPIDURAL ABSCESS  04-24-25   Numerous Bacteroides fragilis "Susceptibilities not performed"  --    Moderate polymorphonuclear leukocytes seen per low power field  Few Gram Negative Rods seen per oil power field                  RADIOLOGY:  below reviewed    RADIOLOGY:  imaging below personally reviewed and agree with findings    < from: MR Thoracic Spine w/ IV Cont (04.23.25 @ 15:14) >  ACC: [id number] EXAM:  MR SPINE CERVICAL IC   ORDERED BY: ZANE REYNOSO     ACC: [id number] EXAM:  MR SPINE THORACIC IC   ORDERED BY: ZANE REYNOSO     ACC: [id number] EXAM:  MR SPINE LUMBAR IC   ORDERED BY: ZANE REYNOSO     PROCEDURE DATE:04/23/2025          INTERPRETATION:  Three examinations were performed:  1. MR cervical spine with gadolinium  2. MR thoracic spine with gadolinium  3. MR lumbar spine with gadolinium      CLINICAL INFORMATION (all three examinations): Epidural access    TECHNIQUE (all three examinations): Post gadolinium fat saturated   sagittal and axial T1-weighted images were obtained following intravenous   administration of 6 cc of Gadavist/1.5 cc discarded.    FINDINGS:   CT abdomen and pelvis dated 04/23/2025 available for review.      1.  Cervical spine:    Cervical vertebral alignment is intact.  Cervical vertebral body heights   are maintained.  Marrow signal intensity within cervical vertebral bodies   and posterior elements is significant for a 1.5 cm enhancing trabeculated   lesion within T7 likely a hemangioma..  No osseous expansion, epidural   disease or paraspinal abnormalities found.    Cervical intervertebral discs show moderate degenerative disc disease and   spondylosis at C4-5 through C6/7 with loss of disc height and associated   degenerative endplate changes.    The cervical cord maintains intact morphology.   No cord signal intensity   change is seen.  No abnormal enhancement occurs within the cervical canal.      2.  Thoracic spine:    Thoracic vertebral alignment is preserved.  Thoracic vertebral body   heights are maintained.  Marrow signal intensity within thoracic   vertebral bodies and posterior elements is unremarkable.  There is no   abnormal vertebral or paraspinal enhancement.  No osseous expansion,   epidural disease or paraspinal abnormality is found.    Thoracic intervertebral discs demonstrates minimal enhancement within   T7-8 which may reflect early discitis. No central canal or foraminal   compromise is recognized.    The thoracic cord maintains intact morphology.   No cord signal intensity   change is seen.  No abnormal enhancement occurs within the thoracic canal.      3.  Lumbar spine:    Lumbar vertebral alignment is preserved.  Lumbar vertebral body heights   are maintained.  Marrow signal intensity within lumbar vertebral bodies   and posterior elements is significant for osteomyelitis and discitis at   L1-2 with erosions of the inferior L1 vertebral body and L2 vertebral   body consistent with osteomyelitis and discitis. Ventral epidural abscess   is noted extending from the L1-2 level superiorly to the T10 level with   mass effect on the ventral thecal sac most prominently at the L1-2 level   resulting in marked compression. Multiple abscesses within the BILATERAL   psoas muscles, the largest measuring 4.3 cm on the RIGHT and 2.8 cm on   the LEFT.      The distal cord maintains intact morphology and signal intensity.  The   conus is normally positioned at T12.  Nerve roots of the cauda equina   demonstrate no enhancement.  IMPRESSION:        1.   Cervical spine:   Moderate degenerative disc disease and   spondylosis at C4-5 through C6/7 with loss of disc height and associated   degenerative endplate changes.        2.   Thoracic spine:   Minimal enhancement within T7-8 which may   reflect early discitis.        3.   Lumbar spine:   Osteomyelitis and discitis at L1-2 with erosions   of the inferior L1 vertebral body and L2 vertebral body consistent with   osteomyelitis and discitis. Ventral epidural abscess is noted extending   from the L1-2 level superiorly to the T10 level with mass effect on the   ventral thecal sac, most prominently at the L1-2 level resulting in   marked compression. Multiple abscesses within the BILATERAL psoas   muscles, the largest measuring 4.3 cm on the RIGHT and 2.8 cm on the LEFT.      --- End of Report ---      < from: CT Abdomen and Pelvis w/ IV Cont (04.23.25 @ 13:24) >  INTERPRETATION:  CLINICAL INFORMATION: Psoas abscess.    COMPARISON: 6/8/2018 MR and 6/5/2018 CT        CONTRAST/COMPLICATIONS:  IV Contrast: Omnipaque 350  90 cc administered   10 cc discarded  Oral Contrast: NONE  .    PROCEDURE:  CT of the Abdomen and Pelvis was performed.  Sagittal and coronal reformats were performed.    FINDINGS:  LOWER CHEST: Within normal limits.    LIVER: Within normal limits.  BILE DUCTS: Normal caliber.  GALLBLADDER: Stable. Under distended gallbladder with wall   calcification/calculi. No pericholecystic inflammatory changes or fluid.  SPLEEN: Within normal limits.  PANCREAS: Within normal limits.  ADRENALS: Within normal limits.  KIDNEYS/URETERS: No renal stones or hydronephrosis. Renal cysts and   subcentimeter hypodense foci is are noted.    BLADDER: Within normal limits.  REPRODUCTIVE ORGANS: Prostate is enlarged.    BOWEL: No bowelobstruction. Appendix is normal. Small hiatal hernia.  PERITONEUM/RETROPERITONEUM: No free fluid. Multilobulated ill-defined   heterogeneous low-density collection/lesion noted in the right psoas   muscle measuring 6.1 x 4.9 x 13.2 cm with adjacent wall enhancement and   incompletely septations. Additional heterogeneous predominantly   low-density lesion noted in the left psoas muscle measuring 8.4 x 4.7 x   9.1 cm. These 2 collections are contiguous with the perivertebral   collection at L1/L2.  VESSELS: Mild atherosclerotic calcification.  LYMPH NODES: No lymphadenopathy.  ABDOMINAL WALL: Within normal limits.  BONES: Degenerative changes. Loss of L1 inferior and L2 superior endplate   with irregularity of the disc space and a perivertebralcollection   measuring approximately 2.7 x 6.3 x 3.6 cm. Retropulsion into the spinal   canal noted with enhancing collection.    IMPRESSION:  *  L1/L2 discitis/osteomyelitis with paravertebral collection both   ventrally and dorsally into the spinalcanal.  *  Bilateral heterogeneous complex psoas collection which are contiguous   with the perivertebral collection at the L1/L2.  *  Stable cholelithiasis/calcified gallbladder wall.        --- End of Report ---

## 2025-04-30 NOTE — PROGRESS NOTE ADULT - SUBJECTIVE AND OBJECTIVE BOX
Date of Service  : 04-30-25     INTERVAL HPI/OVERNIGHT EVENTS: I feel slightly better. Had BM yesterday .  Vital Signs Last 24 Hrs  T(C): 36.5 (30 Apr 2025 06:00), Max: 36.9 (29 Apr 2025 22:25)  T(F): 97.7 (30 Apr 2025 06:00), Max: 98.4 (29 Apr 2025 22:25)  HR: 100 (30 Apr 2025 06:00) (99 - 110)  BP: 147/68 (30 Apr 2025 06:00) (118/76 - 147/80)  BP(mean): --  RR: 17 (30 Apr 2025 06:00) (17 - 18)  SpO2: 99% (30 Apr 2025 06:00) (99% - 100%)    Parameters below as of 30 Apr 2025 06:00  Patient On (Oxygen Delivery Method): room air      I&O's Summary    29 Apr 2025 07:01  -  30 Apr 2025 07:00  --------------------------------------------------------  IN: 2000 mL / OUT: 985 mL / NET: 1015 mL      MEDICATIONS  (STANDING):  acetaminophen     Tablet .. 975 milliGRAM(s) Oral every 6 hours  atorvastatin 40 milliGRAM(s) Oral at bedtime  ertapenem  IVPB      ertapenem  IVPB 1000 milliGRAM(s) IV Intermittent every 24 hours  gabapentin 100 milliGRAM(s) Oral every 8 hours  glucagon  Injectable 1 milliGRAM(s) IntraMuscular once  heparin   Injectable 5000 Unit(s) SubCutaneous every 8 hours  insulin lispro (ADMELOG) corrective regimen sliding scale   SubCutaneous every 6 hours  lactated ringers. 1000 milliLiter(s) (100 mL/Hr) IV Continuous <Continuous>  methocarbamol 500 milliGRAM(s) Oral every 12 hours  omega-3-Acid Ethyl Esters 2 Gram(s) Oral two times a day  pantoprazole    Tablet 40 milliGRAM(s) Oral before breakfast  senna 2 Tablet(s) Oral at bedtime    MEDICATIONS  (PRN):  bisacodyl 5 milliGRAM(s) Oral every 12 hours PRN Constipation  bisacodyl Suppository 10 milliGRAM(s) Rectal daily PRN Constipation  magnesium hydroxide Suspension 30 milliLiter(s) Oral every 12 hours PRN Constipation  simethicone 80 milliGRAM(s) Chew every 12 hours PRN Upset Stomach    LABS:                        9.6    15.44 )-----------( 624      ( 30 Apr 2025 05:41 )             27.9     04-30    128[L]  |  98  |  24[H]  ----------------------------<  100[H]  3.3[L]   |  16[L]  |  1.14    Ca    8.7      30 Apr 2025 05:41  Phos  3.9     04-30  Mg     2.40     04-30        Urinalysis Basic - ( 30 Apr 2025 05:41 )    Color: x / Appearance: x / SG: x / pH: x  Gluc: 100 mg/dL / Ketone: x  / Bili: x / Urobili: x   Blood: x / Protein: x / Nitrite: x   Leuk Esterase: x / RBC: x / WBC x   Sq Epi: x / Non Sq Epi: x / Bacteria: x      CAPILLARY BLOOD GLUCOSE      POCT Blood Glucose.: 105 mg/dL (30 Apr 2025 07:43)  POCT Blood Glucose.: 135 mg/dL (29 Apr 2025 22:50)  POCT Blood Glucose.: 112 mg/dL (29 Apr 2025 17:54)  POCT Blood Glucose.: 302 mg/dL (29 Apr 2025 11:28)        Urinalysis Basic - ( 30 Apr 2025 05:41 )    Color: x / Appearance: x / SG: x / pH: x  Gluc: 100 mg/dL / Ketone: x  / Bili: x / Urobili: x   Blood: x / Protein: x / Nitrite: x   Leuk Esterase: x / RBC: x / WBC x   Sq Epi: x / Non Sq Epi: x / Bacteria: x      REVIEW OF SYSTEMS:  CONSTITUTIONAL: No fever, weight loss, or fatigue  EYES: No eye pain, visual disturbances, or discharge  ENMT:  No difficulty hearing, tinnitus, vertigo; No sinus or throat pain  NECK: No pain or stiffness  RESPIRATORY: No cough, wheezing, chills or hemoptysis; No shortness of breath  CARDIOVASCULAR: No chest pain, palpitations, dizziness, or leg swelling  GASTROINTESTINAL: No abdominal or epigastric pain. No nausea, vomiting, or hematemesis; No diarrhea or constipation. No melena or hematochezia.  GENITOURINARY: No dysuria, frequency, hematuria, or incontinence  NEUROLOGICAL: No headaches, memory loss, loss of strength, numbness, or tremors      RADIOLOGY & ADDITIONAL TESTS:    Consultant(s) Notes Reviewed:  [x ] YES  [ ] NO    PHYSICAL EXAM:  GENERAL: NAD, well-groomed, well-developed,not in any distress ,  HEAD:  Atraumatic, Normocephalic  EYES: EOMI, PERRLA, conjunctiva and sclera clear  ENMT: No tonsillar erythema, exudates, or enlargement; Moist mucous membranes, Good dentition, No lesions  NECK: Supple, No JVD, Normal thyroid  NERVOUS SYSTEM:  Alert & Oriented X3, Drains +  CHEST/LUNG: Good air entry bilateral with no  rales, rhonchi, wheezing, or rubs  HEART: Regular rate and rhythm; No murmurs, rubs, or gallops  ABDOMEN: Soft, Nontender, ++distended; Bowel sounds present  EXTREMITIES:  2+ Peripheral Pulses, No clubbing, cyanosis, or edema    Care Discussed with Consultants/Other Providers [ x] YES  [ ] NO

## 2025-04-30 NOTE — CONSULT NOTE ADULT - ATTENDING COMMENTS
79M s/p spinal surgery on 4/24.   Post op course c/b abdominal distention and decrease in bowel frequency.   Tolerating bites of PO.   this am, ++flatus and good bm  CT with both small and large bowel distention  on exam, +distended but nontender  labs reviewed, k 3.3 today  Impression is that of ileus and pseudo-obstruction  Agree with fellow assessment/plan  - K>4, mg>2  - patient to walk/move as able  - TID Miralax and monitor bm

## 2025-05-01 LAB
ANION GAP SERPL CALC-SCNC: 12 MMOL/L — SIGNIFICANT CHANGE UP (ref 7–14)
BASOPHILS # BLD AUTO: 0.11 K/UL — SIGNIFICANT CHANGE UP (ref 0–0.2)
BASOPHILS NFR BLD AUTO: 0.7 % — SIGNIFICANT CHANGE UP (ref 0–2)
BUN SERPL-MCNC: 22 MG/DL — SIGNIFICANT CHANGE UP (ref 7–23)
CALCIUM SERPL-MCNC: 8.4 MG/DL — SIGNIFICANT CHANGE UP (ref 8.4–10.5)
CHLORIDE SERPL-SCNC: 102 MMOL/L — SIGNIFICANT CHANGE UP (ref 98–107)
CO2 SERPL-SCNC: 18 MMOL/L — LOW (ref 22–31)
CREAT SERPL-MCNC: 1.04 MG/DL — SIGNIFICANT CHANGE UP (ref 0.5–1.3)
EGFR: 73 ML/MIN/1.73M2 — SIGNIFICANT CHANGE UP
EGFR: 73 ML/MIN/1.73M2 — SIGNIFICANT CHANGE UP
EOSINOPHIL # BLD AUTO: 0.17 K/UL — SIGNIFICANT CHANGE UP (ref 0–0.5)
EOSINOPHIL NFR BLD AUTO: 1.1 % — SIGNIFICANT CHANGE UP (ref 0–6)
GLUCOSE BLDC GLUCOMTR-MCNC: 102 MG/DL — HIGH (ref 70–99)
GLUCOSE BLDC GLUCOMTR-MCNC: 130 MG/DL — HIGH (ref 70–99)
GLUCOSE BLDC GLUCOMTR-MCNC: 139 MG/DL — HIGH (ref 70–99)
GLUCOSE BLDC GLUCOMTR-MCNC: 72 MG/DL — SIGNIFICANT CHANGE UP (ref 70–99)
GLUCOSE SERPL-MCNC: 61 MG/DL — LOW (ref 70–99)
HCT VFR BLD CALC: 26.3 % — LOW (ref 39–50)
HGB BLD-MCNC: 8.9 G/DL — LOW (ref 13–17)
IANC: 9.86 K/UL — HIGH (ref 1.8–7.4)
IMM GRANULOCYTES NFR BLD AUTO: 8.4 % — HIGH (ref 0–0.9)
LYMPHOCYTES # BLD AUTO: 19.3 % — SIGNIFICANT CHANGE UP (ref 13–44)
LYMPHOCYTES # BLD AUTO: 2.88 K/UL — SIGNIFICANT CHANGE UP (ref 1–3.3)
MAGNESIUM SERPL-MCNC: 2.4 MG/DL — SIGNIFICANT CHANGE UP (ref 1.6–2.6)
MCHC RBC-ENTMCNC: 29.6 PG — SIGNIFICANT CHANGE UP (ref 27–34)
MCHC RBC-ENTMCNC: 33.8 G/DL — SIGNIFICANT CHANGE UP (ref 32–36)
MCV RBC AUTO: 87.4 FL — SIGNIFICANT CHANGE UP (ref 80–100)
MONOCYTES # BLD AUTO: 0.65 K/UL — SIGNIFICANT CHANGE UP (ref 0–0.9)
MONOCYTES NFR BLD AUTO: 4.4 % — SIGNIFICANT CHANGE UP (ref 2–14)
NEUTROPHILS # BLD AUTO: 9.86 K/UL — HIGH (ref 1.8–7.4)
NEUTROPHILS NFR BLD AUTO: 66.1 % — SIGNIFICANT CHANGE UP (ref 43–77)
NRBC # BLD AUTO: 0 K/UL — SIGNIFICANT CHANGE UP (ref 0–0)
NRBC # FLD: 0 K/UL — SIGNIFICANT CHANGE UP (ref 0–0)
NRBC BLD AUTO-RTO: 0 /100 WBCS — SIGNIFICANT CHANGE UP (ref 0–0)
OSMOLALITY SERPL: 279 MOSM/KG — SIGNIFICANT CHANGE UP (ref 275–295)
PHOSPHATE SERPL-MCNC: 3.6 MG/DL — SIGNIFICANT CHANGE UP (ref 2.5–4.5)
PLATELET # BLD AUTO: 645 K/UL — HIGH (ref 150–400)
POTASSIUM SERPL-MCNC: 3.2 MMOL/L — LOW (ref 3.5–5.3)
POTASSIUM SERPL-SCNC: 3.2 MMOL/L — LOW (ref 3.5–5.3)
RBC # BLD: 3.01 M/UL — LOW (ref 4.2–5.8)
RBC # FLD: 15.2 % — HIGH (ref 10.3–14.5)
SODIUM SERPL-SCNC: 132 MMOL/L — LOW (ref 135–145)
TSH SERPL-MCNC: 2.97 UIU/ML — SIGNIFICANT CHANGE UP (ref 0.27–4.2)
URATE SERPL-MCNC: 4.9 MG/DL — SIGNIFICANT CHANGE UP (ref 3.4–8.8)
WBC # BLD: 14.93 K/UL — HIGH (ref 3.8–10.5)
WBC # FLD AUTO: 14.93 K/UL — HIGH (ref 3.8–10.5)

## 2025-05-01 PROCEDURE — G0545: CPT

## 2025-05-01 PROCEDURE — 74018 RADEX ABDOMEN 1 VIEW: CPT | Mod: 26

## 2025-05-01 PROCEDURE — 99232 SBSQ HOSP IP/OBS MODERATE 35: CPT | Mod: GC

## 2025-05-01 PROCEDURE — 99232 SBSQ HOSP IP/OBS MODERATE 35: CPT

## 2025-05-01 RX ORDER — DIBUCAINE 10 MG/G
1 OINTMENT TOPICAL DAILY
Refills: 0 | Status: DISCONTINUED | OUTPATIENT
Start: 2025-05-01 | End: 2025-05-07

## 2025-05-01 RX ORDER — INSULIN LISPRO 100 U/ML
INJECTION, SOLUTION INTRAVENOUS; SUBCUTANEOUS AT BEDTIME
Refills: 0 | Status: DISCONTINUED | OUTPATIENT
Start: 2025-05-01 | End: 2025-05-16

## 2025-05-01 RX ORDER — INSULIN LISPRO 100 U/ML
INJECTION, SOLUTION INTRAVENOUS; SUBCUTANEOUS
Refills: 0 | Status: DISCONTINUED | OUTPATIENT
Start: 2025-05-01 | End: 2025-05-16

## 2025-05-01 RX ADMIN — METHOCARBAMOL 500 MILLIGRAM(S): 500 TABLET, FILM COATED ORAL at 23:57

## 2025-05-01 RX ADMIN — Medication 975 MILLIGRAM(S): at 00:20

## 2025-05-01 RX ADMIN — Medication 975 MILLIGRAM(S): at 13:00

## 2025-05-01 RX ADMIN — Medication 1 APPLICATION(S): at 06:04

## 2025-05-01 RX ADMIN — ERTAPENEM SODIUM 100 MILLIGRAM(S): 1 INJECTION, POWDER, LYOPHILIZED, FOR SOLUTION INTRAMUSCULAR; INTRAVENOUS at 13:40

## 2025-05-01 RX ADMIN — Medication 1 APPLICATION(S): at 13:40

## 2025-05-01 RX ADMIN — Medication 40 MILLIGRAM(S): at 06:03

## 2025-05-01 RX ADMIN — HEPARIN SODIUM 5000 UNIT(S): 1000 INJECTION INTRAVENOUS; SUBCUTANEOUS at 21:23

## 2025-05-01 RX ADMIN — GABAPENTIN 100 MILLIGRAM(S): 400 CAPSULE ORAL at 13:42

## 2025-05-01 RX ADMIN — Medication 975 MILLIGRAM(S): at 17:10

## 2025-05-01 RX ADMIN — GABAPENTIN 100 MILLIGRAM(S): 400 CAPSULE ORAL at 06:03

## 2025-05-01 RX ADMIN — DIBUCAINE 1 APPLICATION(S): 10 OINTMENT TOPICAL at 09:40

## 2025-05-01 RX ADMIN — HEPARIN SODIUM 5000 UNIT(S): 1000 INJECTION INTRAVENOUS; SUBCUTANEOUS at 13:40

## 2025-05-01 RX ADMIN — POLYETHYLENE GLYCOL 3350 17 GRAM(S): 17 POWDER, FOR SOLUTION ORAL at 21:22

## 2025-05-01 RX ADMIN — Medication 975 MILLIGRAM(S): at 07:02

## 2025-05-01 RX ADMIN — OMEGA-3-ACID ETHYL ESTERS CAPSULES 2 GRAM(S): 1 CAPSULE, LIQUID FILLED ORAL at 17:09

## 2025-05-01 RX ADMIN — Medication 2 TABLET(S): at 21:23

## 2025-05-01 RX ADMIN — Medication 10 MILLIGRAM(S): at 12:53

## 2025-05-01 RX ADMIN — Medication 2 TABLET(S): at 01:03

## 2025-05-01 RX ADMIN — Medication 975 MILLIGRAM(S): at 12:01

## 2025-05-01 RX ADMIN — Medication 975 MILLIGRAM(S): at 06:04

## 2025-05-01 RX ADMIN — Medication 975 MILLIGRAM(S): at 23:57

## 2025-05-01 RX ADMIN — MAGNESIUM HYDROXIDE 30 MILLILITER(S): 400 SUSPENSION ORAL at 01:12

## 2025-05-01 RX ADMIN — METHOCARBAMOL 500 MILLIGRAM(S): 500 TABLET, FILM COATED ORAL at 01:03

## 2025-05-01 RX ADMIN — Medication 100 MILLIEQUIVALENT(S): at 12:04

## 2025-05-01 RX ADMIN — Medication 975 MILLIGRAM(S): at 18:10

## 2025-05-01 RX ADMIN — OMEGA-3-ACID ETHYL ESTERS CAPSULES 2 GRAM(S): 1 CAPSULE, LIQUID FILLED ORAL at 09:40

## 2025-05-01 RX ADMIN — POLYETHYLENE GLYCOL 3350 17 GRAM(S): 17 POWDER, FOR SOLUTION ORAL at 12:53

## 2025-05-01 RX ADMIN — POLYETHYLENE GLYCOL 3350 17 GRAM(S): 17 POWDER, FOR SOLUTION ORAL at 07:46

## 2025-05-01 RX ADMIN — Medication 100 MILLIEQUIVALENT(S): at 10:41

## 2025-05-01 RX ADMIN — GABAPENTIN 100 MILLIGRAM(S): 400 CAPSULE ORAL at 21:22

## 2025-05-01 RX ADMIN — METHOCARBAMOL 500 MILLIGRAM(S): 500 TABLET, FILM COATED ORAL at 12:02

## 2025-05-01 RX ADMIN — Medication 100 MILLIEQUIVALENT(S): at 15:24

## 2025-05-01 RX ADMIN — Medication 1 APPLICATION(S): at 21:23

## 2025-05-01 RX ADMIN — HEPARIN SODIUM 5000 UNIT(S): 1000 INJECTION INTRAVENOUS; SUBCUTANEOUS at 06:03

## 2025-05-01 NOTE — PROGRESS NOTE ADULT - SUBJECTIVE AND OBJECTIVE BOX
Patient is a 79y old  Male who presents with a chief complaint of L1-L2 discitis/OM with epidural abscess and BL psoas abscesses (01 May 2025 06:31)    Being followed by ID for OM    Interval history:  Abd distended,   Otherwise no other complaints       ROS:  No cough,SOB,CP  No urinary complaints  No HA    Antimicrobials:  ertapenem  IVPB      ertapenem  IVPB 1000 milliGRAM(s) IV Intermittent every 24 hours      Vital Signs Last 24 Hrs  T(C): 36.3 (05-01-25 @ 06:15), Max: 36.7 (04-30-25 @ 13:46)  T(F): 97.4 (05-01-25 @ 06:15), Max: 98 (04-30-25 @ 13:46)  HR: 95 (05-01-25 @ 06:15) (95 - 106)  BP: 122/55 (05-01-25 @ 06:15) (121/63 - 142/67)  BP(mean): --  RR: 17 (05-01-25 @ 06:15) (17 - 18)  SpO2: 99% (05-01-25 @ 06:15) (99% - 100%)    PHYSICAL EXAM:  Constitutional: non-toxic, no distress  HEAD/EYES: anicteric, no conjunctival injection  ENT:  supple, no thrush  Cardiovascular:   normal S1, S2, no murmur, no edema  Respiratory:  clear BS bilaterally, no wheezes, no rales  GI:  distended and TTP  Musculoskeletal:  no synovitis, normal ROM  Neurologic: awake and alert, normal strength, no focal findings  Skin:  no rash, no erythema, no phlebitis  Back: drains +     Lab Data:                        8.9    14.93 )-----------( 645      ( 01 May 2025 05:17 )             26.3     05-01    132[L]  |  102  |  22  ----------------------------<  61[L]  3.2[L]   |  18[L]  |  1.04    Ca    8.4      01 May 2025 05:17  Phos  3.6     05-01  Mg     2.40     05-01        Abscess  04-26-25   Numerous Bacteroides fragilis "Susceptibilities not performed"  --    Numerous polymorphonuclear leukocytes per low power field  Moderate Gram Negative Rods per oil power field      Abscess  04-26-25   Numerous Bacteroides fragilis "Susceptibilities not performed"  --    Numerous polymorphonuclear leukocytes per low power field  Few Gram Negative Rods per oil power field      Blood Blood  04-25-25   No growth at 5 days  --  --      Blood Blood-Venous  04-25-25   No growth at 5 days  --  --      Abscess #3 EPIDURAL ABSCESS  04-24-25   Moderate Bacteroides fragilis "Susceptibilities not performed"  --    Moderate polymorphonuclear leukocytes seen per low power field  Rare Gram Negative Rods seen per oil power field      Surgical Swab #5 L1 L2 DISC SPALE  04-24-25   Numerous Bacteroides fragilis "Susceptibilities not performed"  --  --      Abscess #2 EPIDURAL ABSCESS  04-24-25   Moderate Bacteroides fragilis "Susceptibilities not performed"  --    Moderate polymorphonuclear leukocytes seen per low power field  Few Gram Negative Rods seen per oil power field      Abscess #1 EPIDURAL ABSCESS  04-24-25   Numerous Bacteroides fragilis "Susceptibilities not performed"  --    Moderate polymorphonuclear leukocytes seen per low power field  Few Gram Negative Rods seen per oil power field                  RADIOLOGY:  below reviewed      IMPRESSION:  *  L1/L2 discitis/osteomyelitis with paravertebral collection both   ventrally and dorsally into the spinalcanal.  *  Bilateral heterogeneous complex psoas collection which are contiguous   with the perivertebral collection at the L1/L2.  *  Stable cholelithiasis/calcified gallbladder wall.

## 2025-05-01 NOTE — PROGRESS NOTE ADULT - SUBJECTIVE AND OBJECTIVE BOX
ORTHO PROGRESS NOTE     No acute overnight events. Pt resting comfortably without complaint. Pain controlled       Vital Signs Last 24 Hrs  T(C): 36.3 (01 May 2025 06:15), Max: 36.7 (30 Apr 2025 13:46)  T(F): 97.4 (01 May 2025 06:15), Max: 98 (30 Apr 2025 13:46)  HR: 95 (01 May 2025 06:15) (95 - 106)  BP: 122/55 (01 May 2025 06:15) (121/63 - 142/67)  BP(mean): --  RR: 17 (01 May 2025 06:15) (17 - 18)  SpO2: 99% (01 May 2025 06:15) (99% - 100%)    Parameters below as of 01 May 2025 06:15  Patient On (Oxygen Delivery Method): room air        Back: Dressing/ Incision Clean/Dry/Intact,  HV intact with SS output  Motor:                   C5                C6              C7               C8           T1   R            5/5                5/5            5/5             5/5          5/5  L             5/5               5/5             5/5             5/5          5/5                L2             L3             L4               L5            S1  R         4/5           4/5          5/5             5/5           5/5  L          5/5          5/5           5/5             5/5           5/5    Sensory:            C5         C6         C7      C8       T1        (0=absent, 1=impaired, 2=normal, NT=not testable)  R         2            2           2        2         2  L          2            2           2        2         2               L2          L3         L4      L5       S1         (0=absent, 1=impaired, 2=normal, NT=not testable)  R         2            2            2        2        2  L          2            2           2        2         2      A/P:  79M s/p T10-pelvis PSF on 4/24    Plan:  -WBAT BLLE  -appreciate ID recs  -appreciate IR for psoas abscess drainage     - 2 latisha drains per IR     - Asp: + GNR  -f/u blood cx  -PT/OT  -pain control (PCA)  -dvt ppx: venodynes  -monitor drain outputs - drains per PRS  -dispo: ADRIANA  - Abx: Erta

## 2025-05-01 NOTE — PROGRESS NOTE ADULT - SUBJECTIVE AND OBJECTIVE BOX
Date of Service  : 05-01-25     INTERVAL HPI/OVERNIGHT EVENTS: I feel a little better. Had BM.  Vital Signs Last 24 Hrs  T(C): 36.3 (01 May 2025 17:29), Max: 37.1 (01 May 2025 09:36)  T(F): 97.3 (01 May 2025 17:29), Max: 98.7 (01 May 2025 09:36)  HR: 104 (01 May 2025 17:29) (95 - 104)  BP: 123/62 (01 May 2025 17:29) (121/63 - 142/67)  BP(mean): --  RR: 17 (01 May 2025 17:29) (17 - 18)  SpO2: 97% (01 May 2025 17:29) (97% - 100%)    Parameters below as of 01 May 2025 17:29  Patient On (Oxygen Delivery Method): room air      I&O's Summary    30 Apr 2025 07:01  -  01 May 2025 07:00  --------------------------------------------------------  IN: 1000 mL / OUT: 1243 mL / NET: -243 mL    01 May 2025 07:01  -  01 May 2025 17:30  --------------------------------------------------------  IN: 0 mL / OUT: 29 mL / NET: -29 mL      MEDICATIONS  (STANDING):  acetaminophen     Tablet .. 975 milliGRAM(s) Oral every 6 hours  atorvastatin 40 milliGRAM(s) Oral at bedtime  bacitracin   Ointment 1 Application(s) Topical three times a day  bisacodyl Suppository 10 milliGRAM(s) Rectal daily  ertapenem  IVPB      ertapenem  IVPB 1000 milliGRAM(s) IV Intermittent every 24 hours  gabapentin 100 milliGRAM(s) Oral every 8 hours  glucagon  Injectable 1 milliGRAM(s) IntraMuscular once  heparin   Injectable 5000 Unit(s) SubCutaneous every 8 hours  insulin lispro (ADMELOG) corrective regimen sliding scale   SubCutaneous three times a day before meals  insulin lispro (ADMELOG) corrective regimen sliding scale   SubCutaneous at bedtime  methocarbamol 500 milliGRAM(s) Oral every 12 hours  omega-3-Acid Ethyl Esters 2 Gram(s) Oral two times a day  pantoprazole    Tablet 40 milliGRAM(s) Oral before breakfast  polyethylene glycol 3350 17 Gram(s) Oral <User Schedule>  senna 2 Tablet(s) Oral at bedtime    MEDICATIONS  (PRN):  dibucaine 1% Ointment 1 Application(s) Topical daily PRN hemorrhoid pain  magnesium hydroxide Suspension 30 milliLiter(s) Oral every 12 hours PRN Constipation  simethicone 80 milliGRAM(s) Chew every 12 hours PRN Upset Stomach    LABS:                        8.9    14.93 )-----------( 645      ( 01 May 2025 05:17 )             26.3     05-01    132[L]  |  102  |  22  ----------------------------<  61[L]  3.2[L]   |  18[L]  |  1.04    Ca    8.4      01 May 2025 05:17  Phos  3.6     05-01  Mg     2.40     05-01        Urinalysis Basic - ( 01 May 2025 05:17 )    Color: x / Appearance: x / SG: x / pH: x  Gluc: 61 mg/dL / Ketone: x  / Bili: x / Urobili: x   Blood: x / Protein: x / Nitrite: x   Leuk Esterase: x / RBC: x / WBC x   Sq Epi: x / Non Sq Epi: x / Bacteria: x      CAPILLARY BLOOD GLUCOSE      POCT Blood Glucose.: 139 mg/dL (01 May 2025 16:30)  POCT Blood Glucose.: 102 mg/dL (01 May 2025 11:18)  POCT Blood Glucose.: 72 mg/dL (01 May 2025 06:02)  POCT Blood Glucose.: 91 mg/dL (30 Apr 2025 23:54)        Urinalysis Basic - ( 01 May 2025 05:17 )    Color: x / Appearance: x / SG: x / pH: x  Gluc: 61 mg/dL / Ketone: x  / Bili: x / Urobili: x   Blood: x / Protein: x / Nitrite: x   Leuk Esterase: x / RBC: x / WBC x   Sq Epi: x / Non Sq Epi: x / Bacteria: x      REVIEW OF SYSTEMS:  CONSTITUTIONAL: No fever, weight loss, or fatigue  EYES: No eye pain, visual disturbances, or discharge  ENMT:  No difficulty hearing, tinnitus, vertigo; No sinus or throat pain  NECK: No pain or stiffness  RESPIRATORY: No cough, wheezing, chills or hemoptysis; No shortness of breath  CARDIOVASCULAR: No chest pain, palpitations, dizziness, or leg swelling  GASTROINTESTINAL: No abdominal or epigastric pain. No nausea, vomiting, or hematemesis; No diarrhea or constipation. No melena or hematochezia.  GENITOURINARY: No dysuria, frequency, hematuria, or incontinence  NEUROLOGICAL: No headaches, memory loss, loss of strength, numbness, or tremors    RADIOLOGY & ADDITIONAL TESTS:    Consultant(s) Notes Reviewed:  [x ] YES  [ ] NO    PHYSICAL EXAM:  GENERAL: NAD, well-groomed, well-developed,not in any distress ,  HEAD:  Atraumatic, Normocephalic  NECK: Supple, No JVD, Normal thyroid  NERVOUS SYSTEM:  Alert & Oriented X3, No focal deficit   CHEST/LUNG: Good air entry bilateral with no  rales, rhonchi, wheezing, or rubs  HEART: Regular rate and rhythm; No murmurs, rubs, or gallops  ABDOMEN: Soft, Nontender, +distended; Bowel sounds present  EXTREMITIES:  2+ Peripheral Pulses, No clubbing, cyanosis, or edema    Care Discussed with Consultants/Other Providers [ x] YES  [ ] NO

## 2025-05-01 NOTE — PROGRESS NOTE ADULT - SUBJECTIVE AND OBJECTIVE BOX
TEAM [ B ] Surgery Daily Progress Note  =====================================================  SUBJECTIVE: Patient seen and examined at bedside on AM rounds. Patient reports that they're feeling well. Passing gas and having moderate sized formed bowel movements. Denies abdominal pain.    PMH:   PAST MEDICAL & SURGICAL HISTORY:  HTN (hypertension)    HLD (hyperlipidemia)    DM (diabetes mellitus)    Cataract    ALLERGIES:  No Known Allergies  --------------------------------------------------------------------------------------  MEDICATIONS:    Neurologic Medications  acetaminophen     Tablet .. 975 milliGRAM(s) Oral every 6 hours  gabapentin 100 milliGRAM(s) Oral every 8 hours  methocarbamol 500 milliGRAM(s) Oral every 12 hours    Respiratory Medications    Cardiovascular Medications    Gastrointestinal Medications  bisacodyl Suppository 10 milliGRAM(s) Rectal daily  magnesium hydroxide Suspension 30 milliLiter(s) Oral every 12 hours PRN Constipation  pantoprazole    Tablet 40 milliGRAM(s) Oral before breakfast  polyethylene glycol 3350 17 Gram(s) Oral <User Schedule>  senna 2 Tablet(s) Oral at bedtime  simethicone 80 milliGRAM(s) Chew every 12 hours PRN Upset Stomach    Genitourinary Medications    Hematologic/Oncologic Medications  heparin   Injectable 5000 Unit(s) SubCutaneous every 8 hours    Antimicrobial/Immunologic Medications  ertapenem  IVPB 1000 milliGRAM(s) IV Intermittent every 24 hours  ertapenem  IVPB        Endocrine/Metabolic Medications  atorvastatin 40 milliGRAM(s) Oral at bedtime  glucagon  Injectable 1 milliGRAM(s) IntraMuscular once  insulin lispro (ADMELOG) corrective regimen sliding scale   SubCutaneous three times a day before meals  insulin lispro (ADMELOG) corrective regimen sliding scale   SubCutaneous at bedtime    Topical/Other Medications  bacitracin   Ointment 1 Application(s) Topical three times a day  dibucaine 1% Ointment 1 Application(s) Topical daily PRN hemorrhoid pain  omega-3-Acid Ethyl Esters 2 Gram(s) Oral two times a day  --------------------------------------------------------------------------------------  VITAL SIGNS:    T(C): 36.5 (05-01-25 @ 13:51), Max: 37.1 (05-01-25 @ 09:36)  HR: 102 (05-01-25 @ 13:51) (95 - 104)  BP: 138/69 (05-01-25 @ 13:51) (121/63 - 142/67)  RR: 17 (05-01-25 @ 13:51) (17 - 18)  SpO2: 100% (05-01-25 @ 13:51) (99% - 100%)    --------------------------------------------------------------------------------------  PHYSICAL EXAM:  GEN: resting in bed comfortably in NAD  NEURO: awake, alert  CV: warm, well-perfused  RESP: no increased WOB, saturating well on RA  ABD: softly distended, not tender, without rebound tenderness or guarding;   EXTR: no gross deformities; spontaneous movement in b/l U/L extrem     --------------------------------------------------------------------------------------  LABS                        8.9    14.93 )-----------( 645      ( 01 May 2025 05:17 )             26.3   05-01    132[L]  |  102  |  22  ----------------------------<  61[L]  3.2[L]   |  18[L]  |  1.04    Ca    8.4      01 May 2025 05:17  Phos  3.6     05-01  Mg     2.40     05-01    --------------------------------------------------------------------------------------  INS AND OUTS:    04-30-25 @ 07:01  -  05-01-25 @ 07:00  --------------------------------------------------------  IN: 1000 mL / OUT: 1243 mL / NET: -243 mL    05-01-25 @ 07:01  -  05-01-25 @ 16:30  --------------------------------------------------------  IN: 0 mL / OUT: 23.5 mL / NET: -23.5 mL    --------------------------------------------------------------------------------------

## 2025-05-01 NOTE — PROGRESS NOTE ADULT - ASSESSMENT
Mr. 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     #Colonic dilation  #Ileus  #S/p spinal surgery 4/24  GI is consulted for post-operative abdominal distension and radiographic findings of colonic dilation. XR Abdomen and CT demonstrate multiple dilated loops of small and large bowel without transition point or free air. Patient does not have nausea or vomiting. His abdomen is distended on exam without peritoneal signs. Bowel sounds are present. Today he had a successful semi-liquid/solid brown BM and flatus. Not clinically obstructed. Suspect symptoms due to post-operative ileus/colonic pseudoobstruction. Clinically stable.   As of 4/30, patient moved his bowels 3x with gas. Abdomen is less distended and less firm. Tolerating clears. Overall patient is improving and ongoing improvement is anticipated with the appropriate supportive management.     Recommendations:  - Continue aggressive bowel regimen: Miralax TID, Senna QHS, dulcolax suppository daily  - Track all stool output   - Up to chair for the majority of the day  - Ambulation as able and tolerated  - Frequent movements while in bed  - Replenish electrolytes to goal K>4, Mg>2  - Clear liquid diet. May advance slowly as patient begins to move his bowels more frequently and abdomen becomes more flat.   - Avoid narcotics   - PT support  - Ensure adequate hydration    - No further GI intervention    GI to sign off. Please call back as needed.     Bennett Kenney MD  Gastroenterology/Hepatology Fellow  Available via [employer] Teams  Pager: [phone]    On Weekdays after 5 PM to 8AM and Weekends/Holidays (All day)  For non-urgent consults, please email [email or web address] or [email or web address]  For urgent consults, please contact on call GI team.  See Amion schedule ([facility]), Parrable system - [phone] ([facility]), or call hospital  ([facility]) Mr. 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     #Colonic dilation  #Ileus  #S/p spinal surgery 4/24  GI is consulted for post-operative abdominal distension and radiographic findings of colonic dilation. XR Abdomen and CT demonstrate multiple dilated loops of small and large bowel without transition point or free air. Patient does not have nausea or vomiting. His abdomen is distended on exam without peritoneal signs. Bowel sounds are present. Today he had a successful semi-liquid/solid brown BM and flatus. Not clinically obstructed. Suspect symptoms due to post-operative ileus/colonic pseudoobstruction. Clinically stable.   As of 5/1, patient moved his bowels 3x overnight with gas. Abdomen is less distended and less firm. Tolerating clears. Overall patient is improving and ongoing improvement is anticipated with the appropriate supportive management.     Recommendations:  - Continue aggressive bowel regimen: Miralax TID, Senna QHS, dulcolax suppository daily  - Track all stool output   - Up to chair for the majority of the day  - Ambulation as able and tolerated  - Frequent movements while in bed  - Replenish electrolytes to goal K>4, Mg>2  - Clear liquid diet. May advance slowly as patient begins to move his bowels more frequently and abdomen becomes more flat.   - Avoid narcotics   - PT support  - Ensure adequate hydration    - No further GI intervention    GI to sign off. Please call back as needed.     Bennett Kenney MD  Gastroenterology/Hepatology Fellow  Available via [employer] Teams  Pager: [phone]    On Weekdays after 5 PM to 8AM and Weekends/Holidays (All day)  For non-urgent consults, please email [email or web address]  For urgent consults, please contact on call GI team.  See Amion schedule ([facility]), Mobile Active Defense system - [id number] ([facility]), or call hospital  ([facility])

## 2025-05-01 NOTE — PROGRESS NOTE ADULT - ASSESSMENT
ASSESSMENT:  79yMale w/ HTN, HLD, T2DM, chronic low back pain presents as a transfer from [facility] ED for surgery today. Pt was sent into ED yesterday by his pain medicine physician due to recent MRI findings concerning for L1-L2 discitis/OM with epidural abscess, bilateral psoas abscesses    (1)Renal - CKD2, early diabetic nephropathy? Follows as outpatient with Dr. Syed Euceda; resolved superimposed prerenal JUSTINO  (2)Hyponatremia - likely SIADH due to pain - improving   (3)Hypokalemia     RECOMMEND:  (1)Maintain 1L free water restriction  (2)KCl 40 mEq po x 1   (3)BMP at least daily for now  (4)Pain control per Ortho  (5)Diet per sx    Mare Cameron DNP  [facility]  [phone]    ASSESSMENT:  79yMale w/ HTN, HLD, T2DM, chronic low back pain presents as a transfer from [facility] ED for surgery today. Pt was sent into ED yesterday by his pain medicine physician due to recent MRI findings concerning for L1-L2 discitis/OM with epidural abscess, bilateral psoas abscesses    (1)Renal - CKD2, early diabetic nephropathy? Follows as outpatient with Dr. Syed Euceda; resolved superimposed prerenal JUSTINO  (2)Hyponatremia - improving   (3)Hypokalemia     RECOMMEND:  (1)NO further IVF for now, if Na+ lower than 130 tomorrow, will give isotonic IVF  (2)KCl 40 mEq po x 1   (3)BMP at least daily for now  (4)Pain control per Ortho  (5)Diet per sx    Mare Cameron DNP  [facility]  [phone]    ASSESSMENT:  79yMale w/ HTN, HLD, T2DM, chronic low back pain presents as a transfer from [facility] ED for surgery today. Pt was sent into ED yesterday by his pain medicine physician due to recent MRI findings concerning for L1-L2 discitis/OM with epidural abscess, bilateral psoas abscesses    (1)Renal - CKD2, early diabetic nephropathy? Follows as outpatient with Dr. Syed Euceda; resolved superimposed prerenal JUSTINO  (2)Hyponatremia - improving   (3)Hypokalemia     RECOMMEND:  (1)NO further IVF for now, if Na+ lower than 130 tomorrow, will give isotonic IVF  (2)KCl 40 mEq po x 1   (3)BMP at least daily for now  (4)Pain control per Ortho  (5)Diet per sx    Mare Cameron DNP  [facility]  [phone]       RENAL ATTENDING NOTE  Patient seen and examined with NP. Agree with assessment and plan as above.  Urine studies c/w low effective arterial volume (appropriately increased ADH) -improved with isotonic IVF; we can forgo further IVF for now, but can reinstate if serum sodium drops again    Yamil Humphreys MD  [facility]  [phone] ASSESSMENT:  79yMale w/ HTN, HLD, T2DM, chronic low back pain presents as a transfer from [facility] ED for surgery today. Pt was sent into ED yesterday by his pain medicine physician due to recent MRI findings concerning for L1-L2 discitis/OM with epidural abscess, bilateral psoas abscesses    (1)Renal - CKD2, early diabetic nephropathy? Follows as outpatient with Dr. Syed Euceda; resolved superimposed prerenal JUSTINO  (2)Hyponatremia - improving   (3)Hypokalemia     RECOMMEND:  (1)NO further IVF for now, if Na+ lower than 130 tomorrow, will give isotonic IVF  (2)KCl 40 mEq po x 1   (3)BMP at least daily for now  (4)Pain control per Ortho  (5)Diet per sx    Mare Cameron DNP  [facility]  [phone]       RENAL ATTENDING NOTE  Patient seen and examined with NP. Agree with assessment and plan as above.  Urine studies c/w low effective arterial volume (appropriately increased ADH) -improved with isotonic IVF; we can forgo further IVF for now, but can reinstate if serum sodium drops again    Patient complains of abdominal distension in association with ileus; encouraged to get out of bed/maximize ambulation, and to adhere to bowel regimen as advised by GI.    Yamil Humphreys MD  [facility]  [phone]

## 2025-05-01 NOTE — PROGRESS NOTE ADULT - ASSESSMENT
79yMale w/ HTN, HLD, T2DM, chronic low back pain presents as a transfer from [facility] ED for surgery today. Pt was sent into ED yesterday by his pain medicine physician due to recent MRI findings concerning for L1-L2 discitis/OM with epidural abscess, bilateral psoas abscesses. Patient initially saw pain specialist in February for chronic back pain with radiations to bilateral lateral thighs (R>L). MRI at that time showed degenerative changes. He subsequently had epidural injections x2 (2/27, 3/11) with moderate pain relief. Pain began to worsen on 4/10. He had radiofrequency ablation performed on 4/11 and has since has severe worsening of pain and radiation to R lateral thigh. He reports recent bowel/bladder "incontinence" requiring diaper due to his inability to get to the bathroom in time due to pain limitations but states urge and sensation are intact. Denies fevers, chills, night sweats, weakness, numbness/tingling, saddle anesthesia, recent falls/trauma. He uses a cane for ambulation. Denies recent falls/trauma or any other ortho injuries. He was born in [place], has frequent travels back, no known TB exposure. NO recent infections, antibiotic use.    ED/Hospital course: Has been afebrile, WBC 14, A1C 7.5, labs otherwise WNL. Underwent decompression, spine, lumbar, posterior approach, with fusion of posterior spinal column on 4/24. Cultures sent. Started on Vancomycin and Zosyn. IR consulted for abscess drainage. ID consulted for antibiotic management.    Blood cultures B fragilis  Abscess cx GNR- B fragilis      # L1/L2 discitis OM s/p decompression/fusion with cx thus far with bacteroides   # Multiloculated bilateral psoas abscesses S/P drainage on 04/26-  cx thus far with bacteroides   # leucocytosis   # JUSTINO    MICRO:   04/23 BCX- 2/4 GNR, bacteroides   04/24 OR cx-  bacteroides   04/25 MRSA nares- negative   04/25 BCX- NGTD  04/26 Psoas abscess cx- GNR, Bacteroides     Recommendations;     - Cultures growing Bacteroides; not typical pathogens for spine infection, suspect transient gut translocation into the blood stream causing bacteremia, epidural abscess and B/L Psoas abscess   - No GI/ pathology on imaging ( enlarged prostate, UA negative)   - Continue Ertapenem 1 gm Q24H ( current crcl is 39)   - Follow up with cultures from 04/26 (bacteroides)   - continue to trend CBC/fever curve  - Will need atleast 6 weeks of IV antibiotics from 04/26/25   - Will need a Midline upon discharge   - Trend ESR/CRP weekly     In addition to reviewing history, imaging, documents, labs, microbiology, took into account antibiotic stewardship, local antibiogram and infection control strategies and potential transmission issues.    Discussed with the primary team.    Alem Russell MD  Attending Physician, Division of Infectious Diseases  Department of Medicine   [facility]    Contact on TEAMS messaging from 9am - 5pm  Office: [phone] (after 5 PM or weekend)

## 2025-05-01 NOTE — PROGRESS NOTE ADULT - ASSESSMENT
79M PMH HTN, DM, and chronic low back pain who was transferred from [facility] for management of L1-L2 discitis/osteomyelitis with bilateral psoas abscesses, s/p T12-L2 laminectomy via RP approach with Ortho on 4/24 with PRS closure, and s/p percutaneous drainage of bilateral psoas abscesses by IR on 4/26. Post-op course c/b abdominal distension. General Surgery consulted for imaging findings of possible pneumoperitoneum. He is tolerating a regular diet, having bowel function, and has a benign abdominal exam.     PLAN:   - CT AP with IV/PO contrast ordered -- Diffuse colonic dilatation, likely secondary to pseudoobstruction or ileus.  - Management of pseudoobstruction per GI; Continuing to have bowel function.   - Advance diet as tolerated, supportive care  - Please call back B Team surgery at [phone] with further surgical questions/concerns  - Global care per Ortho     B Team surgery   [id number]

## 2025-05-01 NOTE — PROGRESS NOTE ADULT - SUBJECTIVE AND OBJECTIVE BOX
Interval Events:   no acute events  patient received Miralax and dulcolax suppository  he moved his bowels 3 times overnight and passed gas  pt stood out of bed with PT  abdominal distension is improving. abd is more soft    Hospital Medications:  acetaminophen     Tablet .. 975 milliGRAM(s) Oral every 6 hours  atorvastatin 40 milliGRAM(s) Oral at bedtime  bacitracin   Ointment 1 Application(s) Topical three times a day  bisacodyl Suppository 10 milliGRAM(s) Rectal daily  dibucaine 1% Ointment 1 Application(s) Topical daily PRN  ertapenem  IVPB      ertapenem  IVPB 1000 milliGRAM(s) IV Intermittent every 24 hours  gabapentin 100 milliGRAM(s) Oral every 8 hours  glucagon  Injectable 1 milliGRAM(s) IntraMuscular once  heparin   Injectable 5000 Unit(s) SubCutaneous every 8 hours  insulin lispro (ADMELOG) corrective regimen sliding scale   SubCutaneous three times a day before meals  insulin lispro (ADMELOG) corrective regimen sliding scale   SubCutaneous at bedtime  magnesium hydroxide Suspension 30 milliLiter(s) Oral every 12 hours PRN  methocarbamol 500 milliGRAM(s) Oral every 12 hours  omega-3-Acid Ethyl Esters 2 Gram(s) Oral two times a day  pantoprazole    Tablet 40 milliGRAM(s) Oral before breakfast  polyethylene glycol 3350 17 Gram(s) Oral <User Schedule>  senna 2 Tablet(s) Oral at bedtime  simethicone 80 milliGRAM(s) Chew every 12 hours PRN    ROS: See above.    PHYSICAL EXAM:   Vital Signs:  Vital Signs Last 24 Hrs  T(C): 37.1 (01 May 2025 09:36), Max: 37.1 (01 May 2025 09:36)  T(F): 98.7 (01 May 2025 09:36), Max: 98.7 (01 May 2025 09:36)  HR: 102 (01 May 2025 09:36) (95 - 106)  BP: 135/58 (01 May 2025 09:36) (121/63 - 142/67)  BP(mean): --  RR: 18 (01 May 2025 09:36) (17 - 18)  SpO2: 100% (01 May 2025 09:36) (99% - 100%)    Parameters below as of 01 May 2025 09:36  Patient On (Oxygen Delivery Method): room air    GENERAL:  NAD, resting comfortably in bed  HEENT:  sclera anicteric  RESPIRATORY:  Normal Effort  CARDIAC:  HDS  ABDOMEN:  Soft, non-tender, distended  SKIN:  Warm & Dry. No jaundice  NEURO:  Alert, conversant, no focal deficit    LABS:                        8.9    14.93 )-----------( 645      ( 01 May 2025 05:17 )             26.3     Mean Cell Volume: 87.4 fL (05-01-25 @ 05:17)    05-01    132[L]  |  102  |  22  ----------------------------<  61[L]  3.2[L]   |  18[L]  |  1.04    Ca    8.4      01 May 2025 05:17  Phos  3.6     05-01  Mg     2.40     05-01

## 2025-05-01 NOTE — PROGRESS NOTE ADULT - SUBJECTIVE AND OBJECTIVE BOX
DATE OF SERVICE: 05-01-25    c/o ongoing abdominal bloating, tolerating PO and reports BM last night.   Review of symptoms otherwise negative.    MEDICATIONS:  acetaminophen     Tablet .. 975 milliGRAM(s) Oral every 6 hours  atorvastatin 40 milliGRAM(s) Oral at bedtime  bacitracin   Ointment 1 Application(s) Topical three times a day  bisacodyl Suppository 10 milliGRAM(s) Rectal daily  dibucaine 1% Ointment 1 Application(s) Topical daily PRN  ertapenem  IVPB      ertapenem  IVPB 1000 milliGRAM(s) IV Intermittent every 24 hours  gabapentin 100 milliGRAM(s) Oral every 8 hours  glucagon  Injectable 1 milliGRAM(s) IntraMuscular once  heparin   Injectable 5000 Unit(s) SubCutaneous every 8 hours  insulin lispro (ADMELOG) corrective regimen sliding scale   SubCutaneous three times a day before meals  insulin lispro (ADMELOG) corrective regimen sliding scale   SubCutaneous at bedtime  magnesium hydroxide Suspension 30 milliLiter(s) Oral every 12 hours PRN  methocarbamol 500 milliGRAM(s) Oral every 12 hours  omega-3-Acid Ethyl Esters 2 Gram(s) Oral two times a day  pantoprazole    Tablet 40 milliGRAM(s) Oral before breakfast  polyethylene glycol 3350 17 Gram(s) Oral <User Schedule>  potassium chloride  10 mEq/100 mL IVPB 10 milliEquivalent(s) IV Intermittent every 1 hour  senna 2 Tablet(s) Oral at bedtime  simethicone 80 milliGRAM(s) Chew every 12 hours PRN                            8.9    14.93 )-----------( 645      ( 01 May 2025 05:17 )             26.3       132[L]  |  102  |  22  ----------------------------<  61[L]  3.2[L]   |  18[L]  |  1.04    Ca    8.4      01 May 2025 05:17  Phos  3.6     05-01  Mg     2.40     05-01    Creatinine Trend: 1.04<--, 1.14<--, 1.36<--, 1.21<--, 1.09<--, 0.96<--      T(C): 37.1 (05-01-25 @ 09:36), Max: 37.1 (05-01-25 @ 09:36)  HR: 102 (05-01-25 @ 09:36) (95 - 106)  BP: 135/58 (05-01-25 @ 09:36) (121/63 - 142/67)  RR: 18 (05-01-25 @ 09:36) (17 - 18)  SpO2: 100% (05-01-25 @ 09:36) (99% - 100%)  Wt(kg): --    I&O's Summary    30 Apr 2025 07:01  -  01 May 2025 07:00  --------------------------------------------------------  IN: 1000 mL / OUT: 1243 mL / NET: -243 mL    01 May 2025 07:01  -  01 May 2025 11:49  --------------------------------------------------------  IN: 0 mL / OUT: 11 mL / NET: -11 mL        Gen: NAD  HEENT:  (-)icterus (-)pallor  CV: N S1 S2 1/6 DARYA (+)2 Pulses B/l  Resp:  Clear to auscultation B/L, normal effort  GI: distended  Lymph:  (-)Edema, (-)obvious lymphadenopathy  Skin: Warm to touch, Normal turgor  Psych: Appropriate mood and affect      TELEMETRY: 	  NSR    ECG:  	NSR    < from: Xray Abdomen 1 View PORTABLE -Urgent (Xray Abdomen 1 View PORTABLE -Urgent .) (04.29.25 @ 10:09) >  IMPRESSION:  Multiple dilated loops of small and large bowel with question of   pneumoperitoneum. Recommend upright chest radiograph or decubitus   abdominal radiograph for further evaluation.    < end of copied text >    < from: CT Abdomen and Pelvis w/ Oral Cont and w/ IV Cont (04.29.25 @ 17:16) >  IMPRESSION:      Diffuse colonic dilatation, worse on today's study, likely secondary to   pseudoobstruction or ileus.    No free air.    Reduction in the bilateral psoas collections with catheters in place.    PRELIMINARY IMPRESSION: Study limited secondary to extensive streak   artifact from patient's spinal hardware. Diffusely dilated colon to the   level of the rectum, increased since 4/25/2025, without evidence of   mechanical obstruction. Possible etiologies include pseudoobstruction   versus ileus. No small bowel obstruction. No pneumoperitoneum.   Percutaneous posterior approach drainage catheters within the psoas   muscles bilaterally with interval decrease of previously seen abscess.   Redemonstrated erosive endplate changes at L1-L2 compatible with discitis   osteomyelitis.    Follow-up final report in the a.m.    < end of copied text >      ASSESSMENT/PLAN: 	Pt is a  79 year old male w/ HTN, HLD, T2DM, chronic low back pain presents as a transfer from [facility] ED for surgery. . Pt was sent into ED yesterday by his pain medicine physician due to recent MRI findings concerning for L1-L2 discitis/OM with epidural abscess, bilateral psoas abscesses. Patient initially saw pain specialist in February for chronic back pain with radiations to bilateral lateral thighs (R>L). MRI at that time showed degenerative changes. He subsequently had epidural injections x2 (2/27, 3/11) with moderate pain relief. Pain began to worsen on 4/10. He had radiofrequency ablation performed on 4/11 and has since has severe worsening of pain and radiation to R lateral thigh. He reports recent bowel/bladder "incontinence" requiring diaper due to his inability to get to the bathroom in time due to pain limitations but states urge and sensation are intact. Denies fevers, chills, night sweats, weakness, numbness/tingling, saddle anesthesia, recent falls/trauma. He uses a cane for ambulation. Denies recent falls/trauma or any other ortho injuries.   Found to have epidural abscess now s/p Decompression, spine, lumbar, posterior approach, with fusion of posterior spinal column, planned for psoas abscess drainage.    - s/p Decompression, spine, lumbar, posterior approach, with fusion of posterior spinal column  - s/p psoas drainage   - pain control  - c/w statin  - bowel regimen, F/U GI

## 2025-05-01 NOTE — PROGRESS NOTE ADULT - ASSESSMENT
79yMale w/ HTN, HLD, T2DM, chronic low back pain presents as a transfer from [facility] ED for surgery today. Pt was sent into ED yesterday by his pain medicine physician due to recent MRI findings concerning for L1-L2 discitis/OM with epidural abscess, bilateral psoas abscesses. Patient initially saw pain specialist in February for chronic back pain with radiations to bilateral lateral thighs (R>L). MRI at that time showed degenerative changes. He subsequently had epidural injections x2 (2/27, 3/11) with moderate pain relief. Pain began to worsen on 4/10. He had radiofrequency ablation performed on 4/11 and has since has severe worsening of pain and radiation to R lateral thigh. He reports recent bowel/bladder "incontinence" requiring diaper due to his inability to get to the bathroom in time due to pain limitations but states urge and sensation are intact. Denies fevers, chills, night sweats, weakness, numbness/tingling, saddle anesthesia, recent falls/trauma. He uses a cane for ambulation. Denies recent falls/trauma or any other ortho injuries. Before back pain was very active walked half an hour , going up and down stairs and doing grocery etc with no Chest pain or SOB.        Problem/Recommendation - 1:  ·  Problem: Epidural abscess.   ·  Recommendation: S/P Decompression, spine, lumbar, posterior approach, with fusion of posterior spinal column.  On IV Abxs Ertapenem now  per ID x 6 weeks  .  Will defer management to  Neurosurgery.    < from: MR Lumbar Spine w/ IV Cont (04.23.25 @ 15:13) >  IMPRESSION:        1.   Cervical spine:   Moderate degenerative disc disease and   spondylosis at C4-5 through C6/7 with loss of disc height and associated   degenerative endplate changes.        2.   Thoracic spine:   Minimal enhancement within T7-8 which may   reflect early discitis.        3.   Lumbar spine:   Osteomyelitis and discitis at L1-2 with erosions   of the inferior L1 vertebral body and L2 vertebral body consistent with   osteomyelitis and discitis. Ventral epidural abscess is noted extending   from the L1-2 level superiorly to the T10 level with mass effect on the   ventral thecal sac, most prominently at the L1-2 level resulting in   marked compression. Multiple abscesses within the BILATERAL psoas   muscles, the largest measuring 4.3 cm on the RIGHT and 2.8 cm on the LEFT.     Problem/Recommendation - 2:  ·  Problem: Acute osteomyelitis of lumbar spine.   ·  Recommendation: L1 &L2 vertebrae .  On IV Abxs per ID .  Will defer management to Neurosurgery.     Problem/Recommendation - 3:  ·  Problem: Low back pain radiating to right lower extremity.   ·  Recommendation: Pain control.     Problem/Recommendation - 4:  ·  Problem: . BILATERAL psoas Abscess  ·  Recommendation: ON IV Abxs per ID .  IR placed drains after draining  .       Problem/Recommendation - 5:  ·  Problem: HTN (hypertension).   ·  Recommendation: Takes Amlodipine and ARB so continue with hold parameters.  < from: TTE W or WO Ultrasound Enhancing Agent (04.24.25 @ 10:10) >  CONCLUSIONS:      1. Left ventricular cavity is normal in size. Left ventricular wall thickness is normal. Left ventricular systolic function is normal with an ejection fraction of 64 % by Carpenter's method of disks. There are no regional wall motion abnormalities seen.   2. Normal right ventricular cavity size and normal right ventricular systolic function. Tricuspid annular plane systolic excursion (TAPSE) is 2.2 cm (normal >=1.7 cm).   3. Structurally normal mitral valve with normal leaflet excursion. There is calcification of the mitral valve annulus. There is tracemitral regurgitation.   4. The aortic valve appears trileaflet with normal systolic excursion. There is calcification of the aortic valve leaflets. There is mild aortic regurgitation.     Problem/Recommendation - 6:  ·  Problem: HLD (hyperlipidemia).   ·  Recommendation: Continue Atorvastatin.     Problem/Recommendation - 7:  ·  Problem: DM (diabetes mellitus).   ·  Recommendation: ON Farxiga and holding.  SSI and Lantus in patient .     Problem/Recommendation - 8:  ·  Problem:  Ileus /Pseudoobustraction.   ·  Recommendation: Had BM and not  passing flatus .    ON Laxatives .  CLD started.   Surgery & GI input appreciated .  < from: CT Abdomen and Pelvis w/ Oral Cont and w/ IV Cont (04.29.25 @ 17:16) >  IMPRESSION:  Diffuse colonic dilatation, worse on today's study, likely secondary to   pseudoobstruction or ileus.    No free air.    Reduction in the bilateral psoas collections with catheters in place.    < from: Xray Abdomen 1 View PORTABLE -Urgent (Xray Abdomen 1 View PORTABLE -Urgent .) (04.29.25 @ 10:09) >  IMPRESSION:  Multiple dilated loops of small and large bowel with question of   pneumoperitoneum. Recommend upright chest radiograph or decubitus   abdominal radiograph for further evaluation.    Paged surgery team and waiting for response .      Problem/Recommendation - 9:  ·  Problem: JUSTINO with Hyponatremia .   ·  Recommendation: Trending BMP.  Improving.   Renal help appreciated.      Problem/Recommendation - 10:  ·  Problem: Hypokalemia .   ·  Recommendation: Replacing.   .    D/W patient in detail and wife.

## 2025-05-01 NOTE — PROGRESS NOTE ADULT - SUBJECTIVE AND OBJECTIVE BOX
NEPHROLOGY     Patient seen and examined resting on room air, reports of mild abdominal discomfort, denies sob, in no acute distress.     MEDICATIONS  (STANDING):  acetaminophen     Tablet .. 975 milliGRAM(s) Oral every 6 hours  atorvastatin 40 milliGRAM(s) Oral at bedtime  bacitracin   Ointment 1 Application(s) Topical three times a day  bisacodyl Suppository 10 milliGRAM(s) Rectal daily  ertapenem  IVPB      ertapenem  IVPB 1000 milliGRAM(s) IV Intermittent every 24 hours  gabapentin 100 milliGRAM(s) Oral every 8 hours  glucagon  Injectable 1 milliGRAM(s) IntraMuscular once  heparin   Injectable 5000 Unit(s) SubCutaneous every 8 hours  methocarbamol 500 milliGRAM(s) Oral every 12 hours  omega-3-Acid Ethyl Esters 2 Gram(s) Oral two times a day  pantoprazole    Tablet 40 milliGRAM(s) Oral before breakfast  polyethylene glycol 3350 17 Gram(s) Oral <User Schedule>  senna 2 Tablet(s) Oral at bedtime    VITALS:  T(C): , Max: 36.7 (04-30-25 @ 13:46)  T(F): , Max: 98 (04-30-25 @ 13:46)  HR: 95 (05-01-25 @ 06:15)  BP: 122/55 (05-01-25 @ 06:15)  RR: 17 (05-01-25 @ 06:15)  SpO2: 99% (05-01-25 @ 06:15)    I and O's:    04-30 @ 07:01  -  05-01 @ 07:00  --------------------------------------------------------  IN: 1000 mL / OUT: 1243 mL / NET: -243 mL    PHYSICAL EXAM:  Constitutional: NAD  HEENT: EOMI  Neck:  No JVD, supple   Respiratory: CTA B/L  Cardiovascular: S1 and S2, RRR  Gastrointestinal: distended  Extremities: tr peripheral edema, + peripheral pulses  Neurological: A/O x 3, CN2-12 intact  Psychiatric: Normal mood, normal affect  : No Mac  Skin: No rashes, C/D/I    LABS:                        8.9    14.93 )-----------( 645      ( 01 May 2025 05:17 )             26.3     05-01    132[L]  |  102  |  22  ----------------------------<  61[L]  3.2[L]   |  18[L]  |  1.04    Ca    8.4      01 May 2025 05:17  Phos  3.6     05-01  Mg     2.40     05-01    Urine Studies:    Chloride, Random Urine: <20 mmol/L (04-30 @ 17:15)  Osmolality, Random Urine: 325 mosm/kg (04-30 @ 17:15)  Potassium, Random Urine: 9.4 mmol/L (04-30 @ 17:15)  Sodium, Random Urine: <20 mmol/L (04-30 @ 17:15)

## 2025-05-02 LAB
ANION GAP SERPL CALC-SCNC: 11 MMOL/L — SIGNIFICANT CHANGE UP (ref 7–14)
ANION GAP SERPL CALC-SCNC: 8 MMOL/L — SIGNIFICANT CHANGE UP (ref 7–14)
BASOPHILS # BLD AUTO: 0.09 K/UL — SIGNIFICANT CHANGE UP (ref 0–0.2)
BASOPHILS NFR BLD AUTO: 0.7 % — SIGNIFICANT CHANGE UP (ref 0–2)
BUN SERPL-MCNC: 12 MG/DL — SIGNIFICANT CHANGE UP (ref 7–23)
BUN SERPL-MCNC: 15 MG/DL — SIGNIFICANT CHANGE UP (ref 7–23)
CALCIUM SERPL-MCNC: 8.4 MG/DL — SIGNIFICANT CHANGE UP (ref 8.4–10.5)
CALCIUM SERPL-MCNC: 8.6 MG/DL — SIGNIFICANT CHANGE UP (ref 8.4–10.5)
CHLORIDE SERPL-SCNC: 103 MMOL/L — SIGNIFICANT CHANGE UP (ref 98–107)
CHLORIDE SERPL-SCNC: 105 MMOL/L — SIGNIFICANT CHANGE UP (ref 98–107)
CO2 SERPL-SCNC: 20 MMOL/L — LOW (ref 22–31)
CO2 SERPL-SCNC: 21 MMOL/L — LOW (ref 22–31)
CREAT SERPL-MCNC: 0.87 MG/DL — SIGNIFICANT CHANGE UP (ref 0.5–1.3)
CREAT SERPL-MCNC: 0.94 MG/DL — SIGNIFICANT CHANGE UP (ref 0.5–1.3)
EGFR: 82 ML/MIN/1.73M2 — SIGNIFICANT CHANGE UP
EGFR: 82 ML/MIN/1.73M2 — SIGNIFICANT CHANGE UP
EGFR: 88 ML/MIN/1.73M2 — SIGNIFICANT CHANGE UP
EGFR: 88 ML/MIN/1.73M2 — SIGNIFICANT CHANGE UP
EOSINOPHIL # BLD AUTO: 0.19 K/UL — SIGNIFICANT CHANGE UP (ref 0–0.5)
EOSINOPHIL NFR BLD AUTO: 1.5 % — SIGNIFICANT CHANGE UP (ref 0–6)
GLUCOSE BLDC GLUCOMTR-MCNC: 118 MG/DL — HIGH (ref 70–99)
GLUCOSE BLDC GLUCOMTR-MCNC: 122 MG/DL — HIGH (ref 70–99)
GLUCOSE BLDC GLUCOMTR-MCNC: 93 MG/DL — SIGNIFICANT CHANGE UP (ref 70–99)
GLUCOSE BLDC GLUCOMTR-MCNC: 99 MG/DL — SIGNIFICANT CHANGE UP (ref 70–99)
GLUCOSE SERPL-MCNC: 124 MG/DL — HIGH (ref 70–99)
GLUCOSE SERPL-MCNC: 85 MG/DL — SIGNIFICANT CHANGE UP (ref 70–99)
HCT VFR BLD CALC: 28.3 % — LOW (ref 39–50)
HGB BLD-MCNC: 9.5 G/DL — LOW (ref 13–17)
IANC: 7.75 K/UL — HIGH (ref 1.8–7.4)
IMM GRANULOCYTES NFR BLD AUTO: 6.7 % — HIGH (ref 0–0.9)
LYMPHOCYTES # BLD AUTO: 24.9 % — SIGNIFICANT CHANGE UP (ref 13–44)
LYMPHOCYTES # BLD AUTO: 3.08 K/UL — SIGNIFICANT CHANGE UP (ref 1–3.3)
MAGNESIUM SERPL-MCNC: 2.3 MG/DL — SIGNIFICANT CHANGE UP (ref 1.6–2.6)
MCHC RBC-ENTMCNC: 29.8 PG — SIGNIFICANT CHANGE UP (ref 27–34)
MCHC RBC-ENTMCNC: 33.6 G/DL — SIGNIFICANT CHANGE UP (ref 32–36)
MCV RBC AUTO: 88.7 FL — SIGNIFICANT CHANGE UP (ref 80–100)
MONOCYTES # BLD AUTO: 0.44 K/UL — SIGNIFICANT CHANGE UP (ref 0–0.9)
MONOCYTES NFR BLD AUTO: 3.6 % — SIGNIFICANT CHANGE UP (ref 2–14)
NEUTROPHILS # BLD AUTO: 7.75 K/UL — HIGH (ref 1.8–7.4)
NEUTROPHILS NFR BLD AUTO: 62.6 % — SIGNIFICANT CHANGE UP (ref 43–77)
NRBC # BLD AUTO: 0 K/UL — SIGNIFICANT CHANGE UP (ref 0–0)
NRBC # FLD: 0 K/UL — SIGNIFICANT CHANGE UP (ref 0–0)
NRBC BLD AUTO-RTO: 0 /100 WBCS — SIGNIFICANT CHANGE UP (ref 0–0)
PHOSPHATE SERPL-MCNC: 2.7 MG/DL — SIGNIFICANT CHANGE UP (ref 2.5–4.5)
PLATELET # BLD AUTO: 766 K/UL — HIGH (ref 150–400)
POTASSIUM SERPL-MCNC: 2.9 MMOL/L — CRITICAL LOW (ref 3.5–5.3)
POTASSIUM SERPL-MCNC: 3 MMOL/L — LOW (ref 3.5–5.3)
POTASSIUM SERPL-SCNC: 2.9 MMOL/L — CRITICAL LOW (ref 3.5–5.3)
POTASSIUM SERPL-SCNC: 3 MMOL/L — LOW (ref 3.5–5.3)
RBC # BLD: 3.19 M/UL — LOW (ref 4.2–5.8)
RBC # FLD: 16 % — HIGH (ref 10.3–14.5)
SODIUM SERPL-SCNC: 132 MMOL/L — LOW (ref 135–145)
SODIUM SERPL-SCNC: 136 MMOL/L — SIGNIFICANT CHANGE UP (ref 135–145)
WBC # BLD: 12.38 K/UL — HIGH (ref 3.8–10.5)
WBC # FLD AUTO: 12.38 K/UL — HIGH (ref 3.8–10.5)

## 2025-05-02 PROCEDURE — G0545: CPT

## 2025-05-02 PROCEDURE — 99232 SBSQ HOSP IP/OBS MODERATE 35: CPT

## 2025-05-02 RX ORDER — GABAPENTIN 400 MG/1
100 CAPSULE ORAL EVERY 8 HOURS
Refills: 0 | Status: DISCONTINUED | OUTPATIENT
Start: 2025-05-02 | End: 2025-05-16

## 2025-05-02 RX ORDER — OXYCODONE HYDROCHLORIDE 30 MG/1
5 TABLET ORAL EVERY 4 HOURS
Refills: 0 | Status: DISCONTINUED | OUTPATIENT
Start: 2025-05-02 | End: 2025-05-02

## 2025-05-02 RX ORDER — SIMETHICONE 80 MG
80 TABLET,CHEWABLE ORAL
Refills: 0 | Status: DISCONTINUED | OUTPATIENT
Start: 2025-05-02 | End: 2025-05-16

## 2025-05-02 RX ORDER — GABAPENTIN 400 MG/1
300 CAPSULE ORAL EVERY 8 HOURS
Refills: 0 | Status: DISCONTINUED | OUTPATIENT
Start: 2025-05-02 | End: 2025-05-02

## 2025-05-02 RX ORDER — KETOROLAC TROMETHAMINE 30 MG/ML
15 INJECTION, SOLUTION INTRAMUSCULAR; INTRAVENOUS ONCE
Refills: 0 | Status: DISCONTINUED | OUTPATIENT
Start: 2025-05-02 | End: 2025-05-02

## 2025-05-02 RX ORDER — OXYCODONE HYDROCHLORIDE 30 MG/1
10 TABLET ORAL EVERY 4 HOURS
Refills: 0 | Status: DISCONTINUED | OUTPATIENT
Start: 2025-05-02 | End: 2025-05-02

## 2025-05-02 RX ADMIN — Medication 975 MILLIGRAM(S): at 01:00

## 2025-05-02 RX ADMIN — GABAPENTIN 100 MILLIGRAM(S): 400 CAPSULE ORAL at 13:22

## 2025-05-02 RX ADMIN — Medication 975 MILLIGRAM(S): at 12:57

## 2025-05-02 RX ADMIN — Medication 2 TABLET(S): at 23:08

## 2025-05-02 RX ADMIN — Medication 975 MILLIGRAM(S): at 11:57

## 2025-05-02 RX ADMIN — HEPARIN SODIUM 5000 UNIT(S): 1000 INJECTION INTRAVENOUS; SUBCUTANEOUS at 13:22

## 2025-05-02 RX ADMIN — Medication 100 MILLIEQUIVALENT(S): at 11:08

## 2025-05-02 RX ADMIN — POLYETHYLENE GLYCOL 3350 17 GRAM(S): 17 POWDER, FOR SOLUTION ORAL at 07:34

## 2025-05-02 RX ADMIN — Medication 100 MILLIEQUIVALENT(S): at 09:38

## 2025-05-02 RX ADMIN — Medication 80 MILLIGRAM(S): at 13:22

## 2025-05-02 RX ADMIN — HEPARIN SODIUM 5000 UNIT(S): 1000 INJECTION INTRAVENOUS; SUBCUTANEOUS at 21:54

## 2025-05-02 RX ADMIN — OMEGA-3-ACID ETHYL ESTERS CAPSULES 2 GRAM(S): 1 CAPSULE, LIQUID FILLED ORAL at 11:57

## 2025-05-02 RX ADMIN — HEPARIN SODIUM 5000 UNIT(S): 1000 INJECTION INTRAVENOUS; SUBCUTANEOUS at 05:15

## 2025-05-02 RX ADMIN — Medication 100 MILLIEQUIVALENT(S): at 12:35

## 2025-05-02 RX ADMIN — METHOCARBAMOL 500 MILLIGRAM(S): 500 TABLET, FILM COATED ORAL at 11:57

## 2025-05-02 RX ADMIN — GABAPENTIN 100 MILLIGRAM(S): 400 CAPSULE ORAL at 21:53

## 2025-05-02 RX ADMIN — Medication 975 MILLIGRAM(S): at 05:15

## 2025-05-02 RX ADMIN — Medication 40 MILLIEQUIVALENT(S): at 21:53

## 2025-05-02 RX ADMIN — Medication 1 APPLICATION(S): at 05:15

## 2025-05-02 RX ADMIN — POLYETHYLENE GLYCOL 3350 17 GRAM(S): 17 POWDER, FOR SOLUTION ORAL at 23:13

## 2025-05-02 RX ADMIN — Medication 975 MILLIGRAM(S): at 06:30

## 2025-05-02 RX ADMIN — GABAPENTIN 100 MILLIGRAM(S): 400 CAPSULE ORAL at 05:16

## 2025-05-02 RX ADMIN — Medication 40 MILLIGRAM(S): at 05:15

## 2025-05-02 RX ADMIN — ERTAPENEM SODIUM 100 MILLIGRAM(S): 1 INJECTION, POWDER, LYOPHILIZED, FOR SOLUTION INTRAMUSCULAR; INTRAVENOUS at 15:23

## 2025-05-02 RX ADMIN — Medication 1 APPLICATION(S): at 21:54

## 2025-05-02 RX ADMIN — Medication 975 MILLIGRAM(S): at 18:01

## 2025-05-02 RX ADMIN — OMEGA-3-ACID ETHYL ESTERS CAPSULES 2 GRAM(S): 1 CAPSULE, LIQUID FILLED ORAL at 18:01

## 2025-05-02 RX ADMIN — Medication 1 APPLICATION(S): at 13:22

## 2025-05-02 NOTE — PROGRESS NOTE ADULT - SUBJECTIVE AND OBJECTIVE BOX
Patient is a 79y old  Male who presents with a chief complaint of L1-L2 discitis/OM with epidural abscess and BL psoas abscesses (02 May 2025 12:00)    Being followed by ID for OM    Interval history:  Abd distension has improved   Otherwise no other complaints       ROS:  No cough,SOB,CP  No urinary complaints  No HA    Antimicrobials:  ertapenem  IVPB      ertapenem  IVPB 1000 milliGRAM(s) IV Intermittent every 24 hours      Vital Signs Last 24 Hrs  T(C): 37.1 (05-02-25 @ 09:24), Max: 37.1 (05-02-25 @ 09:24)  T(F): 98.7 (05-02-25 @ 09:24), Max: 98.7 (05-02-25 @ 09:24)  HR: 92 (05-02-25 @ 09:24) (88 - 104)  BP: 132/64 (05-02-25 @ 09:24) (123/62 - 153/72)  BP(mean): --  RR: 18 (05-02-25 @ 09:24) (16 - 18)  SpO2: 98% (05-02-25 @ 09:24) (97% - 100%)    Physical Exam:  Constitutional: non-toxic, no distress  HEAD/EYES: anicteric, no conjunctival injection  ENT:  supple, no thrush  Cardiovascular:   normal S1, S2, no murmur, no edema  Respiratory:  clear BS bilaterally, no wheezes, no rales  GI:  distended and TTP  Musculoskeletal:  no synovitis, normal ROM  Neurologic: awake and alert, normal strength, no focal findings  Skin:  no rash, no erythema, no phlebitis  Back: drains +     Lab Data:                        9.5    12.38 )-----------( 766      ( 02 May 2025 05:52 )             28.3     05-02    132[L]  |  103  |  15  ----------------------------<  85  2.9[LL]   |  21[L]  |  0.94    Ca    8.6      02 May 2025 05:52  Phos  2.7     05-02  Mg     2.30     05-02        Abscess  04-26-25   Numerous Bacteroides fragilis "Susceptibilities not performed"  --    Numerous polymorphonuclear leukocytes per low power field  Moderate Gram Negative Rods per oil power field      Abscess  04-26-25   Numerous Bacteroides fragilis "Susceptibilities not performed"  --    Numerous polymorphonuclear leukocytes per low power field  Few Gram Negative Rods per oil power field                RADIOLOGY:  below reviewed      IMPRESSION:  *  L1/L2 discitis/osteomyelitis with paravertebral collection both   ventrally and dorsally into the spinalcanal.  *  Bilateral heterogeneous complex psoas collection which are contiguous   with the perivertebral collection at the L1/L2.  *  Stable cholelithiasis/calcified gallbladder wall.

## 2025-05-02 NOTE — PROGRESS NOTE ADULT - SUBJECTIVE AND OBJECTIVE BOX
Date of Service  : 05-02-25     INTERVAL HPI/OVERNIGHT EVENTS: Having multi[ple BMs but not passing gas.   Vital Signs Last 24 Hrs  T(C): 36.6 (02 May 2025 17:32), Max: 37.1 (02 May 2025 09:24)  T(F): 97.9 (02 May 2025 17:32), Max: 98.7 (02 May 2025 09:24)  HR: 68 (02 May 2025 17:32) (68 - 96)  BP: 126/68 (02 May 2025 17:32) (125/66 - 153/72)  BP(mean): --  RR: 18 (02 May 2025 17:32) (16 - 18)  SpO2: 98% (02 May 2025 17:32) (98% - 100%)    Parameters below as of 02 May 2025 17:32  Patient On (Oxygen Delivery Method): room air      I&O's Summary    01 May 2025 07:01  -  02 May 2025 07:00  --------------------------------------------------------  IN: 0 mL / OUT: 1048.5 mL / NET: -1048.5 mL    02 May 2025 07:01  -  02 May 2025 19:10  --------------------------------------------------------  IN: 0 mL / OUT: 625 mL / NET: -625 mL      MEDICATIONS  (STANDING):  acetaminophen     Tablet .. 975 milliGRAM(s) Oral every 6 hours  atorvastatin 40 milliGRAM(s) Oral at bedtime  bacitracin   Ointment 1 Application(s) Topical three times a day  bisacodyl Suppository 10 milliGRAM(s) Rectal daily  ertapenem  IVPB      ertapenem  IVPB 1000 milliGRAM(s) IV Intermittent every 24 hours  gabapentin 100 milliGRAM(s) Oral every 8 hours  glucagon  Injectable 1 milliGRAM(s) IntraMuscular once  heparin   Injectable 5000 Unit(s) SubCutaneous every 8 hours  insulin lispro (ADMELOG) corrective regimen sliding scale   SubCutaneous three times a day before meals  insulin lispro (ADMELOG) corrective regimen sliding scale   SubCutaneous at bedtime  methocarbamol 500 milliGRAM(s) Oral every 12 hours  omega-3-Acid Ethyl Esters 2 Gram(s) Oral two times a day  pantoprazole    Tablet 40 milliGRAM(s) Oral before breakfast  polyethylene glycol 3350 17 Gram(s) Oral <User Schedule>  senna 2 Tablet(s) Oral at bedtime  simethicone 80 milliGRAM(s) Chew two times a day    MEDICATIONS  (PRN):  dibucaine 1% Ointment 1 Application(s) Topical daily PRN hemorrhoid pain  magnesium hydroxide Suspension 30 milliLiter(s) Oral every 12 hours PRN Constipation    LABS:                        9.5    12.38 )-----------( 766      ( 02 May 2025 05:52 )             28.3     05-02    136  |  105  |  12  ----------------------------<  124[H]  3.0[L]   |  20[L]  |  0.87    Ca    8.4      02 May 2025 17:20  Phos  2.7     05-02  Mg     2.30     05-02        Urinalysis Basic - ( 02 May 2025 17:20 )    Color: x / Appearance: x / SG: x / pH: x  Gluc: 124 mg/dL / Ketone: x  / Bili: x / Urobili: x   Blood: x / Protein: x / Nitrite: x   Leuk Esterase: x / RBC: x / WBC x   Sq Epi: x / Non Sq Epi: x / Bacteria: x      CAPILLARY BLOOD GLUCOSE      POCT Blood Glucose.: 122 mg/dL (02 May 2025 16:19)  POCT Blood Glucose.: 99 mg/dL (02 May 2025 11:24)  POCT Blood Glucose.: 93 mg/dL (02 May 2025 07:28)  POCT Blood Glucose.: 130 mg/dL (01 May 2025 21:33)        Urinalysis Basic - ( 02 May 2025 17:20 )    Color: x / Appearance: x / SG: x / pH: x  Gluc: 124 mg/dL / Ketone: x  / Bili: x / Urobili: x   Blood: x / Protein: x / Nitrite: x   Leuk Esterase: x / RBC: x / WBC x   Sq Epi: x / Non Sq Epi: x / Bacteria: x      REVIEW OF SYSTEMS:  CONSTITUTIONAL: No fever, weight loss, or fatigue  EYES: No eye pain, visual disturbances, or discharge  ENMT:  No difficulty hearing, tinnitus, vertigo; No sinus or throat pain  NECK: No pain or stiffness  RESPIRATORY: No cough, wheezing, chills or hemoptysis; No shortness of breath  CARDIOVASCULAR: No chest pain, palpitations, dizziness, or leg swelling  GASTROINTESTINAL: No abdominal or epigastric pain. No nausea, vomiting, or hematemesis; No diarrhea or constipation. No melena or hematochezia.  GENITOURINARY: No dysuria, frequency, hematuria, or incontinence  NEUROLOGICAL: No headaches, memory loss, loss of strength, numbness, or tremors    RADIOLOGY & ADDITIONAL TESTS:    Consultant(s) Notes Reviewed:  [x ] YES  [ ] NO    PHYSICAL EXAM:  GENERAL: NAD, well-groomed, well-developed,not in any distress ,  HEAD:  Atraumatic, Normocephalic  EYES: EOMI, PERRLA, conjunctiva and sclera clear  ENMT: No tonsillar erythema, exudates, or enlargement; Moist mucous membranes, Good dentition, No lesions  NECK: Supple, No JVD, Normal thyroid  NERVOUS SYSTEM:  Alert & Oriented X3, Drain +++   CHEST/LUNG: Good air entry bilateral with no  rales, rhonchi, wheezing, or rubs  HEART: Regular rate and rhythm; No murmurs, rubs, or gallops  ABDOMEN: Soft, Nontender, Nondistended; Bowel sounds present  EXTREMITIES:  2+ Peripheral Pulses, No clubbing, cyanosis, or edema    Care Discussed with Consultants/Other Providers [ x] YES  [ ] NO

## 2025-05-02 NOTE — PROGRESS NOTE ADULT - ASSESSMENT
A/P:  79M s/p T10-pelvis PSF on 4/24    Plan:  -WBAT BLLE  -appreciate ID recs  -appreciate IR for psoas abscess drainage     - 2 latisha drains per IR     - Asp: + GNR  -f/u blood cx  -PT/OT  -pain control (PCA)  -dvt ppx: venodynes  -monitor drain outputs - drains per PRS  -dispo: ADRIANA  - Abx: Erta      For all questions related to patient care, please reach out to the on-call team via the pager.     Asia Perez, PGY 3  Orthopaedic Surgery  [facility] [phone]  [facility] [id number]  [facility] [phone]

## 2025-05-02 NOTE — PROGRESS NOTE ADULT - ASSESSMENT
79yMale w/ HTN, HLD, T2DM, chronic low back pain presents as a transfer from [facility] ED for surgery today. Pt was sent into ED yesterday by his pain medicine physician due to recent MRI findings concerning for L1-L2 discitis/OM with epidural abscess, bilateral psoas abscesses. Patient initially saw pain specialist in February for chronic back pain with radiations to bilateral lateral thighs (R>L). MRI at that time showed degenerative changes. He subsequently had epidural injections x2 (2/27, 3/11) with moderate pain relief. Pain began to worsen on 4/10. He had radiofrequency ablation performed on 4/11 and has since has severe worsening of pain and radiation to R lateral thigh. He reports recent bowel/bladder "incontinence" requiring diaper due to his inability to get to the bathroom in time due to pain limitations but states urge and sensation are intact. Denies fevers, chills, night sweats, weakness, numbness/tingling, saddle anesthesia, recent falls/trauma. He uses a cane for ambulation. Denies recent falls/trauma or any other ortho injuries. He was born in [place], has frequent travels back, no known TB exposure. NO recent infections, antibiotic use.    ED/Hospital course: Has been afebrile, WBC 14, A1C 7.5, labs otherwise WNL. Underwent decompression, spine, lumbar, posterior approach, with fusion of posterior spinal column on 4/24. Cultures sent. Started on Vancomycin and Zosyn. IR consulted for abscess drainage. ID consulted for antibiotic management.    Blood cultures B fragilis  Abscess cx GNR- B fragilis      # L1/L2 discitis OM s/p decompression/fusion with cx thus far with bacteroides   # Multiloculated bilateral psoas abscesses S/P drainage on 04/26-  cx thus far with bacteroides   # leucocytosis   # JUSTINO    MICRO:   04/23 BCX- 2/4 GNR, bacteroides   04/24 OR cx-  bacteroides   04/25 MRSA nares- negative   04/25 BCX- NGTD  04/26 Psoas abscess cx- GNR, Bacteroides     Recommendations;     - Cultures growing Bacteroides; not typical pathogens for spine infection, suspect transient gut translocation into the blood stream causing bacteremia, epidural abscess and B/L Psoas abscess   - No GI/ pathology on imaging ( enlarged prostate, UA negative)   - Continue Ertapenem 1 gm Q24H  - Will need atleast 6 weeks of IV antibiotics from 04/26/25 until 06/07/25  - Will need a PICC line upon discharge   - Trend ESR/CRP weekly     In addition to reviewing history, imaging, documents, labs, microbiology, took into account antibiotic stewardship, local antibiogram and infection control strategies and potential transmission issues.    Discussed with the primary team.    Alem Russell MD  Attending Physician, Division of Infectious Diseases  Department of Medicine   [facility]    Contact on TEAMS messaging from 9am - 5pm  Office: [phone] (after 5 PM or weekend)

## 2025-05-02 NOTE — PROGRESS NOTE ADULT - ASSESSMENT
ASSESSMENT:  79yMale w/ HTN, HLD, T2DM, chronic low back pain presents as a transfer from [facility] ED for surgery today. Pt was sent into ED yesterday by his pain medicine physician due to recent MRI findings concerning for L1-L2 discitis/OM with epidural abscess, bilateral psoas abscesses    (1)Renal - CKD2, early diabetic nephropathy? Follows as outpatient with Dr. Syed Euceda  (2)Hyponatremia - urine studies c/w low effective arterial volume (appropriately increased ADH) -improved with isotonic IVF; we can forgo further IVF for now, but can reinstate if serum sodium drops again  (3)Hypokalemia     RECOMMEND:  (1)No IVF  (2)A/w KCl 10 mEq IV x 3   (3)BMP at least daily for now  (4)Pain control per Ortho  (5)Diet per sx    Mare Cameron DNP  [facility]  [phone]      ASSESSMENT:  79yMale w/ HTN, HLD, T2DM, chronic low back pain presents as a transfer from [facility] ED for surgery today. Pt was sent into ED yesterday by his pain medicine physician due to recent MRI findings concerning for L1-L2 discitis/OM with epidural abscess, bilateral psoas abscesses    (1)Renal - CKD2, early diabetic nephropathy? Follows as outpatient with Dr. Syed Euceda  (2)Hyponatremia - urine studies c/w low effective arterial volume (appropriately increased ADH) -improved with isotonic IVF; we can forgo further IVF for now, but can reinstate if serum sodium drops again  (3)Hypokalemia     RECOMMEND:  (1)No IVF  (2)A/w KCl 10 mEq IV x 3   (3)BMP at least daily for now  (4)Pain control per Ortho  (5)Diet per sx    Mare Cameron DNP  [facility]  [phone]       RENAL ATTENDING NOTE  Patient seen and examined with NP. Agree with assessment and plan as above.    Yamil Humphreys MD  [facility]  [phone]

## 2025-05-02 NOTE — PROGRESS NOTE ADULT - SUBJECTIVE AND OBJECTIVE BOX
ORTHOPEDIC PROGRESS NOTE    Overnight events: None    SUBJECTIVE: Pt seen and examined at bedside. Patient is doing well, no acute complaints this AM. Pain is controlled with medication. Abd discomfort has improved.      OBJECTIVE:  Vital Signs Last 24 Hrs  T(C): 36.6 (02 May 2025 05:24), Max: 37.1 (01 May 2025 09:36)  T(F): 97.9 (02 May 2025 05:24), Max: 98.7 (01 May 2025 09:36)  HR: 88 (02 May 2025 05:24) (88 - 104)  BP: 138/61 (02 May 2025 05:24) (123/62 - 153/72)  BP(mean): --  RR: 16 (02 May 2025 05:24) (16 - 18)  SpO2: 100% (02 May 2025 05:24) (97% - 100%)    Parameters below as of 02 May 2025 05:24  Patient On (Oxygen Delivery Method): room air    04-30-25 @ 07:01  -  05-01-25 @ 07:00  --------------------------------------------------------  IN: 1000 mL / OUT: 1243 mL / NET: -243 mL    05-01-25 @ 07:01  -  05-02-25 @ 06:26  --------------------------------------------------------  IN: 0 mL / OUT: 1048.5 mL / NET: -1048.5 mL        Physical Examination:  GEN: NAD, resting quietly  PULM: symmetric chest rise bilaterally, no increased WOB  ABD: nondistended  EXTR:   Back: Dressing/ Incision Clean/Dry/Intact,  HV intact with SS output  Motor:                   C5                C6              C7               C8           T1   R            5/5                5/5            5/5             5/5          5/5  L             5/5               5/5             5/5             5/5          5/5                L2             L3             L4               L5            S1  R         4/5           4/5          5/5             5/5           5/5  L          5/5          5/5           5/5             5/5           5/5    Sensory:            C5         C6         C7      C8       T1        (0=absent, 1=impaired, 2=normal, NT=not testable)  R         2            2           2        2         2  L          2            2           2        2         2               L2          L3         L4      L5       S1         (0=absent, 1=impaired, 2=normal, NT=not testable)  R         2            2            2        2        2  L          2            2           2        2         2        LABS:                        8.9    14.93 )-----------( 645      ( 01 May 2025 05:17 )             26.3       05-01    132[L]  |  102  |  22  ----------------------------<  61[L]  3.2[L]   |  18[L]  |  1.04    Ca    8.4      01 May 2025 05:17  Phos  3.6     05-01  Mg     2.40     05-01           ORTHOPEDIC PROGRESS NOTE    Overnight events: None    SUBJECTIVE: Pt seen and examined at bedside. Patient is doing well, no acute complaints this AM. Pain is controlled with medication. Abd discomfort has improved.      OBJECTIVE:  Vital Signs Last 24 Hrs  T(C): 36.6 (02 May 2025 05:24), Max: 37.1 (01 May 2025 09:36)  T(F): 97.9 (02 May 2025 05:24), Max: 98.7 (01 May 2025 09:36)  HR: 88 (02 May 2025 05:24) (88 - 104)  BP: 138/61 (02 May 2025 05:24) (123/62 - 153/72)  BP(mean): --  RR: 16 (02 May 2025 05:24) (16 - 18)  SpO2: 100% (02 May 2025 05:24) (97% - 100%)    Parameters below as of 02 May 2025 05:24  Patient On (Oxygen Delivery Method): room air    04-30-25 @ 07:01  -  05-01-25 @ 07:00  --------------------------------------------------------  IN: 1000 mL / OUT: 1243 mL / NET: -243 mL    05-01-25 @ 07:01  -  05-02-25 @ 06:26  --------------------------------------------------------  IN: 0 mL / OUT: 1048.5 mL / NET: -1048.5 mL        Physical Examination:  GEN: NAD, resting quietly  PULM: symmetric chest rise bilaterally, no increased WOB  ABD: mildly distended  EXTR:   Back: Dressing/ Incision Clean/Dry/Intact,  HV intact with SS output  Motor:                   C5                C6              C7               C8           T1   R            5/5                5/5            5/5             5/5          5/5  L             5/5               5/5             5/5             5/5          5/5                L2             L3             L4               L5            S1  R         4/5           4/5          5/5             5/5           5/5  L          5/5          5/5           5/5             5/5           5/5    Sensory:            C5         C6         C7      C8       T1        (0=absent, 1=impaired, 2=normal, NT=not testable)  R         2            2           2        2         2  L          2            2           2        2         2               L2          L3         L4      L5       S1         (0=absent, 1=impaired, 2=normal, NT=not testable)  R         2            2            2        2        2  L          2            2           2        2         2        LABS:                        8.9    14.93 )-----------( 645      ( 01 May 2025 05:17 )             26.3       05-01    132[L]  |  102  |  22  ----------------------------<  61[L]  3.2[L]   |  18[L]  |  1.04    Ca    8.4      01 May 2025 05:17  Phos  3.6     05-01  Mg     2.40     05-01

## 2025-05-02 NOTE — PROGRESS NOTE ADULT - ASSESSMENT
79M s/p T10-pelvis PSF w/ PRS closure 4/24    - pain control PRN  - dressing changes PRN, done today  - Monitor DHARA - holding suction; drain care. DC'd last drain.  - appreciate care per primary    Erik Gómez MD   [facility] Surgery Resident PGY1    For any questions, please DO NOT reach out via Teams.    Plastic Surgery   [facility]: [id number]  [facility]: [phone]

## 2025-05-02 NOTE — PROVIDER CONTACT NOTE (CRITICAL VALUE NOTIFICATION) - ASSESSMENT
Patient AxOx4. VS stable. asymptomatic. denies chest pain or sob.
Vital signs stable. Patient afebrile.

## 2025-05-02 NOTE — PROGRESS NOTE ADULT - ASSESSMENT
79yMale w/ HTN, HLD, T2DM, chronic low back pain presents as a transfer from [facility] ED for surgery today. Pt was sent into ED yesterday by his pain medicine physician due to recent MRI findings concerning for L1-L2 discitis/OM with epidural abscess, bilateral psoas abscesses. Patient initially saw pain specialist in February for chronic back pain with radiations to bilateral lateral thighs (R>L). MRI at that time showed degenerative changes. He subsequently had epidural injections x2 (2/27, 3/11) with moderate pain relief. Pain began to worsen on 4/10. He had radiofrequency ablation performed on 4/11 and has since has severe worsening of pain and radiation to R lateral thigh. He reports recent bowel/bladder "incontinence" requiring diaper due to his inability to get to the bathroom in time due to pain limitations but states urge and sensation are intact. Denies fevers, chills, night sweats, weakness, numbness/tingling, saddle anesthesia, recent falls/trauma. He uses a cane for ambulation. Denies recent falls/trauma or any other ortho injuries. Before back pain was very active walked half an hour , going up and down stairs and doing grocery etc with no Chest pain or SOB.        Problem/Recommendation - 1:  ·  Problem: Epidural abscess.   ·  Recommendation: S/P Decompression, spine, lumbar, posterior approach, with fusion of posterior spinal column.  On IV Abxs Ertapenem now  per ID x 6 weeks  .  Will defer management to  Neurosurgery.    < from: MR Lumbar Spine w/ IV Cont (04.23.25 @ 15:13) >  IMPRESSION:        1.   Cervical spine:   Moderate degenerative disc disease and   spondylosis at C4-5 through C6/7 with loss of disc height and associated   degenerative endplate changes.        2.   Thoracic spine:   Minimal enhancement within T7-8 which may   reflect early discitis.        3.   Lumbar spine:   Osteomyelitis and discitis at L1-2 with erosions   of the inferior L1 vertebral body and L2 vertebral body consistent with   osteomyelitis and discitis. Ventral epidural abscess is noted extending   from the L1-2 level superiorly to the T10 level with mass effect on the   ventral thecal sac, most prominently at the L1-2 level resulting in   marked compression. Multiple abscesses within the BILATERAL psoas   muscles, the largest measuring 4.3 cm on the RIGHT and 2.8 cm on the LEFT.     Problem/Recommendation - 2:  ·  Problem: Acute osteomyelitis of lumbar spine.   ·  Recommendation: L1 &L2 vertebrae .  On IV Abxs per ID .  Will defer management to Neurosurgery.     Problem/Recommendation - 3:  ·  Problem: Low back pain radiating to right lower extremity.   ·  Recommendation: Pain control.     Problem/Recommendation - 4:  ·  Problem: . BILATERAL psoas Abscess  ·  Recommendation: ON IV Abxs per ID .  IR placed drains after draining  .       Problem/Recommendation - 5:  ·  Problem: HTN (hypertension).   ·  Recommendation: Takes Amlodipine and ARB so continue with hold parameters.  < from: TTE W or WO Ultrasound Enhancing Agent (04.24.25 @ 10:10) >  CONCLUSIONS:      1. Left ventricular cavity is normal in size. Left ventricular wall thickness is normal. Left ventricular systolic function is normal with an ejection fraction of 64 % by Carpenter's method of disks. There are no regional wall motion abnormalities seen.   2. Normal right ventricular cavity size and normal right ventricular systolic function. Tricuspid annular plane systolic excursion (TAPSE) is 2.2 cm (normal >=1.7 cm).   3. Structurally normal mitral valve with normal leaflet excursion. There is calcification of the mitral valve annulus. There is tracemitral regurgitation.   4. The aortic valve appears trileaflet with normal systolic excursion. There is calcification of the aortic valve leaflets. There is mild aortic regurgitation.     Problem/Recommendation - 6:  ·  Problem: HLD (hyperlipidemia).   ·  Recommendation: Continue Atorvastatin.     Problem/Recommendation - 7:  ·  Problem: DM (diabetes mellitus).   ·  Recommendation: ON Farxiga and holding.  SSI and Lantus in patient .     Problem/Recommendation - 8:  ·  Problem:  Ileus /Pseudoobustraction.   ·  Recommendation: Had BMs and not  passing flatus .    ON Laxatives and Simethacone .  CLD started.   Surgery & GI input appreciated .  < from: CT Abdomen and Pelvis w/ Oral Cont and w/ IV Cont (04.29.25 @ 17:16) >  IMPRESSION:  Diffuse colonic dilatation, worse on today's study, likely secondary to   pseudoobstruction or ileus.    No free air.    Reduction in the bilateral psoas collections with catheters in place.    < from: Xray Abdomen 1 View PORTABLE -Urgent (Xray Abdomen 1 View PORTABLE -Urgent .) (04.29.25 @ 10:09) >  IMPRESSION:  Multiple dilated loops of small and large bowel with question of   pneumoperitoneum. Recommend upright chest radiograph or decubitus   abdominal radiograph for further evaluation.    Paged surgery team and waiting for response .      Problem/Recommendation - 9:  ·  Problem: JUSTINO with Hyponatremia .   ·  Recommendation: Trending BMP.  Improving.   Renal help appreciated.      Problem/Recommendation - 10:  ·  Problem: Hypokalemia .   ·  Recommendation: Replacing.   Please repeat PM also so can be replaced if still low.   Keep K level close to 4 .     D/W patient in detail and wife.     PT working with patient .  D/W ortho PA.

## 2025-05-02 NOTE — PROGRESS NOTE ADULT - SUBJECTIVE AND OBJECTIVE BOX
DATE OF SERVICE: 05-02-25    resting in bed, per Rn 4 bowel movements overnight    MEDICATIONS:  acetaminophen     Tablet .. 975 milliGRAM(s) Oral every 6 hours  atorvastatin 40 milliGRAM(s) Oral at bedtime  bacitracin   Ointment 1 Application(s) Topical three times a day  bisacodyl Suppository 10 milliGRAM(s) Rectal daily  dibucaine 1% Ointment 1 Application(s) Topical daily PRN  ertapenem  IVPB      ertapenem  IVPB 1000 milliGRAM(s) IV Intermittent every 24 hours  gabapentin 100 milliGRAM(s) Oral every 8 hours  glucagon  Injectable 1 milliGRAM(s) IntraMuscular once  heparin   Injectable 5000 Unit(s) SubCutaneous every 8 hours  insulin lispro (ADMELOG) corrective regimen sliding scale   SubCutaneous three times a day before meals  insulin lispro (ADMELOG) corrective regimen sliding scale   SubCutaneous at bedtime  magnesium hydroxide Suspension 30 milliLiter(s) Oral every 12 hours PRN  methocarbamol 500 milliGRAM(s) Oral every 12 hours  omega-3-Acid Ethyl Esters 2 Gram(s) Oral two times a day  pantoprazole    Tablet 40 milliGRAM(s) Oral before breakfast  polyethylene glycol 3350 17 Gram(s) Oral <User Schedule>  potassium chloride  10 mEq/100 mL IVPB 10 milliEquivalent(s) IV Intermittent every 1 hour  senna 2 Tablet(s) Oral at bedtime  simethicone 80 milliGRAM(s) Chew two times a day      LABS:                        9.5    12.38 )-----------( 766      ( 02 May 2025 05:52 )             28.3       Hemoglobin: 9.5 g/dL (05-02 @ 05:52)  Hemoglobin: 8.9 g/dL (05-01 @ 05:17)  Hemoglobin: 9.6 g/dL (04-30 @ 05:41)  Hemoglobin: 9.6 g/dL (04-28 @ 06:00)      05-02    132[L]  |  103  |  15  ----------------------------<  85  2.9[LL]   |  21[L]  |  0.94    Ca    8.6      02 May 2025 05:52  Phos  2.7     05-02  Mg     2.30     05-02      Creatinine Trend: 0.94<--, 1.04<--, 1.14<--, 1.36<--, 1.21<--, 1.09<--    COAGS:           PHYSICAL EXAM:  T(C): 37.1 (05-02-25 @ 09:24), Max: 37.1 (05-02-25 @ 09:24)  HR: 92 (05-02-25 @ 09:24) (88 - 104)  BP: 132/64 (05-02-25 @ 09:24) (123/62 - 153/72)  RR: 18 (05-02-25 @ 09:24) (16 - 18)  SpO2: 98% (05-02-25 @ 09:24) (97% - 100%)  Wt(kg): --    I&O's Summary    01 May 2025 07:01  -  02 May 2025 07:00  --------------------------------------------------------  IN: 0 mL / OUT: 1048.5 mL / NET: -1048.5 mL    02 May 2025 07:01  -  02 May 2025 12:01  --------------------------------------------------------  IN: 0 mL / OUT: 15 mL / NET: -15 mL        Gen: NAD  HEENT:  (-)icterus (-)pallor  CV: N S1 S2 1/6 DARYA (+)2 Pulses B/l  Resp:  Clear to auscultation B/L, normal effort  GI: distended  Lymph:  (-)Edema, (-)obvious lymphadenopathy  Skin: Warm to touch, Normal turgor  Psych: Appropriate mood and affect      TELEMETRY: 	  NSR    ECG:  	NSR    < from: Xray Abdomen 1 View PORTABLE -Urgent (Xray Abdomen 1 View PORTABLE -Urgent .) (04.29.25 @ 10:09) >  IMPRESSION:  Multiple dilated loops of small and large bowel with question of   pneumoperitoneum. Recommend upright chest radiograph or decubitus   abdominal radiograph for further evaluation.    < end of copied text >    < from: CT Abdomen and Pelvis w/ Oral Cont and w/ IV Cont (04.29.25 @ 17:16) >  IMPRESSION:      Diffuse colonic dilatation, worse on today's study, likely secondary to   pseudoobstruction or ileus.    No free air.    Reduction in the bilateral psoas collections with catheters in place.    PRELIMINARY IMPRESSION: Study limited secondary to extensive streak   artifact from patient's spinal hardware. Diffusely dilated colon to the   level of the rectum, increased since 4/25/2025, without evidence of   mechanical obstruction. Possible etiologies include pseudoobstruction   versus ileus. No small bowel obstruction. No pneumoperitoneum.   Percutaneous posterior approach drainage catheters within the psoas   muscles bilaterally with interval decrease of previously seen abscess.   Redemonstrated erosive endplate changes at L1-L2 compatible with discitis   osteomyelitis.    Follow-up final report in the a.m.    < end of copied text >      ASSESSMENT/PLAN: 	Pt is a  79 year old male w/ HTN, HLD, T2DM, chronic low back pain presents as a transfer from [facility] ED for surgery. . Pt was sent into ED yesterday by his pain medicine physician due to recent MRI findings concerning for L1-L2 discitis/OM with epidural abscess, bilateral psoas abscesses. Patient initially saw pain specialist in February for chronic back pain with radiations to bilateral lateral thighs (R>L). MRI at that time showed degenerative changes. He subsequently had epidural injections x2 (2/27, 3/11) with moderate pain relief. Pain began to worsen on 4/10. He had radiofrequency ablation performed on 4/11 and has since has severe worsening of pain and radiation to R lateral thigh. He reports recent bowel/bladder "incontinence" requiring diaper due to his inability to get to the bathroom in time due to pain limitations but states urge and sensation are intact. Denies fevers, chills, night sweats, weakness, numbness/tingling, saddle anesthesia, recent falls/trauma. He uses a cane for ambulation. Denies recent falls/trauma or any other ortho injuries.   Found to have epidural abscess now s/p Decompression, spine, lumbar, posterior approach, with fusion of posterior spinal column, planned for psoas abscess drainage.    - s/p Decompression, spine, lumbar, posterior approach, with fusion of posterior spinal column  - s/p psoas drainage   - pain control  - c/w statin  - bowel regimen, F/U GI      Janet Roy MD  Pager: [phone]  Office: [phone]

## 2025-05-02 NOTE — PROGRESS NOTE ADULT - SUBJECTIVE AND OBJECTIVE BOX
Plastic Surgery Progress Note (pg [facility]: [id number], [facility]: [phone])    SUBJECTIVE  The patient was seen and examined. No acute events overnight. Pain controlled, afebrile w/ stable vitals.     OBJECTIVE  ___________________________________________________  VITAL SIGNS / I&O's   Vital Signs Last 24 Hrs  T(C): 36.4 (03 May 2025 05:16), Max: 37.1 (02 May 2025 09:24)  T(F): 97.6 (03 May 2025 05:16), Max: 98.7 (02 May 2025 09:24)  HR: 91 (03 May 2025 05:16) (68 - 97)  BP: 137/70 (03 May 2025 05:16) (126/68 - 148/69)  BP(mean): --  RR: 18 (03 May 2025 05:16) (18 - 18)  SpO2: 100% (03 May 2025 05:16) (98% - 100%)    Parameters below as of 03 May 2025 05:16  Patient On (Oxygen Delivery Method): room air          01 May 2025 07:01  -  02 May 2025 07:00  --------------------------------------------------------  IN:  Total IN: 0 mL    OUT:    Bulb (mL): 18 mL    Bulb (mL): 26 mL    Bulb (mL): 4.5 mL    Voided (mL): 1000 mL  Total OUT: 1048.5 mL    Total NET: -1048.5 mL      02 May 2025 07:01  -  03 May 2025 06:34  --------------------------------------------------------  IN:  Total IN: 0 mL    OUT:    Bulb (mL): 10 mL    Bulb (mL): 7.5 mL    Bulb (mL): 15 mL    Voided (mL): 600 mL  Total OUT: 632.5 mL    Total NET: -632.5 mL        ___________________________________________________  PHYSICAL EXAM  NEURO: NAD,   HEENT: NC/AT  RESPIRATORY: nonlabored respirations, normal CW expansion  BACK: dressing changed, incisions well appearing. DHARA x1 SS output, IR drains murky serous drainage.    ___________________________________________________  LABS                        9.5    12.38 )-----------( 766      ( 02 May 2025 05:52 )             28.3     02 May 2025 17:20    136    |  105    |  12     ----------------------------<  124    3.0     |  20     |  0.87     Ca    8.4        02 May 2025 17:20  Phos  2.7       02 May 2025 05:52  Mg     2.30      02 May 2025 05:52        CAPILLARY BLOOD GLUCOSE      POCT Blood Glucose.: 118 mg/dL (02 May 2025 22:17)  POCT Blood Glucose.: 122 mg/dL (02 May 2025 16:19)  POCT Blood Glucose.: 99 mg/dL (02 May 2025 11:24)  POCT Blood Glucose.: 93 mg/dL (02 May 2025 07:28)        Urinalysis Basic - ( 02 May 2025 17:20 )    Color: x / Appearance: x / SG: x / pH: x  Gluc: 124 mg/dL / Ketone: x  / Bili: x / Urobili: x   Blood: x / Protein: x / Nitrite: x   Leuk Esterase: x / RBC: x / WBC x   Sq Epi: x / Non Sq Epi: x / Bacteria: x      ___________________________________________________  MICRO  Recent Cultures:  Specimen Source: Abscess, 04-26 @ 15:44; Results   Numerous Bacteroides fragilis "Susceptibilities not performed"[!]; Gram Stain:   Numerous polymorphonuclear leukocytes per low power field  Moderate Gram Negative Rods per oil power field[!]; Organism: --  Specimen Source: Abscess, 04-26 @ 15:40; Results   Numerous Bacteroides fragilis "Susceptibilities not performed"[!]; Gram Stain:   Numerous polymorphonuclear leukocytes per low power field  Few Gram Negative Rods per oil power field[!]; Organism: --    ___________________________________________________  MEDICATIONS  (STANDING):  acetaminophen     Tablet .. 975 milliGRAM(s) Oral every 6 hours  atorvastatin 40 milliGRAM(s) Oral at bedtime  bacitracin   Ointment 1 Application(s) Topical three times a day  bisacodyl Suppository 10 milliGRAM(s) Rectal daily  ertapenem  IVPB      ertapenem  IVPB 1000 milliGRAM(s) IV Intermittent every 24 hours  gabapentin 100 milliGRAM(s) Oral every 8 hours  glucagon  Injectable 1 milliGRAM(s) IntraMuscular once  heparin   Injectable 5000 Unit(s) SubCutaneous every 8 hours  insulin lispro (ADMELOG) corrective regimen sliding scale   SubCutaneous three times a day before meals  insulin lispro (ADMELOG) corrective regimen sliding scale   SubCutaneous at bedtime  methocarbamol 500 milliGRAM(s) Oral every 12 hours  omega-3-Acid Ethyl Esters 2 Gram(s) Oral two times a day  pantoprazole    Tablet 40 milliGRAM(s) Oral before breakfast  polyethylene glycol 3350 17 Gram(s) Oral <User Schedule>  senna 2 Tablet(s) Oral at bedtime  simethicone 80 milliGRAM(s) Chew two times a day    MEDICATIONS  (PRN):  dibucaine 1% Ointment 1 Application(s) Topical daily PRN hemorrhoid pain  magnesium hydroxide Suspension 30 milliLiter(s) Oral every 12 hours PRN Constipation

## 2025-05-02 NOTE — PROGRESS NOTE ADULT - SUBJECTIVE AND OBJECTIVE BOX
NEPHROLOGY     Patient seen and examined resting comfortably on room air, reports having BM yesterday, less abdominal discomfort, no sob, in no acute distress.     MEDICATIONS  (STANDING):  acetaminophen     Tablet .. 975 milliGRAM(s) Oral every 6 hours  atorvastatin 40 milliGRAM(s) Oral at bedtime  bacitracin   Ointment 1 Application(s) Topical three times a day  bisacodyl Suppository 10 milliGRAM(s) Rectal daily  ertapenem  IVPB      ertapenem  IVPB 1000 milliGRAM(s) IV Intermittent every 24 hours  gabapentin 100 milliGRAM(s) Oral every 8 hours  glucagon  Injectable 1 milliGRAM(s) IntraMuscular once  heparin   Injectable 5000 Unit(s) SubCutaneous every 8 hours  insulin lispro (ADMELOG) corrective regimen sliding scale   SubCutaneous three times a day before meals  insulin lispro (ADMELOG) corrective regimen sliding scale   SubCutaneous at bedtime  methocarbamol 500 milliGRAM(s) Oral every 12 hours  omega-3-Acid Ethyl Esters 2 Gram(s) Oral two times a day  pantoprazole    Tablet 40 milliGRAM(s) Oral before breakfast  polyethylene glycol 3350 17 Gram(s) Oral <User Schedule>  potassium chloride  10 mEq/100 mL IVPB 10 milliEquivalent(s) IV Intermittent every 1 hour  senna 2 Tablet(s) Oral at bedtime    VITALS:  T(C): , Max: 37.1 (05-01-25 @ 09:36)  T(F): , Max: 98.7 (05-01-25 @ 09:36)  HR: 92 (05-02-25 @ 09:24)  BP: 132/64 (05-02-25 @ 09:24)  RR: 18 (05-02-25 @ 09:24)  SpO2: 98% (05-02-25 @ 09:24)    I and O's:    05-01 @ 07:01  -  05-02 @ 07:00  --------------------------------------------------------  IN: 0 mL / OUT: 1048.5 mL / NET: -1048.5 mL    PHYSICAL EXAM:  Constitutional: NAD  HEENT: EOMI  Neck:  No JVD, supple   Respiratory: CTA B/L  Cardiovascular: S1 and S2, RRR  Gastrointestinal: distended  Extremities: tr peripheral edema, + peripheral pulses  Neurological: A/O x 3, CN2-12 intact  Psychiatric: Normal mood, normal affect  : No Mac  Skin: No rashes, C/D/I    LABS:                        9.5    12.38 )-----------( 766      ( 02 May 2025 05:52 )             28.3     05-02    132[L]  |  103  |  15  ----------------------------<  85  2.9[LL]   |  21[L]  |  0.94    Ca    8.6      02 May 2025 05:52  Phos  2.7     05-02  Mg     2.30     05-02    Urine Studies      Chloride, Random Urine: <20 mmol/L (04-30 @ 17:15)  Osmolality, Random Urine: 325 mosm/kg (04-30 @ 17:15)  Potassium, Random Urine: 9.4 mmol/L (04-30 @ 17:15)  Sodium, Random Urine: <20 mmol/L (04-30 @ 17:15)    RADIOLOGY & ADDITIONAL STUDIES:  rad< from: Xray Abdomen 1 View PORTABLE -Urgent (Xray Abdomen 1 View PORTABLE -Urgent .) (05.01.25 @ 10:31) >  IMPRESSION:  Multiple dilated loops of small and large bowel, similar to prior   consistent with severe pseudoobstruction.    --- End of Report ---          GATITO CASTILLO MD; Resident Radiologist  This document has been electronically signed.  EMILE AVENDANO MD; Attending Radiologist  This document has been electronically signed. May  1 2025  2:34PM    < end of copied text >   [Conjuctival Injection] : conjunctival injection [Increased Tearing] : increased tearing [Bilateral] : (bilateral) [NL] : warm, clear

## 2025-05-03 LAB
ANION GAP SERPL CALC-SCNC: 10 MMOL/L — SIGNIFICANT CHANGE UP (ref 7–14)
ANION GAP SERPL CALC-SCNC: 13 MMOL/L — SIGNIFICANT CHANGE UP (ref 7–14)
BUN SERPL-MCNC: 10 MG/DL — SIGNIFICANT CHANGE UP (ref 7–23)
BUN SERPL-MCNC: 10 MG/DL — SIGNIFICANT CHANGE UP (ref 7–23)
CALCIUM SERPL-MCNC: 8.3 MG/DL — LOW (ref 8.4–10.5)
CALCIUM SERPL-MCNC: 8.6 MG/DL — SIGNIFICANT CHANGE UP (ref 8.4–10.5)
CHLORIDE SERPL-SCNC: 105 MMOL/L — SIGNIFICANT CHANGE UP (ref 98–107)
CHLORIDE SERPL-SCNC: 106 MMOL/L — SIGNIFICANT CHANGE UP (ref 98–107)
CO2 SERPL-SCNC: 16 MMOL/L — LOW (ref 22–31)
CO2 SERPL-SCNC: 17 MMOL/L — LOW (ref 22–31)
CREAT SERPL-MCNC: 0.9 MG/DL — SIGNIFICANT CHANGE UP (ref 0.5–1.3)
CREAT SERPL-MCNC: 0.94 MG/DL — SIGNIFICANT CHANGE UP (ref 0.5–1.3)
EGFR: 82 ML/MIN/1.73M2 — SIGNIFICANT CHANGE UP
EGFR: 82 ML/MIN/1.73M2 — SIGNIFICANT CHANGE UP
EGFR: 87 ML/MIN/1.73M2 — SIGNIFICANT CHANGE UP
EGFR: 87 ML/MIN/1.73M2 — SIGNIFICANT CHANGE UP
GLUCOSE BLDC GLUCOMTR-MCNC: 116 MG/DL — HIGH (ref 70–99)
GLUCOSE BLDC GLUCOMTR-MCNC: 170 MG/DL — HIGH (ref 70–99)
GLUCOSE BLDC GLUCOMTR-MCNC: 181 MG/DL — HIGH (ref 70–99)
GLUCOSE BLDC GLUCOMTR-MCNC: 89 MG/DL — SIGNIFICANT CHANGE UP (ref 70–99)
GLUCOSE SERPL-MCNC: 117 MG/DL — HIGH (ref 70–99)
GLUCOSE SERPL-MCNC: 142 MG/DL — HIGH (ref 70–99)
HCT VFR BLD CALC: 28.7 % — LOW (ref 39–50)
HGB BLD-MCNC: 9.9 G/DL — LOW (ref 13–17)
MAGNESIUM SERPL-MCNC: 2.1 MG/DL — SIGNIFICANT CHANGE UP (ref 1.6–2.6)
MCHC RBC-ENTMCNC: 30 PG — SIGNIFICANT CHANGE UP (ref 27–34)
MCHC RBC-ENTMCNC: 34.5 G/DL — SIGNIFICANT CHANGE UP (ref 32–36)
MCV RBC AUTO: 87 FL — SIGNIFICANT CHANGE UP (ref 80–100)
NRBC # BLD AUTO: 0 K/UL — SIGNIFICANT CHANGE UP (ref 0–0)
NRBC # FLD: 0 K/UL — SIGNIFICANT CHANGE UP (ref 0–0)
NRBC BLD AUTO-RTO: 0 /100 WBCS — SIGNIFICANT CHANGE UP (ref 0–0)
PHOSPHATE SERPL-MCNC: 2.2 MG/DL — LOW (ref 2.5–4.5)
PLATELET # BLD AUTO: 885 K/UL — HIGH (ref 150–400)
POTASSIUM SERPL-MCNC: 3.3 MMOL/L — LOW (ref 3.5–5.3)
POTASSIUM SERPL-MCNC: 3.5 MMOL/L — SIGNIFICANT CHANGE UP (ref 3.5–5.3)
POTASSIUM SERPL-SCNC: 3.3 MMOL/L — LOW (ref 3.5–5.3)
POTASSIUM SERPL-SCNC: 3.5 MMOL/L — SIGNIFICANT CHANGE UP (ref 3.5–5.3)
RBC # BLD: 3.3 M/UL — LOW (ref 4.2–5.8)
RBC # FLD: 16.3 % — HIGH (ref 10.3–14.5)
SODIUM SERPL-SCNC: 132 MMOL/L — LOW (ref 135–145)
SODIUM SERPL-SCNC: 135 MMOL/L — SIGNIFICANT CHANGE UP (ref 135–145)
WBC # BLD: 10.49 K/UL — SIGNIFICANT CHANGE UP (ref 3.8–10.5)
WBC # FLD AUTO: 10.49 K/UL — SIGNIFICANT CHANGE UP (ref 3.8–10.5)

## 2025-05-03 PROCEDURE — 71045 X-RAY EXAM CHEST 1 VIEW: CPT | Mod: 26

## 2025-05-03 RX ORDER — POTASSIUM PHOSPHATE, MONOBASIC POTASSIUM PHOSPHATE, DIBASIC INJECTION, 236; 224 MG/ML; MG/ML
30 SOLUTION, CONCENTRATE INTRAVENOUS ONCE
Refills: 0 | Status: DISCONTINUED | OUTPATIENT
Start: 2025-05-03 | End: 2025-05-04

## 2025-05-03 RX ORDER — POTASSIUM PHOSPHATE, MONOBASIC POTASSIUM PHOSPHATE, DIBASIC INJECTION, 236; 224 MG/ML; MG/ML
30 SOLUTION, CONCENTRATE INTRAVENOUS ONCE
Refills: 0 | Status: COMPLETED | OUTPATIENT
Start: 2025-05-03 | End: 2025-05-03

## 2025-05-03 RX ADMIN — Medication 100 MILLIEQUIVALENT(S): at 16:19

## 2025-05-03 RX ADMIN — HEPARIN SODIUM 5000 UNIT(S): 1000 INJECTION INTRAVENOUS; SUBCUTANEOUS at 21:16

## 2025-05-03 RX ADMIN — INSULIN LISPRO 1: 100 INJECTION, SOLUTION INTRAVENOUS; SUBCUTANEOUS at 11:30

## 2025-05-03 RX ADMIN — HEPARIN SODIUM 5000 UNIT(S): 1000 INJECTION INTRAVENOUS; SUBCUTANEOUS at 14:01

## 2025-05-03 RX ADMIN — Medication 2 TABLET(S): at 22:44

## 2025-05-03 RX ADMIN — DIBUCAINE 1 APPLICATION(S): 10 OINTMENT TOPICAL at 21:13

## 2025-05-03 RX ADMIN — Medication 975 MILLIGRAM(S): at 18:34

## 2025-05-03 RX ADMIN — GABAPENTIN 100 MILLIGRAM(S): 400 CAPSULE ORAL at 06:26

## 2025-05-03 RX ADMIN — METHOCARBAMOL 500 MILLIGRAM(S): 500 TABLET, FILM COATED ORAL at 11:41

## 2025-05-03 RX ADMIN — Medication 1 APPLICATION(S): at 06:26

## 2025-05-03 RX ADMIN — GABAPENTIN 100 MILLIGRAM(S): 400 CAPSULE ORAL at 22:44

## 2025-05-03 RX ADMIN — Medication 1 APPLICATION(S): at 21:15

## 2025-05-03 RX ADMIN — INSULIN LISPRO 1: 100 INJECTION, SOLUTION INTRAVENOUS; SUBCUTANEOUS at 16:53

## 2025-05-03 RX ADMIN — ERTAPENEM SODIUM 100 MILLIGRAM(S): 1 INJECTION, POWDER, LYOPHILIZED, FOR SOLUTION INTRAMUSCULAR; INTRAVENOUS at 14:00

## 2025-05-03 RX ADMIN — Medication 975 MILLIGRAM(S): at 07:26

## 2025-05-03 RX ADMIN — Medication 40 MILLIGRAM(S): at 06:26

## 2025-05-03 RX ADMIN — Medication 80 MILLIGRAM(S): at 17:34

## 2025-05-03 RX ADMIN — Medication 1 APPLICATION(S): at 14:01

## 2025-05-03 RX ADMIN — POTASSIUM PHOSPHATE, MONOBASIC POTASSIUM PHOSPHATE, DIBASIC INJECTION, 83.33 MILLIMOLE(S): 236; 224 SOLUTION, CONCENTRATE INTRAVENOUS at 20:36

## 2025-05-03 RX ADMIN — Medication 975 MILLIGRAM(S): at 12:41

## 2025-05-03 RX ADMIN — Medication 975 MILLIGRAM(S): at 00:12

## 2025-05-03 RX ADMIN — METHOCARBAMOL 500 MILLIGRAM(S): 500 TABLET, FILM COATED ORAL at 01:08

## 2025-05-03 RX ADMIN — Medication 80 MILLIGRAM(S): at 06:27

## 2025-05-03 RX ADMIN — POLYETHYLENE GLYCOL 3350 17 GRAM(S): 17 POWDER, FOR SOLUTION ORAL at 14:01

## 2025-05-03 RX ADMIN — Medication 975 MILLIGRAM(S): at 17:34

## 2025-05-03 RX ADMIN — Medication 975 MILLIGRAM(S): at 11:41

## 2025-05-03 RX ADMIN — HEPARIN SODIUM 5000 UNIT(S): 1000 INJECTION INTRAVENOUS; SUBCUTANEOUS at 06:26

## 2025-05-03 RX ADMIN — GABAPENTIN 100 MILLIGRAM(S): 400 CAPSULE ORAL at 14:00

## 2025-05-03 RX ADMIN — OMEGA-3-ACID ETHYL ESTERS CAPSULES 2 GRAM(S): 1 CAPSULE, LIQUID FILLED ORAL at 09:20

## 2025-05-03 RX ADMIN — Medication 975 MILLIGRAM(S): at 06:26

## 2025-05-03 RX ADMIN — POLYETHYLENE GLYCOL 3350 17 GRAM(S): 17 POWDER, FOR SOLUTION ORAL at 09:20

## 2025-05-03 RX ADMIN — Medication 975 MILLIGRAM(S): at 01:12

## 2025-05-03 RX ADMIN — Medication 100 MILLIEQUIVALENT(S): at 14:00

## 2025-05-03 NOTE — PROGRESS NOTE ADULT - SUBJECTIVE AND OBJECTIVE BOX
Date of Service  : 05-03-25     INTERVAL HPI/OVERNIGHT EVENTS: Seen and examined with daughter and son in law in room. Having BM but not passing gas.  Vital Signs Last 24 Hrs  T(C): 36.6 (03 May 2025 17:57), Max: 36.9 (03 May 2025 14:00)  T(F): 97.8 (03 May 2025 17:57), Max: 98.5 (03 May 2025 14:00)  HR: 100 (03 May 2025 17:57) (91 - 100)  BP: 134/76 (03 May 2025 17:57) (123/70 - 148/69)  BP(mean): --  RR: 18 (03 May 2025 17:57) (18 - 18)  SpO2: 97% (03 May 2025 17:57) (97% - 100%)    Parameters below as of 03 May 2025 17:57  Patient On (Oxygen Delivery Method): room air      I&O's Summary    02 May 2025 07:01  -  03 May 2025 07:00  --------------------------------------------------------  IN: 0 mL / OUT: 935 mL / NET: -935 mL    03 May 2025 07:01  -  03 May 2025 20:06  --------------------------------------------------------  IN: 0 mL / OUT: 377 mL / NET: -377 mL      MEDICATIONS  (STANDING):  acetaminophen     Tablet .. 975 milliGRAM(s) Oral every 6 hours  atorvastatin 40 milliGRAM(s) Oral at bedtime  bacitracin   Ointment 1 Application(s) Topical three times a day  bisacodyl Suppository 10 milliGRAM(s) Rectal daily  ertapenem  IVPB 1000 milliGRAM(s) IV Intermittent every 24 hours  ertapenem  IVPB      gabapentin 100 milliGRAM(s) Oral every 8 hours  glucagon  Injectable 1 milliGRAM(s) IntraMuscular once  heparin   Injectable 5000 Unit(s) SubCutaneous every 8 hours  insulin lispro (ADMELOG) corrective regimen sliding scale   SubCutaneous three times a day before meals  insulin lispro (ADMELOG) corrective regimen sliding scale   SubCutaneous at bedtime  methocarbamol 500 milliGRAM(s) Oral every 12 hours  omega-3-Acid Ethyl Esters 2 Gram(s) Oral two times a day  pantoprazole    Tablet 40 milliGRAM(s) Oral before breakfast  polyethylene glycol 3350 17 Gram(s) Oral <User Schedule>  potassium phosphate IVPB 30 milliMole(s) IV Intermittent once  senna 2 Tablet(s) Oral at bedtime  simethicone 80 milliGRAM(s) Chew two times a day    MEDICATIONS  (PRN):  dibucaine 1% Ointment 1 Application(s) Topical daily PRN hemorrhoid pain  magnesium hydroxide Suspension 30 milliLiter(s) Oral every 12 hours PRN Constipation    LABS:                        9.9    10.49 )-----------( 885      ( 03 May 2025 10:53 )             28.7     05-03    135  |  106  |  10  ----------------------------<  117[H]  3.5   |  16[L]  |  0.90    Ca    8.3[L]      03 May 2025 18:58  Phos  2.2     05-03  Mg     2.10     05-03        Urinalysis Basic - ( 03 May 2025 18:58 )    Color: x / Appearance: x / SG: x / pH: x  Gluc: 117 mg/dL / Ketone: x  / Bili: x / Urobili: x   Blood: x / Protein: x / Nitrite: x   Leuk Esterase: x / RBC: x / WBC x   Sq Epi: x / Non Sq Epi: x / Bacteria: x      CAPILLARY BLOOD GLUCOSE      POCT Blood Glucose.: 181 mg/dL (03 May 2025 16:21)  POCT Blood Glucose.: 170 mg/dL (03 May 2025 11:19)  POCT Blood Glucose.: 89 mg/dL (03 May 2025 07:26)  POCT Blood Glucose.: 118 mg/dL (02 May 2025 22:17)        Urinalysis Basic - ( 03 May 2025 18:58 )    Color: x / Appearance: x / SG: x / pH: x  Gluc: 117 mg/dL / Ketone: x  / Bili: x / Urobili: x   Blood: x / Protein: x / Nitrite: x   Leuk Esterase: x / RBC: x / WBC x   Sq Epi: x / Non Sq Epi: x / Bacteria: x      REVIEW OF SYSTEMS:  CONSTITUTIONAL: No fever, weight loss, or fatigue  EYES: No eye pain, visual disturbances, or discharge  ENMT:  No difficulty hearing, tinnitus, vertigo; No sinus or throat pain  NECK: No pain or stiffness  RESPIRATORY: No cough, wheezing, chills or hemoptysis; No shortness of breath  CARDIOVASCULAR: No chest pain, palpitations, dizziness, or leg swelling  GASTROINTESTINAL: No abdominal or epigastric pain. No nausea, vomiting, or hematemesis; No diarrhea or constipation. No melena or hematochezia.  GENITOURINARY: No dysuria, frequency, hematuria, or incontinence  NEUROLOGICAL: No headaches, memory loss, loss of strength, numbness, or tremors      RADIOLOGY & ADDITIONAL TESTS:    Consultant(s) Notes Reviewed:  [x ] YES  [ ] NO    PHYSICAL EXAM:  GENERAL: NAD, well-groomed, well-developed,not in any distress ,  HEAD:  Atraumatic, Normocephalic  NECK: Supple, No JVD, Normal thyroid  NERVOUS SYSTEM:  Alert & Oriented X3, No focal deficit   CHEST/LUNG: Good air entry bilateral with no  rales, rhonchi, wheezing, or rubs  HEART: Regular rate and rhythm; No murmurs, rubs, or gallops  ABDOMEN: Soft, Nontender, +distended; Bowel sounds present  EXTREMITIES:  2+ Peripheral Pulses, No clubbing, cyanosis, or edema      Care Discussed with Consultants/Other Providers [ x] YES  [ ] NO

## 2025-05-03 NOTE — PROGRESS NOTE ADULT - SUBJECTIVE AND OBJECTIVE BOX
DATE OF SERVICE: 05-03-25       no events overnight       acetaminophen     Tablet .. 975 milliGRAM(s) Oral every 6 hours  atorvastatin 40 milliGRAM(s) Oral at bedtime  bacitracin   Ointment 1 Application(s) Topical three times a day  bisacodyl Suppository 10 milliGRAM(s) Rectal daily  dibucaine 1% Ointment 1 Application(s) Topical daily PRN  ertapenem  IVPB      ertapenem  IVPB 1000 milliGRAM(s) IV Intermittent every 24 hours  gabapentin 100 milliGRAM(s) Oral every 8 hours  glucagon  Injectable 1 milliGRAM(s) IntraMuscular once  heparin   Injectable 5000 Unit(s) SubCutaneous every 8 hours  insulin lispro (ADMELOG) corrective regimen sliding scale   SubCutaneous three times a day before meals  insulin lispro (ADMELOG) corrective regimen sliding scale   SubCutaneous at bedtime  magnesium hydroxide Suspension 30 milliLiter(s) Oral every 12 hours PRN  methocarbamol 500 milliGRAM(s) Oral every 12 hours  omega-3-Acid Ethyl Esters 2 Gram(s) Oral two times a day  pantoprazole    Tablet 40 milliGRAM(s) Oral before breakfast  polyethylene glycol 3350 17 Gram(s) Oral <User Schedule>  senna 2 Tablet(s) Oral at bedtime  simethicone 80 milliGRAM(s) Chew two times a day                            9.5    12.38 )-----------( 766      ( 02 May 2025 05:52 )             28.3       Hemoglobin: 9.5 g/dL (05-02 @ 05:52)  Hemoglobin: 8.9 g/dL (05-01 @ 05:17)  Hemoglobin: 9.6 g/dL (04-30 @ 05:41)      05-02    136  |  105  |  12  ----------------------------<  124[H]  3.0[L]   |  20[L]  |  0.87    Ca    8.4      02 May 2025 17:20  Phos  2.7     05-02  Mg     2.30     05-02      Creatinine Trend: 0.87<--, 0.94<--, 1.04<--, 1.14<--, 1.36<--, 1.21<--    COAGS:           T(C): 36.7 (05-03-25 @ 09:20), Max: 36.7 (05-02-25 @ 21:05)  HR: 98 (05-03-25 @ 09:20) (68 - 98)  BP: 124/75 (05-03-25 @ 09:20) (124/75 - 148/69)  RR: 18 (05-03-25 @ 09:20) (18 - 18)  SpO2: 100% (05-03-25 @ 09:20) (98% - 100%)  Wt(kg): --    I&O's Summary    02 May 2025 07:01  -  03 May 2025 07:00  --------------------------------------------------------  IN: 0 mL / OUT: 935 mL / NET: -935 mL    03 May 2025 07:01  -  03 May 2025 10:08  --------------------------------------------------------  IN: 0 mL / OUT: 2 mL / NET: -2 mL        Gen: NAD  HEENT:  (-)icterus (-)pallor  CV: N S1 S2 1/6 DARYA (+)2 Pulses B/l  Resp:  Clear to auscultation B/L, normal effort  GI: distended  Lymph:  (-)Edema, (-)obvious lymphadenopathy  Skin: Warm to touch, Normal turgor  Psych: Appropriate mood and affect      TELEMETRY: 	  NSR    ECG:  	NSR    < from: Xray Abdomen 1 View PORTABLE -Urgent (Xray Abdomen 1 View PORTABLE -Urgent .) (04.29.25 @ 10:09) >  IMPRESSION:  Multiple dilated loops of small and large bowel with question of   pneumoperitoneum. Recommend upright chest radiograph or decubitus   abdominal radiograph for further evaluation.    < end of copied text >    < from: CT Abdomen and Pelvis w/ Oral Cont and w/ IV Cont (04.29.25 @ 17:16) >  IMPRESSION:      Diffuse colonic dilatation, worse on today's study, likely secondary to   pseudoobstruction or ileus.    No free air.    Reduction in the bilateral psoas collections with catheters in place.    PRELIMINARY IMPRESSION: Study limited secondary to extensive streak   artifact from patient's spinal hardware. Diffusely dilated colon to the   level of the rectum, increased since 4/25/2025, without evidence of   mechanical obstruction. Possible etiologies include pseudoobstruction   versus ileus. No small bowel obstruction. No pneumoperitoneum.   Percutaneous posterior approach drainage catheters within the psoas   muscles bilaterally with interval decrease of previously seen abscess.   Redemonstrated erosive endplate changes at L1-L2 compatible with discitis   osteomyelitis.    Follow-up final report in the a.m.    < end of copied text >      ASSESSMENT/PLAN: 	Pt is a  79 year old male w/ HTN, HLD, T2DM, chronic low back pain presents as a transfer from [facility] ED for surgery. . Pt was sent into ED yesterday by his pain medicine physician due to recent MRI findings concerning for L1-L2 discitis/OM with epidural abscess, bilateral psoas abscesses. Patient initially saw pain specialist in February for chronic back pain with radiations to bilateral lateral thighs (R>L). MRI at that time showed degenerative changes. He subsequently had epidural injections x2 (2/27, 3/11) with moderate pain relief. Pain began to worsen on 4/10. He had radiofrequency ablation performed on 4/11 and has since has severe worsening of pain and radiation to R lateral thigh. He reports recent bowel/bladder "incontinence" requiring diaper due to his inability to get to the bathroom in time due to pain limitations but states urge and sensation are intact. Denies fevers, chills, night sweats, weakness, numbness/tingling, saddle anesthesia, recent falls/trauma. He uses a cane for ambulation. Denies recent falls/trauma or any other ortho injuries.   Found to have epidural abscess now s/p Decompression, spine, lumbar, posterior approach, with fusion of posterior spinal column, planned for psoas abscess drainage.    - s/p Decompression, spine, lumbar, posterior approach, with fusion of posterior spinal column  - s/p psoas drainage   - pain control  - c/w statin  - bowel regimen, F/U GI  -PT follow up

## 2025-05-03 NOTE — PROGRESS NOTE ADULT - ASSESSMENT
79yMale w/ HTN, HLD, T2DM, chronic low back pain presents as a transfer from [facility] ED for surgery today. Pt was sent into ED yesterday by his pain medicine physician due to recent MRI findings concerning for L1-L2 discitis/OM with epidural abscess, bilateral psoas abscesses. Patient initially saw pain specialist in February for chronic back pain with radiations to bilateral lateral thighs (R>L). MRI at that time showed degenerative changes. He subsequently had epidural injections x2 (2/27, 3/11) with moderate pain relief. Pain began to worsen on 4/10. He had radiofrequency ablation performed on 4/11 and has since has severe worsening of pain and radiation to R lateral thigh. He reports recent bowel/bladder "incontinence" requiring diaper due to his inability to get to the bathroom in time due to pain limitations but states urge and sensation are intact. Denies fevers, chills, night sweats, weakness, numbness/tingling, saddle anesthesia, recent falls/trauma. He uses a cane for ambulation. Denies recent falls/trauma or any other ortho injuries. Before back pain was very active walked half an hour , going up and down stairs and doing grocery etc with no Chest pain or SOB.        Problem/Recommendation - 1:  ·  Problem: Epidural abscess.   ·  Recommendation: S/P Decompression, spine, lumbar, posterior approach, with fusion of posterior spinal column.  On IV Abxs Ertapenem now  per ID x 6 weeks  .  Will defer management to  Neurosurgery.    < from: MR Lumbar Spine w/ IV Cont (04.23.25 @ 15:13) >  IMPRESSION:        1.   Cervical spine:   Moderate degenerative disc disease and   spondylosis at C4-5 through C6/7 with loss of disc height and associated   degenerative endplate changes.        2.   Thoracic spine:   Minimal enhancement within T7-8 which may   reflect early discitis.        3.   Lumbar spine:   Osteomyelitis and discitis at L1-2 with erosions   of the inferior L1 vertebral body and L2 vertebral body consistent with   osteomyelitis and discitis. Ventral epidural abscess is noted extending   from the L1-2 level superiorly to the T10 level with mass effect on the   ventral thecal sac, most prominently at the L1-2 level resulting in   marked compression. Multiple abscesses within the BILATERAL psoas   muscles, the largest measuring 4.3 cm on the RIGHT and 2.8 cm on the LEFT.     Problem/Recommendation - 2:  ·  Problem: Acute osteomyelitis of lumbar spine.   ·  Recommendation: L1 &L2 vertebrae .  On IV Abxs per ID .  Will defer management to Neurosurgery.     Problem/Recommendation - 3:  ·  Problem: Low back pain radiating to right lower extremity.   ·  Recommendation: Pain control.     Problem/Recommendation - 4:  ·  Problem: . BILATERAL psoas Abscess  ·  Recommendation: ON IV Abxs per ID .  IR placed drains after draining  .       Problem/Recommendation - 5:  ·  Problem: HTN (hypertension).   ·  Recommendation: Takes Amlodipine and ARB so continue with hold parameters.  < from: TTE W or WO Ultrasound Enhancing Agent (04.24.25 @ 10:10) >  CONCLUSIONS:      1. Left ventricular cavity is normal in size. Left ventricular wall thickness is normal. Left ventricular systolic function is normal with an ejection fraction of 64 % by Carpenter's method of disks. There are no regional wall motion abnormalities seen.   2. Normal right ventricular cavity size and normal right ventricular systolic function. Tricuspid annular plane systolic excursion (TAPSE) is 2.2 cm (normal >=1.7 cm).   3. Structurally normal mitral valve with normal leaflet excursion. There is calcification of the mitral valve annulus. There is tracemitral regurgitation.   4. The aortic valve appears trileaflet with normal systolic excursion. There is calcification of the aortic valve leaflets. There is mild aortic regurgitation.     Problem/Recommendation - 6:  ·  Problem: HLD (hyperlipidemia).   ·  Recommendation: Continue Atorvastatin.     Problem/Recommendation - 7:  ·  Problem: DM (diabetes mellitus).   ·  Recommendation: ON Farxiga and holding.  SSI and Lantus in patient .     Problem/Recommendation - 8:  ·  Problem:  Ileus /Pseudoobustraction.   ·  Recommendation: Had BMs and not  passing flatus .    ON Laxatives and Simethacone .  CLD started.   Surgery & GI input appreciated .  < from: CT Abdomen and Pelvis w/ Oral Cont and w/ IV Cont (04.29.25 @ 17:16) >  IMPRESSION:  Diffuse colonic dilatation, worse on today's study, likely secondary to   pseudoobstruction or ileus.    No free air.    Reduction in the bilateral psoas collections with catheters in place.    < from: Xray Abdomen 1 View PORTABLE -Urgent (Xray Abdomen 1 View PORTABLE -Urgent .) (04.29.25 @ 10:09) >  IMPRESSION:  Multiple dilated loops of small and large bowel with question of   pneumoperitoneum. Recommend upright chest radiograph or decubitus   abdominal radiograph for further evaluation.    Paged surgery team and waiting for response .      Problem/Recommendation - 9:  ·  Problem: JUSTINO with Hyponatremia .   ·  Recommendation: Trending BMP.  Improving.   Renal help appreciated.      Problem/Recommendation - 10:  ·  Problem: Hypokalemia & Hypophosphatemia .   ·  Recommendation: Replacing.   Rpt BMP noted.    Keep K level close to 4 .     D/W patient and daughter & son in law in room in detail .     PT working with patient and ambulating .

## 2025-05-03 NOTE — PROGRESS NOTE ADULT - ASSESSMENT
A/P:  79M s/p T10-pelvis PSF on 4/24    Plan:  -WBAT BLLE  -appreciate ID recs  -appreciate IR for psoas abscess drainage     - 2 latisha drains per IR     - Asp: + GNR  -f/u blood cx  -PT/OT  -pain control (PCA)  -dvt ppx: venodynes  -monitor drain outputs - drains per PRS  -dispo: ADRIANA  - Abx: Erta      For all questions related to patient care, please reach out to the on-call team via the pager.     Asia Perez, PGY 3  Orthopaedic Surgery  [facility] [id number]  [facility] [id number]  [facility] [id number]

## 2025-05-03 NOTE — PROGRESS NOTE ADULT - SUBJECTIVE AND OBJECTIVE BOX
ORTHOPEDIC PROGRESS NOTE    Overnight events: None    SUBJECTIVE: Pt seen and examined at bedside. Patient is doing well, no acute complaints this AM. Pain is controlled with medication      OBJECTIVE:  Vital Signs Last 24 Hrs  T(C): 36.4 (03 May 2025 05:16), Max: 37.1 (02 May 2025 09:24)  T(F): 97.6 (03 May 2025 05:16), Max: 98.7 (02 May 2025 09:24)  HR: 91 (03 May 2025 05:16) (68 - 97)  BP: 137/70 (03 May 2025 05:16) (126/68 - 148/69)  BP(mean): --  RR: 18 (03 May 2025 05:16) (18 - 18)  SpO2: 100% (03 May 2025 05:16) (98% - 100%)    Parameters below as of 03 May 2025 05:16  Patient On (Oxygen Delivery Method): room air          05-01-25 @ 07:01  -  05-02-25 @ 07:00  --------------------------------------------------------  IN: 0 mL / OUT: 1048.5 mL / NET: -1048.5 mL    05-02-25 @ 07:01  -  05-03-25 @ 06:00  --------------------------------------------------------  IN: 0 mL / OUT: 632.5 mL / NET: -632.5 mL        Physical Examination:  GEN: NAD, resting quietly  PULM: symmetric chest rise bilaterally, no increased WOB  ABD: mildly distended  Spine:  Back: Dressing/ Incision Clean/Dry/Intact,  HV intact with SS output  Motor:                   C5                C6              C7               C8           T1   R            5/5                5/5            5/5             5/5          5/5  L             5/5               5/5             5/5             5/5          5/5                L2             L3             L4               L5            S1  R         4/5           4/5          5/5             5/5           5/5  L          5/5          5/5           5/5             5/5 5/5      LABS:                        9.5    12.38 )-----------( 766      ( 02 May 2025 05:52 )             28.3       05-02    136  |  105  |  12  ----------------------------<  124[H]  3.0[L]   |  20[L]  |  0.87    Ca    8.4      02 May 2025 17:20  Phos  2.7     05-02  Mg     2.30     05-02           ORTHOPEDIC PROGRESS NOTE    Overnight events: None    SUBJECTIVE: Pt seen and examined at bedside. Patient is doing well, no acute complaints this AM. Pain is controlled with medication. Patient is still c/o abdominal discomfort.      OBJECTIVE:  Vital Signs Last 24 Hrs  T(C): 36.4 (03 May 2025 05:16), Max: 37.1 (02 May 2025 09:24)  T(F): 97.6 (03 May 2025 05:16), Max: 98.7 (02 May 2025 09:24)  HR: 91 (03 May 2025 05:16) (68 - 97)  BP: 137/70 (03 May 2025 05:16) (126/68 - 148/69)  BP(mean): --  RR: 18 (03 May 2025 05:16) (18 - 18)  SpO2: 100% (03 May 2025 05:16) (98% - 100%)    Parameters below as of 03 May 2025 05:16  Patient On (Oxygen Delivery Method): room air          05-01-25 @ 07:01  -  05-02-25 @ 07:00  --------------------------------------------------------  IN: 0 mL / OUT: 1048.5 mL / NET: -1048.5 mL    05-02-25 @ 07:01  -  05-03-25 @ 06:00  --------------------------------------------------------  IN: 0 mL / OUT: 632.5 mL / NET: -632.5 mL        Physical Examination:  GEN: NAD, resting quietly  PULM: symmetric chest rise bilaterally, no increased WOB  ABD: mildly distended  Spine:  Back: Dressing/ Incision Clean/Dry/Intact,  HV intact with SS output  Motor:                   C5                C6              C7               C8           T1   R            5/5                5/5            5/5             5/5          5/5  L             5/5               5/5             5/5             5/5          5/5                L2             L3             L4               L5            S1  R         4/5           4/5          5/5             5/5           5/5  L          5/5          5/5           5/5             5/5           5/5      LABS:                        9.5    12.38 )-----------( 766      ( 02 May 2025 05:52 )             28.3       05-02    136  |  105  |  12  ----------------------------<  124[H]  3.0[L]   |  20[L]  |  0.87    Ca    8.4      02 May 2025 17:20  Phos  2.7     05-02  Mg     2.30     05-02

## 2025-05-03 NOTE — PROVIDER CONTACT NOTE (OTHER) - BACKGROUND
Pt s/p T10-Pelvis PSF, T12-L2 Laminectomy 4/24 and s/p b/l psoas abscess drain placement 4/26. Pt complains of cough x2 days.

## 2025-05-04 LAB
ANION GAP SERPL CALC-SCNC: 11 MMOL/L — SIGNIFICANT CHANGE UP (ref 7–14)
ANION GAP SERPL CALC-SCNC: 11 MMOL/L — SIGNIFICANT CHANGE UP (ref 7–14)
BUN SERPL-MCNC: 10 MG/DL — SIGNIFICANT CHANGE UP (ref 7–23)
BUN SERPL-MCNC: 9 MG/DL — SIGNIFICANT CHANGE UP (ref 7–23)
CALCIUM SERPL-MCNC: 8 MG/DL — LOW (ref 8.4–10.5)
CALCIUM SERPL-MCNC: 8.3 MG/DL — LOW (ref 8.4–10.5)
CHLORIDE SERPL-SCNC: 107 MMOL/L — SIGNIFICANT CHANGE UP (ref 98–107)
CHLORIDE SERPL-SCNC: 107 MMOL/L — SIGNIFICANT CHANGE UP (ref 98–107)
CO2 SERPL-SCNC: 17 MMOL/L — LOW (ref 22–31)
CO2 SERPL-SCNC: 17 MMOL/L — LOW (ref 22–31)
CREAT SERPL-MCNC: 0.87 MG/DL — SIGNIFICANT CHANGE UP (ref 0.5–1.3)
CREAT SERPL-MCNC: 0.9 MG/DL — SIGNIFICANT CHANGE UP (ref 0.5–1.3)
EGFR: 87 ML/MIN/1.73M2 — SIGNIFICANT CHANGE UP
EGFR: 87 ML/MIN/1.73M2 — SIGNIFICANT CHANGE UP
EGFR: 88 ML/MIN/1.73M2 — SIGNIFICANT CHANGE UP
EGFR: 88 ML/MIN/1.73M2 — SIGNIFICANT CHANGE UP
GLUCOSE BLDC GLUCOMTR-MCNC: 155 MG/DL — HIGH (ref 70–99)
GLUCOSE BLDC GLUCOMTR-MCNC: 158 MG/DL — HIGH (ref 70–99)
GLUCOSE BLDC GLUCOMTR-MCNC: 193 MG/DL — HIGH (ref 70–99)
GLUCOSE BLDC GLUCOMTR-MCNC: 90 MG/DL — SIGNIFICANT CHANGE UP (ref 70–99)
GLUCOSE SERPL-MCNC: 138 MG/DL — HIGH (ref 70–99)
GLUCOSE SERPL-MCNC: 76 MG/DL — SIGNIFICANT CHANGE UP (ref 70–99)
HCT VFR BLD CALC: 26.2 % — LOW (ref 39–50)
HGB BLD-MCNC: 8.9 G/DL — LOW (ref 13–17)
MAGNESIUM SERPL-MCNC: 2.1 MG/DL — SIGNIFICANT CHANGE UP (ref 1.6–2.6)
MCHC RBC-ENTMCNC: 30 PG — SIGNIFICANT CHANGE UP (ref 27–34)
MCHC RBC-ENTMCNC: 34 G/DL — SIGNIFICANT CHANGE UP (ref 32–36)
MCV RBC AUTO: 88.2 FL — SIGNIFICANT CHANGE UP (ref 80–100)
NRBC # BLD AUTO: 0 K/UL — SIGNIFICANT CHANGE UP (ref 0–0)
NRBC # FLD: 0 K/UL — SIGNIFICANT CHANGE UP (ref 0–0)
NRBC BLD AUTO-RTO: 0 /100 WBCS — SIGNIFICANT CHANGE UP (ref 0–0)
PHOSPHATE SERPL-MCNC: 3.9 MG/DL — SIGNIFICANT CHANGE UP (ref 2.5–4.5)
PLATELET # BLD AUTO: 887 K/UL — HIGH (ref 150–400)
POTASSIUM SERPL-MCNC: 3 MMOL/L — LOW (ref 3.5–5.3)
POTASSIUM SERPL-MCNC: 3.2 MMOL/L — LOW (ref 3.5–5.3)
POTASSIUM SERPL-SCNC: 3 MMOL/L — LOW (ref 3.5–5.3)
POTASSIUM SERPL-SCNC: 3.2 MMOL/L — LOW (ref 3.5–5.3)
RBC # BLD: 2.97 M/UL — LOW (ref 4.2–5.8)
RBC # FLD: 17.1 % — HIGH (ref 10.3–14.5)
SODIUM SERPL-SCNC: 135 MMOL/L — SIGNIFICANT CHANGE UP (ref 135–145)
SODIUM SERPL-SCNC: 135 MMOL/L — SIGNIFICANT CHANGE UP (ref 135–145)
WBC # BLD: 11.02 K/UL — HIGH (ref 3.8–10.5)
WBC # FLD AUTO: 11.02 K/UL — HIGH (ref 3.8–10.5)

## 2025-05-04 PROCEDURE — 74176 CT ABD & PELVIS W/O CONTRAST: CPT | Mod: 26

## 2025-05-04 RX ORDER — HEPARIN SODIUM 1000 [USP'U]/ML
5000 INJECTION INTRAVENOUS; SUBCUTANEOUS ONCE
Refills: 0 | Status: COMPLETED | OUTPATIENT
Start: 2025-05-04 | End: 2025-05-04

## 2025-05-04 RX ORDER — B1/B2/B3/B5/B6/B12/VIT C/FOLIC 500-0.5 MG
1 TABLET ORAL DAILY
Refills: 0 | Status: DISCONTINUED | OUTPATIENT
Start: 2025-05-04 | End: 2025-05-16

## 2025-05-04 RX ADMIN — HEPARIN SODIUM 5000 UNIT(S): 1000 INJECTION INTRAVENOUS; SUBCUTANEOUS at 05:04

## 2025-05-04 RX ADMIN — Medication 100 MILLIEQUIVALENT(S): at 14:23

## 2025-05-04 RX ADMIN — METHOCARBAMOL 500 MILLIGRAM(S): 500 TABLET, FILM COATED ORAL at 11:53

## 2025-05-04 RX ADMIN — Medication 40 MILLIGRAM(S): at 05:04

## 2025-05-04 RX ADMIN — ERTAPENEM SODIUM 100 MILLIGRAM(S): 1 INJECTION, POWDER, LYOPHILIZED, FOR SOLUTION INTRAMUSCULAR; INTRAVENOUS at 14:24

## 2025-05-04 RX ADMIN — Medication 975 MILLIGRAM(S): at 18:16

## 2025-05-04 RX ADMIN — Medication 100 MILLIEQUIVALENT(S): at 12:57

## 2025-05-04 RX ADMIN — Medication 975 MILLIGRAM(S): at 17:16

## 2025-05-04 RX ADMIN — DIBUCAINE 1 APPLICATION(S): 10 OINTMENT TOPICAL at 22:59

## 2025-05-04 RX ADMIN — GABAPENTIN 100 MILLIGRAM(S): 400 CAPSULE ORAL at 05:03

## 2025-05-04 RX ADMIN — HEPARIN SODIUM 5000 UNIT(S): 1000 INJECTION INTRAVENOUS; SUBCUTANEOUS at 21:50

## 2025-05-04 RX ADMIN — Medication 100 MILLIEQUIVALENT(S): at 22:58

## 2025-05-04 RX ADMIN — Medication 975 MILLIGRAM(S): at 12:53

## 2025-05-04 RX ADMIN — METHOCARBAMOL 500 MILLIGRAM(S): 500 TABLET, FILM COATED ORAL at 00:57

## 2025-05-04 RX ADMIN — Medication 975 MILLIGRAM(S): at 01:55

## 2025-05-04 RX ADMIN — GABAPENTIN 100 MILLIGRAM(S): 400 CAPSULE ORAL at 21:51

## 2025-05-04 RX ADMIN — POLYETHYLENE GLYCOL 3350 17 GRAM(S): 17 POWDER, FOR SOLUTION ORAL at 12:59

## 2025-05-04 RX ADMIN — OMEGA-3-ACID ETHYL ESTERS CAPSULES 2 GRAM(S): 1 CAPSULE, LIQUID FILLED ORAL at 12:58

## 2025-05-04 RX ADMIN — Medication 80 MILLIGRAM(S): at 05:04

## 2025-05-04 RX ADMIN — Medication 1 TABLET(S): at 11:55

## 2025-05-04 RX ADMIN — Medication 975 MILLIGRAM(S): at 07:33

## 2025-05-04 RX ADMIN — HEPARIN SODIUM 5000 UNIT(S): 1000 INJECTION INTRAVENOUS; SUBCUTANEOUS at 14:25

## 2025-05-04 RX ADMIN — Medication 1 APPLICATION(S): at 05:04

## 2025-05-04 RX ADMIN — Medication 100 MILLIEQUIVALENT(S): at 21:55

## 2025-05-04 RX ADMIN — INSULIN LISPRO 1: 100 INJECTION, SOLUTION INTRAVENOUS; SUBCUTANEOUS at 11:51

## 2025-05-04 RX ADMIN — GABAPENTIN 100 MILLIGRAM(S): 400 CAPSULE ORAL at 14:33

## 2025-05-04 RX ADMIN — Medication 80 MILLIGRAM(S): at 20:00

## 2025-05-04 RX ADMIN — POLYETHYLENE GLYCOL 3350 17 GRAM(S): 17 POWDER, FOR SOLUTION ORAL at 20:03

## 2025-05-04 RX ADMIN — Medication 10 MILLIGRAM(S): at 12:57

## 2025-05-04 RX ADMIN — Medication 100 MILLIEQUIVALENT(S): at 11:52

## 2025-05-04 RX ADMIN — Medication 2 TABLET(S): at 21:50

## 2025-05-04 RX ADMIN — POLYETHYLENE GLYCOL 3350 17 GRAM(S): 17 POWDER, FOR SOLUTION ORAL at 05:05

## 2025-05-04 RX ADMIN — Medication 975 MILLIGRAM(S): at 11:53

## 2025-05-04 RX ADMIN — Medication 1 APPLICATION(S): at 21:51

## 2025-05-04 RX ADMIN — Medication 975 MILLIGRAM(S): at 00:57

## 2025-05-04 RX ADMIN — Medication 1 APPLICATION(S): at 14:33

## 2025-05-04 RX ADMIN — INSULIN LISPRO 1: 100 INJECTION, SOLUTION INTRAVENOUS; SUBCUTANEOUS at 17:15

## 2025-05-04 NOTE — PROGRESS NOTE ADULT - ASSESSMENT
ASSESSMENT:  79yMale w/ HTN, HLD, T2DM, chronic low back pain presents as a transfer from [facility] ED for surgery today. Pt was sent into ED yesterday by his pain medicine physician due to recent MRI findings concerning for L1-L2 discitis/OM with epidural abscess, bilateral psoas abscesses    (1)Renal - CKD2, early diabetic nephropathy? Follows as outpatient with Dr. Syed Euceda  (2)Hyponatremia - urine studies c/w low effective arterial volume (appropriately increased ADH) -improved as of late, s/p isotonic IVF, labs pending   (3)Hypokalemia - mild as of late, labs pending  - sp KCl 10 mEq IV x 2 doses  (4)Hypophosphatemia - s/p Kphos 30 mmol IV x 1     RECOMMEND:  (1)No IVF  (2)BMP at least daily for now  (3)Pain control per Ortho  (4)Diet per sx    Mare Cameron DNP  [facility]  [phone]

## 2025-05-04 NOTE — PROGRESS NOTE ADULT - SUBJECTIVE AND OBJECTIVE BOX
Date of Service  : 05-04-25     INTERVAL HPI/OVERNIGHT EVENTS: Had BM but not passing flatus . On CLD  Vital Signs Last 24 Hrs  T(C): 36.5 (04 May 2025 15:00), Max: 36.8 (04 May 2025 02:28)  T(F): 97.7 (04 May 2025 15:00), Max: 98.2 (04 May 2025 02:28)  HR: 100 (04 May 2025 15:00) (91 - 103)  BP: 142/80 (04 May 2025 15:00) (128/64 - 143/76)  BP(mean): --  RR: 18 (04 May 2025 15:00) (17 - 18)  SpO2: 100% (04 May 2025 15:00) (97% - 100%)    Parameters below as of 04 May 2025 15:00  Patient On (Oxygen Delivery Method): room air      I&O's Summary    03 May 2025 07:01  -  04 May 2025 07:00  --------------------------------------------------------  IN: 0 mL / OUT: 384.5 mL / NET: -384.5 mL    04 May 2025 07:01  -  04 May 2025 17:53  --------------------------------------------------------  IN: 1100 mL / OUT: 250 mL / NET: 850 mL      MEDICATIONS  (STANDING):  acetaminophen     Tablet .. 975 milliGRAM(s) Oral every 6 hours  atorvastatin 40 milliGRAM(s) Oral at bedtime  bacitracin   Ointment 1 Application(s) Topical three times a day  bisacodyl Suppository 10 milliGRAM(s) Rectal daily  ertapenem  IVPB      ertapenem  IVPB 1000 milliGRAM(s) IV Intermittent every 24 hours  gabapentin 100 milliGRAM(s) Oral every 8 hours  glucagon  Injectable 1 milliGRAM(s) IntraMuscular once  heparin   Injectable 5000 Unit(s) SubCutaneous once  insulin lispro (ADMELOG) corrective regimen sliding scale   SubCutaneous three times a day before meals  insulin lispro (ADMELOG) corrective regimen sliding scale   SubCutaneous at bedtime  methocarbamol 500 milliGRAM(s) Oral every 12 hours  multivitamin 1 Tablet(s) Oral daily  omega-3-Acid Ethyl Esters 2 Gram(s) Oral two times a day  pantoprazole    Tablet 40 milliGRAM(s) Oral before breakfast  polyethylene glycol 3350 17 Gram(s) Oral <User Schedule>  senna 2 Tablet(s) Oral at bedtime  simethicone 80 milliGRAM(s) Chew two times a day    MEDICATIONS  (PRN):  dibucaine 1% Ointment 1 Application(s) Topical daily PRN hemorrhoid pain  magnesium hydroxide Suspension 30 milliLiter(s) Oral every 12 hours PRN Constipation    LABS:                        8.9    11.02 )-----------( 887      ( 04 May 2025 05:32 )             26.2     05-04    135  |  107  |  9   ----------------------------<  76  3.0[L]   |  17[L]  |  0.90    Ca    8.0[L]      04 May 2025 05:32  Phos  3.9     05-04  Mg     2.10     05-04        Urinalysis Basic - ( 04 May 2025 05:32 )    Color: x / Appearance: x / SG: x / pH: x  Gluc: 76 mg/dL / Ketone: x  / Bili: x / Urobili: x   Blood: x / Protein: x / Nitrite: x   Leuk Esterase: x / RBC: x / WBC x   Sq Epi: x / Non Sq Epi: x / Bacteria: x      CAPILLARY BLOOD GLUCOSE      POCT Blood Glucose.: 158 mg/dL (04 May 2025 16:29)  POCT Blood Glucose.: 193 mg/dL (04 May 2025 11:04)  POCT Blood Glucose.: 90 mg/dL (04 May 2025 07:25)  POCT Blood Glucose.: 116 mg/dL (03 May 2025 21:37)        Urinalysis Basic - ( 04 May 2025 05:32 )    Color: x / Appearance: x / SG: x / pH: x  Gluc: 76 mg/dL / Ketone: x  / Bili: x / Urobili: x   Blood: x / Protein: x / Nitrite: x   Leuk Esterase: x / RBC: x / WBC x   Sq Epi: x / Non Sq Epi: x / Bacteria: x      REVIEW OF SYSTEMS:  CONSTITUTIONAL: No fever, weight loss, or fatigue  EYES: No eye pain, visual disturbances, or discharge  ENMT:  No difficulty hearing, tinnitus, vertigo; No sinus or throat pain  NECK: No pain or stiffness  RESPIRATORY: No cough, wheezing, chills or hemoptysis; No shortness of breath  CARDIOVASCULAR: No chest pain, palpitations, dizziness, or leg swelling  GASTROINTESTINAL: No abdominal or epigastric pain. No nausea, vomiting, or hematemesis; No diarrhea or constipation. No melena or hematochezia.  GENITOURINARY: No dysuria, frequency, hematuria, or incontinence  NEUROLOGICAL: No headaches, memory loss, loss of strength, numbness, or tremors      RADIOLOGY & ADDITIONAL TESTS:    Consultant(s) Notes Reviewed:  [x ] YES  [ ] NO    PHYSICAL EXAM:  GENERAL: NAD, well-groomed, well-developed,not in any distress ,  HEAD:  Atraumatic, Normocephalic  EYES: EOMI, PERRLA, conjunctiva and sclera clear  ENMT: No tonsillar erythema, exudates, or enlargement; Moist mucous membranes, Good dentition, No lesions  NECK: Supple, No JVD, Normal thyroid  NERVOUS SYSTEM:  Alert & Oriented X3, No focal deficit   CHEST/LUNG: Good air entry bilateral with no  rales, rhonchi, wheezing, or rubs  HEART: Regular rate and rhythm; No murmurs, rubs, or gallops  ABDOMEN: Soft, Nontender, +distended; Bowel sounds present  EXTREMITIES:  2+ Peripheral Pulses, No clubbing, cyanosis, or edema    Care Discussed with Consultants/Other Providers [ x] YES  [ ] NO

## 2025-05-04 NOTE — PROGRESS NOTE ADULT - ASSESSMENT
79M s/p T10-pelvis PSF on 4/24    Plan:  -WBAT BLLE  -appreciate ID recs: abx ertapenem  -appreciate IR mgmt s/p psoas abscess drainage     - 2 latisha drains per IR     - Asp cx: bacteroides fragilis  -f/u blood cx  -PT/OT  -dvt ppx: venodynes  -dispo: ADRIANA Cervantes, PGY-2  Orthopedic Surgery    [facility]: [id number]  [facility]: [id number]  [facility]:  [id number]/ [phone]

## 2025-05-04 NOTE — PROGRESS NOTE ADULT - SUBJECTIVE AND OBJECTIVE BOX
NEPHROLOGY     Patient seen and examined resting on room air, no new complaints, denies pain at present, states having BMs, in no acute distress.     MEDICATIONS  (STANDING):  acetaminophen     Tablet .. 975 milliGRAM(s) Oral every 6 hours  atorvastatin 40 milliGRAM(s) Oral at bedtime  bacitracin   Ointment 1 Application(s) Topical three times a day  bisacodyl Suppository 10 milliGRAM(s) Rectal daily  ertapenem  IVPB 1000 milliGRAM(s) IV Intermittent every 24 hours  ertapenem  IVPB      gabapentin 100 milliGRAM(s) Oral every 8 hours  glucagon  Injectable 1 milliGRAM(s) IntraMuscular once  heparin   Injectable 5000 Unit(s) SubCutaneous every 8 hours  insulin lispro (ADMELOG) corrective regimen sliding scale   SubCutaneous three times a day before meals  insulin lispro (ADMELOG) corrective regimen sliding scale   SubCutaneous at bedtime  methocarbamol 500 milliGRAM(s) Oral every 12 hours  multivitamin 1 Tablet(s) Oral daily  omega-3-Acid Ethyl Esters 2 Gram(s) Oral two times a day  pantoprazole    Tablet 40 milliGRAM(s) Oral before breakfast  polyethylene glycol 3350 17 Gram(s) Oral <User Schedule>  senna 2 Tablet(s) Oral at bedtime  simethicone 80 milliGRAM(s) Chew two times a day    VITALS:  T(C): , Max: 36.9 (05-03-25 @ 14:00)  T(F): , Max: 98.5 (05-03-25 @ 14:00)  HR: 91 (05-04-25 @ 05:00)  BP: 133/66 (05-04-25 @ 05:00)  RR: 18 (05-04-25 @ 05:00)  SpO2: 100% (05-04-25 @ 05:00)    I and O's:    05-03 @ 07:01  -  05-04 @ 07:00  --------------------------------------------------------  IN: 0 mL / OUT: 384.5 mL / NET: -384.5 mL    PHYSICAL EXAM:  Constitutional: NAD  HEENT: EOMI  Neck:  No JVD, supple   Respiratory: CTA B/L  Cardiovascular: S1 and S2, RRR  Gastrointestinal: distended  Extremities: tr peripheral edema, + peripheral pulses  Neurological: A/O x 3, CN2-12 intact  Psychiatric: Normal mood, normal affect  : No Mac  Skin: No rashes, C/D/I    LABS:                        8.9    11.02 )-----------( 887      ( 04 May 2025 05:32 )             26.2     05-03    135  |  106  |  10  ----------------------------<  117[H]  3.5   |  16[L]  |  0.90    Ca    8.3[L]      03 May 2025 18:58  Phos  2.2     05-03  Mg     2.10     05-03    RADIOLOGY & ADDITIONAL STUDIES:  < from: Xray Chest 1 View- PORTABLE-Urgent (Xray Chest 1 View- PORTABLE-Urgent .) (05.03.25 @ 11:41) >    IMPRESSION:  Underinflated but otherwise clear lungs. No pleural effusions or   pneumothorax.  Cardiac and mediastinal silhouettes within normal limits.    No acute osseous abnormalities. Partially visualized upper end of   thoracolumbar posterior spinal fusion hardware.    Redemonstrated nonspecific gaseous distention of visualized upper   abdominal bowel loops. No subdiaphragmatic free air.    --- End of Report ---          ZANDRA TAYLOR; Resident Radiologist  This document has been electronically signed.  MELLO PEREA MD; Attending Radiologist  This document has been electronically signed. May  3 2025  2:47PM    < end of copied text >

## 2025-05-04 NOTE — PROGRESS NOTE ADULT - SUBJECTIVE AND OBJECTIVE BOX
DATE OF SERVICE: 05-04-25          no chest pain or so b       acetaminophen     Tablet .. 975 milliGRAM(s) Oral every 6 hours  atorvastatin 40 milliGRAM(s) Oral at bedtime  bacitracin   Ointment 1 Application(s) Topical three times a day  bisacodyl Suppository 10 milliGRAM(s) Rectal daily  dibucaine 1% Ointment 1 Application(s) Topical daily PRN  ertapenem  IVPB 1000 milliGRAM(s) IV Intermittent every 24 hours  ertapenem  IVPB      gabapentin 100 milliGRAM(s) Oral every 8 hours  glucagon  Injectable 1 milliGRAM(s) IntraMuscular once  heparin   Injectable 5000 Unit(s) SubCutaneous every 8 hours  insulin lispro (ADMELOG) corrective regimen sliding scale   SubCutaneous three times a day before meals  insulin lispro (ADMELOG) corrective regimen sliding scale   SubCutaneous at bedtime  magnesium hydroxide Suspension 30 milliLiter(s) Oral every 12 hours PRN  methocarbamol 500 milliGRAM(s) Oral every 12 hours  multivitamin 1 Tablet(s) Oral daily  omega-3-Acid Ethyl Esters 2 Gram(s) Oral two times a day  pantoprazole    Tablet 40 milliGRAM(s) Oral before breakfast  polyethylene glycol 3350 17 Gram(s) Oral <User Schedule>  senna 2 Tablet(s) Oral at bedtime  simethicone 80 milliGRAM(s) Chew two times a day                            8.9    11.02 )-----------( 887      ( 04 May 2025 05:32 )             26.2       Hemoglobin: 8.9 g/dL (05-04 @ 05:32)  Hemoglobin: 9.9 g/dL (05-03 @ 10:53)  Hemoglobin: 9.5 g/dL (05-02 @ 05:52)  Hemoglobin: 8.9 g/dL (05-01 @ 05:17)  Hemoglobin: 9.6 g/dL (04-30 @ 05:41)      05-04    135  |  107  |  9   ----------------------------<  76  3.0[L]   |  17[L]  |  0.90    Ca    8.0[L]      04 May 2025 05:32  Phos  3.9     05-04  Mg     2.10     05-04      Creatinine Trend: 0.90<--, 0.90<--, 0.94<--, 0.87<--, 0.94<--, 1.04<--    COAGS:           T(C): 36.5 (05-04-25 @ 09:30), Max: 36.9 (05-03-25 @ 14:00)  HR: 102 (05-04-25 @ 09:30) (91 - 103)  BP: 128/64 (05-04-25 @ 09:30) (123/70 - 143/76)  RR: 18 (05-04-25 @ 05:00) (17 - 18)  SpO2: 100% (05-04-25 @ 05:00) (97% - 100%)  Wt(kg): --    I&O's Summary    03 May 2025 07:01  -  04 May 2025 07:00  --------------------------------------------------------  IN: 0 mL / OUT: 384.5 mL / NET: -384.5 mL      Gen: NAD  HEENT:  (-)icterus (-)pallor  CV: N S1 S2 1/6 DARYA (+)2 Pulses B/l  Resp:  Clear to auscultation B/L, normal effort  GI: distended  Lymph:  (-)Edema, (-)obvious lymphadenopathy  Skin: Warm to touch, Normal turgor  Psych: Appropriate mood and affect      TELEMETRY: 	  NSR    ECG:  	NSR    < from: Xray Abdomen 1 View PORTABLE -Urgent (Xray Abdomen 1 View PORTABLE -Urgent .) (04.29.25 @ 10:09) >  IMPRESSION:  Multiple dilated loops of small and large bowel with question of   pneumoperitoneum. Recommend upright chest radiograph or decubitus   abdominal radiograph for further evaluation.    < end of copied text >    < from: CT Abdomen and Pelvis w/ Oral Cont and w/ IV Cont (04.29.25 @ 17:16) >  IMPRESSION:      Diffuse colonic dilatation, worse on today's study, likely secondary to   pseudoobstruction or ileus.    No free air.    Reduction in the bilateral psoas collections with catheters in place.    PRELIMINARY IMPRESSION: Study limited secondary to extensive streak   artifact from patient's spinal hardware. Diffusely dilated colon to the   level of the rectum, increased since 4/25/2025, without evidence of   mechanical obstruction. Possible etiologies include pseudoobstruction   versus ileus. No small bowel obstruction. No pneumoperitoneum.   Percutaneous posterior approach drainage catheters within the psoas   muscles bilaterally with interval decrease of previously seen abscess.   Redemonstrated erosive endplate changes at L1-L2 compatible with discitis   osteomyelitis.    Follow-up final report in the a.m.    < end of copied text >      ASSESSMENT/PLAN: 	Pt is a  79 year old male w/ HTN, HLD, T2DM, chronic low back pain presents as a transfer from [facility] ED for surgery. . Pt was sent into ED yesterday by his pain medicine physician due to recent MRI findings concerning for L1-L2 discitis/OM with epidural abscess, bilateral psoas abscesses. Patient initially saw pain specialist in February for chronic back pain with radiations to bilateral lateral thighs (R>L). MRI at that time showed degenerative changes. He subsequently had epidural injections x2 (2/27, 3/11) with moderate pain relief. Pain began to worsen on 4/10. He had radiofrequency ablation performed on 4/11 and has since has severe worsening of pain and radiation to R lateral thigh. He reports recent bowel/bladder "incontinence" requiring diaper due to his inability to get to the bathroom in time due to pain limitations but states urge and sensation are intact. Denies fevers, chills, night sweats, weakness, numbness/tingling, saddle anesthesia, recent falls/trauma. He uses a cane for ambulation. Denies recent falls/trauma or any other ortho injuries.   Found to have epidural abscess now s/p Decompression, spine, lumbar, posterior approach, with fusion of posterior spinal column, planned for psoas abscess drainage.    - s/p Decompression, spine, lumbar, posterior approach, with fusion of posterior spinal column  - s/p psoas drainage   - pain control  - c/w statin  - bowel regimen, F/U GI  -PT follow up   DATE OF SERVICE: 05-04-25          no chest pain or sob       acetaminophen     Tablet .. 975 milliGRAM(s) Oral every 6 hours  atorvastatin 40 milliGRAM(s) Oral at bedtime  bacitracin   Ointment 1 Application(s) Topical three times a day  bisacodyl Suppository 10 milliGRAM(s) Rectal daily  dibucaine 1% Ointment 1 Application(s) Topical daily PRN  ertapenem  IVPB 1000 milliGRAM(s) IV Intermittent every 24 hours  ertapenem  IVPB      gabapentin 100 milliGRAM(s) Oral every 8 hours  glucagon  Injectable 1 milliGRAM(s) IntraMuscular once  heparin   Injectable 5000 Unit(s) SubCutaneous every 8 hours  insulin lispro (ADMELOG) corrective regimen sliding scale   SubCutaneous three times a day before meals  insulin lispro (ADMELOG) corrective regimen sliding scale   SubCutaneous at bedtime  magnesium hydroxide Suspension 30 milliLiter(s) Oral every 12 hours PRN  methocarbamol 500 milliGRAM(s) Oral every 12 hours  multivitamin 1 Tablet(s) Oral daily  omega-3-Acid Ethyl Esters 2 Gram(s) Oral two times a day  pantoprazole    Tablet 40 milliGRAM(s) Oral before breakfast  polyethylene glycol 3350 17 Gram(s) Oral <User Schedule>  senna 2 Tablet(s) Oral at bedtime  simethicone 80 milliGRAM(s) Chew two times a day                            8.9    11.02 )-----------( 887      ( 04 May 2025 05:32 )             26.2       Hemoglobin: 8.9 g/dL (05-04 @ 05:32)  Hemoglobin: 9.9 g/dL (05-03 @ 10:53)  Hemoglobin: 9.5 g/dL (05-02 @ 05:52)  Hemoglobin: 8.9 g/dL (05-01 @ 05:17)  Hemoglobin: 9.6 g/dL (04-30 @ 05:41)      05-04    135  |  107  |  9   ----------------------------<  76  3.0[L]   |  17[L]  |  0.90    Ca    8.0[L]      04 May 2025 05:32  Phos  3.9     05-04  Mg     2.10     05-04      Creatinine Trend: 0.90<--, 0.90<--, 0.94<--, 0.87<--, 0.94<--, 1.04<--    COAGS:           T(C): 36.5 (05-04-25 @ 09:30), Max: 36.9 (05-03-25 @ 14:00)  HR: 102 (05-04-25 @ 09:30) (91 - 103)  BP: 128/64 (05-04-25 @ 09:30) (123/70 - 143/76)  RR: 18 (05-04-25 @ 05:00) (17 - 18)  SpO2: 100% (05-04-25 @ 05:00) (97% - 100%)  Wt(kg): --    I&O's Summary    03 May 2025 07:01  -  04 May 2025 07:00  --------------------------------------------------------  IN: 0 mL / OUT: 384.5 mL / NET: -384.5 mL      Gen: NAD  HEENT:  (-)icterus (-)pallor  CV: N S1 S2 1/6 DARYA (+)2 Pulses B/l  Resp:  Clear to auscultation B/L, normal effort  GI: distended  Lymph:  (-)Edema, (-)obvious lymphadenopathy  Skin: Warm to touch, Normal turgor  Psych: Appropriate mood and affect      TELEMETRY: 	  NSR    ECG:  	NSR    < from: Xray Abdomen 1 View PORTABLE -Urgent (Xray Abdomen 1 View PORTABLE -Urgent .) (04.29.25 @ 10:09) >  IMPRESSION:  Multiple dilated loops of small and large bowel with question of   pneumoperitoneum. Recommend upright chest radiograph or decubitus   abdominal radiograph for further evaluation.    < end of copied text >    < from: CT Abdomen and Pelvis w/ Oral Cont and w/ IV Cont (04.29.25 @ 17:16) >  IMPRESSION:      Diffuse colonic dilatation, worse on today's study, likely secondary to   pseudoobstruction or ileus.    No free air.    Reduction in the bilateral psoas collections with catheters in place.    PRELIMINARY IMPRESSION: Study limited secondary to extensive streak   artifact from patient's spinal hardware. Diffusely dilated colon to the   level of the rectum, increased since 4/25/2025, without evidence of   mechanical obstruction. Possible etiologies include pseudoobstruction   versus ileus. No small bowel obstruction. No pneumoperitoneum.   Percutaneous posterior approach drainage catheters within the psoas   muscles bilaterally with interval decrease of previously seen abscess.   Redemonstrated erosive endplate changes at L1-L2 compatible with discitis   osteomyelitis.    Follow-up final report in the a.m.    < end of copied text >      ASSESSMENT/PLAN: 	Pt is a  79 year old male w/ HTN, HLD, T2DM, chronic low back pain presents as a transfer from [facility] ED for surgery. . Pt was sent into ED yesterday by his pain medicine physician due to recent MRI findings concerning for L1-L2 discitis/OM with epidural abscess, bilateral psoas abscesses. Patient initially saw pain specialist in February for chronic back pain with radiations to bilateral lateral thighs (R>L). MRI at that time showed degenerative changes. He subsequently had epidural injections x2 (2/27, 3/11) with moderate pain relief. Pain began to worsen on 4/10. He had radiofrequency ablation performed on 4/11 and has since has severe worsening of pain and radiation to R lateral thigh. He reports recent bowel/bladder "incontinence" requiring diaper due to his inability to get to the bathroom in time due to pain limitations but states urge and sensation are intact. Denies fevers, chills, night sweats, weakness, numbness/tingling, saddle anesthesia, recent falls/trauma. He uses a cane for ambulation. Denies recent falls/trauma or any other ortho injuries.   Found to have epidural abscess now s/p Decompression, spine, lumbar, posterior approach, with fusion of posterior spinal column, planned for psoas abscess drainage.    - s/p Decompression, spine, lumbar, posterior approach, with fusion of posterior spinal column  - s/p psoas drainage   - pain control  - c/w statin  - bowel regimen, F/U GI  -PT follow up

## 2025-05-04 NOTE — CHART NOTE - NSCHARTNOTEFT_GEN_A_CORE
79M sp T10-pelvis PSF on 4/24 sp bl psoas abscess drainage 4/26. IR consulted for drain eval given reduced output.     - CT reviewed, collections stable to slightly decreased in size.   - Plan for tube check tomorrow   - am cbc bmp coags   - hold am ppx ac  - no need for npo   - pre procedure note   - procedure order under Dr. Porter     --  Don Osborn MD, PGY-4  Vascular and Interventional Radiology   Available on [employer] Teams    - Non-emergent consults: Place IR consult order in [place]  - Emergent issues (pager): [facility] [phone]; [place] [facility] [phone]; [phone]  - Scheduling questions: [facility] [phone]; [place] [facility] [phone]  - Clinic/outpatient booking: [facility] [phone]; [facility] [phone] 79M sp T10-pelvis PSF on 4/24 sp bl psoas abscess drainage 4/26. IR consulted for drain eval given reduced output.     - CT reviewed, collections stable to slightly decreased in size.    - Plan for tube check tomorrow   - am cbc bmp coags   - hold am ppx ac  - no need for npo   - pre procedure note   - procedure order under Dr. Porter     --  Don Osborn MD, PGY-4  Vascular and Interventional Radiology   Available on [employer] Teams    - Non-emergent consults: Place IR consult order in [place]  - Emergent issues (pager): [facility] [phone]; [facility] [phone]; [phone]  - Scheduling questions: [facility] [phone]; [facility] [phone]  - Clinic/outpatient booking: [facility] [phone]; [facility] [phone]

## 2025-05-04 NOTE — PROGRESS NOTE ADULT - ASSESSMENT
79yMale w/ HTN, HLD, T2DM, chronic low back pain presents as a transfer from [facility] ED for surgery today. Pt was sent into ED yesterday by his pain medicine physician due to recent MRI findings concerning for L1-L2 discitis/OM with epidural abscess, bilateral psoas abscesses. Patient initially saw pain specialist in February for chronic back pain with radiations to bilateral lateral thighs (R>L). MRI at that time showed degenerative changes. He subsequently had epidural injections x2 (2/27, 3/11) with moderate pain relief. Pain began to worsen on 4/10. He had radiofrequency ablation performed on 4/11 and has since has severe worsening of pain and radiation to R lateral thigh. He reports recent bowel/bladder "incontinence" requiring diaper due to his inability to get to the bathroom in time due to pain limitations but states urge and sensation are intact. Denies fevers, chills, night sweats, weakness, numbness/tingling, saddle anesthesia, recent falls/trauma. He uses a cane for ambulation. Denies recent falls/trauma or any other ortho injuries. Before back pain was very active walked half an hour , going up and down stairs and doing grocery etc with no Chest pain or SOB.        Problem/Recommendation - 1:  ·  Problem: Epidural abscess.   ·  Recommendation: S/P Decompression, spine, lumbar, posterior approach, with fusion of posterior spinal column.  On IV Abxs Ertapenem now  per ID x 6 weeks  .  Will defer management to  Neurosurgery.    < from: MR Lumbar Spine w/ IV Cont (04.23.25 @ 15:13) >  IMPRESSION:        1.   Cervical spine:   Moderate degenerative disc disease and   spondylosis at C4-5 through C6/7 with loss of disc height and associated   degenerative endplate changes.        2.   Thoracic spine:   Minimal enhancement within T7-8 which may   reflect early discitis.        3.   Lumbar spine:   Osteomyelitis and discitis at L1-2 with erosions   of the inferior L1 vertebral body and L2 vertebral body consistent with   osteomyelitis and discitis. Ventral epidural abscess is noted extending   from the L1-2 level superiorly to the T10 level with mass effect on the   ventral thecal sac, most prominently at the L1-2 level resulting in   marked compression. Multiple abscesses within the BILATERAL psoas   muscles, the largest measuring 4.3 cm on the RIGHT and 2.8 cm on the LEFT.     Problem/Recommendation - 2:  ·  Problem: Acute osteomyelitis of lumbar spine.   ·  Recommendation: L1 &L2 vertebrae .  On IV Abxs per ID .  Will defer management to Neurosurgery.     Problem/Recommendation - 3:  ·  Problem: Low back pain radiating to right lower extremity.   ·  Recommendation: Pain control.     Problem/Recommendation - 4:  ·  Problem: . BILATERAL psoas Abscess  ·  Recommendation: ON IV Abxs per ID .  IR placed drains after draining  .       Problem/Recommendation - 5:  ·  Problem: HTN (hypertension).   ·  Recommendation: Takes Amlodipine and ARB so continue with hold parameters.  < from: TTE W or WO Ultrasound Enhancing Agent (04.24.25 @ 10:10) >  CONCLUSIONS:      1. Left ventricular cavity is normal in size. Left ventricular wall thickness is normal. Left ventricular systolic function is normal with an ejection fraction of 64 % by Carpenter's method of disks. There are no regional wall motion abnormalities seen.   2. Normal right ventricular cavity size and normal right ventricular systolic function. Tricuspid annular plane systolic excursion (TAPSE) is 2.2 cm (normal >=1.7 cm).   3. Structurally normal mitral valve with normal leaflet excursion. There is calcification of the mitral valve annulus. There is tracemitral regurgitation.   4. The aortic valve appears trileaflet with normal systolic excursion. There is calcification of the aortic valve leaflets. There is mild aortic regurgitation.     Problem/Recommendation - 6:  ·  Problem: HLD (hyperlipidemia).   ·  Recommendation: Continue Atorvastatin.     Problem/Recommendation - 7:  ·  Problem: DM (diabetes mellitus).   ·  Recommendation: ON Farxiga and holding.  SSI and Lantus in patient .     Problem/Recommendation - 8:  ·  Problem:  Ileus /Pseudoobustraction.   ·  Recommendation: Had BMs and not  passing flatus .    Dulcolax suppository added.    ON Laxatives and Simethacone .  CLD started.   Surgery & GI input appreciated .  < from: CT Abdomen and Pelvis w/ Oral Cont and w/ IV Cont (04.29.25 @ 17:16) >  IMPRESSION:  Diffuse colonic dilatation, worse on today's study, likely secondary to   pseudoobstruction or ileus.    No free air.    Reduction in the bilateral psoas collections with catheters in place.    < from: Xray Abdomen 1 View PORTABLE -Urgent (Xray Abdomen 1 View PORTABLE -Urgent .) (04.29.25 @ 10:09) >  IMPRESSION:  Multiple dilated loops of small and large bowel with question of   pneumoperitoneum. Recommend upright chest radiograph or decubitus   abdominal radiograph for further evaluation.    Paged surgery team and waiting for response .      Problem/Recommendation - 9:  ·  Problem: JUSTINO with Hyponatremia .   ·  Recommendation: Trending BMP.  Improving.   Renal help appreciated.      Problem/Recommendation - 10:  ·  Problem: Hypokalemia & Hypophosphatemia .   ·  Recommendation: Replacing.   Rpt BMP noted.    Keep K level close to 4 .     Problem/Recommendation - 11:  ·  Problem: Thromocytosis.   ·  Recommendation: Reactionary secondary to infection..  If continues to trend up recommend  hematology consult .    D/W patient and family in room in detail .     PT working with patient and ambulating .

## 2025-05-04 NOTE — CHART NOTE - NSCHARTNOTEFT_GEN_A_CORE
Interventional Radiology Pre-Procedure Note    This is a 79y Male going for IR tube check tomorrow 5/5/25    IR Attending: German    NPO: Not needed  Antibiotics: None  Anticoagulation: SQH Held  Adverse events to anesethsia: None  Objection to blood products: None    PAST MEDICAL & SURGICAL HISTORY:  HTN (hypertension)      HLD (hyperlipidemia)      DM (diabetes mellitus)      Cataract           Vitals:Vital Signs Last 24 Hrs  T(C): 36.5 (04 May 2025 15:00), Max: 36.8 (04 May 2025 02:28)  T(F): 97.7 (04 May 2025 15:00), Max: 98.2 (04 May 2025 02:28)  HR: 100 (04 May 2025 15:00) (91 - 103)  BP: 142/80 (04 May 2025 15:00) (128/64 - 143/76)  BP(mean): --  RR: 18 (04 May 2025 15:00) (17 - 18)  SpO2: 100% (04 May 2025 15:00) (97% - 100%)    Parameters below as of 04 May 2025 15:00  Patient On (Oxygen Delivery Method): room air        Allergies: Allergies    No Known Allergies    Intolerances        Physical Exam:   GEN: NAD, resting quietly  Spine:  Back:   Dressing CDI  2x IR DHARA drains in place    Motor:                   C5                C6              C7               C8           T1   R            5/5                5/5            5/5             5/5          5/5  L             5/5               5/5             5/5             5/5          5/5                L2             L3             L4               L5            S1  R         3/5           4/5          5/5             5/5           5/5  L          4/5          5/5           5/5             5/5           5/5    Labs:                         8.9    11.02 )-----------( 887      ( 04 May 2025 05:32 )             26.2     05-04    135  |  107  |  9   ----------------------------<  76  3.0[L]   |  17[L]  |  0.90    Ca    8.0[L]      04 May 2025 05:32  Phos  3.9     05-04  Mg     2.10     05-04           All questions and concerns have been addressed at this time.

## 2025-05-04 NOTE — PROGRESS NOTE ADULT - SUBJECTIVE AND OBJECTIVE BOX
Orthopedic Surgery Progress Note     S: Patient seen and examined today. No acute events overnight. Pain is well controlled. Denies f/c, chest pain, shortness of breath, dizziness.    MEDICATIONS  (STANDING):  acetaminophen     Tablet .. 975 milliGRAM(s) Oral every 6 hours  atorvastatin 40 milliGRAM(s) Oral at bedtime  bacitracin   Ointment 1 Application(s) Topical three times a day  bisacodyl Suppository 10 milliGRAM(s) Rectal daily  ertapenem  IVPB 1000 milliGRAM(s) IV Intermittent every 24 hours  ertapenem  IVPB      gabapentin 100 milliGRAM(s) Oral every 8 hours  glucagon  Injectable 1 milliGRAM(s) IntraMuscular once  heparin   Injectable 5000 Unit(s) SubCutaneous every 8 hours  insulin lispro (ADMELOG) corrective regimen sliding scale   SubCutaneous three times a day before meals  insulin lispro (ADMELOG) corrective regimen sliding scale   SubCutaneous at bedtime  methocarbamol 500 milliGRAM(s) Oral every 12 hours  multivitamin 1 Tablet(s) Oral daily  omega-3-Acid Ethyl Esters 2 Gram(s) Oral two times a day  pantoprazole    Tablet 40 milliGRAM(s) Oral before breakfast  polyethylene glycol 3350 17 Gram(s) Oral <User Schedule>  senna 2 Tablet(s) Oral at bedtime  simethicone 80 milliGRAM(s) Chew two times a day    MEDICATIONS  (PRN):  dibucaine 1% Ointment 1 Application(s) Topical daily PRN hemorrhoid pain  magnesium hydroxide Suspension 30 milliLiter(s) Oral every 12 hours PRN Constipation      Vital Signs Last 24 Hrs  T(C): 36.2 (04 May 2025 05:00), Max: 36.9 (03 May 2025 14:00)  T(F): 97.1 (04 May 2025 05:00), Max: 98.5 (03 May 2025 14:00)  HR: 91 (04 May 2025 05:00) (91 - 103)  BP: 133/66 (04 May 2025 05:00) (123/70 - 143/76)  BP(mean): --  RR: 18 (04 May 2025 05:00) (17 - 18)  SpO2: 100% (04 May 2025 05:00) (97% - 100%)    Parameters below as of 04 May 2025 05:00  Patient On (Oxygen Delivery Method): room air        05-03-25 @ 07:01  -  05-04-25 @ 07:00  --------------------------------------------------------  IN: 0 mL / OUT: 384.5 mL / NET: -384.5 mL        Physical Exam:  GEN: NAD, resting quietly  Spine:  Back:   Dressing CDI  2x IR DHARA drains in place    Motor:                   C5                C6              C7               C8           T1   R            5/5                5/5            5/5             5/5          5/5  L             5/5               5/5             5/5             5/5          5/5                L2             L3             L4               L5            S1  R         3/5           4/5          5/5             5/5           5/5  L          4/5          5/5           5/5             5/5           5/5      LABS:                        8.9    11.02 )-----------( 887      ( 04 May 2025 05:32 )             26.2     05-04    135  |  107  |  9   ----------------------------<  76  3.0[L]   |  17[L]  |  0.90    Ca    8.0[L]      04 May 2025 05:32  Phos  3.9     05-04  Mg     2.10     05-04

## 2025-05-05 ENCOUNTER — NON-APPOINTMENT (OUTPATIENT)
Age: 80
End: 2025-05-05

## 2025-05-05 LAB
ANION GAP SERPL CALC-SCNC: 10 MMOL/L — SIGNIFICANT CHANGE UP (ref 7–14)
ANION GAP SERPL CALC-SCNC: 10 MMOL/L — SIGNIFICANT CHANGE UP (ref 7–14)
APTT BLD: 52.2 SEC — HIGH (ref 26.1–36.8)
BLD GP AB SCN SERPL QL: NEGATIVE — SIGNIFICANT CHANGE UP
BUN SERPL-MCNC: 10 MG/DL — SIGNIFICANT CHANGE UP (ref 7–23)
BUN SERPL-MCNC: 9 MG/DL — SIGNIFICANT CHANGE UP (ref 7–23)
CALCIUM SERPL-MCNC: 7.9 MG/DL — LOW (ref 8.4–10.5)
CALCIUM SERPL-MCNC: 8.3 MG/DL — LOW (ref 8.4–10.5)
CHLORIDE SERPL-SCNC: 107 MMOL/L — SIGNIFICANT CHANGE UP (ref 98–107)
CHLORIDE SERPL-SCNC: 110 MMOL/L — HIGH (ref 98–107)
CO2 SERPL-SCNC: 17 MMOL/L — LOW (ref 22–31)
CO2 SERPL-SCNC: 18 MMOL/L — LOW (ref 22–31)
CREAT SERPL-MCNC: 0.82 MG/DL — SIGNIFICANT CHANGE UP (ref 0.5–1.3)
CREAT SERPL-MCNC: 0.85 MG/DL — SIGNIFICANT CHANGE UP (ref 0.5–1.3)
EGFR: 88 ML/MIN/1.73M2 — SIGNIFICANT CHANGE UP
EGFR: 88 ML/MIN/1.73M2 — SIGNIFICANT CHANGE UP
EGFR: 89 ML/MIN/1.73M2 — SIGNIFICANT CHANGE UP
EGFR: 89 ML/MIN/1.73M2 — SIGNIFICANT CHANGE UP
GLUCOSE BLDC GLUCOMTR-MCNC: 108 MG/DL — HIGH (ref 70–99)
GLUCOSE BLDC GLUCOMTR-MCNC: 116 MG/DL — HIGH (ref 70–99)
GLUCOSE BLDC GLUCOMTR-MCNC: 141 MG/DL — HIGH (ref 70–99)
GLUCOSE BLDC GLUCOMTR-MCNC: 243 MG/DL — HIGH (ref 70–99)
GLUCOSE SERPL-MCNC: 103 MG/DL — HIGH (ref 70–99)
GLUCOSE SERPL-MCNC: 121 MG/DL — HIGH (ref 70–99)
HCT VFR BLD CALC: 26 % — LOW (ref 39–50)
HGB BLD-MCNC: 8.6 G/DL — LOW (ref 13–17)
INR BLD: 1.03 RATIO — SIGNIFICANT CHANGE UP (ref 0.85–1.16)
MAGNESIUM SERPL-MCNC: 2 MG/DL — SIGNIFICANT CHANGE UP (ref 1.6–2.6)
MCHC RBC-ENTMCNC: 30.5 PG — SIGNIFICANT CHANGE UP (ref 27–34)
MCHC RBC-ENTMCNC: 33.1 G/DL — SIGNIFICANT CHANGE UP (ref 32–36)
MCV RBC AUTO: 92.2 FL — SIGNIFICANT CHANGE UP (ref 80–100)
NRBC # BLD AUTO: 0 K/UL — SIGNIFICANT CHANGE UP (ref 0–0)
NRBC # FLD: 0 K/UL — SIGNIFICANT CHANGE UP (ref 0–0)
NRBC BLD AUTO-RTO: 0 /100 WBCS — SIGNIFICANT CHANGE UP (ref 0–0)
PHOSPHATE SERPL-MCNC: 2.2 MG/DL — LOW (ref 2.5–4.5)
PLATELET # BLD AUTO: 847 K/UL — HIGH (ref 150–400)
POTASSIUM SERPL-MCNC: 2.9 MMOL/L — CRITICAL LOW (ref 3.5–5.3)
POTASSIUM SERPL-MCNC: 3.6 MMOL/L — SIGNIFICANT CHANGE UP (ref 3.5–5.3)
POTASSIUM SERPL-SCNC: 2.9 MMOL/L — CRITICAL LOW (ref 3.5–5.3)
POTASSIUM SERPL-SCNC: 3.6 MMOL/L — SIGNIFICANT CHANGE UP (ref 3.5–5.3)
PROTHROM AB SERPL-ACNC: 12.2 SEC — SIGNIFICANT CHANGE UP (ref 9.9–13.4)
RBC # BLD: 2.82 M/UL — LOW (ref 4.2–5.8)
RBC # FLD: 17.7 % — HIGH (ref 10.3–14.5)
RH IG SCN BLD-IMP: POSITIVE — SIGNIFICANT CHANGE UP
SODIUM SERPL-SCNC: 134 MMOL/L — LOW (ref 135–145)
SODIUM SERPL-SCNC: 138 MMOL/L — SIGNIFICANT CHANGE UP (ref 135–145)
WBC # BLD: 9.7 K/UL — SIGNIFICANT CHANGE UP (ref 3.8–10.5)
WBC # FLD AUTO: 9.7 K/UL — SIGNIFICANT CHANGE UP (ref 3.8–10.5)

## 2025-05-05 PROCEDURE — 99232 SBSQ HOSP IP/OBS MODERATE 35: CPT

## 2025-05-05 PROCEDURE — 74018 RADEX ABDOMEN 1 VIEW: CPT | Mod: 26

## 2025-05-05 PROCEDURE — G0545: CPT

## 2025-05-05 RX ORDER — POTASSIUM PHOSPHATE, MONOBASIC POTASSIUM PHOSPHATE, DIBASIC INJECTION, 236; 224 MG/ML; MG/ML
30 SOLUTION, CONCENTRATE INTRAVENOUS ONCE
Refills: 0 | Status: COMPLETED | OUTPATIENT
Start: 2025-05-05 | End: 2025-05-05

## 2025-05-05 RX ORDER — HEPARIN SODIUM 1000 [USP'U]/ML
5000 INJECTION INTRAVENOUS; SUBCUTANEOUS ONCE
Refills: 0 | Status: COMPLETED | OUTPATIENT
Start: 2025-05-05 | End: 2025-05-05

## 2025-05-05 RX ORDER — POLYETHYLENE GLYCOL 3350 17 G/17G
17 POWDER, FOR SOLUTION ORAL
Refills: 0 | Status: DISCONTINUED | OUTPATIENT
Start: 2025-05-05 | End: 2025-05-05

## 2025-05-05 RX ADMIN — Medication 100 MILLIEQUIVALENT(S): at 00:00

## 2025-05-05 RX ADMIN — Medication 975 MILLIGRAM(S): at 18:35

## 2025-05-05 RX ADMIN — ERTAPENEM SODIUM 100 MILLIGRAM(S): 1 INJECTION, POWDER, LYOPHILIZED, FOR SOLUTION INTRAMUSCULAR; INTRAVENOUS at 13:57

## 2025-05-05 RX ADMIN — Medication 10 MILLIGRAM(S): at 13:20

## 2025-05-05 RX ADMIN — HEPARIN SODIUM 5000 UNIT(S): 1000 INJECTION INTRAVENOUS; SUBCUTANEOUS at 21:14

## 2025-05-05 RX ADMIN — METHOCARBAMOL 500 MILLIGRAM(S): 500 TABLET, FILM COATED ORAL at 11:23

## 2025-05-05 RX ADMIN — POLYETHYLENE GLYCOL 3350 17 GRAM(S): 17 POWDER, FOR SOLUTION ORAL at 07:48

## 2025-05-05 RX ADMIN — Medication 2 TABLET(S): at 21:13

## 2025-05-05 RX ADMIN — Medication 100 MILLIEQUIVALENT(S): at 09:50

## 2025-05-05 RX ADMIN — GABAPENTIN 100 MILLIGRAM(S): 400 CAPSULE ORAL at 21:13

## 2025-05-05 RX ADMIN — Medication 975 MILLIGRAM(S): at 12:23

## 2025-05-05 RX ADMIN — Medication 975 MILLIGRAM(S): at 05:52

## 2025-05-05 RX ADMIN — INSULIN LISPRO 2: 100 INJECTION, SOLUTION INTRAVENOUS; SUBCUTANEOUS at 11:25

## 2025-05-05 RX ADMIN — POTASSIUM PHOSPHATE, MONOBASIC POTASSIUM PHOSPHATE, DIBASIC INJECTION, 83.33 MILLIMOLE(S): 236; 224 SOLUTION, CONCENTRATE INTRAVENOUS at 21:13

## 2025-05-05 RX ADMIN — Medication 100 MILLIEQUIVALENT(S): at 12:52

## 2025-05-05 RX ADMIN — Medication 975 MILLIGRAM(S): at 19:35

## 2025-05-05 RX ADMIN — Medication 80 MILLIGRAM(S): at 18:35

## 2025-05-05 RX ADMIN — Medication 975 MILLIGRAM(S): at 11:23

## 2025-05-05 RX ADMIN — Medication 100 MILLIEQUIVALENT(S): at 19:17

## 2025-05-05 RX ADMIN — Medication 1 APPLICATION(S): at 21:13

## 2025-05-05 RX ADMIN — OMEGA-3-ACID ETHYL ESTERS CAPSULES 2 GRAM(S): 1 CAPSULE, LIQUID FILLED ORAL at 11:24

## 2025-05-05 RX ADMIN — Medication 975 MILLIGRAM(S): at 00:00

## 2025-05-05 RX ADMIN — Medication 40 MILLIEQUIVALENT(S): at 09:25

## 2025-05-05 RX ADMIN — Medication 975 MILLIGRAM(S): at 06:45

## 2025-05-05 RX ADMIN — GABAPENTIN 100 MILLIGRAM(S): 400 CAPSULE ORAL at 05:52

## 2025-05-05 RX ADMIN — METHOCARBAMOL 500 MILLIGRAM(S): 500 TABLET, FILM COATED ORAL at 00:00

## 2025-05-05 RX ADMIN — Medication 1 TABLET(S): at 11:24

## 2025-05-05 RX ADMIN — Medication 40 MILLIGRAM(S): at 05:52

## 2025-05-05 RX ADMIN — GABAPENTIN 100 MILLIGRAM(S): 400 CAPSULE ORAL at 13:20

## 2025-05-05 RX ADMIN — Medication 80 MILLIGRAM(S): at 07:47

## 2025-05-05 RX ADMIN — Medication 100 MILLIEQUIVALENT(S): at 13:57

## 2025-05-05 RX ADMIN — Medication 975 MILLIGRAM(S): at 00:30

## 2025-05-05 NOTE — PROGRESS NOTE ADULT - ASSESSMENT
79yMale w/ HTN, HLD, T2DM, chronic low back pain presents as a transfer from [facility] ED for surgery today. Pt was sent into ED yesterday by his pain medicine physician due to recent MRI findings concerning for L1-L2 discitis/OM with epidural abscess, bilateral psoas abscesses. Patient initially saw pain specialist in February for chronic back pain with radiations to bilateral lateral thighs (R>L). MRI at that time showed degenerative changes. He subsequently had epidural injections x2 (2/27, 3/11) with moderate pain relief. Pain began to worsen on 4/10. He had radiofrequency ablation performed on 4/11 and has since has severe worsening of pain and radiation to R lateral thigh. He reports recent bowel/bladder "incontinence" requiring diaper due to his inability to get to the bathroom in time due to pain limitations but states urge and sensation are intact. Denies fevers, chills, night sweats, weakness, numbness/tingling, saddle anesthesia, recent falls/trauma. He uses a cane for ambulation. Denies recent falls/trauma or any other ortho injuries. Before back pain was very active walked half an hour , going up and down stairs and doing grocery etc with no Chest pain or SOB.        Problem/Recommendation - 1:  ·  Problem: Epidural abscess.   ·  Recommendation: S/P Decompression, spine, lumbar, posterior approach, with fusion of posterior spinal column.  On IV Abxs Ertapenem now  per ID x 6 weeks  .  Will defer management to  Neurosurgery.  DVT prophylaxis per Neurosurgery .  < from: MR Lumbar Spine w/ IV Cont (04.23.25 @ 15:13) >  IMPRESSION:        1.   Cervical spine:   Moderate degenerative disc disease and   spondylosis at C4-5 through C6/7 with loss of disc height and associated   degenerative endplate changes.        2.   Thoracic spine:   Minimal enhancement within T7-8 which may   reflect early discitis.        3.   Lumbar spine:   Osteomyelitis and discitis at L1-2 with erosions   of the inferior L1 vertebral body and L2 vertebral body consistent with   osteomyelitis and discitis. Ventral epidural abscess is noted extending   from the L1-2 level superiorly to the T10 level with mass effect on the   ventral thecal sac, most prominently at the L1-2 level resulting in   marked compression. Multiple abscesses within the BILATERAL psoas   muscles, the largest measuring 4.3 cm on the RIGHT and 2.8 cm on the LEFT.     Problem/Recommendation - 2:  ·  Problem: Acute osteomyelitis of lumbar spine.   ·  Recommendation: L1 &L2 vertebrae .  On IV Abxs per ID .  Will defer management to Neurosurgery.     Problem/Recommendation - 3:  ·  Problem: Low back pain radiating to right lower extremity.   ·  Recommendation: Pain control.     Problem/Recommendation - 4:  ·  Problem: . BILATERAL psoas Abscess  ·  Recommendation: ON IV Abxs per ID .  IR placed drains after draining  .     Problem/Recommendation - 5:  ·  Problem: HTN (hypertension).   ·  Recommendation: Takes Amlodipine and ARB so continue with hold parameters.  < from: TTE W or WO Ultrasound Enhancing Agent (04.24.25 @ 10:10) >  CONCLUSIONS:      1. Left ventricular cavity is normal in size. Left ventricular wall thickness is normal. Left ventricular systolic function is normal with an ejection fraction of 64 % by Carpenter's method of disks. There are no regional wall motion abnormalities seen.   2. Normal right ventricular cavity size and normal right ventricular systolic function. Tricuspid annular plane systolic excursion (TAPSE) is 2.2 cm (normal >=1.7 cm).   3. Structurally normal mitral valve with normal leaflet excursion. There is calcification of the mitral valve annulus. There is tracemitral regurgitation.   4. The aortic valve appears trileaflet with normal systolic excursion. There is calcification of the aortic valve leaflets. There is mild aortic regurgitation.     Problem/Recommendation - 6:  ·  Problem: HLD (hyperlipidemia).   ·  Recommendation: Continue Atorvastatin.     Problem/Recommendation - 7:  ·  Problem: DM (diabetes mellitus).   ·  Recommendation: ON Farxiga and holding.  SSI and Lantus in patient .     Problem/Recommendation - 8:  ·  Problem:  Ileus /Pseudoobustraction.   ·  Recommendation: Had BMs and passed flatus .  Correcting electrolytes.     Dulcolax suppository added.    ON Laxatives and Simethacone .  CLD started.   Surgery & GI input appreciated .  < from: CT Abdomen and Pelvis w/ Oral Cont and w/ IV Cont (04.29.25 @ 17:16) >  IMPRESSION:  Diffuse colonic dilatation, worse on today's study, likely secondary to   pseudoobstruction or ileus.    No free air.    Reduction in the bilateral psoas collections with catheters in place.    < from: Xray Abdomen 1 View PORTABLE -Urgent (Xray Abdomen 1 View PORTABLE -Urgent .) (04.29.25 @ 10:09) >  IMPRESSION:  Multiple dilated loops of small and large bowel with question of   pneumoperitoneum. Recommend upright chest radiograph or decubitus   abdominal radiograph for further evaluation.    Paged surgery team and waiting for response .      Problem/Recommendation - 9:  ·  Problem: JUSTINO with Hyponatremia .   ·  Recommendation: Trending BMP.  Improving.   Renal help appreciated.      Problem/Recommendation - 10:  ·  Problem: Hypokalemia & Hypophosphatemia .   ·  Recommendation: Replacing.   Rpt BMP noted.    Keep K level close to 4 .     Problem/Recommendation - 11:  ·  Problem: Thromocytosis.   ·  Recommendation: Reactionary secondary to infection.  Platelets Trending down .  If continues to trend up recommend  hematology consult .    D/W patient and family  in detail .     PT working with patient and ambulating .

## 2025-05-05 NOTE — PROGRESS NOTE ADULT - ASSESSMENT
79M s/p T10-pelvis PSF on 4/24    Plan:  -WBAT BLLE  -appreciate ID recs: abx ertapenem  -appreciate IR mgmt s/p psoas abscess drainage-- plan for tube check with IR today     - 2 latisha drains per IR     - Asp cx: bacteroides fragilis  -f/u blood cx  -PT/OT  -dvt ppx: venodynes  -dispo: ADRIANA    For all questions related to patient care, please reach out to the on-call team via the pager.     Asia Perez, PGY 3  Orthopaedic Surgery  [facility] [phone]  [facility] [id number]  [facility] [phone]

## 2025-05-05 NOTE — PROGRESS NOTE ADULT - SUBJECTIVE AND OBJECTIVE BOX
Date of Service  : 05-05-25     INTERVAL HPI/OVERNIGHT EVENTS: I feel 10% better.   Vital Signs Last 24 Hrs  T(C): 36.5 (05 May 2025 17:36), Max: 36.7 (04 May 2025 22:00)  T(F): 97.7 (05 May 2025 17:36), Max: 98.1 (04 May 2025 22:00)  HR: 98 (05 May 2025 17:36) (91 - 105)  BP: 140/62 (05 May 2025 17:36) (122/59 - 150/75)  BP(mean): --  RR: 17 (05 May 2025 17:36) (17 - 18)  SpO2: 100% (05 May 2025 17:36) (96% - 100%)    Parameters below as of 05 May 2025 17:36  Patient On (Oxygen Delivery Method): room air      I&O's Summary    04 May 2025 07:01  -  05 May 2025 07:00  --------------------------------------------------------  IN: 1100 mL / OUT: 1015 mL / NET: 85 mL    05 May 2025 07:01  -  05 May 2025 19:13  --------------------------------------------------------  IN: 0 mL / OUT: 607 mL / NET: -607 mL      MEDICATIONS  (STANDING):  acetaminophen     Tablet .. 975 milliGRAM(s) Oral every 6 hours  atorvastatin 40 milliGRAM(s) Oral at bedtime  bacitracin   Ointment 1 Application(s) Topical three times a day  ertapenem  IVPB      ertapenem  IVPB 1000 milliGRAM(s) IV Intermittent every 24 hours  gabapentin 100 milliGRAM(s) Oral every 8 hours  glucagon  Injectable 1 milliGRAM(s) IntraMuscular once  insulin lispro (ADMELOG) corrective regimen sliding scale   SubCutaneous three times a day before meals  insulin lispro (ADMELOG) corrective regimen sliding scale   SubCutaneous at bedtime  methocarbamol 500 milliGRAM(s) Oral every 12 hours  multivitamin 1 Tablet(s) Oral daily  omega-3-Acid Ethyl Esters 2 Gram(s) Oral two times a day  pantoprazole    Tablet 40 milliGRAM(s) Oral before breakfast  potassium chloride  10 mEq/100 mL IVPB 10 milliEquivalent(s) IV Intermittent once  potassium phosphate IVPB 30 milliMole(s) IV Intermittent once  senna 2 Tablet(s) Oral at bedtime  simethicone 80 milliGRAM(s) Chew two times a day    MEDICATIONS  (PRN):  dibucaine 1% Ointment 1 Application(s) Topical daily PRN hemorrhoid pain  magnesium hydroxide Suspension 30 milliLiter(s) Oral every 12 hours PRN Constipation    LABS:                        8.6    9.70  )-----------( 847      ( 05 May 2025 05:33 )             26.0     05-05    138  |  110[H]  |  10  ----------------------------<  121[H]  3.6   |  18[L]  |  0.82    Ca    8.3[L]      05 May 2025 17:36  Phos  2.2     05-05  Mg     2.00     05-05      PT/INR - ( 05 May 2025 05:33 )   PT: 12.2 sec;   INR: 1.03 ratio         PTT - ( 05 May 2025 05:33 )  PTT:52.2 sec  Urinalysis Basic - ( 05 May 2025 17:36 )    Color: x / Appearance: x / SG: x / pH: x  Gluc: 121 mg/dL / Ketone: x  / Bili: x / Urobili: x   Blood: x / Protein: x / Nitrite: x   Leuk Esterase: x / RBC: x / WBC x   Sq Epi: x / Non Sq Epi: x / Bacteria: x      CAPILLARY BLOOD GLUCOSE      POCT Blood Glucose.: 141 mg/dL (05 May 2025 16:41)  POCT Blood Glucose.: 243 mg/dL (05 May 2025 11:11)  POCT Blood Glucose.: 108 mg/dL (05 May 2025 07:12)  POCT Blood Glucose.: 155 mg/dL (04 May 2025 22:06)        Urinalysis Basic - ( 05 May 2025 17:36 )    Color: x / Appearance: x / SG: x / pH: x  Gluc: 121 mg/dL / Ketone: x  / Bili: x / Urobili: x   Blood: x / Protein: x / Nitrite: x   Leuk Esterase: x / RBC: x / WBC x   Sq Epi: x / Non Sq Epi: x / Bacteria: x      REVIEW OF SYSTEMS:  CONSTITUTIONAL: No fever, weight loss, or fatigue  EYES: No eye pain, visual disturbances, or discharge  ENMT:  No difficulty hearing, tinnitus, vertigo; No sinus or throat pain  NECK: No pain or stiffness  RESPIRATORY: No cough, wheezing, chills or hemoptysis; No shortness of breath  CARDIOVASCULAR: No chest pain, palpitations, dizziness, or leg swelling  GASTROINTESTINAL: No abdominal or epigastric pain. No nausea, vomiting, or hematemesis; No diarrhea or constipation. No melena or hematochezia.  GENITOURINARY: No dysuria, frequency, hematuria, or incontinence  NEUROLOGICAL: No headaches, memory loss, loss of strength, numbness, or tremors      RADIOLOGY & ADDITIONAL TESTS:    Consultant(s) Notes Reviewed:  [x ] YES  [ ] NO    PHYSICAL EXAM:  GENERAL: NAD, well-groomed, well-developed,not in any distress ,  HEAD:  Atraumatic, Normocephalic  NECK: Supple, No JVD, Normal thyroid  NERVOUS SYSTEM:  Alert & Oriented X3, No focal deficit   CHEST/LUNG: Good air entry bilateral with no  rales, rhonchi, wheezing, or rubs  HEART: Regular rate and rhythm; No murmurs, rubs, or gallops  ABDOMEN: Soft, Nontender, Nondistended; Bowel sounds present  EXTREMITIES:  2+ Peripheral Pulses, No clubbing, cyanosis, or edema      Care Discussed with Consultants/Other Providers [ x] YES  [ ] NO

## 2025-05-05 NOTE — PROVIDER CONTACT NOTE (CRITICAL VALUE NOTIFICATION) - RECOMMENDATIONS
Pt is resting in bed, denies chest pain/sob, no acute distress noted.
Continue Antibiotic and trend labs

## 2025-05-05 NOTE — PROGRESS NOTE ADULT - SUBJECTIVE AND OBJECTIVE BOX
Patient is a 79y old  Male who presents with a chief complaint of L1-L2 discitis/OM with epidural abscess and BL psoas abscesses (05 May 2025 12:08)    Being followed by ID for spinal infection     Interval history:  abd pain but improved  WBC stable   Diarrhea +   no other complaints     ROS:  No cough,SOB,CP  No N/V/D  No HA  No other complaints    Antimicrobials:  ertapenem  IVPB      ertapenem  IVPB 1000 milliGRAM(s) IV Intermittent every 24 hours      Vital Signs Last 24 Hrs  T(C): 36.7 (05-05-25 @ 09:55), Max: 36.7 (05-04-25 @ 22:00)  T(F): 98.1 (05-05-25 @ 09:55), Max: 98.1 (05-04-25 @ 22:00)  HR: 96 (05-05-25 @ 09:55) (91 - 105)  BP: 142/70 (05-05-25 @ 09:55) (122/59 - 150/75)  BP(mean): --  RR: 18 (05-05-25 @ 09:55) (17 - 18)  SpO2: 96% (05-05-25 @ 09:55) (96% - 100%)    Physical Exam:  Constitutional: non-toxic, no distress  HEAD/EYES: anicteric, no conjunctival injection  ENT:  supple, no thrush  Cardiovascular:   normal S1, S2, no murmur, no edema  Respiratory:  clear BS bilaterally, no wheezes, no rales  GI:  distended and TTP  Musculoskeletal:  no synovitis, normal ROM  Neurologic: awake and alert, normal strength, no focal findings  Skin:  no rash, no erythema, no phlebitis  Back: drains +     Lab Data:                        8.6    9.70  )-----------( 847      ( 05 May 2025 05:33 )             26.0     05-05    134[L]  |  107  |  9   ----------------------------<  103[H]  2.9[LL]   |  17[L]  |  0.85    Ca    7.9[L]      05 May 2025 05:33  Phos  3.9     05-04  Mg     2.10     05-04                    RADIOLOGY:  below reviewed      ACC: [id number] EXAM:  CT ABDOMEN AND PELVIS   ORDERED BY: DEAN RODAS     PROCEDURE DATE:  05/04/2025          INTERPRETATION:  CLINICAL INFORMATION: Reevaluation of a known psoas   collection.    COMPARISON: CT abdomen pelvis performed on 4/29/2025.    CONTRAST/COMPLICATIONS:  IV Contrast: NONE  Oral Contrast: NONE    PROCEDURE:  CT of the Abdomen and Pelvis was performed.  Sagittal and coronal reformats were performed.    FINDINGS:  LOWER CHEST: Bibasilar subsegmental atelectasis and small pleural   effusions.    LIVER: Within normal limits.  BILE DUCTS: Normal caliber.  GALLBLADDER: Gallbladder wall calcifications.  SPLEEN: Within normal limits.  PANCREAS: Within normal limits.  ADRENALS: Within normal limits.  KIDNEYS/URETERS: Right cortical cyst.  BLADDER: Within normal limits.  REPRODUCTIVE ORGANS: Prostate is enlarged.    BOWEL: Diffusely dilated colon to the level of the rectum with multiple   air-fluid levels without discrete transition point, stable. Small sliding   type hiatal hernia. No bowel obstruction. Appendix is normal.  PERITONEUM/RETROPERITONEUM: Bilateral psoas drains. Bilateral psoas   collections. Stable in size compared to prior exam although evaluation is   limited due to adjacent streak artifact. Small volume ascites around the   spleen.  VESSELS: Atherosclerotic changes.  LYMPH NODES: No lymphadenopathy.  ABDOMINAL WALL: Anasarca. Interval removal of paraspinal soft tissue   drain. Paraspinal soft tissue persists.  BONES: Thoracolumbar spinal fusion hardware. Degenerative changes.    IMPRESSION:  Bilateral psoas collections, are stable in size compared to prior exam.  Diffusely dilated large bowel containing fluid and air, without discrete   transition point, and is stable compared to prior exam.        --- End of Report ---

## 2025-05-05 NOTE — PROGRESS NOTE ADULT - SUBJECTIVE AND OBJECTIVE BOX
DATE OF SERVICE: 05-05-25    reports having BMs, mild pain at surgical site, no chest pain or SOB    MEDICATIONS:  acetaminophen     Tablet .. 975 milliGRAM(s) Oral every 6 hours  atorvastatin 40 milliGRAM(s) Oral at bedtime  bacitracin   Ointment 1 Application(s) Topical three times a day  bisacodyl Suppository 10 milliGRAM(s) Rectal daily  dibucaine 1% Ointment 1 Application(s) Topical daily PRN  ertapenem  IVPB      ertapenem  IVPB 1000 milliGRAM(s) IV Intermittent every 24 hours  gabapentin 100 milliGRAM(s) Oral every 8 hours  glucagon  Injectable 1 milliGRAM(s) IntraMuscular once  insulin lispro (ADMELOG) corrective regimen sliding scale   SubCutaneous three times a day before meals  insulin lispro (ADMELOG) corrective regimen sliding scale   SubCutaneous at bedtime  magnesium hydroxide Suspension 30 milliLiter(s) Oral every 12 hours PRN  methocarbamol 500 milliGRAM(s) Oral every 12 hours  multivitamin 1 Tablet(s) Oral daily  omega-3-Acid Ethyl Esters 2 Gram(s) Oral two times a day  pantoprazole    Tablet 40 milliGRAM(s) Oral before breakfast  potassium chloride  10 mEq/100 mL IVPB 10 milliEquivalent(s) IV Intermittent every 1 hour  senna 2 Tablet(s) Oral at bedtime  simethicone 80 milliGRAM(s) Chew two times a day                            8.6    9.70  )-----------( 847      ( 05 May 2025 05:33 )             26.0       Hemoglobin: 8.6 g/dL (05-05 @ 05:33)  Hemoglobin: 8.9 g/dL (05-04 @ 05:32)  Hemoglobin: 9.9 g/dL (05-03 @ 10:53)  Hemoglobin: 9.5 g/dL (05-02 @ 05:52)  Hemoglobin: 8.9 g/dL (05-01 @ 05:17)      05-05    134[L]  |  107  |  9   ----------------------------<  103[H]  2.9[LL]   |  17[L]  |  0.85    Ca    7.9[L]      05 May 2025 05:33  Phos  3.9     05-04  Mg     2.10     05-04    Creatinine Trend: 0.85<--, 0.87<--, 0.90<--, 0.90<--, 0.94<--, 0.87<--  COAGS: PT/INR - ( 05 May 2025 05:33 )   PT: 12.2 sec;   INR: 1.03 ratio    PTT - ( 05 May 2025 05:33 )  PTT:52.2 sec      T(C): 36.7 (05-05-25 @ 09:55), Max: 36.7 (05-04-25 @ 22:00)  HR: 96 (05-05-25 @ 09:55) (91 - 105)  BP: 142/70 (05-05-25 @ 09:55) (122/59 - 150/75)  RR: 18 (05-05-25 @ 09:55) (17 - 18)  SpO2: 96% (05-05-25 @ 09:55) (96% - 100%)  Wt(kg): --    I&O's Summary    04 May 2025 07:01  -  05 May 2025 07:00  --------------------------------------------------------  IN: 1100 mL / OUT: 1015 mL / NET: 85 mL    05 May 2025 07:01  -  05 May 2025 12:51  --------------------------------------------------------  IN: 0 mL / OUT: 302 mL / NET: -302 mL      Gen: NAD  HEENT:  (-)icterus (-)pallor  CV: N S1 S2 1/6 DARYA (+)2 Pulses B/l  Resp:  Clear to auscultation B/L, normal effort  GI: distended  Lymph:  (-)Edema, (-)obvious lymphadenopathy  Skin: Warm to touch, Normal turgor  Psych: Appropriate mood and affect      TELEMETRY: 	  NSR    ECG:  	NSR    < from: Xray Abdomen 1 View PORTABLE -Urgent (Xray Abdomen 1 View PORTABLE -Urgent .) (04.29.25 @ 10:09) >  IMPRESSION:  Multiple dilated loops of small and large bowel with question of   pneumoperitoneum. Recommend upright chest radiograph or decubitus   abdominal radiograph for further evaluation.    < end of copied text >    < from: CT Abdomen and Pelvis w/ Oral Cont and w/ IV Cont (04.29.25 @ 17:16) >  IMPRESSION:      Diffuse colonic dilatation, worse on today's study, likely secondary to   pseudoobstruction or ileus.    No free air.    Reduction in the bilateral psoas collections with catheters in place.    PRELIMINARY IMPRESSION: Study limited secondary to extensive streak   artifact from patient's spinal hardware. Diffusely dilated colon to the   level of the rectum, increased since 4/25/2025, without evidence of   mechanical obstruction. Possible etiologies include pseudoobstruction   versus ileus. No small bowel obstruction. No pneumoperitoneum.   Percutaneous posterior approach drainage catheters within the psoas   muscles bilaterally with interval decrease of previously seen abscess.   Redemonstrated erosive endplate changes at L1-L2 compatible with discitis   osteomyelitis.    Follow-up final report in the a.m.    < end of copied text >      ASSESSMENT/PLAN: 	Pt is a  79 year old male w/ HTN, HLD, T2DM, chronic low back pain presents as a transfer from [facility] ED for surgery. . Pt was sent into ED yesterday by his pain medicine physician due to recent MRI findings concerning for L1-L2 discitis/OM with epidural abscess, bilateral psoas abscesses. Patient initially saw pain specialist in February for chronic back pain with radiations to bilateral lateral thighs (R>L). MRI at that time showed degenerative changes. He subsequently had epidural injections x2 (2/27, 3/11) with moderate pain relief. Pain began to worsen on 4/10. He had radiofrequency ablation performed on 4/11 and has since has severe worsening of pain and radiation to R lateral thigh. He reports recent bowel/bladder "incontinence" requiring diaper due to his inability to get to the bathroom in time due to pain limitations but states urge and sensation are intact. Denies fevers, chills, night sweats, weakness, numbness/tingling, saddle anesthesia, recent falls/trauma. He uses a cane for ambulation. Denies recent falls/trauma or any other ortho injuries.   Found to have epidural abscess now s/p Decompression, spine, lumbar, posterior approach, with fusion of posterior spinal column, planned for psoas abscess drainage.    - s/p Decompression, spine, lumbar, posterior approach, with fusion of posterior spinal column  - s/p psoas drainage   - pain control  - c/w statin  - bowel regimen, PT    Judy GAYTAN  [phone]

## 2025-05-05 NOTE — PROGRESS NOTE ADULT - ASSESSMENT
79yMale w/ HTN, HLD, T2DM, chronic low back pain presents as a transfer from [facility] ED for surgery today. Pt was sent into ED yesterday by his pain medicine physician due to recent MRI findings concerning for L1-L2 discitis/OM with epidural abscess, bilateral psoas abscesses. Patient initially saw pain specialist in February for chronic back pain with radiations to bilateral lateral thighs (R>L). MRI at that time showed degenerative changes. He subsequently had epidural injections x2 (2/27, 3/11) with moderate pain relief. Pain began to worsen on 4/10. He had radiofrequency ablation performed on 4/11 and has since has severe worsening of pain and radiation to R lateral thigh. He reports recent bowel/bladder "incontinence" requiring diaper due to his inability to get to the bathroom in time due to pain limitations but states urge and sensation are intact. Denies fevers, chills, night sweats, weakness, numbness/tingling, saddle anesthesia, recent falls/trauma. He uses a cane for ambulation. Denies recent falls/trauma or any other ortho injuries. He was born in [place], has frequent travels back, no known TB exposure. NO recent infections, antibiotic use.    ED/Hospital course: Has been afebrile, WBC 14, A1C 7.5, labs otherwise WNL. Underwent decompression, spine, lumbar, posterior approach, with fusion of posterior spinal column on 4/24. Cultures sent. Started on Vancomycin and Zosyn. IR consulted for abscess drainage. ID consulted for antibiotic management.    Blood cultures B fragilis  Abscess cx GNR- B fragilis      # L1/L2 discitis OM s/p decompression/fusion with cx thus far with bacteroides   # Multiloculated bilateral psoas abscesses S/P drainage on 04/26-  cx thus far with bacteroides   # leucocytosis   # JUSTINO    MICRO:   04/23 BCX- 2/4 GNR, bacteroides   04/24 OR cx-  bacteroides   04/25 MRSA nares- negative   04/25 BCX- NGTD  04/26 Psoas abscess cx- GNR, Bacteroides     Recommendations;     - Cultures growing Bacteroides; not typical pathogens for spine infection, suspect transient gut translocation into the blood stream causing bacteremia, epidural abscess and B/L Psoas abscess   - No GI/ pathology on imaging ( enlarged prostate, UA negative)   - Continue Ertapenem 1 gm Q24H  - Will need atleast 6 weeks of IV antibiotics from 04/26/25 until 06/07/25  - Will need a PICC/midline line upon discharge   - Trend ESR/CRP weekly       - The patient will need 6 weeks of IV ertapenem  - The patient will need weekly CBC with differentials, CMP, ESR and CRP while on IV antibiotics.     Please contact the Infectious Diseases office at [phone] to schedule an appointment with me in June 9th 2025  For the weekly lab reports, please email results to [email or web address]    In addition to reviewing history, imaging, documents, labs, microbiology, took into account antibiotic stewardship, local antibiogram and infection control strategies and potential transmission issues.    Discussed with the primary team.   ID will sign off today. Thank you for the consultation. Please feel free to reach out with any questions or concerns.   Inpatient ID consult team will discontinue active follow-up for this patient.        Alem Russell MD  Attending Physician, Division of Infectious Diseases  Department of Medicine   [facility]    Contact on TEAMS messaging from 9am - 5pm  Office: [phone] (after 5 PM or weekend)

## 2025-05-05 NOTE — PROGRESS NOTE ADULT - SUBJECTIVE AND OBJECTIVE BOX
ORTHOPEDIC PROGRESS NOTE    Overnight events: None    SUBJECTIVE: Pt seen and examined at bedside. Patient is doing well, no acute complaints this AM. Pain is controlled with medication. Patient states he has been able to walk within his room and his abdominal discomfort is starting to improve      OBJECTIVE:  Vital Signs Last 24 Hrs  T(C): 36.7 (05 May 2025 01:57), Max: 36.7 (04 May 2025 22:00)  T(F): 98.1 (05 May 2025 01:57), Max: 98.1 (04 May 2025 22:00)  HR: 91 (05 May 2025 02:26) (91 - 105)  BP: 122/59 (05 May 2025 01:57) (122/59 - 150/75)  BP(mean): --  RR: 17 (05 May 2025 01:57) (17 - 18)  SpO2: 100% (05 May 2025 02:26) (100% - 100%)    Parameters below as of 05 May 2025 02:26  Patient On (Oxygen Delivery Method): room air          05-03-25 @ 07:01  -  05-04-25 @ 07:00  --------------------------------------------------------  IN: 0 mL / OUT: 384.5 mL / NET: -384.5 mL    05-04-25 @ 07:01  -  05-05-25 @ 04:45  --------------------------------------------------------  IN: 1100 mL / OUT: 607.5 mL / NET: 492.5 mL        Physical Examination:  GEN: NAD, resting quietly  PULM: symmetric chest rise bilaterally, no increased WOB  ABD: nondistended  EXTR:   Spine:  Back:   Dressing CDI  2x IR DHARA drains in place    Motor:                   C5                C6              C7               C8           T1   R            5/5                5/5            5/5             5/5          5/5  L             5/5               5/5             5/5             5/5          5/5                L2             L3             L4               L5            S1  R         3/5           4/5          5/5             5/5           5/5  L          4/5 5/5 5/5 5/5 5/5      LABS:                        8.9    11.02 )-----------( 887      ( 04 May 2025 05:32 )             26.2       05-04    135  |  107  |  10  ----------------------------<  138[H]  3.2[L]   |  17[L]  |  0.87    Ca    8.3[L]      04 May 2025 19:53  Phos  3.9     05-04  Mg     2.10     05-04

## 2025-05-05 NOTE — CHART NOTE - NSCHARTNOTEFT_GEN_A_CORE
nterventional Radiology Pre-Procedure Note    This is a 79y Male going for IR tube check tomorrow 5/5/25    IR Attending: German    NPO: Not needed  Antibiotics: None  Anticoagulation: SQH Held  Adverse events to anesethsia: None  Objection to blood products: None    PAST MEDICAL & SURGICAL HISTORY:  HTN (hypertension)      HLD (hyperlipidemia)      DM (diabetes mellitus)      Cataract           Vitals:Vital Signs Last 24 Hrs  T(C): 36.5 (04 May 2025 15:00), Max: 36.8 (04 May 2025 02:28)  T(F): 97.7 (04 May 2025 15:00), Max: 98.2 (04 May 2025 02:28)  HR: 100 (04 May 2025 15:00) (91 - 103)  BP: 142/80 (04 May 2025 15:00) (128/64 - 143/76)  BP(mean): --  RR: 18 (04 May 2025 15:00) (17 - 18)  SpO2: 100% (04 May 2025 15:00) (97% - 100%)    Parameters below as of 04 May 2025 15:00  Patient On (Oxygen Delivery Method): room air        Allergies: Allergies    No Known Allergies    Intolerances        Physical Exam:   GEN: NAD, resting quietly  Spine:  Back:   Dressing CDI  2x IR DHARA drains in place    Motor:                   C5                C6              C7               C8           T1   R            5/5                5/5            5/5             5/5          5/5  L             5/5               5/5             5/5             5/5          5/5                L2             L3             L4               L5            S1  R         3/5           4/5          5/5             5/5           5/5  L          4/5          5/5           5/5             5/5           5/5    Labs:                         8.9    11.02 )-----------( 887      ( 04 May 2025 05:32 )             26.2     05-04    135  |  107  |  9   ----------------------------<  76  3.0[L]   |  17[L]  |  0.90    Ca    8.0[L]      04 May 2025 05:32  Phos  3.9     05-04  Mg     2.10     05-04           All questions and concerns have been addressed at this time.

## 2025-05-05 NOTE — PROGRESS NOTE ADULT - SUBJECTIVE AND OBJECTIVE BOX
Overnight events noted      VITAL:  T(C): , Max: 36.7 (05-04-25 @ 22:00)  T(F): , Max: 98.1 (05-04-25 @ 22:00)  HR: 96 (05-05-25 @ 09:55)  BP: 142/70 (05-05-25 @ 09:55)  BP(mean): --  RR: 18 (05-05-25 @ 09:55)  SpO2: 96% (05-05-25 @ 09:55)  Wt(kg): --      PHYSICAL EXAM:  Constitutional: NAD  HEENT: EOMI  Neck:  No JVD, supple   Respiratory: CTA B/L  Cardiovascular: S1 and S2, RRR  Gastrointestinal: distended  Extremities: tr peripheral edema, + peripheral pulses  Neurological: A/O x 3, CN2-12 intact  Psychiatric: Normal mood, normal affect  : No Mac  Skin: No rashes, C/D/I    LABS:                        8.6    9.70  )-----------( 847      ( 05 May 2025 05:33 )             26.0     Na(134)/K(2.9)/Cl(107)/HCO3(17)/BUN(9)/Cr(0.85)Glu(103)/Ca(7.9)/Mg(--)/PO4(--)    05-05 @ 05:33  Na(135)/K(3.2)/Cl(107)/HCO3(17)/BUN(10)/Cr(0.87)Glu(138)/Ca(8.3)/Mg(--)/PO4(--)    05-04 @ 19:53  Na(135)/K(3.0)/Cl(107)/HCO3(17)/BUN(9)/Cr(0.90)Glu(76)/Ca(8.0)/Mg(2.10)/PO4(3.9)    05-04 @ 05:32  Na(135)/K(3.5)/Cl(106)/HCO3(16)/BUN(10)/Cr(0.90)Glu(117)/Ca(8.3)/Mg(2.10)/PO4(2.2)    05-03 @ 18:58  Na(132)/K(3.3)/Cl(105)/HCO3(17)/BUN(10)/Cr(0.94)Glu(142)/Ca(8.6)/Mg(--)/PO4(--)    05-03 @ 10:53  Na(136)/K(3.0)/Cl(105)/HCO3(20)/BUN(12)/Cr(0.87)Glu(124)/Ca(8.4)/Mg(--)/PO4(--)    05-02 @ 17:20        IMPRESSION:  79yMale w/ HTN, HLD, T2DM, chronic low back pain presents as a transfer from [facility] ED for surgery today. Pt was sent into ED yesterday by his pain medicine physician due to recent MRI findings concerning for L1-L2 discitis/OM with epidural abscess, bilateral psoas abscesses    (1)Renal - CKD2, early diabetic nephropathy? Follows as outpatient with Dr. Syed Euceda  (2)Hyponatremia - urine studies c/w low effective arterial volume (appropriately increased ADH) -improved with isotonic IVF; we can forgo further IVF for now, but can reinstate if serum sodium drops again  (3)Hypokalemia -whole body deficit - continuing to require aggressive repletion  (4)GI - ileus; exacerbated by hypokalemia    RECOMMEND:  (1)KCl 10 mEq IV x 3 + PO KCl as ordered from this a.m.  (2)Repeat BMP at 6pm tonight; give further IV KCL this evening as follows based on the 6pm K+ level:       -if K+ >4, then, no KCl needed       -if K+ 3.6-4, then give KCl 10meq iv x 1       -if K+ 3.2-3.5, then give KCl 10meq iv x 2       -if K+ <3.2, then give KCL 10meq iv x 3 + KCL 40meq po x 1  (3)BMP+Mg in a.m.          Yamil Humphreys MD  [facility]  Office/on call physician: [phone]  Cell (7a-7p): [phone]       Overnight events noted      VITAL:  T(C): , Max: 36.7 (05-04-25 @ 22:00)  T(F): , Max: 98.1 (05-04-25 @ 22:00)  HR: 96 (05-05-25 @ 09:55)  BP: 142/70 (05-05-25 @ 09:55)  BP(mean): --  RR: 18 (05-05-25 @ 09:55)  SpO2: 96% (05-05-25 @ 09:55)  Wt(kg): --      PHYSICAL EXAM:  Constitutional: NAD  HEENT: EOMI  Neck:  No JVD, supple   Respiratory: CTA B/L  Cardiovascular: S1 and S2, RRR  Gastrointestinal: distended  Extremities: tr peripheral edema, + peripheral pulses  Neurological: A/O x 3, CN2-12 intact  Psychiatric: Normal mood, normal affect  : No Mac  Skin: No rashes, C/D/I    LABS:                        8.6    9.70  )-----------( 847      ( 05 May 2025 05:33 )             26.0     Na(134)/K(2.9)/Cl(107)/HCO3(17)/BUN(9)/Cr(0.85)Glu(103)/Ca(7.9)/Mg(--)/PO4(--)    05-05 @ 05:33  Na(135)/K(3.2)/Cl(107)/HCO3(17)/BUN(10)/Cr(0.87)Glu(138)/Ca(8.3)/Mg(--)/PO4(--)    05-04 @ 19:53  Na(135)/K(3.0)/Cl(107)/HCO3(17)/BUN(9)/Cr(0.90)Glu(76)/Ca(8.0)/Mg(2.10)/PO4(3.9)    05-04 @ 05:32  Na(135)/K(3.5)/Cl(106)/HCO3(16)/BUN(10)/Cr(0.90)Glu(117)/Ca(8.3)/Mg(2.10)/PO4(2.2)    05-03 @ 18:58  Na(132)/K(3.3)/Cl(105)/HCO3(17)/BUN(10)/Cr(0.94)Glu(142)/Ca(8.6)/Mg(--)/PO4(--)    05-03 @ 10:53  Na(136)/K(3.0)/Cl(105)/HCO3(20)/BUN(12)/Cr(0.87)Glu(124)/Ca(8.4)/Mg(--)/PO4(--)    05-02 @ 17:20        IMPRESSION:  79yMale w/ HTN, HLD, T2DM, chronic low back pain presents as a transfer from [facility] ED for surgery today. Pt was sent into ED yesterday by his pain medicine physician due to recent MRI findings concerning for L1-L2 discitis/OM with epidural abscess, bilateral psoas abscesses    (1)Renal - CKD2, early diabetic nephropathy? Follows as outpatient with Dr. Syed Euceda  (2)Hyponatremia - urine studies c/w low effective arterial volume (appropriately increased ADH) -improved with isotonic IVF; we can forgo further IVF for now, but can reinstate if serum sodium drops again  (3)Hypokalemia -whole body deficit - continuing to require aggressive repletion  (4)GI - ileus; exacerbated by hypokalemia    RECOMMEND:  (1)KCl 10 mEq IV x 3 + PO KCl as ordered from this a.m.  (2)Repeat BMP this evening; give further IV KCL this evening as follows based on the evening K+ level:       -if K+ >4, then, no KCl needed       -if K+ 3.6-4, then give KCl 10meq iv x 1       -if K+ 3.2-3.5, then give KCl 10meq iv x 2       -if K+ <3.2, then give KCL 10meq iv x 3 + KCL 40meq po x 1  (3)BMP+Mg in a.m.          Yamil Humphreys MD  [facility]  Office/on call physician: [phone]  Cell (7a-7p): [phone]       Overnight events noted      VITAL:  T(C): , Max: 36.7 (05-04-25 @ 22:00)  T(F): , Max: 98.1 (05-04-25 @ 22:00)  HR: 96 (05-05-25 @ 09:55)  BP: 142/70 (05-05-25 @ 09:55)  BP(mean): --  RR: 18 (05-05-25 @ 09:55)  SpO2: 96% (05-05-25 @ 09:55)  Wt(kg): --      PHYSICAL EXAM:  Constitutional: NAD  HEENT: EOMI  Neck:  No JVD, supple   Respiratory: CTA B/L  Cardiovascular: S1 and S2, RRR  Gastrointestinal: distended  Extremities: tr peripheral edema, + peripheral pulses  Neurological: A/O x 3, CN2-12 intact  Psychiatric: Normal mood, normal affect  : No Mac  Skin: No rashes, C/D/I    LABS:                        8.6    9.70  )-----------( 847      ( 05 May 2025 05:33 )             26.0     Na(134)/K(2.9)/Cl(107)/HCO3(17)/BUN(9)/Cr(0.85)Glu(103)/Ca(7.9)/Mg(--)/PO4(--)    05-05 @ 05:33  Na(135)/K(3.2)/Cl(107)/HCO3(17)/BUN(10)/Cr(0.87)Glu(138)/Ca(8.3)/Mg(--)/PO4(--)    05-04 @ 19:53  Na(135)/K(3.0)/Cl(107)/HCO3(17)/BUN(9)/Cr(0.90)Glu(76)/Ca(8.0)/Mg(2.10)/PO4(3.9)    05-04 @ 05:32  Na(135)/K(3.5)/Cl(106)/HCO3(16)/BUN(10)/Cr(0.90)Glu(117)/Ca(8.3)/Mg(2.10)/PO4(2.2)    05-03 @ 18:58  Na(132)/K(3.3)/Cl(105)/HCO3(17)/BUN(10)/Cr(0.94)Glu(142)/Ca(8.6)/Mg(--)/PO4(--)    05-03 @ 10:53  Na(136)/K(3.0)/Cl(105)/HCO3(20)/BUN(12)/Cr(0.87)Glu(124)/Ca(8.4)/Mg(--)/PO4(--)    05-02 @ 17:20        IMPRESSION:  79yMale w/ HTN, HLD, T2DM, chronic low back pain presents as a transfer from [facility] ED for surgery today. Pt was sent into ED yesterday by his pain medicine physician due to recent MRI findings concerning for L1-L2 discitis/OM with epidural abscess, bilateral psoas abscesses    (1)Renal - CKD2, early diabetic nephropathy? Follows as outpatient with Dr. Syed Euceda  (2)Hyponatremia - urine studies c/w low effective arterial volume (appropriately increased ADH) -improved with isotonic IVF; we can forgo further IVF for now, but can reinstate if serum sodium drops again  (3)Hypokalemia -whole body deficit - continuing to require aggressive repletion  (4)GI - ileus; exacerbated by hypokalemia    RECOMMEND:  (1)KCl 10 mEq IV x 3 + PO KCl as ordered from this a.m.  (2)Repeat BMP this evening; give further IV KCL this evening as follows based on the evening K+ level:       -if K+ >4, then, no KCl needed       -if K+ 3.6-4, then give KCl 10meq iv x 1       -if K+ 3.2-3.5, then give KCl 10meq iv x 2       -if K+ <3.2, then give KCL 10meq iv x 3 + KCL 40meq po x 1  (3)BMP+Mg in a.m.      recs d/w'd Ortho ACP Red Humphreys MD  [facility] [employer] [facility]  Office/on call physician: [phone]  Cell (7a-7p): [phone]

## 2025-05-06 LAB
ANION GAP SERPL CALC-SCNC: 11 MMOL/L — SIGNIFICANT CHANGE UP (ref 7–14)
APTT BLD: 47.7 SEC — HIGH (ref 26.1–36.8)
BLD GP AB SCN SERPL QL: NEGATIVE — SIGNIFICANT CHANGE UP
BUN SERPL-MCNC: 7 MG/DL — SIGNIFICANT CHANGE UP (ref 7–23)
CALCIUM SERPL-MCNC: 7.9 MG/DL — LOW (ref 8.4–10.5)
CHLORIDE SERPL-SCNC: 109 MMOL/L — HIGH (ref 98–107)
CO2 SERPL-SCNC: 16 MMOL/L — LOW (ref 22–31)
CREAT SERPL-MCNC: 0.77 MG/DL — SIGNIFICANT CHANGE UP (ref 0.5–1.3)
EGFR: 91 ML/MIN/1.73M2 — SIGNIFICANT CHANGE UP
EGFR: 91 ML/MIN/1.73M2 — SIGNIFICANT CHANGE UP
GLUCOSE BLDC GLUCOMTR-MCNC: 130 MG/DL — HIGH (ref 70–99)
GLUCOSE BLDC GLUCOMTR-MCNC: 141 MG/DL — HIGH (ref 70–99)
GLUCOSE BLDC GLUCOMTR-MCNC: 154 MG/DL — HIGH (ref 70–99)
GLUCOSE BLDC GLUCOMTR-MCNC: 83 MG/DL — SIGNIFICANT CHANGE UP (ref 70–99)
GLUCOSE SERPL-MCNC: 88 MG/DL — SIGNIFICANT CHANGE UP (ref 70–99)
HCT VFR BLD CALC: 26 % — LOW (ref 39–50)
HGB BLD-MCNC: 8.7 G/DL — LOW (ref 13–17)
INR BLD: 1.03 RATIO — SIGNIFICANT CHANGE UP (ref 0.85–1.16)
MCHC RBC-ENTMCNC: 29.8 PG — SIGNIFICANT CHANGE UP (ref 27–34)
MCHC RBC-ENTMCNC: 33.5 G/DL — SIGNIFICANT CHANGE UP (ref 32–36)
MCV RBC AUTO: 89 FL — SIGNIFICANT CHANGE UP (ref 80–100)
NRBC # BLD AUTO: 0 K/UL — SIGNIFICANT CHANGE UP (ref 0–0)
NRBC # FLD: 0 K/UL — SIGNIFICANT CHANGE UP (ref 0–0)
NRBC BLD AUTO-RTO: 0 /100 WBCS — SIGNIFICANT CHANGE UP (ref 0–0)
PLATELET # BLD AUTO: 835 K/UL — HIGH (ref 150–400)
POTASSIUM SERPL-MCNC: 3.6 MMOL/L — SIGNIFICANT CHANGE UP (ref 3.5–5.3)
POTASSIUM SERPL-SCNC: 3.6 MMOL/L — SIGNIFICANT CHANGE UP (ref 3.5–5.3)
PROTHROM AB SERPL-ACNC: 11.9 SEC — SIGNIFICANT CHANGE UP (ref 9.9–13.4)
RBC # BLD: 2.92 M/UL — LOW (ref 4.2–5.8)
RBC # FLD: 18.5 % — HIGH (ref 10.3–14.5)
RH IG SCN BLD-IMP: POSITIVE — SIGNIFICANT CHANGE UP
SODIUM SERPL-SCNC: 136 MMOL/L — SIGNIFICANT CHANGE UP (ref 135–145)
WBC # BLD: 9.1 K/UL — SIGNIFICANT CHANGE UP (ref 3.8–10.5)
WBC # FLD AUTO: 9.1 K/UL — SIGNIFICANT CHANGE UP (ref 3.8–10.5)

## 2025-05-06 PROCEDURE — 99232 SBSQ HOSP IP/OBS MODERATE 35: CPT | Mod: GC

## 2025-05-06 PROCEDURE — 76080 X-RAY EXAM OF FISTULA: CPT | Mod: 26

## 2025-05-06 PROCEDURE — 49424 ASSESS CYST CONTRAST INJECT: CPT | Mod: XS

## 2025-05-06 RX ORDER — POLYETHYLENE GLYCOL 3350 17 G/17G
17 POWDER, FOR SOLUTION ORAL AT BEDTIME
Refills: 0 | Status: DISCONTINUED | OUTPATIENT
Start: 2025-05-06 | End: 2025-05-16

## 2025-05-06 RX ORDER — CALCIUM GLUCONATE 20 MG/ML
2 INJECTION, SOLUTION INTRAVENOUS ONCE
Refills: 0 | Status: COMPLETED | OUTPATIENT
Start: 2025-05-06 | End: 2025-05-06

## 2025-05-06 RX ADMIN — Medication 975 MILLIGRAM(S): at 01:15

## 2025-05-06 RX ADMIN — CALCIUM GLUCONATE 200 GRAM(S): 20 INJECTION, SOLUTION INTRAVENOUS at 05:45

## 2025-05-06 RX ADMIN — GABAPENTIN 100 MILLIGRAM(S): 400 CAPSULE ORAL at 05:45

## 2025-05-06 RX ADMIN — Medication 100 MILLIEQUIVALENT(S): at 21:36

## 2025-05-06 RX ADMIN — GABAPENTIN 100 MILLIGRAM(S): 400 CAPSULE ORAL at 13:13

## 2025-05-06 RX ADMIN — POLYETHYLENE GLYCOL 3350 17 GRAM(S): 17 POWDER, FOR SOLUTION ORAL at 21:37

## 2025-05-06 RX ADMIN — Medication 975 MILLIGRAM(S): at 00:10

## 2025-05-06 RX ADMIN — Medication 1 APPLICATION(S): at 13:13

## 2025-05-06 RX ADMIN — METHOCARBAMOL 500 MILLIGRAM(S): 500 TABLET, FILM COATED ORAL at 12:09

## 2025-05-06 RX ADMIN — OMEGA-3-ACID ETHYL ESTERS CAPSULES 2 GRAM(S): 1 CAPSULE, LIQUID FILLED ORAL at 12:10

## 2025-05-06 RX ADMIN — Medication 975 MILLIGRAM(S): at 05:45

## 2025-05-06 RX ADMIN — Medication 40 MILLIGRAM(S): at 05:45

## 2025-05-06 RX ADMIN — METHOCARBAMOL 500 MILLIGRAM(S): 500 TABLET, FILM COATED ORAL at 23:22

## 2025-05-06 RX ADMIN — Medication 975 MILLIGRAM(S): at 23:22

## 2025-05-06 RX ADMIN — Medication 975 MILLIGRAM(S): at 06:46

## 2025-05-06 RX ADMIN — METHOCARBAMOL 500 MILLIGRAM(S): 500 TABLET, FILM COATED ORAL at 00:11

## 2025-05-06 RX ADMIN — ERTAPENEM SODIUM 100 MILLIGRAM(S): 1 INJECTION, POWDER, LYOPHILIZED, FOR SOLUTION INTRAMUSCULAR; INTRAVENOUS at 16:38

## 2025-05-06 RX ADMIN — Medication 80 MILLIGRAM(S): at 05:45

## 2025-05-06 RX ADMIN — Medication 975 MILLIGRAM(S): at 13:10

## 2025-05-06 RX ADMIN — Medication 1 TABLET(S): at 12:09

## 2025-05-06 RX ADMIN — OMEGA-3-ACID ETHYL ESTERS CAPSULES 2 GRAM(S): 1 CAPSULE, LIQUID FILLED ORAL at 23:22

## 2025-05-06 RX ADMIN — OMEGA-3-ACID ETHYL ESTERS CAPSULES 2 GRAM(S): 1 CAPSULE, LIQUID FILLED ORAL at 00:11

## 2025-05-06 RX ADMIN — Medication 100 MILLIEQUIVALENT(S): at 03:47

## 2025-05-06 RX ADMIN — Medication 80 MILLIGRAM(S): at 17:30

## 2025-05-06 RX ADMIN — GABAPENTIN 100 MILLIGRAM(S): 400 CAPSULE ORAL at 21:36

## 2025-05-06 RX ADMIN — Medication 975 MILLIGRAM(S): at 17:29

## 2025-05-06 RX ADMIN — Medication 975 MILLIGRAM(S): at 12:10

## 2025-05-06 NOTE — PROGRESS NOTE ADULT - ASSESSMENT
79yMale w/ HTN, HLD, T2DM, chronic low back pain presents as a transfer from [facility] ED for surgery today. Pt was sent into ED yesterday by his pain medicine physician due to recent MRI findings concerning for L1-L2 discitis/OM with epidural abscess, bilateral psoas abscesses. Patient initially saw pain specialist in February for chronic back pain with radiations to bilateral lateral thighs (R>L). MRI at that time showed degenerative changes. He subsequently had epidural injections x2 (2/27, 3/11) with moderate pain relief. Pain began to worsen on 4/10. He had radiofrequency ablation performed on 4/11 and has since has severe worsening of pain and radiation to R lateral thigh. He reports recent bowel/bladder "incontinence" requiring diaper due to his inability to get to the bathroom in time due to pain limitations but states urge and sensation are intact. Denies fevers, chills, night sweats, weakness, numbness/tingling, saddle anesthesia, recent falls/trauma. He uses a cane for ambulation. Denies recent falls/trauma or any other ortho injuries. Before back pain was very active walked half an hour , going up and down stairs and doing grocery etc with no Chest pain or SOB.        Problem/Recommendation - 1:  ·  Problem: Epidural abscess.   ·  Recommendation: S/P Decompression, spine, lumbar, posterior approach, with fusion of posterior spinal column.  On IV Abxs Ertapenem now  per ID x 6 weeks  .  Will defer management to  Neurosurgery.  DVT prophylaxis per Neurosurgery .  < from: MR Lumbar Spine w/ IV Cont (04.23.25 @ 15:13) >  IMPRESSION:        1.   Cervical spine:   Moderate degenerative disc disease and   spondylosis at C4-5 through C6/7 with loss of disc height and associated   degenerative endplate changes.        2.   Thoracic spine:   Minimal enhancement within T7-8 which may   reflect early discitis.        3.   Lumbar spine:   Osteomyelitis and discitis at L1-2 with erosions   of the inferior L1 vertebral body and L2 vertebral body consistent with   osteomyelitis and discitis. Ventral epidural abscess is noted extending   from the L1-2 level superiorly to the T10 level with mass effect on the   ventral thecal sac, most prominently at the L1-2 level resulting in   marked compression. Multiple abscesses within the BILATERAL psoas   muscles, the largest measuring 4.3 cm on the RIGHT and 2.8 cm on the LEFT.     Problem/Recommendation - 2:  ·  Problem: Acute osteomyelitis of lumbar spine.   ·  Recommendation: L1 &L2 vertebrae .  On IV Abxs per ID .  Will defer management to Neurosurgery.     Problem/Recommendation - 3:  ·  Problem: Low back pain radiating to right lower extremity.   ·  Recommendation: Pain control.     Problem/Recommendation - 4:  ·  Problem: . BILATERAL psoas Abscess  ·  Recommendation: ON IV Abxs per ID .  IR placed drains after draining  . IR to check drains .     Problem/Recommendation - 5:  ·  Problem: HTN (hypertension).   ·  Recommendation: Takes Amlodipine and ARB so continue with hold parameters.  < from: TTE W or WO Ultrasound Enhancing Agent (04.24.25 @ 10:10) >  CONCLUSIONS:      1. Left ventricular cavity is normal in size. Left ventricular wall thickness is normal. Left ventricular systolic function is normal with an ejection fraction of 64 % by Carpenter's method of disks. There are no regional wall motion abnormalities seen.   2. Normal right ventricular cavity size and normal right ventricular systolic function. Tricuspid annular plane systolic excursion (TAPSE) is 2.2 cm (normal >=1.7 cm).   3. Structurally normal mitral valve with normal leaflet excursion. There is calcification of the mitral valve annulus. There is tracemitral regurgitation.   4. The aortic valve appears trileaflet with normal systolic excursion. There is calcification of the aortic valve leaflets. There is mild aortic regurgitation.     Problem/Recommendation - 6:  ·  Problem: HLD (hyperlipidemia).   ·  Recommendation: Continue Atorvastatin.     Problem/Recommendation - 7:  ·  Problem: DM (diabetes mellitus).   ·  Recommendation: ON Farxiga and holding.  SSI and Lantus in patient .     Problem/Recommendation - 8:  ·  Problem:  Ileus /Pseudoobustraction.   ·  Recommendation: Had BMs and passed flatus .  Correcting electrolytes.     Dulcolax suppository added.    ON Laxatives and Simethacone .  Still on CLD .   Surgery & GI input appreciated .  Awaiting GI follow up .  < from: CT Abdomen and Pelvis w/ Oral Cont and w/ IV Cont (04.29.25 @ 17:16) >  IMPRESSION:  Diffuse colonic dilatation, worse on today's study, likely secondary to   pseudoobstruction or ileus.    No free air.    Reduction in the bilateral psoas collections with catheters in place.    < from: Xray Abdomen 1 View PORTABLE -Urgent (Xray Abdomen 1 View PORTABLE -Urgent .) (04.29.25 @ 10:09) >  IMPRESSION:  Multiple dilated loops of small and large bowel with question of   pneumoperitoneum. Recommend upright chest radiograph or decubitus   abdominal radiograph for further evaluation.     Problem/Recommendation - 9:  ·  Problem: JUSTINO with Hyponatremia .   ·  Recommendation: Trending BMP.  Improving.   Renal help appreciated.      Problem/Recommendation - 10:  ·  Problem: Hypokalemia & Hypophosphatemia .   ·  Recommendation: Replaced .   Rpt BMP noted.    Keep K level close to 4 .     Problem/Recommendation - 11:  ·  Problem: Thrombocytosis .   ·  Recommendation: Reactionary secondary to infection.  Platelets Trending down .  If continues to trend up recommend  hematology consult .     Problem/Recommendation - 12:  ·  Problem: Pedal edema >Right LE.   ·  Recommendation: D/W Ortho team regarding Doppler to R/O DVT.    They will  asses at rounds and decide about     D/W patient and family  in detail .       PT working with patient and ambulating .

## 2025-05-06 NOTE — PROGRESS NOTE ADULT - ASSESSMENT
IMPRESSION:  79yMale w/ HTN, HLD, T2DM, chronic low back pain presents as a transfer from [facility] ED for surgery today. Pt was sent into ED yesterday by his pain medicine physician due to recent MRI findings concerning for L1-L2 discitis/OM with epidural abscess, bilateral psoas abscesses    (1)Renal - CKD2, early diabetic nephropathy? Follows as outpatient with Dr. Syed Euceda  (2)Hyponatremia - urine studies c/w low effective arterial volume (appropriately increased ADH) -improved with isotonic IVF; we can forgo further IVF for now, but can reinstate if serum sodium drops again  (3)Hypokalemia -whole body deficit - continuing to require aggressive repletion  (4)GI - ileus; exacerbated by hypokalemia    RECOMMEND:  (1)a/w KCl 10 mEq IV x 1 + PO KCl as ordered from this a.m.  (2)BMP daily     Mare Cameron DNP  [facility]  [phone]       IMPRESSION:  79yMale w/ HTN, HLD, T2DM, chronic low back pain presents as a transfer from [facility] ED for surgery today. Pt was sent into ED yesterday by his pain medicine physician due to recent MRI findings concerning for L1-L2 discitis/OM with epidural abscess, bilateral psoas abscesses    (1)Renal - CKD2, early diabetic nephropathy? Follows as outpatient with Dr. Syed Euceda  (2)Hyponatremia - urine studies c/w low effective arterial volume (appropriately increased ADH) -improved with isotonic IVF; we can forgo further IVF for now, but can reinstate if serum sodium drops again  (3)Hypokalemia -whole body deficit - continuing to require aggressive repletion  (4)GI - ileus; exacerbated by hypokalemia    RECOMMEND:  (1)a/w KCl 10 mEq IV x 1 + PO KCl as ordered from this a.m.  (2)BMP daily     Mare Cameron DNP  [facility]  [phone]      RENAL ATTENDING NOTE  Patient seen and examined with NP. Agree with assessment and plan as above.  Ileus slowly improving per patient  K+ improved but still borderline low  Agree with K+ repletion as ordered and daily BMP  Counseled patient as to etiology of the hypokalemia and regarding high-K foods he can increase in diet to help prevent further hypokalemia; answered questions to his satisfaction  Yamil Humphreys MD  [facility]  [phone]

## 2025-05-06 NOTE — PROGRESS NOTE ADULT - ASSESSMENT
Mr. 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     #Colonic dilation  #Ileus  #S/p spinal surgery 4/24  #Post-operative ileus/ colonic pseudoobstruction   GI is re- consulted for persistent abdominal distension and radiographic findings of colonic dilation. XR Abdomen and CT demonstrate multiple dilated loops of small and large bowel without transition point or free air. Patient does not have nausea or vomiting. His abdomen is distended on exam without peritoneal signs. Bowel sounds are present. Patient is having bowel movements (was receiving miralax TID, which was stopped yesterday due to diarrhea).     Recommendations:  - please encourage out of bed as much as able  - correct electrolyte abnormalities   - Track all stool output   - Replenish electrolytes to goal K>4, Mg>2  - Avoid narcotics   - PT support  - Ensure adequate hydration      Note incomplete until finalized by attending signature/attestation.    Myra Sutherland  GI/Hepatology Fellow    MONDAY-FRIDAY 8AM-5PM:  Pager# [phone] ([facility]) or [phone] ([facility])    NON-URGENT CONSULTS:  Please email [email or web address] OR [email or web address]  AT NIGHT AND ON WEEKENDS:  Contact on-call GI fellow from 5pm-8am and on weekends/holidays     Mr. 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     #Colonic dilation  #Ileus  #S/p spinal surgery 4/24  #Post-operative ileus/ colonic pseudoobstruction   GI is re- consulted for persistent abdominal distension and radiographic findings of colonic dilation. XR Abdomen and CT demonstrate multiple dilated loops of small and large bowel without transition point or free air. Patient does not have nausea or vomiting. His abdomen is distended on exam without peritoneal signs. Bowel sounds are present. Patient is having bowel movements (was receiving miralax TID, which was stopped yesterday due to diarrhea).   Patient feels his symptoms are improving.     Recommendations:  - please encourage out of bed as much as able  - correct electrolyte abnormalities   - Track all stool output   - Replenish electrolytes to goal K>4, Mg>2  - Avoid narcotics   - PT support  - Ensure adequate hydration      Note incomplete until finalized by attending signature/attestation.    Myra Sutherland  GI/Hepatology Fellow    MONDAY-FRIDAY 8AM-5PM:  Pager# [phone] ([facility]) or [phone] ([facility])    NON-URGENT CONSULTS:  Please email [email or web address] OR [email or web address]  AT NIGHT AND ON WEEKENDS:  Contact on-call GI fellow from 5pm-8am and on weekends/holidays

## 2025-05-06 NOTE — PROGRESS NOTE ADULT - SUBJECTIVE AND OBJECTIVE BOX
Chief Complaint:  Patient is a 79y old  Male who presents with a chief complaint of L1-L2 discitis/OM with epidural abscess and BL psoas abscesses (06 May 2025 12:13)      Interval Events:   -GI team reconsulted for diarrhea and persistently dilated bowel loops on imaging     Allergies:  No Known Allergies      Hospital Medications:  acetaminophen     Tablet .. 975 milliGRAM(s) Oral every 6 hours  atorvastatin 40 milliGRAM(s) Oral at bedtime  bacitracin   Ointment 1 Application(s) Topical three times a day  dibucaine 1% Ointment 1 Application(s) Topical daily PRN  ertapenem  IVPB      ertapenem  IVPB 1000 milliGRAM(s) IV Intermittent every 24 hours  gabapentin 100 milliGRAM(s) Oral every 8 hours  glucagon  Injectable 1 milliGRAM(s) IntraMuscular once  insulin lispro (ADMELOG) corrective regimen sliding scale   SubCutaneous three times a day before meals  insulin lispro (ADMELOG) corrective regimen sliding scale   SubCutaneous at bedtime  magnesium hydroxide Suspension 30 milliLiter(s) Oral every 12 hours PRN  methocarbamol 500 milliGRAM(s) Oral every 12 hours  multivitamin 1 Tablet(s) Oral daily  omega-3-Acid Ethyl Esters 2 Gram(s) Oral two times a day  pantoprazole    Tablet 40 milliGRAM(s) Oral before breakfast  simethicone 80 milliGRAM(s) Chew two times a day        PHYSICAL EXAM:   Vital Signs:  Vital Signs Last 24 Hrs  T(C): 37.1 (06 May 2025 13:41), Max: 37.1 (06 May 2025 09:28)  T(F): 98.8 (06 May 2025 13:41), Max: 98.8 (06 May 2025 13:41)  HR: 67 (06 May 2025 13:41) (67 - 106)  BP: 124/70 (06 May 2025 13:41) (124/70 - 147/75)  BP(mean): --  RR: 18 (06 May 2025 13:41) (17 - 18)  SpO2: 98% (06 May 2025 13:41) (98% - 100%)    Parameters below as of 06 May 2025 13:41  Patient On (Oxygen Delivery Method): room air      Daily       GENERAL:  No acute distress  HEENT:  NCAT, no scleral icterus  CHEST: no resp distress  HEART:  RRR  ABDOMEN:  Soft, non-tender, non-distended, normoactive bowel sounds  EXTREMITIES:  No cyanosis, clubbing, or edema  SKIN:  No rash/erythema/ecchymoses/petechiae/wounds/abscess/warm/dry  NEURO:  Alert and oriented x 3, no asterixis, no tremor    LABS:                        8.7    9.10  )-----------( 835      ( 06 May 2025 02:08 )             26.0     Mean Cell Volume: 89.0 fL (05-06-25 @ 02:08)    05-06    136  |  109[H]  |  7   ----------------------------<  88  3.6   |  16[L]  |  0.77    Ca    7.9[L]      06 May 2025 02:08  Phos  2.2     05-05  Mg     2.00     05-05        PT/INR - ( 06 May 2025 02:08 )   PT: 11.9 sec;   INR: 1.03 ratio         PTT - ( 06 May 2025 02:08 )  PTT:47.7 sec  Urinalysis Basic - ( 06 May 2025 02:08 )    Color: x / Appearance: x / SG: x / pH: x  Gluc: 88 mg/dL / Ketone: x  / Bili: x / Urobili: x   Blood: x / Protein: x / Nitrite: x   Leuk Esterase: x / RBC: x / WBC x   Sq Epi: x / Non Sq Epi: x / Bacteria: x            Imaging:    < from: CT Abdomen and Pelvis No Cont (05.04.25 @ 15:29) >    IMPRESSION:  Bilateral psoas collections, are stable in size compared to prior exam.  Diffusely dilated large bowel containing fluid and air, without discrete   transition point, and is stable compared to prior exam.        --- End of Report ---    < end of copied text >           Chief Complaint:  Patient is a 79y old  Male who presents with a chief complaint of L1-L2 discitis/OM with epidural abscess and BL psoas abscesses (06 May 2025 12:13)      Interval Events:   -GI team reconsulted for diarrhea and persistently dilated bowel loops on imaging   -patient feels his symptoms are improving     Allergies:  No Known Allergies      Hospital Medications:  acetaminophen     Tablet .. 975 milliGRAM(s) Oral every 6 hours  atorvastatin 40 milliGRAM(s) Oral at bedtime  bacitracin   Ointment 1 Application(s) Topical three times a day  dibucaine 1% Ointment 1 Application(s) Topical daily PRN  ertapenem  IVPB      ertapenem  IVPB 1000 milliGRAM(s) IV Intermittent every 24 hours  gabapentin 100 milliGRAM(s) Oral every 8 hours  glucagon  Injectable 1 milliGRAM(s) IntraMuscular once  insulin lispro (ADMELOG) corrective regimen sliding scale   SubCutaneous three times a day before meals  insulin lispro (ADMELOG) corrective regimen sliding scale   SubCutaneous at bedtime  magnesium hydroxide Suspension 30 milliLiter(s) Oral every 12 hours PRN  methocarbamol 500 milliGRAM(s) Oral every 12 hours  multivitamin 1 Tablet(s) Oral daily  omega-3-Acid Ethyl Esters 2 Gram(s) Oral two times a day  pantoprazole    Tablet 40 milliGRAM(s) Oral before breakfast  simethicone 80 milliGRAM(s) Chew two times a day        PHYSICAL EXAM:   Vital Signs:  Vital Signs Last 24 Hrs  T(C): 37.1 (06 May 2025 13:41), Max: 37.1 (06 May 2025 09:28)  T(F): 98.8 (06 May 2025 13:41), Max: 98.8 (06 May 2025 13:41)  HR: 67 (06 May 2025 13:41) (67 - 106)  BP: 124/70 (06 May 2025 13:41) (124/70 - 147/75)  BP(mean): --  RR: 18 (06 May 2025 13:41) (17 - 18)  SpO2: 98% (06 May 2025 13:41) (98% - 100%)    Parameters below as of 06 May 2025 13:41  Patient On (Oxygen Delivery Method): room air      Daily       GENERAL:  No acute distress  HEENT:  NCAT, no scleral icterus  CHEST: no resp distress  HEART:  RRR  ABDOMEN:  Soft, non-tender, non-distended, normoactive bowel sounds  EXTREMITIES:  No cyanosis, clubbing, or edema  SKIN:  No rash/erythema/ecchymoses/petechiae/wounds/abscess/warm/dry  NEURO:  Alert and oriented x 3, no asterixis, no tremor    LABS:                        8.7    9.10  )-----------( 835      ( 06 May 2025 02:08 )             26.0     Mean Cell Volume: 89.0 fL (05-06-25 @ 02:08)    05-06    136  |  109[H]  |  7   ----------------------------<  88  3.6   |  16[L]  |  0.77    Ca    7.9[L]      06 May 2025 02:08  Phos  2.2     05-05  Mg     2.00     05-05        PT/INR - ( 06 May 2025 02:08 )   PT: 11.9 sec;   INR: 1.03 ratio         PTT - ( 06 May 2025 02:08 )  PTT:47.7 sec  Urinalysis Basic - ( 06 May 2025 02:08 )    Color: x / Appearance: x / SG: x / pH: x  Gluc: 88 mg/dL / Ketone: x  / Bili: x / Urobili: x   Blood: x / Protein: x / Nitrite: x   Leuk Esterase: x / RBC: x / WBC x   Sq Epi: x / Non Sq Epi: x / Bacteria: x            Imaging:    < from: CT Abdomen and Pelvis No Cont (05.04.25 @ 15:29) >    IMPRESSION:  Bilateral psoas collections, are stable in size compared to prior exam.  Diffusely dilated large bowel containing fluid and air, without discrete   transition point, and is stable compared to prior exam.        --- End of Report ---    < end of copied text >

## 2025-05-06 NOTE — PROGRESS NOTE ADULT - SUBJECTIVE AND OBJECTIVE BOX
NEPHROLOGY     Patient seen and examined resting comfortably on room air, no new complaints, planned for tube check with IR today, currently in no acute distress.     MEDICATIONS  (STANDING):  acetaminophen     Tablet .. 975 milliGRAM(s) Oral every 6 hours  atorvastatin 40 milliGRAM(s) Oral at bedtime  bacitracin   Ointment 1 Application(s) Topical three times a day  ertapenem  IVPB      ertapenem  IVPB 1000 milliGRAM(s) IV Intermittent every 24 hours  gabapentin 100 milliGRAM(s) Oral every 8 hours  glucagon  Injectable 1 milliGRAM(s) IntraMuscular once  insulin lispro (ADMELOG) corrective regimen sliding scale   SubCutaneous three times a day before meals  insulin lispro (ADMELOG) corrective regimen sliding scale   SubCutaneous at bedtime  methocarbamol 500 milliGRAM(s) Oral every 12 hours  multivitamin 1 Tablet(s) Oral daily  omega-3-Acid Ethyl Esters 2 Gram(s) Oral two times a day  pantoprazole    Tablet 40 milliGRAM(s) Oral before breakfast  senna 2 Tablet(s) Oral at bedtime  simethicone 80 milliGRAM(s) Chew two times a day    VITALS:  T(C): , Max: 37.1 (05-06-25 @ 09:28)  T(F): , Max: 98.7 (05-06-25 @ 09:28)  HR: 98 (05-06-25 @ 09:28)  BP: 142/78 (05-06-25 @ 09:28)  RR: 18 (05-06-25 @ 09:28)  SpO2: 100% (05-06-25 @ 09:28)    I and O's:    05-05 @ 07:01  -  05-06 @ 07:00  --------------------------------------------------------  IN: 0 mL / OUT: 1561 mL / NET: -1561 mL    Height (cm): 165.1 (05-05 @ 10:59)  Weight (kg): 63.5 (05-05 @ 10:59)  BMI (kg/m2): 23.3 (05-05 @ 10:59)  BSA (m2): 1.7 (05-05 @ 10:59)    PHYSICAL EXAM:  Constitutional: NAD  HEENT: EOMI  Neck:  No JVD, supple   Respiratory: CTA B/L  Cardiovascular: S1 and S2, RRR  Gastrointestinal: distended  Extremities: tr peripheral edema, + peripheral pulses  Neurological: A/O x 3, CN2-12 intact  Psychiatric: Normal mood, normal affect  : No Mac  Skin: No rashes, C/D/I    LABS:                        8.7    9.10  )-----------( 835      ( 06 May 2025 02:08 )             26.0     05-06    136  |  109[H]  |  7   ----------------------------<  88  3.6   |  16[L]  |  0.77    Ca    7.9[L]      06 May 2025 02:08  Phos  2.2     05-05  Mg     2.00     05-05

## 2025-05-06 NOTE — PROGRESS NOTE ADULT - ASSESSMENT
79M s/p T10-pelvis PSF on 4/24    Plan:  -WBAT BLLE  -appreciate ID recs: abx ertapenem  -appreciate IR mgmt s/p psoas abscess drainage-- plan for tube check with IR today     - 2 latisha drains per IR     - Asp cx: bacteroides fragilis  -f/u blood cx: NGTD  -PT/OT  -dvt ppx: venodynes  -dispo: ADRIANA Cervantes, PGY-2  Orthopedic Surgery    [facility] [place] [id number]  [facility]: [id number]  [facility]:  [id number]/ [phone]

## 2025-05-06 NOTE — CHART NOTE - NSCHARTNOTEFT_GEN_A_CORE
NUTRITION FOLLOW UP NOTE    Pt seen for f/u length of stay.      SOURCE: [X] Patient [X] Medical record [X] RN/team [] Family/Caregiver [] Patient unavailable [] Patient inappropriate (disoriented, nonverbal, intubated/sedated) [] Other:    MEDICAL COURSE:    5/6 Orthopedics Progress Note: s/p T10-pelvis PSF on 4/24.     5/5 Internal Medicine Progress Note:  79yMale w/ HTN, HLD, T2DM, chronic low back pain presents as a transfer from [facility] ED for surgery today. Pt was sent into ED yesterday by his pain medicine physician due to recent MRI findings concerning for L1-L2 discitis/OM with epidural abscess, bilateral psoas abscesses.  S/P Decompression, spine, lumbar, posterior approach, with fusion of posterior spinal column.  On IV Abxs Ertapenem now  per ID x 6 weeks.  Patient noted with ileus - had BM and passed flatus - electrolytes being corrected.  Bowel regimen was adjusted.     DIET PRESCRIPTION: Diet, Full Liquid:   Supplement Feeding Modality:  Oral  Ensure Plus High Protein Cans or Servings Per Day:  1       Frequency:  Two Times a day (05-04-25 @ 01:07)    FOOD ALLERGIES/INTOLERANCES: NKFA     NUTRITION COURSE: Patient has been on clears / fulls since 5/1 (5 days). Per team, patient with diarrhea, was receiving aggressive bowel regimen.  Plan is to advance diet back towards Low fiber.  Last BM today 5/5 (moderate, loose). Of note, patient also on IV ABtx.  Inconsistent weight pattern noted.  Patient noted with previous weight loss based on admit weight (63.5kg); weight Jan 2025 - 71.7kg; current weight trend more consistent with Jan 2025 weight hx as noted in 4/24 Initial Dietitian Evaluation.  Est needs reassessed below based on IBW given inconsistent weight pattern while inpatient.      MEDICATIONS: MEDICATIONS  (STANDING):  acetaminophen     Tablet .. 975 milliGRAM(s) Oral every 6 hours  atorvastatin 40 milliGRAM(s) Oral at bedtime  bacitracin   Ointment 1 Application(s) Topical three times a day  ertapenem  IVPB      ertapenem  IVPB 1000 milliGRAM(s) IV Intermittent every 24 hours  gabapentin 100 milliGRAM(s) Oral every 8 hours  glucagon  Injectable 1 milliGRAM(s) IntraMuscular once  insulin lispro (ADMELOG) corrective regimen sliding scale   SubCutaneous three times a day before meals  insulin lispro (ADMELOG) corrective regimen sliding scale   SubCutaneous at bedtime  methocarbamol 500 milliGRAM(s) Oral every 12 hours  multivitamin 1 Tablet(s) Oral daily  omega-3-Acid Ethyl Esters 2 Gram(s) Oral two times a day  pantoprazole    Tablet 40 milliGRAM(s) Oral before breakfast  senna 2 Tablet(s) Oral at bedtime  simethicone 80 milliGRAM(s) Chew two times a day    MEDICATIONS  (PRN):  dibucaine 1% Ointment 1 Application(s) Topical daily PRN hemorrhoid pain  magnesium hydroxide Suspension 30 milliLiter(s) Oral every 12 hours PRN Constipation    LABS: 05-06 Na136 mmol/L Glu 88 mg/dL K+ 3.6 mmol/L Cr  0.77 mg/dL BUN 7 mg/dL 05-05 Phos 2.2 mg/dL[L]    A1C with Estimated Average Glucose Result: 7.2 % (04-25-25 @ 02:45)  A1C with Estimated Average Glucose Result: 7.3 % (04-24-25 @ 22:30)    CAPILLARY BLOOD GLUCOSE  POCT Blood Glucose.: 83 mg/dL (06 May 2025 07:22)  POCT Blood Glucose.: 116 mg/dL (05 May 2025 21:08)  POCT Blood Glucose.: 141 mg/dL (05 May 2025 16:41)  POCT Blood Glucose.: 243 mg/dL (05 May 2025 11:11)    ANTHROPOMETRICS  Inpatient Weights: 5/5 - 74.5kg      5/2 - 71.1kg     4/30 - 70.1kg      4/27 - 65kg      4/26 - 63.8kg      4/24 - 63.4kg        Height (cm): 165.1 / 65 inches (05-05 @ 10:59)  Weight (kg): 63.5 (05-05 @ 10:59)  BMI (kg/m2): 27.3 (05-05 @ 10:59)  Weight Assessment: Weight gain (?) 11.1kg since admit weight - likely weight discrepancies as inconsistent weight trend noted over hospital course.  IBW: 61.8kg +/-10%    PHYSICAL ASSESSMENT, per flowsheets:  Edema: No edema noted   Pressure Injury: No pressure ulcers/DTI noted in flowsheets.     ESTIMATED NEEDS  [X] No change since previous assessment -OR - [X] Recalculated  Est Needs based on: [] actual weight [] dosing weight [X] IBW - 61.8kg  Kcal 25-30kcal/cs=[id number]  Protein 1.2-1.4gm/kg=74-87gm    NUTRITION DIAGNOSIS  -Increased Nutrient Needs  -Unintended Weight Loss   Nutrition Diagnosis is [ ] ongoing  [ ] resolved [ ] not applicable   Additional Nutrition Diagnosis (#2):     /       [ ] not applicable     EDUCATION  [ ] Provided pt with verbal / written education on   [X ] Provided on previous assessment by RD  [ ] Not applicable secondary to   [ ] Pt refused     INTERVENTIONS:  1) Consider advancement towards low fiber / consistent CHO when medically appropriate as discussed with Ortho team.    2) Suggest change in oral supplement to Ensure Max Protein Nutrition Shake 2x day (300kcal, 60gm protein).  3) Please document % meal intake in flowsheets to assess adequacy of PO Intake.  4) Obtain and record current weight to best assess for acute changes in nutritional status.  5) Further adjust bowel regimen PRN.    MONITORING & EVALUATION:  PO intake, tolerance to diet/supplement, nutrition related lab values, weight trends, BMs/GI distress, hydration status, skin integrity.    Kinza Vivas, MS, RD, CDN; Pager #[phone]  Also available on [employer] Teams NUTRITION FOLLOW UP NOTE    Pt seen for f/u length of stay.      SOURCE: [X] Patient [X] Medical record [X] RN/team [] Family/Caregiver [] Patient unavailable [] Patient inappropriate (disoriented, nonverbal, intubated/sedated) [] Other:    MEDICAL COURSE:    5/6 Orthopedics Progress Note: s/p T10-pelvis PSF on 4/24.     5/5 Internal Medicine Progress Note:  79yMale w/ HTN, HLD, T2DM, chronic low back pain presents as a transfer from [facility] ED for surgery today. Pt was sent into ED yesterday by his pain medicine physician due to recent MRI findings concerning for L1-L2 discitis/OM with epidural abscess, bilateral psoas abscesses.  S/P Decompression, spine, lumbar, posterior approach, with fusion of posterior spinal column.  On IV Abxs Ertapenem now  per ID x 6 weeks.  Patient noted with ileus - had BM and passed flatus - electrolytes being corrected.  Bowel regimen was adjusted.    DIET PRESCRIPTION: Diet, Full Liquid:   Supplement Feeding Modality:  Oral  Ensure Plus High Protein Cans or Servings Per Day:  1       Frequency:  Two Times a day (05-04-25 @ 01:07)    FOOD ALLERGIES/INTOLERANCES: NKFA     NUTRITION COURSE: Patient has been on clears / fulls since 5/1 (5 days). Per team, patient with diarrhea, was receiving aggressive bowel regimen.  Plan is to advance diet back towards Low fiber.  Last BM today 5/5 (moderate, loose). Of note, patient also on IV ABtx.  Inconsistent weight pattern noted (see below).  Patient noted with previous weight loss based on admit weight (63.5kg); weight Jan 2025 - 71.7kg; current weight trend more consistent with Jan 2025 weight hx as noted in 4/24 Initial Dietitian Evaluation.  Continue to trend weights while inpatient.  Est needs reassessed below based on IBW given inconsistent weight pattern while inpatient.      MEDICATIONS: MEDICATIONS  (STANDING):  acetaminophen     Tablet .. 975 milliGRAM(s) Oral every 6 hours  atorvastatin 40 milliGRAM(s) Oral at bedtime  bacitracin   Ointment 1 Application(s) Topical three times a day  ertapenem  IVPB      ertapenem  IVPB 1000 milliGRAM(s) IV Intermittent every 24 hours  gabapentin 100 milliGRAM(s) Oral every 8 hours  glucagon  Injectable 1 milliGRAM(s) IntraMuscular once  insulin lispro (ADMELOG) corrective regimen sliding scale   SubCutaneous three times a day before meals  insulin lispro (ADMELOG) corrective regimen sliding scale   SubCutaneous at bedtime  methocarbamol 500 milliGRAM(s) Oral every 12 hours  multivitamin 1 Tablet(s) Oral daily  omega-3-Acid Ethyl Esters 2 Gram(s) Oral two times a day  pantoprazole    Tablet 40 milliGRAM(s) Oral before breakfast  senna 2 Tablet(s) Oral at bedtime  simethicone 80 milliGRAM(s) Chew two times a day    MEDICATIONS  (PRN):  dibucaine 1% Ointment 1 Application(s) Topical daily PRN hemorrhoid pain  magnesium hydroxide Suspension 30 milliLiter(s) Oral every 12 hours PRN Constipation    LABS: 05-06 Na136 mmol/L Glu 88 mg/dL K+ 3.6 mmol/L Cr  0.77 mg/dL BUN 7 mg/dL 05-05 Phos 2.2 mg/dL[L]    A1C with Estimated Average Glucose Result: 7.2 % (04-25-25 @ 02:45)  A1C with Estimated Average Glucose Result: 7.3 % (04-24-25 @ 22:30)    CAPILLARY BLOOD GLUCOSE  POCT Blood Glucose.: 83 mg/dL (06 May 2025 07:22)  POCT Blood Glucose.: 116 mg/dL (05 May 2025 21:08)  POCT Blood Glucose.: 141 mg/dL (05 May 2025 16:41)  POCT Blood Glucose.: 243 mg/dL (05 May 2025 11:11)    ANTHROPOMETRICS  Inpatient Weights: 5/5 - 74.5kg      5/2 - 71.1kg     4/30 - 70.1kg      4/27 - 65kg      4/26 - 63.8kg      4/24 - 63.4kg        Height (cm): 165.1 / 65 inches (05-05 @ 10:59)  Weight (kg): 63.5 (05-05 @ 10:59)  BMI (kg/m2): 27.3 (05-05 @ 10:59)  Weight Assessment: Weight gain (?) 11.1kg since admit weight - likely weight discrepancies as inconsistent weight trend noted over hospital course.  IBW: 61.8kg +/-10%    PHYSICAL ASSESSMENT, per flowsheets:  Edema: No edema noted   Pressure Injury: No pressure ulcers/DTI noted in flowsheets.     ESTIMATED NEEDS  [X] No change since previous assessment -OR - [X] Recalculated  Est Needs based on: [] actual weight [] dosing weight [X] IBW - 61.8kg  Kcal 25-30kcal/qv=[id number]  Protein 1.2-1.4gm/kg=74-87gm    NUTRITION DIAGNOSIS  -Increased Nutrient Needs (ongoing)  -Unintended Weight Loss --- trend inpatient weights, ? adm weight discrepancy as weights 70-74kg since 4/30    EDUCATION  [ ] Provided pt with verbal / written education on   [X ] Provided on previous assessment by RD  [ ] Not applicable secondary to   [ ] Pt refused     INTERVENTIONS:  1) Consider advancement towards low fiber / consistent CHO when medically appropriate as discussed with Ortho team.    2) Suggest change in oral supplement to Ensure Max Protein Nutrition Shake 2x day (300kcal, 60gm protein).  3) Please document % meal intake in flowsheets to assess adequacy of PO Intake.  4) Obtain and record current weight to best assess for acute changes in nutritional status.  5) Further adjust bowel regimen PRN.    MONITORING & EVALUATION:  PO intake, tolerance to diet/supplement, nutrition related lab values, weight trends, BMs/GI distress, hydration status, skin integrity.    Kinza Vivas, MS, RD, CDN; Pager #[phone]  Also available on [employer] Teams

## 2025-05-06 NOTE — PROGRESS NOTE ADULT - SUBJECTIVE AND OBJECTIVE BOX
DATE OF SERVICE: 05-06-25    Patient denies chest pain or shortness of breath.  For tube check today. Review of symptoms otherwise negative.    MEDICATIONS:  acetaminophen     Tablet .. 975 milliGRAM(s) Oral every 6 hours  atorvastatin 40 milliGRAM(s) Oral at bedtime  bacitracin   Ointment 1 Application(s) Topical three times a day  dibucaine 1% Ointment 1 Application(s) Topical daily PRN  ertapenem  IVPB      ertapenem  IVPB 1000 milliGRAM(s) IV Intermittent every 24 hours  gabapentin 100 milliGRAM(s) Oral every 8 hours  glucagon  Injectable 1 milliGRAM(s) IntraMuscular once  insulin lispro (ADMELOG) corrective regimen sliding scale   SubCutaneous three times a day before meals  insulin lispro (ADMELOG) corrective regimen sliding scale   SubCutaneous at bedtime  magnesium hydroxide Suspension 30 milliLiter(s) Oral every 12 hours PRN  methocarbamol 500 milliGRAM(s) Oral every 12 hours  multivitamin 1 Tablet(s) Oral daily  omega-3-Acid Ethyl Esters 2 Gram(s) Oral two times a day  pantoprazole    Tablet 40 milliGRAM(s) Oral before breakfast  senna 2 Tablet(s) Oral at bedtime  simethicone 80 milliGRAM(s) Chew two times a day      LABS:                        8.7    9.10  )-----------( 835      ( 06 May 2025 02:08 )             26.0       Hemoglobin: 8.7 g/dL (05-06 @ 02:08)  Hemoglobin: 8.6 g/dL (05-05 @ 05:33)  Hemoglobin: 8.9 g/dL (05-04 @ 05:32)  Hemoglobin: 9.9 g/dL (05-03 @ 10:53)  Hemoglobin: 9.5 g/dL (05-02 @ 05:52)      05-06    136  |  109[H]  |  7   ----------------------------<  88  3.6   |  16[L]  |  0.77    Ca    7.9[L]      06 May 2025 02:08  Phos  2.2     05-05  Mg     2.00     05-05      Creatinine Trend: 0.77<--, 0.82<--, 0.85<--, 0.87<--, 0.90<--, 0.90<--    COAGS: PT/INR - ( 06 May 2025 02:08 )   PT: 11.9 sec;   INR: 1.03 ratio         PTT - ( 06 May 2025 02:08 )  PTT:47.7 sec          PHYSICAL EXAM:  T(C): 37.1 (05-06-25 @ 09:28), Max: 37.1 (05-06-25 @ 09:28)  HR: 98 (05-06-25 @ 09:28) (94 - 106)  BP: 142/78 (05-06-25 @ 09:28) (131/69 - 147/75)  RR: 18 (05-06-25 @ 09:28) (17 - 18)  SpO2: 100% (05-06-25 @ 09:28) (100% - 100%)  Wt(kg): --    I&O's Summary    05 May 2025 07:01  -  06 May 2025 07:00  --------------------------------------------------------  IN: 0 mL / OUT: 1561 mL / NET: -1561 mL    06 May 2025 07:01  -  06 May 2025 12:14  --------------------------------------------------------  IN: 0 mL / OUT: 755 mL / NET: -755 mL        Gen: NAD  HEENT:  (-)icterus (-)pallor  CV: N S1 S2 1/6 DARYA (+)2 Pulses B/l  Resp:  Clear to auscultation B/L, normal effort  GI: distended  Lymph:  (-)Edema, (-)obvious lymphadenopathy  Skin: Warm to touch, Normal turgor  Psych: Appropriate mood and affect      TELEMETRY: 	  NSR    ECG:  	NSR    < from: Xray Abdomen 1 View PORTABLE -Urgent (Xray Abdomen 1 View PORTABLE -Urgent .) (04.29.25 @ 10:09) >  IMPRESSION:  Multiple dilated loops of small and large bowel with question of   pneumoperitoneum. Recommend upright chest radiograph or decubitus   abdominal radiograph for further evaluation.    < end of copied text >    < from: CT Abdomen and Pelvis w/ Oral Cont and w/ IV Cont (04.29.25 @ 17:16) >  IMPRESSION:      Diffuse colonic dilatation, worse on today's study, likely secondary to   pseudoobstruction or ileus.    No free air.    Reduction in the bilateral psoas collections with catheters in place.    PRELIMINARY IMPRESSION: Study limited secondary to extensive streak   artifact from patient's spinal hardware. Diffusely dilated colon to the   level of the rectum, increased since 4/25/2025, without evidence of   mechanical obstruction. Possible etiologies include pseudoobstruction   versus ileus. No small bowel obstruction. No pneumoperitoneum.   Percutaneous posterior approach drainage catheters within the psoas   muscles bilaterally with interval decrease of previously seen abscess.   Redemonstrated erosive endplate changes at L1-L2 compatible with discitis   osteomyelitis.    Follow-up final report in the a.m.    < end of copied text >      ASSESSMENT/PLAN: 	Pt is a  79 year old male w/ HTN, HLD, T2DM, chronic low back pain presents as a transfer from [facility] ED for surgery. . Pt was sent into ED yesterday by his pain medicine physician due to recent MRI findings concerning for L1-L2 discitis/OM with epidural abscess, bilateral psoas abscesses. Patient initially saw pain specialist in February for chronic back pain with radiations to bilateral lateral thighs (R>L). MRI at that time showed degenerative changes. He subsequently had epidural injections x2 (2/27, 3/11) with moderate pain relief. Pain began to worsen on 4/10. He had radiofrequency ablation performed on 4/11 and has since has severe worsening of pain and radiation to R lateral thigh. He reports recent bowel/bladder "incontinence" requiring diaper due to his inability to get to the bathroom in time due to pain limitations but states urge and sensation are intact. Denies fevers, chills, night sweats, weakness, numbness/tingling, saddle anesthesia, recent falls/trauma. He uses a cane for ambulation. Denies recent falls/trauma or any other ortho injuries.   Found to have epidural abscess now s/p Decompression, spine, lumbar, posterior approach, with fusion of posterior spinal column, planned for psoas abscess drainage.    - s/p Decompression, spine, lumbar, posterior approach, with fusion of posterior spinal column  - s/p psoas drainage   - pain control  - c/w statin  - bowel regimen, PT    Janet Roy MD  Pager: [phone]  Office: [phone]

## 2025-05-06 NOTE — PROGRESS NOTE ADULT - SUBJECTIVE AND OBJECTIVE BOX
Date of Service  : 05-06-25    INTERVAL HPI/OVERNIGHT EVENTS: I feel little better as had BM as well passing flatus . Walked outside room few steps   Vital Signs Last 24 Hrs  T(C): 37.1 (06 May 2025 09:28), Max: 37.1 (06 May 2025 09:28)  T(F): 98.7 (06 May 2025 09:28), Max: 98.7 (06 May 2025 09:28)  HR: 98 (06 May 2025 09:28) (94 - 106)  BP: 142/78 (06 May 2025 09:28) (131/69 - 147/75)  BP(mean): --  RR: 18 (06 May 2025 09:28) (17 - 18)  SpO2: 100% (06 May 2025 09:28) (100% - 100%)    Parameters below as of 06 May 2025 09:28  Patient On (Oxygen Delivery Method): room air      I&O's Summary    05 May 2025 07:01  -  06 May 2025 07:00  --------------------------------------------------------  IN: 0 mL / OUT: 1561 mL / NET: -1561 mL    06 May 2025 07:01  -  06 May 2025 12:07  --------------------------------------------------------  IN: 0 mL / OUT: 755 mL / NET: -755 mL      MEDICATIONS  (STANDING):  acetaminophen     Tablet .. 975 milliGRAM(s) Oral every 6 hours  atorvastatin 40 milliGRAM(s) Oral at bedtime  bacitracin   Ointment 1 Application(s) Topical three times a day  ertapenem  IVPB      ertapenem  IVPB 1000 milliGRAM(s) IV Intermittent every 24 hours  gabapentin 100 milliGRAM(s) Oral every 8 hours  glucagon  Injectable 1 milliGRAM(s) IntraMuscular once  insulin lispro (ADMELOG) corrective regimen sliding scale   SubCutaneous three times a day before meals  insulin lispro (ADMELOG) corrective regimen sliding scale   SubCutaneous at bedtime  methocarbamol 500 milliGRAM(s) Oral every 12 hours  multivitamin 1 Tablet(s) Oral daily  omega-3-Acid Ethyl Esters 2 Gram(s) Oral two times a day  pantoprazole    Tablet 40 milliGRAM(s) Oral before breakfast  senna 2 Tablet(s) Oral at bedtime  simethicone 80 milliGRAM(s) Chew two times a day    MEDICATIONS  (PRN):  dibucaine 1% Ointment 1 Application(s) Topical daily PRN hemorrhoid pain  magnesium hydroxide Suspension 30 milliLiter(s) Oral every 12 hours PRN Constipation    LABS:                        8.7    9.10  )-----------( 835      ( 06 May 2025 02:08 )             26.0     05-06    136  |  109[H]  |  7   ----------------------------<  88  3.6   |  16[L]  |  0.77    Ca    7.9[L]      06 May 2025 02:08  Phos  2.2     05-05  Mg     2.00     05-05      PT/INR - ( 06 May 2025 02:08 )   PT: 11.9 sec;   INR: 1.03 ratio         PTT - ( 06 May 2025 02:08 )  PTT:47.7 sec  Urinalysis Basic - ( 06 May 2025 02:08 )    Color: x / Appearance: x / SG: x / pH: x  Gluc: 88 mg/dL / Ketone: x  / Bili: x / Urobili: x   Blood: x / Protein: x / Nitrite: x   Leuk Esterase: x / RBC: x / WBC x   Sq Epi: x / Non Sq Epi: x / Bacteria: x      CAPILLARY BLOOD GLUCOSE      POCT Blood Glucose.: 130 mg/dL (06 May 2025 11:09)  POCT Blood Glucose.: 83 mg/dL (06 May 2025 07:22)  POCT Blood Glucose.: 116 mg/dL (05 May 2025 21:08)  POCT Blood Glucose.: 141 mg/dL (05 May 2025 16:41)        Urinalysis Basic - ( 06 May 2025 02:08 )    Color: x / Appearance: x / SG: x / pH: x  Gluc: 88 mg/dL / Ketone: x  / Bili: x / Urobili: x   Blood: x / Protein: x / Nitrite: x   Leuk Esterase: x / RBC: x / WBC x   Sq Epi: x / Non Sq Epi: x / Bacteria: x      REVIEW OF SYSTEMS:  CONSTITUTIONAL: No fever, weight loss, or fatigue  EYES: No eye pain, visual disturbances, or discharge  ENMT:  No difficulty hearing, tinnitus, vertigo; No sinus or throat pain  NECK: No pain or stiffness  RESPIRATORY: No cough, wheezing, chills or hemoptysis; No shortness of breath  CARDIOVASCULAR: No chest pain, palpitations, dizziness, or leg swelling  GASTROINTESTINAL: No abdominal or epigastric pain. No nausea, vomiting, or hematemesis; No diarrhea or constipation. No melena or hematochezia.  GENITOURINARY: No dysuria, frequency, hematuria, or incontinence  NEUROLOGICAL: No headaches, memory loss, loss of strength, numbness, or tremors      RADIOLOGY & ADDITIONAL TESTS:    Consultant(s) Notes Reviewed:  [x ] YES  [ ] NO    PHYSICAL EXAM:  GENERAL: NAD, well-groomed, well-developed,not in any distress ,  HEAD:  Atraumatic, Normocephalic  NECK: Supple, No JVD, Normal thyroid  NERVOUS SYSTEM:  Alert & Oriented X3, No focal deficit   CHEST/LUNG: Good air entry bilateral with no  rales, rhonchi, wheezing, or rubs  HEART: Regular rate and rhythm; No murmurs, rubs, or gallops  ABDOMEN: Soft, Nontender, +distended; Bowel sounds present  EXTREMITIES:  2+ Pedal  edema>Right foot       Care Discussed with Consultants/Other Providers [ x] YES  [ ] NO

## 2025-05-07 LAB
ANION GAP SERPL CALC-SCNC: 10 MMOL/L — SIGNIFICANT CHANGE UP (ref 7–14)
BASOPHILS # BLD AUTO: 0.05 K/UL — SIGNIFICANT CHANGE UP (ref 0–0.2)
BASOPHILS NFR BLD AUTO: 0.5 % — SIGNIFICANT CHANGE UP (ref 0–2)
BUN SERPL-MCNC: 7 MG/DL — SIGNIFICANT CHANGE UP (ref 7–23)
CALCIUM SERPL-MCNC: 8.5 MG/DL — SIGNIFICANT CHANGE UP (ref 8.4–10.5)
CHLORIDE SERPL-SCNC: 110 MMOL/L — HIGH (ref 98–107)
CO2 SERPL-SCNC: 19 MMOL/L — LOW (ref 22–31)
CREAT SERPL-MCNC: 0.7 MG/DL — SIGNIFICANT CHANGE UP (ref 0.5–1.3)
EGFR: 94 ML/MIN/1.73M2 — SIGNIFICANT CHANGE UP
EGFR: 94 ML/MIN/1.73M2 — SIGNIFICANT CHANGE UP
EOSINOPHIL # BLD AUTO: 0.16 K/UL — SIGNIFICANT CHANGE UP (ref 0–0.5)
EOSINOPHIL NFR BLD AUTO: 1.6 % — SIGNIFICANT CHANGE UP (ref 0–6)
GLUCOSE BLDC GLUCOMTR-MCNC: 139 MG/DL — HIGH (ref 70–99)
GLUCOSE BLDC GLUCOMTR-MCNC: 146 MG/DL — HIGH (ref 70–99)
GLUCOSE BLDC GLUCOMTR-MCNC: 162 MG/DL — HIGH (ref 70–99)
GLUCOSE BLDC GLUCOMTR-MCNC: 84 MG/DL — SIGNIFICANT CHANGE UP (ref 70–99)
GLUCOSE SERPL-MCNC: 83 MG/DL — SIGNIFICANT CHANGE UP (ref 70–99)
HCT VFR BLD CALC: 26.4 % — LOW (ref 39–50)
HGB BLD-MCNC: 8.8 G/DL — LOW (ref 13–17)
IANC: 5.62 K/UL — SIGNIFICANT CHANGE UP (ref 1.8–7.4)
IMM GRANULOCYTES NFR BLD AUTO: 1.4 % — HIGH (ref 0–0.9)
LYMPHOCYTES # BLD AUTO: 3.87 K/UL — HIGH (ref 1–3.3)
LYMPHOCYTES # BLD AUTO: 37.5 % — SIGNIFICANT CHANGE UP (ref 13–44)
MAGNESIUM SERPL-MCNC: 2 MG/DL — SIGNIFICANT CHANGE UP (ref 1.6–2.6)
MCHC RBC-ENTMCNC: 30 PG — SIGNIFICANT CHANGE UP (ref 27–34)
MCHC RBC-ENTMCNC: 33.3 G/DL — SIGNIFICANT CHANGE UP (ref 32–36)
MCV RBC AUTO: 90.1 FL — SIGNIFICANT CHANGE UP (ref 80–100)
MONOCYTES # BLD AUTO: 0.47 K/UL — SIGNIFICANT CHANGE UP (ref 0–0.9)
MONOCYTES NFR BLD AUTO: 4.6 % — SIGNIFICANT CHANGE UP (ref 2–14)
NEUTROPHILS # BLD AUTO: 5.62 K/UL — SIGNIFICANT CHANGE UP (ref 1.8–7.4)
NEUTROPHILS NFR BLD AUTO: 54.4 % — SIGNIFICANT CHANGE UP (ref 43–77)
NRBC # BLD AUTO: 0 K/UL — SIGNIFICANT CHANGE UP (ref 0–0)
NRBC # FLD: 0 K/UL — SIGNIFICANT CHANGE UP (ref 0–0)
NRBC BLD AUTO-RTO: 0 /100 WBCS — SIGNIFICANT CHANGE UP (ref 0–0)
PHOSPHATE SERPL-MCNC: 2.7 MG/DL — SIGNIFICANT CHANGE UP (ref 2.5–4.5)
PLATELET # BLD AUTO: 857 K/UL — HIGH (ref 150–400)
POTASSIUM SERPL-MCNC: 3.1 MMOL/L — LOW (ref 3.5–5.3)
POTASSIUM SERPL-SCNC: 3.1 MMOL/L — LOW (ref 3.5–5.3)
RBC # BLD: 2.93 M/UL — LOW (ref 4.2–5.8)
RBC # FLD: 19.2 % — HIGH (ref 10.3–14.5)
SODIUM SERPL-SCNC: 139 MMOL/L — SIGNIFICANT CHANGE UP (ref 135–145)
WBC # BLD: 10.31 K/UL — SIGNIFICANT CHANGE UP (ref 3.8–10.5)
WBC # FLD AUTO: 10.31 K/UL — SIGNIFICANT CHANGE UP (ref 3.8–10.5)

## 2025-05-07 PROCEDURE — 99232 SBSQ HOSP IP/OBS MODERATE 35: CPT | Mod: GC

## 2025-05-07 PROCEDURE — 93970 EXTREMITY STUDY: CPT | Mod: 26

## 2025-05-07 RX ORDER — DIBUCAINE 10 MG/G
1 OINTMENT TOPICAL
Refills: 0 | Status: DISCONTINUED | OUTPATIENT
Start: 2025-05-07 | End: 2025-05-16

## 2025-05-07 RX ORDER — PHENYLEPHRINE HYDROCHLORIDE AND FAT, HARD .00525; 1.86 G/1; G/1
1 SUPPOSITORY RECTAL
Refills: 0 | Status: DISCONTINUED | OUTPATIENT
Start: 2025-05-07 | End: 2025-05-07

## 2025-05-07 RX ADMIN — ERTAPENEM SODIUM 100 MILLIGRAM(S): 1 INJECTION, POWDER, LYOPHILIZED, FOR SOLUTION INTRAMUSCULAR; INTRAVENOUS at 14:23

## 2025-05-07 RX ADMIN — Medication 40 MILLIEQUIVALENT(S): at 17:15

## 2025-05-07 RX ADMIN — METHOCARBAMOL 500 MILLIGRAM(S): 500 TABLET, FILM COATED ORAL at 23:29

## 2025-05-07 RX ADMIN — OMEGA-3-ACID ETHYL ESTERS CAPSULES 2 GRAM(S): 1 CAPSULE, LIQUID FILLED ORAL at 11:22

## 2025-05-07 RX ADMIN — Medication 975 MILLIGRAM(S): at 17:15

## 2025-05-07 RX ADMIN — Medication 100 MILLIEQUIVALENT(S): at 13:18

## 2025-05-07 RX ADMIN — Medication 975 MILLIGRAM(S): at 11:22

## 2025-05-07 RX ADMIN — Medication 975 MILLIGRAM(S): at 06:56

## 2025-05-07 RX ADMIN — Medication 975 MILLIGRAM(S): at 05:55

## 2025-05-07 RX ADMIN — Medication 40 MILLIGRAM(S): at 05:55

## 2025-05-07 RX ADMIN — Medication 80 MILLIGRAM(S): at 05:56

## 2025-05-07 RX ADMIN — Medication 100 MILLIEQUIVALENT(S): at 12:23

## 2025-05-07 RX ADMIN — DIBUCAINE 1 APPLICATION(S): 10 OINTMENT TOPICAL at 22:23

## 2025-05-07 RX ADMIN — OMEGA-3-ACID ETHYL ESTERS CAPSULES 2 GRAM(S): 1 CAPSULE, LIQUID FILLED ORAL at 23:29

## 2025-05-07 RX ADMIN — Medication 1 APPLICATION(S): at 13:20

## 2025-05-07 RX ADMIN — Medication 975 MILLIGRAM(S): at 23:30

## 2025-05-07 RX ADMIN — Medication 1 TABLET(S): at 11:22

## 2025-05-07 RX ADMIN — Medication 100 MILLIEQUIVALENT(S): at 11:21

## 2025-05-07 RX ADMIN — GABAPENTIN 100 MILLIGRAM(S): 400 CAPSULE ORAL at 22:22

## 2025-05-07 RX ADMIN — Medication 80 MILLIGRAM(S): at 17:16

## 2025-05-07 RX ADMIN — GABAPENTIN 100 MILLIGRAM(S): 400 CAPSULE ORAL at 13:20

## 2025-05-07 RX ADMIN — GABAPENTIN 100 MILLIGRAM(S): 400 CAPSULE ORAL at 05:56

## 2025-05-07 RX ADMIN — Medication 975 MILLIGRAM(S): at 18:15

## 2025-05-07 RX ADMIN — Medication 975 MILLIGRAM(S): at 00:22

## 2025-05-07 RX ADMIN — Medication 975 MILLIGRAM(S): at 12:20

## 2025-05-07 NOTE — PROGRESS NOTE ADULT - ASSESSMENT
79yMale w/ HTN, HLD, T2DM, chronic low back pain presents as a transfer from [facility] ED for surgery today. Pt was sent into ED yesterday by his pain medicine physician due to recent MRI findings concerning for L1-L2 discitis/OM with epidural abscess, bilateral psoas abscesses. Patient initially saw pain specialist in February for chronic back pain with radiations to bilateral lateral thighs (R>L). MRI at that time showed degenerative changes. He subsequently had epidural injections x2 (2/27, 3/11) with moderate pain relief. Pain began to worsen on 4/10. He had radiofrequency ablation performed on 4/11 and has since has severe worsening of pain and radiation to R lateral thigh. He reports recent bowel/bladder "incontinence" requiring diaper due to his inability to get to the bathroom in time due to pain limitations but states urge and sensation are intact. Denies fevers, chills, night sweats, weakness, numbness/tingling, saddle anesthesia, recent falls/trauma. He uses a cane for ambulation. Denies recent falls/trauma or any other ortho injuries. Before back pain was very active walked half an hour , going up and down stairs and doing grocery etc with no Chest pain or SOB.        Problem/Recommendation - 1:  ·  Problem: Epidural abscess.   ·  Recommendation: S/P Decompression, spine, lumbar, posterior approach, with fusion of posterior spinal column.  On IV Abxs Ertapenem now  per ID x 6 weeks  .  Will defer management to  Neurosurgery.  DVT prophylaxis per Neurosurgery .  < from: MR Lumbar Spine w/ IV Cont (04.23.25 @ 15:13) >  IMPRESSION:        1.   Cervical spine:   Moderate degenerative disc disease and   spondylosis at C4-5 through C6/7 with loss of disc height and associated   degenerative endplate changes.        2.   Thoracic spine:   Minimal enhancement within T7-8 which may   reflect early discitis.        3.   Lumbar spine:   Osteomyelitis and discitis at L1-2 with erosions   of the inferior L1 vertebral body and L2 vertebral body consistent with   osteomyelitis and discitis. Ventral epidural abscess is noted extending   from the L1-2 level superiorly to the T10 level with mass effect on the   ventral thecal sac, most prominently at the L1-2 level resulting in   marked compression. Multiple abscesses within the BILATERAL psoas   muscles, the largest measuring 4.3 cm on the RIGHT and 2.8 cm on the LEFT.     Problem/Recommendation - 2:  ·  Problem: Acute osteomyelitis of lumbar spine.   ·  Recommendation: L1 &L2 vertebrae .  On IV Abxs per ID .  Will defer management to Neurosurgery.     Problem/Recommendation - 3:  ·  Problem: Low back pain radiating to right lower extremity.   ·  Recommendation: Pain control.     Problem/Recommendation - 4:  ·  Problem: . BILATERAL psoas Abscess  ·  Recommendation: ON IV Abxs per ID .  IR placed drains after draining  . IR to check drains .     Problem/Recommendation - 5:  ·  Problem: HTN (hypertension).   ·  Recommendation: Takes Amlodipine and ARB so continue with hold parameters.  < from: TTE W or WO Ultrasound Enhancing Agent (04.24.25 @ 10:10) >  CONCLUSIONS:      1. Left ventricular cavity is normal in size. Left ventricular wall thickness is normal. Left ventricular systolic function is normal with an ejection fraction of 64 % by Carpenter's method of disks. There are no regional wall motion abnormalities seen.   2. Normal right ventricular cavity size and normal right ventricular systolic function. Tricuspid annular plane systolic excursion (TAPSE) is 2.2 cm (normal >=1.7 cm).   3. Structurally normal mitral valve with normal leaflet excursion. There is calcification of the mitral valve annulus. There is tracemitral regurgitation.   4. The aortic valve appears trileaflet with normal systolic excursion. There is calcification of the aortic valve leaflets. There is mild aortic regurgitation.     Problem/Recommendation - 6:  ·  Problem: HLD (hyperlipidemia).   ·  Recommendation: Continue Atorvastatin.     Problem/Recommendation - 7:  ·  Problem: DM (diabetes mellitus).   ·  Recommendation: ON Farxiga and holding.  SSI and Lantus in patient .     Problem/Recommendation - 8:  ·  Problem:  Ileus /Pseudoobustraction.   ·  Recommendation: Had BMs and passed flatus .  Correcting electrolytes.     ON Laxatives and Simethacone .  Advanced diet .   Surgery & GI input appreciated .  D/W GI attending and will wait till tomorrow before rectal tube placement  .  < from: CT Abdomen and Pelvis w/ Oral Cont and w/ IV Cont (04.29.25 @ 17:16) >  IMPRESSION:  Diffuse colonic dilatation, worse on today's study, likely secondary to   pseudoobstruction or ileus.    No free air.    Reduction in the bilateral psoas collections with catheters in place.    < from: Xray Abdomen 1 View PORTABLE -Urgent (Xray Abdomen 1 View PORTABLE -Urgent .) (04.29.25 @ 10:09) >  IMPRESSION:  Multiple dilated loops of small and large bowel with question of   pneumoperitoneum. Recommend upright chest radiograph or decubitus   abdominal radiograph for further evaluation.     Problem/Recommendation - 9:  ·  Problem: JUSTINO with Hyponatremia .   ·  Recommendation: Trending BMP.  Improving.   Renal help appreciated.      Problem/Recommendation - 10:  ·  Problem: Hypokalemia & Hypophosphatemia .   ·  Recommendation: Replaced .   Rpt BMP noted.    Keep K level close to 4 .     Problem/Recommendation - 11:  ·  Problem: Thrombocytosis .   ·  Recommendation: Reactionary secondary to infection.  Platelets Trending down .  If continues to trend up recommend  hematology consult .     Problem/Recommendation - 12:  ·  Problem: Pedal edema >Right LE.   ·  Recommendation: No calf tenderness +,Ambulating.    D/W patient and daughter  in detail .       PT working with patient and ambulating .

## 2025-05-07 NOTE — PROGRESS NOTE ADULT - SUBJECTIVE AND OBJECTIVE BOX
NEPHROLOGY     Patient seen and examined resting comfortably on room air, no new complaints, in no acute distress.     MEDICATIONS  (STANDING):  acetaminophen     Tablet .. 975 milliGRAM(s) Oral every 6 hours  atorvastatin 40 milliGRAM(s) Oral at bedtime  bacitracin   Ointment 1 Application(s) Topical three times a day  ertapenem  IVPB      ertapenem  IVPB 1000 milliGRAM(s) IV Intermittent every 24 hours  gabapentin 100 milliGRAM(s) Oral every 8 hours  glucagon  Injectable 1 milliGRAM(s) IntraMuscular once  insulin lispro (ADMELOG) corrective regimen sliding scale   SubCutaneous three times a day before meals  insulin lispro (ADMELOG) corrective regimen sliding scale   SubCutaneous at bedtime  methocarbamol 500 milliGRAM(s) Oral every 12 hours  multivitamin 1 Tablet(s) Oral daily  omega-3-Acid Ethyl Esters 2 Gram(s) Oral two times a day  pantoprazole    Tablet 40 milliGRAM(s) Oral before breakfast  polyethylene glycol 3350 17 Gram(s) Oral at bedtime  simethicone 80 milliGRAM(s) Chew two times a day    VITALS:  T(C): , Max: 37.1 (05-06-25 @ 13:41)  T(F): , Max: 98.8 (05-06-25 @ 13:41)  HR: 97 (05-07-25 @ 09:17)  BP: 132/75 (05-07-25 @ 09:17)  RR: 18 (05-07-25 @ 09:17)  SpO2: 100% (05-07-25 @ 09:17)    I and O's:    05-06 @ 07:01  -  05-07 @ 07:00  --------------------------------------------------------  IN: 0 mL / OUT: 2667.5 mL / NET: -2667.5 mL      PHYSICAL EXAM:  Constitutional: NAD  HEENT: EOMI  Neck:  No JVD, supple   Respiratory: CTA B/L  Cardiovascular: S1 and S2, RRR  Gastrointestinal: distended  Extremities: tr peripheral edema, + peripheral pulses  Neurological: A/O x 3, CN2-12 intact  Psychiatric: Normal mood, normal affect  : No Mac  Skin: No rashes, C/D/I    LABS:                        8.8    10.31 )-----------( 857      ( 07 May 2025 06:23 )             26.4     05-07    139  |  110[H]  |  7   ----------------------------<  83  3.1[L]   |  19[L]  |  0.70    Ca    8.5      07 May 2025 06:23  Phos  2.7     05-07  Mg     2.00     05-07

## 2025-05-07 NOTE — PROGRESS NOTE ADULT - ASSESSMENT
IMPRESSION:  79yMale w/ HTN, HLD, T2DM, chronic low back pain presents as a transfer from [facility] ED for surgery today. Pt was sent into ED yesterday by his pain medicine physician due to recent MRI findings concerning for L1-L2 discitis/OM with epidural abscess, bilateral psoas abscesses    (1)Renal - CKD2, early diabetic nephropathy? Follows as outpatient with Dr. Syed Euceda  (2)Hyponatremia - urine studies c/w low effective arterial volume (appropriately increased ADH) -improved s/p isotonic IVF  (3)Hypokalemia -whole body deficit - continuing to require aggressive repletion  (4)GI - ileus slowly improving; exacerbated by hypokalemia    RECOMMEND:  (1)KCl 40 mEq po x 1   (2)BMP daily     Mare Cameron DNP  [facility]  [phone]       IMPRESSION:  79yMale w/ HTN, HLD, T2DM, chronic low back pain presents as a transfer from [facility] ED for surgery today. Pt was sent into ED yesterday by his pain medicine physician due to recent MRI findings concerning for L1-L2 discitis/OM with epidural abscess, bilateral psoas abscesses    (1)Renal - CKD2, early diabetic nephropathy? Follows as outpatient with Dr. Syed Euceda  (2)Hyponatremia - urine studies c/w low effective arterial volume (appropriately increased ADH) -improved s/p isotonic IVF  (3)Hypokalemia -whole body deficit - continuing to require aggressive repletion  (4)GI - ileus slowly improving; exacerbated by hypokalemia    RECOMMEND:  (1)KCl 40 mEq po x 1   (2)BMP daily     Mare Cameron DNP  [facility]  [phone]      RENAL ATTENDING NOTE  Patient seen and examined with NP.   complains of anal pain - will defer to Surgery/GI in management of the pain  can plan for KCl 40meq po x 1, in addition to the 10meq IV x 3     Yamil Humphreys MD  [facility]  [phone]

## 2025-05-07 NOTE — PROGRESS NOTE ADULT - ASSESSMENT
79M s/p T10-pelvis PSF on 4/24    Plan:  -WBAT BLLE  -appreciate ID recs: abx ertapenem  -appreciate GI recs for diarrhea: decrease miralax dose and advance diet, do not place rectal tube given he is having BMs  -appreciate IR mgmt s/p psoas abscess drainage-- last tube check yesterday, IR planning for next check 5/13     - 1 DHARA drains per IR     - Asp cx: bacteroides fragilis  -blood cx: NGF  -PT/OT  -dvt ppx: venodynes  -dispo: ADRIANA Cervantes, PGY-2  Orthopedic Surgery    [facility]: [id number]  [facility]: [phone]  [facility]:  [id number]/ [phone]

## 2025-05-07 NOTE — PROGRESS NOTE ADULT - SUBJECTIVE AND OBJECTIVE BOX
DATE OF SERVICE: 05-07-25    Patient denies chest pain, had tube study yesterday with one tube removed  Review of symptoms otherwise negative.    MEDICATIONS:  acetaminophen     Tablet .. 975 milliGRAM(s) Oral every 6 hours  atorvastatin 40 milliGRAM(s) Oral at bedtime  bacitracin   Ointment 1 Application(s) Topical three times a day  dibucaine 1% Ointment 1 Application(s) Topical daily PRN  ertapenem  IVPB      ertapenem  IVPB 1000 milliGRAM(s) IV Intermittent every 24 hours  gabapentin 100 milliGRAM(s) Oral every 8 hours  glucagon  Injectable 1 milliGRAM(s) IntraMuscular once  insulin lispro (ADMELOG) corrective regimen sliding scale   SubCutaneous three times a day before meals  insulin lispro (ADMELOG) corrective regimen sliding scale   SubCutaneous at bedtime  magnesium hydroxide Suspension 30 milliLiter(s) Oral every 12 hours PRN  methocarbamol 500 milliGRAM(s) Oral every 12 hours  multivitamin 1 Tablet(s) Oral daily  omega-3-Acid Ethyl Esters 2 Gram(s) Oral two times a day  pantoprazole    Tablet 40 milliGRAM(s) Oral before breakfast  polyethylene glycol 3350 17 Gram(s) Oral at bedtime  simethicone 80 milliGRAM(s) Chew two times a day      LABS:                        8.8    10.31 )-----------( 857      ( 07 May 2025 06:23 )             26.4       Hemoglobin: 8.8 g/dL (05-07 @ 06:23)  Hemoglobin: 8.7 g/dL (05-06 @ 02:08)  Hemoglobin: 8.6 g/dL (05-05 @ 05:33)  Hemoglobin: 8.9 g/dL (05-04 @ 05:32)  Hemoglobin: 9.9 g/dL (05-03 @ 10:53)      05-07    139  |  110[H]  |  7   ----------------------------<  83  3.1[L]   |  19[L]  |  0.70    Ca    8.5      07 May 2025 06:23  Phos  2.7     05-07  Mg     2.00     05-07      Creatinine Trend: 0.70<--, 0.77<--, 0.82<--, 0.85<--, 0.87<--, 0.90<--    COAGS:           PHYSICAL EXAM:  T(C): 36.9 (05-07-25 @ 09:17), Max: 37.1 (05-06-25 @ 14:42)  HR: 97 (05-07-25 @ 09:17) (90 - 98)  BP: 132/75 (05-07-25 @ 09:17) (129/67 - 135/74)  RR: 18 (05-07-25 @ 09:17) (16 - 18)  SpO2: 100% (05-07-25 @ 09:17) (99% - 100%)  Wt(kg): --    I&O's Summary    06 May 2025 07:01  -  07 May 2025 07:00  --------------------------------------------------------  IN: 0 mL / OUT: 2667.5 mL / NET: -2667.5 mL    07 May 2025 07:01  -  07 May 2025 14:13  --------------------------------------------------------  IN: 0 mL / OUT: 2.5 mL / NET: -2.5 mL      Gen: NAD  HEENT:  (-)icterus (-)pallor  CV: N S1 S2 1/6 DARYA (+)2 Pulses B/l  Resp:  Clear to auscultation B/L, normal effort  GI: distended  Lymph:  (-)Edema, (-)obvious lymphadenopathy  Skin: Warm to touch, Normal turgor  Psych: Appropriate mood and affect      TELEMETRY: 	  NSR    ECG:  	NSR    < from: Xray Abdomen 1 View PORTABLE -Urgent (Xray Abdomen 1 View PORTABLE -Urgent .) (04.29.25 @ 10:09) >  IMPRESSION:  Multiple dilated loops of small and large bowel with question of   pneumoperitoneum. Recommend upright chest radiograph or decubitus   abdominal radiograph for further evaluation.    < end of copied text >    < from: CT Abdomen and Pelvis w/ Oral Cont and w/ IV Cont (04.29.25 @ 17:16) >  IMPRESSION:      Diffuse colonic dilatation, worse on today's study, likely secondary to   pseudoobstruction or ileus.    No free air.    Reduction in the bilateral psoas collections with catheters in place.    PRELIMINARY IMPRESSION: Study limited secondary to extensive streak   artifact from patient's spinal hardware. Diffusely dilated colon to the   level of the rectum, increased since 4/25/2025, without evidence of   mechanical obstruction. Possible etiologies include pseudoobstruction   versus ileus. No small bowel obstruction. No pneumoperitoneum.   Percutaneous posterior approach drainage catheters within the psoas   muscles bilaterally with interval decrease of previously seen abscess.   Redemonstrated erosive endplate changes at L1-L2 compatible with discitis   osteomyelitis.    Follow-up final report in the a.m.    < end of copied text >      ASSESSMENT/PLAN: 	Pt is a  79 year old male w/ HTN, HLD, T2DM, chronic low back pain presents as a transfer from [facility] ED for surgery. . Pt was sent into ED yesterday by his pain medicine physician due to recent MRI findings concerning for L1-L2 discitis/OM with epidural abscess, bilateral psoas abscesses. Patient initially saw pain specialist in February for chronic back pain with radiations to bilateral lateral thighs (R>L). MRI at that time showed degenerative changes. He subsequently had epidural injections x2 (2/27, 3/11) with moderate pain relief. Pain began to worsen on 4/10. He had radiofrequency ablation performed on 4/11 and has since has severe worsening of pain and radiation to R lateral thigh. He reports recent bowel/bladder "incontinence" requiring diaper due to his inability to get to the bathroom in time due to pain limitations but states urge and sensation are intact. Denies fevers, chills, night sweats, weakness, numbness/tingling, saddle anesthesia, recent falls/trauma. He uses a cane for ambulation. Denies recent falls/trauma or any other ortho injuries.   Found to have epidural abscess now s/p Decompression, spine, lumbar, posterior approach, with fusion of posterior spinal column, planned for psoas abscess drainage.    - s/p Decompression, spine, lumbar, posterior approach, with fusion of posterior spinal column  - s/p psoas drainage   - pain control  - c/w statin  - bowel regimen, PT    Suchet Roy MD  Pager: [phone]  Office: [phone]

## 2025-05-07 NOTE — PROGRESS NOTE ADULT - ASSESSMENT
Mr. 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     #Colonic dilation  #Ileus  #S/p spinal surgery 4/24  #Post-operative ileus/ colonic pseudoobstruction   GI is re- consulted for persistent abdominal distension and radiographic findings of colonic dilation. XR Abdomen and CT demonstrate multiple dilated loops of small and large bowel without transition point or free air. Patient does not have nausea or vomiting. His abdomen is distended on exam without peritoneal signs. Bowel sounds are present. Patient is having bowel movements (was receiving miralax TID, which was stopped yesterday due to diarrhea).   Patient feels his symptoms are improving.     Recommendations:  - continue miralax qd   - please encourage out of bed as much as able  - correct electrolyte abnormalities   - Track all stool output   - Replenish electrolytes to goal K>4, Mg>2  - Avoid narcotics   - PT support  - Ensure adequate hydration      Note incomplete until finalized by attending signature/attestation.    Myra Sutherland  GI/Hepatology Fellow    MONDAY-FRIDAY 8AM-5PM:  Pager# [phone] ([facility]) or [phone] ([facility])    NON-URGENT CONSULTS:  Please email [email or web address]  AT NIGHT AND ON WEEKENDS:  Contact on-call GI fellow from 5pm-8am and on weekends/holidays

## 2025-05-07 NOTE — PROGRESS NOTE ADULT - SUBJECTIVE AND OBJECTIVE BOX
Chief Complaint:  Patient is a 79y old  Male who presents with a chief complaint of L1-L2 discitis/OM with epidural abscess and BL psoas abscesses (07 May 2025 14:12)      Interval Events:   -feels distension is continuing to improve     Allergies:  No Known Allergies      Hospital Medications:  acetaminophen     Tablet .. 975 milliGRAM(s) Oral every 6 hours  atorvastatin 40 milliGRAM(s) Oral at bedtime  bacitracin   Ointment 1 Application(s) Topical three times a day  dibucaine 1% Ointment 1 Application(s) Topical daily PRN  ertapenem  IVPB      ertapenem  IVPB 1000 milliGRAM(s) IV Intermittent every 24 hours  gabapentin 100 milliGRAM(s) Oral every 8 hours  glucagon  Injectable 1 milliGRAM(s) IntraMuscular once  insulin lispro (ADMELOG) corrective regimen sliding scale   SubCutaneous three times a day before meals  insulin lispro (ADMELOG) corrective regimen sliding scale   SubCutaneous at bedtime  magnesium hydroxide Suspension 30 milliLiter(s) Oral every 12 hours PRN  methocarbamol 500 milliGRAM(s) Oral every 12 hours  multivitamin 1 Tablet(s) Oral daily  omega-3-Acid Ethyl Esters 2 Gram(s) Oral two times a day  pantoprazole    Tablet 40 milliGRAM(s) Oral before breakfast  polyethylene glycol 3350 17 Gram(s) Oral at bedtime  simethicone 80 milliGRAM(s) Chew two times a day        PHYSICAL EXAM:   Vital Signs:  Vital Signs Last 24 Hrs  T(C): 36.9 (07 May 2025 09:17), Max: 37.1 (06 May 2025 14:42)  T(F): 98.4 (07 May 2025 09:17), Max: 98.8 (06 May 2025 14:42)  HR: 97 (07 May 2025 09:17) (90 - 98)  BP: 132/75 (07 May 2025 09:17) (129/67 - 135/74)  BP(mean): --  RR: 18 (07 May 2025 09:17) (16 - 18)  SpO2: 100% (07 May 2025 09:17) (99% - 100%)    Parameters below as of 07 May 2025 09:17  Patient On (Oxygen Delivery Method): room air      Daily       GENERAL:  No acute distress  HEENT:  NCAT, no scleral icterus  CHEST: no resp distress  HEART:  RRR  ABDOMEN:  Soft, non-tender, +distended  EXTREMITIES:  No cyanosis, clubbing, or edema  SKIN:  No rash/erythema/ecchymoses/petechiae/wounds/abscess/warm/dry  NEURO:  Alert and oriented x 3, no asterixis, no tremor    LABS:                        8.8    10.31 )-----------( 857      ( 07 May 2025 06:23 )             26.4     Mean Cell Volume: 90.1 fL (05-07-25 @ 06:23)    05-07    139  |  110[H]  |  7   ----------------------------<  83  3.1[L]   |  19[L]  |  0.70    Ca    8.5      07 May 2025 06:23  Phos  2.7     05-07  Mg     2.00     05-07        PT/INR - ( 06 May 2025 02:08 )   PT: 11.9 sec;   INR: 1.03 ratio         PTT - ( 06 May 2025 02:08 )  PTT:47.7 sec  Urinalysis Basic - ( 07 May 2025 06:23 )    Color: x / Appearance: x / SG: x / pH: x  Gluc: 83 mg/dL / Ketone: x  / Bili: x / Urobili: x   Blood: x / Protein: x / Nitrite: x   Leuk Esterase: x / RBC: x / WBC x   Sq Epi: x / Non Sq Epi: x / Bacteria: x            Imaging:    < from: CT Abdomen and Pelvis No Cont (05.04.25 @ 15:29) >  IMPRESSION:  Bilateral psoas collections, are stable in size compared to prior exam.  Diffusely dilated large bowel containing fluid and air, without discrete   transition point, and is stable compared to prior exam.        --- End of Report ---      < end of copied text >

## 2025-05-07 NOTE — PROGRESS NOTE ADULT - SUBJECTIVE AND OBJECTIVE BOX
Orthopedic Surgery Progress Note     S: Patient seen and examined today. No acute events overnight. Pain is well controlled. Denies f/c, chest pain, shortness of breath, dizziness.    MEDICATIONS  (STANDING):  acetaminophen     Tablet .. 975 milliGRAM(s) Oral every 6 hours  atorvastatin 40 milliGRAM(s) Oral at bedtime  bacitracin   Ointment 1 Application(s) Topical three times a day  ertapenem  IVPB      ertapenem  IVPB 1000 milliGRAM(s) IV Intermittent every 24 hours  gabapentin 100 milliGRAM(s) Oral every 8 hours  glucagon  Injectable 1 milliGRAM(s) IntraMuscular once  insulin lispro (ADMELOG) corrective regimen sliding scale   SubCutaneous three times a day before meals  insulin lispro (ADMELOG) corrective regimen sliding scale   SubCutaneous at bedtime  methocarbamol 500 milliGRAM(s) Oral every 12 hours  multivitamin 1 Tablet(s) Oral daily  omega-3-Acid Ethyl Esters 2 Gram(s) Oral two times a day  pantoprazole    Tablet 40 milliGRAM(s) Oral before breakfast  polyethylene glycol 3350 17 Gram(s) Oral at bedtime  simethicone 80 milliGRAM(s) Chew two times a day    MEDICATIONS  (PRN):  dibucaine 1% Ointment 1 Application(s) Topical daily PRN hemorrhoid pain  magnesium hydroxide Suspension 30 milliLiter(s) Oral every 12 hours PRN Constipation      Vital Signs Last 24 Hrs  T(C): 36.6 (07 May 2025 06:13), Max: 37.1 (06 May 2025 09:28)  T(F): 97.9 (07 May 2025 06:13), Max: 98.8 (06 May 2025 13:41)  HR: 93 (07 May 2025 06:13) (67 - 98)  BP: 135/66 (07 May 2025 06:13) (124/70 - 142/78)  BP(mean): --  RR: 17 (07 May 2025 06:13) (16 - 18)  SpO2: 100% (07 May 2025 06:13) (98% - 100%)    Parameters below as of 07 May 2025 06:13  Patient On (Oxygen Delivery Method): room air        05-06-25 @ 07:01  -  05-07-25 @ 07:00  --------------------------------------------------------  IN: 0 mL / OUT: 2667.5 mL / NET: -2667.5 mL        Physical Exam:  Gen: NAD  Spine:  Dressing CDI  1x IR DHARA drain in place    Motor:                   C5                C6              C7               C8           T1   R            5/5                5/5            5/5             5/5          5/5  L             5/5               5/5             5/5             5/5          5/5                L2             L3             L4               L5            S1  R         3/5           4/5          5/5             5/5           5/5  L          4/5          5/5           5/5             5/5           5/5    LABS:                        8.7    9.10  )-----------( 835      ( 06 May 2025 02:08 )             26.0     05-06    136  |  109[H]  |  7   ----------------------------<  88  3.6   |  16[L]  |  0.77    Ca    7.9[L]      06 May 2025 02:08  Phos  2.2     05-05  Mg     2.00     05-05

## 2025-05-07 NOTE — PROGRESS NOTE ADULT - SUBJECTIVE AND OBJECTIVE BOX
Date of Service  : 05-07-25     INTERVAL HPI/OVERNIGHT EVENTS: Still not passing Gas  Vital Signs Last 24 Hrs  T(C): 36.5 (07 May 2025 17:39), Max: 36.9 (07 May 2025 09:17)  T(F): 97.7 (07 May 2025 17:39), Max: 98.4 (07 May 2025 09:17)  HR: 99 (07 May 2025 17:39) (90 - 100)  BP: 139/65 (07 May 2025 17:39) (124/59 - 139/65)  BP(mean): --  RR: 18 (07 May 2025 17:39) (17 - 18)  SpO2: 100% (07 May 2025 17:39) (99% - 100%)    Parameters below as of 07 May 2025 17:39  Patient On (Oxygen Delivery Method): room air      I&O's Summary    06 May 2025 07:01  -  07 May 2025 07:00  --------------------------------------------------------  IN: 0 mL / OUT: 2667.5 mL / NET: -2667.5 mL    07 May 2025 07:01  -  07 May 2025 19:16  --------------------------------------------------------  IN: 0 mL / OUT: 155 mL / NET: -155 mL      MEDICATIONS  (STANDING):  acetaminophen     Tablet .. 975 milliGRAM(s) Oral every 6 hours  atorvastatin 40 milliGRAM(s) Oral at bedtime  bacitracin   Ointment 1 Application(s) Topical three times a day  ertapenem  IVPB 1000 milliGRAM(s) IV Intermittent every 24 hours  ertapenem  IVPB      gabapentin 100 milliGRAM(s) Oral every 8 hours  glucagon  Injectable 1 milliGRAM(s) IntraMuscular once  insulin lispro (ADMELOG) corrective regimen sliding scale   SubCutaneous three times a day before meals  insulin lispro (ADMELOG) corrective regimen sliding scale   SubCutaneous at bedtime  methocarbamol 500 milliGRAM(s) Oral every 12 hours  multivitamin 1 Tablet(s) Oral daily  omega-3-Acid Ethyl Esters 2 Gram(s) Oral two times a day  pantoprazole    Tablet 40 milliGRAM(s) Oral before breakfast  polyethylene glycol 3350 17 Gram(s) Oral at bedtime  simethicone 80 milliGRAM(s) Chew two times a day    MEDICATIONS  (PRN):  dibucaine 1% Ointment 1 Application(s) Topical daily PRN hemorrhoid pain  magnesium hydroxide Suspension 30 milliLiter(s) Oral every 12 hours PRN Constipation    LABS:                        8.8    10.31 )-----------( 857      ( 07 May 2025 06:23 )             26.4     05-07    139  |  110[H]  |  7   ----------------------------<  83  3.1[L]   |  19[L]  |  0.70    Ca    8.5      07 May 2025 06:23  Phos  2.7     05-07  Mg     2.00     05-07      PT/INR - ( 06 May 2025 02:08 )   PT: 11.9 sec;   INR: 1.03 ratio         PTT - ( 06 May 2025 02:08 )  PTT:47.7 sec  Urinalysis Basic - ( 07 May 2025 06:23 )    Color: x / Appearance: x / SG: x / pH: x  Gluc: 83 mg/dL / Ketone: x  / Bili: x / Urobili: x   Blood: x / Protein: x / Nitrite: x   Leuk Esterase: x / RBC: x / WBC x   Sq Epi: x / Non Sq Epi: x / Bacteria: x      CAPILLARY BLOOD GLUCOSE      POCT Blood Glucose.: 139 mg/dL (07 May 2025 16:20)  POCT Blood Glucose.: 146 mg/dL (07 May 2025 11:07)  POCT Blood Glucose.: 84 mg/dL (07 May 2025 07:19)  POCT Blood Glucose.: 154 mg/dL (06 May 2025 21:17)        Urinalysis Basic - ( 07 May 2025 06:23 )    Color: x / Appearance: x / SG: x / pH: x  Gluc: 83 mg/dL / Ketone: x  / Bili: x / Urobili: x   Blood: x / Protein: x / Nitrite: x   Leuk Esterase: x / RBC: x / WBC x   Sq Epi: x / Non Sq Epi: x / Bacteria: x      REVIEW OF SYSTEMS:  CONSTITUTIONAL: No fever, weight loss, or fatigue  EYES: No eye pain, visual disturbances, or discharge  ENMT:  No difficulty hearing, tinnitus, vertigo; No sinus or throat pain  NECK: No pain or stiffness  RESPIRATORY: No cough, wheezing, chills or hemoptysis; No shortness of breath  CARDIOVASCULAR: No chest pain, palpitations, dizziness, or leg swelling  GASTROINTESTINAL: No abdominal or epigastric pain. No nausea, vomiting, or hematemesis; No diarrhea or constipation. No melena or hematochezia.  GENITOURINARY: No dysuria, frequency, hematuria, or incontinence  NEUROLOGICAL: No headaches, memory loss, loss of strength, numbness, or tremors      RADIOLOGY & ADDITIONAL TESTS:    Consultant(s) Notes Reviewed:  [x ] YES  [ ] NO    PHYSICAL EXAM:  GENERAL: NAD, well-groomed, well-developed,not in any distress ,  HEAD:  Atraumatic, Normocephalic  NECK: Supple, No JVD, Normal thyroid  NERVOUS SYSTEM:  Alert & Oriented X3, No focal deficit   CHEST/LUNG: Good air entry bilateral with no  rales, rhonchi, wheezing, or rubs  HEART: Regular rate and rhythm; No murmurs, rubs, or gallops  ABDOMEN: Soft, Nontender, +distended; Bowel sounds present  EXTREMITIES:  2+  edema    Care Discussed with Consultants/Other Providers [ x] YES  [ ] NO

## 2025-05-08 LAB
ANION GAP SERPL CALC-SCNC: 11 MMOL/L — SIGNIFICANT CHANGE UP (ref 7–14)
ANION GAP SERPL CALC-SCNC: 11 MMOL/L — SIGNIFICANT CHANGE UP (ref 7–14)
BASOPHILS # BLD AUTO: 0.04 K/UL — SIGNIFICANT CHANGE UP (ref 0–0.2)
BASOPHILS NFR BLD AUTO: 0.5 % — SIGNIFICANT CHANGE UP (ref 0–2)
BUN SERPL-MCNC: 8 MG/DL — SIGNIFICANT CHANGE UP (ref 7–23)
BUN SERPL-MCNC: 8 MG/DL — SIGNIFICANT CHANGE UP (ref 7–23)
CALCIUM SERPL-MCNC: 8.4 MG/DL — SIGNIFICANT CHANGE UP (ref 8.4–10.5)
CALCIUM SERPL-MCNC: 8.6 MG/DL — SIGNIFICANT CHANGE UP (ref 8.4–10.5)
CHLORIDE SERPL-SCNC: 109 MMOL/L — HIGH (ref 98–107)
CHLORIDE SERPL-SCNC: 110 MMOL/L — HIGH (ref 98–107)
CO2 SERPL-SCNC: 17 MMOL/L — LOW (ref 22–31)
CO2 SERPL-SCNC: 17 MMOL/L — LOW (ref 22–31)
CREAT SERPL-MCNC: 0.7 MG/DL — SIGNIFICANT CHANGE UP (ref 0.5–1.3)
CREAT SERPL-MCNC: 0.75 MG/DL — SIGNIFICANT CHANGE UP (ref 0.5–1.3)
EGFR: 92 ML/MIN/1.73M2 — SIGNIFICANT CHANGE UP
EGFR: 92 ML/MIN/1.73M2 — SIGNIFICANT CHANGE UP
EGFR: 94 ML/MIN/1.73M2 — SIGNIFICANT CHANGE UP
EGFR: 94 ML/MIN/1.73M2 — SIGNIFICANT CHANGE UP
EOSINOPHIL # BLD AUTO: 0.19 K/UL — SIGNIFICANT CHANGE UP (ref 0–0.5)
EOSINOPHIL NFR BLD AUTO: 2.2 % — SIGNIFICANT CHANGE UP (ref 0–6)
GLUCOSE BLDC GLUCOMTR-MCNC: 116 MG/DL — HIGH (ref 70–99)
GLUCOSE BLDC GLUCOMTR-MCNC: 128 MG/DL — HIGH (ref 70–99)
GLUCOSE BLDC GLUCOMTR-MCNC: 98 MG/DL — SIGNIFICANT CHANGE UP (ref 70–99)
GLUCOSE BLDC GLUCOMTR-MCNC: 99 MG/DL — SIGNIFICANT CHANGE UP (ref 70–99)
GLUCOSE SERPL-MCNC: 113 MG/DL — HIGH (ref 70–99)
GLUCOSE SERPL-MCNC: 91 MG/DL — SIGNIFICANT CHANGE UP (ref 70–99)
HCT VFR BLD CALC: 25.9 % — LOW (ref 39–50)
HGB BLD-MCNC: 8.7 G/DL — LOW (ref 13–17)
IANC: 4.66 K/UL — SIGNIFICANT CHANGE UP (ref 1.8–7.4)
IMM GRANULOCYTES NFR BLD AUTO: 1.5 % — HIGH (ref 0–0.9)
LYMPHOCYTES # BLD AUTO: 3.23 K/UL — SIGNIFICANT CHANGE UP (ref 1–3.3)
LYMPHOCYTES # BLD AUTO: 36.8 % — SIGNIFICANT CHANGE UP (ref 13–44)
MAGNESIUM SERPL-MCNC: 1.9 MG/DL — SIGNIFICANT CHANGE UP (ref 1.6–2.6)
MAGNESIUM SERPL-MCNC: 2.1 MG/DL — SIGNIFICANT CHANGE UP (ref 1.6–2.6)
MCHC RBC-ENTMCNC: 30.4 PG — SIGNIFICANT CHANGE UP (ref 27–34)
MCHC RBC-ENTMCNC: 33.6 G/DL — SIGNIFICANT CHANGE UP (ref 32–36)
MCV RBC AUTO: 90.6 FL — SIGNIFICANT CHANGE UP (ref 80–100)
MONOCYTES # BLD AUTO: 0.52 K/UL — SIGNIFICANT CHANGE UP (ref 0–0.9)
MONOCYTES NFR BLD AUTO: 5.9 % — SIGNIFICANT CHANGE UP (ref 2–14)
NEUTROPHILS # BLD AUTO: 4.66 K/UL — SIGNIFICANT CHANGE UP (ref 1.8–7.4)
NEUTROPHILS NFR BLD AUTO: 53.1 % — SIGNIFICANT CHANGE UP (ref 43–77)
NRBC # BLD AUTO: 0 K/UL — SIGNIFICANT CHANGE UP (ref 0–0)
NRBC # FLD: 0 K/UL — SIGNIFICANT CHANGE UP (ref 0–0)
NRBC BLD AUTO-RTO: 0 /100 WBCS — SIGNIFICANT CHANGE UP (ref 0–0)
PHOSPHATE SERPL-MCNC: 2.4 MG/DL — LOW (ref 2.5–4.5)
PHOSPHATE SERPL-MCNC: 4 MG/DL — SIGNIFICANT CHANGE UP (ref 2.5–4.5)
PLATELET # BLD AUTO: 826 K/UL — HIGH (ref 150–400)
POTASSIUM SERPL-MCNC: 3.1 MMOL/L — LOW (ref 3.5–5.3)
POTASSIUM SERPL-MCNC: 4 MMOL/L — SIGNIFICANT CHANGE UP (ref 3.5–5.3)
POTASSIUM SERPL-SCNC: 3.1 MMOL/L — LOW (ref 3.5–5.3)
POTASSIUM SERPL-SCNC: 4 MMOL/L — SIGNIFICANT CHANGE UP (ref 3.5–5.3)
RBC # BLD: 2.86 M/UL — LOW (ref 4.2–5.8)
RBC # FLD: 19.7 % — HIGH (ref 10.3–14.5)
SODIUM SERPL-SCNC: 137 MMOL/L — SIGNIFICANT CHANGE UP (ref 135–145)
SODIUM SERPL-SCNC: 138 MMOL/L — SIGNIFICANT CHANGE UP (ref 135–145)
WBC # BLD: 8.77 K/UL — SIGNIFICANT CHANGE UP (ref 3.8–10.5)
WBC # FLD AUTO: 8.77 K/UL — SIGNIFICANT CHANGE UP (ref 3.8–10.5)

## 2025-05-08 PROCEDURE — 99232 SBSQ HOSP IP/OBS MODERATE 35: CPT | Mod: GC

## 2025-05-08 RX ORDER — MAGNESIUM SULFATE 500 MG/ML
1 SYRINGE (ML) INJECTION ONCE
Refills: 0 | Status: COMPLETED | OUTPATIENT
Start: 2025-05-08 | End: 2025-05-08

## 2025-05-08 RX ORDER — POTASSIUM PHOSPHATE, MONOBASIC POTASSIUM PHOSPHATE, DIBASIC INJECTION, 236; 224 MG/ML; MG/ML
30 SOLUTION, CONCENTRATE INTRAVENOUS ONCE
Refills: 0 | Status: COMPLETED | OUTPATIENT
Start: 2025-05-08 | End: 2025-05-08

## 2025-05-08 RX ORDER — HEPARIN SODIUM 1000 [USP'U]/ML
5000 INJECTION INTRAVENOUS; SUBCUTANEOUS EVERY 8 HOURS
Refills: 0 | Status: DISCONTINUED | OUTPATIENT
Start: 2025-05-08 | End: 2025-05-12

## 2025-05-08 RX ORDER — MAGNESIUM SULFATE 500 MG/ML
1 SYRINGE (ML) INJECTION ONCE
Refills: 0 | Status: DISCONTINUED | OUTPATIENT
Start: 2025-05-08 | End: 2025-05-08

## 2025-05-08 RX ADMIN — ERTAPENEM SODIUM 100 MILLIGRAM(S): 1 INJECTION, POWDER, LYOPHILIZED, FOR SOLUTION INTRAMUSCULAR; INTRAVENOUS at 15:04

## 2025-05-08 RX ADMIN — Medication 20 MILLIEQUIVALENT(S): at 17:30

## 2025-05-08 RX ADMIN — Medication 975 MILLIGRAM(S): at 05:55

## 2025-05-08 RX ADMIN — Medication 975 MILLIGRAM(S): at 13:17

## 2025-05-08 RX ADMIN — Medication 20 MILLIEQUIVALENT(S): at 11:06

## 2025-05-08 RX ADMIN — GABAPENTIN 100 MILLIGRAM(S): 400 CAPSULE ORAL at 21:19

## 2025-05-08 RX ADMIN — Medication 40 MILLIGRAM(S): at 05:55

## 2025-05-08 RX ADMIN — Medication 80 MILLIGRAM(S): at 17:30

## 2025-05-08 RX ADMIN — Medication 100 GRAM(S): at 12:34

## 2025-05-08 RX ADMIN — GABAPENTIN 100 MILLIGRAM(S): 400 CAPSULE ORAL at 15:08

## 2025-05-08 RX ADMIN — Medication 100 MILLIEQUIVALENT(S): at 13:38

## 2025-05-08 RX ADMIN — GABAPENTIN 100 MILLIGRAM(S): 400 CAPSULE ORAL at 06:01

## 2025-05-08 RX ADMIN — Medication 80 MILLIGRAM(S): at 05:55

## 2025-05-08 RX ADMIN — METHOCARBAMOL 500 MILLIGRAM(S): 500 TABLET, FILM COATED ORAL at 10:22

## 2025-05-08 RX ADMIN — Medication 975 MILLIGRAM(S): at 06:50

## 2025-05-08 RX ADMIN — Medication 975 MILLIGRAM(S): at 18:34

## 2025-05-08 RX ADMIN — Medication 20 MILLIEQUIVALENT(S): at 13:17

## 2025-05-08 RX ADMIN — Medication 975 MILLIGRAM(S): at 18:28

## 2025-05-08 RX ADMIN — Medication 975 MILLIGRAM(S): at 13:37

## 2025-05-08 RX ADMIN — OMEGA-3-ACID ETHYL ESTERS CAPSULES 2 GRAM(S): 1 CAPSULE, LIQUID FILLED ORAL at 13:17

## 2025-05-08 RX ADMIN — Medication 1 TABLET(S): at 13:17

## 2025-05-08 RX ADMIN — METHOCARBAMOL 500 MILLIGRAM(S): 500 TABLET, FILM COATED ORAL at 21:19

## 2025-05-08 RX ADMIN — POTASSIUM PHOSPHATE, MONOBASIC POTASSIUM PHOSPHATE, DIBASIC INJECTION, 83.33 MILLIMOLE(S): 236; 224 SOLUTION, CONCENTRATE INTRAVENOUS at 11:24

## 2025-05-08 RX ADMIN — Medication 975 MILLIGRAM(S): at 00:26

## 2025-05-08 RX ADMIN — HEPARIN SODIUM 5000 UNIT(S): 1000 INJECTION INTRAVENOUS; SUBCUTANEOUS at 18:28

## 2025-05-08 NOTE — PROGRESS NOTE ADULT - ASSESSMENT
IMPRESSION:  79yMale w/ HTN, HLD, T2DM, chronic low back pain presents as a transfer from [facility] ED for surgery today. Pt was sent into ED yesterday by his pain medicine physician due to recent MRI findings concerning for L1-L2 discitis/OM with epidural abscess, bilateral psoas abscesses    (1)Renal - CKD2, early diabetic nephropathy? Follows as outpatient with Dr. Syed Euceda  (2)Hyponatremia - urine studies c/w low effective arterial volume (appropriately increased ADH) -improved/stable s/p isotonic IVF  (3)Hypokalemia -whole body deficit - continuing to require aggressive repletion  (4)GI - ileus slowly improving; exacerbated by hypokalemia    RECOMMEND:  (1) KCl 40meq po x 1 + KCl 10meq IV x 3   (2)Phosnak 1 packet po x 1   (3)BMP daily     Mare Cameron DNP  [facility]  [phone]         IMPRESSION:  79yMale w/ HTN, HLD, T2DM, chronic low back pain presents as a transfer from [facility] ED for surgery today. Pt was sent into ED yesterday by his pain medicine physician due to recent MRI findings concerning for L1-L2 discitis/OM with epidural abscess, bilateral psoas abscesses    (1)Renal - CKD2, early diabetic nephropathy? Follows as outpatient with Dr. Syed Euceda  (2)Hyponatremia - urine studies c/w low effective arterial volume (appropriately increased ADH) -improved/stable s/p isotonic IVF  (3)Hypokalemia -whole body deficit - continuing to require aggressive repletion  (4)GI - ileus slowly improving; exacerbated by hypokalemia  (5)Hypophosphatemia - mild     RECOMMEND:  (1) KCl 40meq po x 1 + KCl 10meq IV x 3   (2)Phosnak 1 packet po x 1   (3)BMP daily     Mare Cameron DNP  [facility]  [phone]         IMPRESSION:  79yMale w/ HTN, HLD, T2DM, chronic low back pain presents as a transfer from [facility] ED for surgery today. Pt was sent into ED yesterday by his pain medicine physician due to recent MRI findings concerning for L1-L2 discitis/OM with epidural abscess, bilateral psoas abscesses    (1)Renal - CKD2, early diabetic nephropathy? Follows as outpatient with Dr. Syed Euceda  (2)Hyponatremia - urine studies c/w low effective arterial volume (appropriately increased ADH) -improved/stable s/p isotonic IVF  (3)Hypokalemia -whole body deficit - continuing to require aggressive repletion  (4)GI - ileus slowly improving; exacerbated by hypokalemia  (5)Hypophosphatemia - mild     RECOMMEND:  (1) KCl 40meq po x 1 + KCl 10meq IV x 3   (2)Phosnak 1 packet po x 1   (3)BMP daily     Mare Cameron DNP  [facility]  [phone]      RENAL ATTENDING NOTE  Patient seen and examined with NP. Agree with assessment and plan as above.  Not just the K+ level that is low at this point; the PO4 is also low, and the BUN is low as well...I suspect that his persistent hypokalemia is being significantly impacted by his limited nutrition. In addition to the PO/IV K+ repletion, I would add TID Ensure at this point.    Yamil Humphreys MD  [facility]  [phone]       IMPRESSION:  79yMale w/ HTN, HLD, T2DM, chronic low back pain presents as a transfer from [facility] ED for surgery today. Pt was sent into ED yesterday by his pain medicine physician due to recent MRI findings concerning for L1-L2 discitis/OM with epidural abscess, bilateral psoas abscesses    (1)Renal - CKD2, early diabetic nephropathy? Follows as outpatient with Dr. Syed Euceda  (2)Hyponatremia - urine studies c/w low effective arterial volume (appropriately increased ADH) -improved/stable s/p isotonic IVF  (3)Hypokalemia -whole body deficit - continuing to require aggressive repletion  (4)GI - ileus slowly improving; exacerbated by hypokalemia  (5)Hypophosphatemia - mild     RECOMMEND:  (1) KCl 40meq po x 1 + KCl 10meq IV x 3   (2)Phosnak 1 packet po x 1   (3)BMP daily     Mare Cameron DNP  [facility]  [phone]      RENAL ATTENDING NOTE  Patient seen and examined with NP. Agree with assessment and plan as above.  Not just the K+ level that is low at this point; the PO4 is also low, and the BUN is low as well...I suspect that his persistent hypokalemia is being significantly impacted by his limited nutrition. In addition to the PO/IV K+ repletion, I would add TID Ensure at this point. He had diarrhea yesterday which is also contributing here; Miralax appropriately discontinued.    Yamil Humphreys MD  [facility]  [phone]       IMPRESSION:  79yMale w/ HTN, HLD, T2DM, chronic low back pain presents as a transfer from [facility] ED for surgery today. Pt was sent into ED yesterday by his pain medicine physician due to recent MRI findings concerning for L1-L2 discitis/OM with epidural abscess, bilateral psoas abscesses    (1)Renal - CKD2, early diabetic nephropathy? Follows as outpatient with Dr. Syed Euceda  (2)Hyponatremia - urine studies c/w low effective arterial volume (appropriately increased ADH) -improved/stable s/p isotonic IVF  (3)Hypokalemia -whole body deficit - continuing to require aggressive repletion  (4)GI - ileus slowly improving; exacerbated by hypokalemia  (5)Hypophosphatemia - mild     RECOMMEND:  (1) KCl 40meq po x 1 + KCl 10meq IV x 3   (2)Phosnak 1 packet po x 1   (3)BMP daily     Mare Cameron DNP  [facility]  [phone]      RENAL ATTENDING NOTE  Patient seen and examined with NP. Agree with assessment and plan as above.  Not just the K+ level that is low at this point; the PO4 is also low, and the BUN is low as well...I suspect that his persistent hypokalemia is being significantly impacted by his limited nutrition. In addition to the PO/IV K+ repletion, I would add TID Glucerna at this point. He had diarrhea yesterday which is also contributing here; Miralax appropriately discontinued.    Yamil Humphreys MD  [facility]  [phone]

## 2025-05-08 NOTE — PROGRESS NOTE ADULT - SUBJECTIVE AND OBJECTIVE BOX
DATE OF SERVICE: 05-08-25    Patient denies chest pain or SOB, having BMs, tolerating PO. Review of symptoms otherwise negative.    MEDICATIONS:  acetaminophen     Tablet .. 975 milliGRAM(s) Oral every 6 hours  atorvastatin 40 milliGRAM(s) Oral at bedtime  bacitracin   Ointment 1 Application(s) Topical three times a day  dibucaine 1% Ointment 1 Application(s) Topical four times a day PRN  ertapenem  IVPB 1000 milliGRAM(s) IV Intermittent every 24 hours  ertapenem  IVPB      gabapentin 100 milliGRAM(s) Oral every 8 hours  glucagon  Injectable 1 milliGRAM(s) IntraMuscular once  insulin lispro (ADMELOG) corrective regimen sliding scale   SubCutaneous three times a day before meals  insulin lispro (ADMELOG) corrective regimen sliding scale   SubCutaneous at bedtime  magnesium hydroxide Suspension 30 milliLiter(s) Oral every 12 hours PRN  methocarbamol 500 milliGRAM(s) Oral every 12 hours  multivitamin 1 Tablet(s) Oral daily  omega-3-Acid Ethyl Esters 2 Gram(s) Oral two times a day  pantoprazole    Tablet 40 milliGRAM(s) Oral before breakfast  polyethylene glycol 3350 17 Gram(s) Oral at bedtime  simethicone 80 milliGRAM(s) Chew two times a day                            8.7    8.77  )-----------( 826      ( 08 May 2025 05:50 )             25.9       137  |  109[H]  |  8   ----------------------------<  91  3.1[L]   |  17[L]  |  0.70    Ca    8.4      08 May 2025 05:50  Phos  2.4     05-08  Mg     1.90     05-08      Creatinine Trend: 0.70<--, 0.70<--, 0.77<--, 0.82<--, 0.85<--, 0.87<--    T(C): 36.9 (05-08-25 @ 09:39), Max: 36.9 (05-08-25 @ 09:39)  HR: 92 (05-08-25 @ 09:39) (90 - 100)  BP: 143/75 (05-08-25 @ 09:39) (124/59 - 149/71)  RR: 18 (05-08-25 @ 09:39) (18 - 18)  SpO2: 100% (05-08-25 @ 09:39) (100% - 100%)  Wt(kg): --    I&O's Summary    07 May 2025 07:01  -  08 May 2025 07:00  --------------------------------------------------------  IN: 0 mL / OUT: 657 mL / NET: -657 mL    08 May 2025 07:01  -  08 May 2025 10:16  --------------------------------------------------------  IN: 0 mL / OUT: 400 mL / NET: -400 mL        Gen: NAD  HEENT:  (-)icterus (-)pallor  CV: N S1 S2 1/6 DARYA (+)2 Pulses B/l  Resp:  Clear to auscultation B/L, normal effort  GI: distended  Lymph:  (-)Edema, (-)obvious lymphadenopathy  Skin: Warm to touch, Normal turgor  Psych: Appropriate mood and affect      TELEMETRY: 	  NSR    ECG:  	NSR    < from: Xray Abdomen 1 View PORTABLE -Urgent (Xray Abdomen 1 View PORTABLE -Urgent .) (04.29.25 @ 10:09) >  IMPRESSION:  Multiple dilated loops of small and large bowel with question of   pneumoperitoneum. Recommend upright chest radiograph or decubitus   abdominal radiograph for further evaluation.    < end of copied text >    < from: CT Abdomen and Pelvis w/ Oral Cont and w/ IV Cont (04.29.25 @ 17:16) >  IMPRESSION:      Diffuse colonic dilatation, worse on today's study, likely secondary to   pseudoobstruction or ileus.    No free air.    Reduction in the bilateral psoas collections with catheters in place.    PRELIMINARY IMPRESSION: Study limited secondary to extensive streak   artifact from patient's spinal hardware. Diffusely dilated colon to the   level of the rectum, increased since 4/25/2025, without evidence of   mechanical obstruction. Possible etiologies include pseudoobstruction   versus ileus. No small bowel obstruction. No pneumoperitoneum.   Percutaneous posterior approach drainage catheters within the psoas   muscles bilaterally with interval decrease of previously seen abscess.   Redemonstrated erosive endplate changes at L1-L2 compatible with discitis   osteomyelitis.    Follow-up final report in the a.m.    < end of copied text >      ASSESSMENT/PLAN: 	Pt is a  79 year old male w/ HTN, HLD, T2DM, chronic low back pain presents as a transfer from [facility] ED for surgery. . Pt was sent into ED yesterday by his pain medicine physician due to recent MRI findings concerning for L1-L2 discitis/OM with epidural abscess, bilateral psoas abscesses. Patient initially saw pain specialist in February for chronic back pain with radiations to bilateral lateral thighs (R>L). MRI at that time showed degenerative changes. He subsequently had epidural injections x2 (2/27, 3/11) with moderate pain relief. Pain began to worsen on 4/10. He had radiofrequency ablation performed on 4/11 and has since has severe worsening of pain and radiation to R lateral thigh. He reports recent bowel/bladder "incontinence" requiring diaper due to his inability to get to the bathroom in time due to pain limitations but states urge and sensation are intact. Denies fevers, chills, night sweats, weakness, numbness/tingling, saddle anesthesia, recent falls/trauma. He uses a cane for ambulation. Denies recent falls/trauma or any other ortho injuries.   Found to have epidural abscess now s/p Decompression, spine, lumbar, posterior approach, with fusion of posterior spinal column, planned for psoas abscess drainage.    - s/p Decompression, spine, lumbar, posterior approach, with fusion of posterior spinal column  - s/p psoas drainage   - pain control/PT/bowel regimen  - c/w statin  - BP/HR stable      Judy GAYTAN  [phone]

## 2025-05-08 NOTE — PROGRESS NOTE ADULT - ASSESSMENT
Mr. 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     #Colonic dilation  #Ileus  #S/p spinal surgery 4/24  #Post-operative ileus/ colonic pseudoobstruction   GI is re- consulted for persistent abdominal distension and radiographic findings of colonic dilation. XR Abdomen and CT demonstrate multiple dilated loops of small and large bowel without transition point or free air. Patient does not have nausea or vomiting. His abdomen is distended on exam without peritoneal signs. Bowel sounds are present. Patient is having bowel movements (was receiving miralax TID, which was stopped yesterday due to diarrhea).   Patient feels his symptoms are continuing to improve.    Recommendations:  - continue miralax qd   - please encourage out of bed as much as able  - correct electrolyte abnormalities   - Track all stool output   - Replenish electrolytes to goal K>4, Mg>2  - Avoid narcotics   - PT support  - Ensure adequate hydration    - no role for rectal tume at this time as patient is continuing to clinically improve.     Note incomplete until finalized by attending signature/attestation.    Myra Sutherland  GI/Hepatology Fellow    MONDAY-FRIDAY 8AM-5PM:  Pager# [phone] ([facility]) or [phone] ([facility])    NON-URGENT CONSULTS:  Please email [email or web address]  AT NIGHT AND ON WEEKENDS:  Contact on-call GI fellow from 5pm-8am and on weekends/holidays

## 2025-05-08 NOTE — PROGRESS NOTE ADULT - SUBJECTIVE AND OBJECTIVE BOX
POST OPERATIVE DAY #: 14    Patient Resting in bed. States abdomen still distended but having a lot less discomfort. Does endorse lateral right thigh numbness    Exam:   Alert/Oriented, No Acute Distress  Lungs- CTA  Card- RRR  ABD- + BS, Distended but soft   Mac- SDB- clear, yellow          Dressing: [x] clean/dry/intact  [ ] Other:           Drains: IR Drain 2/7         Motor exam: [  ]          [ ] Upper extremity                     Bi          Tri        Delt                                                    R         5/5        5/5        5/5                                               L          5/5        5/5        5/5                  [ ] Lower extremeity                   PF          DF         EHL       FHL                                                                                            R        4+/5      4+/5      4+/5      4+/5                                                        L         4+/5      4+/5      4+/5      4+/5                   Sensation: right lateral thigh numbness                                           Calves Soft/Non-tender bilaterally           Distal extremities warm well perfused; capillary refill <3 seconds              LABS:                        8.8    10.31 )-----------( 857      ( 07 May 2025 06:23 )             26.4     05-07    139  |  110[H]  |  7   ----------------------------<  83  3.1[L]   |  19[L]  |  0.70    Ca    8.5      07 May 2025 06:23  Phos  2.7     05-07  Mg     2.00     05-07          RADIOLOGY & ADDITIONAL STUDIES:

## 2025-05-08 NOTE — PROGRESS NOTE ADULT - ASSESSMENT
79yMale w/ HTN, HLD, T2DM, chronic low back pain presents as a transfer from [facility] ED for surgery today. Pt was sent into ED yesterday by his pain medicine physician due to recent MRI findings concerning for L1-L2 discitis/OM with epidural abscess, bilateral psoas abscesses. Patient initially saw pain specialist in February for chronic back pain with radiations to bilateral lateral thighs (R>L). MRI at that time showed degenerative changes. He subsequently had epidural injections x2 (2/27, 3/11) with moderate pain relief. Pain began to worsen on 4/10. He had radiofrequency ablation performed on 4/11 and has since has severe worsening of pain and radiation to R lateral thigh. He reports recent bowel/bladder "incontinence" requiring diaper due to his inability to get to the bathroom in time due to pain limitations but states urge and sensation are intact. Denies fevers, chills, night sweats, weakness, numbness/tingling, saddle anesthesia, recent falls/trauma. He uses a cane for ambulation. Denies recent falls/trauma or any other ortho injuries. Before back pain was very active walked half an hour , going up and down stairs and doing grocery etc with no Chest pain or SOB.        Problem/Recommendation - 1:  ·  Problem: Epidural abscess.   ·  Recommendation: S/P Decompression, spine, lumbar, posterior approach, with fusion of posterior spinal column.  On IV Abxs Ertapenem now  per ID x 6 weeks  .  Will defer management to  Neurosurgery.  DVT prophylaxis per Neurosurgery .  < from: MR Lumbar Spine w/ IV Cont (04.23.25 @ 15:13) >  IMPRESSION:        1.   Cervical spine:   Moderate degenerative disc disease and   spondylosis at C4-5 through C6/7 with loss of disc height and associated   degenerative endplate changes.        2.   Thoracic spine:   Minimal enhancement within T7-8 which may   reflect early discitis.        3.   Lumbar spine:   Osteomyelitis and discitis at L1-2 with erosions   of the inferior L1 vertebral body and L2 vertebral body consistent with   osteomyelitis and discitis. Ventral epidural abscess is noted extending   from the L1-2 level superiorly to the T10 level with mass effect on the   ventral thecal sac, most prominently at the L1-2 level resulting in   marked compression. Multiple abscesses within the BILATERAL psoas   muscles, the largest measuring 4.3 cm on the RIGHT and 2.8 cm on the LEFT.     Problem/Recommendation - 2:  ·  Problem: Acute osteomyelitis of lumbar spine.   ·  Recommendation: L1 &L2 vertebrae .  On IV Abxs per ID .  Will defer management to Neurosurgery.     Problem/Recommendation - 3:  ·  Problem: Low back pain radiating to right lower extremity.   ·  Recommendation: Pain control.     Problem/Recommendation - 4:  ·  Problem: . BILATERAL psoas Abscess  ·  Recommendation: ON IV Abxs per ID .  IR placed drains after draining  . IR to check drains .     Problem/Recommendation - 5:  ·  Problem: HTN (hypertension).   ·  Recommendation: Takes Amlodipine and ARB so continue with hold parameters.  < from: TTE W or WO Ultrasound Enhancing Agent (04.24.25 @ 10:10) >  CONCLUSIONS:      1. Left ventricular cavity is normal in size. Left ventricular wall thickness is normal. Left ventricular systolic function is normal with an ejection fraction of 64 % by Carpenter's method of disks. There are no regional wall motion abnormalities seen.   2. Normal right ventricular cavity size and normal right ventricular systolic function. Tricuspid annular plane systolic excursion (TAPSE) is 2.2 cm (normal >=1.7 cm).   3. Structurally normal mitral valve with normal leaflet excursion. There is calcification of the mitral valve annulus. There is tracemitral regurgitation.   4. The aortic valve appears trileaflet with normal systolic excursion. There is calcification of the aortic valve leaflets. There is mild aortic regurgitation.     Problem/Recommendation - 6:  ·  Problem: HLD (hyperlipidemia).   ·  Recommendation: Continue Atorvastatin.     Problem/Recommendation - 7:  ·  Problem: DM (diabetes mellitus).   ·  Recommendation: ON Farxiga and holding.  SSI and Lantus in patient .     Problem/Recommendation - 8:  ·  Problem:  Ileus /Pseudoobustraction.   ·  Recommendation: Had BMs and not passing flatus .  Correcting electrolytes.     ON Laxatives and Simethacone .  Advanced diet .   Surgery & GI input appreciated .  D/W GI attending and will wait till tomorrow before rectal tube placement  .  < from: CT Abdomen and Pelvis w/ Oral Cont and w/ IV Cont (04.29.25 @ 17:16) >  IMPRESSION:  Diffuse colonic dilatation, worse on today's study, likely secondary to   pseudoobstruction or ileus.    No free air.    Reduction in the bilateral psoas collections with catheters in place.    < from: Xray Abdomen 1 View PORTABLE -Urgent (Xray Abdomen 1 View PORTABLE -Urgent .) (04.29.25 @ 10:09) >  IMPRESSION:  Multiple dilated loops of small and large bowel with question of   pneumoperitoneum. Recommend upright chest radiograph or decubitus   abdominal radiograph for further evaluation.     Problem/Recommendation - 9:  ·  Problem: JUSTINO with Hyponatremia .   ·  Recommendation: Trending BMP.  Resolved.  Renal help appreciated.      Problem/Recommendation - 10:  ·  Problem: Hypokalemia & Hypophosphatemia .   ·  Recommendation: Ordered replacement and rpting BMP at 6 PM.   Rpt BMP noted.    Keep K level close to 4 .     Problem/Recommendation - 11:  ·  Problem: Thrombocytosis .   ·  Recommendation: Reactionary secondary to infection.  Platelets Trending down .  If continues to trend up recommend  hematology consult .     Problem/Recommendation - 12:  ·  Problem: Pedal edema >Right LE.   ·  Recommendation: No calf tenderness +,Ambulating.    D/W patient and daughter  in detail .       PT working with patient and ambulating .

## 2025-05-08 NOTE — PROGRESS NOTE ADULT - SUBJECTIVE AND OBJECTIVE BOX
Chief Complaint:  Patient is a 79y old  Male who presents with a chief complaint of L1-L2 discitis/OM with epidural abscess and BL psoas abscesses (08 May 2025 10:31)      Interval Events:   -abdomen is softer, though still distended  -having BM     Allergies:  No Known Allergies      Hospital Medications:  acetaminophen     Tablet .. 975 milliGRAM(s) Oral every 6 hours  atorvastatin 40 milliGRAM(s) Oral at bedtime  bacitracin   Ointment 1 Application(s) Topical three times a day  dibucaine 1% Ointment 1 Application(s) Topical four times a day PRN  ertapenem  IVPB 1000 milliGRAM(s) IV Intermittent every 24 hours  ertapenem  IVPB      gabapentin 100 milliGRAM(s) Oral every 8 hours  glucagon  Injectable 1 milliGRAM(s) IntraMuscular once  insulin lispro (ADMELOG) corrective regimen sliding scale   SubCutaneous three times a day before meals  insulin lispro (ADMELOG) corrective regimen sliding scale   SubCutaneous at bedtime  magnesium hydroxide Suspension 30 milliLiter(s) Oral every 12 hours PRN  methocarbamol 500 milliGRAM(s) Oral every 12 hours  multivitamin 1 Tablet(s) Oral daily  omega-3-Acid Ethyl Esters 2 Gram(s) Oral two times a day  pantoprazole    Tablet 40 milliGRAM(s) Oral before breakfast  polyethylene glycol 3350 17 Gram(s) Oral at bedtime  potassium chloride    Tablet ER 20 milliEquivalent(s) Oral every 2 hours  simethicone 80 milliGRAM(s) Chew two times a day        PHYSICAL EXAM:   Vital Signs:  Vital Signs Last 24 Hrs  T(C): 36.8 (08 May 2025 12:39), Max: 36.9 (08 May 2025 09:39)  T(F): 98.3 (08 May 2025 12:39), Max: 98.4 (08 May 2025 09:39)  HR: 100 (08 May 2025 12:39) (90 - 100)  BP: 133/69 (08 May 2025 12:39) (124/59 - 149/71)  BP(mean): --  RR: 16 (08 May 2025 12:39) (16 - 18)  SpO2: 100% (08 May 2025 12:39) (100% - 100%)    Parameters below as of 08 May 2025 12:39  Patient On (Oxygen Delivery Method): room air      Daily       GENERAL:  No acute distress  HEENT:  NCAT, no scleral icterus  CHEST: no resp distress  HEART:  RRR  ABDOMEN:  Soft, non-tender, +distended  EXTREMITIES:  No cyanosis, clubbing, or edema  SKIN:  No rash/erythema/ecchymoses/petechiae/wounds/abscess/warm/dry  NEURO:  Alert and oriented x 3, no asterixis, no tremor    LABS:                        8.7    8.77  )-----------( 826      ( 08 May 2025 05:50 )             25.9     Mean Cell Volume: 90.6 fL (05-08-25 @ 05:50)    05-08    137  |  109[H]  |  8   ----------------------------<  91  3.1[L]   |  17[L]  |  0.70    Ca    8.4      08 May 2025 05:50  Phos  2.4     05-08  Mg     1.90     05-08          Urinalysis Basic - ( 08 May 2025 05:50 )    Color: x / Appearance: x / SG: x / pH: x  Gluc: 91 mg/dL / Ketone: x  / Bili: x / Urobili: x   Blood: x / Protein: x / Nitrite: x   Leuk Esterase: x / RBC: x / WBC x   Sq Epi: x / Non Sq Epi: x / Bacteria: x            Imaging:

## 2025-05-08 NOTE — PROGRESS NOTE ADULT - PROBLEM SELECTOR PROBLEM 1
Acute osteomyelitis of lumbar spine

## 2025-05-08 NOTE — PROGRESS NOTE ADULT - PROBLEM SELECTOR PLAN 1
-    FU Abdominal XR  -    Bowel reg  -    Appreciate ID recs - continue Zosyn  -    DVT ppx: SCDs       -    Physical Therapy  -    Weight bearing status: WBAT [x]        PWB    [ ]     TTWB  [ ]      NWB  [ ]  -    Dispo: ADRIANA Gilman PA-C  Team Pager #[phone]
-    Pain control  -    Appreciate GI - monitor/replete lytes PRN  -    IR Tube Check 5/13  -    Appreciate ID recs: Continue Ertapenem  -    Encourage ambulation  -    DVT ppx: SCDs       -    Physical Therapy  -    Weight bearing status: WBAT [x]        PWB    [ ]     TTWB  [ ]      NWB  [ ]  -    Dispo: ADRIANA Gilman PA-C  Team Pager #[phone]
-    Pain control  -    Appreciate ID recs - continue Zosyn, f/u cultures  -    Appreciate IR - 2 drains per IR  -    2 drains as per PRS, f/u outputs  -    DVT ppx: SCDs       -    Physical Therapy  -    Weight bearing status: WBAT [x]        PWB    [ ]     TTWB  [ ]      NWB  [ ]  -    Dispo: To be determined      Kimberly Gilman PA-C  Team Pager #[phone]

## 2025-05-08 NOTE — PROGRESS NOTE ADULT - SUBJECTIVE AND OBJECTIVE BOX
Date of Service  : 05-08-25     INTERVAL HPI/OVERNIGHT EVENTS: I didn't pass flatus but had BM.  Vital Signs Last 24 Hrs  T(C): 36.9 (08 May 2025 09:39), Max: 36.9 (08 May 2025 09:39)  T(F): 98.4 (08 May 2025 09:39), Max: 98.4 (08 May 2025 09:39)  HR: 92 (08 May 2025 09:39) (90 - 100)  BP: 143/75 (08 May 2025 09:39) (124/59 - 149/71)  BP(mean): --  RR: 18 (08 May 2025 09:39) (18 - 18)  SpO2: 100% (08 May 2025 09:39) (100% - 100%)    Parameters below as of 08 May 2025 09:39  Patient On (Oxygen Delivery Method): room air      I&O's Summary    07 May 2025 07:01  -  08 May 2025 07:00  --------------------------------------------------------  IN: 0 mL / OUT: 657 mL / NET: -657 mL    08 May 2025 07:01  -  08 May 2025 10:31  --------------------------------------------------------  IN: 0 mL / OUT: 400 mL / NET: -400 mL      MEDICATIONS  (STANDING):  acetaminophen     Tablet .. 975 milliGRAM(s) Oral every 6 hours  atorvastatin 40 milliGRAM(s) Oral at bedtime  bacitracin   Ointment 1 Application(s) Topical three times a day  ertapenem  IVPB 1000 milliGRAM(s) IV Intermittent every 24 hours  ertapenem  IVPB      gabapentin 100 milliGRAM(s) Oral every 8 hours  glucagon  Injectable 1 milliGRAM(s) IntraMuscular once  insulin lispro (ADMELOG) corrective regimen sliding scale   SubCutaneous three times a day before meals  insulin lispro (ADMELOG) corrective regimen sliding scale   SubCutaneous at bedtime  methocarbamol 500 milliGRAM(s) Oral every 12 hours  multivitamin 1 Tablet(s) Oral daily  omega-3-Acid Ethyl Esters 2 Gram(s) Oral two times a day  pantoprazole    Tablet 40 milliGRAM(s) Oral before breakfast  polyethylene glycol 3350 17 Gram(s) Oral at bedtime  simethicone 80 milliGRAM(s) Chew two times a day    MEDICATIONS  (PRN):  dibucaine 1% Ointment 1 Application(s) Topical four times a day PRN for anal discomfort d/t hemorrhoids  magnesium hydroxide Suspension 30 milliLiter(s) Oral every 12 hours PRN Constipation    LABS:                        8.7    8.77  )-----------( 826      ( 08 May 2025 05:50 )             25.9     05-08    137  |  109[H]  |  8   ----------------------------<  91  3.1[L]   |  17[L]  |  0.70    Ca    8.4      08 May 2025 05:50  Phos  2.4     05-08  Mg     1.90     05-08        Urinalysis Basic - ( 08 May 2025 05:50 )    Color: x / Appearance: x / SG: x / pH: x  Gluc: 91 mg/dL / Ketone: x  / Bili: x / Urobili: x   Blood: x / Protein: x / Nitrite: x   Leuk Esterase: x / RBC: x / WBC x   Sq Epi: x / Non Sq Epi: x / Bacteria: x      CAPILLARY BLOOD GLUCOSE      POCT Blood Glucose.: 98 mg/dL (08 May 2025 07:23)  POCT Blood Glucose.: 162 mg/dL (07 May 2025 21:38)  POCT Blood Glucose.: 139 mg/dL (07 May 2025 16:20)  POCT Blood Glucose.: 146 mg/dL (07 May 2025 11:07)        Urinalysis Basic - ( 08 May 2025 05:50 )    Color: x / Appearance: x / SG: x / pH: x  Gluc: 91 mg/dL / Ketone: x  / Bili: x / Urobili: x   Blood: x / Protein: x / Nitrite: x   Leuk Esterase: x / RBC: x / WBC x   Sq Epi: x / Non Sq Epi: x / Bacteria: x      REVIEW OF SYSTEMS:  CONSTITUTIONAL: No fever, weight loss, or fatigue  EYES: No eye pain, visual disturbances, or discharge  ENMT:  No difficulty hearing, tinnitus, vertigo; No sinus or throat pain  NECK: No pain or stiffness  RESPIRATORY: No cough, wheezing, chills or hemoptysis; No shortness of breath  CARDIOVASCULAR: No chest pain, palpitations, dizziness, or leg swelling  GASTROINTESTINAL: No abdominal or epigastric pain. No nausea, vomiting, or hematemesis; No diarrhea or constipation. No melena or hematochezia.  GENITOURINARY: No dysuria, frequency, hematuria, or incontinence  NEUROLOGICAL: No headaches, memory loss, loss of strength, numbness, or tremors    RADIOLOGY & ADDITIONAL TESTS:    Consultant(s) Notes Reviewed:  [x ] YES  [ ] NO    PHYSICAL EXAM:  GENERAL: NAD, well-groomed, well-developed,not in any distress ,  HEAD:  Atraumatic, Normocephalic  EYES: EOMI, PERRLA, conjunctiva and sclera clear  ENMT: No tonsillar erythema, exudates, or enlargement; Moist mucous membranes, Good dentition, No lesions  NECK: Supple, No JVD, Normal thyroid  NERVOUS SYSTEM:  Alert & Oriented X3, No focal deficit   CHEST/LUNG: Good air entry bilateral with no  rales, rhonchi, wheezing, or rubs  HEART: Regular rate and rhythm; No murmurs, rubs, or gallops  ABDOMEN: Soft, Nontender, + distended; Bowel sounds present  EXTREMITIES:  2+ Peripheral Pulses, No clubbing, cyanosis, or edema    Care Discussed with Consultants/Other Providers [ x] YES  [ ] NO

## 2025-05-08 NOTE — PROGRESS NOTE ADULT - SUBJECTIVE AND OBJECTIVE BOX
NEPHROLOGY     Patient seen and examined resting on room air, reports feeling ok, less abdominal discomfort, no sob, in no acute distress.     MEDICATIONS  (STANDING):  acetaminophen     Tablet .. 975 milliGRAM(s) Oral every 6 hours  atorvastatin 40 milliGRAM(s) Oral at bedtime  bacitracin   Ointment 1 Application(s) Topical three times a day  ertapenem  IVPB 1000 milliGRAM(s) IV Intermittent every 24 hours  ertapenem  IVPB      gabapentin 100 milliGRAM(s) Oral every 8 hours  glucagon  Injectable 1 milliGRAM(s) IntraMuscular once  insulin lispro (ADMELOG) corrective regimen sliding scale   SubCutaneous three times a day before meals  insulin lispro (ADMELOG) corrective regimen sliding scale   SubCutaneous at bedtime  methocarbamol 500 milliGRAM(s) Oral every 12 hours  multivitamin 1 Tablet(s) Oral daily  omega-3-Acid Ethyl Esters 2 Gram(s) Oral two times a day  pantoprazole    Tablet 40 milliGRAM(s) Oral before breakfast  polyethylene glycol 3350 17 Gram(s) Oral at bedtime  simethicone 80 milliGRAM(s) Chew two times a day    VITALS:  T(C): , Max: 36.9 (05-08-25 @ 09:39)  T(F): , Max: 98.4 (05-08-25 @ 09:39)  HR: 92 (05-08-25 @ 09:39)  BP: 143/75 (05-08-25 @ 09:39)  RR: 18 (05-08-25 @ 09:39)  SpO2: 100% (05-08-25 @ 09:39)    I and O's:    05-07 @ 07:01  -  05-08 @ 07:00  --------------------------------------------------------  IN: 0 mL / OUT: 657 mL / NET: -657 mL    PHYSICAL EXAM:  Constitutional: NAD  HEENT: EOMI  Neck:  No JVD, supple   Respiratory: CTA B/L  Cardiovascular: S1 and S2, RRR  Gastrointestinal: distended  Extremities: tr peripheral edema, + peripheral pulses  Neurological: A/O x 3, CN2-12 intact  Psychiatric: Normal mood, normal affect  : No Mac  Skin: No rashes, C/D/I    LABS:                        8.7    8.77  )-----------( 826      ( 08 May 2025 05:50 )             25.9     05-08    137  |  109[H]  |  8   ----------------------------<  91  3.1[L]   |  17[L]  |  0.70    Ca    8.4      08 May 2025 05:50  Phos  2.4     05-08  Mg     1.90     05-08    RADIOLOGY & ADDITIONAL STUDIES:    < from: US Duplex Venous Lower Ext Complete, Bilateral (05.07.25 @ 10:15) >    IMPRESSION:  No evidence of deep venous thrombosis in either lower extremity.            --- End of Report ---            JILLIAN YORK MD; Attending Radiologist  This document has been electronically signed. May  7 2025 10:26AM    < end of copied text >

## 2025-05-09 LAB
ANION GAP SERPL CALC-SCNC: 12 MMOL/L — SIGNIFICANT CHANGE UP (ref 7–14)
BASOPHILS # BLD AUTO: 0.05 K/UL — SIGNIFICANT CHANGE UP (ref 0–0.2)
BASOPHILS NFR BLD AUTO: 0.6 % — SIGNIFICANT CHANGE UP (ref 0–2)
BUN SERPL-MCNC: 6 MG/DL — LOW (ref 7–23)
CALCIUM SERPL-MCNC: 8.3 MG/DL — LOW (ref 8.4–10.5)
CHLORIDE SERPL-SCNC: 111 MMOL/L — HIGH (ref 98–107)
CO2 SERPL-SCNC: 15 MMOL/L — LOW (ref 22–31)
CREAT SERPL-MCNC: 0.69 MG/DL — SIGNIFICANT CHANGE UP (ref 0.5–1.3)
EGFR: 94 ML/MIN/1.73M2 — SIGNIFICANT CHANGE UP
EGFR: 94 ML/MIN/1.73M2 — SIGNIFICANT CHANGE UP
EOSINOPHIL # BLD AUTO: 0.19 K/UL — SIGNIFICANT CHANGE UP (ref 0–0.5)
EOSINOPHIL NFR BLD AUTO: 2.3 % — SIGNIFICANT CHANGE UP (ref 0–6)
GLUCOSE BLDC GLUCOMTR-MCNC: 122 MG/DL — HIGH (ref 70–99)
GLUCOSE BLDC GLUCOMTR-MCNC: 143 MG/DL — HIGH (ref 70–99)
GLUCOSE BLDC GLUCOMTR-MCNC: 144 MG/DL — HIGH (ref 70–99)
GLUCOSE BLDC GLUCOMTR-MCNC: 86 MG/DL — SIGNIFICANT CHANGE UP (ref 70–99)
GLUCOSE SERPL-MCNC: 70 MG/DL — SIGNIFICANT CHANGE UP (ref 70–99)
HCT VFR BLD CALC: 26.8 % — LOW (ref 39–50)
HGB BLD-MCNC: 8.9 G/DL — LOW (ref 13–17)
IANC: 4.12 K/UL — SIGNIFICANT CHANGE UP (ref 1.8–7.4)
IMM GRANULOCYTES NFR BLD AUTO: 1.4 % — HIGH (ref 0–0.9)
LYMPHOCYTES # BLD AUTO: 3.46 K/UL — HIGH (ref 1–3.3)
LYMPHOCYTES # BLD AUTO: 41.1 % — SIGNIFICANT CHANGE UP (ref 13–44)
MAGNESIUM SERPL-MCNC: 2 MG/DL — SIGNIFICANT CHANGE UP (ref 1.6–2.6)
MCHC RBC-ENTMCNC: 30.5 PG — SIGNIFICANT CHANGE UP (ref 27–34)
MCHC RBC-ENTMCNC: 33.2 G/DL — SIGNIFICANT CHANGE UP (ref 32–36)
MCV RBC AUTO: 91.8 FL — SIGNIFICANT CHANGE UP (ref 80–100)
MONOCYTES # BLD AUTO: 0.48 K/UL — SIGNIFICANT CHANGE UP (ref 0–0.9)
MONOCYTES NFR BLD AUTO: 5.7 % — SIGNIFICANT CHANGE UP (ref 2–14)
NEUTROPHILS # BLD AUTO: 4.12 K/UL — SIGNIFICANT CHANGE UP (ref 1.8–7.4)
NEUTROPHILS NFR BLD AUTO: 48.9 % — SIGNIFICANT CHANGE UP (ref 43–77)
NRBC # BLD AUTO: 0 K/UL — SIGNIFICANT CHANGE UP (ref 0–0)
NRBC # FLD: 0 K/UL — SIGNIFICANT CHANGE UP (ref 0–0)
NRBC BLD AUTO-RTO: 0 /100 WBCS — SIGNIFICANT CHANGE UP (ref 0–0)
PHOSPHATE SERPL-MCNC: 3 MG/DL — SIGNIFICANT CHANGE UP (ref 2.5–4.5)
PLATELET # BLD AUTO: 773 K/UL — HIGH (ref 150–400)
POTASSIUM SERPL-MCNC: 3.5 MMOL/L — SIGNIFICANT CHANGE UP (ref 3.5–5.3)
POTASSIUM SERPL-SCNC: 3.5 MMOL/L — SIGNIFICANT CHANGE UP (ref 3.5–5.3)
RBC # BLD: 2.92 M/UL — LOW (ref 4.2–5.8)
RBC # FLD: 20.9 % — HIGH (ref 10.3–14.5)
SODIUM SERPL-SCNC: 138 MMOL/L — SIGNIFICANT CHANGE UP (ref 135–145)
WBC # BLD: 8.42 K/UL — SIGNIFICANT CHANGE UP (ref 3.8–10.5)
WBC # FLD AUTO: 8.42 K/UL — SIGNIFICANT CHANGE UP (ref 3.8–10.5)

## 2025-05-09 PROCEDURE — 99232 SBSQ HOSP IP/OBS MODERATE 35: CPT | Mod: GC

## 2025-05-09 RX ORDER — NYSTATIN 100000 [USP'U]/G
1 CREAM TOPICAL ONCE
Refills: 0 | Status: COMPLETED | OUTPATIENT
Start: 2025-05-09 | End: 2025-05-09

## 2025-05-09 RX ADMIN — GABAPENTIN 100 MILLIGRAM(S): 400 CAPSULE ORAL at 06:16

## 2025-05-09 RX ADMIN — ERTAPENEM SODIUM 100 MILLIGRAM(S): 1 INJECTION, POWDER, LYOPHILIZED, FOR SOLUTION INTRAMUSCULAR; INTRAVENOUS at 13:46

## 2025-05-09 RX ADMIN — Medication 975 MILLIGRAM(S): at 07:20

## 2025-05-09 RX ADMIN — HEPARIN SODIUM 5000 UNIT(S): 1000 INJECTION INTRAVENOUS; SUBCUTANEOUS at 06:17

## 2025-05-09 RX ADMIN — Medication 975 MILLIGRAM(S): at 00:25

## 2025-05-09 RX ADMIN — HEPARIN SODIUM 5000 UNIT(S): 1000 INJECTION INTRAVENOUS; SUBCUTANEOUS at 22:42

## 2025-05-09 RX ADMIN — GABAPENTIN 100 MILLIGRAM(S): 400 CAPSULE ORAL at 13:45

## 2025-05-09 RX ADMIN — Medication 975 MILLIGRAM(S): at 17:08

## 2025-05-09 RX ADMIN — OMEGA-3-ACID ETHYL ESTERS CAPSULES 2 GRAM(S): 1 CAPSULE, LIQUID FILLED ORAL at 22:41

## 2025-05-09 RX ADMIN — Medication 975 MILLIGRAM(S): at 13:42

## 2025-05-09 RX ADMIN — DIBUCAINE 1 APPLICATION(S): 10 OINTMENT TOPICAL at 14:58

## 2025-05-09 RX ADMIN — Medication 975 MILLIGRAM(S): at 06:17

## 2025-05-09 RX ADMIN — METHOCARBAMOL 500 MILLIGRAM(S): 500 TABLET, FILM COATED ORAL at 10:26

## 2025-05-09 RX ADMIN — OMEGA-3-ACID ETHYL ESTERS CAPSULES 2 GRAM(S): 1 CAPSULE, LIQUID FILLED ORAL at 00:25

## 2025-05-09 RX ADMIN — Medication 975 MILLIGRAM(S): at 12:00

## 2025-05-09 RX ADMIN — Medication 80 MILLIGRAM(S): at 06:16

## 2025-05-09 RX ADMIN — Medication 1 APPLICATION(S): at 13:46

## 2025-05-09 RX ADMIN — Medication 1 TABLET(S): at 12:00

## 2025-05-09 RX ADMIN — METHOCARBAMOL 500 MILLIGRAM(S): 500 TABLET, FILM COATED ORAL at 22:41

## 2025-05-09 RX ADMIN — OMEGA-3-ACID ETHYL ESTERS CAPSULES 2 GRAM(S): 1 CAPSULE, LIQUID FILLED ORAL at 12:00

## 2025-05-09 RX ADMIN — Medication 40 MILLIEQUIVALENT(S): at 16:23

## 2025-05-09 RX ADMIN — GABAPENTIN 100 MILLIGRAM(S): 400 CAPSULE ORAL at 22:41

## 2025-05-09 RX ADMIN — Medication 40 MILLIGRAM(S): at 06:16

## 2025-05-09 RX ADMIN — Medication 975 MILLIGRAM(S): at 17:09

## 2025-05-09 RX ADMIN — HEPARIN SODIUM 5000 UNIT(S): 1000 INJECTION INTRAVENOUS; SUBCUTANEOUS at 13:46

## 2025-05-09 RX ADMIN — Medication 80 MILLIGRAM(S): at 17:08

## 2025-05-09 RX ADMIN — Medication 975 MILLIGRAM(S): at 01:30

## 2025-05-09 RX ADMIN — POLYETHYLENE GLYCOL 3350 17 GRAM(S): 17 POWDER, FOR SOLUTION ORAL at 22:42

## 2025-05-09 RX ADMIN — NYSTATIN 1 APPLICATION(S): 100000 CREAM TOPICAL at 12:51

## 2025-05-09 NOTE — PROGRESS NOTE ADULT - SUBJECTIVE AND OBJECTIVE BOX
Chief Complaint:  Patient is a 79y old  Male who presents with a chief complaint of L1-L2 discitis/OM with epidural abscess and BL psoas abscesses (09 May 2025 08:45)      Interval Events:   -endorsing abdominal distension continues to improve    Allergies:  No Known Allergies      Hospital Medications:  acetaminophen     Tablet .. 975 milliGRAM(s) Oral every 6 hours  atorvastatin 40 milliGRAM(s) Oral at bedtime  bacitracin   Ointment 1 Application(s) Topical three times a day  dibucaine 1% Ointment 1 Application(s) Topical four times a day PRN  ertapenem  IVPB      ertapenem  IVPB 1000 milliGRAM(s) IV Intermittent every 24 hours  gabapentin 100 milliGRAM(s) Oral every 8 hours  glucagon  Injectable 1 milliGRAM(s) IntraMuscular once  heparin   Injectable 5000 Unit(s) SubCutaneous every 8 hours  insulin lispro (ADMELOG) corrective regimen sliding scale   SubCutaneous three times a day before meals  insulin lispro (ADMELOG) corrective regimen sliding scale   SubCutaneous at bedtime  magnesium hydroxide Suspension 30 milliLiter(s) Oral every 12 hours PRN  methocarbamol 500 milliGRAM(s) Oral every 12 hours  multivitamin 1 Tablet(s) Oral daily  omega-3-Acid Ethyl Esters 2 Gram(s) Oral two times a day  pantoprazole    Tablet 40 milliGRAM(s) Oral before breakfast  polyethylene glycol 3350 17 Gram(s) Oral at bedtime  simethicone 80 milliGRAM(s) Chew two times a day        PHYSICAL EXAM:   Vital Signs:  Vital Signs Last 24 Hrs  T(C): 37.1 (09 May 2025 10:04), Max: 37.3 (08 May 2025 18:13)  T(F): 98.8 (09 May 2025 10:04), Max: 99.1 (08 May 2025 18:13)  HR: 91 (09 May 2025 10:04) (91 - 108)  BP: 125/62 (09 May 2025 10:04) (117/63 - 142/64)  BP(mean): --  RR: 17 (09 May 2025 10:04) (16 - 17)  SpO2: 97% (09 May 2025 10:04) (97% - 100%)    Parameters below as of 09 May 2025 10:04  Patient On (Oxygen Delivery Method): room air      Daily         GENERAL:  No acute distress  HEENT:  NCAT, no scleral icterus  CHEST: no resp distress  HEART:  RRR  ABDOMEN:  Soft, distended (though improving)  EXTREMITIES:  No cyanosis, clubbing, or edema  SKIN:  No rash/erythema/ecchymoses/petechiae/wounds/abscess/warm/dry  NEURO:  Alert and oriented x 3, no asterixis, no tremor    LABS:                        8.9    8.42  )-----------( 773      ( 09 May 2025 05:22 )             26.8     Mean Cell Volume: 91.8 fL (05-09-25 @ 05:22)    05-09    138  |  111[H]  |  6[L]  ----------------------------<  70  3.5   |  15[L]  |  0.69    Ca    8.3[L]      09 May 2025 05:22  Phos  3.0     05-09  Mg     2.00     05-09          Urinalysis Basic - ( 09 May 2025 05:22 )    Color: x / Appearance: x / SG: x / pH: x  Gluc: 70 mg/dL / Ketone: x  / Bili: x / Urobili: x   Blood: x / Protein: x / Nitrite: x   Leuk Esterase: x / RBC: x / WBC x   Sq Epi: x / Non Sq Epi: x / Bacteria: x

## 2025-05-09 NOTE — PROGRESS NOTE ADULT - SUBJECTIVE AND OBJECTIVE BOX
DATE OF SERVICE: 05-09-25    Patient denies chest pain or shortness of breath.   Review of symptoms otherwise negative.    MEDICATIONS:  acetaminophen     Tablet .. 975 milliGRAM(s) Oral every 6 hours  atorvastatin 40 milliGRAM(s) Oral at bedtime  bacitracin   Ointment 1 Application(s) Topical three times a day  dibucaine 1% Ointment 1 Application(s) Topical four times a day PRN  ertapenem  IVPB      ertapenem  IVPB 1000 milliGRAM(s) IV Intermittent every 24 hours  gabapentin 100 milliGRAM(s) Oral every 8 hours  glucagon  Injectable 1 milliGRAM(s) IntraMuscular once  heparin   Injectable 5000 Unit(s) SubCutaneous every 8 hours  insulin lispro (ADMELOG) corrective regimen sliding scale   SubCutaneous three times a day before meals  insulin lispro (ADMELOG) corrective regimen sliding scale   SubCutaneous at bedtime  magnesium hydroxide Suspension 30 milliLiter(s) Oral every 12 hours PRN  methocarbamol 500 milliGRAM(s) Oral every 12 hours  multivitamin 1 Tablet(s) Oral daily  omega-3-Acid Ethyl Esters 2 Gram(s) Oral two times a day  pantoprazole    Tablet 40 milliGRAM(s) Oral before breakfast  polyethylene glycol 3350 17 Gram(s) Oral at bedtime  simethicone 80 milliGRAM(s) Chew two times a day      LABS:                        8.9    8.42  )-----------( 773      ( 09 May 2025 05:22 )             26.8       Hemoglobin: 8.9 g/dL (05-09 @ 05:22)  Hemoglobin: 8.7 g/dL (05-08 @ 05:50)  Hemoglobin: 8.8 g/dL (05-07 @ 06:23)  Hemoglobin: 8.7 g/dL (05-06 @ 02:08)  Hemoglobin: 8.6 g/dL (05-05 @ 05:33)      05-09    138  |  111[H]  |  6[L]  ----------------------------<  70  3.5   |  15[L]  |  0.69    Ca    8.3[L]      09 May 2025 05:22  Phos  3.0     05-09  Mg     2.00     05-09      Creatinine Trend: 0.69<--, 0.75<--, 0.70<--, 0.70<--, 0.77<--, 0.82<--    COAGS:           PHYSICAL EXAM:  T(C): 37.1 (05-09-25 @ 10:04), Max: 37.3 (05-08-25 @ 18:13)  HR: 91 (05-09-25 @ 10:04) (91 - 108)  BP: 125/62 (05-09-25 @ 10:04) (117/63 - 142/64)  RR: 17 (05-09-25 @ 10:04) (16 - 17)  SpO2: 97% (05-09-25 @ 10:04) (97% - 100%)  Wt(kg): --    I&O's Summary    08 May 2025 07:01  -  09 May 2025 07:00  --------------------------------------------------------  IN: 0 mL / OUT: 1657.5 mL / NET: -1657.5 mL    09 May 2025 07:01  -  09 May 2025 13:45  --------------------------------------------------------  IN: 0 mL / OUT: 305 mL / NET: -305 mL          Gen: NAD  HEENT:  (-)icterus (-)pallor  CV: N S1 S2 1/6 DARYA (+)2 Pulses B/l  Resp:  Clear to auscultation B/L, normal effort  GI: distended  Lymph:  (-)Edema, (-)obvious lymphadenopathy  Skin: Warm to touch, Normal turgor  Psych: Appropriate mood and affect      TELEMETRY: 	  NSR    ECG:  	NSR    < from: Xray Abdomen 1 View PORTABLE -Urgent (Xray Abdomen 1 View PORTABLE -Urgent .) (04.29.25 @ 10:09) >  IMPRESSION:  Multiple dilated loops of small and large bowel with question of   pneumoperitoneum. Recommend upright chest radiograph or decubitus   abdominal radiograph for further evaluation.    < end of copied text >    < from: CT Abdomen and Pelvis w/ Oral Cont and w/ IV Cont (04.29.25 @ 17:16) >  IMPRESSION:      Diffuse colonic dilatation, worse on today's study, likely secondary to   pseudoobstruction or ileus.    No free air.    Reduction in the bilateral psoas collections with catheters in place.    PRELIMINARY IMPRESSION: Study limited secondary to extensive streak   artifact from patient's spinal hardware. Diffusely dilated colon to the   level of the rectum, increased since 4/25/2025, without evidence of   mechanical obstruction. Possible etiologies include pseudoobstruction   versus ileus. No small bowel obstruction. No pneumoperitoneum.   Percutaneous posterior approach drainage catheters within the psoas   muscles bilaterally with interval decrease of previously seen abscess.   Redemonstrated erosive endplate changes at L1-L2 compatible with discitis   osteomyelitis.    Follow-up final report in the a.m.    < end of copied text >      ASSESSMENT/PLAN: 	Pt is a  79 year old male w/ HTN, HLD, T2DM, chronic low back pain presents as a transfer from [facility] ED for surgery. . Pt was sent into ED yesterday by his pain medicine physician due to recent MRI findings concerning for L1-L2 discitis/OM with epidural abscess, bilateral psoas abscesses. Patient initially saw pain specialist in February for chronic back pain with radiations to bilateral lateral thighs (R>L). MRI at that time showed degenerative changes. He subsequently had epidural injections x2 (2/27, 3/11) with moderate pain relief. Pain began to worsen on 4/10. He had radiofrequency ablation performed on 4/11 and has since has severe worsening of pain and radiation to R lateral thigh. He reports recent bowel/bladder "incontinence" requiring diaper due to his inability to get to the bathroom in time due to pain limitations but states urge and sensation are intact. Denies fevers, chills, night sweats, weakness, numbness/tingling, saddle anesthesia, recent falls/trauma. He uses a cane for ambulation. Denies recent falls/trauma or any other ortho injuries.   Found to have epidural abscess now s/p Decompression, spine, lumbar, posterior approach, with fusion of posterior spinal column, planned for psoas abscess drainage.    - s/p Decompression, spine, lumbar, posterior approach, with fusion of posterior spinal column  - s/p psoas drainage   - pain control/PT/bowel regimen  - c/w statin  - BP/HR stable  - for drain study on 05/13    Janet Roy MD  Pager: [phone]  Office: [phone]

## 2025-05-09 NOTE — PROGRESS NOTE ADULT - ASSESSMENT
IMPRESSION:  79yMale w/ HTN, HLD, T2DM, chronic low back pain presents as a transfer from [facility] ED for surgery today. Pt was sent into ED yesterday by his pain medicine physician due to recent MRI findings concerning for L1-L2 discitis/OM with epidural abscess, bilateral psoas abscesses    (1)Renal - CKD2, early diabetic nephropathy? Follows as outpatient with Dr. Syed Euceda  (2)Hyponatremia - urine studies c/w low effective arterial volume (appropriately increased ADH) -improved/stable s/p isotonic IVF  (3)Hypokalemia -whole body deficit - improving s/p repletion   (4)GI - ileus slowly improving; exacerbated by hypokalemia  (5)Hypophosphatemia - improved s/p repletion     RECOMMEND:  (1)KCl 20meq po x 1   (2)Continue Glucerna TID  (3)BMP, Mg, Phos daily     Mare Cameron DNP  [facility]  [phone]       IMPRESSION:  79yMale w/ HTN, HLD, T2DM, chronic low back pain presents as a transfer from [facility] ED for surgery today. Pt was sent into ED yesterday by his pain medicine physician due to recent MRI findings concerning for L1-L2 discitis/OM with epidural abscess, bilateral psoas abscesses    (1)Renal - CKD2, early diabetic nephropathy? Follows as outpatient with Dr. Syed Euceda  (2)Hyponatremia - urine studies c/w low effective arterial volume (appropriately increased ADH) -improved/stable s/p isotonic IVF  (3)Hypokalemia -whole body deficit - improving s/p repletion   (4)GI - ileus slowly improving; exacerbated by hypokalemia  (5)Hypophosphatemia - improved s/p repletion     RECOMMEND:  (1)KCl 40meq po x 1   (2)Continue Glucerna TID  (3)BMP, Mg, Phos daily     Mare Cameron DNP  [facility]  [phone]      RENAL ATTENDING NOTE  Patient seen and examined with NP. Would give KCL 40meq po x 1 now to prevent further drop in K+    Yamil Humphreys MD  [facility]  [phone]

## 2025-05-09 NOTE — PROGRESS NOTE ADULT - ASSESSMENT
79yMale w/ HTN, HLD, T2DM, chronic low back pain presents as a transfer from [facility] ED for surgery today. Pt was sent into ED yesterday by his pain medicine physician due to recent MRI findings concerning for L1-L2 discitis/OM with epidural abscess, bilateral psoas abscesses. Patient initially saw pain specialist in February for chronic back pain with radiations to bilateral lateral thighs (R>L). MRI at that time showed degenerative changes. He subsequently had epidural injections x2 (2/27, 3/11) with moderate pain relief. Pain began to worsen on 4/10. He had radiofrequency ablation performed on 4/11 and has since has severe worsening of pain and radiation to R lateral thigh. He reports recent bowel/bladder "incontinence" requiring diaper due to his inability to get to the bathroom in time due to pain limitations but states urge and sensation are intact. Denies fevers, chills, night sweats, weakness, numbness/tingling, saddle anesthesia, recent falls/trauma. He uses a cane for ambulation. Denies recent falls/trauma or any other ortho injuries. Before back pain was very active walked half an hour , going up and down stairs and doing grocery etc with no Chest pain or SOB.        Problem/Recommendation - 1:  ·  Problem: Epidural abscess.   ·  Recommendation: S/P Decompression, spine, lumbar, posterior approach, with fusion of posterior spinal column.  On IV Abxs Ertapenem now  per ID x 6 weeks  .  Will defer management to  Neurosurgery.  DVT prophylaxis per Neurosurgery .  < from: MR Lumbar Spine w/ IV Cont (04.23.25 @ 15:13) >  IMPRESSION:        1.   Cervical spine:   Moderate degenerative disc disease and   spondylosis at C4-5 through C6/7 with loss of disc height and associated   degenerative endplate changes.        2.   Thoracic spine:   Minimal enhancement within T7-8 which may   reflect early discitis.        3.   Lumbar spine:   Osteomyelitis and discitis at L1-2 with erosions   of the inferior L1 vertebral body and L2 vertebral body consistent with   osteomyelitis and discitis. Ventral epidural abscess is noted extending   from the L1-2 level superiorly to the T10 level with mass effect on the   ventral thecal sac, most prominently at the L1-2 level resulting in   marked compression. Multiple abscesses within the BILATERAL psoas   muscles, the largest measuring 4.3 cm on the RIGHT and 2.8 cm on the LEFT.     Problem/Recommendation - 2:  ·  Problem: Acute osteomyelitis of lumbar spine.   ·  Recommendation: L1 &L2 vertebrae .  On IV Abxs per ID .  Will defer management to Neurosurgery.     Problem/Recommendation - 3:  ·  Problem: Low back pain radiating to right lower extremity.   ·  Recommendation: Pain control.     Problem/Recommendation - 4:  ·  Problem: . BILATERAL psoas Abscess  ·  Recommendation: ON IV Abxs per ID .  IR placed drains after draining  . IR to check drains .     Problem/Recommendation - 5:  ·  Problem: HTN (hypertension).   ·  Recommendation: Takes Amlodipine and ARB so continue with hold parameters.  < from: TTE W or WO Ultrasound Enhancing Agent (04.24.25 @ 10:10) >  CONCLUSIONS:      1. Left ventricular cavity is normal in size. Left ventricular wall thickness is normal. Left ventricular systolic function is normal with an ejection fraction of 64 % by Carpenter's method of disks. There are no regional wall motion abnormalities seen.   2. Normal right ventricular cavity size and normal right ventricular systolic function. Tricuspid annular plane systolic excursion (TAPSE) is 2.2 cm (normal >=1.7 cm).   3. Structurally normal mitral valve with normal leaflet excursion. There is calcification of the mitral valve annulus. There is tracemitral regurgitation.   4. The aortic valve appears trileaflet with normal systolic excursion. There is calcification of the aortic valve leaflets. There is mild aortic regurgitation.     Problem/Recommendation - 6:  ·  Problem: HLD (hyperlipidemia).   ·  Recommendation: Continue Atorvastatin.     Problem/Recommendation - 7:  ·  Problem: DM (diabetes mellitus).   ·  Recommendation: ON Farxiga and holding.  SSI and Lantus in patient .     Problem/Recommendation - 8:  ·  Problem:  Ileus /Pseudoobustraction.   ·  Recommendation: Had BMs and  passing flatus .  Correcting electrolytes.     ON Laxatives and Simethacone .  Advanced diet .   Surgery & GI input appreciated .  D/W GI attending and will wait till tomorrow before rectal tube placement  .  < from: CT Abdomen and Pelvis w/ Oral Cont and w/ IV Cont (04.29.25 @ 17:16) >  IMPRESSION:  Diffuse colonic dilatation, worse on today's study, likely secondary to   pseudoobstruction or ileus.    No free air.    Reduction in the bilateral psoas collections with catheters in place.    < from: Xray Abdomen 1 View PORTABLE -Urgent (Xray Abdomen 1 View PORTABLE -Urgent .) (04.29.25 @ 10:09) >  IMPRESSION:  Multiple dilated loops of small and large bowel with question of   pneumoperitoneum. Recommend upright chest radiograph or decubitus   abdominal radiograph for further evaluation.     Problem/Recommendation - 9:  ·  Problem: JUSTINO with Hyponatremia .   ·  Recommendation: Trending BMP.  Resolved.  Renal help appreciated.      Problem/Recommendation - 10:  ·  Problem: Hypokalemia & Hypophosphatemia .   ·  Recommendation: Corrected.   Rpt BMP noted.    Keep K level close to 4 .     Problem/Recommendation - 11:  ·  Problem: Thrombocytosis .   ·  Recommendation: Reactionary secondary to infection.  Platelets Trending down .     Problem/Recommendation - 12:  ·  Problem: Pedal edema >Right LE.   ·  Recommendation: No calf tenderness +,Ambulating.    D/W patient and daughter  in detail .       PT working with patient and ambulating .

## 2025-05-09 NOTE — PROGRESS NOTE ADULT - ASSESSMENT
ASSESSMENT/PLAN:   79M s/p T10-pelvis PSF w/ PRS closure 4/24    - Continue aquacel dressing  - Drain per IR  - Rest of care per primary    Trini Damon MD  Plastic and Reconstructive Surgery, PGY-2  ABBEY: [id number]  NS: [phone] ASSESSMENT/PLAN:   79M s/p T10-pelvis PSF w/ PRS closure 4/24    - Continue aquacel dressing, replaced 5/9  - Drain per IR  - Rest of care per primary    Trini Damon MD  Plastic and Reconstructive Surgery, PGY-2  ABBEY: [id number]  NS: [phone]

## 2025-05-09 NOTE — PROGRESS NOTE ADULT - SUBJECTIVE AND OBJECTIVE BOX
Orthopedic Surgery Progress Note     S: Patient seen and examined today. No acute events overnight. Pain is well controlled. Denies f/c, chest pain, shortness of breath, dizziness. Continues to complain of diarrhea. Had an episode overnight.     MEDICATIONS  (STANDING):  acetaminophen     Tablet .. 975 milliGRAM(s) Oral every 6 hours  atorvastatin 40 milliGRAM(s) Oral at bedtime  bacitracin   Ointment 1 Application(s) Topical three times a day  ertapenem  IVPB      ertapenem  IVPB 1000 milliGRAM(s) IV Intermittent every 24 hours  gabapentin 100 milliGRAM(s) Oral every 8 hours  glucagon  Injectable 1 milliGRAM(s) IntraMuscular once  heparin   Injectable 5000 Unit(s) SubCutaneous every 8 hours  insulin lispro (ADMELOG) corrective regimen sliding scale   SubCutaneous three times a day before meals  insulin lispro (ADMELOG) corrective regimen sliding scale   SubCutaneous at bedtime  methocarbamol 500 milliGRAM(s) Oral every 12 hours  multivitamin 1 Tablet(s) Oral daily  omega-3-Acid Ethyl Esters 2 Gram(s) Oral two times a day  pantoprazole    Tablet 40 milliGRAM(s) Oral before breakfast  polyethylene glycol 3350 17 Gram(s) Oral at bedtime  simethicone 80 milliGRAM(s) Chew two times a day    MEDICATIONS  (PRN):  dibucaine 1% Ointment 1 Application(s) Topical four times a day PRN for anal discomfort d/t hemorrhoids  magnesium hydroxide Suspension 30 milliLiter(s) Oral every 12 hours PRN Constipation      Vital Signs Last 24 Hrs  T(C): 37.1 (09 May 2025 10:04), Max: 37.3 (08 May 2025 18:13)  T(F): 98.8 (09 May 2025 10:04), Max: 99.1 (08 May 2025 18:13)  HR: 91 (09 May 2025 10:04) (91 - 108)  BP: 125/62 (09 May 2025 10:04) (117/63 - 142/64)  BP(mean): --  RR: 17 (09 May 2025 10:04) (16 - 17)  SpO2: 97% (09 May 2025 10:04) (97% - 100%)    Parameters below as of 09 May 2025 10:04  Patient On (Oxygen Delivery Method): room air        05-08-25 @ 07:01  -  05-09-25 @ 07:00  --------------------------------------------------------  IN: 0 mL / OUT: 1657.5 mL / NET: -1657.5 mL    05-09-25 @ 07:01  -  05-09-25 @ 12:55  --------------------------------------------------------  IN: 0 mL / OUT: 305 mL / NET: -305 mL        Physical Exam:  Gen: NAD  Spine:         Dressing: clean/dry/intact           Drains: IR Drain in place from 2/7  Motor:                   C5                C6              C7               C8           T1   R            5/5                5/5            5/5             5/5          5/5  L             5/5               5/5             5/5             5/5          5/5                L2             L3             L4               L5            S1  R         3/5           4/5          5/5             5/5           5/5  L          4/5          5/5           5/5             5/5           5/5            Calves Soft/Non-tender bilaterally         Distal extremities warm well perfused; capillary refill <3 seconds              LABS:                        8.9    8.42  )-----------( 773      ( 09 May 2025 05:22 )             26.8     05-09    138  |  111[H]  |  6[L]  ----------------------------<  70  3.5   |  15[L]  |  0.69    Ca    8.3[L]      09 May 2025 05:22  Phos  3.0     05-09  Mg     2.00     05-09

## 2025-05-09 NOTE — PROGRESS NOTE ADULT - ASSESSMENT
79M s/p T10-pelvis PSF on 4/24    Plan:  -WBAT BLLE  -appreciate ID recs: abx ertapenem  -appreciate GI recs for diarrhea: decrease miralax dose and advance diet, do not place rectal tube given he is having BMs  -appreciate IR mgmt s/p psoas abscess drainage-- IR planning for next check 5/13     - 1 DHARA drains per IR     - Asp cx: bacteroides fragilis  -blood cx: NGF  -PT/OT  -dvt ppx: venodynes  -dispo: ADRIANA Cervantes, PGY-2  Orthopedic Surgery    [facility] [place]: [id number]  [facility]: [id number]  [facility]:  [id number]/ [phone]

## 2025-05-09 NOTE — PROGRESS NOTE ADULT - SUBJECTIVE AND OBJECTIVE BOX
Date of Service  : 05-09-25     INTERVAL HPI/OVERNIGHT EVENTS: I feel fine.   Vital Signs Last 24 Hrs  T(C): 36.8 (09 May 2025 17:55), Max: 37.1 (09 May 2025 10:04)  T(F): 98.2 (09 May 2025 17:55), Max: 98.8 (09 May 2025 10:04)  HR: 98 (09 May 2025 17:55) (87 - 99)  BP: 129/63 (09 May 2025 17:55) (121/66 - 142/64)  BP(mean): --  RR: 18 (09 May 2025 17:55) (16 - 18)  SpO2: 100% (09 May 2025 17:55) (96% - 100%)    Parameters below as of 09 May 2025 17:55  Patient On (Oxygen Delivery Method): room air      I&O's Summary    08 May 2025 07:01  -  09 May 2025 07:00  --------------------------------------------------------  IN: 0 mL / OUT: 1657.5 mL / NET: -1657.5 mL    09 May 2025 07:01  -  09 May 2025 21:10  --------------------------------------------------------  IN: 0 mL / OUT: 810 mL / NET: -810 mL      MEDICATIONS  (STANDING):  acetaminophen     Tablet .. 975 milliGRAM(s) Oral every 6 hours  atorvastatin 40 milliGRAM(s) Oral at bedtime  bacitracin   Ointment 1 Application(s) Topical three times a day  ertapenem  IVPB      ertapenem  IVPB 1000 milliGRAM(s) IV Intermittent every 24 hours  gabapentin 100 milliGRAM(s) Oral every 8 hours  glucagon  Injectable 1 milliGRAM(s) IntraMuscular once  heparin   Injectable 5000 Unit(s) SubCutaneous every 8 hours  insulin lispro (ADMELOG) corrective regimen sliding scale   SubCutaneous three times a day before meals  insulin lispro (ADMELOG) corrective regimen sliding scale   SubCutaneous at bedtime  methocarbamol 500 milliGRAM(s) Oral every 12 hours  multivitamin 1 Tablet(s) Oral daily  omega-3-Acid Ethyl Esters 2 Gram(s) Oral two times a day  pantoprazole    Tablet 40 milliGRAM(s) Oral before breakfast  polyethylene glycol 3350 17 Gram(s) Oral at bedtime  simethicone 80 milliGRAM(s) Chew two times a day    MEDICATIONS  (PRN):  dibucaine 1% Ointment 1 Application(s) Topical four times a day PRN for anal discomfort d/t hemorrhoids  magnesium hydroxide Suspension 30 milliLiter(s) Oral every 12 hours PRN Constipation    LABS:                        8.9    8.42  )-----------( 773      ( 09 May 2025 05:22 )             26.8     05-09    138  |  111[H]  |  6[L]  ----------------------------<  70  3.5   |  15[L]  |  0.69    Ca    8.3[L]      09 May 2025 05:22  Phos  3.0     05-09  Mg     2.00     05-09        Urinalysis Basic - ( 09 May 2025 05:22 )    Color: x / Appearance: x / SG: x / pH: x  Gluc: 70 mg/dL / Ketone: x  / Bili: x / Urobili: x   Blood: x / Protein: x / Nitrite: x   Leuk Esterase: x / RBC: x / WBC x   Sq Epi: x / Non Sq Epi: x / Bacteria: x      CAPILLARY BLOOD GLUCOSE      POCT Blood Glucose.: 143 mg/dL (09 May 2025 16:31)  POCT Blood Glucose.: 144 mg/dL (09 May 2025 11:29)  POCT Blood Glucose.: 86 mg/dL (09 May 2025 07:25)  POCT Blood Glucose.: 116 mg/dL (08 May 2025 21:16)        Urinalysis Basic - ( 09 May 2025 05:22 )    Color: x / Appearance: x / SG: x / pH: x  Gluc: 70 mg/dL / Ketone: x  / Bili: x / Urobili: x   Blood: x / Protein: x / Nitrite: x   Leuk Esterase: x / RBC: x / WBC x   Sq Epi: x / Non Sq Epi: x / Bacteria: x      REVIEW OF SYSTEMS:  CONSTITUTIONAL: No fever, weight loss, or fatigue  EYES: No eye pain, visual disturbances, or discharge  ENMT:  No difficulty hearing, tinnitus, vertigo; No sinus or throat pain  NECK: No pain or stiffness  RESPIRATORY: No cough, wheezing, chills or hemoptysis; No shortness of breath  CARDIOVASCULAR: No chest pain, palpitations, dizziness, or leg swelling  GASTROINTESTINAL: No abdominal or epigastric pain. No nausea, vomiting, or hematemesis; No diarrhea or constipation. No melena or hematochezia.  GENITOURINARY: No dysuria, frequency, hematuria, or incontinence  NEUROLOGICAL: No headaches, memory loss, loss of strength, numbness, or tremors      RADIOLOGY & ADDITIONAL TESTS:    Consultant(s) Notes Reviewed:  [x ] YES  [ ] NO    PHYSICAL EXAM:  GENERAL: NAD, well-groomed, well-developed,not in any distress ,  HEAD:  Atraumatic, Normocephalic  EYES: EOMI, PERRLA, conjunctiva and sclera clear  ENMT: No tonsillar erythema, exudates, or enlargement; Moist mucous membranes, Good dentition, No lesions  NECK: Supple, No JVD, Normal thyroid  NERVOUS SYSTEM:  Alert & Oriented X3, No focal deficit   CHEST/LUNG: Good air entry bilateral with no  rales, rhonchi, wheezing, or rubs  HEART: Regular rate and rhythm; No murmurs, rubs, or gallops  ABDOMEN: Soft, Nontender, Nondistended; Bowel sounds present  EXTREMITIES:  2+ Peripheral Pulses, No clubbing, cyanosis, or edema      Care Discussed with Consultants/Other Providers [ x] YES  [ ] NO Date of Service  : 05-09-25     INTERVAL HPI/OVERNIGHT EVENTS: I feel fine.   Vital Signs Last 24 Hrs  T(C): 36.8 (09 May 2025 17:55), Max: 37.1 (09 May 2025 10:04)  T(F): 98.2 (09 May 2025 17:55), Max: 98.8 (09 May 2025 10:04)  HR: 98 (09 May 2025 17:55) (87 - 99)  BP: 129/63 (09 May 2025 17:55) (121/66 - 142/64)  BP(mean): --  RR: 18 (09 May 2025 17:55) (16 - 18)  SpO2: 100% (09 May 2025 17:55) (96% - 100%)    Parameters below as of 09 May 2025 17:55  Patient On (Oxygen Delivery Method): room air      I&O's Summary    08 May 2025 07:01  -  09 May 2025 07:00  --------------------------------------------------------  IN: 0 mL / OUT: 1657.5 mL / NET: -1657.5 mL    09 May 2025 07:01  -  09 May 2025 21:10  --------------------------------------------------------  IN: 0 mL / OUT: 810 mL / NET: -810 mL      MEDICATIONS  (STANDING):  acetaminophen     Tablet .. 975 milliGRAM(s) Oral every 6 hours  atorvastatin 40 milliGRAM(s) Oral at bedtime  bacitracin   Ointment 1 Application(s) Topical three times a day  ertapenem  IVPB      ertapenem  IVPB 1000 milliGRAM(s) IV Intermittent every 24 hours  gabapentin 100 milliGRAM(s) Oral every 8 hours  glucagon  Injectable 1 milliGRAM(s) IntraMuscular once  heparin   Injectable 5000 Unit(s) SubCutaneous every 8 hours  insulin lispro (ADMELOG) corrective regimen sliding scale   SubCutaneous three times a day before meals  insulin lispro (ADMELOG) corrective regimen sliding scale   SubCutaneous at bedtime  methocarbamol 500 milliGRAM(s) Oral every 12 hours  multivitamin 1 Tablet(s) Oral daily  omega-3-Acid Ethyl Esters 2 Gram(s) Oral two times a day  pantoprazole    Tablet 40 milliGRAM(s) Oral before breakfast  polyethylene glycol 3350 17 Gram(s) Oral at bedtime  simethicone 80 milliGRAM(s) Chew two times a day    MEDICATIONS  (PRN):  dibucaine 1% Ointment 1 Application(s) Topical four times a day PRN for anal discomfort d/t hemorrhoids  magnesium hydroxide Suspension 30 milliLiter(s) Oral every 12 hours PRN Constipation    LABS:                        8.9    8.42  )-----------( 773      ( 09 May 2025 05:22 )             26.8     05-09    138  |  111[H]  |  6[L]  ----------------------------<  70  3.5   |  15[L]  |  0.69    Ca    8.3[L]      09 May 2025 05:22  Phos  3.0     05-09  Mg     2.00     05-09        Urinalysis Basic - ( 09 May 2025 05:22 )    Color: x / Appearance: x / SG: x / pH: x  Gluc: 70 mg/dL / Ketone: x  / Bili: x / Urobili: x   Blood: x / Protein: x / Nitrite: x   Leuk Esterase: x / RBC: x / WBC x   Sq Epi: x / Non Sq Epi: x / Bacteria: x      CAPILLARY BLOOD GLUCOSE      POCT Blood Glucose.: 143 mg/dL (09 May 2025 16:31)  POCT Blood Glucose.: 144 mg/dL (09 May 2025 11:29)  POCT Blood Glucose.: 86 mg/dL (09 May 2025 07:25)  POCT Blood Glucose.: 116 mg/dL (08 May 2025 21:16)        Urinalysis Basic - ( 09 May 2025 05:22 )    Color: x / Appearance: x / SG: x / pH: x  Gluc: 70 mg/dL / Ketone: x  / Bili: x / Urobili: x   Blood: x / Protein: x / Nitrite: x   Leuk Esterase: x / RBC: x / WBC x   Sq Epi: x / Non Sq Epi: x / Bacteria: x      REVIEW OF SYSTEMS:  CONSTITUTIONAL: No fever, weight loss, or fatigue  EYES: No eye pain, visual disturbances, or discharge  ENMT:  No difficulty hearing, tinnitus, vertigo; No sinus or throat pain  NECK: No pain or stiffness  RESPIRATORY: No cough, wheezing, chills or hemoptysis; No shortness of breath  CARDIOVASCULAR: No chest pain, palpitations, dizziness, or leg swelling  GASTROINTESTINAL: No abdominal or epigastric pain. No nausea, vomiting, or hematemesis; No diarrhea or constipation. No melena or hematochezia.  GENITOURINARY: No dysuria, frequency, hematuria, or incontinence  NEUROLOGICAL: No headaches, memory loss, loss of strength, numbness, or tremors      RADIOLOGY & ADDITIONAL TESTS:    Consultant(s) Notes Reviewed:  [x ] YES  [ ] NO    PHYSICAL EXAM:  GENERAL: NAD, well-groomed, well-developed,not in any distress ,  HEAD:  Atraumatic, Normocephalic  EYES: EOMI, PERRLA, conjunctiva and sclera clear  ENMT: No tonsillar erythema, exudates, or enlargement; Moist mucous membranes, Good dentition, No lesions  NECK: Supple, No JVD, Normal thyroid  NERVOUS SYSTEM:  Alert & Oriented X3, No focal deficit   CHEST/LUNG: Good air entry bilateral with no  rales, rhonchi, wheezing, or rubs  HEART: Regular rate and rhythm; No murmurs, rubs, or gallops  ABDOMEN: Soft, Nontender, Nondistended; Bowel sounds present  EXTREMITIES:  2+  edema      Care Discussed with Consultants/Other Providers [ x] YES  [ ] NO

## 2025-05-09 NOTE — CHART NOTE - NSCHARTNOTEFT_GEN_A_CORE
IR called to schedule appointment as patient to be discharged with drainage catheter.    - Patient scheduled for Tuesday, May 13, at 12:00PM for Tube Check (No CT required).  - Please arrive to [facility], enter through [facility] and go to 2nd floor. Please check in at Room 263A Radiology Outpatient Reception.    Drainage Catheter Care:  - Continue to flush drainage catheter with 5cc NS once a day  - Continue to monitor and document/write daily output from drainage catheter and bring documented daily output with you to next IR appointment.  - If any issues with appointment date and time, you can reach out to outpatient IR scheduling office #[phone].   - If any concerns regarding drainage catheter, patient can contact IR Physician Assistant at #[phone] and/or by email: [email or web address]    [phone] IR called to schedule appointment as patient to be discharged with drainage catheter. Patient scheduled for Tuesday, May 13 for Tube Check (No CT required). However primary team called back, patient to remain admitted until next tube check.     - Please place IR procedure order for Tube Check under Dr. Sage  - Scheduled for Tuesday, May 13th  - Please complete IR pre-procedure note  - Ortho team aware of above    [id number]

## 2025-05-09 NOTE — PROGRESS NOTE ADULT - SUBJECTIVE AND OBJECTIVE BOX
Plastic Surgery Progress Note (pg [facility]: [id number], [facility]: [phone])    SUBJECTIVE  NAEO. AVSS. Pt comfortable in bed. Pain well controlled, no complaints.    OBJECTIVE  ___________________________________________________  VITAL SIGNS / I&O's   Vital Signs Last 24 Hrs  T(C): 37.1 (09 May 2025 10:04), Max: 37.3 (08 May 2025 18:13)  T(F): 98.8 (09 May 2025 10:04), Max: 99.1 (08 May 2025 18:13)  HR: 91 (09 May 2025 10:04) (91 - 108)  BP: 125/62 (09 May 2025 10:04) (117/63 - 142/64)  BP(mean): --  RR: 17 (09 May 2025 10:04) (16 - 17)  SpO2: 97% (09 May 2025 10:04) (97% - 100%)    Parameters below as of 09 May 2025 10:04  Patient On (Oxygen Delivery Method): room air          08 May 2025 07:01  -  09 May 2025 07:00  --------------------------------------------------------  IN:  Total IN: 0 mL    OUT:    Bulb (mL): 7.5 mL    Voided (mL): 1650 mL  Total OUT: 1657.5 mL    Total NET: -1657.5 mL      09 May 2025 07:01  -  09 May 2025 14:01  --------------------------------------------------------  IN:  Total IN: 0 mL    OUT:    Bulb (mL): 5 mL    Voided (mL): 300 mL  Total OUT: 305 mL    Total NET: -305 mL        ___________________________________________________  PHYSICAL EXAM    General: NAD  Back: soft, flat, no erythema. Aquacel in place without strikethrough  ___________________________________________________  LABS                        8.9    8.42  )-----------( 773      ( 09 May 2025 05:22 )             26.8     09 May 2025 05:22    138    |  111    |  6      ----------------------------<  70     3.5     |  15     |  0.69     Ca    8.3        09 May 2025 05:22  Phos  3.0       09 May 2025 05:22  Mg     2.00      09 May 2025 05:22        CAPILLARY BLOOD GLUCOSE      POCT Blood Glucose.: 144 mg/dL (09 May 2025 11:29)  POCT Blood Glucose.: 86 mg/dL (09 May 2025 07:25)  POCT Blood Glucose.: 116 mg/dL (08 May 2025 21:16)  POCT Blood Glucose.: 99 mg/dL (08 May 2025 16:48)        Urinalysis Basic - ( 09 May 2025 05:22 )    Color: x / Appearance: x / SG: x / pH: x  Gluc: 70 mg/dL / Ketone: x  / Bili: x / Urobili: x   Blood: x / Protein: x / Nitrite: x   Leuk Esterase: x / RBC: x / WBC x   Sq Epi: x / Non Sq Epi: x / Bacteria: x      ___________________________________________________  MICRO  Recent Cultures:    ___________________________________________________  MEDICATIONS  (STANDING):  acetaminophen     Tablet .. 975 milliGRAM(s) Oral every 6 hours  atorvastatin 40 milliGRAM(s) Oral at bedtime  bacitracin   Ointment 1 Application(s) Topical three times a day  ertapenem  IVPB      ertapenem  IVPB 1000 milliGRAM(s) IV Intermittent every 24 hours  gabapentin 100 milliGRAM(s) Oral every 8 hours  glucagon  Injectable 1 milliGRAM(s) IntraMuscular once  heparin   Injectable 5000 Unit(s) SubCutaneous every 8 hours  insulin lispro (ADMELOG) corrective regimen sliding scale   SubCutaneous three times a day before meals  insulin lispro (ADMELOG) corrective regimen sliding scale   SubCutaneous at bedtime  methocarbamol 500 milliGRAM(s) Oral every 12 hours  multivitamin 1 Tablet(s) Oral daily  omega-3-Acid Ethyl Esters 2 Gram(s) Oral two times a day  pantoprazole    Tablet 40 milliGRAM(s) Oral before breakfast  polyethylene glycol 3350 17 Gram(s) Oral at bedtime  simethicone 80 milliGRAM(s) Chew two times a day    MEDICATIONS  (PRN):  dibucaine 1% Ointment 1 Application(s) Topical four times a day PRN for anal discomfort d/t hemorrhoids  magnesium hydroxide Suspension 30 milliLiter(s) Oral every 12 hours PRN Constipation

## 2025-05-09 NOTE — PROGRESS NOTE ADULT - ASSESSMENT
Mr. 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     #Colonic dilation  #Ileus  #S/p spinal surgery 4/24  #Post-operative ileus/ colonic pseudoobstruction   GI is re- consulted for persistent abdominal distension and radiographic findings of colonic dilation. XR Abdomen and CT demonstrate multiple dilated loops of small and large bowel without transition point or free air. Patient does not have nausea or vomiting. His abdomen is distended on exam without peritoneal signs. Bowel sounds are present.   Patient feels his symptoms are continuing to improve.    Recommendations:  - restart miralax qd   - please encourage out of bed as much as able  - correct electrolyte abnormalities   - Track all stool output   - Replenish electrolytes to goal K>4, Mg>2  - Avoid narcotics   - PT support  - Ensure adequate hydration    - no role for rectal tube at this time as patient is continuing to clinically improve.     Note incomplete until finalized by attending signature/attestation.    Myra Sutherland  GI/Hepatology Fellow    MONDAY-FRIDAY 8AM-5PM:  Pager# [phone] ([facility]) or [phone] ([facility])    NON-URGENT CONSULTS:  Please email [email or web address]  AT NIGHT AND ON WEEKENDS:  Contact on-call GI fellow from 5pm-8am and on weekends/holidays

## 2025-05-09 NOTE — PROGRESS NOTE ADULT - ATTENDING COMMENTS
++ flatus and bm overnight  ileus/pseudo-obstruction improving  Abdomen less distended  continue bowel regimen  can advance to regular diet  K>4 Mg>2  movement as tolerated
79M chronic constipation and recent spinal fusion as well as spinal OM and bilateral psoas abscesses presents with abdominal distension, dilated bowel on imaging.     Ileus likely in s/o recent surgery and possibly aggravated by psoas abscesses, may have had slow transit to begin with given chronic constipation. Imaging on 5/4 stable from prior.     Continues to improve, reports again that he feels softer and less distended though he still has distension. Had loose stools last night.     Abd distended but soft and nontender on exam.     - please switch miralax to 1 packet BID  - please give prep H suppositories PRN  - K>4, Mg >2  - OOB; should ambulate multiple times a day, should be in chair when not ambulating, would try to limit time in bed during the day to 4 hours or less
79M chronic constipation and recent spinal fusion as well as spinal OM and bilateral psoas abscesses presents with abdominal distension, dilated bowel on imaging.     Ileus likely in s/o recent surgery and possibly aggravated by psoas abscesses, may have had slow transit to begin with given chronic constipation. Imaging on 5/4 stable from prior.     Today he reports that he feels his abdomen is mildly less distended and softer, and that he is improving. Had a bowel movement this morning and an episode of flatus today and yesterday. Walked around today and yesterday.    Feels that his stools are harder to pass since going down on miralax, feels tenesmus as well and some anal discomfort.     Abd distended but soft and nontender on exam.     - please increase miralax to two packets at bedtime  - please give prep H suppositories  - K>4, Mg >2  - OOB; should ambulate multiple times a day, should be in chair when not ambulating, would try to limit time in bed during the day to 4 hours or less  - if no improvement can consider rectal tube however rectal tube will limit mobility as would ideally stay in for at least 8 hours and thus may make problem worse, he declines rectal tube for now
79M chronic constipation and recent spinal fusion as well as spinal OM and bilateral psoas abscesses presents with abdominal distension, dilated bowel on imaging.     Ileus likely in s/o recent surgery and possibly aggravated by psoas abscesses, may have had slow transit to begin with given chronic constipation. Imaging on 5/4 stable from prior. Patient having bowel movements though difficulty passing gas. Time out of bed has been limited. Tolerating PO but eating small amounts due to bloating.    Abd distended but soft and nontender.     - continue miralax but decrease frequency (daily, not TID)  - K>4, Mg >2  - OOB; should ambulate multiple times a day, should be in chair when not ambulating, would try to limit time in bed during the day to 4 hours or less  - if no improvement can consider rectal tube however rectal tube will limit mobility as would ideally stay in for at least 8 hours and thus may make problem worse
I agree with the history, physical, and plan, which I have reviewed and edited where appropriate.  I agree with notes/assessment of health care providers on my service.  I have personally examined the patient.  I was physically present for the key portions of the evaluation and management (E/M) service provided.  I reviewed data and laboratory tests/x-rays and all pertinent electronic images.  The patient is a critical care patient with life threatening hemodynamic and metabolic instability in SICU.  Risk benefit analyses discussed.    The patient is in SICU with diagnosis mentioned in the note.    The plan is specified below.    79yMale w/ HTN, HLD, T2DM, chronic low back pain presents as a transfer from [facility] ED for surgery today. He underwent T10-Pelvis PSF T12-L2 lami 4/24/25 (Ludy/DAVID)   SICU consulted post operatively for neurological checks hourly (q1h) and hemodynamic monitoring  as patient is requiring vasopressor  to achieve map goals above 80mmhg. Thanh now off. Awaiting IR drainage of psoas abscess. If tolerates well, may transfer to floor afterward.     PLAN  NEUROLOGIC: postoperative pain   - neurological checks hourly q4h  - Pain control with ofirmev/ dilaudid PCA  - robaxin, gabapentin     RESPIRATORY   - on NC     CARDIOVASCULAR    - MAP goal > 65 per Ortho/Spine team   - atorvastatin     GASTROINTESTINAL   - Diet:  NPO for IR   - GI PPX: protonix ppi  - senna, dulcolax     /RENAL   - IV fluids: LR @75cc/hr , will cut rate once tolerating CLD   - d/c maradiaga catheter    HEMATOLOGIC  - DVT prophylaxis: SCDs , restart chemical vte after IR    INFECTIOUS DISEASE: bacteroides bacteremia, psoas abscesses and osteomyelitis of spine   - Antibiotic ; Zosyn  - repeat blood cultures   - ID consult 4/25/25   - IR consult 4/25/25 for evaluation and possible intervention/drainage of psoas abscesses     ENDOCRINE: hx of diabetes type 2   - ISS .
I agree with the history, physical, and plan, which I have reviewed and edited where appropriate.  I agree with notes/assessment of health care providers on my service.  I have personally examined the patient.  I was physically present for the key portions of the evaluation and management (E/M) service provided.  I reviewed data and laboratory tests/x-rays and all pertinent electronic images.  The patient is a critical care patient with life threatening hemodynamic and metabolic instability in SICU.  Risk benefit analyses discussed.    The patient is in SICU with diagnosis mentioned in the note.    The plan is specified below.    79yMale w/ HTN, HLD, T2DM, chronic low back pain presents as a transfer from [facility] ED for surgery today. He underwent T10-Pelvis PSF T12-L2 lami 4/24/25 (Ludy/DAVID)   SICU consulted post operatively for neurological checks hourly (q1h) and hemodynamic monitoring  as patient is requiring vasopressor  to achieve map goals above 80mmhg. Thanh now off. S/P IR drainage of psoas abscess. Stable for transfer to floor.     PLAN  NEUROLOGIC: postoperative pain   - neurological checks hourly q4h  - Pain control with tylenol, prn oxy   - robaxin, gabapentin     RESPIRATORY   - on NC     CARDIOVASCULAR    - MAP goal > 65 per Ortho/Spine team   - atorvastatin     GASTROINTESTINAL   - Diet:  Reg diet   - GI PPX: protonix ppi  - senna, dulcolax     /RENAL   - IV locked  - voiding     HEMATOLOGIC  - DVT prophylaxis: SCDs , SQH    INFECTIOUS DISEASE: bacteroides bacteremia, psoas abscesses and osteomyelitis of spine   - Antibiotic ; Zosyn  - repeat blood cultures   - ID consult 4/25/25   - IR drainage 4/26/25, cultures GNR      ENDOCRINE: hx of diabetes type 2   - MISS .
79M chronic constipation and recent spinal fusion as well as spinal OM and bilateral psoas abscesses presents with abdominal distension, dilated bowel on imaging.     Ileus likely in s/o recent surgery and possibly aggravated by psoas abscesses, may have had slow transit to begin with given chronic constipation.     CTAP 5/4: IMPRESSION:  Bilateral psoas collections, are stable in size compared to prior exam.  Diffusely dilated large bowel containing fluid and air, without discrete   transition point, and is STABLE (writer's emphasis) compared to prior exam (on 4/29).    Patient is passing flatus and having bowel movements.     - continue miralax but decrease frequency (daily, not TID)  - K>4, Mg >2  - OOB  - if no improvement in next few days will consider rectal tube
I agree with the history, physical, and plan, which I have reviewed and edited where appropriate.  I agree with notes/assessment of health care providers on my service.  I have personally examined the patient.  I was physically present for the key portions of the evaluation and management (E/M) service provided.  I reviewed data and laboratory tests/x-rays and all pertinent electronic images.  The patient is a critical care patient with life threatening hemodynamic and metabolic instability in SICU.  Risk benefit analyses discussed.    The patient is in SICU with diagnosis mentioned in the note.    The plan is specified below.    79yMale w/ HTN, HLD, T2DM, chronic low back pain presents as a transfer from [facility] ED for surgery today. He underwent T10-Pelvis PSF T12-L2 lami 4/24/25 (Ludy/DAVID)   SICU consulted post operatively for neurological checks hourly (q1h) and hemodynamic monitoring  as patient is requiring vasopressor  to achieve map goals above 80mmhg     PLAN  NEUROLOGIC: postoperative pain   - neurological checks hourly q1h  - Pain control with ofirmev/ dilaudid PCA  - robaxin, gabapentin     RESPIRATORY   - on NC     CARDIOVASCULAR    - currently on antonio   - MAP goal > 80 per Ortho/Spine team   - atorvastatin     GASTROINTESTINAL   - Diet:  CLD, NPO for IR   - GI PPX: protonix ppi  - senna, dulcolax     /RENAL   - IV fluids: LR @75cc/hr , will cut rate once tolerating CLD   - Maintain maradiaga catheter    HEMATOLOGIC  - DVT prophylaxis: SCDs , possible restart chemical vte after IR    INFECTIOUS DISEASE: bacteroides bacteremia, psoas abscesses and osteomyelitis of spine   - Antibiotic ; Zosyn  - repeat blood cultures   - ID consult 4/25/25   - IR consult 4/25/25 for evaluation and possible intervention/drainage of psoas abscesses     ENDOCRINE: hx of diabetes type 2   - ISS

## 2025-05-09 NOTE — PROGRESS NOTE ADULT - SUBJECTIVE AND OBJECTIVE BOX
NEPHROLOGY     Patient seen and examined having breakfast, comfortable on room air, reports feeling better, though still mild abd pain but overall improving, denies sob, in no acute distress.     MEDICATIONS  (STANDING):  acetaminophen     Tablet .. 975 milliGRAM(s) Oral every 6 hours  atorvastatin 40 milliGRAM(s) Oral at bedtime  bacitracin   Ointment 1 Application(s) Topical three times a day  ertapenem  IVPB      ertapenem  IVPB 1000 milliGRAM(s) IV Intermittent every 24 hours  gabapentin 100 milliGRAM(s) Oral every 8 hours  glucagon  Injectable 1 milliGRAM(s) IntraMuscular once  heparin   Injectable 5000 Unit(s) SubCutaneous every 8 hours  insulin lispro (ADMELOG) corrective regimen sliding scale   SubCutaneous three times a day before meals  insulin lispro (ADMELOG) corrective regimen sliding scale   SubCutaneous at bedtime  methocarbamol 500 milliGRAM(s) Oral every 12 hours  multivitamin 1 Tablet(s) Oral daily  omega-3-Acid Ethyl Esters 2 Gram(s) Oral two times a day  pantoprazole    Tablet 40 milliGRAM(s) Oral before breakfast  polyethylene glycol 3350 17 Gram(s) Oral at bedtime  simethicone 80 milliGRAM(s) Chew two times a day    VITALS:  T(C): , Max: 37.3 (05-08-25 @ 18:13)  T(F): , Max: 99.1 (05-08-25 @ 18:13)  HR: 97 (05-09-25 @ 06:24)  BP: 130/65 (05-09-25 @ 06:24)  RR: 16 (05-09-25 @ 06:24)  SpO2: 100% (05-09-25 @ 06:24)    I and O's:    05-08 @ 07:01  -  05-09 @ 07:00  --------------------------------------------------------  IN: 0 mL / OUT: 1657.5 mL / NET: -1657.5 mL    PHYSICAL EXAM:  Constitutional: NAD  HEENT: EOMI  Neck:  No JVD, supple   Respiratory: CTA B/L  Cardiovascular: S1 and S2, RRR  Gastrointestinal: distended  Extremities: tr peripheral edema, + peripheral pulses  Neurological: A/O x 3, CN2-12 intact  Psychiatric: Normal mood, normal affect  : No Mac  Skin: No rashes, C/D/I    LABS:                        8.9    8.42  )-----------( 773      ( 09 May 2025 05:22 )             26.8     05-09    138  |  111[H]  |  6[L]  ----------------------------<  70  3.5   |  15[L]  |  0.69    Ca    8.3[L]      09 May 2025 05:22  Phos  3.0     05-09  Mg     2.00     05-09

## 2025-05-10 LAB
ANION GAP SERPL CALC-SCNC: 13 MMOL/L — SIGNIFICANT CHANGE UP (ref 7–14)
BUN SERPL-MCNC: 6 MG/DL — LOW (ref 7–23)
CALCIUM SERPL-MCNC: 8.3 MG/DL — LOW (ref 8.4–10.5)
CHLORIDE SERPL-SCNC: 109 MMOL/L — HIGH (ref 98–107)
CO2 SERPL-SCNC: 17 MMOL/L — LOW (ref 22–31)
CREAT SERPL-MCNC: 0.66 MG/DL — SIGNIFICANT CHANGE UP (ref 0.5–1.3)
EGFR: 95 ML/MIN/1.73M2 — SIGNIFICANT CHANGE UP
EGFR: 95 ML/MIN/1.73M2 — SIGNIFICANT CHANGE UP
GLUCOSE BLDC GLUCOMTR-MCNC: 132 MG/DL — HIGH (ref 70–99)
GLUCOSE BLDC GLUCOMTR-MCNC: 138 MG/DL — HIGH (ref 70–99)
GLUCOSE BLDC GLUCOMTR-MCNC: 158 MG/DL — HIGH (ref 70–99)
GLUCOSE BLDC GLUCOMTR-MCNC: 97 MG/DL — SIGNIFICANT CHANGE UP (ref 70–99)
GLUCOSE SERPL-MCNC: 86 MG/DL — SIGNIFICANT CHANGE UP (ref 70–99)
HCT VFR BLD CALC: 24.5 % — LOW (ref 39–50)
HGB BLD-MCNC: 8.2 G/DL — LOW (ref 13–17)
MCHC RBC-ENTMCNC: 30.9 PG — SIGNIFICANT CHANGE UP (ref 27–34)
MCHC RBC-ENTMCNC: 33.5 G/DL — SIGNIFICANT CHANGE UP (ref 32–36)
MCV RBC AUTO: 92.5 FL — SIGNIFICANT CHANGE UP (ref 80–100)
NRBC # BLD AUTO: 0 K/UL — SIGNIFICANT CHANGE UP (ref 0–0)
NRBC # FLD: 0 K/UL — SIGNIFICANT CHANGE UP (ref 0–0)
NRBC BLD AUTO-RTO: 0 /100 WBCS — SIGNIFICANT CHANGE UP (ref 0–0)
PLATELET # BLD AUTO: 565 K/UL — HIGH (ref 150–400)
POTASSIUM SERPL-MCNC: 3.2 MMOL/L — LOW (ref 3.5–5.3)
POTASSIUM SERPL-SCNC: 3.2 MMOL/L — LOW (ref 3.5–5.3)
RBC # BLD: 2.65 M/UL — LOW (ref 4.2–5.8)
RBC # FLD: 21.4 % — HIGH (ref 10.3–14.5)
SODIUM SERPL-SCNC: 139 MMOL/L — SIGNIFICANT CHANGE UP (ref 135–145)
WBC # BLD: 7.77 K/UL — SIGNIFICANT CHANGE UP (ref 3.8–10.5)
WBC # FLD AUTO: 7.77 K/UL — SIGNIFICANT CHANGE UP (ref 3.8–10.5)

## 2025-05-10 RX ADMIN — Medication 40 MILLIEQUIVALENT(S): at 11:53

## 2025-05-10 RX ADMIN — HEPARIN SODIUM 5000 UNIT(S): 1000 INJECTION INTRAVENOUS; SUBCUTANEOUS at 13:55

## 2025-05-10 RX ADMIN — Medication 80 MILLIGRAM(S): at 06:45

## 2025-05-10 RX ADMIN — GABAPENTIN 100 MILLIGRAM(S): 400 CAPSULE ORAL at 13:54

## 2025-05-10 RX ADMIN — METHOCARBAMOL 500 MILLIGRAM(S): 500 TABLET, FILM COATED ORAL at 22:20

## 2025-05-10 RX ADMIN — POLYETHYLENE GLYCOL 3350 17 GRAM(S): 17 POWDER, FOR SOLUTION ORAL at 21:12

## 2025-05-10 RX ADMIN — Medication 100 MILLIEQUIVALENT(S): at 10:46

## 2025-05-10 RX ADMIN — GABAPENTIN 100 MILLIGRAM(S): 400 CAPSULE ORAL at 21:12

## 2025-05-10 RX ADMIN — Medication 1 TABLET(S): at 11:51

## 2025-05-10 RX ADMIN — Medication 975 MILLIGRAM(S): at 06:44

## 2025-05-10 RX ADMIN — Medication 100 MILLIEQUIVALENT(S): at 11:47

## 2025-05-10 RX ADMIN — Medication 975 MILLIGRAM(S): at 18:19

## 2025-05-10 RX ADMIN — Medication 100 MILLIEQUIVALENT(S): at 09:42

## 2025-05-10 RX ADMIN — Medication 1 APPLICATION(S): at 21:13

## 2025-05-10 RX ADMIN — Medication 975 MILLIGRAM(S): at 12:19

## 2025-05-10 RX ADMIN — GABAPENTIN 100 MILLIGRAM(S): 400 CAPSULE ORAL at 06:44

## 2025-05-10 RX ADMIN — ERTAPENEM SODIUM 100 MILLIGRAM(S): 1 INJECTION, POWDER, LYOPHILIZED, FOR SOLUTION INTRAMUSCULAR; INTRAVENOUS at 13:55

## 2025-05-10 RX ADMIN — HEPARIN SODIUM 5000 UNIT(S): 1000 INJECTION INTRAVENOUS; SUBCUTANEOUS at 21:12

## 2025-05-10 RX ADMIN — Medication 975 MILLIGRAM(S): at 18:10

## 2025-05-10 RX ADMIN — Medication 975 MILLIGRAM(S): at 11:51

## 2025-05-10 RX ADMIN — Medication 40 MILLIGRAM(S): at 06:44

## 2025-05-10 RX ADMIN — OMEGA-3-ACID ETHYL ESTERS CAPSULES 2 GRAM(S): 1 CAPSULE, LIQUID FILLED ORAL at 11:50

## 2025-05-10 RX ADMIN — Medication 1 APPLICATION(S): at 13:55

## 2025-05-10 RX ADMIN — METHOCARBAMOL 500 MILLIGRAM(S): 500 TABLET, FILM COATED ORAL at 10:50

## 2025-05-10 RX ADMIN — HEPARIN SODIUM 5000 UNIT(S): 1000 INJECTION INTRAVENOUS; SUBCUTANEOUS at 06:45

## 2025-05-10 RX ADMIN — Medication 80 MILLIGRAM(S): at 18:12

## 2025-05-10 NOTE — PROGRESS NOTE ADULT - SUBJECTIVE AND OBJECTIVE BOX
Orthopedic Surgery Progress Note     S: Patient seen and examined today. No acute events overnight. Pain is well controlled. Denies f/c, chest pain, shortness of breath, dizziness. Diarrhea frequency has improved.     MEDICATIONS  (STANDING):  acetaminophen     Tablet .. 975 milliGRAM(s) Oral every 6 hours  atorvastatin 40 milliGRAM(s) Oral at bedtime  bacitracin   Ointment 1 Application(s) Topical three times a day  ertapenem  IVPB      ertapenem  IVPB 1000 milliGRAM(s) IV Intermittent every 24 hours  gabapentin 100 milliGRAM(s) Oral every 8 hours  glucagon  Injectable 1 milliGRAM(s) IntraMuscular once  heparin   Injectable 5000 Unit(s) SubCutaneous every 8 hours  insulin lispro (ADMELOG) corrective regimen sliding scale   SubCutaneous three times a day before meals  insulin lispro (ADMELOG) corrective regimen sliding scale   SubCutaneous at bedtime  methocarbamol 500 milliGRAM(s) Oral every 12 hours  multivitamin 1 Tablet(s) Oral daily  omega-3-Acid Ethyl Esters 2 Gram(s) Oral two times a day  pantoprazole    Tablet 40 milliGRAM(s) Oral before breakfast  polyethylene glycol 3350 17 Gram(s) Oral at bedtime  simethicone 80 milliGRAM(s) Chew two times a day    MEDICATIONS  (PRN):  dibucaine 1% Ointment 1 Application(s) Topical four times a day PRN for anal discomfort d/t hemorrhoids  magnesium hydroxide Suspension 30 milliLiter(s) Oral every 12 hours PRN Constipation      Vital Signs Last 24 Hrs  T(C): 36.9 (10 May 2025 01:28), Max: 37.1 (09 May 2025 10:04)  T(F): 98.4 (10 May 2025 01:28), Max: 98.8 (09 May 2025 10:04)  HR: 98 (10 May 2025 01:28) (87 - 98)  BP: 144/74 (10 May 2025 01:28) (121/66 - 144/74)  BP(mean): --  RR: 17 (10 May 2025 01:28) (16 - 18)  SpO2: 100% (10 May 2025 01:28) (96% - 100%)    Parameters below as of 10 May 2025 01:28  Patient On (Oxygen Delivery Method): room air        05-08-25 @ 07:01 - 05-09-25 @ 07:00  --------------------------------------------------------  IN: 0 mL / OUT: 1657.5 mL / NET: -1657.5 mL    05-09-25 @ 07:01  -  05-10-25 @ 05:48  --------------------------------------------------------  IN: 120 mL / OUT: 1285 mL / NET: -1165 mL        Physical Exam:  Gen: NAD  Spine:         Dressing: clean/dry/intact           Drains: IR Drain in place from 2/7  Motor:                   C5                C6              C7               C8           T1   R            5/5                5/5            5/5             5/5          5/5  L             5/5               5/5             5/5             5/5          5/5                L2             L3             L4               L5            S1  R         3/5           4/5          5/5             5/5           5/5  L          4/5          5/5           5/5             5/5           5/5            Calves Soft/Non-tender bilaterally         Distal extremities warm well perfused; capillary refill <3 seconds                LABS:                        8.9    8.42  )-----------( 773      ( 09 May 2025 05:22 )             26.8     05-09    138  |  111[H]  |  6[L]  ----------------------------<  70  3.5   |  15[L]  |  0.69    Ca    8.3[L]      09 May 2025 05:22  Phos  3.0     05-09  Mg     2.00     05-09

## 2025-05-10 NOTE — PROGRESS NOTE ADULT - SUBJECTIVE AND OBJECTIVE BOX
DATE OF SERVICE: 05-10-25       no chest pain or sob       acetaminophen     Tablet .. 975 milliGRAM(s) Oral every 6 hours  atorvastatin 40 milliGRAM(s) Oral at bedtime  bacitracin   Ointment 1 Application(s) Topical three times a day  dibucaine 1% Ointment 1 Application(s) Topical four times a day PRN  ertapenem  IVPB      ertapenem  IVPB 1000 milliGRAM(s) IV Intermittent every 24 hours  gabapentin 100 milliGRAM(s) Oral every 8 hours  glucagon  Injectable 1 milliGRAM(s) IntraMuscular once  heparin   Injectable 5000 Unit(s) SubCutaneous every 8 hours  insulin lispro (ADMELOG) corrective regimen sliding scale   SubCutaneous three times a day before meals  insulin lispro (ADMELOG) corrective regimen sliding scale   SubCutaneous at bedtime  magnesium hydroxide Suspension 30 milliLiter(s) Oral every 12 hours PRN  methocarbamol 500 milliGRAM(s) Oral every 12 hours  multivitamin 1 Tablet(s) Oral daily  omega-3-Acid Ethyl Esters 2 Gram(s) Oral two times a day  pantoprazole    Tablet 40 milliGRAM(s) Oral before breakfast  polyethylene glycol 3350 17 Gram(s) Oral at bedtime  potassium chloride   Powder 40 milliEquivalent(s) Oral daily  potassium chloride  10 mEq/100 mL IVPB 10 milliEquivalent(s) IV Intermittent every 1 hour  simethicone 80 milliGRAM(s) Chew two times a day                            8.2    7.77  )-----------( 565      ( 10 May 2025 07:50 )             24.5       Hemoglobin: 8.2 g/dL (05-10 @ 07:50)  Hemoglobin: 8.9 g/dL (05-09 @ 05:22)  Hemoglobin: 8.7 g/dL (05-08 @ 05:50)  Hemoglobin: 8.8 g/dL (05-07 @ 06:23)  Hemoglobin: 8.7 g/dL (05-06 @ 02:08)      05-10    139  |  109[H]  |  6[L]  ----------------------------<  86  3.2[L]   |  17[L]  |  0.66    Ca    8.3[L]      10 May 2025 07:50  Phos  3.0     05-09  Mg     2.00     05-09      Creatinine Trend: 0.66<--, 0.69<--, 0.75<--, 0.70<--, 0.70<--, 0.77<--    COAGS:           T(C): 36.8 (05-10-25 @ 09:59), Max: 36.9 (05-09-25 @ 14:00)  HR: 94 (05-10-25 @ 09:59) (87 - 98)  BP: 140/66 (05-10-25 @ 09:59) (121/66 - 144/74)  RR: 18 (05-10-25 @ 09:59) (16 - 18)  SpO2: 100% (05-10-25 @ 09:59) (96% - 100%)  Wt(kg): --    I&O's Summary    09 May 2025 07:01  -  10 May 2025 07:00  --------------------------------------------------------  IN: 360 mL / OUT: 1741 mL / NET: -1381 mL    10 May 2025 07:01  -  10 May 2025 10:32  --------------------------------------------------------  IN: 0 mL / OUT: 350 mL / NET: -350 mL      Gen: NAD  HEENT:  (-)icterus (-)pallor  CV: N S1 S2 1/6 DARYA (+)2 Pulses B/l  Resp:  Clear to auscultation B/L, normal effort  GI: distended  Lymph:  (-)Edema, (-)obvious lymphadenopathy  Skin: Warm to touch, Normal turgor  Psych: Appropriate mood and affect      TELEMETRY: 	  NSR    ECG:  	NSR    < from: Xray Abdomen 1 View PORTABLE -Urgent (Xray Abdomen 1 View PORTABLE -Urgent .) (04.29.25 @ 10:09) >  IMPRESSION:  Multiple dilated loops of small and large bowel with question of   pneumoperitoneum. Recommend upright chest radiograph or decubitus   abdominal radiograph for further evaluation.    < end of copied text >    < from: CT Abdomen and Pelvis w/ Oral Cont and w/ IV Cont (04.29.25 @ 17:16) >  IMPRESSION:      Diffuse colonic dilatation, worse on today's study, likely secondary to   pseudoobstruction or ileus.    No free air.    Reduction in the bilateral psoas collections with catheters in place.    PRELIMINARY IMPRESSION: Study limited secondary to extensive streak   artifact from patient's spinal hardware. Diffusely dilated colon to the   level of the rectum, increased since 4/25/2025, without evidence of   mechanical obstruction. Possible etiologies include pseudoobstruction   versus ileus. No small bowel obstruction. No pneumoperitoneum.   Percutaneous posterior approach drainage catheters within the psoas   muscles bilaterally with interval decrease of previously seen abscess.   Redemonstrated erosive endplate changes at L1-L2 compatible with discitis   osteomyelitis.    Follow-up final report in the a.m.    < end of copied text >      ASSESSMENT/PLAN: 	Pt is a  79 year old male w/ HTN, HLD, T2DM, chronic low back pain presents as a transfer from [facility] ED for surgery. . Pt was sent into ED yesterday by his pain medicine physician due to recent MRI findings concerning for L1-L2 discitis/OM with epidural abscess, bilateral psoas abscesses. Patient initially saw pain specialist in February for chronic back pain with radiations to bilateral lateral thighs (R>L). MRI at that time showed degenerative changes. He subsequently had epidural injections x2 (2/27, 3/11) with moderate pain relief. Pain began to worsen on 4/10. He had radiofrequency ablation performed on 4/11 and has since has severe worsening of pain and radiation to R lateral thigh. He reports recent bowel/bladder "incontinence" requiring diaper due to his inability to get to the bathroom in time due to pain limitations but states urge and sensation are intact. Denies fevers, chills, night sweats, weakness, numbness/tingling, saddle anesthesia, recent falls/trauma. He uses a cane for ambulation. Denies recent falls/trauma or any other ortho injuries.   Found to have epidural abscess now s/p Decompression, spine, lumbar, posterior approach, with fusion of posterior spinal column, planned for psoas abscess drainage.    - s/p Decompression, spine, lumbar, posterior approach, with fusion of posterior spinal column  - s/p psoas drainage   - pain control/PT/bowel regimen  - c/w statin  - BP/HR stable  - for drain study on 05/13

## 2025-05-10 NOTE — PROGRESS NOTE ADULT - SUBJECTIVE AND OBJECTIVE BOX
NEPHROLOGY-[facility] [phone]        Patient seen and examined sitting in chair on room air, denies SOB/diff breathing.         MEDICATIONS  (STANDING):  acetaminophen     Tablet .. 975 milliGRAM(s) Oral every 6 hours  atorvastatin 40 milliGRAM(s) Oral at bedtime  bacitracin   Ointment 1 Application(s) Topical three times a day  ertapenem  IVPB      ertapenem  IVPB 1000 milliGRAM(s) IV Intermittent every 24 hours  gabapentin 100 milliGRAM(s) Oral every 8 hours  glucagon  Injectable 1 milliGRAM(s) IntraMuscular once  heparin   Injectable 5000 Unit(s) SubCutaneous every 8 hours  insulin lispro (ADMELOG) corrective regimen sliding scale   SubCutaneous three times a day before meals  insulin lispro (ADMELOG) corrective regimen sliding scale   SubCutaneous at bedtime  methocarbamol 500 milliGRAM(s) Oral every 12 hours  multivitamin 1 Tablet(s) Oral daily  omega-3-Acid Ethyl Esters 2 Gram(s) Oral two times a day  pantoprazole    Tablet 40 milliGRAM(s) Oral before breakfast  polyethylene glycol 3350 17 Gram(s) Oral at bedtime  potassium chloride   Powder 40 milliEquivalent(s) Oral daily  simethicone 80 milliGRAM(s) Chew two times a day      VITAL:  T(C): , Max: 36.9 (05-09-25 @ 14:00)  T(F): , Max: 98.5 (05-09-25 @ 14:00)  HR: 94 (05-10-25 @ 09:59)  BP: 140/66 (05-10-25 @ 09:59)  BP(mean): 83 (05-10-25 @ 06:30)  RR: 18 (05-10-25 @ 09:59)  SpO2: 100% (05-10-25 @ 09:59)  Wt(kg): --    I and O's:    05-09 @ 07:01  -  05-10 @ 07:00  --------------------------------------------------------  IN: 360 mL / OUT: 1741 mL / NET: -1381 mL    05-10 @ 07:01  -  05-10 @ 13:28  --------------------------------------------------------  IN: 0 mL / OUT: 350 mL / NET: -350 mL          PHYSICAL EXAM:    Constitutional: NAD  Neck:  No JVD  Respiratory: CTAB/L  Cardiovascular: S1 and S2  Gastrointestinal: BS+, soft, NT/ND  Extremities: right leg +edema  Neurological: A/O x 3, no focal deficits  Psychiatric: Normal mood, normal affect  : No Mac  Skin: No rashes  Access: Not applicable    LABS:                        8.2    7.77  )-----------( 565      ( 10 May 2025 07:50 )             24.5     05-10    139  |  109[H]  |  6[L]  ----------------------------<  86  3.2[L]   |  17[L]  |  0.66    Ca    8.3[L]      10 May 2025 07:50  Phos  3.0     05-09  Mg     2.00     05-09      Assessment and Plan:   · Assessment      IMPRESSION:  79yMale w/ HTN, HLD, T2DM, chronic low back pain presents as a transfer from [facility] ED for surgery today. Pt was sent into ED yesterday by his pain medicine physician due to recent MRI findings concerning for L1-L2 discitis/OM with epidural abscess, bilateral psoas abscesses    (1)Renal - CKD2, early diabetic nephropathy? Follows as outpatient with Dr. Syed Euceda  (2)Hyponatremia - urine studies c/w low effective arterial volume (appropriately increased ADH) -improved/stable s/p isotonic IVF  (3)Hypokalemia -whole body deficit - improving s/p repletion   (4)GI - ileus slowly improving; exacerbated by hypokalemia  (5)Hypophosphatemia - improved s/p repletion     RECOMMEND:  (1)A/w potassium replacement  (2)Continue Glucerna TID  (3)BMP, Mg, Phos daily   (4) Bladder scan   (5) monitor I&Os    Suni Munoz NP  [facility]  [phone]

## 2025-05-10 NOTE — PROGRESS NOTE ADULT - SUBJECTIVE AND OBJECTIVE BOX
Date of Service  : 05-10-25     INTERVAL HPI/OVERNIGHT EVENTS: I feel fine  .   Vital Signs Last 24 Hrs  T(C): 37.1 (10 May 2025 13:30), Max: 37.1 (10 May 2025 13:30)  T(F): 98.8 (10 May 2025 13:30), Max: 98.8 (10 May 2025 13:30)  HR: 89 (10 May 2025 13:30) (89 - 98)  BP: 131/61 (10 May 2025 13:30) (131/61 - 144/74)  BP(mean): 83 (10 May 2025 06:30) (83 - 83)  RR: 17 (10 May 2025 13:30) (16 - 18)  SpO2: 97% (10 May 2025 13:30) (97% - 100%)    Parameters below as of 10 May 2025 13:30  Patient On (Oxygen Delivery Method): room air      I&O's Summary    09 May 2025 07:01  -  10 May 2025 07:00  --------------------------------------------------------  IN: 360 mL / OUT: 1741 mL / NET: -1381 mL    10 May 2025 07:01  -  10 May 2025 18:14  --------------------------------------------------------  IN: 0 mL / OUT: 554 mL / NET: -554 mL      MEDICATIONS  (STANDING):  acetaminophen     Tablet .. 975 milliGRAM(s) Oral every 6 hours  atorvastatin 40 milliGRAM(s) Oral at bedtime  bacitracin   Ointment 1 Application(s) Topical three times a day  ertapenem  IVPB      ertapenem  IVPB 1000 milliGRAM(s) IV Intermittent every 24 hours  gabapentin 100 milliGRAM(s) Oral every 8 hours  glucagon  Injectable 1 milliGRAM(s) IntraMuscular once  heparin   Injectable 5000 Unit(s) SubCutaneous every 8 hours  insulin lispro (ADMELOG) corrective regimen sliding scale   SubCutaneous three times a day before meals  insulin lispro (ADMELOG) corrective regimen sliding scale   SubCutaneous at bedtime  methocarbamol 500 milliGRAM(s) Oral every 12 hours  multivitamin 1 Tablet(s) Oral daily  omega-3-Acid Ethyl Esters 2 Gram(s) Oral two times a day  pantoprazole    Tablet 40 milliGRAM(s) Oral before breakfast  polyethylene glycol 3350 17 Gram(s) Oral at bedtime  potassium chloride   Powder 40 milliEquivalent(s) Oral daily  simethicone 80 milliGRAM(s) Chew two times a day    MEDICATIONS  (PRN):  dibucaine 1% Ointment 1 Application(s) Topical four times a day PRN for anal discomfort d/t hemorrhoids  magnesium hydroxide Suspension 30 milliLiter(s) Oral every 12 hours PRN Constipation    LABS:                        8.2    7.77  )-----------( 565      ( 10 May 2025 07:50 )             24.5     05-10    139  |  109[H]  |  6[L]  ----------------------------<  86  3.2[L]   |  17[L]  |  0.66    Ca    8.3[L]      10 May 2025 07:50  Phos  3.0     05-09  Mg     2.00     05-09        Urinalysis Basic - ( 10 May 2025 07:50 )    Color: x / Appearance: x / SG: x / pH: x  Gluc: 86 mg/dL / Ketone: x  / Bili: x / Urobili: x   Blood: x / Protein: x / Nitrite: x   Leuk Esterase: x / RBC: x / WBC x   Sq Epi: x / Non Sq Epi: x / Bacteria: x      CAPILLARY BLOOD GLUCOSE      POCT Blood Glucose.: 132 mg/dL (10 May 2025 16:28)  POCT Blood Glucose.: 138 mg/dL (10 May 2025 11:08)  POCT Blood Glucose.: 97 mg/dL (10 May 2025 07:36)  POCT Blood Glucose.: 122 mg/dL (09 May 2025 21:46)        Urinalysis Basic - ( 10 May 2025 07:50 )    Color: x / Appearance: x / SG: x / pH: x  Gluc: 86 mg/dL / Ketone: x  / Bili: x / Urobili: x   Blood: x / Protein: x / Nitrite: x   Leuk Esterase: x / RBC: x / WBC x   Sq Epi: x / Non Sq Epi: x / Bacteria: x      REVIEW OF SYSTEMS:  CONSTITUTIONAL: No fever, weight loss, or fatigue  EYES: No eye pain, visual disturbances, or discharge  ENMT:  No difficulty hearing, tinnitus, vertigo; No sinus or throat pain  NECK: No pain or stiffness  RESPIRATORY: No cough, wheezing, chills or hemoptysis; No shortness of breath  CARDIOVASCULAR: No chest pain, palpitations, dizziness, or leg swelling  GASTROINTESTINAL: No abdominal or epigastric pain. No nausea, vomiting, or hematemesis; No diarrhea or constipation. No melena or hematochezia.  GENITOURINARY: No dysuria, frequency, hematuria, or incontinence  NEUROLOGICAL: No headaches, memory loss, loss of strength, numbness, or tremors      RADIOLOGY & ADDITIONAL TESTS:    Consultant(s) Notes Reviewed:  [x ] YES  [ ] NO    PHYSICAL EXAM:  GENERAL: NAD, well-groomed, well-developed,not in any distress ,  HEAD:  Atraumatic, Normocephalic  EYES: EOMI, PERRLA, conjunctiva and sclera clear  ENMT: No tonsillar erythema, exudates, or enlargement; Moist mucous membranes, Good dentition, No lesions  NECK: Supple, No JVD, Normal thyroid  NERVOUS SYSTEM:  Alert & Oriented X3, No focal deficit   CHEST/LUNG: Good air entry bilateral with no  rales, rhonchi, wheezing, or rubs  HEART: Regular rate and rhythm; No murmurs, rubs, or gallops  ABDOMEN: Soft, Nontender, lessdistended; Bowel sounds present  EXTREMITIES:  2+ Peripheral Pulses, No clubbing, cyanosis, or edema  SKIN: Erythema with Blister groin     Care Discussed with Consultants/Other Providers [ x] YES  [ ] NO

## 2025-05-10 NOTE — PROGRESS NOTE ADULT - ASSESSMENT
79M s/p T10-pelvis PSF on 4/24    Plan:  -WBAT BLLE  -appreciate ID recs: abx ertapenem  -appreciate GI recs for diarrhea: miralax qd, correct electrolytes to keep K>4 and Mg>2, avoid narcotics, no role for rectal tube given continual clinical improvement  -appreciate IR mgmt s/p psoas abscess drainage-- IR planning for next check 5/13     - 1 DHARA drains per IR     - Asp cx: bacteroides fragilis  -blood cx: NGF  -PT/OT  -dvt ppx: venodynes  -dispo: ADRIANA Cervantes, PGY-2  Orthopedic Surgery    [facility]: [id number]  [facility]: [id number]  [facility]:  [id number]/ [phone]

## 2025-05-10 NOTE — PROGRESS NOTE ADULT - ASSESSMENT
79yMale w/ HTN, HLD, T2DM, chronic low back pain presents as a transfer from [facility] ED for surgery today. Pt was sent into ED yesterday by his pain medicine physician due to recent MRI findings concerning for L1-L2 discitis/OM with epidural abscess, bilateral psoas abscesses. Patient initially saw pain specialist in February for chronic back pain with radiations to bilateral lateral thighs (R>L). MRI at that time showed degenerative changes. He subsequently had epidural injections x2 (2/27, 3/11) with moderate pain relief. Pain began to worsen on 4/10. He had radiofrequency ablation performed on 4/11 and has since has severe worsening of pain and radiation to R lateral thigh. He reports recent bowel/bladder "incontinence" requiring diaper due to his inability to get to the bathroom in time due to pain limitations but states urge and sensation are intact. Denies fevers, chills, night sweats, weakness, numbness/tingling, saddle anesthesia, recent falls/trauma. He uses a cane for ambulation. Denies recent falls/trauma or any other ortho injuries. Before back pain was very active walked half an hour , going up and down stairs and doing grocery etc with no Chest pain or SOB.        Problem/Recommendation - 1:  ·  Problem: Epidural abscess.   ·  Recommendation: S/P Decompression, spine, lumbar, posterior approach, with fusion of posterior spinal column.  On IV Abxs Ertapenem now  per ID x 6 weeks  .  Will defer management to  Neurosurgery.  DVT prophylaxis per Neurosurgery .  < from: MR Lumbar Spine w/ IV Cont (04.23.25 @ 15:13) >  IMPRESSION:        1.   Cervical spine:   Moderate degenerative disc disease and   spondylosis at C4-5 through C6/7 with loss of disc height and associated   degenerative endplate changes.        2.   Thoracic spine:   Minimal enhancement within T7-8 which may   reflect early discitis.        3.   Lumbar spine:   Osteomyelitis and discitis at L1-2 with erosions   of the inferior L1 vertebral body and L2 vertebral body consistent with   osteomyelitis and discitis. Ventral epidural abscess is noted extending   from the L1-2 level superiorly to the T10 level with mass effect on the   ventral thecal sac, most prominently at the L1-2 level resulting in   marked compression. Multiple abscesses within the BILATERAL psoas   muscles, the largest measuring 4.3 cm on the RIGHT and 2.8 cm on the LEFT.     Problem/Recommendation - 2:  ·  Problem: Acute osteomyelitis of lumbar spine.   ·  Recommendation: L1 &L2 vertebrae .  On IV Abxs per ID .  Will defer management to Neurosurgery.     Problem/Recommendation - 3:  ·  Problem: Low back pain radiating to right lower extremity.   ·  Recommendation: Pain control.     Problem/Recommendation - 4:  ·  Problem: . BILATERAL psoas Abscess  ·  Recommendation: ON IV Abxs per ID .  IR placed drains after draining  . IR to check drains .     Problem/Recommendation - 5:  ·  Problem: HTN (hypertension).   ·  Recommendation: Takes Amlodipine and ARB so continue with hold parameters.  < from: TTE W or WO Ultrasound Enhancing Agent (04.24.25 @ 10:10) >  CONCLUSIONS:      1. Left ventricular cavity is normal in size. Left ventricular wall thickness is normal. Left ventricular systolic function is normal with an ejection fraction of 64 % by Carpenter's method of disks. There are no regional wall motion abnormalities seen.   2. Normal right ventricular cavity size and normal right ventricular systolic function. Tricuspid annular plane systolic excursion (TAPSE) is 2.2 cm (normal >=1.7 cm).   3. Structurally normal mitral valve with normal leaflet excursion. There is calcification of the mitral valve annulus. There is tracemitral regurgitation.   4. The aortic valve appears trileaflet with normal systolic excursion. There is calcification of the aortic valve leaflets. There is mild aortic regurgitation.     Problem/Recommendation - 6:  ·  Problem: HLD (hyperlipidemia).   ·  Recommendation: Continue Atorvastatin.     Problem/Recommendation - 7:  ·  Problem: DM (diabetes mellitus).   ·  Recommendation: ON Farxiga and holding.  SSI and Lantus in patient .     Problem/Recommendation - 8:  ·  Problem:  Ileus /Pseudoobustraction.   ·  Recommendation: Had BMs and  passing flatus .  Correcting electrolytes.     ON Laxatives and Simethacone .  Advanced diet .   Surgery & GI input appreciated .  D/W GI attending and will wait till tomorrow before rectal tube placement  .  < from: CT Abdomen and Pelvis w/ Oral Cont and w/ IV Cont (04.29.25 @ 17:16) >  IMPRESSION:  Diffuse colonic dilatation, worse on today's study, likely secondary to   pseudoobstruction or ileus.    No free air.    Reduction in the bilateral psoas collections with catheters in place.    < from: Xray Abdomen 1 View PORTABLE -Urgent (Xray Abdomen 1 View PORTABLE -Urgent .) (04.29.25 @ 10:09) >  IMPRESSION:  Multiple dilated loops of small and large bowel with question of   pneumoperitoneum. Recommend upright chest radiograph or decubitus   abdominal radiograph for further evaluation.     Problem/Recommendation - 9:  ·  Problem: JUSTINO with Hyponatremia .   ·  Recommendation: Trending BMP.  Resolved.  Renal help appreciated.      Problem/Recommendation - 10:  ·  Problem: Hypokalemia & Hypophosphatemia .   ·  Recommendation: Replacing .    Rpt BMP noted.    Keep K level close to 4 .     Problem/Recommendation - 11:  ·  Problem: Thrombocytosis .   ·  Recommendation: Reactionary secondary to infection.  Platelets Trending down .     Problem/Recommendation - 12:  ·  Problem: Pedal edema >Right LE.   ·  Recommendation: No calf tenderness +,Ambulating.     Problem/Recommendation - 13:  ·  Problem: Rash with Blister .   ·  Recommendation: Will consult dermatology .      PT working with patient and ambulating .

## 2025-05-11 LAB
ANION GAP SERPL CALC-SCNC: 7 MMOL/L — SIGNIFICANT CHANGE UP (ref 7–14)
ANION GAP SERPL CALC-SCNC: 9 MMOL/L — SIGNIFICANT CHANGE UP (ref 7–14)
BUN SERPL-MCNC: 11 MG/DL — SIGNIFICANT CHANGE UP (ref 7–23)
BUN SERPL-MCNC: 8 MG/DL — SIGNIFICANT CHANGE UP (ref 7–23)
CALCIUM SERPL-MCNC: 8.2 MG/DL — LOW (ref 8.4–10.5)
CALCIUM SERPL-MCNC: 8.5 MG/DL — SIGNIFICANT CHANGE UP (ref 8.4–10.5)
CHLORIDE SERPL-SCNC: 109 MMOL/L — HIGH (ref 98–107)
CHLORIDE SERPL-SCNC: 109 MMOL/L — HIGH (ref 98–107)
CO2 SERPL-SCNC: 18 MMOL/L — LOW (ref 22–31)
CO2 SERPL-SCNC: 19 MMOL/L — LOW (ref 22–31)
CREAT SERPL-MCNC: 0.65 MG/DL — SIGNIFICANT CHANGE UP (ref 0.5–1.3)
CREAT SERPL-MCNC: 0.9 MG/DL — SIGNIFICANT CHANGE UP (ref 0.5–1.3)
EGFR: 87 ML/MIN/1.73M2 — SIGNIFICANT CHANGE UP
EGFR: 87 ML/MIN/1.73M2 — SIGNIFICANT CHANGE UP
EGFR: 96 ML/MIN/1.73M2 — SIGNIFICANT CHANGE UP
EGFR: 96 ML/MIN/1.73M2 — SIGNIFICANT CHANGE UP
GLUCOSE BLDC GLUCOMTR-MCNC: 109 MG/DL — HIGH (ref 70–99)
GLUCOSE BLDC GLUCOMTR-MCNC: 134 MG/DL — HIGH (ref 70–99)
GLUCOSE BLDC GLUCOMTR-MCNC: 136 MG/DL — HIGH (ref 70–99)
GLUCOSE BLDC GLUCOMTR-MCNC: 146 MG/DL — HIGH (ref 70–99)
GLUCOSE SERPL-MCNC: 102 MG/DL — HIGH (ref 70–99)
GLUCOSE SERPL-MCNC: 120 MG/DL — HIGH (ref 70–99)
MAGNESIUM SERPL-MCNC: 1.7 MG/DL — SIGNIFICANT CHANGE UP (ref 1.6–2.6)
PHOSPHATE SERPL-MCNC: 2.1 MG/DL — LOW (ref 2.5–4.5)
POTASSIUM SERPL-MCNC: 2.5 MMOL/L — CRITICAL LOW (ref 3.5–5.3)
POTASSIUM SERPL-MCNC: 3.9 MMOL/L — SIGNIFICANT CHANGE UP (ref 3.5–5.3)
POTASSIUM SERPL-SCNC: 2.5 MMOL/L — CRITICAL LOW (ref 3.5–5.3)
POTASSIUM SERPL-SCNC: 3.9 MMOL/L — SIGNIFICANT CHANGE UP (ref 3.5–5.3)
SODIUM SERPL-SCNC: 135 MMOL/L — SIGNIFICANT CHANGE UP (ref 135–145)
SODIUM SERPL-SCNC: 136 MMOL/L — SIGNIFICANT CHANGE UP (ref 135–145)

## 2025-05-11 RX ORDER — SPIRONOLACTONE 25 MG
50 TABLET ORAL DAILY
Refills: 0 | Status: DISCONTINUED | OUTPATIENT
Start: 2025-05-11 | End: 2025-05-16

## 2025-05-11 RX ADMIN — Medication 975 MILLIGRAM(S): at 11:57

## 2025-05-11 RX ADMIN — OMEGA-3-ACID ETHYL ESTERS CAPSULES 2 GRAM(S): 1 CAPSULE, LIQUID FILLED ORAL at 00:08

## 2025-05-11 RX ADMIN — Medication 100 MILLIEQUIVALENT(S): at 11:14

## 2025-05-11 RX ADMIN — GABAPENTIN 100 MILLIGRAM(S): 400 CAPSULE ORAL at 21:04

## 2025-05-11 RX ADMIN — Medication 975 MILLIGRAM(S): at 19:20

## 2025-05-11 RX ADMIN — ERTAPENEM SODIUM 100 MILLIGRAM(S): 1 INJECTION, POWDER, LYOPHILIZED, FOR SOLUTION INTRAMUSCULAR; INTRAVENOUS at 15:17

## 2025-05-11 RX ADMIN — Medication 975 MILLIGRAM(S): at 05:22

## 2025-05-11 RX ADMIN — Medication 975 MILLIGRAM(S): at 06:13

## 2025-05-11 RX ADMIN — Medication 100 MILLIEQUIVALENT(S): at 09:58

## 2025-05-11 RX ADMIN — Medication 975 MILLIGRAM(S): at 18:34

## 2025-05-11 RX ADMIN — Medication 80 MILLIGRAM(S): at 05:22

## 2025-05-11 RX ADMIN — HEPARIN SODIUM 5000 UNIT(S): 1000 INJECTION INTRAVENOUS; SUBCUTANEOUS at 05:22

## 2025-05-11 RX ADMIN — HEPARIN SODIUM 5000 UNIT(S): 1000 INJECTION INTRAVENOUS; SUBCUTANEOUS at 21:04

## 2025-05-11 RX ADMIN — GABAPENTIN 100 MILLIGRAM(S): 400 CAPSULE ORAL at 05:22

## 2025-05-11 RX ADMIN — GABAPENTIN 100 MILLIGRAM(S): 400 CAPSULE ORAL at 15:18

## 2025-05-11 RX ADMIN — OMEGA-3-ACID ETHYL ESTERS CAPSULES 2 GRAM(S): 1 CAPSULE, LIQUID FILLED ORAL at 11:57

## 2025-05-11 RX ADMIN — HEPARIN SODIUM 5000 UNIT(S): 1000 INJECTION INTRAVENOUS; SUBCUTANEOUS at 15:18

## 2025-05-11 RX ADMIN — Medication 40 MILLIEQUIVALENT(S): at 11:58

## 2025-05-11 RX ADMIN — POLYETHYLENE GLYCOL 3350 17 GRAM(S): 17 POWDER, FOR SOLUTION ORAL at 21:04

## 2025-05-11 RX ADMIN — Medication 975 MILLIGRAM(S): at 12:57

## 2025-05-11 RX ADMIN — Medication 80 MILLIGRAM(S): at 18:34

## 2025-05-11 RX ADMIN — Medication 1 TABLET(S): at 11:58

## 2025-05-11 RX ADMIN — Medication 100 MILLIEQUIVALENT(S): at 08:41

## 2025-05-11 RX ADMIN — METHOCARBAMOL 500 MILLIGRAM(S): 500 TABLET, FILM COATED ORAL at 11:57

## 2025-05-11 RX ADMIN — Medication 975 MILLIGRAM(S): at 00:08

## 2025-05-11 RX ADMIN — Medication 975 MILLIGRAM(S): at 01:05

## 2025-05-11 RX ADMIN — Medication 40 MILLIGRAM(S): at 05:22

## 2025-05-11 NOTE — PROGRESS NOTE ADULT - SUBJECTIVE AND OBJECTIVE BOX
Date of Service  : 05-11-25     INTERVAL HPI/OVERNIGHT EVENTS: I feel better.  Vital Signs Last 24 Hrs  T(C): 36.8 (11 May 2025 17:39), Max: 36.9 (11 May 2025 15:00)  T(F): 98.2 (11 May 2025 17:39), Max: 98.4 (11 May 2025 15:00)  HR: 94 (11 May 2025 17:39) (85 - 96)  BP: 134/64 (11 May 2025 17:39) (126/60 - 139/66)  BP(mean): --  RR: 17 (11 May 2025 17:39) (17 - 18)  SpO2: 100% (11 May 2025 17:39) (100% - 100%)    Parameters below as of 11 May 2025 17:39  Patient On (Oxygen Delivery Method): room air      I&O's Summary    10 May 2025 07:01  -  11 May 2025 07:00  --------------------------------------------------------  IN: 0 mL / OUT: 1260 mL / NET: -1260 mL    11 May 2025 07:01  -  11 May 2025 19:57  --------------------------------------------------------  IN: 0 mL / OUT: 1 mL / NET: -1 mL      MEDICATIONS  (STANDING):  acetaminophen     Tablet .. 975 milliGRAM(s) Oral every 6 hours  atorvastatin 40 milliGRAM(s) Oral at bedtime  bacitracin   Ointment 1 Application(s) Topical three times a day  ertapenem  IVPB      ertapenem  IVPB 1000 milliGRAM(s) IV Intermittent every 24 hours  gabapentin 100 milliGRAM(s) Oral every 8 hours  glucagon  Injectable 1 milliGRAM(s) IntraMuscular once  heparin   Injectable 5000 Unit(s) SubCutaneous every 8 hours  insulin lispro (ADMELOG) corrective regimen sliding scale   SubCutaneous three times a day before meals  insulin lispro (ADMELOG) corrective regimen sliding scale   SubCutaneous at bedtime  methocarbamol 500 milliGRAM(s) Oral every 12 hours  multivitamin 1 Tablet(s) Oral daily  omega-3-Acid Ethyl Esters 2 Gram(s) Oral two times a day  pantoprazole    Tablet 40 milliGRAM(s) Oral before breakfast  polyethylene glycol 3350 17 Gram(s) Oral at bedtime  potassium chloride   Powder 40 milliEquivalent(s) Oral daily  simethicone 80 milliGRAM(s) Chew two times a day  spironolactone 50 milliGRAM(s) Oral daily    MEDICATIONS  (PRN):  dibucaine 1% Ointment 1 Application(s) Topical four times a day PRN for anal discomfort d/t hemorrhoids  magnesium hydroxide Suspension 30 milliLiter(s) Oral every 12 hours PRN Constipation    LABS:                        8.2    7.77  )-----------( 565      ( 10 May 2025 07:50 )             24.5     05-11    135  |  109[H]  |  11  ----------------------------<  120[H]  3.9   |  19[L]  |  0.90    Ca    8.5      11 May 2025 15:13  Phos  2.1     05-11  Mg     1.70     05-11        Urinalysis Basic - ( 11 May 2025 15:13 )    Color: x / Appearance: x / SG: x / pH: x  Gluc: 120 mg/dL / Ketone: x  / Bili: x / Urobili: x   Blood: x / Protein: x / Nitrite: x   Leuk Esterase: x / RBC: x / WBC x   Sq Epi: x / Non Sq Epi: x / Bacteria: x      CAPILLARY BLOOD GLUCOSE      POCT Blood Glucose.: 136 mg/dL (11 May 2025 16:21)  POCT Blood Glucose.: 134 mg/dL (11 May 2025 11:28)  POCT Blood Glucose.: 109 mg/dL (11 May 2025 07:16)  POCT Blood Glucose.: 158 mg/dL (10 May 2025 21:22)        Urinalysis Basic - ( 11 May 2025 15:13 )    Color: x / Appearance: x / SG: x / pH: x  Gluc: 120 mg/dL / Ketone: x  / Bili: x / Urobili: x   Blood: x / Protein: x / Nitrite: x   Leuk Esterase: x / RBC: x / WBC x   Sq Epi: x / Non Sq Epi: x / Bacteria: x      REVIEW OF SYSTEMS:  CONSTITUTIONAL: No fever, weight loss, or fatigue  EYES: No eye pain, visual disturbances, or discharge  ENMT:  No difficulty hearing, tinnitus, vertigo; No sinus or throat pain  NECK: No pain or stiffness  RESPIRATORY: No cough, wheezing, chills or hemoptysis; No shortness of breath  CARDIOVASCULAR: No chest pain, palpitations, dizziness, or leg swelling  GASTROINTESTINAL: No abdominal or epigastric pain. No nausea, vomiting, or hematemesis; No diarrhea or constipation. No melena or hematochezia.  GENITOURINARY: No dysuria, frequency, hematuria, or incontinence  NEUROLOGICAL: No headaches, memory loss, loss of strength, numbness, or tremors      RADIOLOGY & ADDITIONAL TESTS:    Consultant(s) Notes Reviewed:  [x ] YES  [ ] NO    PHYSICAL EXAM:  GENERAL: NAD, well-groomed, well-developed,not in any distress ,  HEAD:  Atraumatic, Normocephalic  NECK: Supple, No JVD, Normal thyroid  NERVOUS SYSTEM:  Alert & Oriented X3, No focal deficit   CHEST/LUNG: Good air entry bilateral with no  rales, rhonchi, wheezing, or rubs  HEART: Regular rate and rhythm; No murmurs, rubs, or gallops  ABDOMEN: Soft, Nontender, Nondistended; Bowel sounds present  EXTREMITIES:  2+ Peripheral Pulses, No clubbing, cyanosis, or edema  Rash with  blister +groin .  Care Discussed with Consultants/Other Providers [ x] YES  [ ] NO

## 2025-05-11 NOTE — PROVIDER CONTACT NOTE (OTHER) - ASSESSMENT
A+Ox4, VSS, pt in no acute distress.
Pt has cough. b/l lower posterior lung fields inspiratory crackles.

## 2025-05-11 NOTE — PROGRESS NOTE ADULT - SUBJECTIVE AND OBJECTIVE BOX
DATE OF SERVICE: 05-11-25    no events overnight        acetaminophen     Tablet .. 975 milliGRAM(s) Oral every 6 hours  atorvastatin 40 milliGRAM(s) Oral at bedtime  bacitracin   Ointment 1 Application(s) Topical three times a day  dibucaine 1% Ointment 1 Application(s) Topical four times a day PRN  ertapenem  IVPB      ertapenem  IVPB 1000 milliGRAM(s) IV Intermittent every 24 hours  gabapentin 100 milliGRAM(s) Oral every 8 hours  glucagon  Injectable 1 milliGRAM(s) IntraMuscular once  heparin   Injectable 5000 Unit(s) SubCutaneous every 8 hours  insulin lispro (ADMELOG) corrective regimen sliding scale   SubCutaneous three times a day before meals  insulin lispro (ADMELOG) corrective regimen sliding scale   SubCutaneous at bedtime  magnesium hydroxide Suspension 30 milliLiter(s) Oral every 12 hours PRN  methocarbamol 500 milliGRAM(s) Oral every 12 hours  multivitamin 1 Tablet(s) Oral daily  omega-3-Acid Ethyl Esters 2 Gram(s) Oral two times a day  pantoprazole    Tablet 40 milliGRAM(s) Oral before breakfast  polyethylene glycol 3350 17 Gram(s) Oral at bedtime  potassium chloride   Powder 40 milliEquivalent(s) Oral daily  potassium chloride  10 mEq/100 mL IVPB 10 milliEquivalent(s) IV Intermittent every 1 hour  simethicone 80 milliGRAM(s) Chew two times a day                            8.2    7.77  )-----------( 565      ( 10 May 2025 07:50 )             24.5       Hemoglobin: 8.2 g/dL (05-10 @ 07:50)  Hemoglobin: 8.9 g/dL (05-09 @ 05:22)  Hemoglobin: 8.7 g/dL (05-08 @ 05:50)  Hemoglobin: 8.8 g/dL (05-07 @ 06:23)      05-11    136  |  109[H]  |  8   ----------------------------<  102[H]  2.5[LL]   |  18[L]  |  0.65    Ca    8.2[L]      11 May 2025 06:26  Phos  2.1     05-11  Mg     1.70     05-11      Creatinine Trend: 0.65<--, 0.66<--, 0.69<--, 0.75<--, 0.70<--, 0.70<--    COAGS:           T(C): 36.8 (05-11-25 @ 09:55), Max: 37.1 (05-10-25 @ 13:30)  HR: 91 (05-11-25 @ 09:55) (85 - 102)  BP: 137/62 (05-11-25 @ 09:55) (126/60 - 139/66)  RR: 18 (05-11-25 @ 09:55) (17 - 18)  SpO2: 100% (05-11-25 @ 09:55) (97% - 100%)  Wt(kg): --    I&O's Summary    10 May 2025 07:01  -  11 May 2025 07:00  --------------------------------------------------------  IN: 0 mL / OUT: 1260 mL / NET: -1260 mL              Gen: NAD  HEENT:  (-)icterus (-)pallor  CV: N S1 S2 1/6 DARYA (+)2 Pulses B/l  Resp:  Clear to auscultation B/L, normal effort  GI: distended  Lymph:  (-)Edema, (-)obvious lymphadenopathy  Skin: Warm to touch, Normal turgor  Psych: Appropriate mood and affect      TELEMETRY: 	  NSR    ECG:  	NSR    < from: Xray Abdomen 1 View PORTABLE -Urgent (Xray Abdomen 1 View PORTABLE -Urgent .) (04.29.25 @ 10:09) >  IMPRESSION:  Multiple dilated loops of small and large bowel with question of   pneumoperitoneum. Recommend upright chest radiograph or decubitus   abdominal radiograph for further evaluation.    < end of copied text >    < from: CT Abdomen and Pelvis w/ Oral Cont and w/ IV Cont (04.29.25 @ 17:16) >  IMPRESSION:      Diffuse colonic dilatation, worse on today's study, likely secondary to   pseudoobstruction or ileus.    No free air.    Reduction in the bilateral psoas collections with catheters in place.    PRELIMINARY IMPRESSION: Study limited secondary to extensive streak   artifact from patient's spinal hardware. Diffusely dilated colon to the   level of the rectum, increased since 4/25/2025, without evidence of   mechanical obstruction. Possible etiologies include pseudoobstruction   versus ileus. No small bowel obstruction. No pneumoperitoneum.   Percutaneous posterior approach drainage catheters within the psoas   muscles bilaterally with interval decrease of previously seen abscess.   Redemonstrated erosive endplate changes at L1-L2 compatible with discitis   osteomyelitis.    Follow-up final report in the a.m.    < end of copied text >      ASSESSMENT/PLAN: 	Pt is a  79 year old male w/ HTN, HLD, T2DM, chronic low back pain presents as a transfer from [facility] ED for surgery. . Pt was sent into ED yesterday by his pain medicine physician due to recent MRI findings concerning for L1-L2 discitis/OM with epidural abscess, bilateral psoas abscesses. Patient initially saw pain specialist in February for chronic back pain with radiations to bilateral lateral thighs (R>L). MRI at that time showed degenerative changes. He subsequently had epidural injections x2 (2/27, 3/11) with moderate pain relief. Pain began to worsen on 4/10. He had radiofrequency ablation performed on 4/11 and has since has severe worsening of pain and radiation to R lateral thigh. He reports recent bowel/bladder "incontinence" requiring diaper due to his inability to get to the bathroom in time due to pain limitations but states urge and sensation are intact. Denies fevers, chills, night sweats, weakness, numbness/tingling, saddle anesthesia, recent falls/trauma. He uses a cane for ambulation. Denies recent falls/trauma or any other ortho injuries.   Found to have epidural abscess now s/p Decompression, spine, lumbar, posterior approach, with fusion of posterior spinal column, planned for psoas abscess drainage.    - s/p Decompression, spine, lumbar, posterior approach, with fusion of posterior spinal column  - s/p psoas drainage   - pain control/PT/bowel regimen  - c/w statin  - BP/HR stable  - for drain study on 05/13

## 2025-05-11 NOTE — PROGRESS NOTE ADULT - ASSESSMENT
79yMale w/ HTN, HLD, T2DM, chronic low back pain presents as a transfer from [facility] ED for surgery today. Pt was sent into ED yesterday by his pain medicine physician due to recent MRI findings concerning for L1-L2 discitis/OM with epidural abscess, bilateral psoas abscesses. Patient initially saw pain specialist in February for chronic back pain with radiations to bilateral lateral thighs (R>L). MRI at that time showed degenerative changes. He subsequently had epidural injections x2 (2/27, 3/11) with moderate pain relief. Pain began to worsen on 4/10. He had radiofrequency ablation performed on 4/11 and has since has severe worsening of pain and radiation to R lateral thigh. He reports recent bowel/bladder "incontinence" requiring diaper due to his inability to get to the bathroom in time due to pain limitations but states urge and sensation are intact. Denies fevers, chills, night sweats, weakness, numbness/tingling, saddle anesthesia, recent falls/trauma. He uses a cane for ambulation. Denies recent falls/trauma or any other ortho injuries. Before back pain was very active walked half an hour , going up and down stairs and doing grocery etc with no Chest pain or SOB.        Problem/Recommendation - 1:  ·  Problem: Epidural abscess.   ·  Recommendation: S/P Decompression, spine, lumbar, posterior approach, with fusion of posterior spinal column.  On IV Abxs Ertapenem now  per ID x 6 weeks  .  Will defer management to  Neurosurgery.  DVT prophylaxis per Neurosurgery .  < from: MR Lumbar Spine w/ IV Cont (04.23.25 @ 15:13) >  IMPRESSION:        1.   Cervical spine:   Moderate degenerative disc disease and   spondylosis at C4-5 through C6/7 with loss of disc height and associated   degenerative endplate changes.        2.   Thoracic spine:   Minimal enhancement within T7-8 which may   reflect early discitis.        3.   Lumbar spine:   Osteomyelitis and discitis at L1-2 with erosions   of the inferior L1 vertebral body and L2 vertebral body consistent with   osteomyelitis and discitis. Ventral epidural abscess is noted extending   from the L1-2 level superiorly to the T10 level with mass effect on the   ventral thecal sac, most prominently at the L1-2 level resulting in   marked compression. Multiple abscesses within the BILATERAL psoas   muscles, the largest measuring 4.3 cm on the RIGHT and 2.8 cm on the LEFT.     Problem/Recommendation - 2:  ·  Problem: Acute osteomyelitis of lumbar spine.   ·  Recommendation: L1 &L2 vertebrae .  On IV Abxs per ID .  Will defer management to Neurosurgery.     Problem/Recommendation - 3:  ·  Problem: Low back pain radiating to right lower extremity.   ·  Recommendation: Pain control.     Problem/Recommendation - 4:  ·  Problem: . BILATERAL psoas Abscess  ·  Recommendation: ON IV Abxs per ID .  IR placed drains after draining  . IR to check drains .     Problem/Recommendation - 5:  ·  Problem: HTN (hypertension).   ·  Recommendation: Takes Amlodipine and ARB so continue with hold parameters.  < from: TTE W or WO Ultrasound Enhancing Agent (04.24.25 @ 10:10) >  CONCLUSIONS:      1. Left ventricular cavity is normal in size. Left ventricular wall thickness is normal. Left ventricular systolic function is normal with an ejection fraction of 64 % by Carpenter's method of disks. There are no regional wall motion abnormalities seen.   2. Normal right ventricular cavity size and normal right ventricular systolic function. Tricuspid annular plane systolic excursion (TAPSE) is 2.2 cm (normal >=1.7 cm).   3. Structurally normal mitral valve with normal leaflet excursion. There is calcification of the mitral valve annulus. There is tracemitral regurgitation.   4. The aortic valve appears trileaflet with normal systolic excursion. There is calcification of the aortic valve leaflets. There is mild aortic regurgitation.     Problem/Recommendation - 6:  ·  Problem: HLD (hyperlipidemia).   ·  Recommendation: Continue Atorvastatin.     Problem/Recommendation - 7:  ·  Problem: DM (diabetes mellitus).   ·  Recommendation: ON Farxiga and holding.  SSI and Lantus in patient .     Problem/Recommendation - 8:  ·  Problem:  Ileus /Pseudoobstruction   ·  Recommendation: Improving.   Had BMs and  passing flatus .  Correcting electrolytes.     ON Laxatives and Simethacone .  Advanced diet .   Surgery & GI input appreciated .  D/W GI attending and will wait till tomorrow before rectal tube placement  .  < from: CT Abdomen and Pelvis w/ Oral Cont and w/ IV Cont (04.29.25 @ 17:16) >  IMPRESSION:  Diffuse colonic dilatation, worse on today's study, likely secondary to   pseudoobstruction or ileus.    No free air.    Reduction in the bilateral psoas collections with catheters in place.    < from: Xray Abdomen 1 View PORTABLE -Urgent (Xray Abdomen 1 View PORTABLE -Urgent .) (04.29.25 @ 10:09) >  IMPRESSION:  Multiple dilated loops of small and large bowel with question of   pneumoperitoneum. Recommend upright chest radiograph or decubitus   abdominal radiograph for further evaluation.     Problem/Recommendation - 9:  ·  Problem: JUSTINO with Hyponatremia .   ·  Recommendation: Trending BMP.  Resolved.  Renal help appreciated.      Problem/Recommendation - 10:  ·  Problem: Hypokalemia & Hypophosphatemia .   ·  Recommendation: Replaced .     K3.9 .    Aldactone 50mg started.    Rpt BMP noted.    Keep K level close to 4 .     Problem/Recommendation - 11:  ·  Problem: Thrombocytosis .   ·  Recommendation: Reactionary secondary to infection.  Platelets Trending down .     Problem/Recommendation - 12:  ·  Problem: Pedal edema >Right LE.   ·  Recommendation: No calf tenderness +,Ambulating.     Problem/Recommendation - 13:  ·  Problem: Rash with Blister .   ·  Recommendation: Will consult dermatology .      PT working with patient and ambulating .

## 2025-05-11 NOTE — PROGRESS NOTE ADULT - ASSESSMENT
79M s/p T10-pelvis PSF on 4/24    Plan:  -repleting K, appreciate nephrology/medicine recs   -WBAT BLLE  -appreciate ID recs: abx ertapenem  -appreciate GI recs for diarrhea: miralax qd, correct electrolytes to keep K>4 and Mg>2, avoid narcotics, no role for rectal tube given continual clinical improvement  -appreciate IR mgmt s/p psoas abscess drainage-- IR planning for next check 5/13     - 1 DHARA drain per IR     - Asp cx: bacteroides fragilis  -blood cx: NGF  -PT/OT  -dvt ppx: venodynes  -dispo: ADRIANA Aviles MD, PGY-2  Orthopaedic Surgery  [facility] [place] [id number]  [facility]        [id number]  [facility]  [id number]/ [phone]

## 2025-05-11 NOTE — PROGRESS NOTE ADULT - SUBJECTIVE AND OBJECTIVE BOX
SUBJECTIVE  Patient seen and examined at bedside. No acute events overnight. States pain is controlled. Denies fevers/chills, chest pain, or dyspnea.    OBJECTIVE  Vital Signs Last 24 Hrs  T(C): 36.7 (11 May 2025 05:20), Max: 37.1 (10 May 2025 13:30)  T(F): 98.1 (11 May 2025 05:20), Max: 98.8 (10 May 2025 13:30)  HR: 85 (11 May 2025 05:20) (85 - 102)  BP: 126/60 (11 May 2025 05:20) (126/60 - 140/66)  BP(mean): --  RR: 18 (11 May 2025 05:20) (17 - 18)  SpO2: 100% (11 May 2025 05:20) (97% - 100%)    Parameters below as of 11 May 2025 05:20  Patient On (Oxygen Delivery Method): room air        PHYSICAL EXAM  Gen: Lying in bed, NAD  Resp: No increased WOB  Spine:  Dressing: clean/dry/intact    Drains: IR Drain in place from 2/7  Motor:                   C5                C6              C7               C8           T1   R            5/5                5/5            5/5             5/5          5/5  L             5/5               5/5             5/5             5/5          5/5                L2             L3             L4               L5            S1  R         3/5           4/5          5/5             5/5           5/5  L          4/5          5/5           5/5             5/5           5/5            Calves Soft/Non-tender bilaterally         Distal extremities warm well perfused; capillary refill <3 seconds       LABS                        8.2    7.77  )-----------( 565      ( 10 May 2025 07:50 )             24.5     05-11    136  |  109[H]  |  8   ----------------------------<  102[H]  2.5[LL]   |  18[L]  |  0.65    Ca    8.2[L]      11 May 2025 06:26  Phos  2.1     05-11  Mg     1.70     05-11

## 2025-05-11 NOTE — PROVIDER CONTACT NOTE (OTHER) - SITUATION
Pt complains of cough and
Pt IV , both RN and manager attempted to put in new IV but were unsuccessful

## 2025-05-12 LAB
ANION GAP SERPL CALC-SCNC: 10 MMOL/L — SIGNIFICANT CHANGE UP (ref 7–14)
ANION GAP SERPL CALC-SCNC: 9 MMOL/L — SIGNIFICANT CHANGE UP (ref 7–14)
BUN SERPL-MCNC: 7 MG/DL — SIGNIFICANT CHANGE UP (ref 7–23)
BUN SERPL-MCNC: 8 MG/DL — SIGNIFICANT CHANGE UP (ref 7–23)
CALCIUM SERPL-MCNC: 8.6 MG/DL — SIGNIFICANT CHANGE UP (ref 8.4–10.5)
CALCIUM SERPL-MCNC: 8.8 MG/DL — SIGNIFICANT CHANGE UP (ref 8.4–10.5)
CHLORIDE SERPL-SCNC: 109 MMOL/L — HIGH (ref 98–107)
CHLORIDE SERPL-SCNC: 112 MMOL/L — HIGH (ref 98–107)
CO2 SERPL-SCNC: 19 MMOL/L — LOW (ref 22–31)
CO2 SERPL-SCNC: 20 MMOL/L — LOW (ref 22–31)
CREAT SERPL-MCNC: 0.65 MG/DL — SIGNIFICANT CHANGE UP (ref 0.5–1.3)
CREAT SERPL-MCNC: 0.66 MG/DL — SIGNIFICANT CHANGE UP (ref 0.5–1.3)
EGFR: 95 ML/MIN/1.73M2 — SIGNIFICANT CHANGE UP
EGFR: 95 ML/MIN/1.73M2 — SIGNIFICANT CHANGE UP
EGFR: 96 ML/MIN/1.73M2 — SIGNIFICANT CHANGE UP
EGFR: 96 ML/MIN/1.73M2 — SIGNIFICANT CHANGE UP
GLUCOSE BLDC GLUCOMTR-MCNC: 112 MG/DL — HIGH (ref 70–99)
GLUCOSE BLDC GLUCOMTR-MCNC: 121 MG/DL — HIGH (ref 70–99)
GLUCOSE BLDC GLUCOMTR-MCNC: 196 MG/DL — HIGH (ref 70–99)
GLUCOSE BLDC GLUCOMTR-MCNC: 85 MG/DL — SIGNIFICANT CHANGE UP (ref 70–99)
GLUCOSE SERPL-MCNC: 102 MG/DL — HIGH (ref 70–99)
GLUCOSE SERPL-MCNC: 127 MG/DL — HIGH (ref 70–99)
HCT VFR BLD CALC: 22.6 % — LOW (ref 39–50)
HGB BLD-MCNC: 7.7 G/DL — LOW (ref 13–17)
MAGNESIUM SERPL-MCNC: 1.8 MG/DL — SIGNIFICANT CHANGE UP (ref 1.6–2.6)
MCHC RBC-ENTMCNC: 31.6 PG — SIGNIFICANT CHANGE UP (ref 27–34)
MCHC RBC-ENTMCNC: 34.1 G/DL — SIGNIFICANT CHANGE UP (ref 32–36)
MCV RBC AUTO: 92.6 FL — SIGNIFICANT CHANGE UP (ref 80–100)
NRBC # BLD AUTO: 0 K/UL — SIGNIFICANT CHANGE UP (ref 0–0)
NRBC # FLD: 0 K/UL — SIGNIFICANT CHANGE UP (ref 0–0)
NRBC BLD AUTO-RTO: 0 /100 WBCS — SIGNIFICANT CHANGE UP (ref 0–0)
PHOSPHATE SERPL-MCNC: 2.4 MG/DL — LOW (ref 2.5–4.5)
PLATELET # BLD AUTO: 512 K/UL — HIGH (ref 150–400)
POTASSIUM SERPL-MCNC: 2.9 MMOL/L — CRITICAL LOW (ref 3.5–5.3)
POTASSIUM SERPL-MCNC: 4.1 MMOL/L — SIGNIFICANT CHANGE UP (ref 3.5–5.3)
POTASSIUM SERPL-SCNC: 2.9 MMOL/L — CRITICAL LOW (ref 3.5–5.3)
POTASSIUM SERPL-SCNC: 4.1 MMOL/L — SIGNIFICANT CHANGE UP (ref 3.5–5.3)
RBC # BLD: 2.44 M/UL — LOW (ref 4.2–5.8)
RBC # FLD: 21.8 % — HIGH (ref 10.3–14.5)
SODIUM SERPL-SCNC: 138 MMOL/L — SIGNIFICANT CHANGE UP (ref 135–145)
SODIUM SERPL-SCNC: 141 MMOL/L — SIGNIFICANT CHANGE UP (ref 135–145)
WBC # BLD: 7.51 K/UL — SIGNIFICANT CHANGE UP (ref 3.8–10.5)
WBC # FLD AUTO: 7.51 K/UL — SIGNIFICANT CHANGE UP (ref 3.8–10.5)

## 2025-05-12 RX ORDER — HEPARIN SODIUM 1000 [USP'U]/ML
5000 INJECTION INTRAVENOUS; SUBCUTANEOUS ONCE
Refills: 0 | Status: COMPLETED | OUTPATIENT
Start: 2025-05-12 | End: 2025-05-12

## 2025-05-12 RX ORDER — POTASSIUM PHOSPHATE, MONOBASIC POTASSIUM PHOSPHATE, DIBASIC INJECTION, 236; 224 MG/ML; MG/ML
30 SOLUTION, CONCENTRATE INTRAVENOUS ONCE
Refills: 0 | Status: COMPLETED | OUTPATIENT
Start: 2025-05-12 | End: 2025-05-12

## 2025-05-12 RX ORDER — MAGNESIUM SULFATE 500 MG/ML
1 SYRINGE (ML) INJECTION ONCE
Refills: 0 | Status: COMPLETED | OUTPATIENT
Start: 2025-05-12 | End: 2025-05-12

## 2025-05-12 RX ADMIN — Medication 40 MILLIEQUIVALENT(S): at 12:31

## 2025-05-12 RX ADMIN — OMEGA-3-ACID ETHYL ESTERS CAPSULES 2 GRAM(S): 1 CAPSULE, LIQUID FILLED ORAL at 23:48

## 2025-05-12 RX ADMIN — Medication 100 GRAM(S): at 08:56

## 2025-05-12 RX ADMIN — Medication 975 MILLIGRAM(S): at 01:31

## 2025-05-12 RX ADMIN — GABAPENTIN 100 MILLIGRAM(S): 400 CAPSULE ORAL at 13:38

## 2025-05-12 RX ADMIN — Medication 1 APPLICATION(S): at 13:37

## 2025-05-12 RX ADMIN — Medication 80 MILLIGRAM(S): at 05:45

## 2025-05-12 RX ADMIN — Medication 975 MILLIGRAM(S): at 05:44

## 2025-05-12 RX ADMIN — ERTAPENEM SODIUM 100 MILLIGRAM(S): 1 INJECTION, POWDER, LYOPHILIZED, FOR SOLUTION INTRAMUSCULAR; INTRAVENOUS at 13:39

## 2025-05-12 RX ADMIN — Medication 100 MILLIEQUIVALENT(S): at 07:57

## 2025-05-12 RX ADMIN — Medication 975 MILLIGRAM(S): at 23:48

## 2025-05-12 RX ADMIN — Medication 100 MILLIEQUIVALENT(S): at 10:10

## 2025-05-12 RX ADMIN — Medication 975 MILLIGRAM(S): at 12:56

## 2025-05-12 RX ADMIN — HEPARIN SODIUM 5000 UNIT(S): 1000 INJECTION INTRAVENOUS; SUBCUTANEOUS at 05:44

## 2025-05-12 RX ADMIN — POLYETHYLENE GLYCOL 3350 17 GRAM(S): 17 POWDER, FOR SOLUTION ORAL at 21:23

## 2025-05-12 RX ADMIN — Medication 975 MILLIGRAM(S): at 19:37

## 2025-05-12 RX ADMIN — Medication 975 MILLIGRAM(S): at 00:42

## 2025-05-12 RX ADMIN — Medication 100 MILLIEQUIVALENT(S): at 11:08

## 2025-05-12 RX ADMIN — Medication 40 MILLIEQUIVALENT(S): at 07:57

## 2025-05-12 RX ADMIN — METHOCARBAMOL 500 MILLIGRAM(S): 500 TABLET, FILM COATED ORAL at 23:48

## 2025-05-12 RX ADMIN — HEPARIN SODIUM 5000 UNIT(S): 1000 INJECTION INTRAVENOUS; SUBCUTANEOUS at 21:23

## 2025-05-12 RX ADMIN — Medication 80 MILLIGRAM(S): at 18:20

## 2025-05-12 RX ADMIN — POTASSIUM PHOSPHATE, MONOBASIC POTASSIUM PHOSPHATE, DIBASIC INJECTION, 83.33 MILLIMOLE(S): 236; 224 SOLUTION, CONCENTRATE INTRAVENOUS at 12:31

## 2025-05-12 RX ADMIN — GABAPENTIN 100 MILLIGRAM(S): 400 CAPSULE ORAL at 05:46

## 2025-05-12 RX ADMIN — METHOCARBAMOL 500 MILLIGRAM(S): 500 TABLET, FILM COATED ORAL at 00:42

## 2025-05-12 RX ADMIN — Medication 975 MILLIGRAM(S): at 18:20

## 2025-05-12 RX ADMIN — INSULIN LISPRO 1: 100 INJECTION, SOLUTION INTRAVENOUS; SUBCUTANEOUS at 11:44

## 2025-05-12 RX ADMIN — OMEGA-3-ACID ETHYL ESTERS CAPSULES 2 GRAM(S): 1 CAPSULE, LIQUID FILLED ORAL at 12:31

## 2025-05-12 RX ADMIN — OMEGA-3-ACID ETHYL ESTERS CAPSULES 2 GRAM(S): 1 CAPSULE, LIQUID FILLED ORAL at 00:42

## 2025-05-12 RX ADMIN — Medication 975 MILLIGRAM(S): at 12:32

## 2025-05-12 RX ADMIN — Medication 40 MILLIGRAM(S): at 05:44

## 2025-05-12 RX ADMIN — Medication 975 MILLIGRAM(S): at 06:30

## 2025-05-12 RX ADMIN — HEPARIN SODIUM 5000 UNIT(S): 1000 INJECTION INTRAVENOUS; SUBCUTANEOUS at 13:37

## 2025-05-12 RX ADMIN — GABAPENTIN 100 MILLIGRAM(S): 400 CAPSULE ORAL at 21:24

## 2025-05-12 RX ADMIN — Medication 50 MILLIGRAM(S): at 05:45

## 2025-05-12 RX ADMIN — Medication 1 TABLET(S): at 12:32

## 2025-05-12 NOTE — PROGRESS NOTE ADULT - SUBJECTIVE AND OBJECTIVE BOX
DATE OF SERVICE: 05-12-25    no events overnight, no pain or SOB     acetaminophen     Tablet .. 975 milliGRAM(s) Oral every 6 hours  atorvastatin 40 milliGRAM(s) Oral at bedtime  bacitracin   Ointment 1 Application(s) Topical three times a day  dibucaine 1% Ointment 1 Application(s) Topical four times a day PRN    ertapenem  IVPB 1000 milliGRAM(s) IV Intermittent every 24 hours  gabapentin 100 milliGRAM(s) Oral every 8 hours  glucagon  Injectable 1 milliGRAM(s) IntraMuscular once  heparin   Injectable 5000 Unit(s) SubCutaneous every 8 hours  insulin lispro (ADMELOG) corrective regimen sliding scale   SubCutaneous three times a day before meals  insulin lispro (ADMELOG) corrective regimen sliding scale   SubCutaneous at bedtime  magnesium hydroxide Suspension 30 milliLiter(s) Oral every 12 hours PRN  methocarbamol 500 milliGRAM(s) Oral every 12 hours  multivitamin 1 Tablet(s) Oral daily  omega-3-Acid Ethyl Esters 2 Gram(s) Oral two times a day  pantoprazole    Tablet 40 milliGRAM(s) Oral before breakfast  polyethylene glycol 3350 17 Gram(s) Oral at bedtime  potassium chloride   Powder 40 milliEquivalent(s) Oral daily  simethicone 80 milliGRAM(s) Chew two times a day  spironolactone 50 milliGRAM(s) Oral daily                            7.7    7.51  )-----------( 512      ( 12 May 2025 05:52 )             22.6     141  |  112[H]  |  7   ----------------------------<  102[H]  2.9[LL]   |  19[L]  |  0.65    Ca    8.8      12 May 2025 05:52  Phos  2.4     05-12  Mg     1.80     05-12  Creatinine Trend: 0.65<--, 0.90<--, 0.65<--, 0.66<--, 0.69<--, 0.75<--      T(C): 37.2 (05-12-25 @ 13:10), Max: 37.2 (05-12-25 @ 13:10)  HR: 91 (05-12-25 @ 13:10) (80 - 94)  BP: 122/69 (05-12-25 @ 13:10) (122/69 - 138/57)  RR: 17 (05-12-25 @ 13:10) (17 - 18)  SpO2: 95% (05-12-25 @ 13:10) (95% - 100%)  Wt(kg): --    I&O's Summary    11 May 2025 07:01  -  12 May 2025 07:00  --------------------------------------------------------  IN: 0 mL / OUT: 1451.5 mL / NET: -1451.5 mL    12 May 2025 07:01  -  12 May 2025 15:37  --------------------------------------------------------  IN: 0 mL / OUT: 553 mL / NET: -553 mL        Gen: NAD  HEENT:  (-)icterus (-)pallor  CV: N S1 S2 1/6 DARYA (+)2 Pulses B/l  Resp:  Clear to auscultation B/L, normal effort  GI: distended  Lymph:  (-)Edema, (-)obvious lymphadenopathy  Skin: Warm to touch, Normal turgor  Psych: Appropriate mood and affect      TELEMETRY: 	  NSR    ECG:  	NSR    < from: Xray Abdomen 1 View PORTABLE -Urgent (Xray Abdomen 1 View PORTABLE -Urgent .) (04.29.25 @ 10:09) >  IMPRESSION:  Multiple dilated loops of small and large bowel with question of   pneumoperitoneum. Recommend upright chest radiograph or decubitus   abdominal radiograph for further evaluation.    < end of copied text >    < from: CT Abdomen and Pelvis w/ Oral Cont and w/ IV Cont (04.29.25 @ 17:16) >  IMPRESSION:      Diffuse colonic dilatation, worse on today's study, likely secondary to   pseudoobstruction or ileus.    No free air.    Reduction in the bilateral psoas collections with catheters in place.    PRELIMINARY IMPRESSION: Study limited secondary to extensive streak   artifact from patient's spinal hardware. Diffusely dilated colon to the   level of the rectum, increased since 4/25/2025, without evidence of   mechanical obstruction. Possible etiologies include pseudoobstruction   versus ileus. No small bowel obstruction. No pneumoperitoneum.   Percutaneous posterior approach drainage catheters within the psoas   muscles bilaterally with interval decrease of previously seen abscess.   Redemonstrated erosive endplate changes at L1-L2 compatible with discitis   osteomyelitis.    Follow-up final report in the a.m.    < end of copied text >      ASSESSMENT/PLAN: 	Pt is a  79 year old male w/ HTN, HLD, T2DM, chronic low back pain presents as a transfer from [facility] ED for surgery. . Pt was sent into ED yesterday by his pain medicine physician due to recent MRI findings concerning for L1-L2 discitis/OM with epidural abscess, bilateral psoas abscesses. Patient initially saw pain specialist in February for chronic back pain with radiations to bilateral lateral thighs (R>L). MRI at that time showed degenerative changes. He subsequently had epidural injections x2 (2/27, 3/11) with moderate pain relief. Pain began to worsen on 4/10. He had radiofrequency ablation performed on 4/11 and has since has severe worsening of pain and radiation to R lateral thigh. He reports recent bowel/bladder "incontinence" requiring diaper due to his inability to get to the bathroom in time due to pain limitations but states urge and sensation are intact. Denies fevers, chills, night sweats, weakness, numbness/tingling, saddle anesthesia, recent falls/trauma. He uses a cane for ambulation. Denies recent falls/trauma or any other ortho injuries.   Found to have epidural abscess now s/p Decompression, spine, lumbar, posterior approach, with fusion of posterior spinal column, planned for psoas abscess drainage.    - s/p Decompression, spine, lumbar, posterior approach, with fusion of posterior spinal column  - s/p psoas drainage   - pain control/PT/bowel regimen  - c/w statin  - BP/HR stable  - for drain study on 05/13

## 2025-05-12 NOTE — PROGRESS NOTE ADULT - ASSESSMENT
79M s/p T10-pelvis PSF on 4/24    Plan:  -repleting K, appreciate nephrology/medicine recs   -WBAT BLLE  -appreciate ID recs: abx ertapenem  -appreciate GI recs for diarrhea: miralax qd, correct electrolytes to keep K>4 and Mg>2, avoid narcotics, no role for rectal tube given continual clinical improvement  -appreciate IR mgmt s/p psoas abscess drainage-- IR planning for next check 5/13     - 1 DHARA drain per IR     - Asp cx: bacteroides fragilis  -blood cx: NGF  -PT/OT  -dvt ppx: venodynes  -dispo: ADRIANA

## 2025-05-12 NOTE — PROGRESS NOTE ADULT - SUBJECTIVE AND OBJECTIVE BOX
[facility] [phone]        Patient seen and examined he reports ongoing diarrhea.         MEDICATIONS  (STANDING):  acetaminophen     Tablet .. 975 milliGRAM(s) Oral every 6 hours  atorvastatin 40 milliGRAM(s) Oral at bedtime  bacitracin   Ointment 1 Application(s) Topical three times a day  ertapenem  IVPB      ertapenem  IVPB 1000 milliGRAM(s) IV Intermittent every 24 hours  gabapentin 100 milliGRAM(s) Oral every 8 hours  glucagon  Injectable 1 milliGRAM(s) IntraMuscular once  heparin   Injectable 5000 Unit(s) SubCutaneous every 8 hours  insulin lispro (ADMELOG) corrective regimen sliding scale   SubCutaneous three times a day before meals  insulin lispro (ADMELOG) corrective regimen sliding scale   SubCutaneous at bedtime  methocarbamol 500 milliGRAM(s) Oral every 12 hours  multivitamin 1 Tablet(s) Oral daily  omega-3-Acid Ethyl Esters 2 Gram(s) Oral two times a day  pantoprazole    Tablet 40 milliGRAM(s) Oral before breakfast  polyethylene glycol 3350 17 Gram(s) Oral at bedtime  potassium chloride   Powder 40 milliEquivalent(s) Oral daily  simethicone 80 milliGRAM(s) Chew two times a day  spironolactone 50 milliGRAM(s) Oral daily      VITAL:  T(C): , Max: 36.9 (05-11-25 @ 15:00)  T(F): , Max: 98.5 (05-12-25 @ 01:47)  HR: 94 (05-12-25 @ 09:13)  BP: 128/66 (05-12-25 @ 09:13)  BP(mean): --  RR: 17 (05-12-25 @ 09:13)  SpO2: 97% (05-12-25 @ 09:13)  Wt(kg): --    I and O's:    05-11 @ 07:01  -  05-12 @ 07:00  --------------------------------------------------------  IN: 0 mL / OUT: 1451.5 mL / NET: -1451.5 mL    05-12 @ 07:01  -  05-12 @ 12:51  --------------------------------------------------------  IN: 0 mL / OUT: 303 mL / NET: -303 mL          PHYSICAL EXAM:    Constitutional: NAD  Neck:  No JVD  Respiratory: CTAB/L  Cardiovascular: S1 and S2  Gastrointestinal: BS+, soft, NT/ND  Extremities: No peripheral edema  Neurological: A/O x 3, no focal deficits  Psychiatric: Normal mood, normal affect  : No Mac  Skin: No rashes  Access: Not applicable    LABS:                        7.7    7.51  )-----------( 512      ( 12 May 2025 05:52 )             22.6     05-12    141  |  112[H]  |  7   ----------------------------<  102[H]  2.9[LL]   |  19[L]  |  0.65    Ca    8.8      12 May 2025 05:52  Phos  2.4     05-12  Mg     1.80     05-12            Urine Studies:  Urinalysis Basic - ( 12 May 2025 05:52 )    Color: x / Appearance: x / SG: x / pH: x  Gluc: 102 mg/dL / Ketone: x  / Bili: x / Urobili: x   Blood: x / Protein: x / Nitrite: x   Leuk Esterase: x / RBC: x / WBC x   Sq Epi: x / Non Sq Epi: x / Bacteria: x            RADIOLOGY & ADDITIONAL STUDIES:        IMPRESSION:  79yMale w/ HTN, HLD, T2DM, chronic low back pain presents as a transfer from [facility] ED for surgery today. Pt was sent into ED yesterday by his pain medicine physician due to recent MRI findings concerning for L1-L2 discitis/OM with epidural abscess, bilateral psoas abscesses    (1)Renal - CKD2, early diabetic nephropathy? Follows as outpatient with Dr. Syed Euceda  (2)Hyponatremia - urine studies c/w low effective arterial volume (appropriately increased ADH) -improved/stable s/p isotonic IVF  (3)Hypokalemia -whole body deficit - K+ declining   (4)GI - ileus slowly improving; exacerbated by hypokalemia  (5)Hypophosphatemia - declining     RECOMMEND:  (1)A/w potassium KCl 10 mEq x 3 + 40 mEq po x1  (2)A/w Kphos 30 mmol IV x1   (3)Continue Glucerna TID  (4)BP, Mg, Phos daily   (5)epeat BMP this evening; give further IV KCL this evening as follows based on the evening K+ level:       -if K+ >4, then, no KCl needed       -if K+ 3.6-4, then give KCl 10meq iv x 1       -if K+ 3.2-3.5, then give KCl 10meq iv x 2       -if K+ <3.2, then give KCL 10meq iv x 3 + KCL 40meq po x 1      Britni Sher NP  [facility]  [phone]           [facility] [phone]        Patient seen and examined he reports ongoing diarrhea.         MEDICATIONS  (STANDING):  acetaminophen     Tablet .. 975 milliGRAM(s) Oral every 6 hours  atorvastatin 40 milliGRAM(s) Oral at bedtime  bacitracin   Ointment 1 Application(s) Topical three times a day  ertapenem  IVPB      ertapenem  IVPB 1000 milliGRAM(s) IV Intermittent every 24 hours  gabapentin 100 milliGRAM(s) Oral every 8 hours  glucagon  Injectable 1 milliGRAM(s) IntraMuscular once  heparin   Injectable 5000 Unit(s) SubCutaneous every 8 hours  insulin lispro (ADMELOG) corrective regimen sliding scale   SubCutaneous three times a day before meals  insulin lispro (ADMELOG) corrective regimen sliding scale   SubCutaneous at bedtime  methocarbamol 500 milliGRAM(s) Oral every 12 hours  multivitamin 1 Tablet(s) Oral daily  omega-3-Acid Ethyl Esters 2 Gram(s) Oral two times a day  pantoprazole    Tablet 40 milliGRAM(s) Oral before breakfast  polyethylene glycol 3350 17 Gram(s) Oral at bedtime  potassium chloride   Powder 40 milliEquivalent(s) Oral daily  simethicone 80 milliGRAM(s) Chew two times a day  spironolactone 50 milliGRAM(s) Oral daily      VITAL:  T(C): , Max: 36.9 (05-11-25 @ 15:00)  T(F): , Max: 98.5 (05-12-25 @ 01:47)  HR: 94 (05-12-25 @ 09:13)  BP: 128/66 (05-12-25 @ 09:13)  BP(mean): --  RR: 17 (05-12-25 @ 09:13)  SpO2: 97% (05-12-25 @ 09:13)  Wt(kg): --    I and O's:    05-11 @ 07:01  -  05-12 @ 07:00  --------------------------------------------------------  IN: 0 mL / OUT: 1451.5 mL / NET: -1451.5 mL    05-12 @ 07:01  -  05-12 @ 12:51  --------------------------------------------------------  IN: 0 mL / OUT: 303 mL / NET: -303 mL          PHYSICAL EXAM:    Constitutional: NAD  Neck:  No JVD  Respiratory: CTAB/L  Cardiovascular: S1 and S2  Gastrointestinal: BS+, soft, NT/ND  Extremities: + peripheral edema  Neurological: A/O x 3, no focal deficits  Psychiatric: Normal mood, normal affect  : No Mac  Skin: No rashes  Access: Not applicable    LABS:                        7.7    7.51  )-----------( 512      ( 12 May 2025 05:52 )             22.6     05-12    141  |  112[H]  |  7   ----------------------------<  102[H]  2.9[LL]   |  19[L]  |  0.65    Ca    8.8      12 May 2025 05:52  Phos  2.4     05-12  Mg     1.80     05-12            Urine Studies:  Urinalysis Basic - ( 12 May 2025 05:52 )    Color: x / Appearance: x / SG: x / pH: x  Gluc: 102 mg/dL / Ketone: x  / Bili: x / Urobili: x   Blood: x / Protein: x / Nitrite: x   Leuk Esterase: x / RBC: x / WBC x   Sq Epi: x / Non Sq Epi: x / Bacteria: x      IMPRESSION:  79yMale w/ HTN, HLD, T2DM, chronic low back pain presents as a transfer from [facility] ED for surgery today. Pt was sent into ED yesterday by his pain medicine physician due to recent MRI findings concerning for L1-L2 discitis/OM with epidural abscess, bilateral psoas abscesses    (1)Renal - CKD2, early diabetic nephropathy? Follows as outpatient with Dr. Syed Euceda  (2)Hyponatremia - urine studies c/w low effective arterial volume (appropriately increased ADH) -improved/stable s/p isotonic IVF  (3)Hypokalemia -whole body deficit - K+ declining   (4)GI - ileus slowly improving; exacerbated by hypokalemia  (5)Hypophosphatemia - declining     RECOMMEND:  (1)A/w potassium KCl 10 mEq x 3 + 40 mEq po x1  (2)A/w Kphos 30 mmol IV x1   (3)Continue Glucerna TID  (4)BMP, Mg, Phos daily   (5)Repeat BMP this evening; give further IV KCL this evening as follows based on the evening K+ level:       -if K+ >4, then, no KCl needed       -if K+ 3.6-4, then give KCl 10meq iv x 1       -if K+ 3.2-3.5, then give KCl 10meq iv x 2       -if K+ <3.2, then give KCL 10meq iv x 3 + KCL 40meq po x 1      Britni Sher NP  [facility]  [phone]           [facility] [phone]        Patient seen and examined he reports ongoing diarrhea.         MEDICATIONS  (STANDING):  acetaminophen     Tablet .. 975 milliGRAM(s) Oral every 6 hours  atorvastatin 40 milliGRAM(s) Oral at bedtime  bacitracin   Ointment 1 Application(s) Topical three times a day  ertapenem  IVPB      ertapenem  IVPB 1000 milliGRAM(s) IV Intermittent every 24 hours  gabapentin 100 milliGRAM(s) Oral every 8 hours  glucagon  Injectable 1 milliGRAM(s) IntraMuscular once  heparin   Injectable 5000 Unit(s) SubCutaneous every 8 hours  insulin lispro (ADMELOG) corrective regimen sliding scale   SubCutaneous three times a day before meals  insulin lispro (ADMELOG) corrective regimen sliding scale   SubCutaneous at bedtime  methocarbamol 500 milliGRAM(s) Oral every 12 hours  multivitamin 1 Tablet(s) Oral daily  omega-3-Acid Ethyl Esters 2 Gram(s) Oral two times a day  pantoprazole    Tablet 40 milliGRAM(s) Oral before breakfast  polyethylene glycol 3350 17 Gram(s) Oral at bedtime  potassium chloride   Powder 40 milliEquivalent(s) Oral daily  simethicone 80 milliGRAM(s) Chew two times a day  spironolactone 50 milliGRAM(s) Oral daily      VITAL:  T(C): , Max: 36.9 (05-11-25 @ 15:00)  T(F): , Max: 98.5 (05-12-25 @ 01:47)  HR: 94 (05-12-25 @ 09:13)  BP: 128/66 (05-12-25 @ 09:13)  BP(mean): --  RR: 17 (05-12-25 @ 09:13)  SpO2: 97% (05-12-25 @ 09:13)  Wt(kg): --    I and O's:    05-11 @ 07:01  -  05-12 @ 07:00  --------------------------------------------------------  IN: 0 mL / OUT: 1451.5 mL / NET: -1451.5 mL    05-12 @ 07:01  -  05-12 @ 12:51  --------------------------------------------------------  IN: 0 mL / OUT: 303 mL / NET: -303 mL          PHYSICAL EXAM:    Constitutional: NAD  Neck:  No JVD  Respiratory: CTAB/L  Cardiovascular: S1 and S2  Gastrointestinal: BS+, soft, NT/ND  Extremities: + peripheral edema  Neurological: A/O x 3, no focal deficits  Psychiatric: Normal mood, normal affect  : No Mac  Skin: No rashes  Access: Not applicable    LABS:                        7.7    7.51  )-----------( 512      ( 12 May 2025 05:52 )             22.6     05-12    141  |  112[H]  |  7   ----------------------------<  102[H]  2.9[LL]   |  19[L]  |  0.65    Ca    8.8      12 May 2025 05:52  Phos  2.4     05-12  Mg     1.80     05-12            Urine Studies:  Urinalysis Basic - ( 12 May 2025 05:52 )    Color: x / Appearance: x / SG: x / pH: x  Gluc: 102 mg/dL / Ketone: x  / Bili: x / Urobili: x   Blood: x / Protein: x / Nitrite: x   Leuk Esterase: x / RBC: x / WBC x   Sq Epi: x / Non Sq Epi: x / Bacteria: x      IMPRESSION:  79yMale w/ HTN, HLD, T2DM, chronic low back pain presents as a transfer from [facility] ED for surgery today. Pt was sent into ED yesterday by his pain medicine physician due to recent MRI findings concerning for L1-L2 discitis/OM with epidural abscess, bilateral psoas abscesses    (1)Renal - CKD2, early diabetic nephropathy? Follows as outpatient with Dr. Syed Euceda  (2)Hyponatremia - urine studies c/w low effective arterial volume (appropriately increased ADH) -improved/stable s/p isotonic IVF  (3)Hypokalemia -whole body deficit - K+ declining   (4)GI - ileus slowly improving; exacerbated by hypokalemia  (5)Hypophosphatemia - declining     RECOMMEND:  (1)A/w potassium KCl 10 mEq x 3 + 40 mEq po x1  (2)A/w Kphos 30 mmol IV x1   (3)Continue Glucerna TID  (4)BMP, Mg, Phos daily   (5)Repeat BMP this evening; give further IV KCL this evening as follows based on the evening K+ level:       -if K+ >4, then, no KCl needed       -if K+ 3.6-4, then give KCl 10meq iv x 1       -if K+ 3.2-3.5, then give KCl 10meq iv x 2       -if K+ <3.2, then give KCL 10meq iv x 3 + KCL 40meq po x 1      Britni Sher NP  [facility]  [phone]        RENAL ATTENDING NOTE  Patient seen and examined with NP. Agree with assessment and plan as above.    Yamil Humphreys MD  [facility]  [phone]

## 2025-05-12 NOTE — PRE PROCEDURE NOTE - PRE PROCEDURE EVALUATION
Interventional Radiology Pre-Procedure Note    This is a 79y Male    Procedure: IR tube check for psoas abscess    Diagnosis/Indication: Patient is a 79y old  Male who presents with a chief complaint of L1-L2 discitis/OM with epidural abscess and BL psoas abscesses s/p IR tube placement. Planning for tube check on 5/13 with Dr Sage       PAST MEDICAL & SURGICAL HISTORY:  HTN (hypertension)      HLD (hyperlipidemia)      DM (diabetes mellitus)      Cataract           Male    Allergies: No Known Allergies      LABS:  CBC Full  -  ( 12 May 2025 05:52 )  WBC Count : 7.51 K/uL  RBC Count : 2.44 M/uL  Hemoglobin : 7.7 g/dL  Hematocrit : 22.6 %  Platelet Count - Automated : 512 K/uL  Mean Cell Volume : 92.6 fL  Mean Cell Hemoglobin : 31.6 pg  Mean Cell Hemoglobin Concentration : 34.1 g/dL  Auto Neutrophil # : x  Auto Lymphocyte # : x  Auto Monocyte # : x  Auto Eosinophil # : x  Auto Basophil # : x  Auto Neutrophil % : x  Auto Lymphocyte % : x  Auto Monocyte % : x  Auto Eosinophil % : x  Auto Basophil % : x    05-12    141  |  112[H]  |  7   ----------------------------<  102[H]  2.9[LL]   |  19[L]  |  0.65    Ca    8.8      12 May 2025 05:52  Phos  2.4     05-12  Mg     1.80     05-12      Parent present at bedside to sign consent.
Interventional Radiology    HPI: 79y Male s/p spinal decompression and posterior spinal fusion 4/24 for epidural abscess now found to have bilateral psoas abscesses. IR to perform drainage.    Allergies: No Known Allergies    Medications (Abx/Cardiac/Anticoagulation/Blood Products)  amLODIPine   Tablet: 10 milliGRAM(s) Oral (04-24 @ 09:33)  phenylephrine    Infusion: 19.1 mL/Hr IV Continuous (04-24 @ 22:22)  phenylephrine    Infusion: 23.8 mL/Hr IV Continuous (04-25 @ 19:44)  phenylephrine    Infusion: 23.8 mL/Hr IV Continuous (04-25 @ 21:40)  phenylephrine    Infusion: 38.1 mL/Hr IV Continuous (04-25 @ 02:42)  piperacillin/tazobactam IVPB..: 25 mL/Hr IV Intermittent (04-26 @ 05:07)  valsartan: 40 milliGRAM(s) Oral (04-24 @ 09:47)  vancomycin  IVPB: 250 mL/Hr IV Intermittent (04-25 @ 06:20)    Data:    T(C): 36.2  HR: 72  BP: 112/64  RR: 13  SpO2: 98%    Exam  General: No acute distress  Chest: Non labored breathing  Abdomen: Non-distended  Extremities: No swelling, warm    -WBC 14.41 / HgB 9.1 / Hct 26.5 / Plt 371  -Na 136 / Cl 109 / BUN 16 / Glucose 141  -K 3.9 / CO2 18 / Cr 0.96  -ALT -- / Alk Phos -- / T.Bili --  -INR1.16    Imaging: CT reviewed    Plan:   79y Male s/p spinal decompression and posterior spinal fusion 4/24 for epidural abscess now found to have bilateral psoas abscesses. IR to perform drainage.  -- Relevant imaging and labs were reviewed.   -- Risks, benefits, and alternatives were explained to the patient and informed consent was obtained.

## 2025-05-12 NOTE — PROGRESS NOTE ADULT - SUBJECTIVE AND OBJECTIVE BOX
Date of Service  : 05-12-25     INTERVAL HPI/OVERNIGHT EVENTS: I feel little better.   Vital Signs Last 24 Hrs  T(C): 36.7 (12 May 2025 21:44), Max: 37.2 (12 May 2025 13:10)  T(F): 98 (12 May 2025 21:44), Max: 98.9 (12 May 2025 13:10)  HR: 95 (12 May 2025 21:44) (80 - 95)  BP: 131/62 (12 May 2025 21:44) (122/69 - 138/57)  BP(mean): --  RR: 18 (12 May 2025 21:44) (17 - 18)  SpO2: 100% (12 May 2025 21:44) (95% - 100%)    Parameters below as of 12 May 2025 21:44  Patient On (Oxygen Delivery Method): room air      I&O's Summary    11 May 2025 07:01  -  12 May 2025 07:00  --------------------------------------------------------  IN: 0 mL / OUT: 1451.5 mL / NET: -1451.5 mL    12 May 2025 07:01  -  12 May 2025 23:15  --------------------------------------------------------  IN: 0 mL / OUT: 753 mL / NET: -753 mL      MEDICATIONS  (STANDING):  acetaminophen     Tablet .. 975 milliGRAM(s) Oral every 6 hours  atorvastatin 40 milliGRAM(s) Oral at bedtime  bacitracin   Ointment 1 Application(s) Topical three times a day  ertapenem  IVPB      ertapenem  IVPB 1000 milliGRAM(s) IV Intermittent every 24 hours  gabapentin 100 milliGRAM(s) Oral every 8 hours  glucagon  Injectable 1 milliGRAM(s) IntraMuscular once  insulin lispro (ADMELOG) corrective regimen sliding scale   SubCutaneous three times a day before meals  insulin lispro (ADMELOG) corrective regimen sliding scale   SubCutaneous at bedtime  methocarbamol 500 milliGRAM(s) Oral every 12 hours  multivitamin 1 Tablet(s) Oral daily  omega-3-Acid Ethyl Esters 2 Gram(s) Oral two times a day  pantoprazole    Tablet 40 milliGRAM(s) Oral before breakfast  polyethylene glycol 3350 17 Gram(s) Oral at bedtime  potassium chloride   Powder 40 milliEquivalent(s) Oral daily  simethicone 80 milliGRAM(s) Chew two times a day  spironolactone 50 milliGRAM(s) Oral daily    MEDICATIONS  (PRN):  dibucaine 1% Ointment 1 Application(s) Topical four times a day PRN for anal discomfort d/t hemorrhoids  magnesium hydroxide Suspension 30 milliLiter(s) Oral every 12 hours PRN Constipation    LABS:                        7.7    7.51  )-----------( 512      ( 12 May 2025 05:52 )             22.6     05-12    138  |  109[H]  |  8   ----------------------------<  127[H]  4.1   |  20[L]  |  0.66    Ca    8.6      12 May 2025 18:20  Phos  2.4     05-12  Mg     1.80     05-12        Urinalysis Basic - ( 12 May 2025 18:20 )    Color: x / Appearance: x / SG: x / pH: x  Gluc: 127 mg/dL / Ketone: x  / Bili: x / Urobili: x   Blood: x / Protein: x / Nitrite: x   Leuk Esterase: x / RBC: x / WBC x   Sq Epi: x / Non Sq Epi: x / Bacteria: x      CAPILLARY BLOOD GLUCOSE      POCT Blood Glucose.: 121 mg/dL (12 May 2025 21:11)  POCT Blood Glucose.: 85 mg/dL (12 May 2025 16:35)  POCT Blood Glucose.: 196 mg/dL (12 May 2025 11:26)  POCT Blood Glucose.: 112 mg/dL (12 May 2025 07:17)        Urinalysis Basic - ( 12 May 2025 18:20 )    Color: x / Appearance: x / SG: x / pH: x  Gluc: 127 mg/dL / Ketone: x  / Bili: x / Urobili: x   Blood: x / Protein: x / Nitrite: x   Leuk Esterase: x / RBC: x / WBC x   Sq Epi: x / Non Sq Epi: x / Bacteria: x      REVIEW OF SYSTEMS:  CONSTITUTIONAL: No fever, weight loss, or fatigue  EYES: No eye pain, visual disturbances, or discharge  ENMT:  No difficulty hearing, tinnitus, vertigo; No sinus or throat pain  NECK: No pain or stiffness  RESPIRATORY: No cough, wheezing, chills or hemoptysis; No shortness of breath  CARDIOVASCULAR: No chest pain, palpitations, dizziness, or leg swelling  GASTROINTESTINAL: No abdominal or epigastric pain. No nausea, vomiting, or hematemesis; No diarrhea or constipation. No melena or hematochezia.  GENITOURINARY: No dysuria, frequency, hematuria, or incontinence  NEUROLOGICAL: No headaches, memory loss, loss of strength, numbness, or tremors  SKIN: No itching, burning, rashes, or lesions       RADIOLOGY & ADDITIONAL TESTS:    Consultant(s) Notes Reviewed:  [x ] YES  [ ] NO    PHYSICAL EXAM:  GENERAL: NAD, well-groomed, well-developed,not in any distress ,  HEAD:  Atraumatic, Normocephalic  NECK: Supple, No JVD, Normal thyroid  NERVOUS SYSTEM:  Alert & Oriented X3, No focal deficit   CHEST/LUNG: Good air entry bilateral with no  rales, rhonchi, wheezing, or rubs  HEART: Regular rate and rhythm; No murmurs, rubs, or gallops  ABDOMEN: Soft, Nontender, Nondistended; Bowel sounds present  EXTREMITIES:  2+  edema      Care Discussed with Consultants/Other Providers [ x] YES  [ ] NO

## 2025-05-12 NOTE — PROVIDER CONTACT NOTE (CRITICAL VALUE NOTIFICATION) - TEST AND RESULT REPORTED:
potassium 2.9
Potassium is 2.5
Potassium is 2.9
potassium 2.9
Abscess Culture- Gram Stain Moderate Polymorphonuclear leukocyte- Gram negative Rods

## 2025-05-12 NOTE — PROGRESS NOTE ADULT - ASSESSMENT
79yMale w/ HTN, HLD, T2DM, chronic low back pain presents as a transfer from [facility] ED for surgery today. Pt was sent into ED yesterday by his pain medicine physician due to recent MRI findings concerning for L1-L2 discitis/OM with epidural abscess, bilateral psoas abscesses. Patient initially saw pain specialist in February for chronic back pain with radiations to bilateral lateral thighs (R>L). MRI at that time showed degenerative changes. He subsequently had epidural injections x2 (2/27, 3/11) with moderate pain relief. Pain began to worsen on 4/10. He had radiofrequency ablation performed on 4/11 and has since has severe worsening of pain and radiation to R lateral thigh. He reports recent bowel/bladder "incontinence" requiring diaper due to his inability to get to the bathroom in time due to pain limitations but states urge and sensation are intact. Denies fevers, chills, night sweats, weakness, numbness/tingling, saddle anesthesia, recent falls/trauma. He uses a cane for ambulation. Denies recent falls/trauma or any other ortho injuries. Before back pain was very active walked half an hour , going up and down stairs and doing grocery etc with no Chest pain or SOB.        Problem/Recommendation - 1:  ·  Problem: Epidural abscess.   ·  Recommendation: S/P Decompression, spine, lumbar, posterior approach, with fusion of posterior spinal column.  On IV Abxs Ertapenem now  per ID x 6 weeks  .  Will defer management to  Neurosurgery.  DVT prophylaxis per Neurosurgery .  < from: MR Lumbar Spine w/ IV Cont (04.23.25 @ 15:13) >  IMPRESSION:        1.   Cervical spine:   Moderate degenerative disc disease and   spondylosis at C4-5 through C6/7 with loss of disc height and associated   degenerative endplate changes.        2.   Thoracic spine:   Minimal enhancement within T7-8 which may   reflect early discitis.        3.   Lumbar spine:   Osteomyelitis and discitis at L1-2 with erosions   of the inferior L1 vertebral body and L2 vertebral body consistent with   osteomyelitis and discitis. Ventral epidural abscess is noted extending   from the L1-2 level superiorly to the T10 level with mass effect on the   ventral thecal sac, most prominently at the L1-2 level resulting in   marked compression. Multiple abscesses within the BILATERAL psoas   muscles, the largest measuring 4.3 cm on the RIGHT and 2.8 cm on the LEFT.     Problem/Recommendation - 2:  ·  Problem: Acute osteomyelitis of lumbar spine.   ·  Recommendation: L1 &L2 vertebrae .  On IV Abxs per ID .  Will defer management to Neurosurgery.     Problem/Recommendation - 3:  ·  Problem: Low back pain radiating to right lower extremity.   ·  Recommendation: Pain control.     Problem/Recommendation - 4:  ·  Problem: . BILATERAL psoas Abscess  ·  Recommendation: ON IV Abxs per ID .  IR placed drains after draining  . IR to check drains .     Problem/Recommendation - 5:  ·  Problem: HTN (hypertension).   ·  Recommendation: Takes Amlodipine and ARB so continue with hold parameters.  < from: TTE W or WO Ultrasound Enhancing Agent (04.24.25 @ 10:10) >  CONCLUSIONS:      1. Left ventricular cavity is normal in size. Left ventricular wall thickness is normal. Left ventricular systolic function is normal with an ejection fraction of 64 % by Carpenter's method of disks. There are no regional wall motion abnormalities seen.   2. Normal right ventricular cavity size and normal right ventricular systolic function. Tricuspid annular plane systolic excursion (TAPSE) is 2.2 cm (normal >=1.7 cm).   3. Structurally normal mitral valve with normal leaflet excursion. There is calcification of the mitral valve annulus. There is tracemitral regurgitation.   4. The aortic valve appears trileaflet with normal systolic excursion. There is calcification of the aortic valve leaflets. There is mild aortic regurgitation.     Problem/Recommendation - 6:  ·  Problem: HLD (hyperlipidemia).   ·  Recommendation: Continue Atorvastatin.     Problem/Recommendation - 7:  ·  Problem: DM (diabetes mellitus).   ·  Recommendation: ON Farxiga and holding.  SSI and Lantus in patient .     Problem/Recommendation - 8:  ·  Problem:  Ileus /Pseudoobstruction   ·  Recommendation: Improving.   Had BMs and  passing flatus .  Correcting electrolytes.     ON Laxatives and Simethacone .  Advanced diet .   Surgery & GI input appreciated .  D/W GI attending and will wait till tomorrow before rectal tube placement  .  < from: CT Abdomen and Pelvis w/ Oral Cont and w/ IV Cont (04.29.25 @ 17:16) >  IMPRESSION:  Diffuse colonic dilatation, worse on today's study, likely secondary to   pseudoobstruction or ileus.    No free air.    Reduction in the bilateral psoas collections with catheters in place.    < from: Xray Abdomen 1 View PORTABLE -Urgent (Xray Abdomen 1 View PORTABLE -Urgent .) (04.29.25 @ 10:09) >  IMPRESSION:  Multiple dilated loops of small and large bowel with question of   pneumoperitoneum. Recommend upright chest radiograph or decubitus   abdominal radiograph for further evaluation.     Problem/Recommendation - 9:  ·  Problem: JUSTINO with Hyponatremia .   ·  Recommendation: Trending BMP.  Resolved.  Renal help appreciated.      Problem/Recommendation - 10:  ·  Problem: Hypokalemia & Hypophosphatemia .   ·  Recommendation: Replaced .     K3.9 .    Aldactone 50mg started.    Rpt BMP noted.    Keep K level close to 4 .     Problem/Recommendation - 11:  ·  Problem: Thrombocytosis .   ·  Recommendation: Reactionary secondary to infection.  Platelets Trending down .     Problem/Recommendation - 12:  ·  Problem: Pedal edema >Right LE.   ·  Recommendation: No calf tenderness +,Ambulating.     Problem/Recommendation - 13:  ·  Problem: Rash with Blister .   ·  Recommendation: Dermatology consult pending.      PT working with patient and ambulating .    Dispo : DC planning per primary team . D/W Daughter .

## 2025-05-12 NOTE — PROGRESS NOTE ADULT - SUBJECTIVE AND OBJECTIVE BOX
Orthopedic Surgery Progress Note     S: Patient seen and examined today. No acute events overnight. Pain is well controlled. Denies f/c, chest pain, shortness of breath, dizziness.    MEDICATIONS  (STANDING):  acetaminophen     Tablet .. 975 milliGRAM(s) Oral every 6 hours  atorvastatin 40 milliGRAM(s) Oral at bedtime  bacitracin   Ointment 1 Application(s) Topical three times a day  ertapenem  IVPB      ertapenem  IVPB 1000 milliGRAM(s) IV Intermittent every 24 hours  gabapentin 100 milliGRAM(s) Oral every 8 hours  glucagon  Injectable 1 milliGRAM(s) IntraMuscular once  heparin   Injectable 5000 Unit(s) SubCutaneous every 8 hours  insulin lispro (ADMELOG) corrective regimen sliding scale   SubCutaneous three times a day before meals  insulin lispro (ADMELOG) corrective regimen sliding scale   SubCutaneous at bedtime  methocarbamol 500 milliGRAM(s) Oral every 12 hours  multivitamin 1 Tablet(s) Oral daily  omega-3-Acid Ethyl Esters 2 Gram(s) Oral two times a day  pantoprazole    Tablet 40 milliGRAM(s) Oral before breakfast  polyethylene glycol 3350 17 Gram(s) Oral at bedtime  potassium chloride   Powder 40 milliEquivalent(s) Oral daily  simethicone 80 milliGRAM(s) Chew two times a day  spironolactone 50 milliGRAM(s) Oral daily    MEDICATIONS  (PRN):  dibucaine 1% Ointment 1 Application(s) Topical four times a day PRN for anal discomfort d/t hemorrhoids  magnesium hydroxide Suspension 30 milliLiter(s) Oral every 12 hours PRN Constipation      Vital Signs Last 24 Hrs  T(C): 36.9 (12 May 2025 01:47), Max: 36.9 (11 May 2025 15:00)  T(F): 98.5 (12 May 2025 01:47), Max: 98.5 (12 May 2025 01:47)  HR: 83 (12 May 2025 01:47) (81 - 94)  BP: 138/57 (12 May 2025 01:47) (126/60 - 138/57)  BP(mean): --  RR: 17 (12 May 2025 01:47) (17 - 18)  SpO2: 100% (12 May 2025 01:47) (100% - 100%)    Parameters below as of 12 May 2025 01:47  Patient On (Oxygen Delivery Method): room air        05-10-25 @ 07:01  -  05-11-25 @ 07:00  --------------------------------------------------------  IN: 0 mL / OUT: 1260 mL / NET: -1260 mL    05-11-25 @ 07:01  -  05-12-25 @ 05:00  --------------------------------------------------------  IN: 0 mL / OUT: 701.5 mL / NET: -701.5 mL        Physical Exam:  Gen: NAD  Resp: No increased WOB  Spine:  Dressing: clean/dry/intact    Drains: IR Drain in place from 2/7  Motor:                   C5                C6              C7               C8           T1   R            5/5                5/5            5/5             5/5          5/5  L             5/5               5/5             5/5             5/5          5/5                L2             L3             L4               L5            S1  R         3/5           4/5          5/5             5/5           5/5  L          4/5          5/5           5/5             5/5           5/5            Calves Soft/Non-tender bilaterally         Distal extremities warm well perfused; capillary refill <3 seconds     LABS:                        8.2    7.77  )-----------( 565      ( 10 May 2025 07:50 )             24.5     05-11    135  |  109[H]  |  11  ----------------------------<  120[H]  3.9   |  19[L]  |  0.90    Ca    8.5      11 May 2025 15:13  Phos  2.1     05-11  Mg     1.70     05-11

## 2025-05-13 LAB
ANION GAP SERPL CALC-SCNC: 9 MMOL/L — SIGNIFICANT CHANGE UP (ref 7–14)
APTT BLD: 56.1 SEC — HIGH (ref 26.1–36.8)
BASOPHILS # BLD AUTO: 0.04 K/UL — SIGNIFICANT CHANGE UP (ref 0–0.2)
BASOPHILS NFR BLD AUTO: 0.6 % — SIGNIFICANT CHANGE UP (ref 0–2)
BUN SERPL-MCNC: 6 MG/DL — LOW (ref 7–23)
CALCIUM SERPL-MCNC: 8.8 MG/DL — SIGNIFICANT CHANGE UP (ref 8.4–10.5)
CHLORIDE SERPL-SCNC: 110 MMOL/L — HIGH (ref 98–107)
CO2 SERPL-SCNC: 20 MMOL/L — LOW (ref 22–31)
CREAT SERPL-MCNC: 0.68 MG/DL — SIGNIFICANT CHANGE UP (ref 0.5–1.3)
EGFR: 95 ML/MIN/1.73M2 — SIGNIFICANT CHANGE UP
EGFR: 95 ML/MIN/1.73M2 — SIGNIFICANT CHANGE UP
EOSINOPHIL # BLD AUTO: 0.19 K/UL — SIGNIFICANT CHANGE UP (ref 0–0.5)
EOSINOPHIL NFR BLD AUTO: 2.6 % — SIGNIFICANT CHANGE UP (ref 0–6)
GLUCOSE BLDC GLUCOMTR-MCNC: 106 MG/DL — HIGH (ref 70–99)
GLUCOSE BLDC GLUCOMTR-MCNC: 109 MG/DL — HIGH (ref 70–99)
GLUCOSE BLDC GLUCOMTR-MCNC: 121 MG/DL — HIGH (ref 70–99)
GLUCOSE BLDC GLUCOMTR-MCNC: 138 MG/DL — HIGH (ref 70–99)
GLUCOSE BLDC GLUCOMTR-MCNC: 138 MG/DL — HIGH (ref 70–99)
GLUCOSE SERPL-MCNC: 95 MG/DL — SIGNIFICANT CHANGE UP (ref 70–99)
HCT VFR BLD CALC: 25.4 % — LOW (ref 39–50)
HGB BLD-MCNC: 8.3 G/DL — LOW (ref 13–17)
IANC: 3.48 K/UL — SIGNIFICANT CHANGE UP (ref 1.8–7.4)
IMM GRANULOCYTES NFR BLD AUTO: 1 % — HIGH (ref 0–0.9)
INR BLD: 1.03 RATIO — SIGNIFICANT CHANGE UP (ref 0.85–1.16)
LYMPHOCYTES # BLD AUTO: 2.95 K/UL — SIGNIFICANT CHANGE UP (ref 1–3.3)
LYMPHOCYTES # BLD AUTO: 40.7 % — SIGNIFICANT CHANGE UP (ref 13–44)
MAGNESIUM SERPL-MCNC: 1.9 MG/DL — SIGNIFICANT CHANGE UP (ref 1.6–2.6)
MCHC RBC-ENTMCNC: 31 PG — SIGNIFICANT CHANGE UP (ref 27–34)
MCHC RBC-ENTMCNC: 32.7 G/DL — SIGNIFICANT CHANGE UP (ref 32–36)
MCV RBC AUTO: 94.8 FL — SIGNIFICANT CHANGE UP (ref 80–100)
MONOCYTES # BLD AUTO: 0.52 K/UL — SIGNIFICANT CHANGE UP (ref 0–0.9)
MONOCYTES NFR BLD AUTO: 7.2 % — SIGNIFICANT CHANGE UP (ref 2–14)
NEUTROPHILS # BLD AUTO: 3.48 K/UL — SIGNIFICANT CHANGE UP (ref 1.8–7.4)
NEUTROPHILS NFR BLD AUTO: 47.9 % — SIGNIFICANT CHANGE UP (ref 43–77)
NRBC # BLD AUTO: 0 K/UL — SIGNIFICANT CHANGE UP (ref 0–0)
NRBC # FLD: 0 K/UL — SIGNIFICANT CHANGE UP (ref 0–0)
NRBC BLD AUTO-RTO: 0 /100 WBCS — SIGNIFICANT CHANGE UP (ref 0–0)
PHOSPHATE SERPL-MCNC: 2.5 MG/DL — SIGNIFICANT CHANGE UP (ref 2.5–4.5)
PLATELET # BLD AUTO: 518 K/UL — HIGH (ref 150–400)
POTASSIUM SERPL-MCNC: 3.4 MMOL/L — LOW (ref 3.5–5.3)
POTASSIUM SERPL-SCNC: 3.4 MMOL/L — LOW (ref 3.5–5.3)
PROTHROM AB SERPL-ACNC: 12.3 SEC — SIGNIFICANT CHANGE UP (ref 9.9–13.4)
RBC # BLD: 2.68 M/UL — LOW (ref 4.2–5.8)
RBC # FLD: 22.5 % — HIGH (ref 10.3–14.5)
SODIUM SERPL-SCNC: 139 MMOL/L — SIGNIFICANT CHANGE UP (ref 135–145)
WBC # BLD: 7.25 K/UL — SIGNIFICANT CHANGE UP (ref 3.8–10.5)
WBC # FLD AUTO: 7.25 K/UL — SIGNIFICANT CHANGE UP (ref 3.8–10.5)

## 2025-05-13 PROCEDURE — 49424 ASSESS CYST CONTRAST INJECT: CPT

## 2025-05-13 PROCEDURE — 99223 1ST HOSP IP/OBS HIGH 75: CPT

## 2025-05-13 PROCEDURE — 76080 X-RAY EXAM OF FISTULA: CPT | Mod: 26

## 2025-05-13 RX ORDER — CLOTRIMAZOLE 1 G/100G
1 CREAM TOPICAL
Refills: 0 | Status: DISCONTINUED | OUTPATIENT
Start: 2025-05-13 | End: 2025-05-16

## 2025-05-13 RX ADMIN — Medication 40 MILLIGRAM(S): at 05:02

## 2025-05-13 RX ADMIN — Medication 975 MILLIGRAM(S): at 13:30

## 2025-05-13 RX ADMIN — ERTAPENEM SODIUM 100 MILLIGRAM(S): 1 INJECTION, POWDER, LYOPHILIZED, FOR SOLUTION INTRAMUSCULAR; INTRAVENOUS at 14:21

## 2025-05-13 RX ADMIN — GABAPENTIN 100 MILLIGRAM(S): 400 CAPSULE ORAL at 21:07

## 2025-05-13 RX ADMIN — Medication 1 TABLET(S): at 12:31

## 2025-05-13 RX ADMIN — CLOTRIMAZOLE 1 APPLICATION(S): 1 CREAM TOPICAL at 21:06

## 2025-05-13 RX ADMIN — Medication 975 MILLIGRAM(S): at 06:00

## 2025-05-13 RX ADMIN — POLYETHYLENE GLYCOL 3350 17 GRAM(S): 17 POWDER, FOR SOLUTION ORAL at 21:07

## 2025-05-13 RX ADMIN — Medication 975 MILLIGRAM(S): at 12:30

## 2025-05-13 RX ADMIN — GABAPENTIN 100 MILLIGRAM(S): 400 CAPSULE ORAL at 14:22

## 2025-05-13 RX ADMIN — Medication 40 MILLIEQUIVALENT(S): at 12:29

## 2025-05-13 RX ADMIN — GABAPENTIN 100 MILLIGRAM(S): 400 CAPSULE ORAL at 05:02

## 2025-05-13 RX ADMIN — Medication 50 MILLIGRAM(S): at 05:02

## 2025-05-13 RX ADMIN — Medication 80 MILLIGRAM(S): at 18:58

## 2025-05-13 RX ADMIN — Medication 80 MILLIGRAM(S): at 05:02

## 2025-05-13 RX ADMIN — Medication 40 MILLIEQUIVALENT(S): at 10:34

## 2025-05-13 RX ADMIN — METHOCARBAMOL 500 MILLIGRAM(S): 500 TABLET, FILM COATED ORAL at 12:31

## 2025-05-13 RX ADMIN — Medication 975 MILLIGRAM(S): at 18:58

## 2025-05-13 RX ADMIN — Medication 40 MILLIEQUIVALENT(S): at 14:22

## 2025-05-13 RX ADMIN — Medication 1 APPLICATION(S): at 14:24

## 2025-05-13 RX ADMIN — Medication 975 MILLIGRAM(S): at 05:02

## 2025-05-13 RX ADMIN — OMEGA-3-ACID ETHYL ESTERS CAPSULES 2 GRAM(S): 1 CAPSULE, LIQUID FILLED ORAL at 12:29

## 2025-05-13 RX ADMIN — Medication 975 MILLIGRAM(S): at 00:45

## 2025-05-13 NOTE — PROGRESS NOTE ADULT - SUBJECTIVE AND OBJECTIVE BOX
Overnight events noted      VITAL:  T(C): , Max: 37.2 (05-12-25 @ 13:10)  T(F): , Max: 98.9 (05-12-25 @ 13:10)  HR: 94 (05-13-25 @ 05:10)  BP: 130/61 (05-13-25 @ 05:10)  RR: 17 (05-13-25 @ 05:10)  SpO2: 100% (05-13-25 @ 05:10)      PHYSICAL EXAM:  Constitutional: NAD, alert  HEENT: NCAT, DMM  Neck:  No JVD  Respiratory: CTA-b/l  Cardiovascular: reg s1s2  Gastrointestinal: (+)distension, NT  Extremities: + peripheral edema  Neurological: A/O x 3, no focal deficits  : No Mac  Skin: No rashes    LABS:                        8.3    7.25  )-----------( 518      ( 13 May 2025 06:09 )             25.4     Na(139)/K(3.4)/Cl(110)/HCO3(20)/BUN(6)/Cr(0.68)Glu(95)/Ca(8.8)/Mg(1.90)/PO4(2.5)    05-13 @ 06:09  Na(138)/K(4.1)/Cl(109)/HCO3(20)/BUN(8)/Cr(0.66)Glu(127)/Ca(8.6)/Mg(--)/PO4(--)    05-12 @ 18:20  Na(141)/K(2.9)/Cl(112)/HCO3(19)/BUN(7)/Cr(0.65)Glu(102)/Ca(8.8)/Mg(1.80)/PO4(2.4)    05-12 @ 05:52  Na(135)/K(3.9)/Cl(109)/HCO3(19)/BUN(11)/Cr(0.90)Glu(120)/Ca(8.5)/Mg(--)/PO4(--)    05-11 @ 15:13  Na(136)/K(2.5)/Cl(109)/HCO3(18)/BUN(8)/Cr(0.65)Glu(102)/Ca(8.2)/Mg(1.70)/PO4(2.1)    05-11 @ 06:26        IMPRESSION: 79M w/ HTN, HLD, and T2DM, 4/24/25 from [facility] with L1-L2 OM/epidural abscess/psoas abscesses; s/p decompression spinal fusion 4/24/25; post-op course notable for ileus and hypokalemia    (1)Renal - CKD2 - early diabetic nephropathy. Follows as outpatient with Dr. Syed Euceda  (2)Hypokalemia -whole body deficit due to limited intake/GI losses - being repleted orally today  (3)GI - ileus slowly improving; exacerbated by hypokalemia      RECOMMEND:  (1)K+ repletion as ordered  (2)Spironolactone as ordered  (3)Continue Glucerna TID  (4)BMP, Mg, Phos daily               Yamil Humphreys MD  [facility]  Office/on call physician: [phone]  Cell (7a-7p): [phone]       (+)loose stools  drain removed this a.m.    VITAL:  T(C): , Max: 37.2 (05-12-25 @ 13:10)  T(F): , Max: 98.9 (05-12-25 @ 13:10)  HR: 94 (05-13-25 @ 05:10)  BP: 130/61 (05-13-25 @ 05:10)  RR: 17 (05-13-25 @ 05:10)  SpO2: 100% (05-13-25 @ 05:10)      PHYSICAL EXAM:  Constitutional: NAD, alert  HEENT: NCAT, DMM  Neck:  No JVD  Respiratory: CTA-b/l  Cardiovascular: reg s1s2  Gastrointestinal: (+)distension, NT  Extremities: + peripheral edema  Neurological: reduced generalized strength  : No Mac  Skin: No rashes    LABS:                        8.3    7.25  )-----------( 518      ( 13 May 2025 06:09 )             25.4     Na(139)/K(3.4)/Cl(110)/HCO3(20)/BUN(6)/Cr(0.68)Glu(95)/Ca(8.8)/Mg(1.90)/PO4(2.5)    05-13 @ 06:09  Na(138)/K(4.1)/Cl(109)/HCO3(20)/BUN(8)/Cr(0.66)Glu(127)/Ca(8.6)/Mg(--)/PO4(--)    05-12 @ 18:20  Na(141)/K(2.9)/Cl(112)/HCO3(19)/BUN(7)/Cr(0.65)Glu(102)/Ca(8.8)/Mg(1.80)/PO4(2.4)    05-12 @ 05:52  Na(135)/K(3.9)/Cl(109)/HCO3(19)/BUN(11)/Cr(0.90)Glu(120)/Ca(8.5)/Mg(--)/PO4(--)    05-11 @ 15:13  Na(136)/K(2.5)/Cl(109)/HCO3(18)/BUN(8)/Cr(0.65)Glu(102)/Ca(8.2)/Mg(1.70)/PO4(2.1)    05-11 @ 06:26        IMPRESSION: 79M w/ HTN, HLD, and T2DM, 4/24/25 from [facility] with L1-L2 OM/epidural abscess/psoas abscesses; s/p decompression spinal fusion 4/24/25; post-op course notable for ileus and hypokalemia    (1)Renal - CKD2 - early diabetic nephropathy. Follows as outpatient with Dr. Syed Euceda  (2)Hypokalemia -whole body deficit due to limited intake/GI losses - being repleted orally today  (3)GI - ileus slowly improving; exacerbated by hypokalemia      RECOMMEND:  (1)K+ repletion as ordered  (2)Spironolactone as ordered  (3)Continue Glucerna TID  (4)BMP, Mg, Phos daily               Yamil Humphreys MD  [facility]  Office/on call physician: [phone]  Cell (7a-7t): [phone]

## 2025-05-13 NOTE — PROGRESS NOTE ADULT - SUBJECTIVE AND OBJECTIVE BOX
Date of Service  : 05-13-25    INTERVAL HPI/OVERNIGHT EVENTS: I feel better.  Vital Signs Last 24 Hrs  T(C): 36.5 (13 May 2025 17:29), Max: 36.8 (13 May 2025 01:41)  T(F): 97.7 (13 May 2025 17:29), Max: 98.2 (13 May 2025 01:41)  HR: 98 (13 May 2025 17:29) (87 - 98)  BP: 135/64 (13 May 2025 17:29) (120/58 - 137/63)  BP(mean): 62 (13 May 2025 09:40) (62 - 62)  RR: 18 (13 May 2025 17:29) (17 - 18)  SpO2: 100% (13 May 2025 17:29) (98% - 100%)    Parameters below as of 13 May 2025 17:29  Patient On (Oxygen Delivery Method): room air      I&O's Summary    12 May 2025 07:01  -  13 May 2025 07:00  --------------------------------------------------------  IN: 0 mL / OUT: 1405.5 mL / NET: -1405.5 mL      MEDICATIONS  (STANDING):  acetaminophen     Tablet .. 975 milliGRAM(s) Oral every 6 hours  atorvastatin 40 milliGRAM(s) Oral at bedtime  bacitracin   Ointment 1 Application(s) Topical three times a day  clotrimazole 1% Cream 1 Application(s) Topical two times a day  ertapenem  IVPB      ertapenem  IVPB 1000 milliGRAM(s) IV Intermittent every 24 hours  gabapentin 100 milliGRAM(s) Oral every 8 hours  glucagon  Injectable 1 milliGRAM(s) IntraMuscular once  insulin lispro (ADMELOG) corrective regimen sliding scale   SubCutaneous three times a day before meals  insulin lispro (ADMELOG) corrective regimen sliding scale   SubCutaneous at bedtime  methocarbamol 500 milliGRAM(s) Oral every 12 hours  multivitamin 1 Tablet(s) Oral daily  omega-3-Acid Ethyl Esters 2 Gram(s) Oral two times a day  pantoprazole    Tablet 40 milliGRAM(s) Oral before breakfast  polyethylene glycol 3350 17 Gram(s) Oral at bedtime  potassium chloride   Powder 40 milliEquivalent(s) Oral daily  simethicone 80 milliGRAM(s) Chew two times a day  spironolactone 50 milliGRAM(s) Oral daily    MEDICATIONS  (PRN):  dibucaine 1% Ointment 1 Application(s) Topical four times a day PRN for anal discomfort d/t hemorrhoids  magnesium hydroxide Suspension 30 milliLiter(s) Oral every 12 hours PRN Constipation    LABS:                        8.3    7.25  )-----------( 518      ( 13 May 2025 06:09 )             25.4     05-13    139  |  110[H]  |  6[L]  ----------------------------<  95  3.4[L]   |  20[L]  |  0.68    Ca    8.8      13 May 2025 06:09  Phos  2.5     05-13  Mg     1.90     05-13      PT/INR - ( 13 May 2025 06:09 )   PT: 12.3 sec;   INR: 1.03 ratio         PTT - ( 13 May 2025 06:09 )  PTT:56.1 sec  Urinalysis Basic - ( 13 May 2025 06:09 )    Color: x / Appearance: x / SG: x / pH: x  Gluc: 95 mg/dL / Ketone: x  / Bili: x / Urobili: x   Blood: x / Protein: x / Nitrite: x   Leuk Esterase: x / RBC: x / WBC x   Sq Epi: x / Non Sq Epi: x / Bacteria: x      CAPILLARY BLOOD GLUCOSE      POCT Blood Glucose.: 138 mg/dL (13 May 2025 16:26)  POCT Blood Glucose.: 138 mg/dL (13 May 2025 11:38)  POCT Blood Glucose.: 106 mg/dL (13 May 2025 09:24)  POCT Blood Glucose.: 109 mg/dL (13 May 2025 07:27)  POCT Blood Glucose.: 121 mg/dL (12 May 2025 21:11)        Urinalysis Basic - ( 13 May 2025 06:09 )    Color: x / Appearance: x / SG: x / pH: x  Gluc: 95 mg/dL / Ketone: x  / Bili: x / Urobili: x   Blood: x / Protein: x / Nitrite: x   Leuk Esterase: x / RBC: x / WBC x   Sq Epi: x / Non Sq Epi: x / Bacteria: x      REVIEW OF SYSTEMS:  CONSTITUTIONAL: No fever, weight loss, or fatigue  EYES: No eye pain, visual disturbances, or discharge  ENMT:  No difficulty hearing, tinnitus, vertigo; No sinus or throat pain  NECK: No pain or stiffness  RESPIRATORY: No cough, wheezing, chills or hemoptysis; No shortness of breath  CARDIOVASCULAR: No chest pain, palpitations, dizziness, or leg swelling  GASTROINTESTINAL: No abdominal or epigastric pain. No nausea, vomiting, or hematemesis; No diarrhea or constipation. No melena or hematochezia.  GENITOURINARY: No dysuria, frequency, hematuria, or incontinence  NEUROLOGICAL: No headaches, memory loss, loss of strength, numbness, or tremors      RADIOLOGY & ADDITIONAL TESTS:    Consultant(s) Notes Reviewed:  [x ] YES  [ ] NO    PHYSICAL EXAM:  GENERAL: NAD, well-groomed, well-developed,not in any distress ,  HEAD:  Atraumatic, Normocephalic  NECK: Supple, No JVD, Normal thyroid  NERVOUS SYSTEM:  Alert & Oriented X3, No focal deficit   CHEST/LUNG: Good air entry bilateral with no  rales, rhonchi, wheezing, or rubs  HEART: Regular rate and rhythm; No murmurs, rubs, or gallops  ABDOMEN: Soft, Nontender, Nondistended; Bowel sounds present  EXTREMITIES:  +pedal  edema    Care Discussed with Consultants/Other Providers [ x] YES  [ ] NO

## 2025-05-13 NOTE — CHART NOTE - NSCHARTNOTEFT_GEN_A_CORE
NUTRITION FOLLOW UP NOTE    Pt seen for f/u length of stay.    SOURCE: [X] Patient [X] Medical record [] RN/PCA [] Family/Caregiver [] Patient unavailable [] Patient inappropriate (disoriented, nonverbal, intubated/sedated) [] Other:    MEDICAL COURSE: 79M w/ HTN, HLD, and T2DM, 4/24/25 from [facility] with L1-L2 OM/epidural abscess/psoas abscesses; s/p decompression spinal fusion 4/24/25; post-op course notable for ileus and hypokalemia. K repletion ordered per nephrology.      DIET PRESCRIPTION: Diet, Regular:   Consistent Carbohydrate {No Snacks} (CSTCHO)  Fiber/Residue Restricted (LOWFIBER)  Supplement Feeding Modality:  Oral  Glucerna Shake Cans or Servings Per Day:  3       Frequency:  Three Times a day (05-08-25 @ 16:00)    FOOD ALLERGIES/INTOLERANCES: NKFA     NUTRITION COURSE:   Patient with fair PO intake as being recorded in RN flowsheets (50-75% meal intake recorded).  No chewing or swallowing difficulties reported. Diet being supplemented with Glucerna Therapeutic Nutrition 240mls 3x daily (660kcals, 30g protein) for nutrition optimization efforts.  K+ being repleted per nephrology.  Ongoing diarrhea noted.    MEDICATIONS: MEDICATIONS  (STANDING):  acetaminophen     Tablet .. 975 milliGRAM(s) Oral every 6 hours  atorvastatin 40 milliGRAM(s) Oral at bedtime  bacitracin   Ointment 1 Application(s) Topical three times a day  ertapenem  IVPB      ertapenem  IVPB 1000 milliGRAM(s) IV Intermittent every 24 hours  gabapentin 100 milliGRAM(s) Oral every 8 hours  glucagon  Injectable 1 milliGRAM(s) IntraMuscular once  insulin lispro (ADMELOG) corrective regimen sliding scale   SubCutaneous three times a day before meals  insulin lispro (ADMELOG) corrective regimen sliding scale   SubCutaneous at bedtime  methocarbamol 500 milliGRAM(s) Oral every 12 hours  multivitamin 1 Tablet(s) Oral daily  omega-3-Acid Ethyl Esters 2 Gram(s) Oral two times a day  pantoprazole    Tablet 40 milliGRAM(s) Oral before breakfast  polyethylene glycol 3350 17 Gram(s) Oral at bedtime  potassium chloride    Tablet ER 40 milliEquivalent(s) Oral every 4 hours  potassium chloride   Powder 40 milliEquivalent(s) Oral daily  simethicone 80 milliGRAM(s) Chew two times a day  spironolactone 50 milliGRAM(s) Oral daily    MEDICATIONS  (PRN):  dibucaine 1% Ointment 1 Application(s) Topical four times a day PRN for anal discomfort d/t hemorrhoids  magnesium hydroxide Suspension 30 milliLiter(s) Oral every 12 hours PRN Constipation    LABS: 05-13 Na139 mmol/L Glu 95 mg/dL K+ 3.4 mmol/L[L] Cr  0.68 mg/dL BUN 6 mg/dL[L] 05-13 Phos 2.5 mg/dL    A1C with Estimated Average Glucose Result: 7.2 % (04-25-25 @ 02:45)  A1C with Estimated Average Glucose Result: 7.3 % (04-24-25 @ 22:30)    CAPILLARY BLOOD GLUCOSE  POCT Blood Glucose.: 138 mg/dL (13 May 2025 11:38)  POCT Blood Glucose.: 106 mg/dL (13 May 2025 09:24)  POCT Blood Glucose.: 109 mg/dL (13 May 2025 07:27)  POCT Blood Glucose.: 121 mg/dL (12 May 2025 21:11)  POCT Blood Glucose.: 85 mg/dL (12 May 2025 16:35)    ANTHROPOMETRICS  Inpatient Weights:    5/12 - 68.4kg       5/11 - 68.5kg       5/10 - 70.2kg        5/5 - 74.5kg      5/2 - 71.1kg     4/30 - 70.1kg      4/27 - 65kg         4/26 - 63.8kg         4/24 - 63.4kg       Weight Assessment: Weight variances noted + 4.6kg  / 6.7%    Height: 165.1cm  Weight: 68.4kg   BMI: 25.1kg/m^2  IBW: 61.8kg +/-10%    PHYSICAL ASSESSMENT, per flowsheets:  Edema: None noted   Pressure Injury:  No pressure ulcers/DTI noted in flowsheets.     ESTIMATED NEEDS  [X] No change since previous assessment -OR - [X] Recalculated  Est Needs based on: [] actual weight [] dosing weight [X] IBW - 61.8kg  Kcal 25-30kcal/qm=1663-6350eifm  Protein 1.2-1.4gm/kg=74-87gm    EDUCATION  [ ] Provided pt with verbal / written education on   [X ] Provided on previous assessment by RD  [ ] Not applicable secondary to   [ ] Pt refused     INTERVENTIONS:  1) Continue diet/supplement as ordered:   2) Please document % meal intake in flowsheets to assess adequacy of PO Intake.  3) Obtain and record current weight to best assess for acute changes in nutritional status.  4) Adjust bowel regimen prn.    MONITORING & EVALUATION:  PO intake, tolerance to diet/supplement, nutrition related lab values, weight trends, BMs/GI distress, hydration status, skin integrity.    Kinza Vivas, MS, RD, CDN; Pager #[phone]  Also available on [employer] Teams NUTRITION FOLLOW UP NOTE    Pt seen for f/u length of stay.    SOURCE: [X] Patient [X] Medical record [] RN/PCA [] Family/Caregiver [] Patient unavailable [] Patient inappropriate (disoriented, nonverbal, intubated/sedated) [] Other:    MEDICAL COURSE: 79M w/ HTN, HLD, and T2DM, 4/24/25 from [facility] with L1-L2 OM/epidural abscess/psoas abscesses; s/p decompression spinal fusion 4/24/25; post-op course notable for ileus and hypokalemia. K repletion ordered per nephrology.      DIET PRESCRIPTION: Diet, Regular:   Consistent Carbohydrate {No Snacks} (CSTCHO)  Fiber/Residue Restricted (LOWFIBER)  Supplement Feeding Modality:  Oral  Glucerna Shake Cans or Servings Per Day:  3       Frequency:  Three Times a day (05-08-25 @ 16:00)    FOOD ALLERGIES/INTOLERANCES: NKFA     NUTRITION COURSE:   Patient with fair PO intake as being recorded in RN flowsheets (50-75% meal intake recorded).  No issues reported at this time.  No chewing or swallowing difficulties reported. Diet being supplemented with Glucerna Therapeutic Nutrition 240mls 3x daily (660kcals, 30g protein) for nutrition optimization efforts.  K+ being repleted per nephrology.  Ongoing diarrhea noted.    MEDICATIONS: MEDICATIONS  (STANDING):  acetaminophen     Tablet .. 975 milliGRAM(s) Oral every 6 hours  atorvastatin 40 milliGRAM(s) Oral at bedtime  bacitracin   Ointment 1 Application(s) Topical three times a day  ertapenem  IVPB      ertapenem  IVPB 1000 milliGRAM(s) IV Intermittent every 24 hours  gabapentin 100 milliGRAM(s) Oral every 8 hours  glucagon  Injectable 1 milliGRAM(s) IntraMuscular once  insulin lispro (ADMELOG) corrective regimen sliding scale   SubCutaneous three times a day before meals  insulin lispro (ADMELOG) corrective regimen sliding scale   SubCutaneous at bedtime  methocarbamol 500 milliGRAM(s) Oral every 12 hours  multivitamin 1 Tablet(s) Oral daily  omega-3-Acid Ethyl Esters 2 Gram(s) Oral two times a day  pantoprazole    Tablet 40 milliGRAM(s) Oral before breakfast  polyethylene glycol 3350 17 Gram(s) Oral at bedtime  potassium chloride    Tablet ER 40 milliEquivalent(s) Oral every 4 hours  potassium chloride   Powder 40 milliEquivalent(s) Oral daily  simethicone 80 milliGRAM(s) Chew two times a day  spironolactone 50 milliGRAM(s) Oral daily    MEDICATIONS  (PRN):  dibucaine 1% Ointment 1 Application(s) Topical four times a day PRN for anal discomfort d/t hemorrhoids  magnesium hydroxide Suspension 30 milliLiter(s) Oral every 12 hours PRN Constipation    LABS: 05-13 Na139 mmol/L Glu 95 mg/dL K+ 3.4 mmol/L[L] Cr  0.68 mg/dL BUN 6 mg/dL[L] 05-13 Phos 2.5 mg/dL    A1C with Estimated Average Glucose Result: 7.2 % (04-25-25 @ 02:45)  A1C with Estimated Average Glucose Result: 7.3 % (04-24-25 @ 22:30)    CAPILLARY BLOOD GLUCOSE  POCT Blood Glucose.: 138 mg/dL (13 May 2025 11:38)  POCT Blood Glucose.: 106 mg/dL (13 May 2025 09:24)  POCT Blood Glucose.: 109 mg/dL (13 May 2025 07:27)  POCT Blood Glucose.: 121 mg/dL (12 May 2025 21:11)  POCT Blood Glucose.: 85 mg/dL (12 May 2025 16:35)    ANTHROPOMETRICS  Inpatient Weights: 5/12 - 68.4kg       5/11 - 68.5kg       5/10 - 70.2kg        5/5 - 74.5kg      5/2 - 71.1kg     4/30 - 70.1kg      4/27 - 65kg         4/26 - 63.8kg         4/24 - 63.4kg       Weight Assessment: Weight variances noted + 4.6kg  / 6.7%    Height: 165.1cm  Weight: 68.4kg   BMI: 25.1kg/m^2  IBW: 61.8kg +/-10%    PHYSICAL ASSESSMENT, per flowsheets:  Edema: None noted   Pressure Injury:  No pressure ulcers/DTI noted in flowsheets.     ESTIMATED NEEDS  [X] No change since previous assessment -OR - [X] Recalculated  Est Needs based on: [] actual weight [] dosing weight [X] IBW - 61.8kg  Kcal 25-30kcal/dh=4588-1421qnjt  Protein 1.2-1.4gm/kg=74-87gm    EDUCATION  [ ] Provided pt with verbal / written education on   [X ] Provided on previous assessment by RD  [ ] Not applicable secondary to   [ ] Pt refused     INTERVENTIONS:  1) Continue diet/supplement as ordered:   2) Please document % meal intake in flowsheets to assess adequacy of PO Intake.  3) Obtain and record current weight to best assess for acute changes in nutritional status.  4) Adjust bowel regimen prn.    MONITORING & EVALUATION:  PO intake, tolerance to diet/supplement, nutrition related lab values, weight trends, BMs/GI distress, hydration status, skin integrity.    Kinza Vivas, MS, RD, CDN; Pager #[phone]  Also available on [employer] Teams

## 2025-05-13 NOTE — PROGRESS NOTE ADULT - ASSESSMENT
79M s/p T10-pelvis PSF on 4/24    Plan:  -repleting K, appreciate nephrology/medicine recs   -WBAT BLLE  -appreciate ID recs: abx ertapenem  -appreciate GI recs for diarrhea: miralax qd, correct electrolytes to keep K>4 and Mg>2, avoid narcotics, no role for rectal tube given continual clinical improvement  -appreciate IR mgmt s/p psoas abscess drainage-- IR planning for tube check today     - 1 DHARA drain per IR     - Asp cx: bacteroides fragilis  -blood cx: NGF  -PT/OT  -dvt ppx: venodynes  -dispo: ADRIANA

## 2025-05-13 NOTE — CONSULT NOTE ADULT - ASSESSMENT
___________________________  ASSESSMENT AND PLAN  # Irritant contact dermatitis    ****PENDING FINAL RECOMMENDATIONS THIS NOTE IS INCOMPLETE****    Recommendations:   -   - Dermatology will continue to follow.     The patient's chart was reviewed in addition to being seen and examined at bedside with the dermatology attending Dr. Lobo .  Recommendations were communicated with the primary team.  Please page [phone] with a 10-digit call-back number for further related question    ELSY LEE MD  Resident Physician, PGY-3  [facility]at[facility]  Pager: [phone]  Office: [phone] ___________________________  ASSESSMENT AND PLAN  # Favor tinea cruris  ddx irritant contact dermatitis    Recommendations:   - start clotrimazole cream BID to affected areas  - Dermatology will continue to follow.     The patient's chart was reviewed in addition to being seen and examined at bedside with the dermatology attending Dr. Lobo .  Recommendations were communicated with the primary team.  Please page [phone] with a 10-digit call-back number for further related question    ELSY LEE MD  Resident Physician, PGY-3  [facility] D[facility]at[facility]  Pager: [phone]  Office: [phone] ___________________________  ASSESSMENT AND PLAN  # Favor tinea cruris  ddx erythrasma vs irritant contact dermatitis    Recommendations:   - start clotrimazole cream BID to affected areas  - Dermatology will continue to follow.     The patient's chart was reviewed in addition to being seen and examined at bedside with the dermatology attending Dr. Lobo .  Recommendations were communicated with the primary team.  Please page [phone] with a 10-digit call-back number for further related question    ELSY LEE MD  Resident Physician, PGY-3  [facility]  Pager: [phone]  Office: [phone]

## 2025-05-13 NOTE — CHART NOTE - NSCHARTNOTEFT_GEN_A_CORE
PRE-INTERVENTIONAL RADIOLOGY PROCEDURE NOTE:    Patient Name: ISIS CHEEK  Patient Age: 79y  Patient Gender: Male      Procedure: IR PICC  Diagnosis/Indication: IV abx >6 weeks      Interventional Radiology Attending Physician: DREW GAYTAN    Ordering Attending Physician: Ludy    Pertinent Medical History: spinal infection          Pertinent labs:                      8.3    7.25  )-----------( 518      ( 13 May 2025 06:09 )             25.4       05-13    139  |  110[H]  |  6[L]  ----------------------------<  95  3.4[L]   |  20[L]  |  0.68    Ca    8.8      13 May 2025 06:09  Phos  2.5     05-13  Mg     1.90     05-13        PT/INR - ( 13 May 2025 06:09 )   PT: 12.3 sec;   INR: 1.03 ratio         PTT - ( 13 May 2025 06:09 )  PTT:56.1 sec      Patient and Family Aware?: Yes

## 2025-05-13 NOTE — CONSULT NOTE ADULT - SUBJECTIVE AND OBJECTIVE BOX
HPI  79yMale w/ HTN, HLD, T2DM, chronic low back pain presents as a transfer from [facility] ED for surgery today. Pt was sent into ED yesterday by his pain medicine physician due to recent MRI findings concerning for L1-L2 discitis/OM with epidural abscess, bilateral psoas abscesses. Patient initially saw pain specialist in February for chronic back pain with radiations to bilateral lateral thighs (R>L). MRI at that time showed degenerative changes. He subsequently had epidural injections x2 (2/27, 3/11) with moderate pain relief. Pain began to worsen on 4/10. He had radiofrequency ablation performed on 4/11 and has since has severe worsening of pain and radiation to R lateral thigh. He reports recent bowel/bladder "incontinence" requiring diaper due to his inability to get to the bathroom in time due to pain limitations but states urge and sensation are intact. Denies fevers, chills, night sweats, weakness, numbness/tingling, saddle anesthesia, recent falls/trauma. He uses a cane for ambulation. Denies recent falls/trauma or any other ortho injuries.     Dermatology consulted for rash in the groin.  Per patient, he is having irritation and skin breakdown in the groin x days due to being bedridden for prolonged period of time and incontinence.     ROS  Negative unless otherwise specified in HPI.    PAST MEDICAL & SURGICAL Hx  PAST MEDICAL & SURGICAL HISTORY:  HTN (hypertension)      HLD (hyperlipidemia)      DM (diabetes mellitus)      Cataract          MEDICATIONS  Home Medications:  losartan 25 mg oral tablet: 1 tab(s) orally once a day (06 Jun 2018 10:23)  Lovaza 1000 mg oral capsule: 2 cap(s) orally 2 times a day (06 Jun 2018 10:23)  metFORMIN 500 mg oral tablet: 1 tab(s) orally once a day (06 Jun 2018 10:23)      ALLERGIES  No Known Allergies      FAMILY Hx  FAMILY HISTORY:  No pertinent family history in first degree relatives    MEDICATIONS  (STANDING):  acetaminophen     Tablet .. 975 milliGRAM(s) Oral every 6 hours  atorvastatin 40 milliGRAM(s) Oral at bedtime  bacitracin   Ointment 1 Application(s) Topical three times a day  ertapenem  IVPB      ertapenem  IVPB 1000 milliGRAM(s) IV Intermittent every 24 hours  gabapentin 100 milliGRAM(s) Oral every 8 hours  glucagon  Injectable 1 milliGRAM(s) IntraMuscular once  insulin lispro (ADMELOG) corrective regimen sliding scale   SubCutaneous three times a day before meals  insulin lispro (ADMELOG) corrective regimen sliding scale   SubCutaneous at bedtime  methocarbamol 500 milliGRAM(s) Oral every 12 hours  multivitamin 1 Tablet(s) Oral daily  omega-3-Acid Ethyl Esters 2 Gram(s) Oral two times a day  pantoprazole    Tablet 40 milliGRAM(s) Oral before breakfast  polyethylene glycol 3350 17 Gram(s) Oral at bedtime  potassium chloride   Powder 40 milliEquivalent(s) Oral daily  simethicone 80 milliGRAM(s) Chew two times a day  spironolactone 50 milliGRAM(s) Oral daily      VITAL SIGNS LAST 24 HOURS:  T(F): 97.6 (05-13 @ 14:30), Max: 98.2 (05-13 @ 01:41)  HR: 98 (05-13 @ 14:30) (85 - 98)  BP: 127/66 (05-13 @ 14:30) (120/58 - 137/63)  RR: 17 (05-13 @ 14:30) (17 - 18)    PHYSICAL EXAM:     The patient was alert and conversant and in no apparent distress.  OP showed no ulcerations  There was no visible lymphadenopathy.  Conjunctiva were non injected  There was no clubbing or edema of extremities.  The scalp, hair, face, eyebrows, lips, OP, neck, chest, back,   extremities X 4, nails were examined.  There was no hyperhidrosis or bromhidrosis.    Of note on skin exam: right medial thigh with erosion and surrounding well-demarcated hyperpigmented plaque, left medial thigh with well-demarcated hyperpigmented plaque  ____________________________________    LABS:                        8.3    7.25  )-----------( 518      ( 13 May 2025 06:09 )             25.4     05-13    139  |  110[H]  |  6[L]  ----------------------------<  95  3.4[L]   |  20[L]  |  0.68    Ca    8.8      13 May 2025 06:09  Phos  2.5     05-13  Mg     1.90     05-13      PT/INR - ( 13 May 2025 06:09 )   PT: 12.3 sec;   INR: 1.03 ratio         PTT - ( 13 May 2025 06:09 )  PTT:56.1 sec  Urinalysis Basic - ( 13 May 2025 06:09 )    Color: x / Appearance: x / SG: x / pH: x  Gluc: 95 mg/dL / Ketone: x  / Bili: x / Urobili: x   Blood: x / Protein: x / Nitrite: x   Leuk Esterase: x / RBC: x / WBC x   Sq Epi: x / Non Sq Epi: x / Bacteria: x     HPI  79yMale w/ HTN, HLD, T2DM, chronic low back pain presents as a transfer from [facility] ED for surgery today. Pt was sent into ED yesterday by his pain medicine physician due to recent MRI findings concerning for L1-L2 discitis/OM with epidural abscess, bilateral psoas abscesses. Patient initially saw pain specialist in February for chronic back pain with radiations to bilateral lateral thighs (R>L). MRI at that time showed degenerative changes. He subsequently had epidural injections x2 (2/27, 3/11) with moderate pain relief. Pain began to worsen on 4/10. He had radiofrequency ablation performed on 4/11 and has since has severe worsening of pain and radiation to R lateral thigh. He reports recent bowel/bladder "incontinence" requiring diaper due to his inability to get to the bathroom in time due to pain limitations but states urge and sensation are intact. Denies fevers, chills, night sweats, weakness, numbness/tingling, saddle anesthesia, recent falls/trauma. He uses a cane for ambulation. Denies recent falls/trauma or any other ortho injuries.     Dermatology consulted for rash in the groin.  Per patient, he is having irritation and skin breakdown in the groin x days due to being bedridden for prolonged period of time and incontinence.     ROS  Negative unless otherwise specified in HPI.    PAST MEDICAL & SURGICAL Hx  PAST MEDICAL & SURGICAL HISTORY:  HTN (hypertension)      HLD (hyperlipidemia)      DM (diabetes mellitus)      Cataract          MEDICATIONS  Home Medications:  losartan 25 mg oral tablet: 1 tab(s) orally once a day (06 Jun 2018 10:23)  Lovaza 1000 mg oral capsule: 2 cap(s) orally 2 times a day (06 Jun 2018 10:23)  metFORMIN 500 mg oral tablet: 1 tab(s) orally once a day (06 Jun 2018 10:23)      ALLERGIES  No Known Allergies      FAMILY Hx  FAMILY HISTORY:  No pertinent family history in first degree relatives    MEDICATIONS  (STANDING):  acetaminophen     Tablet .. 975 milliGRAM(s) Oral every 6 hours  atorvastatin 40 milliGRAM(s) Oral at bedtime  bacitracin   Ointment 1 Application(s) Topical three times a day  ertapenem  IVPB      ertapenem  IVPB 1000 milliGRAM(s) IV Intermittent every 24 hours  gabapentin 100 milliGRAM(s) Oral every 8 hours  glucagon  Injectable 1 milliGRAM(s) IntraMuscular once  insulin lispro (ADMELOG) corrective regimen sliding scale   SubCutaneous three times a day before meals  insulin lispro (ADMELOG) corrective regimen sliding scale   SubCutaneous at bedtime  methocarbamol 500 milliGRAM(s) Oral every 12 hours  multivitamin 1 Tablet(s) Oral daily  omega-3-Acid Ethyl Esters 2 Gram(s) Oral two times a day  pantoprazole    Tablet 40 milliGRAM(s) Oral before breakfast  polyethylene glycol 3350 17 Gram(s) Oral at bedtime  potassium chloride   Powder 40 milliEquivalent(s) Oral daily  simethicone 80 milliGRAM(s) Chew two times a day  spironolactone 50 milliGRAM(s) Oral daily      VITAL SIGNS LAST 24 HOURS:  T(F): 97.6 (05-13 @ 14:30), Max: 98.2 (05-13 @ 01:41)  HR: 98 (05-13 @ 14:30) (85 - 98)  BP: 127/66 (05-13 @ 14:30) (120/58 - 137/63)  RR: 17 (05-13 @ 14:30) (17 - 18)    PHYSICAL EXAM:     The patient was alert and conversant and in no apparent distress.  OP showed no ulcerations  There was no visible lymphadenopathy.  Conjunctiva were non injected  There was no clubbing or edema of extremities.  The scalp, hair, face, eyebrows, lips, OP, neck, chest, back,   extremities X 4, nails were examined.  There was no hyperhidrosis or bromhidrosis.    Of note on skin exam: right medial thigh with erosion and surrounding well-demarcated hyperpigmented plaque, left medial thigh with well-demarcated hyperpigmented mildly scaly plaque  ____________________________________    LABS:                        8.3    7.25  )-----------( 518      ( 13 May 2025 06:09 )             25.4     05-13    139  |  110[H]  |  6[L]  ----------------------------<  95  3.4[L]   |  20[L]  |  0.68    Ca    8.8      13 May 2025 06:09  Phos  2.5     05-13  Mg     1.90     05-13      PT/INR - ( 13 May 2025 06:09 )   PT: 12.3 sec;   INR: 1.03 ratio         PTT - ( 13 May 2025 06:09 )  PTT:56.1 sec  Urinalysis Basic - ( 13 May 2025 06:09 )    Color: x / Appearance: x / SG: x / pH: x  Gluc: 95 mg/dL / Ketone: x  / Bili: x / Urobili: x   Blood: x / Protein: x / Nitrite: x   Leuk Esterase: x / RBC: x / WBC x   Sq Epi: x / Non Sq Epi: x / Bacteria: x

## 2025-05-13 NOTE — PROGRESS NOTE ADULT - ASSESSMENT
79yMale w/ HTN, HLD, T2DM, chronic low back pain presents as a transfer from [facility] ED for surgery today. Pt was sent into ED yesterday by his pain medicine physician due to recent MRI findings concerning for L1-L2 discitis/OM with epidural abscess, bilateral psoas abscesses. Patient initially saw pain specialist in February for chronic back pain with radiations to bilateral lateral thighs (R>L). MRI at that time showed degenerative changes. He subsequently had epidural injections x2 (2/27, 3/11) with moderate pain relief. Pain began to worsen on 4/10. He had radiofrequency ablation performed on 4/11 and has since has severe worsening of pain and radiation to R lateral thigh. He reports recent bowel/bladder "incontinence" requiring diaper due to his inability to get to the bathroom in time due to pain limitations but states urge and sensation are intact. Denies fevers, chills, night sweats, weakness, numbness/tingling, saddle anesthesia, recent falls/trauma. He uses a cane for ambulation. Denies recent falls/trauma or any other ortho injuries. Before back pain was very active walked half an hour , going up and down stairs and doing grocery etc with no Chest pain or SOB.        Problem/Recommendation - 1:  ·  Problem: Epidural abscess.   ·  Recommendation: S/P Decompression, spine, lumbar, posterior approach, with fusion of posterior spinal column.  On IV Abxs Ertapenem now  per ID x 6 weeks  .  Will defer management to  Neurosurgery.  DVT prophylaxis per Neurosurgery .  < from: MR Lumbar Spine w/ IV Cont (04.23.25 @ 15:13) >  IMPRESSION:        1.   Cervical spine:   Moderate degenerative disc disease and   spondylosis at C4-5 through C6/7 with loss of disc height and associated   degenerative endplate changes.        2.   Thoracic spine:   Minimal enhancement within T7-8 which may   reflect early discitis.        3.   Lumbar spine:   Osteomyelitis and discitis at L1-2 with erosions   of the inferior L1 vertebral body and L2 vertebral body consistent with   osteomyelitis and discitis. Ventral epidural abscess is noted extending   from the L1-2 level superiorly to the T10 level with mass effect on the   ventral thecal sac, most prominently at the L1-2 level resulting in   marked compression. Multiple abscesses within the BILATERAL psoas   muscles, the largest measuring 4.3 cm on the RIGHT and 2.8 cm on the LEFT.     Problem/Recommendation - 2:  ·  Problem: Acute osteomyelitis of lumbar spine.   ·  Recommendation: L1 &L2 vertebrae .  On IV Abxs per ID .  Will defer management to Neurosurgery.     Problem/Recommendation - 3:  ·  Problem: Low back pain radiating to right lower extremity.   ·  Recommendation: Pain control.     Problem/Recommendation - 4:  ·  Problem: . BILATERAL psoas Abscess  ·  Recommendation: ON IV Abxs per ID .  IR placed drains after draining  . IR to check drains .     Problem/Recommendation - 5:  ·  Problem: HTN (hypertension).   ·  Recommendation: Takes Amlodipine and ARB so continue with hold parameters.  < from: TTE W or WO Ultrasound Enhancing Agent (04.24.25 @ 10:10) >  CONCLUSIONS:      1. Left ventricular cavity is normal in size. Left ventricular wall thickness is normal. Left ventricular systolic function is normal with an ejection fraction of 64 % by Carpenter's method of disks. There are no regional wall motion abnormalities seen.   2. Normal right ventricular cavity size and normal right ventricular systolic function. Tricuspid annular plane systolic excursion (TAPSE) is 2.2 cm (normal >=1.7 cm).   3. Structurally normal mitral valve with normal leaflet excursion. There is calcification of the mitral valve annulus. There is tracemitral regurgitation.   4. The aortic valve appears trileaflet with normal systolic excursion. There is calcification of the aortic valve leaflets. There is mild aortic regurgitation.     Problem/Recommendation - 6:  ·  Problem: HLD (hyperlipidemia).   ·  Recommendation: Continue Atorvastatin.     Problem/Recommendation - 7:  ·  Problem: DM (diabetes mellitus).   ·  Recommendation: ON Farxiga and holding.  SSI and Lantus in patient .     Problem/Recommendation - 8:  ·  Problem:  Ileus /Pseudoobstruction   ·  Recommendation: Improving.   Had BMs and  passing flatus .  Correcting electrolytes.     ON Laxatives and Simethacone .  Advanced diet .   Surgery & GI input appreciated .  D/W GI attending and will wait till tomorrow before rectal tube placement  .  < from: CT Abdomen and Pelvis w/ Oral Cont and w/ IV Cont (04.29.25 @ 17:16) >  IMPRESSION:  Diffuse colonic dilatation, worse on today's study, likely secondary to   pseudoobstruction or ileus.    No free air.    Reduction in the bilateral psoas collections with catheters in place.    < from: Xray Abdomen 1 View PORTABLE -Urgent (Xray Abdomen 1 View PORTABLE -Urgent .) (04.29.25 @ 10:09) >  IMPRESSION:  Multiple dilated loops of small and large bowel with question of   pneumoperitoneum. Recommend upright chest radiograph or decubitus   abdominal radiograph for further evaluation.     Problem/Recommendation - 9:  ·  Problem: JUSTINO with Hyponatremia .   ·  Recommendation: Trending BMP.  Resolved.  Renal help appreciated.      Problem/Recommendation - 10:  ·  Problem: Hypokalemia & Hypophosphatemia .   ·  Recommendation: Replaced .     K3.9 .    Aldactone 50mg started.    Rpt BMP noted.    Keep K level close to 4 .     Problem/Recommendation - 11:  ·  Problem: Thrombocytosis .   ·  Recommendation: Reactionary secondary to infection.  Platelets Trending down .     Problem/Recommendation - 12:  ·  Problem: Pedal edema >Right LE.   ·  Recommendation: No calf tenderness +,Ambulating.     Problem/Recommendation - 13:  ·  Problem: Irritant Dermatitis  .   ·  Recommendation: Dermatology input appreciated .  Started clotrimazole cream.      PT working with patient and ambulating .    Dispo : DC planning per primary team . D/W Daughter .

## 2025-05-13 NOTE — PROGRESS NOTE ADULT - SUBJECTIVE AND OBJECTIVE BOX
Orthopedic Surgery Progress Note     S: Patient seen and examined today. No acute events overnight. Pain is well controlled. Denies f/c, chest pain, shortness of breath, dizziness. Complaining of abdominal discomfort and gas.    MEDICATIONS  (STANDING):  acetaminophen     Tablet .. 975 milliGRAM(s) Oral every 6 hours  atorvastatin 40 milliGRAM(s) Oral at bedtime  bacitracin   Ointment 1 Application(s) Topical three times a day  ertapenem  IVPB      ertapenem  IVPB 1000 milliGRAM(s) IV Intermittent every 24 hours  gabapentin 100 milliGRAM(s) Oral every 8 hours  glucagon  Injectable 1 milliGRAM(s) IntraMuscular once  insulin lispro (ADMELOG) corrective regimen sliding scale   SubCutaneous three times a day before meals  insulin lispro (ADMELOG) corrective regimen sliding scale   SubCutaneous at bedtime  methocarbamol 500 milliGRAM(s) Oral every 12 hours  multivitamin 1 Tablet(s) Oral daily  omega-3-Acid Ethyl Esters 2 Gram(s) Oral two times a day  pantoprazole    Tablet 40 milliGRAM(s) Oral before breakfast  polyethylene glycol 3350 17 Gram(s) Oral at bedtime  potassium chloride    Tablet ER 40 milliEquivalent(s) Oral every 4 hours  potassium chloride   Powder 40 milliEquivalent(s) Oral daily  simethicone 80 milliGRAM(s) Chew two times a day  spironolactone 50 milliGRAM(s) Oral daily    MEDICATIONS  (PRN):  dibucaine 1% Ointment 1 Application(s) Topical four times a day PRN for anal discomfort d/t hemorrhoids  magnesium hydroxide Suspension 30 milliLiter(s) Oral every 12 hours PRN Constipation      Vital Signs Last 24 Hrs  T(C): 36.6 (13 May 2025 09:32), Max: 37.2 (12 May 2025 13:10)  T(F): 97.9 (13 May 2025 09:32), Max: 98.9 (12 May 2025 13:10)  HR: 92 (13 May 2025 09:32) (85 - 95)  BP: 120/58 (13 May 2025 09:32) (120/58 - 137/63)  BP(mean): --  RR: 17 (13 May 2025 09:32) (17 - 18)  SpO2: 98% (13 May 2025 09:32) (95% - 100%)    Parameters below as of 13 May 2025 09:32  Patient On (Oxygen Delivery Method): room air        05-12-25 @ 07:01  -  05-13-25 @ 07:00  --------------------------------------------------------  IN: 0 mL / OUT: 1405.5 mL / NET: -1405.5 mL        Physical Exam:  Gen: NAD  Resp: No increased WOB  Spine:  Dressing: clean/dry/intact    Drains: IR Drain in place from 2/7  Motor:                   C5                C6              C7               C8           T1   R            5/5                5/5            5/5             5/5          5/5  L             5/5               5/5             5/5             5/5          5/5                L2             L3             L4               L5            S1  R         3/5           4/5          5/5             5/5           5/5  L          4/5          5/5           5/5             5/5           5/5            Calves Soft/Non-tender bilaterally         Distal extremities warm well perfused; capillary refill <3 seconds       LABS:                        8.3    7.25  )-----------( 518      ( 13 May 2025 06:09 )             25.4     05-13    139  |  110[H]  |  6[L]  ----------------------------<  95  3.4[L]   |  20[L]  |  0.68    Ca    8.8      13 May 2025 06:09  Phos  2.5     05-13  Mg     1.90     05-13

## 2025-05-14 LAB
ANION GAP SERPL CALC-SCNC: 12 MMOL/L — SIGNIFICANT CHANGE UP (ref 7–14)
BASOPHILS # BLD AUTO: 0.04 K/UL — SIGNIFICANT CHANGE UP (ref 0–0.2)
BASOPHILS NFR BLD AUTO: 0.5 % — SIGNIFICANT CHANGE UP (ref 0–2)
BUN SERPL-MCNC: 7 MG/DL — SIGNIFICANT CHANGE UP (ref 7–23)
CALCIUM SERPL-MCNC: 8.8 MG/DL — SIGNIFICANT CHANGE UP (ref 8.4–10.5)
CHLORIDE SERPL-SCNC: 106 MMOL/L — SIGNIFICANT CHANGE UP (ref 98–107)
CO2 SERPL-SCNC: 17 MMOL/L — LOW (ref 22–31)
CREAT SERPL-MCNC: 0.61 MG/DL — SIGNIFICANT CHANGE UP (ref 0.5–1.3)
EGFR: 98 ML/MIN/1.73M2 — SIGNIFICANT CHANGE UP
EGFR: 98 ML/MIN/1.73M2 — SIGNIFICANT CHANGE UP
EOSINOPHIL # BLD AUTO: 0.2 K/UL — SIGNIFICANT CHANGE UP (ref 0–0.5)
EOSINOPHIL NFR BLD AUTO: 2.6 % — SIGNIFICANT CHANGE UP (ref 0–6)
GLUCOSE BLDC GLUCOMTR-MCNC: 100 MG/DL — HIGH (ref 70–99)
GLUCOSE BLDC GLUCOMTR-MCNC: 104 MG/DL — HIGH (ref 70–99)
GLUCOSE BLDC GLUCOMTR-MCNC: 111 MG/DL — HIGH (ref 70–99)
GLUCOSE BLDC GLUCOMTR-MCNC: 114 MG/DL — HIGH (ref 70–99)
GLUCOSE SERPL-MCNC: 90 MG/DL — SIGNIFICANT CHANGE UP (ref 70–99)
HCT VFR BLD CALC: 23.9 % — LOW (ref 39–50)
HGB BLD-MCNC: 7.8 G/DL — LOW (ref 13–17)
IANC: 3.42 K/UL — SIGNIFICANT CHANGE UP (ref 1.8–7.4)
IMM GRANULOCYTES NFR BLD AUTO: 0.9 % — SIGNIFICANT CHANGE UP (ref 0–0.9)
LYMPHOCYTES # BLD AUTO: 3.27 K/UL — SIGNIFICANT CHANGE UP (ref 1–3.3)
LYMPHOCYTES # BLD AUTO: 43.3 % — SIGNIFICANT CHANGE UP (ref 13–44)
MAGNESIUM SERPL-MCNC: 1.8 MG/DL — SIGNIFICANT CHANGE UP (ref 1.6–2.6)
MCHC RBC-ENTMCNC: 31 PG — SIGNIFICANT CHANGE UP (ref 27–34)
MCHC RBC-ENTMCNC: 32.6 G/DL — SIGNIFICANT CHANGE UP (ref 32–36)
MCV RBC AUTO: 94.8 FL — SIGNIFICANT CHANGE UP (ref 80–100)
MONOCYTES # BLD AUTO: 0.56 K/UL — SIGNIFICANT CHANGE UP (ref 0–0.9)
MONOCYTES NFR BLD AUTO: 7.4 % — SIGNIFICANT CHANGE UP (ref 2–14)
NEUTROPHILS # BLD AUTO: 3.42 K/UL — SIGNIFICANT CHANGE UP (ref 1.8–7.4)
NEUTROPHILS NFR BLD AUTO: 45.3 % — SIGNIFICANT CHANGE UP (ref 43–77)
NRBC # BLD AUTO: 0 K/UL — SIGNIFICANT CHANGE UP (ref 0–0)
NRBC # FLD: 0 K/UL — SIGNIFICANT CHANGE UP (ref 0–0)
NRBC BLD AUTO-RTO: 0 /100 WBCS — SIGNIFICANT CHANGE UP (ref 0–0)
PHOSPHATE SERPL-MCNC: 2.5 MG/DL — SIGNIFICANT CHANGE UP (ref 2.5–4.5)
PLATELET # BLD AUTO: 449 K/UL — HIGH (ref 150–400)
POTASSIUM SERPL-MCNC: 4.1 MMOL/L — SIGNIFICANT CHANGE UP (ref 3.5–5.3)
POTASSIUM SERPL-SCNC: 4.1 MMOL/L — SIGNIFICANT CHANGE UP (ref 3.5–5.3)
RBC # BLD: 2.52 M/UL — LOW (ref 4.2–5.8)
RBC # FLD: 23 % — HIGH (ref 10.3–14.5)
SODIUM SERPL-SCNC: 135 MMOL/L — SIGNIFICANT CHANGE UP (ref 135–145)
WBC # BLD: 7.56 K/UL — SIGNIFICANT CHANGE UP (ref 3.8–10.5)
WBC # FLD AUTO: 7.56 K/UL — SIGNIFICANT CHANGE UP (ref 3.8–10.5)

## 2025-05-14 PROCEDURE — 99222 1ST HOSP IP/OBS MODERATE 55: CPT

## 2025-05-14 PROCEDURE — 36573 INSJ PICC RS&I 5 YR+: CPT

## 2025-05-14 RX ORDER — MAGNESIUM SULFATE 500 MG/ML
1 SYRINGE (ML) INJECTION ONCE
Refills: 0 | Status: COMPLETED | OUTPATIENT
Start: 2025-05-14 | End: 2025-05-14

## 2025-05-14 RX ADMIN — Medication 975 MILLIGRAM(S): at 00:25

## 2025-05-14 RX ADMIN — Medication 975 MILLIGRAM(S): at 18:00

## 2025-05-14 RX ADMIN — Medication 80 MILLIGRAM(S): at 05:41

## 2025-05-14 RX ADMIN — Medication 975 MILLIGRAM(S): at 13:32

## 2025-05-14 RX ADMIN — Medication 975 MILLIGRAM(S): at 12:32

## 2025-05-14 RX ADMIN — Medication 1 APPLICATION(S): at 15:19

## 2025-05-14 RX ADMIN — OMEGA-3-ACID ETHYL ESTERS CAPSULES 2 GRAM(S): 1 CAPSULE, LIQUID FILLED ORAL at 23:59

## 2025-05-14 RX ADMIN — ERTAPENEM SODIUM 100 MILLIGRAM(S): 1 INJECTION, POWDER, LYOPHILIZED, FOR SOLUTION INTRAMUSCULAR; INTRAVENOUS at 15:15

## 2025-05-14 RX ADMIN — CLOTRIMAZOLE 1 APPLICATION(S): 1 CREAM TOPICAL at 05:40

## 2025-05-14 RX ADMIN — Medication 975 MILLIGRAM(S): at 17:08

## 2025-05-14 RX ADMIN — CLOTRIMAZOLE 1 APPLICATION(S): 1 CREAM TOPICAL at 17:08

## 2025-05-14 RX ADMIN — Medication 40 MILLIGRAM(S): at 05:40

## 2025-05-14 RX ADMIN — Medication 975 MILLIGRAM(S): at 05:40

## 2025-05-14 RX ADMIN — Medication 50 MILLIGRAM(S): at 05:40

## 2025-05-14 RX ADMIN — GABAPENTIN 100 MILLIGRAM(S): 400 CAPSULE ORAL at 05:40

## 2025-05-14 RX ADMIN — Medication 100 GRAM(S): at 17:06

## 2025-05-14 RX ADMIN — OMEGA-3-ACID ETHYL ESTERS CAPSULES 2 GRAM(S): 1 CAPSULE, LIQUID FILLED ORAL at 12:32

## 2025-05-14 RX ADMIN — METHOCARBAMOL 500 MILLIGRAM(S): 500 TABLET, FILM COATED ORAL at 00:25

## 2025-05-14 RX ADMIN — Medication 975 MILLIGRAM(S): at 01:22

## 2025-05-14 RX ADMIN — GABAPENTIN 100 MILLIGRAM(S): 400 CAPSULE ORAL at 21:39

## 2025-05-14 RX ADMIN — Medication 975 MILLIGRAM(S): at 23:59

## 2025-05-14 RX ADMIN — OMEGA-3-ACID ETHYL ESTERS CAPSULES 2 GRAM(S): 1 CAPSULE, LIQUID FILLED ORAL at 00:25

## 2025-05-14 RX ADMIN — Medication 975 MILLIGRAM(S): at 06:20

## 2025-05-14 RX ADMIN — Medication 40 MILLIEQUIVALENT(S): at 12:35

## 2025-05-14 RX ADMIN — Medication 1 TABLET(S): at 12:32

## 2025-05-14 RX ADMIN — POLYETHYLENE GLYCOL 3350 17 GRAM(S): 17 POWDER, FOR SOLUTION ORAL at 21:37

## 2025-05-14 RX ADMIN — GABAPENTIN 100 MILLIGRAM(S): 400 CAPSULE ORAL at 15:14

## 2025-05-14 RX ADMIN — METHOCARBAMOL 500 MILLIGRAM(S): 500 TABLET, FILM COATED ORAL at 12:32

## 2025-05-14 RX ADMIN — METHOCARBAMOL 500 MILLIGRAM(S): 500 TABLET, FILM COATED ORAL at 23:59

## 2025-05-14 RX ADMIN — Medication 80 MILLIGRAM(S): at 17:09

## 2025-05-14 NOTE — PROGRESS NOTE ADULT - SUBJECTIVE AND OBJECTIVE BOX
NEPHROLOGY     Patient seen and examined sitting in chair, comfortable on room air, denies sob, pain controlled, in no acute distress.     MEDICATIONS  (STANDING):  acetaminophen     Tablet .. 975 milliGRAM(s) Oral every 6 hours  atorvastatin 40 milliGRAM(s) Oral at bedtime  bacitracin   Ointment 1 Application(s) Topical three times a day  clotrimazole 1% Cream 1 Application(s) Topical two times a day  ertapenem  IVPB 1000 milliGRAM(s) IV Intermittent every 24 hours  ertapenem  IVPB      gabapentin 100 milliGRAM(s) Oral every 8 hours  glucagon  Injectable 1 milliGRAM(s) IntraMuscular once  insulin lispro (ADMELOG) corrective regimen sliding scale   SubCutaneous three times a day before meals  insulin lispro (ADMELOG) corrective regimen sliding scale   SubCutaneous at bedtime  methocarbamol 500 milliGRAM(s) Oral every 12 hours  multivitamin 1 Tablet(s) Oral daily  omega-3-Acid Ethyl Esters 2 Gram(s) Oral two times a day  pantoprazole    Tablet 40 milliGRAM(s) Oral before breakfast  polyethylene glycol 3350 17 Gram(s) Oral at bedtime  potassium chloride   Powder 40 milliEquivalent(s) Oral daily  simethicone 80 milliGRAM(s) Chew two times a day  spironolactone 50 milliGRAM(s) Oral daily    VITALS:  T(C): , Max: 36.9 (05-14-25 @ 05:38)  T(F): , Max: 98.4 (05-14-25 @ 05:38)  HR: 85 (05-14-25 @ 06:54)  BP: 132/58 (05-14-25 @ 06:54)  RR: 18 (05-14-25 @ 06:54)  SpO2: 99% (05-14-25 @ 06:54)    I and O's:    05-13 @ 07:01  -  05-14 @ 07:00  --------------------------------------------------------  IN: 0 mL / OUT: 650 mL / NET: -650 mL    Height (cm): 165.1 (05-14 @ 06:54)  Weight (kg): 63.5 (05-14 @ 06:54)  BMI (kg/m2): 23.3 (05-14 @ 06:54)  BSA (m2): 1.7 (05-14 @ 06:54)    PHYSICAL EXAM:  Constitutional: NAD, alert  HEENT: NCAT, DMM  Neck:  No JVD  Respiratory: CTA-b/l  Cardiovascular: reg s1s2  Gastrointestinal: (+)distension, NT  Extremities: + peripheral edema  Neurological: reduced generalized strength  : No Mac  Skin: No rashes    LABS:                        7.8    7.56  )-----------( 449      ( 14 May 2025 05:42 )             23.9     05-14    135  |  106  |  7   ----------------------------<  90  4.1   |  17[L]  |  0.61    Ca    8.8      14 May 2025 05:42  Phos  2.5     05-14  Mg     1.80     05-14

## 2025-05-14 NOTE — CONSULT NOTE ADULT - REASON FOR ADMISSION
L1-L2 discitis/OM with epidural abscess and BL psoas abscesses

## 2025-05-14 NOTE — CONSULT NOTE ADULT - PROVIDER SPECIALTY LIST ADULT
Rehab Medicine
SICU
Gastroenterology
Infectious Disease
Nephrology
Cardiology
Dermatology
Surgery
Intervent Radiology
Internal Medicine

## 2025-05-14 NOTE — PROGRESS NOTE ADULT - ASSESSMENT
79M s/p T10-pelvis PSF on 4/24    Plan:  -repleting K, appreciate nephrology/medicine recs   -WBAT BLLE  -appreciate ID recs: abx ertapenem  -appreciate GI recs for diarrhea: miralax qd, correct electrolytes to keep K>4 and Mg>2, avoid narcotics, no role for rectal tube given continual clinical improvement  -appreciate IR mgmt s/p psoas abscess drainage-- IR planning for tube check today     - 1 DHARA drain per IR     - Asp cx: bacteroides fragilis  -blood cx: NGF  -PT/OT  -dvt ppx: venodynes  -dispo: [facility], pt requesting AR (f/u PM&R consult)    Ana Luisa Cervantes, PGY-2  Orthopedic Surgery    [facility]: [id number]  [facility]: [phone]  [facility]:  [id number]/ [phone]   79M s/p T10-pelvis PSF on 4/24    Plan:  -repleting K, appreciate nephrology/medicine recs   -WBAT BLLE  -appreciate ID recs: abx ertapenem  -appreciate GI recs for diarrhea: miralax qd, correct electrolytes to keep K>4 and Mg>2, avoid narcotics, no role for rectal tube given continual clinical improvement  -appreciate IR mgmt s/p psoas abscess drainage-- both drains removed per IR      - Asp cx: bacteroides fragilis  -blood cx: NGF  -PT/OT  -dvt ppx: venodynes  -dispo: [facility], pt requesting AR (f/u PM&R consult)    Ana Luisa Cervantes, PGY-2  Orthopedic Surgery    [facility] [id number]  [facility]: [phone]  [facility]:  [id number]/ [phone]

## 2025-05-14 NOTE — PROCEDURE NOTE - NSINFORMCONSENT_GEN_A_CORE
Benefits, risks, and possible complications of procedure explained to patient/caregiver who verbalized understanding and gave written consent.
Benefits, risks, and possible complications of procedure explained to patient/caregiver who verbalized understanding and gave written consent.
Benefits, risks, and possible complications of procedure explained to patient/caregiver who verbalized understanding and gave verbal consent.
Benefits, risks, and possible complications of procedure explained to patient/caregiver who verbalized understanding and gave verbal consent.

## 2025-05-14 NOTE — PROGRESS NOTE ADULT - SUBJECTIVE AND OBJECTIVE BOX
DATE OF SERVICE: 05-14-25    no events overnight, no pain or SOB     acetaminophen     Tablet .. 975 milliGRAM(s) Oral every 6 hours  atorvastatin 40 milliGRAM(s) Oral at bedtime  bacitracin   Ointment 1 Application(s) Topical three times a day  clotrimazole 1% Cream 1 Application(s) Topical two times a day  dibucaine 1% Ointment 1 Application(s) Topical four times a day PRN  ertapenem  IVPB 1000 milliGRAM(s) IV Intermittent every 24 hours  ertapenem  IVPB      gabapentin 100 milliGRAM(s) Oral every 8 hours  glucagon  Injectable 1 milliGRAM(s) IntraMuscular once  insulin lispro (ADMELOG) corrective regimen sliding scale   SubCutaneous three times a day before meals  insulin lispro (ADMELOG) corrective regimen sliding scale   SubCutaneous at bedtime  magnesium hydroxide Suspension 30 milliLiter(s) Oral every 12 hours PRN  methocarbamol 500 milliGRAM(s) Oral every 12 hours  multivitamin 1 Tablet(s) Oral daily  omega-3-Acid Ethyl Esters 2 Gram(s) Oral two times a day  pantoprazole    Tablet 40 milliGRAM(s) Oral before breakfast  polyethylene glycol 3350 17 Gram(s) Oral at bedtime  potassium chloride   Powder 40 milliEquivalent(s) Oral daily  simethicone 80 milliGRAM(s) Chew two times a day  sodium chloride 0.9% lock flush 10 milliLiter(s) IV Push every 1 hour PRN  spironolactone 50 milliGRAM(s) Oral daily                            7.8    7.56  )-----------( 449      ( 14 May 2025 05:42 )             23.9     135  |  106  |  7   ----------------------------<  90  4.1   |  17[L]  |  0.61    Ca    8.8      14 May 2025 05:42  Phos  2.5     05-14  Mg     1.80     05-14    Creatinine Trend: 0.61<--, 0.68<--, 0.66<--, 0.65<--, 0.90<--, 0.65<--      T(C): 36.2 (05-14-25 @ 10:31), Max: 36.9 (05-14-25 @ 05:38)  HR: 90 (05-14-25 @ 10:31) (85 - 98)  BP: 135/64 (05-14-25 @ 10:31) (132/58 - 141/68)  RR: 16 (05-14-25 @ 10:31) (16 - 18)  SpO2: 100% (05-14-25 @ 10:31) (99% - 100%)  Wt(kg): --    I&O's Summary    13 May 2025 07:01  -  14 May 2025 07:00  --------------------------------------------------------  IN: 0 mL / OUT: 650 mL / NET: -650 mL    Gen: NAD  HEENT:  (-)icterus (-)pallor  CV: N S1 S2 1/6 DARYA (+)2 Pulses B/l  Resp:  Clear to auscultation B/L, normal effort  GI: distended  Lymph:  (-)Edema, (-)obvious lymphadenopathy  Skin: Warm to touch, Normal turgor  Psych: Appropriate mood and affect      TELEMETRY: 	  NSR    ECG:  	NSR    < from: Xray Abdomen 1 View PORTABLE -Urgent (Xray Abdomen 1 View PORTABLE -Urgent .) (04.29.25 @ 10:09) >  IMPRESSION:  Multiple dilated loops of small and large bowel with question of   pneumoperitoneum. Recommend upright chest radiograph or decubitus   abdominal radiograph for further evaluation.    < end of copied text >    < from: CT Abdomen and Pelvis w/ Oral Cont and w/ IV Cont (04.29.25 @ 17:16) >  IMPRESSION:      Diffuse colonic dilatation, worse on today's study, likely secondary to   pseudoobstruction or ileus.    No free air.    Reduction in the bilateral psoas collections with catheters in place.    PRELIMINARY IMPRESSION: Study limited secondary to extensive streak   artifact from patient's spinal hardware. Diffusely dilated colon to the   level of the rectum, increased since 4/25/2025, without evidence of   mechanical obstruction. Possible etiologies include pseudoobstruction   versus ileus. No small bowel obstruction. No pneumoperitoneum.   Percutaneous posterior approach drainage catheters within the psoas   muscles bilaterally with interval decrease of previously seen abscess.   Redemonstrated erosive endplate changes at L1-L2 compatible with discitis   osteomyelitis.    Follow-up final report in the a.m.    < end of copied text >      ASSESSMENT/PLAN: 	Pt is a  79 year old male w/ HTN, HLD, T2DM, chronic low back pain presents as a transfer from [facility] ED for surgery. . Pt was sent into ED yesterday by his pain medicine physician due to recent MRI findings concerning for L1-L2 discitis/OM with epidural abscess, bilateral psoas abscesses. Patient initially saw pain specialist in February for chronic back pain with radiations to bilateral lateral thighs (R>L). MRI at that time showed degenerative changes. He subsequently had epidural injections x2 (2/27, 3/11) with moderate pain relief. Pain began to worsen on 4/10. He had radiofrequency ablation performed on 4/11 and has since has severe worsening of pain and radiation to R lateral thigh. He reports recent bowel/bladder "incontinence" requiring diaper due to his inability to get to the bathroom in time due to pain limitations but states urge and sensation are intact. Denies fevers, chills, night sweats, weakness, numbness/tingling, saddle anesthesia, recent falls/trauma. He uses a cane for ambulation. Denies recent falls/trauma or any other ortho injuries.   Found to have epidural abscess now s/p Decompression, spine, lumbar, posterior approach, with fusion of posterior spinal column, planned for psoas abscess drainage.    - s/p Decompression, spine, lumbar, posterior approach, with fusion of posterior spinal column  - s/p psoas drainage   - pain control/PT/bowel regimen  - c/w statin  - BP/HR stable  - s/p PICC  DC planning acute rehab

## 2025-05-14 NOTE — PROCEDURE NOTE - GENERAL PROCEDURE NAME
Single Lumen PICC Placement
bilateral psoas abscess drain placements
Tube check and removal of right psoas drain
Tube Check x2 (Bilateral Psoas Abscess Drains)

## 2025-05-14 NOTE — PROGRESS NOTE ADULT - SUBJECTIVE AND OBJECTIVE BOX
Orthopedic Surgery Progress Note     S: Patient seen and examined today. No acute events overnight. Pain is well controlled. Denies f/c, chest pain, shortness of breath, dizziness. Notes that his abdominal distension is better. Walked twice yesterday.    MEDICATIONS  (STANDING):  acetaminophen     Tablet .. 975 milliGRAM(s) Oral every 6 hours  atorvastatin 40 milliGRAM(s) Oral at bedtime  bacitracin   Ointment 1 Application(s) Topical three times a day  clotrimazole 1% Cream 1 Application(s) Topical two times a day  ertapenem  IVPB      ertapenem  IVPB 1000 milliGRAM(s) IV Intermittent every 24 hours  gabapentin 100 milliGRAM(s) Oral every 8 hours  glucagon  Injectable 1 milliGRAM(s) IntraMuscular once  insulin lispro (ADMELOG) corrective regimen sliding scale   SubCutaneous three times a day before meals  insulin lispro (ADMELOG) corrective regimen sliding scale   SubCutaneous at bedtime  methocarbamol 500 milliGRAM(s) Oral every 12 hours  multivitamin 1 Tablet(s) Oral daily  omega-3-Acid Ethyl Esters 2 Gram(s) Oral two times a day  pantoprazole    Tablet 40 milliGRAM(s) Oral before breakfast  polyethylene glycol 3350 17 Gram(s) Oral at bedtime  potassium chloride   Powder 40 milliEquivalent(s) Oral daily  simethicone 80 milliGRAM(s) Chew two times a day  spironolactone 50 milliGRAM(s) Oral daily    MEDICATIONS  (PRN):  dibucaine 1% Ointment 1 Application(s) Topical four times a day PRN for anal discomfort d/t hemorrhoids  magnesium hydroxide Suspension 30 milliLiter(s) Oral every 12 hours PRN Constipation      Vital Signs Last 24 Hrs  T(C): 36.9 (14 May 2025 05:38), Max: 36.9 (14 May 2025 05:38)  T(F): 98.4 (14 May 2025 05:38), Max: 98.4 (14 May 2025 05:38)  HR: 85 (14 May 2025 05:38) (85 - 98)  BP: 132/58 (14 May 2025 05:38) (120/58 - 138/64)  BP(mean): 62 (13 May 2025 09:40) (62 - 62)  RR: 18 (14 May 2025 05:38) (17 - 18)  SpO2: 99% (14 May 2025 05:38) (98% - 100%)    Parameters below as of 14 May 2025 05:38  Patient On (Oxygen Delivery Method): room air        05-12-25 @ 07:01  -  05-13-25 @ 07:00  --------------------------------------------------------  IN: 0 mL / OUT: 1405.5 mL / NET: -1405.5 mL    05-13-25 @ 07:01  -  05-14-25 @ 06:31  --------------------------------------------------------  IN: 0 mL / OUT: 650 mL / NET: -650 mL        Physical Exam:  Gen: NAD  Resp: No increased WOB  Spine:  Dressing: clean/dry/intact    Drains: IR Drain removed, site covered  Decreased sensation of lateral R thigh, otherwise SILT    Motor:                   C5                C6              C7               C8           T1   R            5/5                5/5            5/5             5/5          5/5  L             5/5               5/5             5/5             5/5          5/5                L2             L3             L4               L5            S1  R         3/5           4/5          5/5             5/5           5/5  L          4/5          5/5           5/5             5/5           5/5            Calves Soft/Non-tender bilaterally         Distal extremities warm well perfused; capillary refill <3 seconds     LABS:                        8.3    7.25  )-----------( 518      ( 13 May 2025 06:09 )             25.4     05-13    139  |  110[H]  |  6[L]  ----------------------------<  95  3.4[L]   |  20[L]  |  0.68    Ca    8.8      13 May 2025 06:09  Phos  2.5     05-13  Mg     1.90     05-13     Orthopedic Surgery Progress Note     S: Patient seen and examined today. No acute events overnight. Pain is well controlled. Denies f/c, chest pain, shortness of breath, dizziness. Notes that his abdominal distension is better. Walked twice yesterday.    MEDICATIONS  (STANDING):  acetaminophen     Tablet .. 975 milliGRAM(s) Oral every 6 hours  atorvastatin 40 milliGRAM(s) Oral at bedtime  bacitracin   Ointment 1 Application(s) Topical three times a day  clotrimazole 1% Cream 1 Application(s) Topical two times a day  ertapenem  IVPB      ertapenem  IVPB 1000 milliGRAM(s) IV Intermittent every 24 hours  gabapentin 100 milliGRAM(s) Oral every 8 hours  glucagon  Injectable 1 milliGRAM(s) IntraMuscular once  insulin lispro (ADMELOG) corrective regimen sliding scale   SubCutaneous three times a day before meals  insulin lispro (ADMELOG) corrective regimen sliding scale   SubCutaneous at bedtime  methocarbamol 500 milliGRAM(s) Oral every 12 hours  multivitamin 1 Tablet(s) Oral daily  omega-3-Acid Ethyl Esters 2 Gram(s) Oral two times a day  pantoprazole    Tablet 40 milliGRAM(s) Oral before breakfast  polyethylene glycol 3350 17 Gram(s) Oral at bedtime  potassium chloride   Powder 40 milliEquivalent(s) Oral daily  simethicone 80 milliGRAM(s) Chew two times a day  spironolactone 50 milliGRAM(s) Oral daily    MEDICATIONS  (PRN):  dibucaine 1% Ointment 1 Application(s) Topical four times a day PRN for anal discomfort d/t hemorrhoids  magnesium hydroxide Suspension 30 milliLiter(s) Oral every 12 hours PRN Constipation      Vital Signs Last 24 Hrs  T(C): 36.9 (14 May 2025 05:38), Max: 36.9 (14 May 2025 05:38)  T(F): 98.4 (14 May 2025 05:38), Max: 98.4 (14 May 2025 05:38)  HR: 85 (14 May 2025 05:38) (85 - 98)  BP: 132/58 (14 May 2025 05:38) (120/58 - 138/64)  BP(mean): 62 (13 May 2025 09:40) (62 - 62)  RR: 18 (14 May 2025 05:38) (17 - 18)  SpO2: 99% (14 May 2025 05:38) (98% - 100%)    Parameters below as of 14 May 2025 05:38  Patient On (Oxygen Delivery Method): room air        05-12-25 @ 07:01  -  05-13-25 @ 07:00  --------------------------------------------------------  IN: 0 mL / OUT: 1405.5 mL / NET: -1405.5 mL    05-13-25 @ 07:01  -  05-14-25 @ 06:31  --------------------------------------------------------  IN: 0 mL / OUT: 650 mL / NET: -650 mL        Physical Exam:  Gen: NAD  Resp: No increased WOB  Spine:  Dressing: clean/dry/intact    Drains: IR Drain removed, site covered  Decreased sensation of lateral R thigh, otherwise SILT    Motor:                   C5                C6              C7               C8           T1   R            5/5                5/5            5/5             5/5          5/5  L             5/5               5/5             5/5             5/5          5/5                L2             L3             L4               L5            S1  R         2+/5           4/5          5/5             5/5           5/5  L          4/5          5/5           5/5             5/5           5/5            Calves Soft/Non-tender bilaterally         Distal extremities warm well perfused; capillary refill <3 seconds     LABS:                        8.3    7.25  )-----------( 518      ( 13 May 2025 06:09 )             25.4     05-13    139  |  110[H]  |  6[L]  ----------------------------<  95  3.4[L]   |  20[L]  |  0.68    Ca    8.8      13 May 2025 06:09  Phos  2.5     05-13  Mg     1.90     05-13

## 2025-05-14 NOTE — PROGRESS NOTE ADULT - SUBJECTIVE AND OBJECTIVE BOX
Plastic Surgery Progress Note (pg [facility]: [id number], [facility]: [phone])    SUBJECTIVE  The patient was seen and examined. No acute events overnight. Pain controlled, afebrile w/ stable vitals.     OBJECTIVE  ___________________________________________________  VITAL SIGNS / I&O's   Vital Signs Last 24 Hrs  T(C): 36.9 (14 May 2025 06:54), Max: 36.9 (14 May 2025 05:38)  T(F): 98.4 (14 May 2025 06:54), Max: 98.4 (14 May 2025 05:38)  HR: 85 (14 May 2025 06:54) (85 - 98)  BP: 132/58 (14 May 2025 06:54) (120/58 - 138/64)  BP(mean): 62 (13 May 2025 09:40) (62 - 62)  RR: 18 (14 May 2025 06:54) (17 - 18)  SpO2: 99% (14 May 2025 06:54) (98% - 100%)    Parameters below as of 14 May 2025 05:38  Patient On (Oxygen Delivery Method): room air          13 May 2025 07:01  -  14 May 2025 07:00  --------------------------------------------------------  IN:  Total IN: 0 mL    OUT:    Voided (mL): 650 mL  Total OUT: 650 mL    Total NET: -650 mL        ___________________________________________________  PHYSICAL EXAM    -- CONSTITUTIONAL: NAD, lying in bed  Back: dressing in place c/d/i  Soft, nontender, no fluctuations, no fluid collections  drains  2x s/s   ___________________________________________________  LABS                        7.8    7.56  )-----------( 449      ( 14 May 2025 05:42 )             23.9     14 May 2025 05:42    135    |  106    |  7      ----------------------------<  90     4.1     |  17     |  0.61     Ca    8.8        14 May 2025 05:42  Phos  2.5       14 May 2025 05:42  Mg     1.80      14 May 2025 05:42      PT/INR - ( 13 May 2025 06:09 )   PT: 12.3 sec;   INR: 1.03 ratio         PTT - ( 13 May 2025 06:09 )  PTT:56.1 sec  CAPILLARY BLOOD GLUCOSE      POCT Blood Glucose.: 104 mg/dL (14 May 2025 07:17)  POCT Blood Glucose.: 121 mg/dL (13 May 2025 23:03)  POCT Blood Glucose.: 138 mg/dL (13 May 2025 16:26)  POCT Blood Glucose.: 138 mg/dL (13 May 2025 11:38)  POCT Blood Glucose.: 106 mg/dL (13 May 2025 09:24)        Urinalysis Basic - ( 14 May 2025 05:42 )    Color: x / Appearance: x / SG: x / pH: x  Gluc: 90 mg/dL / Ketone: x  / Bili: x / Urobili: x   Blood: x / Protein: x / Nitrite: x   Leuk Esterase: x / RBC: x / WBC x   Sq Epi: x / Non Sq Epi: x / Bacteria: x      ___________________________________________________  MICRO  Recent Cultures:    ___________________________________________________  MEDICATIONS  (STANDING):  acetaminophen     Tablet .. 975 milliGRAM(s) Oral every 6 hours  atorvastatin 40 milliGRAM(s) Oral at bedtime  bacitracin   Ointment 1 Application(s) Topical three times a day  clotrimazole 1% Cream 1 Application(s) Topical two times a day  ertapenem  IVPB 1000 milliGRAM(s) IV Intermittent every 24 hours  ertapenem  IVPB      gabapentin 100 milliGRAM(s) Oral every 8 hours  glucagon  Injectable 1 milliGRAM(s) IntraMuscular once  insulin lispro (ADMELOG) corrective regimen sliding scale   SubCutaneous three times a day before meals  insulin lispro (ADMELOG) corrective regimen sliding scale   SubCutaneous at bedtime  methocarbamol 500 milliGRAM(s) Oral every 12 hours  multivitamin 1 Tablet(s) Oral daily  omega-3-Acid Ethyl Esters 2 Gram(s) Oral two times a day  pantoprazole    Tablet 40 milliGRAM(s) Oral before breakfast  polyethylene glycol 3350 17 Gram(s) Oral at bedtime  potassium chloride   Powder 40 milliEquivalent(s) Oral daily  simethicone 80 milliGRAM(s) Chew two times a day  spironolactone 50 milliGRAM(s) Oral daily    MEDICATIONS  (PRN):  dibucaine 1% Ointment 1 Application(s) Topical four times a day PRN for anal discomfort d/t hemorrhoids  magnesium hydroxide Suspension 30 milliLiter(s) Oral every 12 hours PRN Constipation

## 2025-05-14 NOTE — PROGRESS NOTE ADULT - SUBJECTIVE AND OBJECTIVE BOX
Date of Service  : 05-14-25    INTERVAL HPI/OVERNIGHT EVENTS:  I feel better.   Vital Signs Last 24 Hrs  T(C): 36.7 (14 May 2025 22:02), Max: 36.9 (14 May 2025 05:38)  T(F): 98 (14 May 2025 22:02), Max: 98.4 (14 May 2025 05:38)  HR: 87 (14 May 2025 22:02) (85 - 99)  BP: 131/61 (14 May 2025 22:02) (125/56 - 141/68)  BP(mean): --  RR: 18 (14 May 2025 22:02) (16 - 18)  SpO2: 100% (14 May 2025 22:02) (99% - 100%)    Parameters below as of 14 May 2025 22:02  Patient On (Oxygen Delivery Method): room air      I&O's Summary    13 May 2025 07:01  -  14 May 2025 07:00  --------------------------------------------------------  IN: 0 mL / OUT: 650 mL / NET: -650 mL      MEDICATIONS  (STANDING):  acetaminophen     Tablet .. 975 milliGRAM(s) Oral every 6 hours  atorvastatin 40 milliGRAM(s) Oral at bedtime  bacitracin   Ointment 1 Application(s) Topical three times a day  clotrimazole 1% Cream 1 Application(s) Topical two times a day  ertapenem  IVPB 1000 milliGRAM(s) IV Intermittent every 24 hours  ertapenem  IVPB      gabapentin 100 milliGRAM(s) Oral every 8 hours  glucagon  Injectable 1 milliGRAM(s) IntraMuscular once  insulin lispro (ADMELOG) corrective regimen sliding scale   SubCutaneous three times a day before meals  insulin lispro (ADMELOG) corrective regimen sliding scale   SubCutaneous at bedtime  methocarbamol 500 milliGRAM(s) Oral every 12 hours  multivitamin 1 Tablet(s) Oral daily  omega-3-Acid Ethyl Esters 2 Gram(s) Oral two times a day  pantoprazole    Tablet 40 milliGRAM(s) Oral before breakfast  polyethylene glycol 3350 17 Gram(s) Oral at bedtime  potassium chloride   Powder 40 milliEquivalent(s) Oral daily  simethicone 80 milliGRAM(s) Chew two times a day  spironolactone 50 milliGRAM(s) Oral daily    MEDICATIONS  (PRN):  dibucaine 1% Ointment 1 Application(s) Topical four times a day PRN for anal discomfort d/t hemorrhoids  magnesium hydroxide Suspension 30 milliLiter(s) Oral every 12 hours PRN Constipation  sodium chloride 0.9% lock flush 10 milliLiter(s) IV Push every 1 hour PRN Pre/post blood products, medications, blood draw, and to maintain line patency    LABS:                        7.8    7.56  )-----------( 449      ( 14 May 2025 05:42 )             23.9     05-14    135  |  106  |  7   ----------------------------<  90  4.1   |  17[L]  |  0.61    Ca    8.8      14 May 2025 05:42  Phos  2.5     05-14  Mg     1.80     05-14      PT/INR - ( 13 May 2025 06:09 )   PT: 12.3 sec;   INR: 1.03 ratio         PTT - ( 13 May 2025 06:09 )  PTT:56.1 sec  Urinalysis Basic - ( 14 May 2025 05:42 )    Color: x / Appearance: x / SG: x / pH: x  Gluc: 90 mg/dL / Ketone: x  / Bili: x / Urobili: x   Blood: x / Protein: x / Nitrite: x   Leuk Esterase: x / RBC: x / WBC x   Sq Epi: x / Non Sq Epi: x / Bacteria: x      CAPILLARY BLOOD GLUCOSE      POCT Blood Glucose.: 100 mg/dL (14 May 2025 21:24)  POCT Blood Glucose.: 114 mg/dL (14 May 2025 16:25)  POCT Blood Glucose.: 111 mg/dL (14 May 2025 11:27)  POCT Blood Glucose.: 104 mg/dL (14 May 2025 07:17)  POCT Blood Glucose.: 121 mg/dL (13 May 2025 23:03)        Urinalysis Basic - ( 14 May 2025 05:42 )    Color: x / Appearance: x / SG: x / pH: x  Gluc: 90 mg/dL / Ketone: x  / Bili: x / Urobili: x   Blood: x / Protein: x / Nitrite: x   Leuk Esterase: x / RBC: x / WBC x   Sq Epi: x / Non Sq Epi: x / Bacteria: x      REVIEW OF SYSTEMS:  CONSTITUTIONAL: No fever, weight loss, or fatigue  EYES: No eye pain, visual disturbances, or discharge  ENMT:  No difficulty hearing, tinnitus, vertigo; No sinus or throat pain  NECK: No pain or stiffness  RESPIRATORY: No cough, wheezing, chills or hemoptysis; No shortness of breath  CARDIOVASCULAR: No chest pain, palpitations, dizziness, or leg swelling  GASTROINTESTINAL: No abdominal or epigastric pain. No nausea, vomiting, or hematemesis; No diarrhea or constipation. No melena or hematochezia.  GENITOURINARY: No dysuria, frequency, hematuria, or incontinence  NEUROLOGICAL: No headaches, memory loss, loss of strength, numbness, or tremors      RADIOLOGY & ADDITIONAL TESTS:    Consultant(s) Notes Reviewed:  [x ] YES  [ ] NO    PHYSICAL EXAM:  GENERAL: NAD, well-groomed, well-developed, not in any distress ,  HEAD:  Atraumatic, Normocephalic  EYES: EOMI, PERRLA, conjunctiva and sclera clear  ENMT: No tonsillar erythema, exudates, or enlargement; Moist mucous membranes, Good dentition, No lesions  NECK: Supple, No JVD, Normal thyroid  NERVOUS SYSTEM:  Alert & Oriented X3, No focal deficit   CHEST/LUNG: Good air entry bilateral with no  rales, rhonchi, wheezing, or rubs  HEART: Regular rate and rhythm; No murmurs, rubs, or gallops  ABDOMEN: Soft, Nontender, Nondistended; Bowel sounds present  EXTREMITIES:  2+ Peripheral Pulses, No clubbing, cyanosis, or edema    Care Discussed with Consultants/Other Providers [ x] YES  [ ] NO

## 2025-05-14 NOTE — PROCEDURE NOTE - PROCEDURE FINDINGS AND DETAILS
Contrast study of the right psoas drainage catheter demonstrated a collapsed cavity.   The right psoas drain was removed.  Sterile dressing applied.
Successful placement of 4Fr Single Lumen PICC via LEFT Brachial Vein.  PICC Length: 43cm  : [place]  Tip of PICC in SVC
Contrast injection via Left psoas abscess drain revealed resolution of collection. Decision was made to remove Left drain.  Contrast injection via Right psoas abscess drain revealed a moderately-sized residual cavity. Decision was made to leave Right drain in place.
Successful image-guided drainage of bilateral psoas abscesses with 10.2F drains.  15cc of purulent drainage from the left collection and 20cc of purulent drainage from right collection.   Samples were sent for gram stain & culture.   Patient tolerated the procedure well with no immediate complication.  Drains to bulb suction. Please record output Q8h.

## 2025-05-14 NOTE — CONSULT NOTE ADULT - CONSULT REQUESTED DATE/TIME
29-Apr-2025 15:47
26-Apr-2025 15:23
25-Apr-2025 10:59
30-Apr-2025 12:45
13-May-2025 15:26
14-May-2025 09:29
25-Apr-2025 09:42
24-Apr-2025 21:38
30-Apr-2025 14:31
24-Apr-2025 08:35

## 2025-05-14 NOTE — PROGRESS NOTE ADULT - ASSESSMENT
IMPRESSION: 79M w/ HTN, HLD, and T2DM, 4/24/25 from [facility] with L1-L2 OM/epidural abscess/psoas abscesses; s/p decompression spinal fusion 4/24/25; post-op course notable for ileus and hypokalemia    (1)Renal - CKD2 - early diabetic nephropathy. Follows as outpatient with Dr. Syed Euceda  (2)Hypokalemia -whole body deficit due to limited intake/GI losses - improved s/p repletion   (3)GI - ileus slowly improving; exacerbated by hypokalemia    RECOMMEND:  (1)Spironolactone as ordered  (2)Continue Glucerna TID  (3)BMP, Mg, Phos daily     Mare Cameron DNP  [facility]  [phone]    IMPRESSION: 79M w/ HTN, HLD, and T2DM, 4/24/25 from [facility] with L1-L2 OM/epidural abscess/psoas abscesses; s/p decompression spinal fusion 4/24/25; post-op course notable for ileus and hypokalemia    (1)Renal - CKD2 - early diabetic nephropathy. Follows as outpatient with Dr. Syed Euceda  (2)Hypokalemia -whole body deficit due to limited intake/GI losses - improved s/p repletion   (3)GI - ileus slowly improving; exacerbated by hypokalemia    RECOMMEND:  (1)Spironolactone as ordered  (2)Continue Glucerna TID  (3)BMP, Mg, Phos daily     Mare Cameron DNP  [facility]  [phone]       RENAL ATTENDING NOTE  Patient seen and examined with NP. Agree with assessment and plan as above.    Yamil Humphreys MD  [facility]  [phone]

## 2025-05-14 NOTE — PROCEDURE NOTE - PLAN
-Keep dressing C/D/I.  -Repeat tube check of Right psoas abscess drain in 1 week.
OK to use LUE single lumen PICC

## 2025-05-14 NOTE — PROGRESS NOTE ADULT - ASSESSMENT
79yMale w/ HTN, HLD, T2DM, chronic low back pain presents as a transfer from [facility] ED for surgery today. Pt was sent into ED yesterday by his pain medicine physician due to recent MRI findings concerning for L1-L2 discitis/OM with epidural abscess, bilateral psoas abscesses. Patient initially saw pain specialist in February for chronic back pain with radiations to bilateral lateral thighs (R>L). MRI at that time showed degenerative changes. He subsequently had epidural injections x2 (2/27, 3/11) with moderate pain relief. Pain began to worsen on 4/10. He had radiofrequency ablation performed on 4/11 and has since has severe worsening of pain and radiation to R lateral thigh. He reports recent bowel/bladder "incontinence" requiring diaper due to his inability to get to the bathroom in time due to pain limitations but states urge and sensation are intact. Denies fevers, chills, night sweats, weakness, numbness/tingling, saddle anesthesia, recent falls/trauma. He uses a cane for ambulation. Denies recent falls/trauma or any other ortho injuries. Before back pain was very active walked half an hour , going up and down stairs and doing grocery etc with no Chest pain or SOB.        Problem/Recommendation - 1:  ·  Problem: Epidural abscess.   ·  Recommendation: S/P Decompression, spine, lumbar, posterior approach, with fusion of posterior spinal column.  On IV Abxs Ertapenem now  per ID x 6 weeks  .  Will defer management to  Neurosurgery.  DVT prophylaxis per Neurosurgery .  < from: MR Lumbar Spine w/ IV Cont (04.23.25 @ 15:13) >  IMPRESSION:        1.   Cervical spine:   Moderate degenerative disc disease and   spondylosis at C4-5 through C6/7 with loss of disc height and associated   degenerative endplate changes.        2.   Thoracic spine:   Minimal enhancement within T7-8 which may   reflect early discitis.        3.   Lumbar spine:   Osteomyelitis and discitis at L1-2 with erosions   of the inferior L1 vertebral body and L2 vertebral body consistent with   osteomyelitis and discitis. Ventral epidural abscess is noted extending   from the L1-2 level superiorly to the T10 level with mass effect on the   ventral thecal sac, most prominently at the L1-2 level resulting in   marked compression. Multiple abscesses within the BILATERAL psoas   muscles, the largest measuring 4.3 cm on the RIGHT and 2.8 cm on the LEFT.     Problem/Recommendation - 2:  ·  Problem: Acute osteomyelitis of lumbar spine.   ·  Recommendation: L1 &L2 vertebrae .  On IV Abxs per ID .  Will defer management to Neurosurgery.     Problem/Recommendation - 3:  ·  Problem: Low back pain radiating to right lower extremity.   ·  Recommendation: Pain control.     Problem/Recommendation - 4:  ·  Problem: . BILATERAL psoas Abscess  ·  Recommendation: ON IV Abxs per ID .  IR placed drains after draining  . IR to check drains .     Problem/Recommendation - 5:  ·  Problem: HTN (hypertension).   ·  Recommendation: Takes Amlodipine and ARB so continue with hold parameters.  < from: TTE W or WO Ultrasound Enhancing Agent (04.24.25 @ 10:10) >  CONCLUSIONS:      1. Left ventricular cavity is normal in size. Left ventricular wall thickness is normal. Left ventricular systolic function is normal with an ejection fraction of 64 % by Carpenter's method of disks. There are no regional wall motion abnormalities seen.   2. Normal right ventricular cavity size and normal right ventricular systolic function. Tricuspid annular plane systolic excursion (TAPSE) is 2.2 cm (normal >=1.7 cm).   3. Structurally normal mitral valve with normal leaflet excursion. There is calcification of the mitral valve annulus. There is tracemitral regurgitation.   4. The aortic valve appears trileaflet with normal systolic excursion. There is calcification of the aortic valve leaflets. There is mild aortic regurgitation.     Problem/Recommendation - 6:  ·  Problem: HLD (hyperlipidemia).   ·  Recommendation: Continue Atorvastatin.     Problem/Recommendation - 7:  ·  Problem: DM (diabetes mellitus).   ·  Recommendation: ON Farxiga and holding.  SSI and Lantus in patient .     Problem/Recommendation - 8:  ·  Problem:  Ileus /Pseudoobstruction   ·  Recommendation: Improving.   Had BMs and  passing flatus .  Correcting electrolytes.     ON Laxatives and Simethacone .  Advanced diet .   Surgery & GI input appreciated .  D/W GI attending and will wait till tomorrow before rectal tube placement  .  < from: CT Abdomen and Pelvis w/ Oral Cont and w/ IV Cont (04.29.25 @ 17:16) >  IMPRESSION:  Diffuse colonic dilatation, worse on today's study, likely secondary to   pseudoobstruction or ileus.    No free air.    Reduction in the bilateral psoas collections with catheters in place.    < from: Xray Abdomen 1 View PORTABLE -Urgent (Xray Abdomen 1 View PORTABLE -Urgent .) (04.29.25 @ 10:09) >  IMPRESSION:  Multiple dilated loops of small and large bowel with question of   pneumoperitoneum. Recommend upright chest radiograph or decubitus   abdominal radiograph for further evaluation.     Problem/Recommendation - 9:  ·  Problem: JUSTINO with Hyponatremia .   ·  Recommendation: Trending BMP.  Resolved.  Renal help appreciated.      Problem/Recommendation - 10:  ·  Problem: Hypokalemia & Hypophosphatemia .   ·  Recommendation: Replaced .   Aldactone 50mg started.    Rpt BMP noted.    Keep K level close to 4 .     Problem/Recommendation - 11:  ·  Problem: Thrombocytosis .   ·  Recommendation: Reactionary secondary to infection.  Platelets Trending down .     Problem/Recommendation - 12:  ·  Problem: Pedal edema >Right LE.   ·  Recommendation: No calf tenderness +,Ambulating.     Problem/Recommendation - 13:  ·  Problem: Irritant Dermatitis  .   ·  Recommendation: Dermatology input appreciated .  Started clotrimazole cream.      PT working with patient and ambulating .    Dispo : DC planning to Acute Rehab per primary team .

## 2025-05-14 NOTE — PROGRESS NOTE ADULT - ASSESSMENT
ASSESSMENT/PLAN:   79M s/p T10-pelvis PSF w/ PRS closure 4/24    - Continue aquacel dressing, replaced 5/12  - Drain per IR  - Rest of care per primary    Trini Damon MD  Plastic and Reconstructive Surgery, PGY-2  ABBEY: [id number]  NS: [phone]

## 2025-05-14 NOTE — CONSULT NOTE ADULT - SUBJECTIVE AND OBJECTIVE BOX
Patient is a 79y old  Male who presents with a chief complaint of L1-L2 discitis/OM with epidural abscess and BL psoas abscesses (14 May 2025 07:40)       HPI  79yMale w/ HTN, HLD, T2DM, chronic low back pain presents as a transfer from [facility] ED for surgery today. Pt was sent into ED yesterday by his pain medicine physician due to recent MRI findings concerning for L1-L2 discitis/OM with epidural abscess, bilateral psoas abscesses. Patient initially saw pain specialist in February for chronic back pain with radiations to bilateral lateral thighs (R>L). MRI at that time showed degenerative changes. He subsequently had epidural injections x2 (2/27, 3/11) with moderate pain relief. Pain began to worsen on 4/10. He had radiofrequency ablation performed on 4/11 and has since has severe worsening of pain and radiation to R lateral thigh. He reports recent bowel/bladder "incontinence" requiring diaper due to his inability to get to the bathroom in time due to pain limitations but states urge and sensation are intact. Denies fevers, chills, night sweats, weakness, numbness/tingling, saddle anesthesia, recent falls/trauma. He uses a cane for ambulation. Denies recent falls/trauma or any other ortho injuries.     ROS  Negative unless otherwise specified in HPI.    PAST MEDICAL & SURGICAL Hx  PAST MEDICAL & SURGICAL HISTORY:  HTN (hypertension)      HLD (hyperlipidemia)      DM (diabetes mellitus)      Cataract          MEDICATIONS  Home Medications:  losartan 25 mg oral tablet: 1 tab(s) orally once a day (06 Jun 2018 10:23)  Lovaza 1000 mg oral capsule: 2 cap(s) orally 2 times a day (06 Jun 2018 10:23)  metFORMIN 500 mg oral tablet: 1 tab(s) orally once a day (06 Jun 2018 10:23)      ALLERGIES  No Known Allergies      FAMILY Hx  FAMILY HISTORY:  No pertinent family history in first degree relatives        SOCIAL Hx  Social History:      VITALS  Vital Signs Last 24 Hrs  T(C): 36.8 (24 Apr 2025 03:30), Max: 37.4 (23 Apr 2025 11:54)  T(F): 98.2 (24 Apr 2025 03:30), Max: 99.3 (23 Apr 2025 11:54)  HR: 103 (24 Apr 2025 03:30) (97 - 128)  BP: 159/75 (24 Apr 2025 03:30) (114/64 - 159/75)  BP(mean): --  RR: 18 (24 Apr 2025 03:30) (17 - 20)  SpO2: 97% (24 Apr 2025 03:30) (96% - 98%)        PHYSICAL EXAM  Gen: Lying in bed, NAD  Resp: No increased WOB  Spine:  Skin intact  No bony TTP or step-offs along c-, t-, l-spine, or sacrum; has ttp of R paraspinal L spine    Motor:                   C5                C6              C7               C8           T1   R            5/5                5/5            5/5             5/5          5/5  L             5/5               5/5             5/5             5/5          5/5                L2             L3             L4               L5            S1  R         3+/5 **          5/5          5/5             5/5           5/5  L          5/5          5/5           5/5             5/5           5/5    **limited by pain    Sensory:            C5         C6         C7      C8       T1        (0=absent, 1=impaired, 2=normal, NT=not testable)  R         2            2           2        2         2  L          2            2           2        2         2               L2          L3         L4      L5       S1         (0=absent, 1=impaired, 2=normal, NT=not testable)  R         2            2            2        2        2  L          2            2           2        2         2    (+) Rectal tone, saddle/perianal SILT  (-) Rivera test b/l  (-) Straight leg raise test b/l  (-) Babinski sign b/l  (-) Ankle clonus b/l    LABS                        11.5   12.50 )-----------( 561      ( 24 Apr 2025 05:00 )             34.0     04-24    135  |  102  |  21  ----------------------------<  166[H]  4.1   |  21[L]  |  0.80    Ca    10.1      24 Apr 2025 05:00    TPro  7.3  /  Alb  2.8[L]  /  TBili  0.4  /  DBili  x   /  AST  26  /  ALT  44  /  AlkPhos  141[H]  04-23    PT/INR - ( 24 Apr 2025 05:00 )   PT: 13.8 sec;   INR: 1.16 ratio         PTT - ( 24 Apr 2025 05:00 )  PTT:32.7 sec    IMAGING       (24 Apr 2025 05:52)    s/p T10-pelvis PSF on 4/24  with drain per IR    WBAT    REVIEW OF SYSTEMS  Constitutional - No fever, No weight loss, No fatigue  HEENT - No eye pain, No visual disturbances, No difficulty hearing, No tinnitus, No vertigo, No neck pain  Respiratory - No cough, No wheezing, No shortness of breath  Cardiovascular - No chest pain, No palpitations  Gastrointestinal - No abdominal pain, No nausea, No vomiting, No diarrhea, No constipation  Genitourinary - No dysuria, No frequency, No hematuria, No incontinence  Neurological - No headaches, No memory loss, No loss of strength, No numbness, No tremors  Skin - No itching, No rashes, No lesions   Endocrine - No temperature intolerance  Musculoskeletal - No joint pain, No joint swelling, No muscle pain  Psychiatric - No depression, No anxiety    PAST MEDICAL & SURGICAL HISTORY  HTN (hypertension)    HLD (hyperlipidemia)    DM (diabetes mellitus)    Cataract        SOCIAL HISTORY  Smoking - Denied  EtOH - Denied   Drugs - Denied    FUNCTIONAL HISTORY  Lives with spouse in house with 6 steps to enter  Independent with cane, some assistance for adls  denies falls    CURRENT FUNCTIONAL STATUS  5/13 Bed Mobility  Bed Mobility Training Treatment not Performed: Received pt in the chair.    Sit-Stand Transfer Training  Transfer Training Sit-to-Stand Transfer: contact guard;  1 person assist;  weight-bearing as tolerated   rolling walker  Transfer Training Stand-to-Sit Transfer: contact guard;  1 person assist;  weight-bearing as tolerated   rolling walker  Sit-to-Stand Transfer Training Transfer Safety Analysis: decreased step length;  decreased strength;  decreased flexibility;  rolling walker    Gait Training  Gait Training: contact guard;  1 person assist;  weight-bearing as tolerated   rolling walker;  50 feet  Gait Analysis: 3-point gait   decreased strength;  decreased flexibility;  50 feet;  rolling walker    Therapeutic Exercise  Therapeutic Exercise Detail: Pt performed ActiveROM Bilateral LE's.       FAMILY HISTORY   No pertinent family history in first degree relatives        RECENT LABS/IMAGING  CBC Full  -  ( 14 May 2025 05:42 )  WBC Count : 7.56 K/uL  RBC Count : 2.52 M/uL  Hemoglobin : 7.8 g/dL  Hematocrit : 23.9 %  Platelet Count - Automated : 449 K/uL  Mean Cell Volume : 94.8 fL  Mean Cell Hemoglobin : 31.0 pg  Mean Cell Hemoglobin Concentration : 32.6 g/dL  Auto Neutrophil # : x  Auto Lymphocyte # : x  Auto Monocyte # : x  Auto Eosinophil # : x  Auto Basophil # : x  Auto Neutrophil % : x  Auto Lymphocyte % : x  Auto Monocyte % : x  Auto Eosinophil % : x  Auto Basophil % : x    05-14    135  |  106  |  7   ----------------------------<  90  4.1   |  17[L]  |  0.61    Ca    8.8      14 May 2025 05:42  Phos  2.5     05-14  Mg     1.80     05-14      Urinalysis Basic - ( 14 May 2025 05:42 )    Color: x / Appearance: x / SG: x / pH: x  Gluc: 90 mg/dL / Ketone: x  / Bili: x / Urobili: x   Blood: x / Protein: x / Nitrite: x   Leuk Esterase: x / RBC: x / WBC x   Sq Epi: x / Non Sq Epi: x / Bacteria: x        VITALS  T(C): 36.9 (05-14-25 @ 06:54), Max: 36.9 (05-14-25 @ 05:38)  HR: 85 (05-14-25 @ 06:54) (85 - 98)  BP: 132/58 (05-14-25 @ 06:54) (120/58 - 138/64)  RR: 18 (05-14-25 @ 06:54) (17 - 18)  SpO2: 99% (05-14-25 @ 06:54) (98% - 100%)  Wt(kg): --    ALLERGIES  No Known Allergies      MEDICATIONS   acetaminophen     Tablet .. 975 milliGRAM(s) Oral every 6 hours  atorvastatin 40 milliGRAM(s) Oral at bedtime  bacitracin   Ointment 1 Application(s) Topical three times a day  clotrimazole 1% Cream 1 Application(s) Topical two times a day  dibucaine 1% Ointment 1 Application(s) Topical four times a day PRN  ertapenem  IVPB 1000 milliGRAM(s) IV Intermittent every 24 hours  ertapenem  IVPB      gabapentin 100 milliGRAM(s) Oral every 8 hours  glucagon  Injectable 1 milliGRAM(s) IntraMuscular once  insulin lispro (ADMELOG) corrective regimen sliding scale   SubCutaneous three times a day before meals  insulin lispro (ADMELOG) corrective regimen sliding scale   SubCutaneous at bedtime  magnesium hydroxide Suspension 30 milliLiter(s) Oral every 12 hours PRN  methocarbamol 500 milliGRAM(s) Oral every 12 hours  multivitamin 1 Tablet(s) Oral daily  omega-3-Acid Ethyl Esters 2 Gram(s) Oral two times a day  pantoprazole    Tablet 40 milliGRAM(s) Oral before breakfast  polyethylene glycol 3350 17 Gram(s) Oral at bedtime  potassium chloride   Powder 40 milliEquivalent(s) Oral daily  simethicone 80 milliGRAM(s) Chew two times a day  spironolactone 50 milliGRAM(s) Oral daily      ----------------------------------------------------------------------------------------  PHYSICAL EXAM  Constitutional - NAD, Comfortable  HEENT - NCAT, EOMI  Neck - Supple, No limited ROM  Chest - no respiratory distress   Cardiovascular - RRR, S1S2   Abdomen -  Soft, NTND  Extremities - No C/C/E, No calf tenderness   Neurologic Exam -                    Cognitive - Awake, Alert, AAO to self, place, date, year, situation     Communication - Fluent, No dysarthria     Cranial Nerves - CN 2-12 intact     Motor - No focal deficits                    LEFT    UE - ShAB 5/5, EF 5/5, EE 5/5, WE 5/5,  5/5                    RIGHT UE - ShAB 5/5, EF 5/5, EE 5/5, WE 5/5,  5/5                    LEFT    LE - HF 5/5, KE 5/5, DF 5/5, PF 5/5                    RIGHT LE - HF 5/5, KE 5/5, DF 5/5, PF 5/5        Sensory - Intact to LT     Reflexes - DTR Intact, No primitive reflexive     Coordination - FTN intact     OculoVestibular - No saccades, No nystagmus, VOR         Balance - WNL Static  Psychiatric - Mood stable, Affect WNL  ----------------------------------------------------------------------------------------  ASSESSMENT/PLAN  78 yo f s/p T10-pelvis PSF and T12-L2 lami 4/24/25 of OM, abscess  on ertepenem  PT for bed mobility, transfers, ambulation as tolerated  out of bed to chair as tolerated  OT for ADLs  Pain -acetaminophen, gabapentin  DVT PPX - scd  Rehab -   incomplete note, consult in progress    Total time spent to review relevant records and imaging results, examine patient, complete documentation, and when applicable discuss the case with the patient, family, , social workers, and medical team:  55 minutes Patient is a 79y old  Male who presents with a chief complaint of L1-L2 discitis/OM with epidural abscess and BL psoas abscesses (14 May 2025 07:40)       HPI  79yMale w/ HTN, HLD, T2DM, chronic low back pain presents as a transfer from [facility] ED for surgery today. Pt was sent into ED yesterday by his pain medicine physician due to recent MRI findings concerning for L1-L2 discitis/OM with epidural abscess, bilateral psoas abscesses. Patient initially saw pain specialist in February for chronic back pain with radiations to bilateral lateral thighs (R>L). MRI at that time showed degenerative changes. He subsequently had epidural injections x2 (2/27, 3/11) with moderate pain relief. Pain began to worsen on 4/10. He had radiofrequency ablation performed on 4/11 and then had severe worsening of pain and radiation to R lateral thigh. He reported recent bowel/bladder incontinence.       s/p T10-pelvis PSF on 4/24  with drain per IR  reports pain, controlled, r lower back and leg  WBAT    REVIEW OF SYSTEMS  weakness RLE  numbness R lateral thigh    PAST MEDICAL & SURGICAL HISTORY  HTN (hypertension)    HLD (hyperlipidemia)    DM (diabetes mellitus)    Cataract        SOCIAL HISTORY  Smoking - Denied  EtOH - Denied   Drugs - Denied    FUNCTIONAL HISTORY  Lives with spouse in house with 6 steps to enter  Independent with cane, some assistance for adls  denies falls    CURRENT FUNCTIONAL STATUS  5/13 Bed Mobility  Bed Mobility Training Treatment not Performed: Received pt in the chair.    Sit-Stand Transfer Training  Transfer Training Sit-to-Stand Transfer: contact guard;  1 person assist;  weight-bearing as tolerated   rolling walker  Transfer Training Stand-to-Sit Transfer: contact guard;  1 person assist;  weight-bearing as tolerated   rolling walker  Sit-to-Stand Transfer Training Transfer Safety Analysis: decreased step length;  decreased strength;  decreased flexibility;  rolling walker    Gait Training  Gait Training: contact guard;  1 person assist;  weight-bearing as tolerated   rolling walker;  50 feet  Gait Analysis: 3-point gait   decreased strength;  decreased flexibility;  50 feet;  rolling walker    Therapeutic Exercise  Therapeutic Exercise Detail: Pt performed ActiveROM Bilateral LE's.       FAMILY HISTORY   No pertinent family history in first degree relatives        RECENT LABS/IMAGING  CBC Full  -  ( 14 May 2025 05:42 )  WBC Count : 7.56 K/uL  RBC Count : 2.52 M/uL  Hemoglobin : 7.8 g/dL  Hematocrit : 23.9 %  Platelet Count - Automated : 449 K/uL  Mean Cell Volume : 94.8 fL  Mean Cell Hemoglobin : 31.0 pg  Mean Cell Hemoglobin Concentration : 32.6 g/dL  Auto Neutrophil # : x  Auto Lymphocyte # : x  Auto Monocyte # : x  Auto Eosinophil # : x  Auto Basophil # : x  Auto Neutrophil % : x  Auto Lymphocyte % : x  Auto Monocyte % : x  Auto Eosinophil % : x  Auto Basophil % : x    05-14    135  |  106  |  7   ----------------------------<  90  4.1   |  17[L]  |  0.61    Ca    8.8      14 May 2025 05:42  Phos  2.5     05-14  Mg     1.80     05-14      Urinalysis Basic - ( 14 May 2025 05:42 )    Color: x / Appearance: x / SG: x / pH: x  Gluc: 90 mg/dL / Ketone: x  / Bili: x / Urobili: x   Blood: x / Protein: x / Nitrite: x   Leuk Esterase: x / RBC: x / WBC x   Sq Epi: x / Non Sq Epi: x / Bacteria: x        VITALS  T(C): 36.9 (05-14-25 @ 06:54), Max: 36.9 (05-14-25 @ 05:38)  HR: 85 (05-14-25 @ 06:54) (85 - 98)  BP: 132/58 (05-14-25 @ 06:54) (120/58 - 138/64)  RR: 18 (05-14-25 @ 06:54) (17 - 18)  SpO2: 99% (05-14-25 @ 06:54) (98% - 100%)  Wt(kg): --    ALLERGIES  No Known Allergies      MEDICATIONS   acetaminophen     Tablet .. 975 milliGRAM(s) Oral every 6 hours  atorvastatin 40 milliGRAM(s) Oral at bedtime  bacitracin   Ointment 1 Application(s) Topical three times a day  clotrimazole 1% Cream 1 Application(s) Topical two times a day  dibucaine 1% Ointment 1 Application(s) Topical four times a day PRN  ertapenem  IVPB 1000 milliGRAM(s) IV Intermittent every 24 hours  ertapenem  IVPB      gabapentin 100 milliGRAM(s) Oral every 8 hours  glucagon  Injectable 1 milliGRAM(s) IntraMuscular once  insulin lispro (ADMELOG) corrective regimen sliding scale   SubCutaneous three times a day before meals  insulin lispro (ADMELOG) corrective regimen sliding scale   SubCutaneous at bedtime  magnesium hydroxide Suspension 30 milliLiter(s) Oral every 12 hours PRN  methocarbamol 500 milliGRAM(s) Oral every 12 hours  multivitamin 1 Tablet(s) Oral daily  omega-3-Acid Ethyl Esters 2 Gram(s) Oral two times a day  pantoprazole    Tablet 40 milliGRAM(s) Oral before breakfast  polyethylene glycol 3350 17 Gram(s) Oral at bedtime  potassium chloride   Powder 40 milliEquivalent(s) Oral daily  simethicone 80 milliGRAM(s) Chew two times a day  spironolactone 50 milliGRAM(s) Oral daily      ----------------------------------------------------------------------------------------  PHYSICAL EXAM  Constitutional - NAD, Comfortable  + drain  HEENT - NCAT, EOMI   Chest - no respiratory distress   Cardiovascular - RRR, S1S2   Abdomen -  Soft, NTND  Extremities - No C/C/E, No calf tenderness   Neurologic Exam -                    Cognitive - Awake, Alert, AAO to self, place, date, year, situation     Communication - Fluent, No dysarthria     Cranial Nerves - CN 2-12 intact     Motor -                      LEFT    UE - ShAB 5/5, EF 5/5, EE 5/5, WE 5/5,  5/5                    RIGHT UE - ShAB 5/5, EF 5/5, EE 5/5, WE 5/5,  5/5                    LEFT    LE - HF 5/5, KE 5/5, DF 5/5, PF 5/5                    RIGHT LE - HF 2/5, KE 2/5, DF 5/5, PF 5/5        Sensory - Intact to LT on LLE, impaired RLE      Balance - WNL Static  Psychiatric - Mood stable, Affect WNL  ----------------------------------------------------------------------------------------  ASSESSMENT/PLAN  78 yo f s/p T10-pelvis PSF and T12-L2 lami 4/24/25 of OM, abscess  on ertepenem, planning for picc  PT for bed mobility, transfers, ambulation as tolerated  out of bed to chair as tolerated  OT for ADLs  Pain -acetaminophen, gabapentin  DVT PPX - scd  Rehab - recommend acute inpatient rehab when medically cleared. Patient can tolerate 3 hours per day of therapy with medical supervision.  Total time spent to review relevant records and imaging results, examine patient, complete documentation, and when applicable discuss the case with the patient, family, , social workers, and medical team:  55 minutes

## 2025-05-14 NOTE — CONSULT NOTE ADULT - CONSULT REASON
Family request
colonic distension
rash
Hyponatremia
c/f pneumoperitoneum
psoas abscess drainage
epidural abscess
eval for rehab
Q1H Neurochecks
Preop med clearance.

## 2025-05-15 LAB
ANION GAP SERPL CALC-SCNC: 10 MMOL/L — SIGNIFICANT CHANGE UP (ref 7–14)
BLD GP AB SCN SERPL QL: NEGATIVE — SIGNIFICANT CHANGE UP
BUN SERPL-MCNC: 6 MG/DL — LOW (ref 7–23)
CALCIUM SERPL-MCNC: 9 MG/DL — SIGNIFICANT CHANGE UP (ref 8.4–10.5)
CHLORIDE SERPL-SCNC: 106 MMOL/L — SIGNIFICANT CHANGE UP (ref 98–107)
CO2 SERPL-SCNC: 20 MMOL/L — LOW (ref 22–31)
CREAT SERPL-MCNC: 0.62 MG/DL — SIGNIFICANT CHANGE UP (ref 0.5–1.3)
EGFR: 97 ML/MIN/1.73M2 — SIGNIFICANT CHANGE UP
EGFR: 97 ML/MIN/1.73M2 — SIGNIFICANT CHANGE UP
GLUCOSE BLDC GLUCOMTR-MCNC: 102 MG/DL — HIGH (ref 70–99)
GLUCOSE BLDC GLUCOMTR-MCNC: 112 MG/DL — HIGH (ref 70–99)
GLUCOSE BLDC GLUCOMTR-MCNC: 136 MG/DL — HIGH (ref 70–99)
GLUCOSE BLDC GLUCOMTR-MCNC: 146 MG/DL — HIGH (ref 70–99)
GLUCOSE SERPL-MCNC: 78 MG/DL — SIGNIFICANT CHANGE UP (ref 70–99)
HCT VFR BLD CALC: 23.3 % — LOW (ref 39–50)
HCT VFR BLD CALC: 28.3 % — LOW (ref 39–50)
HGB BLD-MCNC: 7.7 G/DL — LOW (ref 13–17)
HGB BLD-MCNC: 9.5 G/DL — LOW (ref 13–17)
MAGNESIUM SERPL-MCNC: 2 MG/DL — SIGNIFICANT CHANGE UP (ref 1.6–2.6)
MCHC RBC-ENTMCNC: 30.7 PG — SIGNIFICANT CHANGE UP (ref 27–34)
MCHC RBC-ENTMCNC: 31.4 PG — SIGNIFICANT CHANGE UP (ref 27–34)
MCHC RBC-ENTMCNC: 33 G/DL — SIGNIFICANT CHANGE UP (ref 32–36)
MCHC RBC-ENTMCNC: 33.6 G/DL — SIGNIFICANT CHANGE UP (ref 32–36)
MCV RBC AUTO: 91.6 FL — SIGNIFICANT CHANGE UP (ref 80–100)
MCV RBC AUTO: 95.1 FL — SIGNIFICANT CHANGE UP (ref 80–100)
NRBC # BLD AUTO: 0 K/UL — SIGNIFICANT CHANGE UP (ref 0–0)
NRBC # BLD AUTO: 0 K/UL — SIGNIFICANT CHANGE UP (ref 0–0)
NRBC # FLD: 0 K/UL — SIGNIFICANT CHANGE UP (ref 0–0)
NRBC # FLD: 0 K/UL — SIGNIFICANT CHANGE UP (ref 0–0)
NRBC BLD AUTO-RTO: 0 /100 WBCS — SIGNIFICANT CHANGE UP (ref 0–0)
NRBC BLD AUTO-RTO: 0 /100 WBCS — SIGNIFICANT CHANGE UP (ref 0–0)
PHOSPHATE SERPL-MCNC: 3 MG/DL — SIGNIFICANT CHANGE UP (ref 2.5–4.5)
PLATELET # BLD AUTO: 412 K/UL — HIGH (ref 150–400)
PLATELET # BLD AUTO: 437 K/UL — HIGH (ref 150–400)
POTASSIUM SERPL-MCNC: 3.8 MMOL/L — SIGNIFICANT CHANGE UP (ref 3.5–5.3)
POTASSIUM SERPL-SCNC: 3.8 MMOL/L — SIGNIFICANT CHANGE UP (ref 3.5–5.3)
RBC # BLD: 2.45 M/UL — LOW (ref 4.2–5.8)
RBC # BLD: 3.09 M/UL — LOW (ref 4.2–5.8)
RBC # FLD: 22.4 % — HIGH (ref 10.3–14.5)
RBC # FLD: 22.5 % — HIGH (ref 10.3–14.5)
RH IG SCN BLD-IMP: POSITIVE — SIGNIFICANT CHANGE UP
SODIUM SERPL-SCNC: 136 MMOL/L — SIGNIFICANT CHANGE UP (ref 135–145)
WBC # BLD: 6.95 K/UL — SIGNIFICANT CHANGE UP (ref 3.8–10.5)
WBC # BLD: 9.67 K/UL — SIGNIFICANT CHANGE UP (ref 3.8–10.5)
WBC # FLD AUTO: 6.95 K/UL — SIGNIFICANT CHANGE UP (ref 3.8–10.5)
WBC # FLD AUTO: 9.67 K/UL — SIGNIFICANT CHANGE UP (ref 3.8–10.5)

## 2025-05-15 PROCEDURE — 99232 SBSQ HOSP IP/OBS MODERATE 35: CPT

## 2025-05-15 RX ADMIN — OMEGA-3-ACID ETHYL ESTERS CAPSULES 2 GRAM(S): 1 CAPSULE, LIQUID FILLED ORAL at 12:28

## 2025-05-15 RX ADMIN — MAGNESIUM HYDROXIDE 30 MILLILITER(S): 400 SUSPENSION ORAL at 14:48

## 2025-05-15 RX ADMIN — METHOCARBAMOL 500 MILLIGRAM(S): 500 TABLET, FILM COATED ORAL at 23:09

## 2025-05-15 RX ADMIN — Medication 975 MILLIGRAM(S): at 00:58

## 2025-05-15 RX ADMIN — GABAPENTIN 100 MILLIGRAM(S): 400 CAPSULE ORAL at 22:41

## 2025-05-15 RX ADMIN — ERTAPENEM SODIUM 100 MILLIGRAM(S): 1 INJECTION, POWDER, LYOPHILIZED, FOR SOLUTION INTRAMUSCULAR; INTRAVENOUS at 14:48

## 2025-05-15 RX ADMIN — GABAPENTIN 100 MILLIGRAM(S): 400 CAPSULE ORAL at 06:16

## 2025-05-15 RX ADMIN — Medication 975 MILLIGRAM(S): at 07:12

## 2025-05-15 RX ADMIN — Medication 40 MILLIEQUIVALENT(S): at 12:28

## 2025-05-15 RX ADMIN — Medication 50 MILLIGRAM(S): at 06:17

## 2025-05-15 RX ADMIN — GABAPENTIN 100 MILLIGRAM(S): 400 CAPSULE ORAL at 14:48

## 2025-05-15 RX ADMIN — Medication 975 MILLIGRAM(S): at 12:29

## 2025-05-15 RX ADMIN — Medication 975 MILLIGRAM(S): at 23:09

## 2025-05-15 RX ADMIN — Medication 975 MILLIGRAM(S): at 13:29

## 2025-05-15 RX ADMIN — CLOTRIMAZOLE 1 APPLICATION(S): 1 CREAM TOPICAL at 18:22

## 2025-05-15 RX ADMIN — Medication 975 MILLIGRAM(S): at 18:23

## 2025-05-15 RX ADMIN — OMEGA-3-ACID ETHYL ESTERS CAPSULES 2 GRAM(S): 1 CAPSULE, LIQUID FILLED ORAL at 23:19

## 2025-05-15 RX ADMIN — Medication 1 TABLET(S): at 12:29

## 2025-05-15 RX ADMIN — POLYETHYLENE GLYCOL 3350 17 GRAM(S): 17 POWDER, FOR SOLUTION ORAL at 22:26

## 2025-05-15 RX ADMIN — CLOTRIMAZOLE 1 APPLICATION(S): 1 CREAM TOPICAL at 06:17

## 2025-05-15 RX ADMIN — Medication 975 MILLIGRAM(S): at 06:17

## 2025-05-15 RX ADMIN — Medication 80 MILLIGRAM(S): at 06:18

## 2025-05-15 RX ADMIN — Medication 80 MILLIGRAM(S): at 18:23

## 2025-05-15 RX ADMIN — Medication 40 MILLIGRAM(S): at 06:17

## 2025-05-15 NOTE — PROGRESS NOTE ADULT - SUBJECTIVE AND OBJECTIVE BOX
NEPHROLOGY      Patient seen and examined sitting in chair, comfortable on room air, reports of mild abdominal discomfort, denies sob, in no acute distress.     MEDICATIONS  (STANDING):  acetaminophen     Tablet .. 975 milliGRAM(s) Oral every 6 hours  atorvastatin 40 milliGRAM(s) Oral at bedtime  bacitracin   Ointment 1 Application(s) Topical three times a day  clotrimazole 1% Cream 1 Application(s) Topical two times a day  ertapenem  IVPB      ertapenem  IVPB 1000 milliGRAM(s) IV Intermittent every 24 hours  gabapentin 100 milliGRAM(s) Oral every 8 hours  glucagon  Injectable 1 milliGRAM(s) IntraMuscular once  insulin lispro (ADMELOG) corrective regimen sliding scale   SubCutaneous three times a day before meals  insulin lispro (ADMELOG) corrective regimen sliding scale   SubCutaneous at bedtime  methocarbamol 500 milliGRAM(s) Oral every 12 hours  multivitamin 1 Tablet(s) Oral daily  omega-3-Acid Ethyl Esters 2 Gram(s) Oral two times a day  pantoprazole    Tablet 40 milliGRAM(s) Oral before breakfast  polyethylene glycol 3350 17 Gram(s) Oral at bedtime  potassium chloride   Powder 40 milliEquivalent(s) Oral daily  simethicone 80 milliGRAM(s) Chew two times a day  spironolactone 50 milliGRAM(s) Oral daily    VITALS:  T(C): , Max: 36.8 (05-14-25 @ 14:39)  T(F): , Max: 98.3 (05-14-25 @ 14:39)  HR: 88 (05-15-25 @ 05:23)  BP: 129/64 (05-15-25 @ 05:23)  BP(mean): --  RR: 18 (05-15-25 @ 05:23)  SpO2: 100% (05-15-25 @ 05:23)    I and O's:    05-14 @ 07:01  -  05-15 @ 07:00  --------------------------------------------------------  IN: 0 mL / OUT: 850 mL / NET: -850 mL    PHYSICAL EXAM:  Constitutional: NAD, alert  HEENT: NCAT, DMM  Neck:  No JVD  Respiratory: CTA-b/l  Cardiovascular: reg s1s2  Gastrointestinal: (+)distension, NT  Extremities: + peripheral edema  Neurological: reduced generalized strength  : No Mac  Skin: No rashes    LABS:                        7.7    6.95  )-----------( 437      ( 15 May 2025 05:31 )             23.3     05-15    136  |  106  |  6[L]  ----------------------------<  78  3.8   |  20[L]  |  0.62    Ca    9.0      15 May 2025 05:31  Phos  3.0     05-15  Mg     2.00     05-15

## 2025-05-15 NOTE — PROGRESS NOTE ADULT - SUBJECTIVE AND OBJECTIVE BOX
Patient is a 79y old  Male who presents with a chief complaint of L1-L2 discitis/OM with epidural abscess and BL psoas abscesses (15 May 2025 09:35)      Interval history: reports pain controlled. Now has PICC, states his wife would be willing to learn how to give antibiotics.       REVIEW OF SYSTEMS  Constitutional - No fever, No weight loss, No fatigue  HEENT - No eye pain, No visual disturbances, No difficulty hearing, No tinnitus, No vertigo, No neck pain  Respiratory - No cough, No wheezing, No shortness of breath  Cardiovascular - No chest pain, No palpitations  Gastrointestinal - No abdominal pain, No nausea, No vomiting, No diarrhea, No constipation  Genitourinary - No dysuria, No frequency, No hematuria, No incontinence  Neurological - No headaches, No memory loss, + loss of strength, + numbness, No tremors  Skin - No itching, No rashes, No lesions   Endocrine - No temperature intolerance  Musculoskeletal - No joint pain, No joint swelling, + pain  Psychiatric - No depression, No anxiety    PAST MEDICAL & SURGICAL HISTORY  HTN (hypertension)    HLD (hyperlipidemia)    DM (diabetes mellitus)    Cataract       CURRENT FUNCTIONAL STATUS  5/13 Bed Mobility  Bed Mobility Training Treatment not Performed: Received pt in the chair.    Sit-Stand Transfer Training  Transfer Training Sit-to-Stand Transfer: contact guard;  1 person assist;  weight-bearing as tolerated   rolling walker  Transfer Training Stand-to-Sit Transfer: contact guard;  1 person assist;  weight-bearing as tolerated   rolling walker  Sit-to-Stand Transfer Training Transfer Safety Analysis: decreased step length;  decreased strength;  decreased flexibility;  rolling walker    Gait Training  Gait Training: contact guard;  1 person assist;  weight-bearing as tolerated   rolling walker;  50 feet  Gait Analysis: 3-point gait   decreased strength;  decreased flexibility;  50 feet;  rolling walker    Therapeutic Exercise  Therapeutic Exercise Detail: Pt performed ActiveROM Bilateral LE's.         FAMILY HISTORY   No pertinent family history in first degree relatives        RECENT LABS/IMAGING  CBC Full  -  ( 15 May 2025 05:31 )  WBC Count : 6.95 K/uL  RBC Count : 2.45 M/uL  Hemoglobin : 7.7 g/dL  Hematocrit : 23.3 %  Platelet Count - Automated : 437 K/uL  Mean Cell Volume : 95.1 fL  Mean Cell Hemoglobin : 31.4 pg  Mean Cell Hemoglobin Concentration : 33.0 g/dL  Auto Neutrophil # : x  Auto Lymphocyte # : x  Auto Monocyte # : x  Auto Eosinophil # : x  Auto Basophil # : x  Auto Neutrophil % : x  Auto Lymphocyte % : x  Auto Monocyte % : x  Auto Eosinophil % : x  Auto Basophil % : x    05-15    136  |  106  |  6[L]  ----------------------------<  78  3.8   |  20[L]  |  0.62    Ca    9.0      15 May 2025 05:31  Phos  3.0     05-15  Mg     2.00     05-15      Urinalysis Basic - ( 15 May 2025 05:31 )    Color: x / Appearance: x / SG: x / pH: x  Gluc: 78 mg/dL / Ketone: x  / Bili: x / Urobili: x   Blood: x / Protein: x / Nitrite: x   Leuk Esterase: x / RBC: x / WBC x   Sq Epi: x / Non Sq Epi: x / Bacteria: x        VITALS  T(C): 36.2 (05-15-25 @ 09:52), Max: 36.8 (05-14-25 @ 14:39)  HR: 93 (05-15-25 @ 09:52) (86 - 99)  BP: 107/55 (05-15-25 @ 09:52) (107/55 - 139/57)  RR: 17 (05-15-25 @ 09:52) (17 - 18)  SpO2: 97% (05-15-25 @ 09:52) (97% - 100%)  Wt(kg): --    ALLERGIES  No Known Allergies      MEDICATIONS   acetaminophen     Tablet .. 975 milliGRAM(s) Oral every 6 hours  atorvastatin 40 milliGRAM(s) Oral at bedtime  bacitracin   Ointment 1 Application(s) Topical three times a day  clotrimazole 1% Cream 1 Application(s) Topical two times a day  dibucaine 1% Ointment 1 Application(s) Topical four times a day PRN  ertapenem  IVPB      ertapenem  IVPB 1000 milliGRAM(s) IV Intermittent every 24 hours  gabapentin 100 milliGRAM(s) Oral every 8 hours  glucagon  Injectable 1 milliGRAM(s) IntraMuscular once  insulin lispro (ADMELOG) corrective regimen sliding scale   SubCutaneous three times a day before meals  insulin lispro (ADMELOG) corrective regimen sliding scale   SubCutaneous at bedtime  magnesium hydroxide Suspension 30 milliLiter(s) Oral every 12 hours PRN  methocarbamol 500 milliGRAM(s) Oral every 12 hours  multivitamin 1 Tablet(s) Oral daily  omega-3-Acid Ethyl Esters 2 Gram(s) Oral two times a day  pantoprazole    Tablet 40 milliGRAM(s) Oral before breakfast  polyethylene glycol 3350 17 Gram(s) Oral at bedtime  potassium chloride   Powder 40 milliEquivalent(s) Oral daily  simethicone 80 milliGRAM(s) Chew two times a day  sodium chloride 0.9% lock flush 10 milliLiter(s) IV Push every 1 hour PRN  spironolactone 50 milliGRAM(s) Oral daily      ----------------------------------------------------------------------------------------  PHYSICAL EXAM  Constitutional - NAD, Comfortable    HEENT - NCAT, EOMI   Chest - no respiratory distress   Cardiovascular - RRR, S1S2   Abdomen -  Soft, NTND  Extremities - No C/C/E, No calf tenderness   Neurologic Exam -                    Cognitive - Awake, Alert, AAO to self, place, date, year, situation     Communication - Fluent, No dysarthria     Cranial Nerves - CN 2-12 intact     Motor -                      LEFT    UE - ShAB 5/5, EF 5/5, EE 5/5, WE 5/5,  5/5                    RIGHT UE - ShAB 5/5, EF 5/5, EE 5/5, WE 5/5,  5/5                    LEFT    LE - HF 5/5, KE 5/5, DF 5/5, PF 5/5                    RIGHT LE - HF 3/5, KE 3/5, DF 5/5, PF 5/5        Sensory - Intact to LT on LLE, impaired RLE      Balance - WNL Static  Psychiatric - Mood stable, Affect WNL  ----------------------------------------------------------------------------------------  ASSESSMENT/PLAN  80 yo f s/p T10-pelvis PSF and T12-L2 lami 4/24/25 of OM, abscess  on ertepenem, planning for picc  PT for bed mobility, transfers, ambulation as tolerated  out of bed to chair as tolerated  OT for ADLs  Pain -acetaminophen, gabapentin  DVT PPX - scd  Rehab - recommend acute inpatient rehab when medically cleared. Patient can tolerate 3 hours per day of therapy with medical supervision.         Total time spent to review relevant records and imaging results, examine patient, complete documentation, and when applicable discuss the case with the patient, family, , social workers, and medical team:  35 minutes Patient is a 79y old  Male who presents with a chief complaint of L1-L2 discitis/OM with epidural abscess and BL psoas abscesses (15 May 2025 09:35)      Interval history: reports pain controlled. Now has PICC, states his wife would be willing to learn how to give antibiotics.       REVIEW OF SYSTEMS  Constitutional - No fever, No weight loss, No fatigue  HEENT - No eye pain, No visual disturbances, No difficulty hearing, No tinnitus, No vertigo, No neck pain  Respiratory - No cough, No wheezing, No shortness of breath  Cardiovascular - No chest pain, No palpitations  Gastrointestinal - No abdominal pain, No nausea, No vomiting, No diarrhea, No constipation  Genitourinary - No dysuria, No frequency, No hematuria, No incontinence  Neurological - No headaches, No memory loss, + loss of strength, + numbness, No tremors  Skin - No itching, No rashes, No lesions   Endocrine - No temperature intolerance  Musculoskeletal - No joint pain, No joint swelling, + pain  Psychiatric - No depression, No anxiety    PAST MEDICAL & SURGICAL HISTORY  HTN (hypertension)    HLD (hyperlipidemia)    DM (diabetes mellitus)    Cataract       CURRENT FUNCTIONAL STATUS   5/14 Sit-Stand Transfer Training  Transfer Training Sit-to-Stand Transfer: contact guard;  1 person assist;  weight-bearing as tolerated   rolling walker  Transfer Training Stand-to-Sit Transfer: contact guard;  1 person assist;  weight-bearing as tolerated   rolling walker  Sit-to-Stand Transfer Training Transfer Safety Analysis: decreased step length;  decreased strength;  decreased flexibility;  rolling walker    Gait Training  Gait Training: contact guard;  1 person assist;  weight-bearing as tolerated   rolling walker;  50 feet  Gait Analysis: 3-point gait   decreased linn;  decreased step length;  decreased strength;  50 feet;  rolling walker    Therapeutic Exercise  Therapeutic Exercise Detail: Pt performed ActiveROM bilateral LE's.           FAMILY HISTORY   No pertinent family history in first degree relatives        RECENT LABS/IMAGING  CBC Full  -  ( 15 May 2025 05:31 )  WBC Count : 6.95 K/uL  RBC Count : 2.45 M/uL  Hemoglobin : 7.7 g/dL  Hematocrit : 23.3 %  Platelet Count - Automated : 437 K/uL  Mean Cell Volume : 95.1 fL  Mean Cell Hemoglobin : 31.4 pg  Mean Cell Hemoglobin Concentration : 33.0 g/dL  Auto Neutrophil # : x  Auto Lymphocyte # : x  Auto Monocyte # : x  Auto Eosinophil # : x  Auto Basophil # : x  Auto Neutrophil % : x  Auto Lymphocyte % : x  Auto Monocyte % : x  Auto Eosinophil % : x  Auto Basophil % : x    05-15    136  |  106  |  6[L]  ----------------------------<  78  3.8   |  20[L]  |  0.62    Ca    9.0      15 May 2025 05:31  Phos  3.0     05-15  Mg     2.00     05-15      Urinalysis Basic - ( 15 May 2025 05:31 )    Color: x / Appearance: x / SG: x / pH: x  Gluc: 78 mg/dL / Ketone: x  / Bili: x / Urobili: x   Blood: x / Protein: x / Nitrite: x   Leuk Esterase: x / RBC: x / WBC x   Sq Epi: x / Non Sq Epi: x / Bacteria: x        VITALS  T(C): 36.2 (05-15-25 @ 09:52), Max: 36.8 (05-14-25 @ 14:39)  HR: 93 (05-15-25 @ 09:52) (86 - 99)  BP: 107/55 (05-15-25 @ 09:52) (107/55 - 139/57)  RR: 17 (05-15-25 @ 09:52) (17 - 18)  SpO2: 97% (05-15-25 @ 09:52) (97% - 100%)  Wt(kg): --    ALLERGIES  No Known Allergies      MEDICATIONS   acetaminophen     Tablet .. 975 milliGRAM(s) Oral every 6 hours  atorvastatin 40 milliGRAM(s) Oral at bedtime  bacitracin   Ointment 1 Application(s) Topical three times a day  clotrimazole 1% Cream 1 Application(s) Topical two times a day  dibucaine 1% Ointment 1 Application(s) Topical four times a day PRN  ertapenem  IVPB      ertapenem  IVPB 1000 milliGRAM(s) IV Intermittent every 24 hours  gabapentin 100 milliGRAM(s) Oral every 8 hours  glucagon  Injectable 1 milliGRAM(s) IntraMuscular once  insulin lispro (ADMELOG) corrective regimen sliding scale   SubCutaneous three times a day before meals  insulin lispro (ADMELOG) corrective regimen sliding scale   SubCutaneous at bedtime  magnesium hydroxide Suspension 30 milliLiter(s) Oral every 12 hours PRN  methocarbamol 500 milliGRAM(s) Oral every 12 hours  multivitamin 1 Tablet(s) Oral daily  omega-3-Acid Ethyl Esters 2 Gram(s) Oral two times a day  pantoprazole    Tablet 40 milliGRAM(s) Oral before breakfast  polyethylene glycol 3350 17 Gram(s) Oral at bedtime  potassium chloride   Powder 40 milliEquivalent(s) Oral daily  simethicone 80 milliGRAM(s) Chew two times a day  sodium chloride 0.9% lock flush 10 milliLiter(s) IV Push every 1 hour PRN  spironolactone 50 milliGRAM(s) Oral daily      ----------------------------------------------------------------------------------------  PHYSICAL EXAM  Constitutional - NAD, Comfortable    HEENT - NCAT, EOMI   Chest - no respiratory distress   Cardiovascular - RRR, S1S2   Abdomen -  Soft, NTND  Extremities - No C/C/E, No calf tenderness   Neurologic Exam -                    Cognitive - Awake, Alert, AAO to self, place, date, year, situation     Communication - Fluent, No dysarthria     Cranial Nerves - CN 2-12 intact     Motor -                      LEFT    UE - ShAB 5/5, EF 5/5, EE 5/5, WE 5/5,  5/5                    RIGHT UE - ShAB 5/5, EF 5/5, EE 5/5, WE 5/5,  5/5                    LEFT    LE - HF 5/5, KE 5/5, DF 5/5, PF 5/5                    RIGHT LE - HF 3/5, KE 3/5, DF 5/5, PF 5/5        Sensory - Intact to LT on LLE, impaired RLE      Balance - WNL Static  Psychiatric - Mood stable, Affect WNL  ----------------------------------------------------------------------------------------  ASSESSMENT/PLAN  78 yo f s/p T10-pelvis PSF and T12-L2 lami 4/24/25 of OM, abscess  on ertepenem, planning for picc  PT for bed mobility, transfers, ambulation as tolerated  out of bed to chair as tolerated  OT for ADLs  Pain -acetaminophen, gabapentin  DVT PPX - scd  Rehab - recommend acute inpatient rehab when medically cleared. Patient can tolerate 3 hours per day of therapy with medical supervision.         Total time spent to review relevant records and imaging results, examine patient, complete documentation, and when applicable discuss the case with the patient, family, , social workers, and medical team:  35 minutes

## 2025-05-15 NOTE — PROGRESS NOTE ADULT - ASSESSMENT
79yMale w/ HTN, HLD, T2DM, chronic low back pain presents as a transfer from [facility] ED for surgery today. Pt was sent into ED yesterday by his pain medicine physician due to recent MRI findings concerning for L1-L2 discitis/OM with epidural abscess, bilateral psoas abscesses. Patient initially saw pain specialist in February for chronic back pain with radiations to bilateral lateral thighs (R>L). MRI at that time showed degenerative changes. He subsequently had epidural injections x2 (2/27, 3/11) with moderate pain relief. Pain began to worsen on 4/10. He had radiofrequency ablation performed on 4/11 and has since has severe worsening of pain and radiation to R lateral thigh. He reports recent bowel/bladder "incontinence" requiring diaper due to his inability to get to the bathroom in time due to pain limitations but states urge and sensation are intact. Denies fevers, chills, night sweats, weakness, numbness/tingling, saddle anesthesia, recent falls/trauma. He uses a cane for ambulation. Denies recent falls/trauma or any other ortho injuries. Before back pain was very active walked half an hour , going up and down stairs and doing grocery etc with no Chest pain or SOB.        Problem/Recommendation - 1:  ·  Problem: Epidural abscess.   ·  Recommendation: S/P Decompression, spine, lumbar, posterior approach, with fusion of posterior spinal column.  On IV Abxs Ertapenem now  per ID x 6 weeks  .  Will defer management to  Neurosurgery.  DVT prophylaxis per Neurosurgery .  < from: MR Lumbar Spine w/ IV Cont (04.23.25 @ 15:13) >  IMPRESSION:        1.   Cervical spine:   Moderate degenerative disc disease and   spondylosis at C4-5 through C6/7 with loss of disc height and associated   degenerative endplate changes.        2.   Thoracic spine:   Minimal enhancement within T7-8 which may   reflect early discitis.        3.   Lumbar spine:   Osteomyelitis and discitis at L1-2 with erosions   of the inferior L1 vertebral body and L2 vertebral body consistent with   osteomyelitis and discitis. Ventral epidural abscess is noted extending   from the L1-2 level superiorly to the T10 level with mass effect on the   ventral thecal sac, most prominently at the L1-2 level resulting in   marked compression. Multiple abscesses within the BILATERAL psoas   muscles, the largest measuring 4.3 cm on the RIGHT and 2.8 cm on the LEFT.     Problem/Recommendation - 2:  ·  Problem: Acute osteomyelitis of lumbar spine.   ·  Recommendation: L1 &L2 vertebrae .  On IV Abxs per ID .  Will defer management to Neurosurgery.     Problem/Recommendation - 3:  ·  Problem: Low back pain radiating to right lower extremity.   ·  Recommendation: Pain control.     Problem/Recommendation - 4:  ·  Problem: . BILATERAL psoas Abscess  ·  Recommendation: ON IV Abxs per ID .  IR removed drains as better.     Problem/Recommendation - 5:  ·  Problem: HTN (hypertension).   ·  Recommendation: Takes Amlodipine and ARB so continue with hold parameters.  < from: TTE W or WO Ultrasound Enhancing Agent (04.24.25 @ 10:10) >  CONCLUSIONS:      1. Left ventricular cavity is normal in size. Left ventricular wall thickness is normal. Left ventricular systolic function is normal with an ejection fraction of 64 % by Carpenter's method of disks. There are no regional wall motion abnormalities seen.   2. Normal right ventricular cavity size and normal right ventricular systolic function. Tricuspid annular plane systolic excursion (TAPSE) is 2.2 cm (normal >=1.7 cm).   3. Structurally normal mitral valve with normal leaflet excursion. There is calcification of the mitral valve annulus. There is tracemitral regurgitation.   4. The aortic valve appears trileaflet with normal systolic excursion. There is calcification of the aortic valve leaflets. There is mild aortic regurgitation.     Problem/Recommendation - 6:  ·  Problem: HLD (hyperlipidemia).   ·  Recommendation: Continue Atorvastatin.     Problem/Recommendation - 7:  ·  Problem: DM (diabetes mellitus).   ·  Recommendation: ON Farxiga and holding.  SSI and Lantus in patient .     Problem/Recommendation - 8:  ·  Problem:  Ileus /Pseudoobstruction   ·  Recommendation: Improving.   Had BMs and  passing flatus .  Correcting electrolytes.     ON Laxatives and Simethacone .  Advanced diet .   Surgery & GI input appreciated .  D/W GI attending and will wait till tomorrow before rectal tube placement  .  < from: CT Abdomen and Pelvis w/ Oral Cont and w/ IV Cont (04.29.25 @ 17:16) >  IMPRESSION:  Diffuse colonic dilatation, worse on today's study, likely secondary to   pseudoobstruction or ileus.    No free air.    Reduction in the bilateral psoas collections with catheters in place.    < from: Xray Abdomen 1 View PORTABLE -Urgent (Xray Abdomen 1 View PORTABLE -Urgent .) (04.29.25 @ 10:09) >  IMPRESSION:  Multiple dilated loops of small and large bowel with question of   pneumoperitoneum. Recommend upright chest radiograph or decubitus   abdominal radiograph for further evaluation.     Problem/Recommendation - 9:  ·  Problem: JUSTINO with Hyponatremia .   ·  Recommendation: Trending BMP.  Resolved.  Renal help appreciated.      Problem/Recommendation - 10:  ·  Problem: Hypokalemia & Hypophosphatemia .   ·  Recommendation: Replaced .   Aldactone 50mg started.    Rpt BMP noted.    Keep K level close to 4 .     Problem/Recommendation - 11:  ·  Problem: Thrombocytosis .   ·  Recommendation: Reactionary secondary to infection.  Platelets Trending down .     Problem/Recommendation - 12:  ·  Problem: Pedal edema >Right LE.   ·  Recommendation: No calf tenderness +,Ambulating.     Problem/Recommendation - 13:  ·  Problem: Irritant Dermatitis  .   ·  Recommendation: Dermatology input appreciated .  Started clotrimazole cream.        Dispo : DC planning to Acute Rehab per primary team .

## 2025-05-15 NOTE — PROGRESS NOTE ADULT - SUBJECTIVE AND OBJECTIVE BOX
Date of Service  : 05-15-25     INTERVAL HPI/OVERNIGHT EVENTS: I feel fine now.   Vital Signs Last 24 Hrs  T(C): 36.7 (15 May 2025 14:50), Max: 36.7 (14 May 2025 22:02)  T(F): 98 (15 May 2025 14:50), Max: 98.1 (15 May 2025 01:25)  HR: 97 (15 May 2025 14:50) (86 - 99)  BP: 129/64 (15 May 2025 14:50) (107/55 - 139/57)  BP(mean): --  RR: 17 (15 May 2025 14:50) (17 - 18)  SpO2: 100% (15 May 2025 14:50) (97% - 100%)    Parameters below as of 15 May 2025 14:50  Patient On (Oxygen Delivery Method): room air      I&O's Summary    14 May 2025 07:01  -  15 May 2025 07:00  --------------------------------------------------------  IN: 0 mL / OUT: 850 mL / NET: -850 mL      MEDICATIONS  (STANDING):  acetaminophen     Tablet .. 975 milliGRAM(s) Oral every 6 hours  atorvastatin 40 milliGRAM(s) Oral at bedtime  bacitracin   Ointment 1 Application(s) Topical three times a day  clotrimazole 1% Cream 1 Application(s) Topical two times a day  ertapenem  IVPB 1000 milliGRAM(s) IV Intermittent every 24 hours  ertapenem  IVPB      gabapentin 100 milliGRAM(s) Oral every 8 hours  glucagon  Injectable 1 milliGRAM(s) IntraMuscular once  insulin lispro (ADMELOG) corrective regimen sliding scale   SubCutaneous three times a day before meals  insulin lispro (ADMELOG) corrective regimen sliding scale   SubCutaneous at bedtime  methocarbamol 500 milliGRAM(s) Oral every 12 hours  multivitamin 1 Tablet(s) Oral daily  omega-3-Acid Ethyl Esters 2 Gram(s) Oral two times a day  pantoprazole    Tablet 40 milliGRAM(s) Oral before breakfast  polyethylene glycol 3350 17 Gram(s) Oral at bedtime  potassium chloride   Powder 40 milliEquivalent(s) Oral daily  simethicone 80 milliGRAM(s) Chew two times a day  spironolactone 50 milliGRAM(s) Oral daily    MEDICATIONS  (PRN):  dibucaine 1% Ointment 1 Application(s) Topical four times a day PRN for anal discomfort d/t hemorrhoids  magnesium hydroxide Suspension 30 milliLiter(s) Oral every 12 hours PRN Constipation  sodium chloride 0.9% lock flush 10 milliLiter(s) IV Push every 1 hour PRN Pre/post blood products, medications, blood draw, and to maintain line patency    LABS:                        7.7    6.95  )-----------( 437      ( 15 May 2025 05:31 )             23.3     05-15    136  |  106  |  6[L]  ----------------------------<  78  3.8   |  20[L]  |  0.62    Ca    9.0      15 May 2025 05:31  Phos  3.0     05-15  Mg     2.00     05-15        Urinalysis Basic - ( 15 May 2025 05:31 )    Color: x / Appearance: x / SG: x / pH: x  Gluc: 78 mg/dL / Ketone: x  / Bili: x / Urobili: x   Blood: x / Protein: x / Nitrite: x   Leuk Esterase: x / RBC: x / WBC x   Sq Epi: x / Non Sq Epi: x / Bacteria: x      CAPILLARY BLOOD GLUCOSE      POCT Blood Glucose.: 146 mg/dL (15 May 2025 11:24)  POCT Blood Glucose.: 102 mg/dL (15 May 2025 07:27)  POCT Blood Glucose.: 100 mg/dL (14 May 2025 21:24)  POCT Blood Glucose.: 114 mg/dL (14 May 2025 16:25)        Urinalysis Basic - ( 15 May 2025 05:31 )    Color: x / Appearance: x / SG: x / pH: x  Gluc: 78 mg/dL / Ketone: x  / Bili: x / Urobili: x   Blood: x / Protein: x / Nitrite: x   Leuk Esterase: x / RBC: x / WBC x   Sq Epi: x / Non Sq Epi: x / Bacteria: x      REVIEW OF SYSTEMS:  CONSTITUTIONAL: No fever, weight loss, or fatigue  EYES: No eye pain, visual disturbances, or discharge  ENMT:  No difficulty hearing, tinnitus, vertigo; No sinus or throat pain  NECK: No pain or stiffness  RESPIRATORY: No cough, wheezing, chills or hemoptysis; No shortness of breath  CARDIOVASCULAR: No chest pain, palpitations, dizziness, or leg swelling  GASTROINTESTINAL: No abdominal or epigastric pain. No nausea, vomiting, or hematemesis; No diarrhea or constipation. No melena or hematochezia.  GENITOURINARY: No dysuria, frequency, hematuria, or incontinence  NEUROLOGICAL: No headaches, memory loss, loss of strength, numbness, or tremors      RADIOLOGY & ADDITIONAL TESTS:    Consultant(s) Notes Reviewed:  [x ] YES  [ ] NO    PHYSICAL EXAM:  GENERAL: NAD, well-groomed, well-developed,not in any distress ,  HEAD:  Atraumatic, Normocephalic  NECK: Supple, No JVD, Normal thyroid  NERVOUS SYSTEM:  Alert & Oriented X3, No focal deficit   CHEST/LUNG: Good air entry bilateral with no  rales, rhonchi, wheezing, or rubs  HEART: Regular rate and rhythm; No murmurs, rubs, or gallops  ABDOMEN: Soft, Nontender, Nondistended; Bowel sounds present  EXTREMITIES:  2+ Peripheral Pulses, No clubbing, cyanosis, or edema    Care Discussed with Consultants/Other Providers [ x] YES  [ ] NO

## 2025-05-15 NOTE — PROGRESS NOTE ADULT - ASSESSMENT
IMPRESSION: 79M w/ HTN, HLD, and T2DM, 4/24/25 from [facility] with L1-L2 OM/epidural abscess/psoas abscesses; s/p decompression spinal fusion 4/24/25; post-op course notable for ileus and hypokalemia    (1)Renal - CKD2 - early diabetic nephropathy. Follows as outpatient with Dr. Syed Euceda  (2)Hypokalemia -whole body deficit due to limited intake/GI losses - stable s/p repletion   (3)GI - ileus slowly improving; exacerbated by hypokalemia    RECOMMEND:  (1)Spironolactone as ordered  (2)Continue Glucerna TID  (3)BMP, Mg, Phos daily     Mare Cameron DNP  [facility]  [phone]          IMPRESSION: 79M w/ HTN, HLD, and T2DM, 4/24/25 from [facility] with L1-L2 OM/epidural abscess/psoas abscesses; s/p decompression spinal fusion 4/24/25; post-op course notable for ileus and hypokalemia    (1)Renal - CKD2 - early diabetic nephropathy. Follows as outpatient with Dr. Syed Euceda  (2)Hypokalemia -whole body deficit due to limited intake/GI losses - stable s/p repletion   (3)GI - ileus slowly improving; exacerbated by hypokalemia    RECOMMEND:  (1)Spironolactone as ordered  (2)Continue Glucerna TID  (3)BMP, Mg, Phos daily     Mare Cameron DNP  [facility]  [phone]       RENAL ATTENDING NOTE  Patient seen and examined with NP. Agree with assessment and plan as above.    Yamil Humphreys MD  [facility]  [phone]

## 2025-05-15 NOTE — PROGRESS NOTE ADULT - SUBJECTIVE AND OBJECTIVE BOX
Orthopedic Surgery Progress Note     S: Patient seen and examined today. No acute events overnight. Pain is well controlled. Denies f/c, chest pain, shortness of breath, dizziness. Notes his abdominal distention is improved.    MEDICATIONS  (STANDING):  acetaminophen     Tablet .. 975 milliGRAM(s) Oral every 6 hours  atorvastatin 40 milliGRAM(s) Oral at bedtime  bacitracin   Ointment 1 Application(s) Topical three times a day  clotrimazole 1% Cream 1 Application(s) Topical two times a day  ertapenem  IVPB      ertapenem  IVPB 1000 milliGRAM(s) IV Intermittent every 24 hours  gabapentin 100 milliGRAM(s) Oral every 8 hours  glucagon  Injectable 1 milliGRAM(s) IntraMuscular once  insulin lispro (ADMELOG) corrective regimen sliding scale   SubCutaneous three times a day before meals  insulin lispro (ADMELOG) corrective regimen sliding scale   SubCutaneous at bedtime  methocarbamol 500 milliGRAM(s) Oral every 12 hours  multivitamin 1 Tablet(s) Oral daily  omega-3-Acid Ethyl Esters 2 Gram(s) Oral two times a day  pantoprazole    Tablet 40 milliGRAM(s) Oral before breakfast  polyethylene glycol 3350 17 Gram(s) Oral at bedtime  potassium chloride   Powder 40 milliEquivalent(s) Oral daily  simethicone 80 milliGRAM(s) Chew two times a day  spironolactone 50 milliGRAM(s) Oral daily    MEDICATIONS  (PRN):  dibucaine 1% Ointment 1 Application(s) Topical four times a day PRN for anal discomfort d/t hemorrhoids  magnesium hydroxide Suspension 30 milliLiter(s) Oral every 12 hours PRN Constipation  sodium chloride 0.9% lock flush 10 milliLiter(s) IV Push every 1 hour PRN Pre/post blood products, medications, blood draw, and to maintain line patency      Vital Signs Last 24 Hrs  T(C): 36.4 (15 May 2025 05:23), Max: 36.9 (14 May 2025 05:38)  T(F): 97.6 (15 May 2025 05:23), Max: 98.4 (14 May 2025 05:38)  HR: 88 (15 May 2025 05:23) (85 - 99)  BP: 129/64 (15 May 2025 05:23) (125/56 - 141/68)  BP(mean): --  RR: 18 (15 May 2025 05:23) (16 - 18)  SpO2: 100% (15 May 2025 05:23) (99% - 100%)    Parameters below as of 15 May 2025 05:23  Patient On (Oxygen Delivery Method): room air        05-13-25 @ 07:01  -  05-14-25 @ 07:00  --------------------------------------------------------  IN: 0 mL / OUT: 650 mL / NET: -650 mL    05-14-25 @ 07:01  -  05-15-25 @ 05:26  --------------------------------------------------------  IN: 0 mL / OUT: 850 mL / NET: -850 mL        Physical Exam:  Gen: NAD  Resp: No increased WOB  Spine:  Dressing: clean/dry/intact    Drains: IR Drain removed, site covered  Decreased sensation of lateral R thigh, otherwise SILT    Motor:                   C5                C6              C7               C8           T1   R            5/5                5/5            5/5             5/5          5/5  L             5/5               5/5             5/5             5/5          5/5                L2             L3             L4               L5            S1  R         3/5           4/5          5/5             5/5           5/5  L          4/5          5/5           5/5             5/5           5/5            Calves Soft/Non-tender bilaterally         Distal extremities warm well perfused; capillary refill <3 seconds       LABS:                        7.8    7.56  )-----------( 449      ( 14 May 2025 05:42 )             23.9     05-14    135  |  106  |  7   ----------------------------<  90  4.1   |  17[L]  |  0.61    Ca    8.8      14 May 2025 05:42  Phos  2.5     05-14  Mg     1.80     05-14     Orthopedic Surgery Progress Note     S: Patient seen and examined today. No acute events overnight. Pain is well controlled. Denies f/c, chest pain, shortness of breath, dizziness. Notes his abdominal distention is improved.    MEDICATIONS  (STANDING):  acetaminophen     Tablet .. 975 milliGRAM(s) Oral every 6 hours  atorvastatin 40 milliGRAM(s) Oral at bedtime  bacitracin   Ointment 1 Application(s) Topical three times a day  clotrimazole 1% Cream 1 Application(s) Topical two times a day  ertapenem  IVPB      ertapenem  IVPB 1000 milliGRAM(s) IV Intermittent every 24 hours  gabapentin 100 milliGRAM(s) Oral every 8 hours  glucagon  Injectable 1 milliGRAM(s) IntraMuscular once  insulin lispro (ADMELOG) corrective regimen sliding scale   SubCutaneous three times a day before meals  insulin lispro (ADMELOG) corrective regimen sliding scale   SubCutaneous at bedtime  methocarbamol 500 milliGRAM(s) Oral every 12 hours  multivitamin 1 Tablet(s) Oral daily  omega-3-Acid Ethyl Esters 2 Gram(s) Oral two times a day  pantoprazole    Tablet 40 milliGRAM(s) Oral before breakfast  polyethylene glycol 3350 17 Gram(s) Oral at bedtime  potassium chloride   Powder 40 milliEquivalent(s) Oral daily  simethicone 80 milliGRAM(s) Chew two times a day  spironolactone 50 milliGRAM(s) Oral daily    MEDICATIONS  (PRN):  dibucaine 1% Ointment 1 Application(s) Topical four times a day PRN for anal discomfort d/t hemorrhoids  magnesium hydroxide Suspension 30 milliLiter(s) Oral every 12 hours PRN Constipation  sodium chloride 0.9% lock flush 10 milliLiter(s) IV Push every 1 hour PRN Pre/post blood products, medications, blood draw, and to maintain line patency      Vital Signs Last 24 Hrs  T(C): 36.4 (15 May 2025 05:23), Max: 36.9 (14 May 2025 05:38)  T(F): 97.6 (15 May 2025 05:23), Max: 98.4 (14 May 2025 05:38)  HR: 88 (15 May 2025 05:23) (85 - 99)  BP: 129/64 (15 May 2025 05:23) (125/56 - 141/68)  BP(mean): --  RR: 18 (15 May 2025 05:23) (16 - 18)  SpO2: 100% (15 May 2025 05:23) (99% - 100%)    Parameters below as of 15 May 2025 05:23  Patient On (Oxygen Delivery Method): room air        05-13-25 @ 07:01  -  05-14-25 @ 07:00  --------------------------------------------------------  IN: 0 mL / OUT: 650 mL / NET: -650 mL    05-14-25 @ 07:01  -  05-15-25 @ 05:26  --------------------------------------------------------  IN: 0 mL / OUT: 850 mL / NET: -850 mL        Physical Exam:  Gen: NAD  Resp: No increased WOB  Spine:  Dressing: clean/dry/intact    Drains: IR Drain removed, site covered  Decreased sensation of lateral R thigh, otherwise SILT    Motor:                   C5                C6              C7               C8           T1   R            5/5                5/5            5/5             5/5          5/5  L             5/5               5/5             5/5             5/5          5/5                L2             L3             L4               L5            S1  R         2+/5           4/5          5/5             5/5           5/5  L          4/5          5/5           5/5             5/5           5/5            Calves Soft/Non-tender bilaterally         Distal extremities warm well perfused; capillary refill <3 seconds       LABS:                        7.8    7.56  )-----------( 449      ( 14 May 2025 05:42 )             23.9     05-14    135  |  106  |  7   ----------------------------<  90  4.1   |  17[L]  |  0.61    Ca    8.8      14 May 2025 05:42  Phos  2.5     05-14  Mg     1.80     05-14

## 2025-05-15 NOTE — PROGRESS NOTE ADULT - SUBJECTIVE AND OBJECTIVE BOX
DATE OF SERVICE: 05-15-25    no events overnight, no pain or SOB     acetaminophen     Tablet .. 975 milliGRAM(s) Oral every 6 hours  atorvastatin 40 milliGRAM(s) Oral at bedtime  bacitracin   Ointment 1 Application(s) Topical three times a day  clotrimazole 1% Cream 1 Application(s) Topical two times a day  dibucaine 1% Ointment 1 Application(s) Topical four times a day PRN   ertapenem  IVPB 1000 milliGRAM(s) IV Intermittent every 24 hours  gabapentin 100 milliGRAM(s) Oral every 8 hours  glucagon  Injectable 1 milliGRAM(s) IntraMuscular once  insulin lispro (ADMELOG) corrective regimen sliding scale   SubCutaneous three times a day before meals  insulin lispro (ADMELOG) corrective regimen sliding scale   SubCutaneous at bedtime  magnesium hydroxide Suspension 30 milliLiter(s) Oral every 12 hours PRN  methocarbamol 500 milliGRAM(s) Oral every 12 hours  multivitamin 1 Tablet(s) Oral daily  omega-3-Acid Ethyl Esters 2 Gram(s) Oral two times a day  pantoprazole    Tablet 40 milliGRAM(s) Oral before breakfast  polyethylene glycol 3350 17 Gram(s) Oral at bedtime  potassium chloride   Powder 40 milliEquivalent(s) Oral daily  simethicone 80 milliGRAM(s) Chew two times a day  sodium chloride 0.9% lock flush 10 milliLiter(s) IV Push every 1 hour PRN  spironolactone 50 milliGRAM(s) Oral daily                            7.7    6.95  )-----------( 437      ( 15 May 2025 05:31 )             23.3     136  |  106  |  6[L]  ----------------------------<  78  3.8   |  20[L]  |  0.62    Ca    9.0      15 May 2025 05:31  Phos  3.0     05-15  Mg     2.00     05-15      Creatinine Trend: 0.62<--, 0.61<--, 0.68<--, 0.66<--, 0.65<--, 0.90<--      T(C): 36.4 (05-15-25 @ 16:51), Max: 36.7 (05-14-25 @ 22:02)  HR: 89 (05-15-25 @ 16:51) (86 - 99)  BP: 144/63 (05-15-25 @ 16:51) (107/55 - 144/63)  RR: 17 (05-15-25 @ 16:51) (17 - 18)  SpO2: 100% (05-15-25 @ 16:51) (97% - 100%)  Wt(kg): --    I&O's Summary    14 May 2025 07:01  -  15 May 2025 07:00  --------------------------------------------------------  IN: 0 mL / OUT: 850 mL / NET: -850 mL      Gen: NAD  HEENT:  (-)icterus (-)pallor  CV: N S1 S2 1/6 DARYA (+)2 Pulses B/l  Resp:  Clear to auscultation B/L, normal effort  GI: distended  Lymph:  (-)Edema, (-)obvious lymphadenopathy  Skin: Warm to touch, Normal turgor  Psych: Appropriate mood and affect      TELEMETRY: 	  NSR    ECG:  	NSR    < from: Xray Abdomen 1 View PORTABLE -Urgent (Xray Abdomen 1 View PORTABLE -Urgent .) (04.29.25 @ 10:09) >  IMPRESSION:  Multiple dilated loops of small and large bowel with question of   pneumoperitoneum. Recommend upright chest radiograph or decubitus   abdominal radiograph for further evaluation.    < end of copied text >    < from: CT Abdomen and Pelvis w/ Oral Cont and w/ IV Cont (04.29.25 @ 17:16) >  IMPRESSION:      Diffuse colonic dilatation, worse on today's study, likely secondary to   pseudoobstruction or ileus.    No free air.    Reduction in the bilateral psoas collections with catheters in place.    PRELIMINARY IMPRESSION: Study limited secondary to extensive streak   artifact from patient's spinal hardware. Diffusely dilated colon to the   level of the rectum, increased since 4/25/2025, without evidence of   mechanical obstruction. Possible etiologies include pseudoobstruction   versus ileus. No small bowel obstruction. No pneumoperitoneum.   Percutaneous posterior approach drainage catheters within the psoas   muscles bilaterally with interval decrease of previously seen abscess.   Redemonstrated erosive endplate changes at L1-L2 compatible with discitis   osteomyelitis.    Follow-up final report in the a.m.    < end of copied text >      ASSESSMENT/PLAN: 	Pt is a  79 year old male w/ HTN, HLD, T2DM, chronic low back pain presents as a transfer from [facility] ED for surgery. . Pt was sent into ED yesterday by his pain medicine physician due to recent MRI findings concerning for L1-L2 discitis/OM with epidural abscess, bilateral psoas abscesses. Patient initially saw pain specialist in February for chronic back pain with radiations to bilateral lateral thighs (R>L). MRI at that time showed degenerative changes. He subsequently had epidural injections x2 (2/27, 3/11) with moderate pain relief. Pain began to worsen on 4/10. He had radiofrequency ablation performed on 4/11 and has since has severe worsening of pain and radiation to R lateral thigh. He reports recent bowel/bladder "incontinence" requiring diaper due to his inability to get to the bathroom in time due to pain limitations but states urge and sensation are intact. Denies fevers, chills, night sweats, weakness, numbness/tingling, saddle anesthesia, recent falls/trauma. He uses a cane for ambulation. Denies recent falls/trauma or any other ortho injuries.   Found to have epidural abscess now s/p Decompression, spine, lumbar, posterior approach, with fusion of posterior spinal column, planned for psoas abscess drainage.    - s/p Decompression, spine, lumbar, posterior approach, with fusion of posterior spinal column  - s/p psoas drainage   - pain control/PT/bowel regimen  - c/w statin  - BP/HR stable  - s/p PICC  DC planning acute rehab      DATE OF SERVICE: 05-15-25    no events overnight, no pain or SOB     acetaminophen     Tablet .. 975 milliGRAM(s) Oral every 6 hours  atorvastatin 40 milliGRAM(s) Oral at bedtime  bacitracin   Ointment 1 Application(s) Topical three times a day  clotrimazole 1% Cream 1 Application(s) Topical two times a day  dibucaine 1% Ointment 1 Application(s) Topical four times a day PRN   ertapenem  IVPB 1000 milliGRAM(s) IV Intermittent every 24 hours  gabapentin 100 milliGRAM(s) Oral every 8 hours  glucagon  Injectable 1 milliGRAM(s) IntraMuscular once  insulin lispro (ADMELOG) corrective regimen sliding scale   SubCutaneous three times a day before meals  insulin lispro (ADMELOG) corrective regimen sliding scale   SubCutaneous at bedtime  magnesium hydroxide Suspension 30 milliLiter(s) Oral every 12 hours PRN  methocarbamol 500 milliGRAM(s) Oral every 12 hours  multivitamin 1 Tablet(s) Oral daily  omega-3-Acid Ethyl Esters 2 Gram(s) Oral two times a day  pantoprazole    Tablet 40 milliGRAM(s) Oral before breakfast  polyethylene glycol 3350 17 Gram(s) Oral at bedtime  potassium chloride   Powder 40 milliEquivalent(s) Oral daily  simethicone 80 milliGRAM(s) Chew two times a day  sodium chloride 0.9% lock flush 10 milliLiter(s) IV Push every 1 hour PRN  spironolactone 50 milliGRAM(s) Oral daily                            7.7    6.95  )-----------( 437      ( 15 May 2025 05:31 )             23.3     136  |  106  |  6[L]  ----------------------------<  78  3.8   |  20[L]  |  0.62    Ca    9.0      15 May 2025 05:31  Phos  3.0     05-15  Mg     2.00     05-15      Creatinine Trend: 0.62<--, 0.61<--, 0.68<--, 0.66<--, 0.65<--, 0.90<--      T(C): 36.4 (05-15-25 @ 16:51), Max: 36.7 (05-14-25 @ 22:02)  HR: 89 (05-15-25 @ 16:51) (86 - 99)  BP: 144/63 (05-15-25 @ 16:51) (107/55 - 144/63)  RR: 17 (05-15-25 @ 16:51) (17 - 18)  SpO2: 100% (05-15-25 @ 16:51) (97% - 100%)  Wt(kg): --    I&O's Summary    14 May 2025 07:01  -  15 May 2025 07:00  --------------------------------------------------------  IN: 0 mL / OUT: 850 mL / NET: -850 mL      Gen: NAD  HEENT:  (-)icterus (-)pallor  CV: N S1 S2 1/6 DARYA (+)2 Pulses B/l  Resp:  Clear to auscultation B/L, normal effort  GI: distended  Lymph:  (-)Edema, (-)obvious lymphadenopathy  Skin: Warm to touch, Normal turgor  Psych: Appropriate mood and affect      TELEMETRY: 	  NSR    ECG:  	NSR    < from: Xray Abdomen 1 View PORTABLE -Urgent (Xray Abdomen 1 View PORTABLE -Urgent .) (04.29.25 @ 10:09) >  IMPRESSION:  Multiple dilated loops of small and large bowel with question of   pneumoperitoneum. Recommend upright chest radiograph or decubitus   abdominal radiograph for further evaluation.    < end of copied text >    < from: CT Abdomen and Pelvis w/ Oral Cont and w/ IV Cont (04.29.25 @ 17:16) >  IMPRESSION:      Diffuse colonic dilatation, worse on today's study, likely secondary to   pseudoobstruction or ileus.    No free air.    Reduction in the bilateral psoas collections with catheters in place.    PRELIMINARY IMPRESSION: Study limited secondary to extensive streak   artifact from patient's spinal hardware. Diffusely dilated colon to the   level of the rectum, increased since 4/25/2025, without evidence of   mechanical obstruction. Possible etiologies include pseudoobstruction   versus ileus. No small bowel obstruction. No pneumoperitoneum.   Percutaneous posterior approach drainage catheters within the psoas   muscles bilaterally with interval decrease of previously seen abscess.   Redemonstrated erosive endplate changes at L1-L2 compatible with discitis   osteomyelitis.    Follow-up final report in the a.m.    < end of copied text >      ASSESSMENT/PLAN: 	Pt is a  79 year old male w/ HTN, HLD, T2DM, chronic low back pain presents as a transfer from [facility] ED for surgery. . Pt was sent into ED yesterday by his pain medicine physician due to recent MRI findings concerning for L1-L2 discitis/OM with epidural abscess, bilateral psoas abscesses. Patient initially saw pain specialist in February for chronic back pain with radiations to bilateral lateral thighs (R>L). MRI at that time showed degenerative changes. He subsequently had epidural injections x2 (2/27, 3/11) with moderate pain relief. Pain began to worsen on 4/10. He had radiofrequency ablation performed on 4/11 and has since has severe worsening of pain and radiation to R lateral thigh. He reports recent bowel/bladder "incontinence" requiring diaper due to his inability to get to the bathroom in time due to pain limitations but states urge and sensation are intact. Denies fevers, chills, night sweats, weakness, numbness/tingling, saddle anesthesia, recent falls/trauma. He uses a cane for ambulation. Denies recent falls/trauma or any other ortho injuries.   Found to have epidural abscess now s/p Decompression, spine, lumbar, posterior approach, with fusion of posterior spinal column, planned for psoas abscess drainage.    - s/p Decompression, spine, lumbar, posterior approach, with fusion of posterior spinal column  - s/p psoas drainage   - pain control/PT/bowel regimen  - c/w statin  - BP/HR stable  - s/p PICC    DC planning acute rehab

## 2025-05-15 NOTE — PROGRESS NOTE ADULT - ASSESSMENT
79M s/p T10-pelvis PSF on 4/24    Plan:  -repleting K, appreciate nephrology/medicine recs   -WBAT BLLE  -appreciate ID recs: abx ertapenem, received PICC yesterday  -appreciate GI recs for diarrhea: miralax qd, correct electrolytes to keep K>4 and Mg>2, avoid narcotics, no role for rectal tube given continual clinical improvement  -appreciate IR mgmt s/p psoas abscess drainage-- IR planning for tube check today     - 1 DHARA drain per IR     - Asp cx: bacteroides fragilis  -blood cx: NGF  -PT/OT  -dvt ppx: venodynes  -dispo: rec for AR by PM&R    Ana Luisa Cervantes, PGY-2  Orthopedic Surgery    [facility] [place]: [id number]  [facility]: [id number]  [facility]:  [id number]/ [phone] 79M s/p T10-pelvis PSF on 4/24    Plan:  -repleting K, appreciate nephrology/medicine recs   -WBAT BLLE  -appreciate ID recs: abx ertapenem, received PICC yesterday  -appreciate GI recs for diarrhea: miralax qd, correct electrolytes to keep K>4 and Mg>2, avoid narcotics, no role for rectal tube given continual clinical improvement  -appreciate IR mgmt s/p psoas abscess drainage-- both drains removed per IR     - Asp cx: bacteroides fragilis  -blood cx: NGF  -PT/OT  -dvt ppx: venodynes  -dispo: rec for AR by PM&R    Ana Luisa Cervantes, PGY-2  Orthopedic Surgery    [facility] [place]: [id number]  [facility]: [id number]  [facility]:  [id number]/ [phone]

## 2025-05-16 ENCOUNTER — NON-APPOINTMENT (OUTPATIENT)
Age: 80
End: 2025-05-16

## 2025-05-16 ENCOUNTER — INPATIENT (INPATIENT)
Facility: HOSPITAL | Age: 80
LOS: 10 days | Discharge: HOME HEALTH | DRG: 96 | End: 2025-05-27
Attending: PHYSICAL MEDICINE & REHABILITATION | Admitting: PHYSICAL MEDICINE & REHABILITATION
Payer: MEDICARE

## 2025-05-16 ENCOUNTER — TRANSCRIPTION ENCOUNTER (OUTPATIENT)
Age: 80
End: 2025-05-16

## 2025-05-16 VITALS
HEART RATE: 87 BPM | RESPIRATION RATE: 18 BRPM | DIASTOLIC BLOOD PRESSURE: 65 MMHG | SYSTOLIC BLOOD PRESSURE: 145 MMHG | TEMPERATURE: 98 F | OXYGEN SATURATION: 100 %

## 2025-05-16 VITALS
DIASTOLIC BLOOD PRESSURE: 78 MMHG | TEMPERATURE: 98 F | SYSTOLIC BLOOD PRESSURE: 147 MMHG | HEIGHT: 65 IN | WEIGHT: 137.13 LBS | RESPIRATION RATE: 16 BRPM | HEART RATE: 83 BPM | OXYGEN SATURATION: 99 %

## 2025-05-16 DIAGNOSIS — H26.9 UNSPECIFIED CATARACT: Chronic | ICD-10-CM

## 2025-05-16 DIAGNOSIS — G06.1 INTRASPINAL ABSCESS AND GRANULOMA: ICD-10-CM

## 2025-05-16 LAB
GLUCOSE BLDC GLUCOMTR-MCNC: 102 MG/DL — HIGH (ref 70–99)
GLUCOSE BLDC GLUCOMTR-MCNC: 116 MG/DL — HIGH (ref 70–99)
GLUCOSE BLDC GLUCOMTR-MCNC: 120 MG/DL — HIGH (ref 70–99)
GLUCOSE BLDC GLUCOMTR-MCNC: 127 MG/DL — HIGH (ref 70–99)
SARS-COV-2 RNA SPEC QL NAA+PROBE: SIGNIFICANT CHANGE UP

## 2025-05-16 PROCEDURE — 71045 X-RAY EXAM CHEST 1 VIEW: CPT | Mod: 26

## 2025-05-16 RX ORDER — SPIRONOLACTONE 25 MG
25 TABLET ORAL DAILY
Refills: 0 | Status: DISCONTINUED | OUTPATIENT
Start: 2025-05-17 | End: 2025-05-27

## 2025-05-16 RX ORDER — ERTAPENEM SODIUM 1 G/1
1 INJECTION, POWDER, LYOPHILIZED, FOR SOLUTION INTRAMUSCULAR; INTRAVENOUS
Qty: 0 | Refills: 0 | DISCHARGE
Start: 2025-05-16

## 2025-05-16 RX ORDER — ACETAMINOPHEN 500 MG/5ML
3 LIQUID (ML) ORAL
Qty: 0 | Refills: 0 | DISCHARGE
Start: 2025-05-16

## 2025-05-16 RX ORDER — DIBUCAINE 10 MG/G
1 OINTMENT TOPICAL
Refills: 0 | Status: DISCONTINUED | OUTPATIENT
Start: 2025-05-16 | End: 2025-05-27

## 2025-05-16 RX ORDER — DEXTROSE 50 % IN WATER 50 %
25 SYRINGE (ML) INTRAVENOUS ONCE
Refills: 0 | Status: DISCONTINUED | OUTPATIENT
Start: 2025-05-16 | End: 2025-05-27

## 2025-05-16 RX ORDER — ERTAPENEM SODIUM 1 G/1
1000 INJECTION, POWDER, LYOPHILIZED, FOR SOLUTION INTRAMUSCULAR; INTRAVENOUS EVERY 24 HOURS
Refills: 0 | Status: DISCONTINUED | OUTPATIENT
Start: 2025-05-16 | End: 2025-05-27

## 2025-05-16 RX ORDER — METFORMIN HYDROCHLORIDE 850 MG/1
500 TABLET ORAL DAILY
Refills: 0 | Status: DISCONTINUED | OUTPATIENT
Start: 2025-05-16 | End: 2025-05-20

## 2025-05-16 RX ORDER — POLYETHYLENE GLYCOL 3350 17 G/17G
17 POWDER, FOR SOLUTION ORAL
Qty: 0 | Refills: 0 | DISCHARGE
Start: 2025-05-16

## 2025-05-16 RX ORDER — SPIRONOLACTONE 25 MG
2 TABLET ORAL
Qty: 0 | Refills: 0 | DISCHARGE
Start: 2025-05-16

## 2025-05-16 RX ORDER — DEXTROSE 50 % IN WATER 50 %
12.5 SYRINGE (ML) INTRAVENOUS ONCE
Refills: 0 | Status: DISCONTINUED | OUTPATIENT
Start: 2025-05-16 | End: 2025-05-27

## 2025-05-16 RX ORDER — SODIUM CHLORIDE 9 G/1000ML
1000 INJECTION, SOLUTION INTRAVENOUS
Refills: 0 | Status: DISCONTINUED | OUTPATIENT
Start: 2025-05-16 | End: 2025-05-27

## 2025-05-16 RX ORDER — SIMETHICONE 80 MG
1 TABLET,CHEWABLE ORAL
Qty: 0 | Refills: 0 | DISCHARGE
Start: 2025-05-16

## 2025-05-16 RX ORDER — INSULIN LISPRO 100 U/ML
INJECTION, SOLUTION INTRAVENOUS; SUBCUTANEOUS AT BEDTIME
Refills: 0 | Status: DISCONTINUED | OUTPATIENT
Start: 2025-05-16 | End: 2025-05-20

## 2025-05-16 RX ORDER — ATORVASTATIN CALCIUM 80 MG/1
40 TABLET, FILM COATED ORAL AT BEDTIME
Refills: 0 | Status: DISCONTINUED | OUTPATIENT
Start: 2025-05-16 | End: 2025-05-26

## 2025-05-16 RX ORDER — B1/B2/B3/B5/B6/B12/VIT C/FOLIC 500-0.5 MG
1 TABLET ORAL DAILY
Refills: 0 | Status: DISCONTINUED | OUTPATIENT
Start: 2025-05-17 | End: 2025-05-27

## 2025-05-16 RX ORDER — SIMETHICONE 80 MG
80 TABLET,CHEWABLE ORAL
Refills: 0 | Status: DISCONTINUED | OUTPATIENT
Start: 2025-05-16 | End: 2025-05-27

## 2025-05-16 RX ORDER — INSULIN LISPRO 100 U/ML
INJECTION, SOLUTION INTRAVENOUS; SUBCUTANEOUS
Refills: 0 | Status: DISCONTINUED | OUTPATIENT
Start: 2025-05-16 | End: 2025-05-20

## 2025-05-16 RX ORDER — BACITRACIN 500 UNIT/G
1 OINTMENT (GRAM) TOPICAL THREE TIMES A DAY
Refills: 0 | Status: DISCONTINUED | OUTPATIENT
Start: 2025-05-16 | End: 2025-05-27

## 2025-05-16 RX ORDER — BISACODYL 5 MG
10 TABLET, DELAYED RELEASE (ENTERIC COATED) ORAL DAILY
Refills: 0 | Status: DISCONTINUED | OUTPATIENT
Start: 2025-05-16 | End: 2025-05-27

## 2025-05-16 RX ORDER — GLUCAGON 3 MG/1
1 POWDER NASAL ONCE
Refills: 0 | Status: DISCONTINUED | OUTPATIENT
Start: 2025-05-16 | End: 2025-05-27

## 2025-05-16 RX ORDER — POLYETHYLENE GLYCOL 3350 17 G/17G
17 POWDER, FOR SOLUTION ORAL AT BEDTIME
Refills: 0 | Status: DISCONTINUED | OUTPATIENT
Start: 2025-05-16 | End: 2025-05-27

## 2025-05-16 RX ORDER — METHOCARBAMOL 500 MG/1
1 TABLET, FILM COATED ORAL
Qty: 0 | Refills: 0 | DISCHARGE
Start: 2025-05-16

## 2025-05-16 RX ORDER — MAGNESIUM HYDROXIDE 400 MG/5ML
30 SUSPENSION ORAL EVERY 12 HOURS
Refills: 0 | Status: DISCONTINUED | OUTPATIENT
Start: 2025-05-16 | End: 2025-05-27

## 2025-05-16 RX ORDER — ACETAMINOPHEN 500 MG/5ML
975 LIQUID (ML) ORAL EVERY 6 HOURS
Refills: 0 | Status: DISCONTINUED | OUTPATIENT
Start: 2025-05-16 | End: 2025-05-22

## 2025-05-16 RX ORDER — GABAPENTIN 400 MG/1
1 CAPSULE ORAL
Qty: 0 | Refills: 0 | DISCHARGE
Start: 2025-05-16

## 2025-05-16 RX ORDER — DEXTROSE 50 % IN WATER 50 %
15 SYRINGE (ML) INTRAVENOUS ONCE
Refills: 0 | Status: DISCONTINUED | OUTPATIENT
Start: 2025-05-16 | End: 2025-05-27

## 2025-05-16 RX ORDER — LOSARTAN POTASSIUM 100 MG/1
25 TABLET, FILM COATED ORAL DAILY
Refills: 0 | Status: DISCONTINUED | OUTPATIENT
Start: 2025-05-17 | End: 2025-05-27

## 2025-05-16 RX ORDER — GABAPENTIN 400 MG/1
100 CAPSULE ORAL EVERY 8 HOURS
Refills: 0 | Status: DISCONTINUED | OUTPATIENT
Start: 2025-05-16 | End: 2025-05-27

## 2025-05-16 RX ORDER — HEPARIN SODIUM 1000 [USP'U]/ML
5000 INJECTION INTRAVENOUS; SUBCUTANEOUS EVERY 12 HOURS
Refills: 0 | Status: DISCONTINUED | OUTPATIENT
Start: 2025-05-16 | End: 2025-05-27

## 2025-05-16 RX ORDER — OMEGA-3-ACID ETHYL ESTERS CAPSULES 1 G/1
2 CAPSULE, LIQUID FILLED ORAL
Refills: 0 | Status: DISCONTINUED | OUTPATIENT
Start: 2025-05-16 | End: 2025-05-27

## 2025-05-16 RX ORDER — METHOCARBAMOL 500 MG/1
500 TABLET, FILM COATED ORAL EVERY 12 HOURS
Refills: 0 | Status: DISCONTINUED | OUTPATIENT
Start: 2025-05-16 | End: 2025-05-26

## 2025-05-16 RX ORDER — CLOTRIMAZOLE 1 G/100G
1 CREAM TOPICAL
Refills: 0 | Status: DISCONTINUED | OUTPATIENT
Start: 2025-05-16 | End: 2025-05-27

## 2025-05-16 RX ADMIN — GABAPENTIN 100 MILLIGRAM(S): 400 CAPSULE ORAL at 05:27

## 2025-05-16 RX ADMIN — ERTAPENEM SODIUM 120 MILLIGRAM(S): 1 INJECTION, POWDER, LYOPHILIZED, FOR SOLUTION INTRAMUSCULAR; INTRAVENOUS at 21:23

## 2025-05-16 RX ADMIN — Medication 975 MILLIGRAM(S): at 23:40

## 2025-05-16 RX ADMIN — Medication 975 MILLIGRAM(S): at 11:43

## 2025-05-16 RX ADMIN — GABAPENTIN 100 MILLIGRAM(S): 400 CAPSULE ORAL at 13:19

## 2025-05-16 RX ADMIN — Medication 40 MILLIGRAM(S): at 05:28

## 2025-05-16 RX ADMIN — OMEGA-3-ACID ETHYL ESTERS CAPSULES 2 GRAM(S): 1 CAPSULE, LIQUID FILLED ORAL at 18:12

## 2025-05-16 RX ADMIN — METHOCARBAMOL 500 MILLIGRAM(S): 500 TABLET, FILM COATED ORAL at 18:13

## 2025-05-16 RX ADMIN — Medication 1 TABLET(S): at 11:43

## 2025-05-16 RX ADMIN — Medication 40 MILLIEQUIVALENT(S): at 11:43

## 2025-05-16 RX ADMIN — Medication 975 MILLIGRAM(S): at 23:10

## 2025-05-16 RX ADMIN — POLYETHYLENE GLYCOL 3350 17 GRAM(S): 17 POWDER, FOR SOLUTION ORAL at 21:22

## 2025-05-16 RX ADMIN — CLOTRIMAZOLE 1 APPLICATION(S): 1 CREAM TOPICAL at 05:28

## 2025-05-16 RX ADMIN — OMEGA-3-ACID ETHYL ESTERS CAPSULES 2 GRAM(S): 1 CAPSULE, LIQUID FILLED ORAL at 11:42

## 2025-05-16 RX ADMIN — Medication 80 MILLIGRAM(S): at 18:13

## 2025-05-16 RX ADMIN — Medication 975 MILLIGRAM(S): at 18:25

## 2025-05-16 RX ADMIN — Medication 80 MILLIGRAM(S): at 05:27

## 2025-05-16 RX ADMIN — Medication 50 MILLIGRAM(S): at 05:27

## 2025-05-16 RX ADMIN — Medication 975 MILLIGRAM(S): at 05:28

## 2025-05-16 RX ADMIN — HEPARIN SODIUM 5000 UNIT(S): 1000 INJECTION INTRAVENOUS; SUBCUTANEOUS at 18:17

## 2025-05-16 RX ADMIN — Medication 975 MILLIGRAM(S): at 18:13

## 2025-05-16 RX ADMIN — CLOTRIMAZOLE 1 APPLICATION(S): 1 CREAM TOPICAL at 21:23

## 2025-05-16 RX ADMIN — GABAPENTIN 100 MILLIGRAM(S): 400 CAPSULE ORAL at 21:21

## 2025-05-16 NOTE — PROGRESS NOTE ADULT - PROVIDER SPECIALTY LIST ADULT
Cardiology
Gastroenterology
Gastroenterology
Infectious Disease
Infectious Disease
Internal Medicine
Intervent Radiology
Nephrology
Orthopedics
Pain Medicine
Plastic Surgery
Rehab Medicine
SICU
Cardiology
Gastroenterology
Infectious Disease
Internal Medicine
Nephrology
Orthopedics
Plastic Surgery
SICU
SICU
Surgery
Anesthesia
Cardiology
Infectious Disease
Infectious Disease
Internal Medicine
Nephrology
Nephrology
Orthopedics
Plastic Surgery
Gastroenterology
Gastroenterology
Infectious Disease
Internal Medicine
Nephrology
Orthopedics
Orthopedics
Plastic Surgery
Surgery
Orthopedics
Orthopedics

## 2025-05-16 NOTE — PROGRESS NOTE ADULT - ASSESSMENT
79M s/p T10-pelvis PSF on 4/24    Plan:  -repleting K, appreciate nephrology/medicine recs   -WBAT BLLE  -appreciate ID recs: abx ertapenem, received PICC 5/14  -appreciate GI recs for diarrhea: miralax qd, correct electrolytes to keep K>4 and Mg>2, avoid narcotics, no role for rectal tube given continual clinical improvement  -appreciate IR mgmt s/p psoas abscess drainage-- IR planning for tube check today     - 1 DHARA drain per IR     - Asp cx: bacteroides fragilis  -blood cx: NGF  -PT/OT  -dvt ppx: venodynes  -dispo: rec for AR by PM&R    Ana Luisa Cervantes, PGY-2  Orthopedic Surgery    [facility] [place]: [id number]  [facility]: [id number]  [facility]:  [id number]/ [phone]   79M s/p T10-pelvis PSF on 4/24    Plan:  -repleting K, appreciate nephrology/medicine recs   -WBAT BLLE  -appreciate ID recs: abx ertapenem, received PICC 5/14  -appreciate GI recs for diarrhea: miralax qd, correct electrolytes to keep K>4 and Mg>2, avoid narcotics, no role for rectal tube given continual clinical improvement  -appreciate IR mgmt s/p psoas abscess drainage-- both drains removed per IR     - Asp cx: bacteroides fragilis  -blood cx: NGF  -PT/OT  -dvt ppx: venodynes  -dispo: rec for AR by PM&R    Ana Luisa Cervantes, PGY-2  Orthopedic Surgery    [facility] [place]: [id number]  [facility]: [id number]  [facility]:  [id number]/ [phone]

## 2025-05-16 NOTE — DISCHARGE NOTE PROVIDER - CARE PROVIDER_API CALL
Baldev Boston)  Spine Surgery  [address], [address]  [place] [address]  Phone: [phone]  Fax: [phone]  Follow Up Time:

## 2025-05-16 NOTE — PROGRESS NOTE ADULT - ASSESSMENT
IMPRESSION: 79M w/ HTN, HLD, and T2DM, 4/24/25 from [facility] with L1-L2 OM/epidural abscess/psoas abscesses; s/p decompression spinal fusion 4/24/25; post-op course notable for ileus and hypokalemia    (1)Renal - CKD2 - early diabetic nephropathy. Follows as outpatient with Dr. Syed Euceda  (2)Hypokalemia -whole body deficit due to limited intake/GI losses - stable s/p repletion   (3)GI - ileus slowly improving; exacerbated by hypokalemia    RECOMMEND:  (1)Spironolactone as ordered  (2)Continue Glucerna TID  (3)BMP, Mg, Phos daily     Mare Cameron DNP  [facility]  [phone]

## 2025-05-16 NOTE — H&P ADULT - REASON FOR ADMISSION
Spinal abscess Lumbar epidural abscess with stenosis, diskitis, osteomyelitis, and kyphosis s/p decompressive laminectomy and fusion surgery

## 2025-05-16 NOTE — PROGRESS NOTE ADULT - SUBJECTIVE AND OBJECTIVE BOX
Orthopedic Surgery Progress Note     S: Patient seen and examined today. No acute events overnight. Pain is well controlled. Denies f/c, chest pain, shortness of breath, dizziness.    MEDICATIONS  (STANDING):  acetaminophen     Tablet .. 975 milliGRAM(s) Oral every 6 hours  atorvastatin 40 milliGRAM(s) Oral at bedtime  bacitracin   Ointment 1 Application(s) Topical three times a day  clotrimazole 1% Cream 1 Application(s) Topical two times a day  ertapenem  IVPB      ertapenem  IVPB 1000 milliGRAM(s) IV Intermittent every 24 hours  gabapentin 100 milliGRAM(s) Oral every 8 hours  glucagon  Injectable 1 milliGRAM(s) IntraMuscular once  insulin lispro (ADMELOG) corrective regimen sliding scale   SubCutaneous three times a day before meals  insulin lispro (ADMELOG) corrective regimen sliding scale   SubCutaneous at bedtime  methocarbamol 500 milliGRAM(s) Oral every 12 hours  multivitamin 1 Tablet(s) Oral daily  omega-3-Acid Ethyl Esters 2 Gram(s) Oral two times a day  pantoprazole    Tablet 40 milliGRAM(s) Oral before breakfast  polyethylene glycol 3350 17 Gram(s) Oral at bedtime  potassium chloride   Powder 40 milliEquivalent(s) Oral daily  simethicone 80 milliGRAM(s) Chew two times a day  spironolactone 50 milliGRAM(s) Oral daily    MEDICATIONS  (PRN):  dibucaine 1% Ointment 1 Application(s) Topical four times a day PRN for anal discomfort d/t hemorrhoids  magnesium hydroxide Suspension 30 milliLiter(s) Oral every 12 hours PRN Constipation  sodium chloride 0.9% lock flush 10 milliLiter(s) IV Push every 1 hour PRN Pre/post blood products, medications, blood draw, and to maintain line patency      Vital Signs Last 24 Hrs  T(C): 36.8 (16 May 2025 02:00), Max: 36.8 (15 May 2025 20:28)  T(F): 98.3 (16 May 2025 02:00), Max: 98.3 (15 May 2025 20:28)  HR: 91 (16 May 2025 02:00) (85 - 97)  BP: 124/60 (16 May 2025 02:00) (107/55 - 144/63)  BP(mean): --  RR: 17 (16 May 2025 02:00) (17 - 18)  SpO2: 100% (16 May 2025 02:00) (97% - 100%)    Parameters below as of 16 May 2025 02:00  Patient On (Oxygen Delivery Method): room air        05-14-25 @ 07:01  -  05-15-25 @ 07:00  --------------------------------------------------------  IN: 0 mL / OUT: 850 mL / NET: -850 mL    05-15-25 @ 07:01  -  05-16-25 @ 03:50  --------------------------------------------------------  IN: 350 mL / OUT: 0 mL / NET: 350 mL        Physical Exam:  Gen: NAD  Resp: No increased WOB  Spine:  Dressing: clean/dry/intact    Drains: IR Drain removed, site covered  Decreased sensation of lateral R thigh, otherwise SILT    Motor:                   C5                C6              C7               C8           T1   R            5/5                5/5            5/5             5/5          5/5  L             5/5               5/5             5/5             5/5          5/5                L2             L3             L4               L5            S1  R         2/5           4/5          5/5             5/5           5/5  L          4/5          5/5           5/5             5/5           5/5            Calves Soft/Non-tender bilaterally         Distal extremities warm well perfused; capillary refill <3 seconds    LABS:                        9.5    9.67  )-----------( 412      ( 15 May 2025 22:25 )             28.3     05-15    136  |  106  |  6[L]  ----------------------------<  78  3.8   |  20[L]  |  0.62    Ca    9.0      15 May 2025 05:31  Phos  3.0     05-15  Mg     2.00     05-15     Orthopedic Surgery Progress Note     S: Patient seen and examined today. No acute events overnight. Pain is well controlled. Denies f/c, chest pain, shortness of breath, dizziness.    MEDICATIONS  (STANDING):  acetaminophen     Tablet .. 975 milliGRAM(s) Oral every 6 hours  atorvastatin 40 milliGRAM(s) Oral at bedtime  bacitracin   Ointment 1 Application(s) Topical three times a day  clotrimazole 1% Cream 1 Application(s) Topical two times a day  ertapenem  IVPB      ertapenem  IVPB 1000 milliGRAM(s) IV Intermittent every 24 hours  gabapentin 100 milliGRAM(s) Oral every 8 hours  glucagon  Injectable 1 milliGRAM(s) IntraMuscular once  insulin lispro (ADMELOG) corrective regimen sliding scale   SubCutaneous three times a day before meals  insulin lispro (ADMELOG) corrective regimen sliding scale   SubCutaneous at bedtime  methocarbamol 500 milliGRAM(s) Oral every 12 hours  multivitamin 1 Tablet(s) Oral daily  omega-3-Acid Ethyl Esters 2 Gram(s) Oral two times a day  pantoprazole    Tablet 40 milliGRAM(s) Oral before breakfast  polyethylene glycol 3350 17 Gram(s) Oral at bedtime  potassium chloride   Powder 40 milliEquivalent(s) Oral daily  simethicone 80 milliGRAM(s) Chew two times a day  spironolactone 50 milliGRAM(s) Oral daily    MEDICATIONS  (PRN):  dibucaine 1% Ointment 1 Application(s) Topical four times a day PRN for anal discomfort d/t hemorrhoids  magnesium hydroxide Suspension 30 milliLiter(s) Oral every 12 hours PRN Constipation  sodium chloride 0.9% lock flush 10 milliLiter(s) IV Push every 1 hour PRN Pre/post blood products, medications, blood draw, and to maintain line patency      Vital Signs Last 24 Hrs  T(C): 36.8 (16 May 2025 02:00), Max: 36.8 (15 May 2025 20:28)  T(F): 98.3 (16 May 2025 02:00), Max: 98.3 (15 May 2025 20:28)  HR: 91 (16 May 2025 02:00) (85 - 97)  BP: 124/60 (16 May 2025 02:00) (107/55 - 144/63)  BP(mean): --  RR: 17 (16 May 2025 02:00) (17 - 18)  SpO2: 100% (16 May 2025 02:00) (97% - 100%)    Parameters below as of 16 May 2025 02:00  Patient On (Oxygen Delivery Method): room air        05-14-25 @ 07:01  -  05-15-25 @ 07:00  --------------------------------------------------------  IN: 0 mL / OUT: 850 mL / NET: -850 mL    05-15-25 @ 07:01  -  05-16-25 @ 03:50  --------------------------------------------------------  IN: 350 mL / OUT: 0 mL / NET: 350 mL        Physical Exam:  Gen: NAD  Resp: No increased WOB  Spine:  Dressing: clean/dry/intact    Drains: IR Drain removed, site covered  Decreased sensation of lateral R thigh, otherwise SILT    Motor:                   C5                C6              C7               C8           T1   R            5/5                5/5            5/5             5/5          5/5  L             5/5               5/5             5/5             5/5          5/5                L2             L3             L4               L5            S1  R         2+/5           4/5          5/5             5/5           5/5  L          4/5          5/5           5/5             5/5           5/5            Calves Soft/Non-tender bilaterally         Distal extremities warm well perfused; capillary refill <3 seconds    LABS:                        9.5    9.67  )-----------( 412      ( 15 May 2025 22:25 )             28.3     05-15    136  |  106  |  6[L]  ----------------------------<  78  3.8   |  20[L]  |  0.62    Ca    9.0      15 May 2025 05:31  Phos  3.0     05-15  Mg     2.00     05-15

## 2025-05-16 NOTE — PROGRESS NOTE ADULT - SUBJECTIVE AND OBJECTIVE BOX
DATE OF SERVICE: 05-16-25    no events overnight, no pain or SOB     acetaminophen     Tablet .. 975 milliGRAM(s) Oral every 6 hours  atorvastatin 40 milliGRAM(s) Oral at bedtime  bacitracin   Ointment 1 Application(s) Topical three times a day  clotrimazole 1% Cream 1 Application(s) Topical two times a day  dibucaine 1% Ointment 1 Application(s) Topical four times a day PRN  ertapenem  IVPB      ertapenem  IVPB 1000 milliGRAM(s) IV Intermittent every 24 hours  gabapentin 100 milliGRAM(s) Oral every 8 hours  glucagon  Injectable 1 milliGRAM(s) IntraMuscular once  insulin lispro (ADMELOG) corrective regimen sliding scale   SubCutaneous three times a day before meals  insulin lispro (ADMELOG) corrective regimen sliding scale   SubCutaneous at bedtime  magnesium hydroxide Suspension 30 milliLiter(s) Oral every 12 hours PRN  methocarbamol 500 milliGRAM(s) Oral every 12 hours  multivitamin 1 Tablet(s) Oral daily  omega-3-Acid Ethyl Esters 2 Gram(s) Oral two times a day  pantoprazole    Tablet 40 milliGRAM(s) Oral before breakfast  polyethylene glycol 3350 17 Gram(s) Oral at bedtime  potassium chloride   Powder 40 milliEquivalent(s) Oral daily  simethicone 80 milliGRAM(s) Chew two times a day  sodium chloride 0.9% lock flush 10 milliLiter(s) IV Push every 1 hour PRN  spironolactone 50 milliGRAM(s) Oral daily                            9.5    9.67  )-----------( 412      ( 15 May 2025 22:25 )             28.3     136  |  106  |  6[L]  ----------------------------<  78  3.8   |  20[L]  |  0.62    Ca    9.0      15 May 2025 05:31  Phos  3.0     05-15  Mg     2.00     05-15    Creatinine Trend: 0.62<--, 0.61<--, 0.68<--, 0.66<--, 0.65<--, 0.90<--    T(C): 36.6 (05-16-25 @ 13:30), Max: 36.8 (05-15-25 @ 20:28)  HR: 87 (05-16-25 @ 13:30) (83 - 91)  BP: 145/65 (05-16-25 @ 13:30) (124/60 - 145/65)  RR: 18 (05-16-25 @ 13:30) (16 - 18)  SpO2: 100% (05-16-25 @ 13:30) (97% - 100%)  Wt(kg): --    I&O's Summary    15 May 2025 07:01  -  16 May 2025 07:00  --------------------------------------------------------  IN: 590 mL / OUT: 200 mL / NET: 390 mL      Gen: NAD  HEENT:  (-)icterus (-)pallor  CV: N S1 S2 1/6 DARYA (+)2 Pulses B/l  Resp:  Clear to auscultation B/L, normal effort  GI: distended  Lymph:  (-)Edema, (-)obvious lymphadenopathy  Skin: Warm to touch, Normal turgor  Psych: Appropriate mood and affect      TELEMETRY: 	  NSR    ECG:  	NSR    < from: Xray Abdomen 1 View PORTABLE -Urgent (Xray Abdomen 1 View PORTABLE -Urgent .) (04.29.25 @ 10:09) >  IMPRESSION:  Multiple dilated loops of small and large bowel with question of   pneumoperitoneum. Recommend upright chest radiograph or decubitus   abdominal radiograph for further evaluation.    < end of copied text >    < from: CT Abdomen and Pelvis w/ Oral Cont and w/ IV Cont (04.29.25 @ 17:16) >  IMPRESSION:      Diffuse colonic dilatation, worse on today's study, likely secondary to   pseudoobstruction or ileus.    No free air.    Reduction in the bilateral psoas collections with catheters in place.    PRELIMINARY IMPRESSION: Study limited secondary to extensive streak   artifact from patient's spinal hardware. Diffusely dilated colon to the   level of the rectum, increased since 4/25/2025, without evidence of   mechanical obstruction. Possible etiologies include pseudoobstruction   versus ileus. No small bowel obstruction. No pneumoperitoneum.   Percutaneous posterior approach drainage catheters within the psoas   muscles bilaterally with interval decrease of previously seen abscess.   Redemonstrated erosive endplate changes at L1-L2 compatible with discitis   osteomyelitis.    Follow-up final report in the a.m.    < end of copied text >      ASSESSMENT/PLAN: 	Pt is a  79 year old male w/ HTN, HLD, T2DM, chronic low back pain presents as a transfer from [facility] ED for surgery. . Pt was sent into ED yesterday by his pain medicine physician due to recent MRI findings concerning for L1-L2 discitis/OM with epidural abscess, bilateral psoas abscesses. Patient initially saw pain specialist in February for chronic back pain with radiations to bilateral lateral thighs (R>L). MRI at that time showed degenerative changes. He subsequently had epidural injections x2 (2/27, 3/11) with moderate pain relief. Pain began to worsen on 4/10. He had radiofrequency ablation performed on 4/11 and has since has severe worsening of pain and radiation to R lateral thigh. He reports recent bowel/bladder "incontinence" requiring diaper due to his inability to get to the bathroom in time due to pain limitations but states urge and sensation are intact. Denies fevers, chills, night sweats, weakness, numbness/tingling, saddle anesthesia, recent falls/trauma. He uses a cane for ambulation. Denies recent falls/trauma or any other ortho injuries.   Found to have epidural abscess now s/p Decompression, spine, lumbar, posterior approach, with fusion of posterior spinal column, planned for psoas abscess drainage.    - s/p Decompression, spine, lumbar, posterior approach, with fusion of posterior spinal column  - s/p psoas drainage   - pain control/PT/bowel regimen  - c/w statin  - BP/HR stable  - s/p PICC    DC planning acute rehab

## 2025-05-16 NOTE — H&P ADULT - NSHPSOCIALHISTORY_GEN_ALL_CORE
SOCIAL HISTORY  Smoking -  EtOH -   Marital Status -     FUNCTIONAL HISTORY      Previous Functional Status:  Pt lives with his wife in a house with 6 steps to enter.   Pt uses a cane and required some assistance with ADLs.   Pt reports no falls in the past 6 months.    Current Functional Status:  Bed mobility: CG  Transfers: CG w/ RW  Gait / ambulation: CG 1 person assist w RW  ADL's:  Speech: SOCIAL HISTORY  Smoking - Denies  EtOH - rarely. 1-2 drinks per 1-2 months    FUNCTIONAL HISTORY    Previous Functional Status:  Pt lives with his wife in a house with 6 steps to enter.  Pt uses a cane and required some assistance with ADLs.  Pt reports no falls in the past 6 months.    Current Functional Status:  Bed mobility: CG  Transfers: CG w/ RW  Gait / ambulation: CG 1 person assist w RW

## 2025-05-16 NOTE — DISCHARGE NOTE NURSING/CASE MANAGEMENT/SOCIAL WORK - FINANCIAL ASSISTANCE
[facility] provides services at a reduced cost to those who are determined to be eligible through [facility]’s financial assistance program. Information regarding [facility]’s financial assistance program can be found by going to [email or web address] or by calling [phone].

## 2025-05-16 NOTE — PROGRESS NOTE ADULT - NS ATTEND AMEND GEN_ALL_CORE FT
Patient seen and examined. Agree with plan as detailed in PA/NP Note.     BP acceptable  F/u GI recs    Janet Roy MD  Pager: [phone]  Office: [phone]
Patient seen and examined. Agree with plan as detailed in PA/NP Note.     Can Follow with Dr. Navarro after discharge    Janet Roy MD  Pager: [phone]  Office: [phone]
Patient seen and examined. Agree with plan as detailed in PA/NP Note.     Janet Roy MD  Pager: [phone]  Office: [phone]
Patient seen and examined. Agree with plan as detailed in PA/NP Note.     Janet Roy MD  Pager: [phone]  Office: [phone]
Patient seen and examined. Agree with plan as detailed in PA/NP Note.    Janet Roy MD  Pager: [phone]  Office: [phone]
Patient seen and examined. Agree with plan as detailed in PA/NP Note.     Janet Roy MD  Pager: [phone]  Office: [phone]
Patient seen and examined. Agree with plan as detailed in PA/NP Note.     Janet Roy MD  Pager: [phone]  Office: [phone]
resting in bed, no new issues.
Patient seen and examined. Agree with plan as detailed in PA/NP Note.       F/u Surgry team recs for abdominal xray findings    Janet Roy MD  Pager: [phone]  Office: [phone]
Patient seen and examined. Agree with plan as detailed in PA/NP Note.     BP well controlled  F/u GI recs    Janet Roy MD  Pager: [phone]  Office: [phone]
Patient seen and examined. Agree with plan as detailed in PA/NP Note.     Janet Roy MD  Pager: [phone]  Office: [phone]
resting in bed, no change in plan of care.

## 2025-05-16 NOTE — H&P ADULT - NSHPREVIEWOFSYSTEMS_GEN_ALL_CORE
REVIEW OF SYSTEMS:    CONSTITUTIONAL: Denies weakness or fevers  EYES/ENT: Denies visual changes   NECK: Denies pain or stiffness  RESPIRATORY: Denies cough or shortness of breath  CARDIOVASCULAR: Denies chest pain or palpitations  GASTROINTESTINAL: Denies abdominal pain, nausea, vomiting, or diarrhea. + constipation. Last BM 2-3 days ago per patient.  GENITOURINARY: Denies dysuria, frequency or hematuria  NEUROLOGICAL: Denies numbness or weakness  SKIN: Denies itching, rashes

## 2025-05-16 NOTE — PATIENT PROFILE ADULT - FALL HARM RISK - HARM RISK INTERVENTIONS
Assistance with ambulation/Assistance OOB with selected safe patient handling equipment/Communicate Risk of Fall with Harm to all staff/Discuss with provider need for PT consult/Monitor gait and stability/Provide patient with walking aids - walker, cane, crutches/Reinforce activity limits and safety measures with patient and family/Sit up slowly, dangle for a short time, stand at bedside before walking/Tailored Fall Risk Interventions/Use of alarms - bed, chair and/or voice tab/Visual Cue: Yellow wristband and red socks/Bed in lowest position, wheels locked, appropriate side rails in place/Call bell, personal items and telephone in reach/Instruct patient to call for assistance before getting out of bed or chair/Non-slip footwear when patient is out of bed/[place] to call system/Physically safe environment - no spills, clutter or unnecessary equipment/Purposeful Proactive Rounding/Room/bathroom lighting operational, light cord in reach

## 2025-05-16 NOTE — PROGRESS NOTE ADULT - NS_MD_PANP_GEN_ALL_CORE
Patient saw for nurse visit for bp and hr check. She is currently only taking metoprolol. C/o chest pain at times, described as sharp that comes and goes. Home bp log noted mostly elevated readings.  She has a medical history of hypertension, GERD, obesity, migraines, chronic pain, TTP and rheumatoid arthritis. First BP in clinic today 150/97 hr 72. Repeat denis 148/90. Per BRET Nunes pt advised to restart isosorbide dinitrate 40 mg po daily, monitor bp, follow up nurse visit, low na diet and ed precautions given. Understanding  verbalized.   
Attending and PA/NP shared services statement (NON-critical care):

## 2025-05-16 NOTE — DISCHARGE NOTE NURSING/CASE MANAGEMENT/SOCIAL WORK - PATIENT PORTAL LINK FT
You can access the FollowMyHealth Patient Portal offered by [facility] by registering at the following website: [email or web address]. By joining [facility]’s FollowMyHealth portal, you will also be able to view your health information using other applications (apps) compatible with our system.

## 2025-05-16 NOTE — PROGRESS NOTE ADULT - REASON FOR ADMISSION
L1-L2 discitis/OM with epidural abscess and BL psoas abscesses

## 2025-05-16 NOTE — DISCHARGE NOTE PROVIDER - HOSPITAL COURSE
This is a 78yo male with PMH of HTN, HLD, DM (A1C 7.2), CKD, chronic LBP who presents to [facility] for orthopedic surgery. Patient s/p T10-Pelvis PSF with Dr. Boston on 4/24/25. Patient tolerated the procedure well without any intraoperative complications. Patient tolerated physical therapy, weight bearing as tolerated and pain was controlled. Seen by infectious disease who co-managed for antibiotic therapy, recommended Ertapenem 1g Q24h until 6/7/25, a PICC line was placed on 5/14/25. IR psoas drains were placed bilaterally on 4/27/25, removed on 5/6/25. Post-operative course complicated by pseudoobstruction, seen by GI who recommended miralax daily, and seen by nephrology to co-manage electrolyte repletion. Seen by medical attending for continuity of care and management and cleared for safe discharge. As per surgeon, the patient is stable and ready for discharge. DO NOT take any NSAIDS (motrin, advil, ibuprofen, aleve), Aspirin, Anti-inflammatory medications unless instructed by your orthopedic surgeon to continue. Avoid any heavy lifting, bending, squatting, or twisting motions. Keep dressing/incision clean, dry and intact, may remove dressing two days after discharge and leave incision open to air. Any sutures/staples to be removed on post-op day #14 at your office visit. Please follow up with Dr. Boston in 2 weeks. Please follow up with your PMD for continuity of care and management as medications may have changed.

## 2025-05-16 NOTE — H&P ADULT - ATTENDING COMMENTS
79yMale w/ HTN, HLD, T2DM, acute on chronic low back pain with recent MRI findings concerning for L1-L2 discitis/OM with epidural abscess, bilateral psoas abscesses now s/p T10-Pelvis PSF T12-L2 Laminectomy. Patient on  ertapenem with PICC line placement. Admitted to [facility] inpatient [facility] on 5/16/25 for ADL, gait, and functional impairments.     seen and examined  Vital Signs Last 24 Hrs  T(C): 36.7 (17 May 2025 07:37), Max: 36.7 (16 May 2025 15:34)  T(F): 98 (17 May 2025 07:37), Max: 98 (16 May 2025 15:34)  HR: 100 (17 May 2025 07:37) (83 - 100)  BP: 132/87 (17 May 2025 07:37) (132/70 - 158/78)  BP(mean): --  RR: 16 (17 May 2025 07:37) (16 - 16)  SpO2: 99% (17 May 2025 07:37) (99% - 100%)    Parameters below as of 17 May 2025 07:37  Patient On (Oxygen Delivery Method): room air        # Epidural abscess  #Osteomyelitis of lumbar spine  #Bilateral Psoas abscess s/p drain removal  - S/P Decompression, spine, lumbar, posterior approach, with fusion of posterior spinal column  - Continue Ertapenem x 6 weeks through 6/7/25  - S/p placement of PICC 5/14  - Confirmed PICC line placement with CXR on admission  Awaiting [facility] clarification on wound management     #HTN  - Starting losartan 25 Qd 5/17 AM  - Spironolactone 50 --> 25mg 25 Qd on 5/17 AM

## 2025-05-16 NOTE — PATIENT PROFILE ADULT - NSPROPASSIVESMOKEEXPOSURE_GEN_A_NUR
Complete an echocardiogram (ultrasound of heart) in 6 months and follow up with Advanced Practice Provider afterwards.   
No

## 2025-05-16 NOTE — DISCHARGE NOTE PROVIDER - NSDCCPTREATMENT_GEN_ALL_CORE_FT
PRINCIPAL PROCEDURE  Procedure: Decompression, spine, lumbar, posterior approach, with fusion of posterior spinal column  Findings and Treatment: IMPORTANT: *DO NOT resume any anticoagulation/blood thinners (Aspirin, Plavix, Eliquis, Coumadin, Xarelto, ect.) until instructed by your surgeon.*  *DO NOT take any NSAIDs (Motrin, Aleve, Advil, Ibuprofen, Celebrex, ect.) until instructed by  your surgeon.*  Diet: Continue regular diet upon discharge.   Activity: No heavy lifting. Avoid straining or excessive activity. DO NOT sit for long periods of time.   -DO NOT bend, twist, or lift heavy objects for at least 3 months.   -DO NOT drive until cleared by your surgeon.   -To reduce strain/pressure on your spine place a pillow under your knees when lying on your back. Place a pillow between your knees when lying on your side.   -When you sit put your feet up on a foot rest. DO NOT lie on your stomach or with your legs flat.  -If you need to bend over, bend at your knees, not your back.  -We encourage you to take many short walks after your surgery to help blood move through your body and to prevent blood clots from forming. If you feel weak or dizzy, sit or lie down right away.   Dressings: Keep dressing clean, dry, and intact. Remove all cotton and yellow gauze 72 hours after surgery. You do not need to put a new dressing on your wound unless there is light drainage. If that occurs place Band-Aids on your wound.   Other Care:  -You may shower 72 hours after surgery but DO NOT soak dressing and/or incision. The water may run over your dressing/incision but DO NOT let the water directly hit your dressing/incision (take a shower with your wound away from the direct stream of water).  NO hot tubes, NO bath tubs, NO swimming pools.

## 2025-05-16 NOTE — H&P ADULT - NSHPLABSRESULTS_GEN_ALL_CORE
LAB                        9.5    9.67  )-----------( 412      ( 15 May 2025 22:25 )             28.3     05-15    136  |  106  |  6[L]  ----------------------------<  78  3.8   |  20[L]  |  0.62    Ca    9.0      15 May 2025 05:31  Phos  3.0     05-15  Mg     2.00     05-15              Urinalysis Basic - ( 15 May 2025 05:31 )    Color: x / Appearance: x / SG: x / pH: x  Gluc: 78 mg/dL / Ketone: x  / Bili: x / Urobili: x   Blood: x / Protein: x / Nitrite: x   Leuk Esterase: x / RBC: x / WBC x   Sq Epi: x / Non Sq Epi: x / Bacteria: x

## 2025-05-16 NOTE — PROGRESS NOTE ADULT - SUBJECTIVE AND OBJECTIVE BOX
NEPHROLOGY     Patient seen and examined sitting in chair, reports feeling ok, pain controlled, no sob, in no acute distress.     MEDICATIONS  (STANDING):  acetaminophen     Tablet .. 975 milliGRAM(s) Oral every 6 hours  atorvastatin 40 milliGRAM(s) Oral at bedtime  bacitracin   Ointment 1 Application(s) Topical three times a day  clotrimazole 1% Cream 1 Application(s) Topical two times a day  ertapenem  IVPB      ertapenem  IVPB 1000 milliGRAM(s) IV Intermittent every 24 hours  gabapentin 100 milliGRAM(s) Oral every 8 hours  glucagon  Injectable 1 milliGRAM(s) IntraMuscular once  insulin lispro (ADMELOG) corrective regimen sliding scale   SubCutaneous three times a day before meals  insulin lispro (ADMELOG) corrective regimen sliding scale   SubCutaneous at bedtime  methocarbamol 500 milliGRAM(s) Oral every 12 hours  multivitamin 1 Tablet(s) Oral daily  omega-3-Acid Ethyl Esters 2 Gram(s) Oral two times a day  pantoprazole    Tablet 40 milliGRAM(s) Oral before breakfast  polyethylene glycol 3350 17 Gram(s) Oral at bedtime  potassium chloride   Powder 40 milliEquivalent(s) Oral daily  simethicone 80 milliGRAM(s) Chew two times a day  spironolactone 50 milliGRAM(s) Oral daily    VITALS:  T(C): , Max: 36.8 (05-15-25 @ 20:28)  T(F): , Max: 98.3 (05-15-25 @ 20:28)  HR: 83 (05-16-25 @ 05:24)  BP: 130/63 (05-16-25 @ 05:24)  RR: 16 (05-16-25 @ 05:24)  SpO2: 99% (05-16-25 @ 05:24)    I and O's:    05-15 @ 07:01  -  05-16 @ 07:00  --------------------------------------------------------  IN: 590 mL / OUT: 200 mL / NET: 390 mL      PHYSICAL EXAM:  Constitutional: NAD, alert  HEENT: NCAT, DMM  Neck:  No JVD  Respiratory: CTA-b/l  Cardiovascular: reg s1s2  Gastrointestinal: (+)distension, NT  Extremities: + peripheral edema  Neurological: reduced generalized strength  : No Mac  Skin: No rashes    LABS:                        9.5    9.67  )-----------( 412      ( 15 May 2025 22:25 )             28.3     05-15    136  |  106  |  6[L]  ----------------------------<  78  3.8   |  20[L]  |  0.62    Ca    9.0      15 May 2025 05:31  Phos  3.0     05-15  Mg     2.00     05-15

## 2025-05-16 NOTE — H&P ADULT - NSHPPHYSICALEXAM_GEN_ALL_CORE
Gen - NAD, Comfortable  HEENT - NCAT, EOM grossly intact  Neck - Supple, No limited ROM  Pulm - CTAB, No wheeze/rhonchi/crackles  Cardiovascular - RRR  Abdomen - Soft, NT/ND, +BS  Extremities - 2+ edema in LLE and 3+ edema in RLE. No calf tenderness bl.  Neuro-     Cognitive - AAOx3     Communication - Fluent, No dysarthria     Cranial Nerves - CN grossly intact     Motor -                     LEFT    UE - ShAB 5/5, EF 5/5, EE 5/5, WE 5/5,  5/5                    RIGHT UE - ShAB 5/5, EF 4/5, EE 5/5, WE 5/5,  5/5                    LEFT    LE - HF 4/5, KE 5/5, DF 5/5, PF 5/5                    RIGHT LE - HF 2/5, KE 4/5, DF 5/5, PF 5/5        Sensory - Intact to LT in bl LEs and bl UEs     Reflexes - DTR Intact, No primitive reflexive  Psychiatric - Mood stable, Affect WNL  Skin: Surgical incision on mid-back 28cm vertical with sutures in place. left ROB.  R groin wound 5x2cm. Bl groin MAD.

## 2025-05-16 NOTE — PROGRESS NOTE ADULT - NS ATTEND OPT1 GEN_ALL_CORE

## 2025-05-16 NOTE — DISCHARGE NOTE PROVIDER - NSDCCPCAREPLAN_GEN_ALL_CORE_FT
PRINCIPAL DISCHARGE DIAGNOSIS  Diagnosis: Low back pain radiating to right lower extremity  Assessment and Plan of Treatment: Pain control:         -Acetaminophen 500mg - 2 tabs every 8 hours as needed       -Flexeril 5mg - 1 tab three times a day - Take as needed for musle spasms        -Gabapentin 100mg - 1 tab every 8 hours   Please do not drive, operate machinery, or make important decisions while taking these medications.   Other Medications: (Standing)  -Protonix 40mg - 1 tab every 24 hours - to prevent stomach irritation/ulcers  -Senna 8.6mg - 2 pills every 24 hours - stool softener  -Miralax 17g - daily - constipation   Follow up: Please follow up at your prescheduled post-operative follow up appointment with Dr. Boston for 2 weeks after hospital discharge. Please call with any questions or concerns including fevers/chills, worsening pain, pus from the wounds, redness of the skin, new or worsening trouble when you swallow, worsening hoarsness or trouble speaking, difficulty breathing or heaviness in the chest at [phone].   Please seek immediate care or call 911 if:   -Your bandage begins to soak with blood  -Your surgical wound breaks open  -You have severe back or leg pain  -You cannot control your bladder or bowel movements  -You have a high fever with nausea and vomiting  -You have painful swelling in your neck AND trouble swallowing  -You have new or worsening trouble moving your neck, arms, or legs  Please also follow up with your PCP within 1 week to approve resuming home medications.

## 2025-05-16 NOTE — H&P ADULT - ASSESSMENT
79yMale w/ HTN, HLD, T2DM, acute on chronic low back pain with recent MRI findings concerning for L1-L2 discitis/OM with epidural abscess, bilateral psoas abscesses now s/p T10-Pelvis PSF T12-L2 Laminectomy. Patient on  ertapenem with PICC line placement. Admitted to [place] [facility] acute inpatient rehab on 5/16/25 for ADL, gait, and functional impairments.     # Epidural abscess  #Osteomyelitis of lumbar spine  #Bilateral Psoas abscess s/p drain removal  - S/P Decompression, spine, lumbar, posterior approach, with fusion of posterior spinal column  - Continue Ertapenem x 6 weeks through 6/7/25  - S/p placement of PICC 5/14  - Confirm PICC line placement with CXR on admission    #HTN  - Amlodipine   - Spironolactone 50 Qd    #Electrolyte imbalance with hypokalemia & hypophosphatemia  - 40mEq KCl Qd  - C/w Spironolactone 50 Qd as above  - Goal K level 4    #HLD  - Atorvastatin    #Diabetes  - A1c 7.2% on 4/25  - Home Farxiga (holding)  - LENA for now  - Hospitalist follow up in AM    # GI/Bowel:  - GI ppx: 40 Qd  #Ileus/Pseudoobstruction   - S/p GI/surgery eval at [facility] with conservative mgmt and improvement   - Continue Miralax/Magnesium hydroxide PRN/ simethicone PRN  - Resolved    # Sleep:  - Melatonin qHS    # Pain Management:  - Tylenol 975 q6  - Methocarbamol 500mg BID  - Gabapentin 100 TID    # /Bladder:   - Bladder scan x1 on admission, SC PRN  - Encourage timed voids every 4 hours while awake       # Skin/Pressure Injury:  - Skin assessment on admission: ***  - Monitor Incisions: ***  *Dressings: Keep dressing clean, dry, and intact.   Remove all cotton and yellow gauze 72 hours after surgery.  No need for new dressing on wound unless there is light drainage.  *Keep dressing/incision clean, dry and intact, may remove dressing two days after discharge and leave incision open to air.   *Any sutures/staples to be removed on post-op day #14 at your office visit.   *Patient may shower 72 hours after surgery but DO NOT soak dressing and/or incision.   *The water may run over dressing/incision but DO NOT let the water directly hit dressing/incision (take a shower with wound away from the direct stream of water).       # Diet:   - Diet Consistency/Modifications: Reg CC  - MTV and Omega 3    # DVT ppx:  - initiate heparin SQ on arrival (discussed with orthopedic surgeon Dr. Boston)  - SCDs  - Last Doppler on 5/7/25 negative    # Restrictions/Precautions:  - Weight bearing status: WBAT BLLE  - ROM restrictions: Avoid any heavy lifting, bending, squatting, or twisting motions.  - Precautions:  *DO NOT resume any anticoagulation/blood thinners (Aspirin, Plavix, Eliquis, Coumadin, Xarelto, ect.) until instructed by your surgeon.*  *DO NOT take any NSAIDs (Motrin, Aleve, Advil, Ibuprofen, Celebrex, ect.) until instructed by  your surgeon.*  *Dressings: Keep dressing clean, dry, and intact. Remove all cotton and yellow gauze 72 hours after surgery. You do not need to put a new dressing on your wound unless there is light drainage. If that occurs place Band-Aids on your wound.  *Keep dressing/incision clean, dry and intact, may remove dressing two days after discharge and leave incision open to air.   *Any sutures/staples to be removed on post-op day #14 at your office visit.   ---------------  Outpatient Follow-up:    Baldev Boston)  Spine Surgery  [address] [place] [address], [address]  [place] [address]  Phone: [phone]  Fax: [phone]  Follow Up Time:      --------------    Goals: Safe discharge to home  Estimated Length of Stay: 10-14 days  Rehab Potential: Good  Medical Prognosis: Good  Estimated Disposition: Home with Home Care  ---------------    PRESCREEN COMPARISON:  I have reviewed the prescreen information and I have found no relevant changes between the preadmission screening and my post admission evaluation.    RATIONALE FOR INPATIENT ADMISSION: Patient demonstrates the following:  [X] Medically appropriate for rehabilitation admission  [X] Has attainable rehab goals with an appropriate initial discharge plan  [X]Has rehabilitation potential (expected to make a significant improvement within a reasonable period of time)  [X] Requires close medical management by a rehab physician, rehab nursing care, Hospitalist and comprehensive interdisciplinary team (including PT, OT and/or SLP, Prosthetics and Orthotics)   79yMale w/ HTN, HLD, T2DM, acute on chronic low back pain with recent MRI findings concerning for L1-L2 discitis/OM with epidural abscess, bilateral psoas abscesses now s/p T10-Pelvis PSF T12-L2 Laminectomy. Patient on  ertapenem with PICC line placement. Admitted to [facility] acute inpatient rehab on 5/16/25 for ADL, gait, and functional impairments.     # Epidural abscess  #Osteomyelitis of lumbar spine  #Bilateral Psoas abscess s/p drain removal  - S/P Decompression, spine, lumbar, posterior approach, with fusion of posterior spinal column  - Continue Ertapenem x 6 weeks through 6/7/25  - S/p placement of PICC 5/14  - Confirm PICC line placement with CXR on admission    #HTN  - Starting losartan 25 Qd 5/17 AM  - Spironolactone 50 --> 25mg 25 Qd on 5/17 AM    #Electrolyte imbalance with hypokalemia & hypophosphatemia  - 40mEq KCl powder Qd  - C/w Spironolactone 25 Qd as above  - Goal K level 4    #HLD  - Atorvastatin    #Diabetes  - A1c 7.2% on 4/25  - Metformin 500 Qd  - LENA for now  - Hospitalist follow up in AM    # GI/Bowel:  - GI ppx: 40 Qd  #Ileus/Pseudoobstruction   - S/p GI/surgery eval at [facility] with conservative mgmt and improvement   - Continue Miralax/Magnesium hydroxide PRN/ simethicone PRN  - Resolved    # Sleep:  - Melatonin qHS    # Pain Management:  - Tylenol 975 q6  - Methocarbamol 500mg BID  - Gabapentin 100 TID    # /Bladder:   - Bladder scan x1 on admission, SC PRN  - Encourage timed voids every 4 hours while awake       # Skin/Pressure Injury:  - Skin assessment on admission: ***  - Monitor Incisions: ***  *Dressings: Keep dressing clean, dry, and intact.   Remove all cotton and yellow gauze 72 hours after surgery.  No need for new dressing on wound unless there is light drainage.  *Keep dressing/incision clean, dry and intact, may remove dressing two days after discharge and leave incision open to air.   *Any sutures/staples to be removed on post-op day #14 at your office visit.   *Patient may shower 72 hours after surgery but DO NOT soak dressing and/or incision.   *The water may run over dressing/incision but DO NOT let the water directly hit dressing/incision (take a shower with wound away from the direct stream of water).       # Diet:   - Diet Consistency/Modifications: Reg CC  - MTV and Omega 3    # DVT ppx:  - initiate heparin SQ on arrival (discussed with orthopedic surgeon Dr. Boston)  - SCDs  - Last Doppler on 5/7/25 negative    # Restrictions/Precautions:  - Weight bearing status: WBAT BLLE  - ROM restrictions: Avoid any heavy lifting, bending, squatting, or twisting motions.  - Precautions:  *DO NOT resume any anticoagulation/blood thinners (Aspirin, Plavix, Eliquis, Coumadin, Xarelto, ect.) until instructed by your surgeon.*  *DO NOT take any NSAIDs (Motrin, Aleve, Advil, Ibuprofen, Celebrex, ect.) until instructed by  your surgeon.*  *Dressings: Keep dressing clean, dry, and intact. Remove all cotton and yellow gauze 72 hours after surgery. You do not need to put a new dressing on your wound unless there is light drainage. If that occurs place Band-Aids on your wound.  *Keep dressing/incision clean, dry and intact, may remove dressing two days after discharge and leave incision open to air.   *Any sutures/staples to be removed on post-op day #14 at your office visit.   ---------------  Outpatient Follow-up:    Baldev Boston)  Spine Surgery  [address] [place] [address], [address]  [place] [address]  Phone: [phone]  Fax: [phone]  Follow Up Time:      --------------    Goals: Safe discharge to home  Estimated Length of Stay: 10-14 days  Rehab Potential: Good  Medical Prognosis: Good  Estimated Disposition: Home with Home Care  ---------------    PRESCREEN COMPARISON:  I have reviewed the prescreen information and I have found no relevant changes between the preadmission screening and my post admission evaluation.    RATIONALE FOR INPATIENT ADMISSION: Patient demonstrates the following:  [X] Medically appropriate for rehabilitation admission  [X] Has attainable rehab goals with an appropriate initial discharge plan  [X]Has rehabilitation potential (expected to make a significant improvement within a reasonable period of time)  [X] Requires close medical management by a rehab physician, rehab nursing care, Hospitalist and comprehensive interdisciplinary team (including PT, OT and/or SLP, Prosthetics and Orthotics)   79yMale w/ HTN, HLD, T2DM, acute on chronic low back pain with recent MRI findings concerning for L1-L2 discitis/OM with epidural abscess, bilateral psoas abscesses now s/p T10-Pelvis PSF T12-L2 Laminectomy. Patient on  ertapenem with PICC line placement. Admitted to [place] acute inpatient rehab on 5/16/25 for ADL, gait, and functional impairments.     # Epidural abscess  #Osteomyelitis of lumbar spine  #Bilateral Psoas abscess s/p drain removal  - S/P Decompression, spine, lumbar, posterior approach, with fusion of posterior spinal column  - Continue Ertapenem x 6 weeks through 6/7/25  - S/p placement of PICC 5/14  - Confirm PICC line placement with CXR on admission    #HTN  - Starting losartan 25 Qd 5/17 AM  - Spironolactone 50 --> 25mg 25 Qd on 5/17 AM    #Electrolyte imbalance with hypokalemia & hypophosphatemia  - 40mEq KCl powder Qd  - C/w Spironolactone 25 Qd as above  - Goal K level 4    #HLD  - Atorvastatin 40 QHS    #Diabetes  - A1c 7.2% on 4/25  - Metformin 500 Qd  - LNEA for now  - Hospitalist follow up in AM    # GI/Bowel:  - GI ppx: 40 Qd  #Ileus/Pseudoobstruction   - S/p GI/surgery eval at [facility] with conservative mgmt and improvement   - Continue Miralax/Magnesium hydroxide PRN/ simethicone PRN  - Resolved    # Sleep:  - Melatonin qHS    # Pain Management:  - Tylenol 975 q6  - Methocarbamol 500mg BID  - Gabapentin 100 TID    # /Bladder:   - Bladder scan x1 on admission, SC PRN  - Encourage timed voids every 4 hours while awake    # Skin/Pressure Injury:  - Skin assessment on admission:   Skin: R groin wound 5x2cm. Bl groin MAD. C/w clotrimazole.  Monitor Incisions: Surgical incision on mid-back 28cm vertical with sutures in place. left ROB.  Per dc summary, "Any sutures/staples to be removed on post-op day #14 at office visit," however, sx was 4/24.   **Reached out to plastic surgery team at [facility] for clarification.    # Diet:   - Diet Consistency/Modifications: Reg CC  - MTV and Omega 3    # DVT ppx:  - Initiate heparin SQ on arrival (discussed with orthopedic surgeon Dr. Boston)  - SCDs  - Last Doppler on 5/7/25 negative    # Restrictions/Precautions:  - Weight bearing status: WBAT BLLE  - ROM restrictions: Avoid any heavy lifting, bending, squatting, or twisting motions.  - Precautions: Fall, spine  *DO NOT take any NSAIDs (Motrin, Aleve, Advil, Ibuprofen, Celebrex, ect.) until instructed by surgical team.  ---------------  Outpatient Follow-up:    Baldev Boston)  Spine Surgery  [address], [address]  [place] [address]  Phone: [phone]  Fax: [phone]  Follow Up Time:      --------------    Goals: Safe discharge to home  Estimated Length of Stay: 10-14 days  Rehab Potential: Good  Medical Prognosis: Good  Estimated Disposition: Home with Home Care  ---------------    PRESCREEN COMPARISON:  I have reviewed the prescreen information and I have found no relevant changes between the preadmission screening and my post admission evaluation.    RATIONALE FOR INPATIENT ADMISSION: Patient demonstrates the following:  [X] Medically appropriate for rehabilitation admission  [X] Has attainable rehab goals with an appropriate initial discharge plan  [X]Has rehabilitation potential (expected to make a significant improvement within a reasonable period of time)  [X] Requires close medical management by a rehab physician, rehab nursing care, Hospitalist and comprehensive interdisciplinary team (including PT, OT and/or SLP, Prosthetics and Orthotics)   Mr. Adama Monsivais is a 79-year-old male patient with past medical history of HTN, HLD, DM (A1C 7.2), CKD, and chronic LBP who presented as a transfer from [facility] to to [facility] for orthopedic surgery after initial ED evaluation for acute on chronic back pain had MRI revealing discitis with epidural abscess and bilateral psoas abscesses. Patient is now s/p T10-Pelvis PSF with Dr. Boston with Plastic Surgery closure by Dr. Escamilla on 4/24/25. Patient tolerated the procedure well without any intraoperative complications. Patient tolerated physical therapy, is weight bearing as tolerated and pain was well-controlled. Of note patient had previously been pain seen by pain specialist in 2/2025 for chronic back pain with radiation to bilateral lateral thighs (R>L). MRI at that time showed degenerative changes and had epidural injections x2 (2/27, 3/11) with moderate pain relief. Pain began to worsen on 4/10. He had radiofrequency ablation performed on 4/11 and since had a severe worsening of pain and radiation to R lateral thigh. He also reported recent bowel/bladder "incontinence" requiring diaper due to his inability to get to the bathroom in time due to pain limitations but states urge and sensation remained intact. Infectious disease consulted and co-managed for antibiotic therapy, recommended Ertapenem 1g Q24h until 6/7/25. A PICC line was placed on 5/14/25 and IR psoas drains were placed bilaterally on 4/27/25 and removed on 5/6/25. 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 Seen by medical attending for continuity of care and management and cleared for safe discharge. As per surgeon, the patient is stable and ready for discharge. Patient transferred to [facility] IRF on 5/16 for initiation of comprehensive rehabilitation program with 3 hours of therapies daily 5xweek.      #Lumbar epidural abscess with stenosis, diskitis, osteomyelitis, and kyphosis s/p decompressive laminectomy and fusion surgery  - Epidural abscess, osteomyelitis of lumbar spine, and bilateral Psoas abscess s/p drain removal      * Continue Ertapenem x 6 weeks through 6/7/25      * S/P placement of PICC 5/14      * Confirm PICC line placement with CXR on admission  - S/P Decompression, spine, lumbar, posterior approach, with fusion of posterior spinal column      * Surgical incision on mid-back 28cm vertical with sutures in place and left ROB      * Sutures/staples to be removed on post-op day #14      * ROM restrictions: Avoid any heavy lifting, bending, squatting, or twisting motions.      * Avoid NSAIDs  - Activity limitations: Decreased social, vocational and leisure activities, decreased self care and ADLs, decreased mobility  - Deficits: lower extremity weakness and bowel/bladder dysfunction  - Comprehensive Multidisciplinary Rehab Program:      * 3 hours a day, 5 days a week.      * PT 2hr/day: Focused on improving strength, endurance, coordination, balance, functional mobility, and transfers      * OT 1hr/day: Focused on improving strength, fine motor skills, coordination, posture and ADLs.      #HTN  - Starting losartan 25 Qd 5/17 AM  - Spironolactone 50 --> 25mg 25 Qd on 5/17 AM    #Electrolyte imbalance with hypokalemia & hypophosphatemia  - 40mEq KCl powder Qd  - C/w Spironolactone 25 Qd as above  - Goal K level 4    #HLD  - Atorvastatin 40 QHS    #Diabetes  - A1c 7.2% on 4/25  - Metformin 500 Qd  - LENA for now  - Hospitalist follow up in AM    #GI/Bowel:  - GI ppx: 40 Qd  - Ileus/Pseudoobstruction (Resolved)      * S/p GI/surgery eval at [facility] with conservative mgmt and improvement       * Continue Miralax/Magnesium hydroxide PRN/ simethicone PRN    #Sleep:  - Melatonin qHS    #Pain Management:  - Tylenol 975 q6  - Methocarbamol 500mg BID  - Gabapentin 100 TID    #/Bladder:   - Bladder scan x1 on admission, SC PRN  - Encourage timed voids every 4 hours while awake    #Skin/Pressure Injury:  - Skin assessment on admission:       * Skin: R groin wound 5x2cm. Bl groin MAD. C/w clotrimazole.      * Monitor Incisions: Surgical incision on mid-back 28cm vertical with sutures in place. left ROB.      * Per dc summary, "Any sutures/staples to be removed on post-op day #14 at office visit," however, sx was 4/24.       * Reached out to plastic surgery team at [facility] for clarification.    #Diet:   - Diet Consistency/Modifications: Reg CC  - MTV and Omega 3    #DVT ppx:  - Initiate heparin SQ on arrival (discussed with orthopedic surgeon Dr. Boston)  - SCDs  - Last Doppler on 5/7/25 negative    #Patient Education  - Education provided on the following:      * Admitting diagnosis and functional implications      * Functional goals      * Bladder management      * Bowel management      * Skin care management      * Intensity of service and scheduling of rehab disciplines      * Plan of care and role of interdisciplinary team conference in discharge planning      * Reconciliation of medications from prior institution    #Restrictions/Precautions:  - Weight bearing status: WBAT BLLE  - ROM restrictions: Avoid any heavy lifting, bending, squatting, or twisting motions.  - Precautions: Fall, spine  *DO NOT take any NSAIDs (Motrin, Aleve, Advil, Ibuprofen, Celebrex, ect.) until instructed by surgical team.  ---------------  Outpatient Follow-up:    Baldev Boston)  Spine Surgery  [address] [place] [address], [address]  [place] [address]  Phone: [phone]  Fax: [phone]  Follow Up Time:      --------------    Goals: Safe discharge to home  Estimated Length of Stay: 10-14 days  Rehab Potential: Good  Medical Prognosis: Good  Estimated Disposition: Home with Home Care  ---------------    PRESCREEN COMPARISON:  I have reviewed the prescreen information and I have found no relevant changes between the preadmission screening and my post admission evaluation.    RATIONALE FOR INPATIENT ADMISSION: Patient demonstrates the following:  [X] Medically appropriate for rehabilitation admission  [X] Has attainable rehab goals with an appropriate initial discharge plan  [X]Has rehabilitation potential (expected to make a significant improvement within a reasonable period of time)  [X] Requires close medical management by a rehab physician, rehab nursing care, Hospitalist and comprehensive interdisciplinary team (including PT, OT and/or SLP, Prosthetics and Orthotics)   Mr. Adama Monsivais is a 79-year-old male patient with past medical history of HTN, HLD, DM (A1C 7.2), CKD, and chronic LBP who presented as a transfer from [facility] to to [facility] for orthopedic surgery after initial ED evaluation for acute on chronic back pain had MRI revealing discitis with epidural abscess and bilateral psoas abscesses. Patient is now s/p T10-Pelvis PSF with Dr. Boston with Plastic Surgery closure by Dr. Escamilla on 4/24/25. Patient tolerated the procedure well without any intraoperative complications. Patient tolerated physical therapy, is weight bearing as tolerated and pain was well-controlled. Of note patient had previously been pain seen by pain specialist in 2/2025 for chronic back pain with radiation to bilateral lateral thighs (R>L). MRI at that time showed degenerative changes and had epidural injections x2 (2/27, 3/11) with moderate pain relief. Pain began to worsen on 4/10. He had radiofrequency ablation performed on 4/11 and since had a severe worsening of pain and radiation to R lateral thigh. He also reported recent bowel/bladder "incontinence" requiring diaper due to his inability to get to the bathroom in time due to pain limitations but states urge and sensation remained intact. Infectious disease consulted and co-managed for antibiotic therapy, recommended Ertapenem 1g Q24h until 6/7/25. A PICC line was placed on 5/14/25 and IR psoas drains were placed bilaterally on 4/27/25 and removed on 5/6/25. 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 Seen by medical attending for continuity of care and management and cleared for safe discharge. As per surgeon, the patient is stable and ready for discharge. Patient transferred to [facility] IRF on 5/16 for initiation of comprehensive rehabilitation program with 3 hours of therapies daily 5xweek.      #Lumbar epidural abscess with stenosis, diskitis, osteomyelitis, and kyphosis s/p decompressive laminectomy and fusion surgery  - Epidural abscess, osteomyelitis of lumbar spine, and bilateral Psoas abscess s/p drain removal      * Continue Ertapenem x 6 weeks through 6/7/25      * S/P placement of PICC 5/14      * Confirm PICC line placement with CXR on admission  - S/P Decompression, spine, lumbar, posterior approach, with fusion of posterior spinal column      * Surgical incision on mid-back 28cm vertical with sutures in place and left ROB      * Sutures/staples to be removed on post-op day #14      * ROM restrictions: Avoid any heavy lifting, bending, squatting, or twisting motions.      * Avoid NSAIDs  - Activity limitations: Decreased social, vocational and leisure activities, decreased self care and ADLs, decreased mobility  - Deficits: lower extremity weakness and bowel/bladder dysfunction  - Comprehensive Multidisciplinary Rehab Program:      * 3 hours a day, 5 days a week.      * PT 2hr/day: Focused on improving strength, endurance, coordination, balance, functional mobility, and transfers      * OT 1hr/day: Focused on improving strength, fine motor skills, coordination, posture and ADLs.      #HTN  - Starting losartan 25 Qd 5/17 AM  - Spironolactone 50 --> 25mg 25 Qd on 5/17 AM    #Electrolyte imbalance with hypokalemia & hypophosphatemia  - 40mEq KCl powder Qd  - C/w Spironolactone 25 Qd as above  - Goal K level 4    #HLD  - Atorvastatin 40 QHS    #Diabetes with hyperglycemia  - A1c 7.2% on 4/25  - Metformin 500 Qd  - LENA for now  - Hospitalist follow up in AM    #GI/Bowel:  - GI ppx: 40 Qd  - Ileus/Pseudoobstruction (Resolved)      * S/p GI/surgery eval at [facility] with conservative mgmt and improvement       * Continue Miralax/Magnesium hydroxide PRN/ simethicone PRN    #Sleep:  - Melatonin qHS    #Pain Management:  - Tylenol 975 q6  - Methocarbamol 500mg BID  - Gabapentin 100 TID    #/Bladder:   - Bladder scan x1 on admission, SC PRN  - Encourage timed voids every 4 hours while awake    #Skin/Pressure Injury:  - Skin assessment on admission:       * Skin: R groin wound 5x2cm. Bl groin MAD. C/w clotrimazole.      * Monitor Incisions: Surgical incision on mid-back 28cm vertical with sutures in place. left ROB.      * Per dc summary, "Any sutures/staples to be removed on post-op day #14 at office visit," however, sx was 4/24.       * Reached out to plastic surgery team at [facility] for clarification.    #Diet:   - Diet Consistency/Modifications: Reg CC  - MTV and Omega 3    #DVT ppx:  - Initiate heparin SQ on arrival (discussed with orthopedic surgeon Dr. Boston)  - SCDs  - Last Doppler on 5/7/25 negative    #Patient Education  - Education provided on the following:      * Admitting diagnosis and functional implications      * Functional goals      * Bladder management      * Bowel management      * Skin care management      * Intensity of service and scheduling of rehab disciplines      * Plan of care and role of interdisciplinary team conference in discharge planning      * Reconciliation of medications from prior institution    #Restrictions/Precautions:  - Weight bearing status: WBAT BLLE  - ROM restrictions: Avoid any heavy lifting, bending, squatting, or twisting motions.  - Precautions: Fall, spine  *DO NOT take any NSAIDs (Motrin, Aleve, Advil, Ibuprofen, Celebrex, ect.) until instructed by surgical team.  ---------------  Outpatient Follow-up:    Baldev Boston)  Spine Surgery  [address] [place] [address], [address]  [place] [address]  Phone: [phone]  Fax: [phone]  Follow Up Time:      --------------    Goals: Safe discharge to home  Estimated Length of Stay: 10-14 days  Rehab Potential: Good  Medical Prognosis: Good  Estimated Disposition: Home with Home Care  ---------------    PRESCREEN COMPARISON:  I have reviewed the prescreen information and I have found no relevant changes between the preadmission screening and my post admission evaluation.    RATIONALE FOR INPATIENT ADMISSION: Patient demonstrates the following:  [X] Medically appropriate for rehabilitation admission  [X] Has attainable rehab goals with an appropriate initial discharge plan  [X]Has rehabilitation potential (expected to make a significant improvement within a reasonable period of time)  [X] Requires close medical management by a rehab physician, rehab nursing care, Hospitalist and comprehensive interdisciplinary team (including PT, OT and/or SLP, Prosthetics and Orthotics)

## 2025-05-16 NOTE — H&P ADULT - HISTORY OF PRESENT ILLNESS
78yo male with PMH of HTN, HLD, DM (A1C 7.2), CKD, chronic LBP who presented as a transfer from [facility] to to [facility] for orthopedic surgery after initial ED evaluation for acute on chronic back pain had MRI revealing discitis with epidural abscess bilateral psoas abscesses. Patient is now s/p T10-Pelvis PSF with Dr. Boston on 4/24/25. Patient tolerated the procedure well without any intraoperative complications. Patient tolerated physical therapy, is weight bearing as tolerated and pain was well-controlled.    Of note patient had previously been pain seen by pain specialist in 2/2025 for chronic back pain with radiation to bilateral lateral thighs (R>L). MRI at that time showed degenerative changes and had epidural injections x2 (2/27, 3/11) with moderate pain relief.   Pain began to worsen on 4/10. He had radiofrequency ablation performed on 4/11 and since had a severe worsening of pain and radiation to R lateral thigh.   He also reported recent bowel/bladder "incontinence" requiring diaper due to his inability to get to the bathroom in time due to pain limitations but states urge and sensation remained intact.    Infectious disease consulted and co-managed for antibiotic therapy, recommended Ertapenem 1g Q24h until 6/7/25. A PICC line was placed on 5/14/25 and IR psoas drains were placed bilaterally on 4/27/25 and removed on 5/6/25. Post-operative course complicated by pseudoobstruction, seen by GI who recommended miralax daily, and seen by nephrology to co-manage electrolyte repletion. Seen by medical attending for continuity of care and management and cleared for safe discharge. As per surgeon, the patient is stable and ready for discharge. Patient transferred to [facility] on 5/16 for initiation of comprehensive rehabilitation program with 3 hours of therapies daily 5xweek. Mr. Adama Monsivais is a 79-year-old male patient with past medical history of HTN, HLD, DM (A1C 7.2), CKD, and chronic LBP who presented as a transfer from [facility] to to [facility] for orthopedic surgery after initial ED evaluation for acute on chronic back pain had MRI revealing discitis with epidural abscess and bilateral psoas abscesses. Patient is now s/p T10-Pelvis PSF with Dr. Boston with Plastic Surgery closure by Dr. Escamilla on 4/24/25. Patient tolerated the procedure well without any intraoperative complications. Patient tolerated physical therapy, is weight bearing as tolerated and pain was well-controlled. Of note patient had previously been pain seen by pain specialist in 2/2025 for chronic back pain with radiation to bilateral lateral thighs (R>L). MRI at that time showed degenerative changes and had epidural injections x2 (2/27, 3/11) with moderate pain relief. Pain began to worsen on 4/10. He had radiofrequency ablation performed on 4/11 and since had a severe worsening of pain and radiation to R lateral thigh. He also reported recent bowel/bladder "incontinence" requiring diaper due to his inability to get to the bathroom in time due to pain limitations but states urge and sensation remained intact. Infectious disease consulted and co-managed for antibiotic therapy, recommended Ertapenem 1g Q24h until 6/7/25. A PICC line was placed on 5/14/25 and IR psoas drains were placed bilaterally on 4/27/25 and removed on 5/6/25. 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 Seen by medical attending for continuity of care and management and cleared for safe discharge. As per surgeon, the patient is stable and ready for discharge. Patient transferred to [facility] on 5/16 for initiation of comprehensive rehabilitation program with 3 hours of therapies daily 5xweek.

## 2025-05-16 NOTE — PROGRESS NOTE ADULT - ASSESSMENT
79yMale w/ HTN, HLD, T2DM, chronic low back pain presents as a transfer from [facility] ED for surgery today. Pt was sent into ED yesterday by his pain medicine physician due to recent MRI findings concerning for L1-L2 discitis/OM with epidural abscess, bilateral psoas abscesses. Patient initially saw pain specialist in February for chronic back pain with radiations to bilateral lateral thighs (R>L). MRI at that time showed degenerative changes. He subsequently had epidural injections x2 (2/27, 3/11) with moderate pain relief. Pain began to worsen on 4/10. He had radiofrequency ablation performed on 4/11 and has since has severe worsening of pain and radiation to R lateral thigh. He reports recent bowel/bladder "incontinence" requiring diaper due to his inability to get to the bathroom in time due to pain limitations but states urge and sensation are intact. Denies fevers, chills, night sweats, weakness, numbness/tingling, saddle anesthesia, recent falls/trauma. He uses a cane for ambulation. Denies recent falls/trauma or any other ortho injuries. Before back pain was very active walked half an hour , going up and down stairs and doing grocery etc with no Chest pain or SOB.        Problem/Recommendation - 1:  ·  Problem: Epidural abscess.   ·  Recommendation: S/P Decompression, spine, lumbar, posterior approach, with fusion of posterior spinal column.  On IV Abxs Ertapenem now  per ID x 6 weeks  .  Will defer management to  Neurosurgery.  DVT prophylaxis per Neurosurgery .  < from: MR Lumbar Spine w/ IV Cont (04.23.25 @ 15:13) >  IMPRESSION:        1.   Cervical spine:   Moderate degenerative disc disease and   spondylosis at C4-5 through C6/7 with loss of disc height and associated   degenerative endplate changes.        2.   Thoracic spine:   Minimal enhancement within T7-8 which may   reflect early discitis.        3.   Lumbar spine:   Osteomyelitis and discitis at L1-2 with erosions   of the inferior L1 vertebral body and L2 vertebral body consistent with   osteomyelitis and discitis. Ventral epidural abscess is noted extending   from the L1-2 level superiorly to the T10 level with mass effect on the   ventral thecal sac, most prominently at the L1-2 level resulting in   marked compression. Multiple abscesses within the BILATERAL psoas   muscles, the largest measuring 4.3 cm on the RIGHT and 2.8 cm on the LEFT.     Problem/Recommendation - 2:  ·  Problem: Acute osteomyelitis of lumbar spine.   ·  Recommendation: L1 &L2 vertebrae .  On IV Abxs per ID .  Will defer management to Neurosurgery.     Problem/Recommendation - 3:  ·  Problem: Low back pain radiating to right lower extremity.   ·  Recommendation: Pain control.     Problem/Recommendation - 4:  ·  Problem: . BILATERAL psoas Abscess  ·  Recommendation: ON IV Abxs per ID .  IR removed drains as better.     Problem/Recommendation - 5:  ·  Problem: HTN (hypertension).   ·  Recommendation: Takes Amlodipine and ARB so continue with hold parameters.  < from: TTE W or WO Ultrasound Enhancing Agent (04.24.25 @ 10:10) >  CONCLUSIONS:      1. Left ventricular cavity is normal in size. Left ventricular wall thickness is normal. Left ventricular systolic function is normal with an ejection fraction of 64 % by Carpenter's method of disks. There are no regional wall motion abnormalities seen.   2. Normal right ventricular cavity size and normal right ventricular systolic function. Tricuspid annular plane systolic excursion (TAPSE) is 2.2 cm (normal >=1.7 cm).   3. Structurally normal mitral valve with normal leaflet excursion. There is calcification of the mitral valve annulus. There is tracemitral regurgitation.   4. The aortic valve appears trileaflet with normal systolic excursion. There is calcification of the aortic valve leaflets. There is mild aortic regurgitation.     Problem/Recommendation - 6:  ·  Problem: HLD (hyperlipidemia).   ·  Recommendation: Continue Atorvastatin.     Problem/Recommendation - 7:  ·  Problem: DM (diabetes mellitus).   ·  Recommendation: ON Farxiga and holding.  SSI and Lantus in patient .     Problem/Recommendation - 8:  ·  Problem:  Ileus /Pseudoobstruction   ·  Recommendation: Improving.   Had BMs and  passing flatus .  Correcting electrolytes.     ON Laxatives and Simethacone .  Advanced diet .   Surgery & GI input appreciated .  D/W GI attending and will wait till tomorrow before rectal tube placement  .  < from: CT Abdomen and Pelvis w/ Oral Cont and w/ IV Cont (04.29.25 @ 17:16) >  IMPRESSION:  Diffuse colonic dilatation, worse on today's study, likely secondary to   pseudoobstruction or ileus.    No free air.    Reduction in the bilateral psoas collections with catheters in place.    < from: Xray Abdomen 1 View PORTABLE -Urgent (Xray Abdomen 1 View PORTABLE -Urgent .) (04.29.25 @ 10:09) >  IMPRESSION:  Multiple dilated loops of small and large bowel with question of   pneumoperitoneum. Recommend upright chest radiograph or decubitus   abdominal radiograph for further evaluation.     Problem/Recommendation - 9:  ·  Problem: JUSTINO with Hyponatremia .   ·  Recommendation: Trending BMP.  Resolved.  Renal help appreciated.      Problem/Recommendation - 10:  ·  Problem: Hypokalemia & Hypophosphatemia .   ·  Recommendation: Replaced .   Aldactone 50mg started.    Rpt BMP noted.    Keep K level close to 4 .     Problem/Recommendation - 11:  ·  Problem: Thrombocytosis .   ·  Recommendation: Reactionary secondary to infection.  Platelets Trending down .     Problem/Recommendation - 12:  ·  Problem: Pedal edema >Right LE.   ·  Recommendation: No calf tenderness +,Ambulating.     Problem/Recommendation - 13:  ·  Problem: Irritant Dermatitis  .   ·  Recommendation: Dermatology input appreciated .  Started clotrimazole cream.        Dispo : DC planning to Acute Rehab per primary team .

## 2025-05-16 NOTE — PATIENT PROFILE ADULT - MST ORDER GENERATE MLM HIDDEN
Airway    Performed by: Nithin Meneses MD  Authorized by: Nithin Meneses MD    Final Airway Type:  Supraglottic airway  Final Supraglottic Airway:  Air-Q  SGA Size*:  4  Attempts*:  1   Patient Identified, Procedure confirmed, Emergency equipment available and Safety protocols followed  Location:  OR  Urgency:  Elective  Difficult Airway: No    Indications for Airway Management:  Anesthesia  Sedation Level:  Deep  Mask Difficulty Assessment:  0 - not attempted  Start Time: 3/11/2024 5:06 PM       MST Score 2 or >

## 2025-05-16 NOTE — PROGRESS NOTE ADULT - SUBJECTIVE AND OBJECTIVE BOX
Date of Service  : 05-16-25     INTERVAL HPI/OVERNIGHT EVENTS: I feel fine.   Vital Signs Last 24 Hrs  T(C): 36.4 (16 May 2025 09:47), Max: 36.8 (15 May 2025 20:28)  T(F): 97.6 (16 May 2025 09:47), Max: 98.3 (15 May 2025 20:28)  HR: 85 (16 May 2025 09:47) (83 - 97)  BP: 130/60 (16 May 2025 09:47) (124/60 - 144/63)  BP(mean): --  RR: 18 (16 May 2025 09:47) (16 - 18)  SpO2: 97% (16 May 2025 09:47) (97% - 100%)    Parameters below as of 16 May 2025 09:47  Patient On (Oxygen Delivery Method): room air      I&O's Summary    15 May 2025 07:01  -  16 May 2025 07:00  --------------------------------------------------------  IN: 590 mL / OUT: 200 mL / NET: 390 mL      MEDICATIONS  (STANDING):  acetaminophen     Tablet .. 975 milliGRAM(s) Oral every 6 hours  atorvastatin 40 milliGRAM(s) Oral at bedtime  bacitracin   Ointment 1 Application(s) Topical three times a day  clotrimazole 1% Cream 1 Application(s) Topical two times a day  ertapenem  IVPB      ertapenem  IVPB 1000 milliGRAM(s) IV Intermittent every 24 hours  gabapentin 100 milliGRAM(s) Oral every 8 hours  glucagon  Injectable 1 milliGRAM(s) IntraMuscular once  insulin lispro (ADMELOG) corrective regimen sliding scale   SubCutaneous three times a day before meals  insulin lispro (ADMELOG) corrective regimen sliding scale   SubCutaneous at bedtime  methocarbamol 500 milliGRAM(s) Oral every 12 hours  multivitamin 1 Tablet(s) Oral daily  omega-3-Acid Ethyl Esters 2 Gram(s) Oral two times a day  pantoprazole    Tablet 40 milliGRAM(s) Oral before breakfast  polyethylene glycol 3350 17 Gram(s) Oral at bedtime  potassium chloride   Powder 40 milliEquivalent(s) Oral daily  simethicone 80 milliGRAM(s) Chew two times a day  spironolactone 50 milliGRAM(s) Oral daily    MEDICATIONS  (PRN):  dibucaine 1% Ointment 1 Application(s) Topical four times a day PRN for anal discomfort d/t hemorrhoids  magnesium hydroxide Suspension 30 milliLiter(s) Oral every 12 hours PRN Constipation  sodium chloride 0.9% lock flush 10 milliLiter(s) IV Push every 1 hour PRN Pre/post blood products, medications, blood draw, and to maintain line patency    LABS:                        9.5    9.67  )-----------( 412      ( 15 May 2025 22:25 )             28.3     05-15    136  |  106  |  6[L]  ----------------------------<  78  3.8   |  20[L]  |  0.62    Ca    9.0      15 May 2025 05:31  Phos  3.0     05-15  Mg     2.00     05-15        Urinalysis Basic - ( 15 May 2025 05:31 )    Color: x / Appearance: x / SG: x / pH: x  Gluc: 78 mg/dL / Ketone: x  / Bili: x / Urobili: x   Blood: x / Protein: x / Nitrite: x   Leuk Esterase: x / RBC: x / WBC x   Sq Epi: x / Non Sq Epi: x / Bacteria: x      CAPILLARY BLOOD GLUCOSE      POCT Blood Glucose.: 120 mg/dL (16 May 2025 11:11)  POCT Blood Glucose.: 102 mg/dL (16 May 2025 07:26)  POCT Blood Glucose.: 136 mg/dL (15 May 2025 21:43)  POCT Blood Glucose.: 112 mg/dL (15 May 2025 16:40)        Urinalysis Basic - ( 15 May 2025 05:31 )    Color: x / Appearance: x / SG: x / pH: x  Gluc: 78 mg/dL / Ketone: x  / Bili: x / Urobili: x   Blood: x / Protein: x / Nitrite: x   Leuk Esterase: x / RBC: x / WBC x   Sq Epi: x / Non Sq Epi: x / Bacteria: x      REVIEW OF SYSTEMS:  CONSTITUTIONAL: No fever, weight loss, or fatigue  EYES: No eye pain, visual disturbances, or discharge  ENMT:  No difficulty hearing, tinnitus, vertigo; No sinus or throat pain  NECK: No pain or stiffness  RESPIRATORY: No cough, wheezing, chills or hemoptysis; No shortness of breath  CARDIOVASCULAR: No chest pain, palpitations, dizziness, or leg swelling  GASTROINTESTINAL: No abdominal or epigastric pain. No nausea, vomiting, or hematemesis; No diarrhea or constipation. No melena or hematochezia.  GENITOURINARY: No dysuria, frequency, hematuria, or incontinence  NEUROLOGICAL: No headaches, memory loss, loss of strength, numbness, or tremors      RADIOLOGY & ADDITIONAL TESTS:    Consultant(s) Notes Reviewed:  [x ] YES  [ ] NO    PHYSICAL EXAM:  GENERAL: NAD, well-groomed, well-developed,not in any distress ,  HEAD:  Atraumatic, Normocephalic  EYES: EOMI, PERRLA, conjunctiva and sclera clear  ENMT: No tonsillar erythema, exudates, or enlargement; Moist mucous membranes, Good dentition, No lesions  NECK: Supple, No JVD, Normal thyroid  NERVOUS SYSTEM:  Alert & Oriented X3, No focal deficit   CHEST/LUNG: Good air entry bilateral with no  rales, rhonchi, wheezing, or rubs  HEART: Regular rate and rhythm; No murmurs, rubs, or gallops  ABDOMEN: Soft, Nontender, Nondistended; Bowel sounds present  EXTREMITIES: Mild pedal edema      Care Discussed with Consultants/Other Providers [ x] YES  [ ] NO

## 2025-05-16 NOTE — DISCHARGE NOTE PROVIDER - NSDCMRMEDTOKEN_GEN_ALL_CORE_FT
acetaminophen 325 mg oral tablet: 3 tab(s) orally every 6 hours  atorvastatin 40 mg oral tablet: 1 tab(s) orally once a day (at bedtime)  ertapenem 1 g injection: 1 gram(s) intravenous every 24 hours 1 gram every 24 hours through 6/7/25  gabapentin 100 mg oral capsule: 1 cap(s) orally every 8 hours  losartan 25 mg oral tablet: 1 tab(s) orally once a day  Lovaza 1000 mg oral capsule: 2 cap(s) orally 2 times a day  metFORMIN 500 mg oral tablet: 1 tab(s) orally once a day  methocarbamol 500 mg oral tablet: 1 tab(s) orally every 12 hours  pantoprazole 40 mg oral delayed release tablet: 1 tab(s) orally once a day (before a meal)  polyethylene glycol 3350 oral powder for reconstitution: 17 gram(s) orally once a day (at bedtime)  simethicone 80 mg oral tablet, chewable: 1 tab(s) orally 2 times a day  spironolactone 25 mg oral tablet: 2 tab(s) orally once a day

## 2025-05-17 LAB
ALBUMIN SERPL ELPH-MCNC: 2.2 G/DL — LOW (ref 3.3–5)
ALP SERPL-CCNC: 109 U/L — SIGNIFICANT CHANGE UP (ref 40–120)
ALT FLD-CCNC: 28 U/L — SIGNIFICANT CHANGE UP (ref 10–45)
ANION GAP SERPL CALC-SCNC: 10 MMOL/L — SIGNIFICANT CHANGE UP (ref 5–17)
AST SERPL-CCNC: 25 U/L — SIGNIFICANT CHANGE UP (ref 10–40)
BASOPHILS # BLD AUTO: 0.06 K/UL — SIGNIFICANT CHANGE UP (ref 0–0.2)
BASOPHILS NFR BLD AUTO: 0.7 % — SIGNIFICANT CHANGE UP (ref 0–2)
BILIRUB SERPL-MCNC: 0.4 MG/DL — SIGNIFICANT CHANGE UP (ref 0.2–1.2)
BUN SERPL-MCNC: 9 MG/DL — SIGNIFICANT CHANGE UP (ref 7–23)
CALCIUM SERPL-MCNC: 9.7 MG/DL — SIGNIFICANT CHANGE UP (ref 8.4–10.5)
CHLORIDE SERPL-SCNC: 104 MMOL/L — SIGNIFICANT CHANGE UP (ref 96–108)
CO2 SERPL-SCNC: 24 MMOL/L — SIGNIFICANT CHANGE UP (ref 22–31)
CREAT SERPL-MCNC: 0.79 MG/DL — SIGNIFICANT CHANGE UP (ref 0.5–1.3)
CRP SERPL-MCNC: <3 MG/L — SIGNIFICANT CHANGE UP
EGFR: 90 ML/MIN/1.73M2 — SIGNIFICANT CHANGE UP
EGFR: 90 ML/MIN/1.73M2 — SIGNIFICANT CHANGE UP
EOSINOPHIL # BLD AUTO: 0.27 K/UL — SIGNIFICANT CHANGE UP (ref 0–0.5)
EOSINOPHIL NFR BLD AUTO: 3.1 % — SIGNIFICANT CHANGE UP (ref 0–6)
ERYTHROCYTE [SEDIMENTATION RATE] IN BLOOD: 52 MM/HR — HIGH (ref 0–15)
GLUCOSE BLDC GLUCOMTR-MCNC: 104 MG/DL — HIGH (ref 70–99)
GLUCOSE BLDC GLUCOMTR-MCNC: 110 MG/DL — HIGH (ref 70–99)
GLUCOSE BLDC GLUCOMTR-MCNC: 125 MG/DL — HIGH (ref 70–99)
GLUCOSE BLDC GLUCOMTR-MCNC: 131 MG/DL — HIGH (ref 70–99)
GLUCOSE SERPL-MCNC: 83 MG/DL — SIGNIFICANT CHANGE UP (ref 70–99)
HCT VFR BLD CALC: 33 % — LOW (ref 39–50)
HGB BLD-MCNC: 10.6 G/DL — LOW (ref 13–17)
IMM GRANULOCYTES NFR BLD AUTO: 1.2 % — HIGH (ref 0–0.9)
LYMPHOCYTES # BLD AUTO: 3.02 K/UL — SIGNIFICANT CHANGE UP (ref 1–3.3)
LYMPHOCYTES # BLD AUTO: 34.1 % — SIGNIFICANT CHANGE UP (ref 13–44)
MCHC RBC-ENTMCNC: 30.9 PG — SIGNIFICANT CHANGE UP (ref 27–34)
MCHC RBC-ENTMCNC: 32.1 G/DL — SIGNIFICANT CHANGE UP (ref 32–36)
MCV RBC AUTO: 96.2 FL — SIGNIFICANT CHANGE UP (ref 80–100)
MONOCYTES # BLD AUTO: 0.71 K/UL — SIGNIFICANT CHANGE UP (ref 0–0.9)
MONOCYTES NFR BLD AUTO: 8 % — SIGNIFICANT CHANGE UP (ref 2–14)
NEUTROPHILS # BLD AUTO: 4.68 K/UL — SIGNIFICANT CHANGE UP (ref 1.8–7.4)
NEUTROPHILS NFR BLD AUTO: 52.9 % — SIGNIFICANT CHANGE UP (ref 43–77)
NRBC BLD AUTO-RTO: 0 /100 WBCS — SIGNIFICANT CHANGE UP (ref 0–0)
PLATELET # BLD AUTO: 457 K/UL — HIGH (ref 150–400)
POTASSIUM SERPL-MCNC: 4.9 MMOL/L — SIGNIFICANT CHANGE UP (ref 3.5–5.3)
POTASSIUM SERPL-SCNC: 4.9 MMOL/L — SIGNIFICANT CHANGE UP (ref 3.5–5.3)
PROT SERPL-MCNC: 6.2 G/DL — SIGNIFICANT CHANGE UP (ref 6–8.3)
RBC # BLD: 3.43 M/UL — LOW (ref 4.2–5.8)
RBC # FLD: 23.2 % — HIGH (ref 10.3–14.5)
SODIUM SERPL-SCNC: 138 MMOL/L — SIGNIFICANT CHANGE UP (ref 135–145)
WBC # BLD: 8.85 K/UL — SIGNIFICANT CHANGE UP (ref 3.8–10.5)
WBC # FLD AUTO: 8.85 K/UL — SIGNIFICANT CHANGE UP (ref 3.8–10.5)

## 2025-05-17 PROCEDURE — 99223 1ST HOSP IP/OBS HIGH 75: CPT | Mod: GC

## 2025-05-17 PROCEDURE — 99223 1ST HOSP IP/OBS HIGH 75: CPT

## 2025-05-17 RX ADMIN — ERTAPENEM SODIUM 120 MILLIGRAM(S): 1 INJECTION, POWDER, LYOPHILIZED, FOR SOLUTION INTRAMUSCULAR; INTRAVENOUS at 21:07

## 2025-05-17 RX ADMIN — Medication 975 MILLIGRAM(S): at 06:05

## 2025-05-17 RX ADMIN — METHOCARBAMOL 500 MILLIGRAM(S): 500 TABLET, FILM COATED ORAL at 17:15

## 2025-05-17 RX ADMIN — OMEGA-3-ACID ETHYL ESTERS CAPSULES 2 GRAM(S): 1 CAPSULE, LIQUID FILLED ORAL at 17:16

## 2025-05-17 RX ADMIN — HEPARIN SODIUM 5000 UNIT(S): 1000 INJECTION INTRAVENOUS; SUBCUTANEOUS at 17:16

## 2025-05-17 RX ADMIN — Medication 975 MILLIGRAM(S): at 13:44

## 2025-05-17 RX ADMIN — Medication 40 MILLIGRAM(S): at 06:04

## 2025-05-17 RX ADMIN — GABAPENTIN 100 MILLIGRAM(S): 400 CAPSULE ORAL at 21:11

## 2025-05-17 RX ADMIN — GABAPENTIN 100 MILLIGRAM(S): 400 CAPSULE ORAL at 06:03

## 2025-05-17 RX ADMIN — Medication 975 MILLIGRAM(S): at 17:15

## 2025-05-17 RX ADMIN — HEPARIN SODIUM 5000 UNIT(S): 1000 INJECTION INTRAVENOUS; SUBCUTANEOUS at 06:04

## 2025-05-17 RX ADMIN — CLOTRIMAZOLE 1 APPLICATION(S): 1 CREAM TOPICAL at 06:46

## 2025-05-17 RX ADMIN — GABAPENTIN 100 MILLIGRAM(S): 400 CAPSULE ORAL at 13:46

## 2025-05-17 RX ADMIN — CLOTRIMAZOLE 1 APPLICATION(S): 1 CREAM TOPICAL at 17:15

## 2025-05-17 RX ADMIN — OMEGA-3-ACID ETHYL ESTERS CAPSULES 2 GRAM(S): 1 CAPSULE, LIQUID FILLED ORAL at 06:04

## 2025-05-17 RX ADMIN — Medication 80 MILLIGRAM(S): at 17:15

## 2025-05-17 RX ADMIN — Medication 1 APPLICATION(S): at 13:47

## 2025-05-17 RX ADMIN — Medication 25 MILLIGRAM(S): at 06:03

## 2025-05-17 RX ADMIN — METFORMIN HYDROCHLORIDE 500 MILLIGRAM(S): 850 TABLET ORAL at 12:13

## 2025-05-17 RX ADMIN — Medication 40 MILLIEQUIVALENT(S): at 12:13

## 2025-05-17 RX ADMIN — LOSARTAN POTASSIUM 25 MILLIGRAM(S): 100 TABLET, FILM COATED ORAL at 06:03

## 2025-05-17 RX ADMIN — Medication 80 MILLIGRAM(S): at 06:03

## 2025-05-17 RX ADMIN — Medication 1 TABLET(S): at 12:13

## 2025-05-17 RX ADMIN — Medication 975 MILLIGRAM(S): at 12:12

## 2025-05-17 RX ADMIN — Medication 975 MILLIGRAM(S): at 06:35

## 2025-05-17 RX ADMIN — POLYETHYLENE GLYCOL 3350 17 GRAM(S): 17 POWDER, FOR SOLUTION ORAL at 21:07

## 2025-05-17 RX ADMIN — METHOCARBAMOL 500 MILLIGRAM(S): 500 TABLET, FILM COATED ORAL at 06:02

## 2025-05-17 NOTE — DIETITIAN INITIAL EVALUATION ADULT - PERTINENT LABORATORY DATA
05-17    138  |  104  |  9   ----------------------------<  83  4.9   |  24  |  0.79    Ca    9.7      17 May 2025 06:01    TPro  6.2  /  Alb  2.2[L]  /  TBili  0.4  /  DBili  x   /  AST  25  /  ALT  28  /  AlkPhos  109  05-17  POCT Blood Glucose.: 125 mg/dL (05-17-25 @ 11:55)  A1C with Estimated Average Glucose Result: 7.2 % (04-25-25 @ 02:45)  A1C with Estimated Average Glucose Result: 7.3 % (04-24-25 @ 22:30)

## 2025-05-17 NOTE — DIETITIAN INITIAL EVALUATION ADULT - ADD RECOMMEND
1) Monitor Weights, Intake, Tolerance, Skin, POCT & Labwork  2) Recommend Change Supplement to Glucerna 8oz PO Daily  3) Recommend Change Diet to Consistent Carbohydrate Diet  4) Education Provided on Need for Supplementation & Consistent Carbohydrate Diet  5) Continue Nutrition Plan of Care

## 2025-05-17 NOTE — DIETITIAN INITIAL EVALUATION ADULT - PERTINENT MEDS FT
MEDICATIONS  (STANDING):  acetaminophen     Tablet .. 975 milliGRAM(s) Oral every 6 hours  atorvastatin 40 milliGRAM(s) Oral at bedtime  bacitracin   Ointment 1 Application(s) Topical three times a day  clotrimazole 1% Cream 1 Application(s) Topical two times a day  dextrose 5%. 1000 milliLiter(s) (100 mL/Hr) IV Continuous <Continuous>  dextrose 5%. 1000 milliLiter(s) (50 mL/Hr) IV Continuous <Continuous>  dextrose 50% Injectable 25 Gram(s) IV Push once  dextrose 50% Injectable 12.5 Gram(s) IV Push once  dextrose 50% Injectable 25 Gram(s) IV Push once  ertapenem  IVPB 1000 milliGRAM(s) IV Intermittent every 24 hours  gabapentin 100 milliGRAM(s) Oral every 8 hours  glucagon  Injectable 1 milliGRAM(s) IntraMuscular once  heparin   Injectable 5000 Unit(s) SubCutaneous every 12 hours  insulin lispro (ADMELOG) corrective regimen sliding scale   SubCutaneous three times a day before meals  insulin lispro (ADMELOG) corrective regimen sliding scale   SubCutaneous at bedtime  losartan 25 milliGRAM(s) Oral daily  metFORMIN 500 milliGRAM(s) Oral daily  methocarbamol 500 milliGRAM(s) Oral every 12 hours  multivitamin 1 Tablet(s) Oral daily  omega-3-Acid Ethyl Esters 2 Gram(s) Oral two times a day  pantoprazole    Tablet 40 milliGRAM(s) Oral before breakfast  polyethylene glycol 3350 17 Gram(s) Oral at bedtime  potassium chloride   Powder 40 milliEquivalent(s) Oral daily  simethicone 80 milliGRAM(s) Chew two times a day  spironolactone 25 milliGRAM(s) Oral daily    MEDICATIONS  (PRN):  bisacodyl Suppository 10 milliGRAM(s) Rectal daily PRN Constipation  dextrose Oral Gel 15 Gram(s) Oral once PRN Blood Glucose LESS THAN 70 milliGRAM(s)/deciliter  dibucaine 1% Ointment 1 Application(s) Topical four times a day PRN for anal discomfort d/t hemorrhoids  magnesium hydroxide Suspension 30 milliLiter(s) Oral every 12 hours PRN Constipation

## 2025-05-17 NOTE — DIETITIAN INITIAL EVALUATION ADULT - ORAL INTAKE PTA/DIET HISTORY
Patient Does Not Follow Diet @Home  Consumes 2 Meals a Day   Wife Usually Cooks For Patient   Does Take Vitamin/Supplements @Home (Multivitamin, Vitamin C, Zinc, Vitamin B Complex)

## 2025-05-17 NOTE — CONSULT NOTE ADULT - SUBJECTIVE AND OBJECTIVE BOX
HPI:  78yo male with PMH of HTN, HLD, DM (A1C 7.2), CKD, chronic LBP who presented as a transfer from [facility] to to [facility] for orthopedic surgery after initial ED evaluation for acute on chronic back pain had MRI revealing discitis with epidural abscess bilateral psoas abscesses. Patient is now s/p T10-Pelvis PSF with Dr. Boston on 4/24/25. Patient tolerated the procedure well without any intraoperative complications. Patient tolerated physical therapy, is weight bearing as tolerated and pain was well-controlled.    Of note patient had previously been pain seen by pain specialist in 2/2025 for chronic back pain with radiation to bilateral lateral thighs (R>L). MRI at that time showed degenerative changes and had epidural injections x2 (2/27, 3/11) with moderate pain relief.   Pain began to worsen on 4/10. He had radiofrequency ablation performed on 4/11 and since had a severe worsening of pain and radiation to R lateral thigh.   He also reported recent bowel/bladder "incontinence" requiring diaper due to his inability to get to the bathroom in time due to pain limitations but states urge and sensation remained intact.    Infectious disease consulted and co-managed for antibiotic therapy, recommended Ertapenem 1g Q24h until 6/7/25. A PICC line was placed on 5/14/25 and IR psoas drains were placed bilaterally on 4/27/25 and removed on 5/6/25. Post-operative course complicated by pseudoobstruction, seen by GI who recommended miralax daily, and seen by nephrology to co-manage electrolyte repletion. Seen by medical attending for continuity of care and management and cleared for safe discharge. As per surgeon, the patient is stable and ready for discharge. Patient transferred to [facility] on 5/16 for initiation of comprehensive rehabilitation program with 3 hours of therapies daily 5xweek. (16 May 2025 13:33)   HPI:  80yo male with PMH of HTN, HLD, DM (A1C 7.2), CKD, chronic LBP who presented as a transfer from [facility] to to [facility] for orthopedic surgery after initial ED evaluation for acute on chronic back pain had MRI revealing discitis with epidural abscess bilateral psoas abscesses. Patient is now s/p T10-Pelvis PSF with Dr. Boston on 4/24/25. Patient tolerated the procedure well without any intraoperative complications. Patient tolerated physical therapy, is weight bearing as tolerated and pain was well-controlled.    Of note patient had previously been pain seen by pain specialist in 2/2025 for chronic back pain with radiation to bilateral lateral thighs (R>L). MRI at that time showed degenerative changes and had epidural injections x2 (2/27, 3/11) with moderate pain relief.   Pain began to worsen on 4/10. He had radiofrequency ablation performed on 4/11 and since had a severe worsening of pain and radiation to R lateral thigh.   He also reported recent bowel/bladder "incontinence" requiring diaper due to his inability to get to the bathroom in time due to pain limitations but states urge and sensation remained intact.    Infectious disease consulted and co-managed for antibiotic therapy, recommended Ertapenem 1g Q24h until 6/7/25. A PICC line was placed on 5/14/25 and IR psoas drains were placed bilaterally on 4/27/25 and removed on 5/6/25. Post-operative course complicated by pseudoobstruction, seen by GI who recommended miralax daily, and seen by nephrology to co-manage electrolyte repletion. Seen by medical attending for continuity of care and management and cleared for safe discharge. As per surgeon, the patient is stable and ready for discharge. Patient transferred to [facility] on 5/16 for initiation of comprehensive rehabilitation program with 3 hours of therapies daily 5xweek. (16 May 2025 13:33)    Patient is a 79y old  Male who presents with a chief complaint of Spinal abscess (17 May 2025 16:05)      Subjective and overnight events:  Patient seen and examined at bedside. no fever, chills, sob, cp, abd pain. reports pain controlled. couldn't sleep well due to noise.    PAST MEDICAL & SURGICAL HISTORY:  HTN (hypertension)      HLD (hyperlipidemia)      DM (diabetes mellitus)      Cataract      Family history: denies family history of DM and HTN    social history: denies smoking, ETOH or illicit drug     ALLERGIES:  No Known Allergies    MEDICATIONS  (STANDING):  acetaminophen     Tablet .. 975 milliGRAM(s) Oral every 6 hours  atorvastatin 40 milliGRAM(s) Oral at bedtime  bacitracin   Ointment 1 Application(s) Topical three times a day  clotrimazole 1% Cream 1 Application(s) Topical two times a day  dextrose 5%. 1000 milliLiter(s) (100 mL/Hr) IV Continuous <Continuous>  dextrose 5%. 1000 milliLiter(s) (50 mL/Hr) IV Continuous <Continuous>  dextrose 50% Injectable 25 Gram(s) IV Push once  dextrose 50% Injectable 12.5 Gram(s) IV Push once  dextrose 50% Injectable 25 Gram(s) IV Push once  ertapenem  IVPB 1000 milliGRAM(s) IV Intermittent every 24 hours  gabapentin 100 milliGRAM(s) Oral every 8 hours  glucagon  Injectable 1 milliGRAM(s) IntraMuscular once  heparin   Injectable 5000 Unit(s) SubCutaneous every 12 hours  insulin lispro (ADMELOG) corrective regimen sliding scale   SubCutaneous three times a day before meals  insulin lispro (ADMELOG) corrective regimen sliding scale   SubCutaneous at bedtime  losartan 25 milliGRAM(s) Oral daily  metFORMIN 500 milliGRAM(s) Oral daily  methocarbamol 500 milliGRAM(s) Oral every 12 hours  multivitamin 1 Tablet(s) Oral daily  omega-3-Acid Ethyl Esters 2 Gram(s) Oral two times a day  pantoprazole    Tablet 40 milliGRAM(s) Oral before breakfast  polyethylene glycol 3350 17 Gram(s) Oral at bedtime  potassium chloride   Powder 40 milliEquivalent(s) Oral daily  simethicone 80 milliGRAM(s) Chew two times a day  spironolactone 25 milliGRAM(s) Oral daily    MEDICATIONS  (PRN):  bisacodyl Suppository 10 milliGRAM(s) Rectal daily PRN Constipation  dextrose Oral Gel 15 Gram(s) Oral once PRN Blood Glucose LESS THAN 70 milliGRAM(s)/deciliter  dibucaine 1% Ointment 1 Application(s) Topical four times a day PRN for anal discomfort d/t hemorrhoids  magnesium hydroxide Suspension 30 milliLiter(s) Oral every 12 hours PRN Constipation    Vital Signs Last 24 Hrs  T(F): 98 (17 May 2025 07:37), Max: 98 (17 May 2025 07:37)  HR: 100 (17 May 2025 07:37) (88 - 100)  BP: 132/87 (17 May 2025 07:37) (132/70 - 158/78)  RR: 16 (17 May 2025 07:37) (16 - 16)  SpO2: 99% (17 May 2025 07:37) (99% - 100%)  I&O's Summary    PHYSICAL EXAM:  General: NAD, A/O x 3  ENT: MMM  Neck: Supple, No JVD  Lungs: Clear to auscultation bilaterally  Cardio: RRR, S1/S2, No murmurs  Abdomen: Soft, Nontender, Nondistended; Bowel sounds present  Extremities: No calf tenderness, No pitting edema    LABS:                        10.6   8.85  )-----------( 457      ( 17 May 2025 06:01 )             33.0     05-17    138  |  104  |  9   ----------------------------<  83  4.9   |  24  |  0.79    Ca    9.7      17 May 2025 06:01  Phos  3.0     05-15  Mg     2.00     05-15    TPro  6.2  /  Alb  2.2  /  TBili  0.4  /  DBili  x   /  AST  25  /  ALT  28  /  AlkPhos  109  05-1        POCT Blood Glucose.: 125 mg/dL (17 May 2025 11:55)  POCT Blood Glucose.: 104 mg/dL (17 May 2025 07:33)  POCT Blood Glucose.: 127 mg/dL (16 May 2025 21:17)  POCT Blood Glucose.: 116 mg/dL (16 May 2025 17:13)      Urinalysis Basic - ( 17 May 2025 06:01 )    Color: x / Appearance: x / SG: x / pH: x  Gluc: 83 mg/dL / Ketone: x  / Bili: x / Urobili: x   Blood: x / Protein: x / Nitrite: x   Leuk Esterase: x / RBC: x / WBC x   Sq Epi: x / Non Sq Epi: x / Bacteria: x        COVID-19 PCR: NotDetec (05-16-25 @ 16:10)      RADIOLOGY & ADDITIONAL TESTS:    Care Discussed with Consultants/Other Providers:

## 2025-05-17 NOTE — DIETITIAN NUTRITION RISK NOTIFICATION - TREATMENT: THE FOLLOWING DIET HAS BEEN RECOMMENDED
Recommend Change Diet to Consistent Carbohydrate Diet  Recommend Change Supplement Glucerna 8oz PO to Daily  Nutrition Education Provided

## 2025-05-17 NOTE — CONSULT NOTE ADULT - ASSESSMENT
79yMale w/ HTN, HLD, T2DM, acute on chronic low back pain with recent MRI findings concerning for L1-L2 discitis/OM with epidural abscess, bilateral psoas abscesses now s/p T10-Pelvis PSF T12-L2 Laminectomy. Patient on  ertapenem with PICC line placement. Admitted to [place] acute inpatient rehab on 5/16/25 for ADL, gait, and functional impairments.     Epidural abscess  Osteomyelitis of lumbar spine  Bilateral Psoas abscess s/p drain removal  - S/P Decompression, spine, lumbar, posterior approach, with fusion of posterior spinal column  - Continue Ertapenem x 6 weeks through 6/7/25  - S/p placement of PICC 5/14    #HTN  - c/w losartan 25mg daily and spironolactone 25mg daily     #Hypokalemia & hypophosphatemia  - repleted   - recheck labs in AM    #HLD  - Atorvastatin 40 QHS    #T2DM  - A1c 7.2% on 4/25  - c/w Metformin 500 Qd  - LENA for now    #Ileus/Pseudoobstruction   - s/p conservative management  - Resolved    # DVT ppx:  -heparin subq    discussed with rehab team during IDR

## 2025-05-17 NOTE — DIETITIAN INITIAL EVALUATION ADULT - OTHER INFO
Initial Nutrition Assessment   79yr Old Male   Denies Food Allergy/Intolerance  Tolerates Diet Well - No Chewing/Swallowing Complications (Per Patient)  Surgical Incision on T10-L5 Spine & No Pressure Ulcers (as Per Nursing Flow Sheets)  +2 Edema Noted to Left Foot & +3 Edema Noted to Right Foot (as Per Nursing Flow Sheets)  No Recent Nausea/Vomiting/Diarrhea/Constipation (as Per Patient)

## 2025-05-17 NOTE — DIETITIAN INITIAL EVALUATION ADULT - NS FNS DIET ORDER
on Consistent Carbohydrate Low Fiber Diet w/ Thin Liquids (IDDSI Level 0) & Glucerna 8oz PO TID (Provides 660kcal-30grams of Protein)   Recommend Change Diet to Consistent Carbohydrate Diet - (Per Patient Request)   Recommend Change Supplement to Glucerna 8oz PO Daily (Provides 220kcal-10grams of Protein) - (Per Patient Request)   Education Provided on Need for Supplementation & Consistent Carbohydrate Diet

## 2025-05-17 NOTE — DIETITIAN INITIAL EVALUATION ADULT - ORAL NUTRITION SUPPLEMENTS
Recommend Decrease Supplement to Glucerna 8oz PO Daily (Provides 220kcal-10grams of Protein) - (Per Patient)

## 2025-05-18 LAB
GLUCOSE BLDC GLUCOMTR-MCNC: 101 MG/DL — HIGH (ref 70–99)
GLUCOSE BLDC GLUCOMTR-MCNC: 107 MG/DL — HIGH (ref 70–99)
GLUCOSE BLDC GLUCOMTR-MCNC: 107 MG/DL — HIGH (ref 70–99)
GLUCOSE BLDC GLUCOMTR-MCNC: 129 MG/DL — HIGH (ref 70–99)

## 2025-05-18 PROCEDURE — 99232 SBSQ HOSP IP/OBS MODERATE 35: CPT

## 2025-05-18 PROCEDURE — 99232 SBSQ HOSP IP/OBS MODERATE 35: CPT | Mod: GC

## 2025-05-18 RX ORDER — POLYETHYLENE GLYCOL 3350 17 G/17G
17 POWDER, FOR SOLUTION ORAL ONCE
Refills: 0 | Status: COMPLETED | OUTPATIENT
Start: 2025-05-18 | End: 2025-05-18

## 2025-05-18 RX ADMIN — Medication 40 MILLIEQUIVALENT(S): at 12:27

## 2025-05-18 RX ADMIN — Medication 975 MILLIGRAM(S): at 18:30

## 2025-05-18 RX ADMIN — METFORMIN HYDROCHLORIDE 500 MILLIGRAM(S): 850 TABLET ORAL at 12:27

## 2025-05-18 RX ADMIN — Medication 975 MILLIGRAM(S): at 23:28

## 2025-05-18 RX ADMIN — POLYETHYLENE GLYCOL 3350 17 GRAM(S): 17 POWDER, FOR SOLUTION ORAL at 22:17

## 2025-05-18 RX ADMIN — GABAPENTIN 100 MILLIGRAM(S): 400 CAPSULE ORAL at 05:53

## 2025-05-18 RX ADMIN — GABAPENTIN 100 MILLIGRAM(S): 400 CAPSULE ORAL at 22:17

## 2025-05-18 RX ADMIN — GABAPENTIN 100 MILLIGRAM(S): 400 CAPSULE ORAL at 16:04

## 2025-05-18 RX ADMIN — HEPARIN SODIUM 5000 UNIT(S): 1000 INJECTION INTRAVENOUS; SUBCUTANEOUS at 17:24

## 2025-05-18 RX ADMIN — CLOTRIMAZOLE 1 APPLICATION(S): 1 CREAM TOPICAL at 05:53

## 2025-05-18 RX ADMIN — METHOCARBAMOL 500 MILLIGRAM(S): 500 TABLET, FILM COATED ORAL at 05:53

## 2025-05-18 RX ADMIN — Medication 40 MILLIGRAM(S): at 06:03

## 2025-05-18 RX ADMIN — Medication 80 MILLIGRAM(S): at 17:24

## 2025-05-18 RX ADMIN — Medication 975 MILLIGRAM(S): at 00:36

## 2025-05-18 RX ADMIN — LOSARTAN POTASSIUM 25 MILLIGRAM(S): 100 TABLET, FILM COATED ORAL at 05:54

## 2025-05-18 RX ADMIN — Medication 975 MILLIGRAM(S): at 13:21

## 2025-05-18 RX ADMIN — ERTAPENEM SODIUM 120 MILLIGRAM(S): 1 INJECTION, POWDER, LYOPHILIZED, FOR SOLUTION INTRAMUSCULAR; INTRAVENOUS at 22:16

## 2025-05-18 RX ADMIN — Medication 975 MILLIGRAM(S): at 00:05

## 2025-05-18 RX ADMIN — Medication 80 MILLIGRAM(S): at 05:53

## 2025-05-18 RX ADMIN — POLYETHYLENE GLYCOL 3350 17 GRAM(S): 17 POWDER, FOR SOLUTION ORAL at 12:26

## 2025-05-18 RX ADMIN — Medication 975 MILLIGRAM(S): at 12:28

## 2025-05-18 RX ADMIN — Medication 975 MILLIGRAM(S): at 17:25

## 2025-05-18 RX ADMIN — OMEGA-3-ACID ETHYL ESTERS CAPSULES 2 GRAM(S): 1 CAPSULE, LIQUID FILLED ORAL at 05:54

## 2025-05-18 RX ADMIN — Medication 975 MILLIGRAM(S): at 05:52

## 2025-05-18 RX ADMIN — HEPARIN SODIUM 5000 UNIT(S): 1000 INJECTION INTRAVENOUS; SUBCUTANEOUS at 05:54

## 2025-05-18 RX ADMIN — Medication 1 TABLET(S): at 12:28

## 2025-05-18 RX ADMIN — METHOCARBAMOL 500 MILLIGRAM(S): 500 TABLET, FILM COATED ORAL at 17:24

## 2025-05-18 RX ADMIN — OMEGA-3-ACID ETHYL ESTERS CAPSULES 2 GRAM(S): 1 CAPSULE, LIQUID FILLED ORAL at 17:23

## 2025-05-18 RX ADMIN — Medication 25 MILLIGRAM(S): at 05:54

## 2025-05-18 NOTE — DISCHARGE NOTE NURSING/CASE MANAGEMENT/SOCIAL WORK - PATIENT PORTAL LINK FT
You can access the FollowMyHealth Patient Portal offered by [facility] by registering at the following website: [email or web address]. By joining [email or web address]’s FollowMyHealth portal, you will also be able to view your health information using other applications (apps) compatible with our system.

## 2025-05-18 NOTE — DISCHARGE NOTE NURSING/CASE MANAGEMENT/SOCIAL WORK - FINANCIAL ASSISTANCE
[facility] provides services at a reduced cost to those who are determined to be eligible through [facility]’s financial assistance program. Information regarding [facility]’s financial assistance program can be found by going to [email or web address] or by calling [phone].

## 2025-05-18 NOTE — DISCHARGE NOTE NURSING/CASE MANAGEMENT/SOCIAL WORK - NSSCNAMETXT_GEN_ALL_CORE
[facility]  [facility]/ [facility]

## 2025-05-18 NOTE — PROGRESS NOTE ADULT - ASSESSMENT
79yMale w/ HTN, HLD, T2DM, acute on chronic low back pain with recent MRI findings concerning for L1-L2 discitis/OM with epidural abscess, bilateral psoas abscesses now s/p T10-Pelvis PSF T12-L2 Laminectomy. Patient on  ertapenem with PICC line placement. Admitted to [place] acute inpatient rehab on 5/16/25 for ADL, gait, and functional impairments.     Epidural abscess  Osteomyelitis of lumbar spine  Bilateral Psoas abscess s/p drain removal  - S/P Decompression, spine, lumbar, posterior approach, with fusion of posterior spinal column  - Continue Ertapenem x 6 weeks through 6/7/25  - S/p placement of PICC 5/14    #HTN  - c/w losartan 25mg daily and spironolactone 25mg daily     #Hypokalemia & hypophosphatemia  - repleted   - recheck labs in AM    #HLD  - Atorvastatin 40 QHS    #T2DM  - A1c 7.2% on 4/25  - c/w Metformin 500 Qd  - LENA for now    #Ileus/Pseudoobstruction   - s/p conservative management  - Resolved    #Constipation  - Miralax    # DVT ppx:  -heparin subq    discussed with rehab team during IDR

## 2025-05-18 NOTE — DISCHARGE NOTE NURSING/CASE MANAGEMENT/SOCIAL WORK - NSDCPEFALRISK_GEN_ALL_CORE
For information on Fall & Injury Prevention, visit: [email or web address] OR  [email or web address] OR  https://www.cdc.gov/steadi/patient.html

## 2025-05-18 NOTE — PROGRESS NOTE ADULT - SUBJECTIVE AND OBJECTIVE BOX
Patient is a 79y old  Male who presents with a chief complaint of Lumbar epidural abscess with stenosis, diskitis, osteomyelitis, and kyphosis s/p decompressive laminectomy and fusion surgery (18 May 2025 11:33)      Subjective and overnight events:  Patient seen and examined at bedside. pain stable and overall controlled. no fever, chills, sob, cp, abd pain. did not sleep well last night due to noise. +constipation      ALLERGIES:  No Known Allergies    MEDICATIONS  (STANDING):  acetaminophen     Tablet .. 975 milliGRAM(s) Oral every 6 hours  atorvastatin 40 milliGRAM(s) Oral at bedtime  bacitracin   Ointment 1 Application(s) Topical three times a day  clotrimazole 1% Cream 1 Application(s) Topical two times a day  dextrose 5%. 1000 milliLiter(s) (100 mL/Hr) IV Continuous <Continuous>  dextrose 5%. 1000 milliLiter(s) (50 mL/Hr) IV Continuous <Continuous>  dextrose 50% Injectable 25 Gram(s) IV Push once  dextrose 50% Injectable 12.5 Gram(s) IV Push once  dextrose 50% Injectable 25 Gram(s) IV Push once  ertapenem  IVPB 1000 milliGRAM(s) IV Intermittent every 24 hours  gabapentin 100 milliGRAM(s) Oral every 8 hours  glucagon  Injectable 1 milliGRAM(s) IntraMuscular once  heparin   Injectable 5000 Unit(s) SubCutaneous every 12 hours  insulin lispro (ADMELOG) corrective regimen sliding scale   SubCutaneous three times a day before meals  insulin lispro (ADMELOG) corrective regimen sliding scale   SubCutaneous at bedtime  losartan 25 milliGRAM(s) Oral daily  metFORMIN 500 milliGRAM(s) Oral daily  methocarbamol 500 milliGRAM(s) Oral every 12 hours  multivitamin 1 Tablet(s) Oral daily  omega-3-Acid Ethyl Esters 2 Gram(s) Oral two times a day  pantoprazole    Tablet 40 milliGRAM(s) Oral before breakfast  polyethylene glycol 3350 17 Gram(s) Oral at bedtime  potassium chloride   Powder 40 milliEquivalent(s) Oral daily  simethicone 80 milliGRAM(s) Chew two times a day  spironolactone 25 milliGRAM(s) Oral daily    MEDICATIONS  (PRN):  bisacodyl Suppository 10 milliGRAM(s) Rectal daily PRN Constipation  dextrose Oral Gel 15 Gram(s) Oral once PRN Blood Glucose LESS THAN 70 milliGRAM(s)/deciliter  dibucaine 1% Ointment 1 Application(s) Topical four times a day PRN for anal discomfort d/t hemorrhoids  magnesium hydroxide Suspension 30 milliLiter(s) Oral every 12 hours PRN Constipation    Vital Signs Last 24 Hrs  T(F): 97.6 (18 May 2025 07:54), Max: 97.9 (17 May 2025 19:50)  HR: 92 (18 May 2025 07:54) (90 - 94)  BP: 138/78 (18 May 2025 07:54) (123/66 - 139/76)  RR: 14 (18 May 2025 07:54) (14 - 16)  SpO2: 100% (18 May 2025 07:54) (100% - 100%)  I&O's Summary    PHYSICAL EXAM:  General: NAD, A/O x 3  ENT: MMM  Neck: Supple, No JVD  Lungs: Clear to auscultation bilaterally  Cardio: RRR, S1/S2, No murmurs  Abdomen: Soft, Nontender, Nondistended; Bowel sounds present  Extremities: No calf tenderness, No pitting edema    LABS:                        10.6   8.85  )-----------( 457      ( 17 May 2025 06:01 )             33.0     05-17    138  |  104  |  9   ----------------------------<  83  4.9   |  24  |  0.79    Ca    9.7      17 May 2025 06:01    TPro  6.2  /  Alb  2.2  /  TBili  0.4  /  DBili  x   /  AST  25  /  ALT  28  /  AlkPhos  109  05-17          POCT Blood Glucose.: 129 mg/dL (18 May 2025 11:51)  POCT Blood Glucose.: 101 mg/dL (18 May 2025 07:39)  POCT Blood Glucose.: 131 mg/dL (17 May 2025 21:20)  POCT Blood Glucose.: 110 mg/dL (17 May 2025 16:48)      Urinalysis Basic - ( 17 May 2025 06:01 )    Color: x / Appearance: x / SG: x / pH: x  Gluc: 83 mg/dL / Ketone: x  / Bili: x / Urobili: x   Blood: x / Protein: x / Nitrite: x   Leuk Esterase: x / RBC: x / WBC x   Sq Epi: x / Non Sq Epi: x / Bacteria: x        COVID-19 PCR: NotDetec (05-16-25 @ 16:10)      RADIOLOGY & ADDITIONAL TESTS:    Care Discussed with Consultants/Other Providers:

## 2025-05-18 NOTE — PROGRESS NOTE ADULT - ASSESSMENT
· Assessment	  Mr. Adama Monsivais is a 79-year-old male patient with past medical history of HTN, HLD, DM (A1C 7.2), CKD, and chronic LBP who presented as a transfer from [facility] to to [facility] for orthopedic surgery after initial ED evaluation for acute on chronic back pain had MRI revealing discitis with epidural abscess and bilateral psoas abscesses. Patient is now s/p T10-Pelvis PSF with Dr. Boston with Plastic Surgery closure by Dr. Escamilla on 4/24/25. Patient tolerated the procedure well without any intraoperative complications. Patient tolerated physical therapy, is weight bearing as tolerated and pain was well-controlled. Of note patient had previously been pain seen by pain specialist in 2/2025 for chronic back pain with radiation to bilateral lateral thighs (R>L). MRI at that time showed degenerative changes and had epidural injections x2 (2/27, 3/11) with moderate pain relief. Pain began to worsen on 4/10. He had radiofrequency ablation performed on 4/11 and since had a severe worsening of pain and radiation to R lateral thigh. He also reported recent bowel/bladder "incontinence" requiring diaper due to his inability to get to the bathroom in time due to pain limitations but states urge and sensation remained intact. Infectious disease consulted and co-managed for antibiotic therapy, recommended Ertapenem 1g Q24h until 6/7/25. A PICC line was placed on 5/14/25 and IR psoas drains were placed bilaterally on 4/27/25 and removed on 5/6/25. 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 Seen by medical attending for continuity of care and management and cleared for safe discharge. As per surgeon, the patient is stable and ready for discharge. Patient transferred to [facility] on 5/16 for initiation of comprehensive rehabilitation program with 3 hours of therapies daily 5xweek.      #Lumbar epidural abscess with stenosis, diskitis, osteomyelitis, and kyphosis s/p decompressive laminectomy and fusion surgery  - Epidural abscess, osteomyelitis of lumbar spine, and bilateral Psoas abscess s/p drain removal      * Continue Ertapenem x 6 weeks through 6/7/25      * S/P placement of PICC 5/14      * Confirm PICC line placement with CXR on admission  - S/P Decompression, spine, lumbar, posterior approach, with fusion of posterior spinal column      * Surgical incision on mid-back 28cm vertical with sutures in place and left ROB      * Sutures/staples to be removed on post-op day #14      * ROM restrictions: Avoid any heavy lifting, bending, squatting, or twisting motions.      * Avoid NSAIDs  - Activity limitations: Decreased social, vocational and leisure activities, decreased self care and ADLs, decreased mobility  - Deficits: lower extremity weakness and bowel/bladder dysfunction  - Comprehensive Multidisciplinary Rehab Program:      * 3 hours a day, 5 days a week.      * PT 2hr/day: Focused on improving strength, endurance, coordination, balance, functional mobility, and transfers      * OT 1hr/day: Focused on improving strength, fine motor skills, coordination, posture and ADLs.      #HTN  - Starting losartan 25 Qd 5/17 AM  - Spironolactone 50 --> 25mg 25 Qd on 5/17 AM    #Electrolyte imbalance with hypokalemia & hypophosphatemia  - 40mEq KCl powder Qd  - C/w Spironolactone 25 Qd as above  - Goal K level 4    #HLD  - Atorvastatin 40 QHS    #Diabetes with hyperglycemia  - A1c 7.2% on 4/25  - Metformin 500 Qd  - LENA for now  - Hospitalist follow up in AM    #GI/Bowel:  - GI ppx: 40 Qd  - Ileus/Pseudoobstruction (Resolved)      * S/p GI/surgery eval at [facility] with conservative mgmt and improvement       * Continue Miralax/Magnesium hydroxide PRN/ simethicone PRN    #Sleep:  - Melatonin qHS    #Pain Management:  - Tylenol 975 q6  - Methocarbamol 500mg BID  - Gabapentin 100 TID    #/Bladder:   - Bladder scan x1 on admission, SC PRN  - Encourage timed voids every 4 hours while awake    #Skin/Pressure Injury:  - Skin assessment on admission:       * Skin: R groin wound 5x2cm. Bl groin MAD. C/w clotrimazole.      * Monitor Incisions: Surgical incision on mid-back 28cm vertical with sutures in place. left ROB.      * Per dc summary, "Any sutures/staples to be removed on post-op day #14 at office visit," however, sx was 4/24.       * Reached out to plastic surgery team at [facility] for clarification.    #Diet:   - Diet Consistency/Modifications: Reg CC  - MTV and Omega 3    #DVT ppx:  - Initiate heparin SQ on arrival (discussed with orthopedic surgeon Dr. Boston)  - SCDs  - Last Doppler on 5/7/25 negative    #Patient Education  - Education provided on the following:      * Admitting diagnosis and functional implications      * Functional goals      * Bladder management      * Bowel management      * Skin care management      * Intensity of service and scheduling of rehab disciplines      * Plan of care and role of interdisciplinary team conference in discharge planning      * Reconciliation of medications from prior institution    #Restrictions/Precautions:  - Weight bearing status: WBAT BLLE  - ROM restrictions: Avoid any heavy lifting, bending, squatting, or twisting motions.  - Precautions: Fall, spine  *DO NOT take any NSAIDs (Motrin, Aleve, Advil, Ibuprofen, Celebrex, ect.) until instructed by surgical team.  ---------------  Outpatient Follow-up:

## 2025-05-18 NOTE — PROGRESS NOTE ADULT - SUBJECTIVE AND OBJECTIVE BOX
Cc: Gait dysfunction    HPI: Patient with constipation today   Pain controlled, no chest pain, no N/V, no Fevers/Chills. No other new ROS  Has been tolerating rehabilitation program.    acetaminophen     Tablet .. 975 milliGRAM(s) Oral every 6 hours  atorvastatin 40 milliGRAM(s) Oral at bedtime  bacitracin   Ointment 1 Application(s) Topical three times a day  bisacodyl Suppository 10 milliGRAM(s) Rectal daily PRN  clotrimazole 1% Cream 1 Application(s) Topical two times a day  dextrose 5%. 1000 milliLiter(s) IV Continuous <Continuous>  dextrose 5%. 1000 milliLiter(s) IV Continuous <Continuous>  dextrose 50% Injectable 25 Gram(s) IV Push once  dextrose 50% Injectable 12.5 Gram(s) IV Push once  dextrose 50% Injectable 25 Gram(s) IV Push once  dextrose Oral Gel 15 Gram(s) Oral once PRN  dibucaine 1% Ointment 1 Application(s) Topical four times a day PRN  ertapenem  IVPB 1000 milliGRAM(s) IV Intermittent every 24 hours  gabapentin 100 milliGRAM(s) Oral every 8 hours  glucagon  Injectable 1 milliGRAM(s) IntraMuscular once  heparin   Injectable 5000 Unit(s) SubCutaneous every 12 hours  insulin lispro (ADMELOG) corrective regimen sliding scale   SubCutaneous three times a day before meals  insulin lispro (ADMELOG) corrective regimen sliding scale   SubCutaneous at bedtime  losartan 25 milliGRAM(s) Oral daily  magnesium hydroxide Suspension 30 milliLiter(s) Oral every 12 hours PRN  metFORMIN 500 milliGRAM(s) Oral daily  methocarbamol 500 milliGRAM(s) Oral every 12 hours  multivitamin 1 Tablet(s) Oral daily  omega-3-Acid Ethyl Esters 2 Gram(s) Oral two times a day  pantoprazole    Tablet 40 milliGRAM(s) Oral before breakfast  polyethylene glycol 3350 17 Gram(s) Oral once  polyethylene glycol 3350 17 Gram(s) Oral at bedtime  potassium chloride   Powder 40 milliEquivalent(s) Oral daily  simethicone 80 milliGRAM(s) Chew two times a day  spironolactone 25 milliGRAM(s) Oral daily      T(C): 36.4 (05-18-25 @ 07:54), Max: 36.6 (05-17-25 @ 19:50)  HR: 92 (05-18-25 @ 07:54) (90 - 94)  BP: 138/78 (05-18-25 @ 07:54) (123/66 - 139/76)  RR: 14 (05-18-25 @ 07:54) (14 - 16)  SpO2: 100% (05-18-25 @ 07:54) (100% - 100%)      Physical Exam: Gen - NAD, Comfortable  HEENT - NCAT, EOM grossly intact  Neck - Supple, No limited ROM  Pulm - CTAB, No wheeze/rhonchi/crackles  Cardiovascular - RRR  Abdomen - Soft, NT/ND, +BS  Extremities - 2+ edema in LLE and 3+ edema in RLE. No calf tenderness bl.  Neuro-     Cognitive - AAOx3     Communication - Fluent, No dysarthria     Cranial Nerves - CN grossly intact     Motor -                     LEFT    UE - ShAB 5/5, EF 5/5, EE 5/5, WE 5/5,  5/5                    RIGHT UE - ShAB 5/5, EF 4/5, EE 5/5, WE 5/5,  5/5                    LEFT    LE - HF 4/5, KE 5/5, DF 5/5, PF 5/5                    RIGHT LE - HF 2/5, KE 4/5, DF 5/5, PF 5/5        Sensory - Intact to LT in bl LEs and bl UEs     Reflexes - DTR Intact, No primitive reflexive  Psychiatric - Mood stable, Affect WNL  Skin: Surgical incision on mid-back 28cm vertical with sutures in place. left ROB.  R groin wound 5x2cm. Bl groin MAD.

## 2025-05-18 NOTE — DISCHARGE NOTE NURSING/CASE MANAGEMENT/SOCIAL WORK - NSSCCARECORD_GEN_ALL_CORE
Home Care Agency/Durable Medical Equipment Agency/[facility]/Durable Medical Equipment Agency

## 2025-05-19 ENCOUNTER — TRANSCRIPTION ENCOUNTER (OUTPATIENT)
Age: 80
End: 2025-05-19

## 2025-05-19 LAB
ALBUMIN SERPL ELPH-MCNC: 2.6 G/DL — LOW (ref 3.3–5)
ALP SERPL-CCNC: 129 U/L — HIGH (ref 40–120)
ALT FLD-CCNC: 33 U/L — SIGNIFICANT CHANGE UP (ref 10–45)
ANION GAP SERPL CALC-SCNC: 10 MMOL/L — SIGNIFICANT CHANGE UP (ref 5–17)
AST SERPL-CCNC: 27 U/L — SIGNIFICANT CHANGE UP (ref 10–40)
BASOPHILS # BLD AUTO: 0.05 K/UL — SIGNIFICANT CHANGE UP (ref 0–0.2)
BASOPHILS NFR BLD AUTO: 0.5 % — SIGNIFICANT CHANGE UP (ref 0–2)
BILIRUB SERPL-MCNC: 0.4 MG/DL — SIGNIFICANT CHANGE UP (ref 0.2–1.2)
BUN SERPL-MCNC: 8 MG/DL — SIGNIFICANT CHANGE UP (ref 7–23)
CALCIUM SERPL-MCNC: 9.9 MG/DL — SIGNIFICANT CHANGE UP (ref 8.4–10.5)
CHLORIDE SERPL-SCNC: 99 MMOL/L — SIGNIFICANT CHANGE UP (ref 96–108)
CO2 SERPL-SCNC: 25 MMOL/L — SIGNIFICANT CHANGE UP (ref 22–31)
CREAT SERPL-MCNC: 0.74 MG/DL — SIGNIFICANT CHANGE UP (ref 0.5–1.3)
CRP SERPL-MCNC: 6 MG/L — HIGH
EGFR: 92 ML/MIN/1.73M2 — SIGNIFICANT CHANGE UP
EGFR: 92 ML/MIN/1.73M2 — SIGNIFICANT CHANGE UP
EOSINOPHIL # BLD AUTO: 0.49 K/UL — SIGNIFICANT CHANGE UP (ref 0–0.5)
EOSINOPHIL NFR BLD AUTO: 5.3 % — SIGNIFICANT CHANGE UP (ref 0–6)
ERYTHROCYTE [SEDIMENTATION RATE] IN BLOOD: 59 MM/HR — HIGH (ref 0–15)
GLUCOSE BLDC GLUCOMTR-MCNC: 108 MG/DL — HIGH (ref 70–99)
GLUCOSE BLDC GLUCOMTR-MCNC: 116 MG/DL — HIGH (ref 70–99)
GLUCOSE BLDC GLUCOMTR-MCNC: 127 MG/DL — HIGH (ref 70–99)
GLUCOSE BLDC GLUCOMTR-MCNC: 86 MG/DL — SIGNIFICANT CHANGE UP (ref 70–99)
GLUCOSE SERPL-MCNC: 97 MG/DL — SIGNIFICANT CHANGE UP (ref 70–99)
HCT VFR BLD CALC: 34.1 % — LOW (ref 39–50)
HGB BLD-MCNC: 11.1 G/DL — LOW (ref 13–17)
IMM GRANULOCYTES NFR BLD AUTO: 1.3 % — HIGH (ref 0–0.9)
LYMPHOCYTES # BLD AUTO: 3.16 K/UL — SIGNIFICANT CHANGE UP (ref 1–3.3)
LYMPHOCYTES # BLD AUTO: 34.5 % — SIGNIFICANT CHANGE UP (ref 13–44)
MCHC RBC-ENTMCNC: 31.4 PG — SIGNIFICANT CHANGE UP (ref 27–34)
MCHC RBC-ENTMCNC: 32.6 G/DL — SIGNIFICANT CHANGE UP (ref 32–36)
MCV RBC AUTO: 96.6 FL — SIGNIFICANT CHANGE UP (ref 80–100)
MONOCYTES # BLD AUTO: 0.6 K/UL — SIGNIFICANT CHANGE UP (ref 0–0.9)
MONOCYTES NFR BLD AUTO: 6.6 % — SIGNIFICANT CHANGE UP (ref 2–14)
NEUTROPHILS # BLD AUTO: 4.74 K/UL — SIGNIFICANT CHANGE UP (ref 1.8–7.4)
NEUTROPHILS NFR BLD AUTO: 51.8 % — SIGNIFICANT CHANGE UP (ref 43–77)
NRBC BLD AUTO-RTO: 0 /100 WBCS — SIGNIFICANT CHANGE UP (ref 0–0)
PLATELET # BLD AUTO: 490 K/UL — HIGH (ref 150–400)
POTASSIUM SERPL-MCNC: 4.3 MMOL/L — SIGNIFICANT CHANGE UP (ref 3.5–5.3)
POTASSIUM SERPL-SCNC: 4.3 MMOL/L — SIGNIFICANT CHANGE UP (ref 3.5–5.3)
PROT SERPL-MCNC: 6.9 G/DL — SIGNIFICANT CHANGE UP (ref 6–8.3)
RBC # BLD: 3.53 M/UL — LOW (ref 4.2–5.8)
RBC # FLD: 22.1 % — HIGH (ref 10.3–14.5)
SODIUM SERPL-SCNC: 134 MMOL/L — LOW (ref 135–145)
WBC # BLD: 9.16 K/UL — SIGNIFICANT CHANGE UP (ref 3.8–10.5)
WBC # FLD AUTO: 9.16 K/UL — SIGNIFICANT CHANGE UP (ref 3.8–10.5)

## 2025-05-19 PROCEDURE — 99233 SBSQ HOSP IP/OBS HIGH 50: CPT

## 2025-05-19 RX ADMIN — GABAPENTIN 100 MILLIGRAM(S): 400 CAPSULE ORAL at 06:17

## 2025-05-19 RX ADMIN — Medication 975 MILLIGRAM(S): at 13:17

## 2025-05-19 RX ADMIN — Medication 975 MILLIGRAM(S): at 07:04

## 2025-05-19 RX ADMIN — ERTAPENEM SODIUM 120 MILLIGRAM(S): 1 INJECTION, POWDER, LYOPHILIZED, FOR SOLUTION INTRAMUSCULAR; INTRAVENOUS at 21:52

## 2025-05-19 RX ADMIN — Medication 80 MILLIGRAM(S): at 18:19

## 2025-05-19 RX ADMIN — GABAPENTIN 100 MILLIGRAM(S): 400 CAPSULE ORAL at 13:08

## 2025-05-19 RX ADMIN — ATORVASTATIN CALCIUM 40 MILLIGRAM(S): 80 TABLET, FILM COATED ORAL at 21:52

## 2025-05-19 RX ADMIN — OMEGA-3-ACID ETHYL ESTERS CAPSULES 2 GRAM(S): 1 CAPSULE, LIQUID FILLED ORAL at 06:04

## 2025-05-19 RX ADMIN — METFORMIN HYDROCHLORIDE 500 MILLIGRAM(S): 850 TABLET ORAL at 12:43

## 2025-05-19 RX ADMIN — OMEGA-3-ACID ETHYL ESTERS CAPSULES 2 GRAM(S): 1 CAPSULE, LIQUID FILLED ORAL at 18:19

## 2025-05-19 RX ADMIN — Medication 1 TABLET(S): at 12:43

## 2025-05-19 RX ADMIN — Medication 975 MILLIGRAM(S): at 18:32

## 2025-05-19 RX ADMIN — Medication 40 MILLIGRAM(S): at 06:07

## 2025-05-19 RX ADMIN — Medication 975 MILLIGRAM(S): at 06:04

## 2025-05-19 RX ADMIN — LOSARTAN POTASSIUM 25 MILLIGRAM(S): 100 TABLET, FILM COATED ORAL at 06:06

## 2025-05-19 RX ADMIN — HEPARIN SODIUM 5000 UNIT(S): 1000 INJECTION INTRAVENOUS; SUBCUTANEOUS at 18:20

## 2025-05-19 RX ADMIN — Medication 975 MILLIGRAM(S): at 12:42

## 2025-05-19 RX ADMIN — METHOCARBAMOL 500 MILLIGRAM(S): 500 TABLET, FILM COATED ORAL at 06:06

## 2025-05-19 RX ADMIN — Medication 25 MILLIGRAM(S): at 06:07

## 2025-05-19 RX ADMIN — CLOTRIMAZOLE 1 APPLICATION(S): 1 CREAM TOPICAL at 06:08

## 2025-05-19 RX ADMIN — HEPARIN SODIUM 5000 UNIT(S): 1000 INJECTION INTRAVENOUS; SUBCUTANEOUS at 06:05

## 2025-05-19 RX ADMIN — Medication 975 MILLIGRAM(S): at 18:19

## 2025-05-19 RX ADMIN — GABAPENTIN 100 MILLIGRAM(S): 400 CAPSULE ORAL at 21:52

## 2025-05-19 RX ADMIN — METHOCARBAMOL 500 MILLIGRAM(S): 500 TABLET, FILM COATED ORAL at 18:19

## 2025-05-19 RX ADMIN — POLYETHYLENE GLYCOL 3350 17 GRAM(S): 17 POWDER, FOR SOLUTION ORAL at 21:52

## 2025-05-19 RX ADMIN — Medication 40 MILLIEQUIVALENT(S): at 12:42

## 2025-05-19 RX ADMIN — Medication 80 MILLIGRAM(S): at 06:07

## 2025-05-19 NOTE — DISCHARGE NOTE PROVIDER - NSDCHHNEEDSERVICEOTHER_GEN_ALL_CORE_FT
Ertapenem to continue administration in sodium chloride 09% 50 ml, IV intermittent via PICC line, infuse over 30 minutes; weekly CBC with differentials, CMP, ESR and CRP while on IV antibiotics to be monitored by ID Dr. Alem Russell,; Contact on TEAMS messaging from 9am - 5pm - Office: [phone] (after 5 PM or weekend); end date of 06/07/25

## 2025-05-19 NOTE — DISCHARGE NOTE PROVIDER - CARE PROVIDERS DIRECT ADDRESSES
[email or web address]

## 2025-05-19 NOTE — DISCHARGE NOTE PROVIDER - NSDCMRMEDTOKEN_GEN_ALL_CORE_FT
acetaminophen 325 mg oral tablet: 3 tab(s) orally every 6 hours  atorvastatin 40 mg oral tablet: 1 tab(s) orally once a day (at bedtime)  ertapenem 1 g injection: 1 gram(s) intravenous every 24 hours 1 gram every 24 hours through 6/7/25  gabapentin 100 mg oral capsule: 1 cap(s) orally every 8 hours  losartan 25 mg oral tablet: 1 tab(s) orally once a day  Lovaza 1000 mg oral capsule: 2 cap(s) orally 2 times a day  metFORMIN 500 mg oral tablet: 1 tab(s) orally once a day  methocarbamol 500 mg oral tablet: 1 tab(s) orally every 12 hours  pantoprazole 40 mg oral delayed release tablet: 1 tab(s) orally once a day (before a meal)  polyethylene glycol 3350 oral powder for reconstitution: 17 gram(s) orally once a day (at bedtime)  simethicone 80 mg oral tablet, chewable: 1 tab(s) orally 2 times a day  spironolactone 25 mg oral tablet: 2 tab(s) orally once a day   acetaminophen 325 mg oral tablet: 3 tab(s) orally every 6 hours As needed Mild Pain (1 - 3)  atorvastatin 40 mg oral tablet: 1 tab(s) orally once a day (at bedtime)  bacitracin 500 units/g topical ointment: 1 Apply topically to affected area 3 times a day  chlorhexidine 2% topical pad: Apply topically to affected area once a day For PICC line care; external use only below chin level  clotrimazole 1% topical cream: 1 Apply topically to affected area 2 times a day  dapagliflozin 10 mg oral tablet: 1 tab(s) orally once a day  Dibucaine 1% topical ointment: Apply topically to affected area 4 times a day as needed for  anal discomfort  ertapenem 1 g injection: 1 gram(s) intravenously every 24 hours in sodium chloride 09% 50 ml, IV intermittent via PICC line, infuse over 30 minutes; weekly CBC with differentials, CMP, ESR and CRP while on IV antibiotics to be monitored by ID Dr. Alem Russell,; Contact on TEAMS messaging from 9am - 5pm - Office: [phone] (after 5 PM or weekend); end date of 06/07/25  gabapentin 100 mg oral capsule: 1 cap(s) orally every 8 hours  losartan 25 mg oral tablet: 1 tab(s) orally once a day  magnesium hydroxide 8% oral suspension: 30 milliliter(s) orally every 12 hours As needed Constipation  metFORMIN 500 mg oral tablet: 1 tab(s) orally once a day  methocarbamol 500 mg oral tablet: 1 tab(s) orally every 12 hours as needed for  muscle spasm  Multiple Vitamins oral tablet: 1 tab(s) orally once a day  omega-3 polyunsaturated fatty acids ethyl esters 1000 mg oral capsule: 2 cap(s) orally 2 times a day  pantoprazole 40 mg oral delayed release tablet: 1 tab(s) orally once a day (before a meal)  polyethylene glycol 3350 oral powder for reconstitution: 17 gram(s) orally once a day (at bedtime)  simethicone 80 mg oral tablet, chewable: 1 tab(s) orally 2 times a day  spironolactone 25 mg oral tablet: 1 tab(s) orally once a day

## 2025-05-19 NOTE — DISCHARGE NOTE PROVIDER - PROVIDER TOKENS
PROVIDER:[TOKEN:[[id number]],FOLLOWUP:[2 weeks]],PROVIDER:[TOKEN:[[id number]],FOLLOWUP:[2 months]]

## 2025-05-19 NOTE — DISCHARGE NOTE PROVIDER - NSDCCPCAREPLAN_GEN_ALL_CORE_FT
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 This was initially treated with surgical evacuation and decompression and you were started on antibiotics to treat the remaining infection. You were then transferred to [facility] inpatient rehab facility where you began participation in a comprehensive rehabilitation program with 3 hours od therapy dialy 5 days per week. During your stay you showed improvement in your symptoms and had an increase in your functional capacity. You should continue to take all medications as prescribed and follow up with your surgeon and other providers as an outpatient as detailed in your discharge summary. Please contact the name on your discharge paperwork if you have any questions or concerns regarding your care.

## 2025-05-19 NOTE — DISCHARGE NOTE PROVIDER - NSDCHHATTENDCERT_GEN_ALL_CORE
[employer]
My signature below certifies that the above stated patient is homebound and upon completion of the Face-To-Face encounter, has the need for intermittent skilled nursing, physical therapy and/or speech or occupational therapy services in their home for their current diagnosis as outlined in their initial plan of care. These services will continue to be monitored by myself or another physician.

## 2025-05-19 NOTE — PROGRESS NOTE ADULT - ASSESSMENT
Mr. Adama Monsivais is a 79-year-old male patient with past medical history of HTN, HLD, DM (A1C 7.2), CKD, and chronic LBP who presented as a transfer from [facility] to to [facility] for orthopedic surgery after initial ED evaluation for acute on chronic back pain had MRI revealing discitis with epidural abscess and bilateral psoas abscesses. Patient is now s/p T10-Pelvis PSF with Dr. Boston with Plastic Surgery closure by Dr. Escamilla on 4/24/25. Patient tolerated the procedure well without any intraoperative complications. Patient tolerated physical therapy, is weight bearing as tolerated and pain was well-controlled. Of note patient had previously been pain seen by pain specialist in 2/2025 for chronic back pain with radiation to bilateral lateral thighs (R>L). MRI at that time showed degenerative changes and had epidural injections x2 (2/27, 3/11) with moderate pain relief. Pain began to worsen on 4/10. He had radiofrequency ablation performed on 4/11 and since had a severe worsening of pain and radiation to R lateral thigh. He also reported recent bowel/bladder "incontinence" requiring diaper due to his inability to get to the bathroom in time due to pain limitations but states urge and sensation remained intact. Infectious disease consulted and co-managed for antibiotic therapy, recommended Ertapenem 1g Q24h until 6/7/25. A PICC line was placed on 5/14/25 and IR psoas drains were placed bilaterally on 4/27/25 and removed on 5/6/25. 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 Seen by medical attending for continuity of care and management and cleared for safe discharge. As per surgeon, the patient is stable and ready for discharge. Patient transferred to [facility] IRF on 5/16 for initiation of comprehensive rehabilitation program with 3 hours of therapies daily 5xweek.      #Lumbar epidural abscess with stenosis, diskitis, osteomyelitis, and kyphosis s/p decompressive laminectomy and fusion surgery  - Epidural abscess, osteomyelitis of lumbar spine, and bilateral Psoas abscess s/p drain removal      * Continue Ertapenem x 6 weeks through 6/7/25      * S/P placement of PICC 5/14      * Confirm PICC line placement with CXR on admission  - S/P Decompression, spine, lumbar, posterior approach, with fusion of posterior spinal column      * Surgical incision on mid-back 28cm vertical with sutures in place and left ROB      * Sutures/staples to be removed on post-op day #14      * ROM restrictions: Avoid any heavy lifting, bending, squatting, or twisting motions.      * Avoid NSAIDs  - Activity limitations: Decreased social, vocational and leisure activities, decreased self care and ADLs, decreased mobility  - Deficits: lower extremity weakness and bowel/bladder dysfunction  - Comprehensive Multidisciplinary Rehab Program:      * 3 hours a day, 5 days a week.      * PT 2hr/day: Focused on improving strength, endurance, coordination, balance, functional mobility, and transfers      * OT 1hr/day: Focused on improving strength, fine motor skills, coordination, posture and ADLs.      #HTN  - Starting losartan 25 Qd 5/17 AM  - Spironolactone 50 --> 25mg 25 Qd on 5/17 AM    #Electrolyte imbalance with hypokalemia & hypophosphatemia  - 40mEq KCl powder Qd  - C/w Spironolactone 25 Qd as above  - Goal K level 4    #HLD  - Atorvastatin 40 QHS    #Diabetes with hyperglycemia  - A1c 7.2% on 4/25  - Metformin 500 Qd  - LENA for now  - Hospitalist follow up in AM    #GI/Bowel:  - GI ppx: 40 Qd  - Ileus/Pseudoobstruction (Resolved)      * S/p GI/surgery eval at [facility] with conservative mgmt and improvement       * Continue Miralax/Magnesium hydroxide PRN/ simethicone PRN    #Sleep:  - Melatonin qHS    #Pain Management:  - Tylenol 975 q6  - Methocarbamol 500mg BID  - Gabapentin 100 TID    #/Bladder:   - Bladder scan x1 on admission, SC PRN  - Encourage timed voids every 4 hours while awake    #Skin/Pressure Injury:  - Skin assessment on admission:       * Skin: R groin wound 5x2cm. Bl groin MAD. C/w clotrimazole.      * Monitor Incisions: Surgical incision on mid-back 28cm vertical with sutures in place. left ROB.      * Per dc summary, "Any sutures/staples to be removed on post-op day #14 at office visit," however, sx was 4/24.       * Reached out to plastic surgery team at [facility] for clarification.    #Diet:   - Diet Consistency/Modifications: Reg CC  - MTV and Omega 3    #DVT ppx:  - Initiate heparin SQ on arrival (discussed with orthopedic surgeon Dr. Boston)  - SCDs  - Last Doppler on 5/7/25 negative    #Patient Education  - Education provided on the following:      * Admitting diagnosis and functional implications      * Functional goals      * Bladder management      * Bowel management      * Skin care management      * Intensity of service and scheduling of rehab disciplines      * Plan of care and role of interdisciplinary team conference in discharge planning      * Reconciliation of medications from prior institution    #Restrictions/Precautions:  - Weight bearing status: WBAT BLLE  - ROM restrictions: Avoid any heavy lifting, bending, squatting, or twisting motions.  - Precautions: Fall, spine  *DO NOT take any NSAIDs (Motrin, Aleve, Advil, Ibuprofen, Celebrex, ect.) until instructed by surgical team.  ---------------  Outpatient Follow-up:    Baldev Boston)  Spine Surgery  [address] [place] [address], [address]  [place] [address]  Phone: [phone]  Fax: [phone]  Follow Up Time:      -------------- Mr. Adama Monsivais is a 79-year-old male patient with past medical history of HTN, HLD, DM (A1C 7.2), CKD, and chronic LBP who presented as a transfer from [facility] to to [facility] for orthopedic surgery after initial ED evaluation for acute on chronic back pain had MRI revealing discitis with epidural abscess and bilateral psoas abscesses. Patient is now s/p T10-Pelvis PSF with Dr. Boston with Plastic Surgery closure by Dr. Escamilla on 4/24/25. Patient tolerated the procedure well without any intraoperative complications. Patient tolerated physical therapy, is weight bearing as tolerated and pain was well-controlled. Of note patient had previously been pain seen by pain specialist in 2/2025 for chronic back pain with radiation to bilateral lateral thighs (R>L). MRI at that time showed degenerative changes and had epidural injections x2 (2/27, 3/11) with moderate pain relief. Pain began to worsen on 4/10. He had radiofrequency ablation performed on 4/11 and since had a severe worsening of pain and radiation to R lateral thigh. He also reported recent bowel/bladder "incontinence" requiring diaper due to his inability to get to the bathroom in time due to pain limitations but states urge and sensation remained intact. Infectious disease consulted and co-managed for antibiotic therapy, recommended Ertapenem 1g Q24h until 6/7/25. A PICC line was placed on 5/14/25 and IR psoas drains were placed bilaterally on 4/27/25 and removed on 5/6/25. 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 Seen by medical attending for continuity of care and management and cleared for safe discharge. As per surgeon, the patient is stable and ready for discharge. Patient transferred to [facility] IRF on 5/16 for initiation of comprehensive rehabilitation program with 3 hours of therapies daily 5xweek.      #Lumbar epidural abscess with stenosis, diskitis, osteomyelitis, and kyphosis s/p decompressive laminectomy and fusion surgery  - Epidural abscess, osteomyelitis of lumbar spine, and bilateral Psoas abscess s/p drain removal      * Continue Ertapenem x 6 weeks through 6/7/25      * S/P placement of PICC 5/14      * Confirm PICC line placement with CXR on admission  - S/P Decompression, spine, lumbar, posterior approach, with fusion of posterior spinal column      * Surgical incision on mid-back 28cm vertical with sutures in place and left ROB      * Sutures/staples to be removed on post-op day #14, but changed to 5/22 per discussion with Plastic Surgery      * ROM restrictions: Avoid any heavy lifting, bending, squatting, or twisting motions.      * Avoid NSAIDs  - Activity limitations: Decreased social, vocational and leisure activities, decreased self care and ADLs, decreased mobility  - Deficits: lower extremity weakness and bowel/bladder dysfunction  - Comprehensive Multidisciplinary Rehab Program:      * 3 hours a day, 5 days a week.      * PT 2hr/day: Focused on improving strength, endurance, coordination, balance, functional mobility, and transfers      * OT 1hr/day: Focused on improving strength, fine motor skills, coordination, posture and ADLs.      #HTN  - Starting losartan 25 Qd 5/17 AM  - Spironolactone 50 --> 25mg 25 Qd on 5/17 AM    #Electrolyte imbalance with hypokalemia & hypophosphatemia  - 40mEq KCl powder Qd  - C/w Spironolactone 25 Qd as above  - Goal K level 4    #HLD  - Atorvastatin 40 QHS    #Diabetes with hyperglycemia  - A1c 7.2% on 4/25  - Metformin 500 Qd  - LENA for now  - Hospitalist follow up in AM    #GI/Bowel:  - GI ppx: 40 Qd  - Ileus/Pseudoobstruction (Resolved)      * S/p GI/surgery eval at [facility] with conservative mgmt and improvement       * Continue Miralax/Magnesium hydroxide PRN/ simethicone PRN    #Sleep:  - Melatonin qHS    #Pain Management:  - Tylenol 975 q6  - Methocarbamol 500mg BID  - Gabapentin 100 TID    #/Bladder:   - Bladder scan x1 on admission, SC PRN  - Encourage timed voids every 4 hours while awake    #Skin/Pressure Injury:  - Skin assessment on admission:       * Skin: R groin wound 5x2cm. Bl groin MAD. C/w clotrimazole.      * Monitor Incisions: Surgical incision on mid-back 28cm vertical with sutures in place. left ROB.      * Per dc summary, "Any sutures/staples to be removed on post-op day #14 at office visit," however, sx was 4/24.       * Reached out to plastic surgery team at [facility] for clarification.    #Diet:   - Diet Consistency/Modifications: Reg CC  - MTV and Omega 3    #DVT ppx:  - Initiate heparin SQ on arrival (discussed with orthopedic surgeon Dr. Boston)  - SCDs  - Last Doppler on 5/7/25 negative    #Patient Education  - Education provided on the following:      * Admitting diagnosis and functional implications      * Functional goals      * Bladder management      * Bowel management      * Skin care management      * Intensity of service and scheduling of rehab disciplines      * Plan of care and role of interdisciplinary team conference in discharge planning      * Reconciliation of medications from prior institution    #Restrictions/Precautions:  - Weight bearing status: WBAT BLLE  - ROM restrictions: Avoid any heavy lifting, bending, squatting, or twisting motions.  - Precautions: Fall, spine  *DO NOT take any NSAIDs (Motrin, Aleve, Advil, Ibuprofen, Celebrex, ect.) until instructed by surgical team.  ---------------  Outpatient Follow-up:    Baldev Bostno)  Spine Surgery  [address] [place] [address], [address]  [place] [address]  Phone: [phone]  Fax: [phone]  Follow Up Time:      --------------

## 2025-05-19 NOTE — DISCHARGE NOTE PROVIDER - CARE PROVIDER_API CALL
Baldev Boston)  Spine Surgery  [address], [address]  [place] [address]  Phone: [phone]  Fax: [phone]  Follow Up Time: 2 weeks    Darin Tenorio  Physical/Rehab Medicine  [address] [place] [address]  Phone: [phone]  Fax: [phone]  Follow Up Time: 2 months

## 2025-05-19 NOTE — DISCHARGE NOTE PROVIDER - DETAILS OF MALNUTRITION DIAGNOSIS/DIAGNOSES
This patient has been assessed with a concern for Malnutrition and was treated during this hospitalization for the following Nutrition diagnosis/diagnoses:     -  05/17/2025: Severe protein-calorie malnutrition

## 2025-05-19 NOTE — DISCHARGE NOTE PROVIDER - HOSPITAL COURSE
HPI:  Mr. Adama Monsivais is a 79-year-old male patient with past medical history of HTN, HLD, DM (A1C 7.2), CKD, and chronic LBP who presented as a transfer from [facility] to to [facility] for orthopedic surgery after initial ED evaluation for acute on chronic back pain had MRI revealing discitis with epidural abscess and bilateral psoas abscesses. Patient is now s/p T10-Pelvis PSF with Dr. Boston with Plastic Surgery closure by Dr. Escamilla on 4/24/25. Patient tolerated the procedure well without any intraoperative complications. Patient tolerated physical therapy, is weight bearing as tolerated and pain was well-controlled. Of note patient had previously been pain seen by pain specialist in 2/2025 for chronic back pain with radiation to bilateral lateral thighs (R>L). MRI at that time showed degenerative changes and had epidural injections x2 (2/27, 3/11) with moderate pain relief. Pain began to worsen on 4/10. He had radiofrequency ablation performed on 4/11 and since had a severe worsening of pain and radiation to R lateral thigh. He also reported recent bowel/bladder "incontinence" requiring diaper due to his inability to get to the bathroom in time due to pain limitations but states urge and sensation remained intact. Infectious disease consulted and co-managed for antibiotic therapy, recommended Ertapenem 1g Q24h until 6/7/25. A PICC line was placed on 5/14/25 and IR psoas drains were placed bilaterally on 4/27/25 and removed on 5/6/25. 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 Seen by medical attending for continuity of care and management and cleared for safe discharge. As per surgeon, the patient is stable and ready for discharge. Patient transferred to [facility] IRF on 5/16 for initiation of comprehensive rehabilitation program with 3 hours of therapies daily 5xweek. (16 May 2025 13:33)      Rehab course significant for ___.    All other medical co-morbidities were stable. Patient tolerated course of inpatient PT/OT/SLP rehab with significant improvements and met rehab goals prior to discharge. Discharge instructions were discussed with patient and family. All questions answered and all concerns addressed. Patient was medically cleared for discharge to ___.     Functional Status:   HPI:  Mr. Adama Monsivais is a 79-year-old male patient with past medical history of HTN, HLD, DM (A1C 7.2), CKD, and chronic LBP who presented as a transfer from [facility] to to [facility] for orthopedic surgery after initial ED evaluation for acute on chronic back pain had MRI revealing discitis with epidural abscess and bilateral psoas abscesses. Patient is now s/p T10-Pelvis PSF with Dr. Boston with Plastic Surgery closure by Dr. Escamilla on 4/24/25. Patient tolerated the procedure well without any intraoperative complications. Patient tolerated physical therapy, is weight bearing as tolerated and pain was well-controlled. Of note patient had previously been pain seen by pain specialist in 2/2025 for chronic back pain with radiation to bilateral lateral thighs (R>L). MRI at that time showed degenerative changes and had epidural injections x2 (2/27, 3/11) with moderate pain relief. Pain began to worsen on 4/10. He had radiofrequency ablation performed on 4/11 and since had a severe worsening of pain and radiation to R lateral thigh. He also reported recent bowel/bladder "incontinence" requiring diaper due to his inability to get to the bathroom in time due to pain limitations but states urge and sensation remained intact. Infectious disease consulted and co-managed for antibiotic therapy, recommended Ertapenem 1g Q24h until 6/7/25. A PICC line was placed on 5/14/25 and IR psoas drains were placed bilaterally on 4/27/25 and removed on 5/6/25. 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 Seen by medical attending for continuity of care and management and cleared for safe discharge. As per surgeon, the patient is stable and ready for discharge. Patient transferred to [facility] IRF on 5/16 for initiation of comprehensive rehabilitation program with 3 hours of therapies daily 5xweek. (16 May 2025 13:33)      Rehab course largely uncomplicated and all other medical co-morbidities were stable. Patient tolerated course of inpatient PT/OT/SLP rehab with significant improvements and met rehab goals prior to discharge. Discharge instructions were discussed with patient and family. All questions answered and all concerns addressed. Patient was medically cleared for discharge to home on 5/27/25.    Functional Status:  PT  Bed/Chair/Wheelchair (Mobility)  Current Status: 5/26: mod I with RW    Bed Mobility (Mobility)  Current Status: 5/23: MI    Walk (Mobility)  Current Status: 5/26: 150ft mod I RW    Car Transfers (Mobility)  Current Status: 5/26: mod I with RW    Stairs (Mobility)  Current Status: 5/26: 12 stairs BUE on right rail CS   HPI:  Mr. Adama Monsivais is a 79-year-old male patient with past medical history of HTN, HLD, DM (A1C 7.2), CKD, and chronic LBP who presented as a transfer from [facility] to to [facility] for orthopedic surgery after initial ED evaluation for acute on chronic back pain had MRI revealing discitis with epidural abscess and bilateral psoas abscesses. Patient is now s/p T10-Pelvis PSF with Dr. Boston with Plastic Surgery closure by Dr. Escamilla on 4/24/25. Patient tolerated the procedure well without any intraoperative complications. Patient tolerated physical therapy, is weight bearing as tolerated and pain was well-controlled. Of note patient had previously been pain seen by pain specialist in 2/2025 for chronic back pain with radiation to bilateral lateral thighs (R>L). MRI at that time showed degenerative changes and had epidural injections x2 (2/27, 3/11) with moderate pain relief. Pain began to worsen on 4/10. He had radiofrequency ablation performed on 4/11 and since had a severe worsening of pain and radiation to R lateral thigh. He also reported recent bowel/bladder "incontinence" requiring diaper due to his inability to get to the bathroom in time due to pain limitations but states urge and sensation remained intact. Infectious disease consulted and co-managed for antibiotic therapy, recommended Ertapenem 1g Q24h until 6/7/25. A PICC line was placed on 5/14/25 and IR psoas drains were placed bilaterally on 4/27/25 and removed on 5/6/25. 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 Seen by medical attending for continuity of care and management and cleared for safe discharge. As per surgeon, the patient is stable and ready for discharge. Patient transferred to [facility] IRF on 5/16 for initiation of comprehensive rehabilitation program with 3 hours of therapies daily 5xweek. (16 May 2025 13:33)      Rehab course largely uncomplicated and all other medical co-morbidities were stable. Patient tolerated course of inpatient PT/OT rehab with significant improvements and met rehab goals prior to discharge. Discharge instructions were discussed with patient and family. All questions answered and all concerns addressed. Patient was medically cleared for discharge to home on 5/27/25.    Functional Status:  PT  Bed/Chair/Wheelchair (Mobility)  Current Status: 5/26: mod I with RW    Bed Mobility (Mobility)  Current Status: 5/23: MI    Walk (Mobility)  Current Status: 5/26: 150ft mod I RW    Car Transfers (Mobility)  Current Status: 5/26: mod I with RW    Stairs (Mobility)  Current Status: 5/26: 12 stairs BUE on right rail CS

## 2025-05-19 NOTE — DISCHARGE NOTE PROVIDER - REASON FOR ADMISSION
Lumbar epidural abscess with stenosis, diskitis, osteomyelitis, and kyphosis s/p decompressive laminectomy and fusion surgery

## 2025-05-19 NOTE — PROGRESS NOTE ADULT - SUBJECTIVE AND OBJECTIVE BOX
HPI:  Mr. Adama Monsivais is a 79-year-old male patient with past medical history of HTN, HLD, DM (A1C 7.2), CKD, and chronic LBP who presented as a transfer from [facility] to to [facility] for orthopedic surgery after initial ED evaluation for acute on chronic back pain had MRI revealing discitis with epidural abscess and bilateral psoas abscesses. Patient is now s/p T10-Pelvis PSF with Dr. Boston with Plastic Surgery closure by Dr. Escamilla on 4/24/25. Patient tolerated the procedure well without any intraoperative complications. Patient tolerated physical therapy, is weight bearing as tolerated and pain was well-controlled. Of note patient had previously been pain seen by pain specialist in 2/2025 for chronic back pain with radiation to bilateral lateral thighs (R>L). MRI at that time showed degenerative changes and had epidural injections x2 (2/27, 3/11) with moderate pain relief. Pain began to worsen on 4/10. He had radiofrequency ablation performed on 4/11 and since had a severe worsening of pain and radiation to R lateral thigh. He also reported recent bowel/bladder "incontinence" requiring diaper due to his inability to get to the bathroom in time due to pain limitations but states urge and sensation remained intact. Infectious disease consulted and co-managed for antibiotic therapy, recommended Ertapenem 1g Q24h until 6/7/25. A PICC line was placed on 5/14/25 and IR psoas drains were placed bilaterally on 4/27/25 and removed on 5/6/25. 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 Seen by medical attending for continuity of care and management and cleared for safe discharge. As per surgeon, the patient is stable and ready for discharge. Patient transferred to [facility] IRF on 5/16 for initiation of comprehensive rehabilitation program with 3 hours of therapies daily 5xweek. (16 May 2025 13:33)      ___________________________________________________________________________    SUBJECTIVE/ROS  Patient was seen and evaluated at bedside today.  Reported no overnight events and is in no acute distress.  Eager to participate on the recommended rehabilitation program.  Denies any CP, SOB, REAL, palpitations, fever, chills, body aches, cough, congestion, or any other symptoms at this time.   ___________________________________________________________________________    VITALS  T(C): 36.6 (05-18-25 @ 20:16), Max: 36.6 (05-18-25 @ 20:16)  HR: 100 (05-19-25 @ 06:00) (92 - 100)  BP: 125/74 (05-19-25 @ 06:00) (125/74 - 138/78)  RR: 15 (05-18-25 @ 20:16) (14 - 15)  SpO2: 99% (05-18-25 @ 20:16) (99% - 100%)  ___________________________________________________________________________    LABS                CAPILLARY BLOOD GLUCOSE      POCT Blood Glucose.: 107 mg/dL (18 May 2025 22:11)  POCT Blood Glucose.: 107 mg/dL (18 May 2025 17:29)  POCT Blood Glucose.: 129 mg/dL (18 May 2025 11:51)  POCT Blood Glucose.: 101 mg/dL (18 May 2025 07:39)      ___________________________________________________________________________    MEDICATIONS  (STANDING):  acetaminophen     Tablet .. 975 milliGRAM(s) Oral every 6 hours  atorvastatin 40 milliGRAM(s) Oral at bedtime  bacitracin   Ointment 1 Application(s) Topical three times a day  clotrimazole 1% Cream 1 Application(s) Topical two times a day  dextrose 5%. 1000 milliLiter(s) (100 mL/Hr) IV Continuous <Continuous>  dextrose 5%. 1000 milliLiter(s) (50 mL/Hr) IV Continuous <Continuous>  dextrose 50% Injectable 25 Gram(s) IV Push once  dextrose 50% Injectable 12.5 Gram(s) IV Push once  dextrose 50% Injectable 25 Gram(s) IV Push once  ertapenem  IVPB 1000 milliGRAM(s) IV Intermittent every 24 hours  gabapentin 100 milliGRAM(s) Oral every 8 hours  glucagon  Injectable 1 milliGRAM(s) IntraMuscular once  heparin   Injectable 5000 Unit(s) SubCutaneous every 12 hours  insulin lispro (ADMELOG) corrective regimen sliding scale   SubCutaneous three times a day before meals  insulin lispro (ADMELOG) corrective regimen sliding scale   SubCutaneous at bedtime  losartan 25 milliGRAM(s) Oral daily  metFORMIN 500 milliGRAM(s) Oral daily  methocarbamol 500 milliGRAM(s) Oral every 12 hours  multivitamin 1 Tablet(s) Oral daily  omega-3-Acid Ethyl Esters 2 Gram(s) Oral two times a day  pantoprazole    Tablet 40 milliGRAM(s) Oral before breakfast  polyethylene glycol 3350 17 Gram(s) Oral at bedtime  potassium chloride   Powder 40 milliEquivalent(s) Oral daily  simethicone 80 milliGRAM(s) Chew two times a day  spironolactone 25 milliGRAM(s) Oral daily    MEDICATIONS  (PRN):  bisacodyl Suppository 10 milliGRAM(s) Rectal daily PRN Constipation  dextrose Oral Gel 15 Gram(s) Oral once PRN Blood Glucose LESS THAN 70 milliGRAM(s)/deciliter  dibucaine 1% Ointment 1 Application(s) Topical four times a day PRN for anal discomfort d/t hemorrhoids  magnesium hydroxide Suspension 30 milliLiter(s) Oral every 12 hours PRN Constipation    ___________________________________________________________________________    PHYSICAL EXAM:    Gen - NAD, Comfortable  HEENT - NCAT, EOM grossly intact  Neck - Supple, No limited ROM  Pulm - CTAB, No wheeze/rhonchi/crackles  Cardiovascular - RRR  Abdomen - Soft, NT/ND, +BS  Extremities - 2+ edema in LLE and 3+ edema in RLE. No calf tenderness bl.  Neuro-     Cognitive - AAOx3     Communication - Fluent, No dysarthria     Cranial Nerves - CN grossly intact     Motor -                     LEFT    UE - ShAB 5/5, EF 5/5, EE 5/5, WE 5/5,  5/5                    RIGHT UE - ShAB 5/5, EF 4/5, EE 5/5, WE 5/5,  5/5                    LEFT    LE - HF 4/5, KE 5/5, DF 5/5, PF 5/5                    RIGHT LE - HF 2/5, KE 4/5, DF 5/5, PF 5/5        Sensory - Intact to LT in bl LEs and bl UEs     Reflexes - DTR Intact, No primitive reflexive  Psychiatric - Mood stable, Affect WNL  Skin: Surgical incision on mid-back 28cm vertical with sutures in place. left ROB.  R groin wound 5x2cm. Bl groin MAD    ___________________________________________________________________________ HPI:  Mr. Adama Monsivais is a 79-year-old male patient with past medical history of HTN, HLD, DM (A1C 7.2), CKD, and chronic LBP who presented as a transfer from [facility] to to [facility] for orthopedic surgery after initial ED evaluation for acute on chronic back pain had MRI revealing discitis with epidural abscess and bilateral psoas abscesses. Patient is now s/p T10-Pelvis PSF with Dr. Boston with Plastic Surgery closure by Dr. Escamilla on 4/24/25. Patient tolerated the procedure well without any intraoperative complications. Patient tolerated physical therapy, is weight bearing as tolerated and pain was well-controlled. Of note patient had previously been pain seen by pain specialist in 2/2025 for chronic back pain with radiation to bilateral lateral thighs (R>L). MRI at that time showed degenerative changes and had epidural injections x2 (2/27, 3/11) with moderate pain relief. Pain began to worsen on 4/10. He had radiofrequency ablation performed on 4/11 and since had a severe worsening of pain and radiation to R lateral thigh. He also reported recent bowel/bladder "incontinence" requiring diaper due to his inability to get to the bathroom in time due to pain limitations but states urge and sensation remained intact. Infectious disease consulted and co-managed for antibiotic therapy, recommended Ertapenem 1g Q24h until 6/7/25. A PICC line was placed on 5/14/25 and IR psoas drains were placed bilaterally on 4/27/25 and removed on 5/6/25. 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 Seen by medical attending for continuity of care and management and cleared for safe discharge. As per surgeon, the patient is stable and ready for discharge. Patient transferred to [facility] IRF on 5/16 for initiation of comprehensive rehabilitation program with 3 hours of therapies daily 5xweek. (16 May 2025 13:33)    ___________________________________________________________________________    SUBJECTIVE/ROS  Patient was seen and evaluated at bedside today.  Reported no overnight events and is in no acute distress.  Reviewed wound and unremarkable. Sutures to be removed on Thursday per plastic surgery.  Eager to participate on the recommended rehabilitation program.  Denies any CP, SOB, REAL, palpitations, fever, chills, body aches, cough, congestion, or any other symptoms at this time.   ___________________________________________________________________________    VITALS  T(C): 36.6 (05-18-25 @ 20:16), Max: 36.6 (05-18-25 @ 20:16)  HR: 100 (05-19-25 @ 06:00) (92 - 100)  BP: 125/74 (05-19-25 @ 06:00) (125/74 - 138/78)  RR: 15 (05-18-25 @ 20:16) (14 - 15)  SpO2: 99% (05-18-25 @ 20:16) (99% - 100%)  ___________________________________________________________________________    LAB                        11.1   9.16  )-----------( 490      ( 19 May 2025 07:09 )             34.1     05-19    134[L]  |  99  |  8   ----------------------------<  97  4.3   |  25  |  0.74    Ca    9.9      19 May 2025 07:09    TPro  6.9  /  Alb  2.6[L]  /  TBili  0.4  /  DBili  x   /  AST  27  /  ALT  33  /  AlkPhos  129[H]  05-19    LIVER FUNCTIONS - ( 19 May 2025 07:09 )  Alb: 2.6 g/dL / Pro: 6.9 g/dL / ALK PHOS: 129 U/L / ALT: 33 U/L / AST: 27 U/L / GGT: x           ESR Historical Values  Sedimentation Rate, Erythrocyte (05.16.25 @ 17:08)   Sedimentation Rate, Erythrocyte: 52 mm/hr  Sedimentation Rate, Erythrocyte (04.26.25 @ 00:53)   Sedimentation Rate, Erythrocyte: 82 mm/hr  Sedimentation Rate, Erythrocyte (04.23.25 @ 11:52)   Sedimentation Rate, Erythrocyte: 120     CRP Historical Values  C-Reactive Protein (05.16.25 @ 17:08)   C-Reactive Protein: <3 mg/L  C-Reactive Protein (04.26.25 @ 00:53)   C-Reactive Protein: 185.2 mg/L  C-Reactive Protein (04.23.25 @ 11:52)   C-Reactive Protein: 137 mg/L    CAPILLARY BLOOD GLUCOSE  POCT Blood Glucose.: 86 mg/dL (19 May 2025 08:00)  POCT Blood Glucose.: 107 mg/dL (18 May 2025 22:11)  POCT Blood Glucose.: 107 mg/dL (18 May 2025 17:29)  POCT Blood Glucose.: 129 mg/dL (18 May 2025 11:51)    ___________________________________________________________________________    MEDICATIONS  (STANDING):  acetaminophen     Tablet .. 975 milliGRAM(s) Oral every 6 hours  atorvastatin 40 milliGRAM(s) Oral at bedtime  bacitracin   Ointment 1 Application(s) Topical three times a day  clotrimazole 1% Cream 1 Application(s) Topical two times a day  dextrose 5%. 1000 milliLiter(s) (100 mL/Hr) IV Continuous <Continuous>  dextrose 5%. 1000 milliLiter(s) (50 mL/Hr) IV Continuous <Continuous>  dextrose 50% Injectable 25 Gram(s) IV Push once  dextrose 50% Injectable 12.5 Gram(s) IV Push once  dextrose 50% Injectable 25 Gram(s) IV Push once  ertapenem  IVPB 1000 milliGRAM(s) IV Intermittent every 24 hours  gabapentin 100 milliGRAM(s) Oral every 8 hours  glucagon  Injectable 1 milliGRAM(s) IntraMuscular once  heparin   Injectable 5000 Unit(s) SubCutaneous every 12 hours  insulin lispro (ADMELOG) corrective regimen sliding scale   SubCutaneous three times a day before meals  insulin lispro (ADMELOG) corrective regimen sliding scale   SubCutaneous at bedtime  losartan 25 milliGRAM(s) Oral daily  metFORMIN 500 milliGRAM(s) Oral daily  methocarbamol 500 milliGRAM(s) Oral every 12 hours  multivitamin 1 Tablet(s) Oral daily  omega-3-Acid Ethyl Esters 2 Gram(s) Oral two times a day  pantoprazole    Tablet 40 milliGRAM(s) Oral before breakfast  polyethylene glycol 3350 17 Gram(s) Oral at bedtime  potassium chloride   Powder 40 milliEquivalent(s) Oral daily  simethicone 80 milliGRAM(s) Chew two times a day  spironolactone 25 milliGRAM(s) Oral daily    MEDICATIONS  (PRN):  bisacodyl Suppository 10 milliGRAM(s) Rectal daily PRN Constipation  dextrose Oral Gel 15 Gram(s) Oral once PRN Blood Glucose LESS THAN 70 milliGRAM(s)/deciliter  dibucaine 1% Ointment 1 Application(s) Topical four times a day PRN for anal discomfort d/t hemorrhoids  magnesium hydroxide Suspension 30 milliLiter(s) Oral every 12 hours PRN Constipation    ___________________________________________________________________________    PHYSICAL EXAM:    Gen - NAD, Comfortable  HEENT - NCAT, EOM grossly intact  Pulm - CTAB, No wheeze/rhonchi/crackles  Cardiovascular - RRR  Abdomen - Soft, NT/ND, +BS  Extremities - 2+ edema in LLE and 3+ edema in RLE. No calf tenderness bl.  Neuro-     Cognitive - AAOx3     Communication - Fluent, No dysarthria     Cranial Nerves - CN grossly intact     Motor -                     LEFT    UE - ShAB 5/5, EF 5/5, EE 5/5, WE 5/5,  5/5                    RIGHT UE - ShAB 5/5, EF 4/5, EE 5/5, WE 5/5,  5/5                    LEFT    LE - HF 4/5, KE 5/5, DF 5/5, PF 5/5                    RIGHT LE - HF 2/5, KE 4/5, DF 5/5, PF 5/5        Sensory - Intact to LT in bl LEs and bl UEs     Reflexes - DTR Intact, No primitive reflexive  Psychiatric - Mood stable, Affect WNL  Skin: Surgical incision on mid-back 28cm vertical with sutures in place. Left ROB.  R groin wound 5x2cm. Bl groin MAD    ___________________________________________________________________________

## 2025-05-20 LAB
GLUCOSE BLDC GLUCOMTR-MCNC: 100 MG/DL — HIGH (ref 70–99)
GLUCOSE BLDC GLUCOMTR-MCNC: 104 MG/DL — HIGH (ref 70–99)
GLUCOSE BLDC GLUCOMTR-MCNC: 139 MG/DL — HIGH (ref 70–99)
GLUCOSE BLDC GLUCOMTR-MCNC: 146 MG/DL — HIGH (ref 70–99)
GLUCOSE BLDC GLUCOMTR-MCNC: 85 MG/DL — SIGNIFICANT CHANGE UP (ref 70–99)

## 2025-05-20 PROCEDURE — 99232 SBSQ HOSP IP/OBS MODERATE 35: CPT

## 2025-05-20 PROCEDURE — 99233 SBSQ HOSP IP/OBS HIGH 50: CPT

## 2025-05-20 RX ORDER — INSULIN LISPRO 100 U/ML
INJECTION, SOLUTION INTRAVENOUS; SUBCUTANEOUS
Refills: 0 | Status: DISCONTINUED | OUTPATIENT
Start: 2025-05-20 | End: 2025-05-22

## 2025-05-20 RX ORDER — INSULIN LISPRO 100 U/ML
INJECTION, SOLUTION INTRAVENOUS; SUBCUTANEOUS AT BEDTIME
Refills: 0 | Status: DISCONTINUED | OUTPATIENT
Start: 2025-05-20 | End: 2025-05-22

## 2025-05-20 RX ORDER — DAPAGLIFLOZIN 5 MG/1
10 TABLET, FILM COATED ORAL DAILY
Refills: 0 | Status: DISCONTINUED | OUTPATIENT
Start: 2025-05-21 | End: 2025-05-27

## 2025-05-20 RX ADMIN — CLOTRIMAZOLE 1 APPLICATION(S): 1 CREAM TOPICAL at 17:40

## 2025-05-20 RX ADMIN — GABAPENTIN 100 MILLIGRAM(S): 400 CAPSULE ORAL at 14:29

## 2025-05-20 RX ADMIN — GABAPENTIN 100 MILLIGRAM(S): 400 CAPSULE ORAL at 05:15

## 2025-05-20 RX ADMIN — CLOTRIMAZOLE 1 APPLICATION(S): 1 CREAM TOPICAL at 05:18

## 2025-05-20 RX ADMIN — GABAPENTIN 100 MILLIGRAM(S): 400 CAPSULE ORAL at 21:38

## 2025-05-20 RX ADMIN — HEPARIN SODIUM 5000 UNIT(S): 1000 INJECTION INTRAVENOUS; SUBCUTANEOUS at 05:17

## 2025-05-20 RX ADMIN — ATORVASTATIN CALCIUM 40 MILLIGRAM(S): 80 TABLET, FILM COATED ORAL at 21:57

## 2025-05-20 RX ADMIN — OMEGA-3-ACID ETHYL ESTERS CAPSULES 2 GRAM(S): 1 CAPSULE, LIQUID FILLED ORAL at 17:35

## 2025-05-20 RX ADMIN — Medication 40 MILLIEQUIVALENT(S): at 11:46

## 2025-05-20 RX ADMIN — Medication 25 MILLIGRAM(S): at 05:16

## 2025-05-20 RX ADMIN — Medication 80 MILLIGRAM(S): at 17:35

## 2025-05-20 RX ADMIN — Medication 975 MILLIGRAM(S): at 17:35

## 2025-05-20 RX ADMIN — POLYETHYLENE GLYCOL 3350 17 GRAM(S): 17 POWDER, FOR SOLUTION ORAL at 21:38

## 2025-05-20 RX ADMIN — ERTAPENEM SODIUM 120 MILLIGRAM(S): 1 INJECTION, POWDER, LYOPHILIZED, FOR SOLUTION INTRAMUSCULAR; INTRAVENOUS at 21:20

## 2025-05-20 RX ADMIN — Medication 40 MILLIGRAM(S): at 06:33

## 2025-05-20 RX ADMIN — Medication 1 TABLET(S): at 11:46

## 2025-05-20 RX ADMIN — Medication 975 MILLIGRAM(S): at 05:13

## 2025-05-20 RX ADMIN — LOSARTAN POTASSIUM 25 MILLIGRAM(S): 100 TABLET, FILM COATED ORAL at 05:15

## 2025-05-20 RX ADMIN — Medication 975 MILLIGRAM(S): at 07:36

## 2025-05-20 RX ADMIN — Medication 975 MILLIGRAM(S): at 12:03

## 2025-05-20 RX ADMIN — HEPARIN SODIUM 5000 UNIT(S): 1000 INJECTION INTRAVENOUS; SUBCUTANEOUS at 17:35

## 2025-05-20 RX ADMIN — Medication 975 MILLIGRAM(S): at 11:46

## 2025-05-20 RX ADMIN — METHOCARBAMOL 500 MILLIGRAM(S): 500 TABLET, FILM COATED ORAL at 20:15

## 2025-05-20 RX ADMIN — METHOCARBAMOL 500 MILLIGRAM(S): 500 TABLET, FILM COATED ORAL at 05:14

## 2025-05-20 RX ADMIN — Medication 975 MILLIGRAM(S): at 18:03

## 2025-05-20 RX ADMIN — Medication 80 MILLIGRAM(S): at 05:16

## 2025-05-20 RX ADMIN — OMEGA-3-ACID ETHYL ESTERS CAPSULES 2 GRAM(S): 1 CAPSULE, LIQUID FILLED ORAL at 05:15

## 2025-05-20 NOTE — PROGRESS NOTE ADULT - ASSESSMENT
Mr. Adama Monsivais is a 79-year-old male patient with past medical history of HTN, HLD, DM (A1C 7.2), CKD, and chronic LBP who presented as a transfer from [facility] to to [facility] for orthopedic surgery after initial ED evaluation for acute on chronic back pain had MRI revealing discitis with epidural abscess and bilateral psoas abscesses. Patient is now s/p T10-Pelvis PSF with Dr. Boston with Plastic Surgery closure by Dr. Escamilla on 4/24/25. Patient tolerated the procedure well without any intraoperative complications. Patient tolerated physical therapy, is weight bearing as tolerated and pain was well-controlled. Of note patient had previously been pain seen by pain specialist in 2/2025 for chronic back pain with radiation to bilateral lateral thighs (R>L). MRI at that time showed degenerative changes and had epidural injections x2 (2/27, 3/11) with moderate pain relief. Pain began to worsen on 4/10. He had radiofrequency ablation performed on 4/11 and since had a severe worsening of pain and radiation to R lateral thigh. He also reported recent bowel/bladder "incontinence" requiring diaper due to his inability to get to the bathroom in time due to pain limitations but states urge and sensation remained intact. Infectious disease consulted and co-managed for antibiotic therapy, recommended Ertapenem 1g Q24h until 6/7/25. A PICC line was placed on 5/14/25 and IR psoas drains were placed bilaterally on 4/27/25 and removed on 5/6/25. 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 Seen by medical attending for continuity of care and management and cleared for safe discharge. As per surgeon, the patient is stable and ready for discharge. Patient transferred to [facility]F on 5/16 for initiation of comprehensive rehabilitation program with 3 hours of therapies daily 5xweek.      #Lumbar epidural abscess with stenosis, diskitis, osteomyelitis, and kyphosis s/p decompressive laminectomy and fusion surgery  - Epidural abscess, osteomyelitis of lumbar spine, and bilateral Psoas abscess s/p drain removal      * Continue Ertapenem x 6 weeks through 6/7/25      * S/P placement of PICC 5/14      * Confirm PICC line placement with CXR on admission  - S/P Decompression, spine, lumbar, posterior approach, with fusion of posterior spinal column      * Surgical incision on mid-back 28cm vertical with sutures in place and left ROB      * Sutures/staples to be removed on post-op day #14, but changed to 5/22 per discussion with Plastic Surgery      * ROM restrictions: Avoid any heavy lifting, bending, squatting, or twisting motions.      * Avoid NSAIDs  - Activity limitations: Decreased social, vocational and leisure activities, decreased self care and ADLs, decreased mobility  - Deficits: lower extremity weakness and bowel/bladder dysfunction  - Comprehensive Multidisciplinary Rehab Program:      * 3 hours a day, 5 days a week.      * PT 2hr/day: Focused on improving strength, endurance, coordination, balance, functional mobility, and transfers      * OT 1hr/day: Focused on improving strength, fine motor skills, coordination, posture and ADLs.      #HTN  - Starting losartan 25 Qd 5/17 AM  - Spironolactone 50 --> 25mg 25 Qd on 5/17 AM    #Electrolyte imbalance with hypokalemia & hypophosphatemia  - 40mEq KCl powder Qd  - C/w Spironolactone 25 Qd as above  - Goal K level 4    #HLD  - Atorvastatin 40 QHS    #Diabetes with hyperglycemia  - A1c 7.2% on 4/25  - Metformin 500 Qd  - LENA for now  - Hospitalist follow up in AM    #GI/Bowel:  - GI ppx: 40 Qd  - Ileus/Pseudoobstruction (Resolved)      * S/p GI/surgery eval at [facility] with conservative mgmt and improvement       * Continue Miralax/Magnesium hydroxide PRN/ simethicone PRN    #Sleep:  - Melatonin qHS    #Pain Management:  - Tylenol 975 q6  - Methocarbamol 500mg BID  - Gabapentin 100 TID    #/Bladder:   - Bladder scan x1 on admission, SC PRN  - Encourage timed voids every 4 hours while awake    #Skin/Pressure Injury:  - Skin assessment on admission:       * Skin: R groin wound 5x2cm. Bl groin MAD. C/w clotrimazole.      * Monitor Incisions: Surgical incision on mid-back 28cm vertical with sutures in place. left ROB.      * Per dc summary, "Any sutures/staples to be removed on post-op day #14 at office visit," however, sx was 4/24.       * Reached out to plastic surgery team at [facility] for clarification.    #Diet:   - Diet Consistency/Modifications: Reg CC  - MTV and Omega 3    #DVT ppx:  - Initiate heparin SQ on arrival (discussed with orthopedic surgeon Dr. Boston)  - SCDs  - Last Doppler on 5/7/25 negative    #Patient Education  - Education provided on the following:      * Admitting diagnosis and functional implications      * Functional goals      * Bladder management      * Bowel management      * Skin care management      * Intensity of service and scheduling of rehab disciplines      * Plan of care and role of interdisciplinary team conference in discharge planning      * Reconciliation of medications from prior institution    #Restrictions/Precautions:  - Weight bearing status: WBAT BLLE  - ROM restrictions: Avoid any heavy lifting, bending, squatting, or twisting motions.  - Precautions: Fall, spine  *DO NOT take any NSAIDs (Motrin, Aleve, Advil, Ibuprofen, Celebrex, ect.) until instructed by surgical team.  ---------------  Outpatient Follow-up:    Baldev Boston)  Spine Surgery  [address] [place] [address], [address]  [place] [address]  Phone: [phone]  Fax: [phone]  Follow Up Time:      --------------

## 2025-05-20 NOTE — PROGRESS NOTE ADULT - SUBJECTIVE AND OBJECTIVE BOX
Patient is a 79y old  Male who presents with a chief complaint of Lumbar epidural abscess with stenosis, diskitis, osteomyelitis, and kyphosis s/p decompressive laminectomy and fusion surgery (20 May 2025 11:12)      Subjective and overnight events:  Patient seen and examined at bedside. reports pain controlled. no fever, chills, sob, cp, abd pain.     ALLERGIES:  No Known Allergies    MEDICATIONS  (STANDING):  acetaminophen     Tablet .. 975 milliGRAM(s) Oral every 6 hours  atorvastatin 40 milliGRAM(s) Oral at bedtime  bacitracin   Ointment 1 Application(s) Topical three times a day  clotrimazole 1% Cream 1 Application(s) Topical two times a day  dextrose 5%. 1000 milliLiter(s) (100 mL/Hr) IV Continuous <Continuous>  dextrose 5%. 1000 milliLiter(s) (50 mL/Hr) IV Continuous <Continuous>  dextrose 50% Injectable 25 Gram(s) IV Push once  dextrose 50% Injectable 12.5 Gram(s) IV Push once  dextrose 50% Injectable 25 Gram(s) IV Push once  ertapenem  IVPB 1000 milliGRAM(s) IV Intermittent every 24 hours  gabapentin 100 milliGRAM(s) Oral every 8 hours  glucagon  Injectable 1 milliGRAM(s) IntraMuscular once  heparin   Injectable 5000 Unit(s) SubCutaneous every 12 hours  insulin lispro (ADMELOG) corrective regimen sliding scale   SubCutaneous three times a day before meals  insulin lispro (ADMELOG) corrective regimen sliding scale   SubCutaneous at bedtime  losartan 25 milliGRAM(s) Oral daily  methocarbamol 500 milliGRAM(s) Oral every 12 hours  multivitamin 1 Tablet(s) Oral daily  omega-3-Acid Ethyl Esters 2 Gram(s) Oral two times a day  pantoprazole    Tablet 40 milliGRAM(s) Oral before breakfast  polyethylene glycol 3350 17 Gram(s) Oral at bedtime  potassium chloride   Powder 40 milliEquivalent(s) Oral daily  simethicone 80 milliGRAM(s) Chew two times a day  spironolactone 25 milliGRAM(s) Oral daily    MEDICATIONS  (PRN):  bisacodyl Suppository 10 milliGRAM(s) Rectal daily PRN Constipation  dextrose Oral Gel 15 Gram(s) Oral once PRN Blood Glucose LESS THAN 70 milliGRAM(s)/deciliter  dibucaine 1% Ointment 1 Application(s) Topical four times a day PRN for anal discomfort d/t hemorrhoids  magnesium hydroxide Suspension 30 milliLiter(s) Oral every 12 hours PRN Constipation    Vital Signs Last 24 Hrs  T(F): 97.6 (20 May 2025 08:01), Max: 97.7 (19 May 2025 20:22)  HR: 100 (20 May 2025 08:01) (99 - 101)  BP: 147/78 (20 May 2025 08:01) (130/79 - 147/78)  RR: 16 (20 May 2025 08:01) (16 - 16)  SpO2: 100% (20 May 2025 08:01) (100% - 100%)  I&O's Summary    PHYSICAL EXAM:  General: NAD, A/O x 3  ENT: MMM  Neck: Supple, No JVD  Lungs: Clear to auscultation bilaterally  Cardio: RRR, S1/S2, No murmurs  Abdomen: Soft, Nontender, Nondistended; Bowel sounds present  Extremities: No calf tenderness, No pitting edema    LABS:                        11.1   9.16  )-----------( 490      ( 19 May 2025 07:09 )             34.1     05-19    134  |  99  |  8   ----------------------------<  97  4.3   |  25  |  0.74    Ca    9.9      19 May 2025 07:09    TPro  6.9  /  Alb  2.6  /  TBili  0.4  /  DBili  x   /  AST  27  /  ALT  33  /  AlkPhos  129  05-19                  POCT Blood Glucose.: 139 mg/dL (20 May 2025 09:48)  POCT Blood Glucose.: 85 mg/dL (20 May 2025 07:56)  POCT Blood Glucose.: 116 mg/dL (19 May 2025 21:50)  POCT Blood Glucose.: 108 mg/dL (19 May 2025 17:10)  POCT Blood Glucose.: 127 mg/dL (19 May 2025 12:13)      Urinalysis Basic - ( 19 May 2025 07:09 )    Color: x / Appearance: x / SG: x / pH: x  Gluc: 97 mg/dL / Ketone: x  / Bili: x / Urobili: x   Blood: x / Protein: x / Nitrite: x   Leuk Esterase: x / RBC: x / WBC x   Sq Epi: x / Non Sq Epi: x / Bacteria: x        COVID-19 PCR: NotDetec (05-16-25 @ 16:10)      RADIOLOGY & ADDITIONAL TESTS:    Care Discussed with Consultants/Other Providers:

## 2025-05-20 NOTE — PROGRESS NOTE ADULT - ASSESSMENT
79yMale w/ HTN, HLD, T2DM, acute on chronic low back pain with recent MRI findings concerning for L1-L2 discitis/OM with epidural abscess, bilateral psoas abscesses now s/p T10-Pelvis PSF T12-L2 Laminectomy. Patient on  ertapenem with PICC line placement. Admitted to [facility] acute inpatient rehab on 5/16/25 for ADL, gait, and functional impairments.     Epidural abscess  Osteomyelitis of lumbar spine  Bilateral Psoas abscess s/p drain removal  - S/P Decompression, spine, lumbar, posterior approach, with fusion of posterior spinal column  - Continue Ertapenem x 6 weeks through 6/7/25  - S/p placement of PICC 5/14    #HTN  - c/w losartan 25mg daily and spironolactone 25mg daily   - BP controlled     #Hyponatremia   - mild hyponatremia, Na 134  - monitor BMP    #HLD  - Atorvastatin 40 QHS    #T2DM  - A1c 7.2% on 4/25  - pt reports he is on  - LENA for now    #Ileus/Pseudoobstruction   - s/p conservative management  - Resolved    #Constipation  - Miralax    # DVT ppx:  -heparin subq    discussed with rehab team during IDR  79yMale w/ HTN, HLD, T2DM, acute on chronic low back pain with recent MRI findings concerning for L1-L2 discitis/OM with epidural abscess, bilateral psoas abscesses now s/p T10-Pelvis PSF T12-L2 Laminectomy. Patient on  ertapenem with PICC line placement. Admitted to [place] acute inpatient rehab on 5/16/25 for ADL, gait, and functional impairments.     Epidural abscess  Osteomyelitis of lumbar spine  Bilateral Psoas abscess s/p drain removal  - S/P Decompression, spine, lumbar, posterior approach, with fusion of posterior spinal column  - Continue Ertapenem x 6 weeks through 6/7/25  - S/p placement of PICC 5/14    #HTN  - c/w losartan 25mg daily and spironolactone 25mg daily   - BP controlled     #Hyponatremia   - mild hyponatremia, Na 134  - monitor BMP    #HLD  - Atorvastatin 40 QHS    #T2DM  - A1c 7.2% on 4/25  - pt reports he takes Farxiga at home   - switch metformin to Farxiga 10mg daily- 5/20  - LENA for now    #Ileus/Pseudoobstruction   - s/p conservative management  - Resolved    #Constipation  - Miralax    # DVT ppx:  -heparin subq    discussed with rehab team during IDR

## 2025-05-20 NOTE — PROGRESS NOTE ADULT - SUBJECTIVE AND OBJECTIVE BOX
HPI:  Mr. Adama Monsivais is a 79-year-old male patient with past medical history of HTN, HLD, DM (A1C 7.2), CKD, and chronic LBP who presented as a transfer from [facility] to to [facility] for orthopedic surgery after initial ED evaluation for acute on chronic back pain had MRI revealing discitis with epidural abscess and bilateral psoas abscesses. Patient is now s/p T10-Pelvis PSF with Dr. Boston with Plastic Surgery closure by Dr. Escamilla on 4/24/25. Patient tolerated the procedure well without any intraoperative complications. Patient tolerated physical therapy, is weight bearing as tolerated and pain was well-controlled. Of note patient had previously been pain seen by pain specialist in 2/2025 for chronic back pain with radiation to bilateral lateral thighs (R>L). MRI at that time showed degenerative changes and had epidural injections x2 (2/27, 3/11) with moderate pain relief. Pain began to worsen on 4/10. He had radiofrequency ablation performed on 4/11 and since had a severe worsening of pain and radiation to R lateral thigh. He also reported recent bowel/bladder "incontinence" requiring diaper due to his inability to get to the bathroom in time due to pain limitations but states urge and sensation remained intact. Infectious disease consulted and co-managed for antibiotic therapy, recommended Ertapenem 1g Q24h until 6/7/25. A PICC line was placed on 5/14/25 and IR psoas drains were placed bilaterally on 4/27/25 and removed on 5/6/25. 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 Seen by medical attending for continuity of care and management and cleared for safe discharge. As per surgeon, the patient is stable and ready for discharge. Patient transferred to [facility] on 5/16 for initiation of comprehensive rehabilitation program with 3 hours of therapies daily 5xweek. (16 May 2025 13:33)    ___________________________________________________________________________    SUBJECTIVE/ROS  Patient was seen and evaluated at bedside today.  Reported no overnight events and is in no acute distress.  Reviewed wound and unremarkable again. Sutures to be removed on Thursday per plastic surgery.  Case to be discussed at Interdisciplinary Team meeting later today.  A tentative discharge date will be determined per scheduled discussion.  Patient is eager to continue participation on the recommended rehabilitation program.  Denies any CP, SOB, REAL, palpitations, fever, chills, body aches, cough, congestion, or any other symptoms at this time.   ___________________________________________________________________________    Vital Signs Last 24 Hrs  T(C): 36.4 (20 May 2025 08:01), Max: 36.5 (19 May 2025 20:22)  T(F): 97.6 (20 May 2025 08:01), Max: 97.7 (19 May 2025 20:22)  HR: 100 (20 May 2025 08:01) (99 - 101)  BP: 147/78 (20 May 2025 08:01) (130/79 - 147/78)  RR: 16 (20 May 2025 08:01) (16 - 16)  SpO2: 100% (20 May 2025 08:01) (100% - 100%)    Parameters below as of 20 May 2025 08:01  Patient On (Oxygen Delivery Method): room air    ___________________________________________________________________________    LAB                        11.1   9.16  )-----------( 490      ( 19 May 2025 07:09 )             34.1     05-19    134[L]  |  99  |  8   ----------------------------<  97  4.3   |  25  |  0.74    Ca    9.9      19 May 2025 07:09    TPro  6.9  /  Alb  2.6[L]  /  TBili  0.4  /  DBili  x   /  AST  27  /  ALT  33  /  AlkPhos  129[H]  05-19    LIVER FUNCTIONS - ( 19 May 2025 07:09 )  Alb: 2.6 g/dL / Pro: 6.9 g/dL / ALK PHOS: 129 U/L / ALT: 33 U/L / AST: 27 U/L / GGT: x           ESR Historical Values  Sedimentation Rate, Erythrocyte (05.16.25 @ 17:08)   Sedimentation Rate, Erythrocyte: 52 mm/hr  Sedimentation Rate, Erythrocyte (04.26.25 @ 00:53)   Sedimentation Rate, Erythrocyte: 82 mm/hr  Sedimentation Rate, Erythrocyte (04.23.25 @ 11:52)   Sedimentation Rate, Erythrocyte: 120     CRP Historical Values  C-Reactive Protein (05.16.25 @ 17:08)   C-Reactive Protein: <3 mg/L  C-Reactive Protein (04.26.25 @ 00:53)   C-Reactive Protein: 185.2 mg/L  C-Reactive Protein (04.23.25 @ 11:52)   C-Reactive Protein: 137 mg/L    CAPILLARY BLOOD GLUCOSE  POCT Blood Glucose.: 86 mg/dL (19 May 2025 08:00)  POCT Blood Glucose.: 107 mg/dL (18 May 2025 22:11)  POCT Blood Glucose.: 107 mg/dL (18 May 2025 17:29)  POCT Blood Glucose.: 129 mg/dL (18 May 2025 11:51)    ___________________________________________________________________________    MEDICATIONS  (STANDING):  acetaminophen     Tablet .. 975 milliGRAM(s) Oral every 6 hours  atorvastatin 40 milliGRAM(s) Oral at bedtime  bacitracin   Ointment 1 Application(s) Topical three times a day  clotrimazole 1% Cream 1 Application(s) Topical two times a day  dextrose 5%. 1000 milliLiter(s) (100 mL/Hr) IV Continuous <Continuous>  dextrose 5%. 1000 milliLiter(s) (50 mL/Hr) IV Continuous <Continuous>  dextrose 50% Injectable 25 Gram(s) IV Push once  dextrose 50% Injectable 12.5 Gram(s) IV Push once  dextrose 50% Injectable 25 Gram(s) IV Push once  ertapenem  IVPB 1000 milliGRAM(s) IV Intermittent every 24 hours  gabapentin 100 milliGRAM(s) Oral every 8 hours  glucagon  Injectable 1 milliGRAM(s) IntraMuscular once  heparin   Injectable 5000 Unit(s) SubCutaneous every 12 hours  insulin lispro (ADMELOG) corrective regimen sliding scale   SubCutaneous three times a day before meals  insulin lispro (ADMELOG) corrective regimen sliding scale   SubCutaneous at bedtime  losartan 25 milliGRAM(s) Oral daily  metFORMIN 500 milliGRAM(s) Oral daily  methocarbamol 500 milliGRAM(s) Oral every 12 hours  multivitamin 1 Tablet(s) Oral daily  omega-3-Acid Ethyl Esters 2 Gram(s) Oral two times a day  pantoprazole    Tablet 40 milliGRAM(s) Oral before breakfast  polyethylene glycol 3350 17 Gram(s) Oral at bedtime  potassium chloride   Powder 40 milliEquivalent(s) Oral daily  simethicone 80 milliGRAM(s) Chew two times a day  spironolactone 25 milliGRAM(s) Oral daily    MEDICATIONS  (PRN):  bisacodyl Suppository 10 milliGRAM(s) Rectal daily PRN Constipation  dextrose Oral Gel 15 Gram(s) Oral once PRN Blood Glucose LESS THAN 70 milliGRAM(s)/deciliter  dibucaine 1% Ointment 1 Application(s) Topical four times a day PRN for anal discomfort d/t hemorrhoids  magnesium hydroxide Suspension 30 milliLiter(s) Oral every 12 hours PRN Constipation    ___________________________________________________________________________    PHYSICAL EXAM:    Gen - NAD, Comfortable  HEENT - NCAT, EOM grossly intact  Pulm - CTAB, No wheeze/rhonchi/crackles  Cardiovascular - RRR  Abdomen - Soft, NT/ND, +BS  Extremities - 2+ edema in LLE and 3+ edema in RLE. No calf tenderness bl.  Neuro-     Cognitive - AAOx3     Communication - Fluent, No dysarthria     Cranial Nerves - CN grossly intact     Motor -                     LEFT    UE - ShAB 5/5, EF 5/5, EE 5/5, WE 5/5,  5/5                    RIGHT UE - ShAB 5/5, EF 4/5, EE 5/5, WE 5/5,  5/5                    LEFT    LE - HF 4/5, KE 5/5, DF 5/5, PF 5/5                    RIGHT LE - HF 2/5, KE 4/5, DF 5/5, PF 5/5        Sensory - Intact to LT in bl LEs and bl UEs     Reflexes - DTR Intact, No primitive reflexive  Psychiatric - Mood stable, Affect WNL  Skin: Surgical incision on mid-back 28cm vertical with sutures in place. Left ROB.  R groin wound 5x2cm. Bl groin MAD    ___________________________________________________________________________

## 2025-05-21 ENCOUNTER — NON-APPOINTMENT (OUTPATIENT)
Age: 80
End: 2025-05-21

## 2025-05-21 LAB
GLUCOSE BLDC GLUCOMTR-MCNC: 109 MG/DL — HIGH (ref 70–99)
GLUCOSE BLDC GLUCOMTR-MCNC: 117 MG/DL — HIGH (ref 70–99)
GLUCOSE BLDC GLUCOMTR-MCNC: 131 MG/DL — HIGH (ref 70–99)
GLUCOSE BLDC GLUCOMTR-MCNC: 86 MG/DL — SIGNIFICANT CHANGE UP (ref 70–99)

## 2025-05-21 PROCEDURE — 99232 SBSQ HOSP IP/OBS MODERATE 35: CPT

## 2025-05-21 PROCEDURE — 99233 SBSQ HOSP IP/OBS HIGH 50: CPT

## 2025-05-21 RX ADMIN — HEPARIN SODIUM 5000 UNIT(S): 1000 INJECTION INTRAVENOUS; SUBCUTANEOUS at 17:52

## 2025-05-21 RX ADMIN — Medication 40 MILLIEQUIVALENT(S): at 12:11

## 2025-05-21 RX ADMIN — CLOTRIMAZOLE 1 APPLICATION(S): 1 CREAM TOPICAL at 05:05

## 2025-05-21 RX ADMIN — LOSARTAN POTASSIUM 25 MILLIGRAM(S): 100 TABLET, FILM COATED ORAL at 05:06

## 2025-05-21 RX ADMIN — METHOCARBAMOL 500 MILLIGRAM(S): 500 TABLET, FILM COATED ORAL at 05:05

## 2025-05-21 RX ADMIN — GABAPENTIN 100 MILLIGRAM(S): 400 CAPSULE ORAL at 05:05

## 2025-05-21 RX ADMIN — Medication 975 MILLIGRAM(S): at 12:47

## 2025-05-21 RX ADMIN — GABAPENTIN 100 MILLIGRAM(S): 400 CAPSULE ORAL at 21:28

## 2025-05-21 RX ADMIN — Medication 975 MILLIGRAM(S): at 00:28

## 2025-05-21 RX ADMIN — Medication 40 MILLIGRAM(S): at 12:08

## 2025-05-21 RX ADMIN — Medication 1 APPLICATION(S): at 14:31

## 2025-05-21 RX ADMIN — OMEGA-3-ACID ETHYL ESTERS CAPSULES 2 GRAM(S): 1 CAPSULE, LIQUID FILLED ORAL at 17:51

## 2025-05-21 RX ADMIN — Medication 975 MILLIGRAM(S): at 17:51

## 2025-05-21 RX ADMIN — ATORVASTATIN CALCIUM 40 MILLIGRAM(S): 80 TABLET, FILM COATED ORAL at 21:29

## 2025-05-21 RX ADMIN — GABAPENTIN 100 MILLIGRAM(S): 400 CAPSULE ORAL at 14:30

## 2025-05-21 RX ADMIN — CLOTRIMAZOLE 1 APPLICATION(S): 1 CREAM TOPICAL at 17:52

## 2025-05-21 RX ADMIN — Medication 1 APPLICATION(S): at 12:16

## 2025-05-21 RX ADMIN — Medication 1 TABLET(S): at 12:08

## 2025-05-21 RX ADMIN — Medication 975 MILLIGRAM(S): at 12:07

## 2025-05-21 RX ADMIN — Medication 1 APPLICATION(S): at 05:07

## 2025-05-21 RX ADMIN — METHOCARBAMOL 500 MILLIGRAM(S): 500 TABLET, FILM COATED ORAL at 17:51

## 2025-05-21 RX ADMIN — Medication 80 MILLIGRAM(S): at 05:07

## 2025-05-21 RX ADMIN — OMEGA-3-ACID ETHYL ESTERS CAPSULES 2 GRAM(S): 1 CAPSULE, LIQUID FILLED ORAL at 05:07

## 2025-05-21 RX ADMIN — Medication 975 MILLIGRAM(S): at 05:05

## 2025-05-21 RX ADMIN — Medication 80 MILLIGRAM(S): at 17:51

## 2025-05-21 RX ADMIN — Medication 25 MILLIGRAM(S): at 05:06

## 2025-05-21 RX ADMIN — Medication 975 MILLIGRAM(S): at 01:28

## 2025-05-21 RX ADMIN — ERTAPENEM SODIUM 120 MILLIGRAM(S): 1 INJECTION, POWDER, LYOPHILIZED, FOR SOLUTION INTRAMUSCULAR; INTRAVENOUS at 22:13

## 2025-05-21 RX ADMIN — DAPAGLIFLOZIN 10 MILLIGRAM(S): 5 TABLET, FILM COATED ORAL at 12:08

## 2025-05-21 RX ADMIN — Medication 975 MILLIGRAM(S): at 06:05

## 2025-05-21 RX ADMIN — HEPARIN SODIUM 5000 UNIT(S): 1000 INJECTION INTRAVENOUS; SUBCUTANEOUS at 05:05

## 2025-05-21 NOTE — CHART NOTE - NSCHARTNOTEFT_GEN_A_CORE
Nutrition Follow Up Note  Hospital Course   (Per Electronic Medical Record)    Source:  Patient [X]  Medical Record [X]      Diet:   Consistent Carbohydrate Diet w/ Thin Liquids (IDDSI Level 0)  Tolerates Diet Consistency Well  No Chewing/Swallowing Difficulties  No Recent Nausea, Vomiting, Diarrhea or Constipation (as Per Patient)  Consumes % of Meals (as Per Documentation) - States Good PO Intake/Appetite (Per Patient)   on Glucerna 8oz PO Daily (Provides 220kcal-10grams of Protein)   Patient Takes Nutrition Supplement Well  Obtained Food Preferences from Patient    Enteral/Parenteral Nutrition: Not Applicable    Current Weight: 137.1lb on 5/21  Obtain New Weight  Obtain Weights Weekly     Pertinent Medications: MEDICATIONS  (STANDING):  acetaminophen     Tablet .. 975 milliGRAM(s) Oral every 6 hours  atorvastatin 40 milliGRAM(s) Oral at bedtime  bacitracin   Ointment 1 Application(s) Topical three times a day  chlorhexidine 2% Cloths 1 Application(s) Topical daily  clotrimazole 1% Cream 1 Application(s) Topical two times a day  dapagliflozin 10 milliGRAM(s) Oral daily  dextrose 5%. 1000 milliLiter(s) (100 mL/Hr) IV Continuous <Continuous>  dextrose 5%. 1000 milliLiter(s) (50 mL/Hr) IV Continuous <Continuous>  dextrose 50% Injectable 25 Gram(s) IV Push once  dextrose 50% Injectable 12.5 Gram(s) IV Push once  dextrose 50% Injectable 25 Gram(s) IV Push once  ertapenem  IVPB 1000 milliGRAM(s) IV Intermittent every 24 hours  gabapentin 100 milliGRAM(s) Oral every 8 hours  glucagon  Injectable 1 milliGRAM(s) IntraMuscular once  heparin   Injectable 5000 Unit(s) SubCutaneous every 12 hours  insulin lispro (ADMELOG) corrective regimen sliding scale   SubCutaneous three times a day before meals  insulin lispro (ADMELOG) corrective regimen sliding scale   SubCutaneous at bedtime  losartan 25 milliGRAM(s) Oral daily  methocarbamol 500 milliGRAM(s) Oral every 12 hours  multivitamin 1 Tablet(s) Oral daily  omega-3-Acid Ethyl Esters 2 Gram(s) Oral two times a day  pantoprazole    Tablet 40 milliGRAM(s) Oral before breakfast  polyethylene glycol 3350 17 Gram(s) Oral at bedtime  potassium chloride   Powder 40 milliEquivalent(s) Oral daily  simethicone 80 milliGRAM(s) Chew two times a day  spironolactone 25 milliGRAM(s) Oral daily    MEDICATIONS  (PRN):  bisacodyl Suppository 10 milliGRAM(s) Rectal daily PRN Constipation  dextrose Oral Gel 15 Gram(s) Oral once PRN Blood Glucose LESS THAN 70 milliGRAM(s)/deciliter  dibucaine 1% Ointment 1 Application(s) Topical four times a day PRN for anal discomfort d/t hemorrhoids  magnesium hydroxide Suspension 30 milliLiter(s) Oral every 12 hours PRN Constipation    Pertinent Labs:  05-19 Na134 mmol/L[L] Glu 97 mg/dL K+ 4.3 mmol/L Cr  0.74 mg/dL BUN 8 mg/dL 05-15 Phos 3.0 mg/dL 05-19 Alb 2.6 g/dL[L]    POCT (over Last 3 Days) - Ranging from     Skin: No Pressure Ulcers  Surgical Incision on T-L Spine  (as Per Nursing Flow Sheet)     Edema: +2 Edema Noted to Left Foot  +3 Edema Noted to Right Foot  (as Per Documentation)    Last Bowel Movement: on 5/21    Estimated Needs:   [X] No Change Since Previous Assessment    Previous Nutrition Diagnosis:   Severe Malnutrition     Nutrition Diagnosis is [X] Ongoing - Continues on Nutrition Supplement & Patient Takes Nutrition Supplement     New Nutrition Diagnosis: [X] Not Applicable    Interventions:   1. Recommend Continue Nutrition Plan of Care     Monitoring & Evaluation:   [X] Weights (as Needed/Available)  [X] PO Intake   [X] Skin Integrity   [X] Follow Up (Per Protocol)  [X] Tolerance to Diet Prescription   [X] Other: Labs & POCT    Registered Dietitian/Nutritionist Remains Available.  Gene Fofana RDN, CDN  Senior Dietitian    Phone# [phone]

## 2025-05-21 NOTE — PROGRESS NOTE ADULT - SUBJECTIVE AND OBJECTIVE BOX
Patient is a 79y old  Male who presents with a chief complaint of Lumbar epidural abscess with stenosis, diskitis, osteomyelitis, and kyphosis s/p decompressive laminectomy and fusion surgery (21 May 2025 06:41)      Subjective and overnight events:  Patient seen and examined at bedside. no fever, chills, sob, cp, abd pain. slept better last night. + BM     ALLERGIES:  No Known Allergies    MEDICATIONS  (STANDING):  acetaminophen     Tablet .. 975 milliGRAM(s) Oral every 6 hours  atorvastatin 40 milliGRAM(s) Oral at bedtime  bacitracin   Ointment 1 Application(s) Topical three times a day  chlorhexidine 2% Cloths 1 Application(s) Topical daily  clotrimazole 1% Cream 1 Application(s) Topical two times a day  dapagliflozin 10 milliGRAM(s) Oral daily  dextrose 5%. 1000 milliLiter(s) (100 mL/Hr) IV Continuous <Continuous>  dextrose 5%. 1000 milliLiter(s) (50 mL/Hr) IV Continuous <Continuous>  dextrose 50% Injectable 25 Gram(s) IV Push once  dextrose 50% Injectable 12.5 Gram(s) IV Push once  dextrose 50% Injectable 25 Gram(s) IV Push once  ertapenem  IVPB 1000 milliGRAM(s) IV Intermittent every 24 hours  gabapentin 100 milliGRAM(s) Oral every 8 hours  glucagon  Injectable 1 milliGRAM(s) IntraMuscular once  heparin   Injectable 5000 Unit(s) SubCutaneous every 12 hours  insulin lispro (ADMELOG) corrective regimen sliding scale   SubCutaneous three times a day before meals  insulin lispro (ADMELOG) corrective regimen sliding scale   SubCutaneous at bedtime  losartan 25 milliGRAM(s) Oral daily  methocarbamol 500 milliGRAM(s) Oral every 12 hours  multivitamin 1 Tablet(s) Oral daily  omega-3-Acid Ethyl Esters 2 Gram(s) Oral two times a day  pantoprazole    Tablet 40 milliGRAM(s) Oral before breakfast  polyethylene glycol 3350 17 Gram(s) Oral at bedtime  potassium chloride   Powder 40 milliEquivalent(s) Oral daily  simethicone 80 milliGRAM(s) Chew two times a day  spironolactone 25 milliGRAM(s) Oral daily    MEDICATIONS  (PRN):  bisacodyl Suppository 10 milliGRAM(s) Rectal daily PRN Constipation  dextrose Oral Gel 15 Gram(s) Oral once PRN Blood Glucose LESS THAN 70 milliGRAM(s)/deciliter  dibucaine 1% Ointment 1 Application(s) Topical four times a day PRN for anal discomfort d/t hemorrhoids  magnesium hydroxide Suspension 30 milliLiter(s) Oral every 12 hours PRN Constipation    Vital Signs Last 24 Hrs  T(F): 97.6 (21 May 2025 08:12), Max: 98.1 (20 May 2025 20:18)  HR: 93 (21 May 2025 08:12) (92 - 105)  BP: 144/83 (21 May 2025 08:12) (111/69 - 144/83)  RR: 16 (21 May 2025 08:12) (16 - 16)  SpO2: 100% (21 May 2025 08:12) (97% - 100%)  I&O's Summary    PHYSICAL EXAM:  General: NAD, A/O x 3  ENT: MMM  Neck: Supple, No JVD  Lungs: Clear to auscultation bilaterally  Cardio: RRR, S1/S2, No murmurs  Abdomen: Soft, Nontender, Nondistended; Bowel sounds present  Extremities: No calf tenderness, No pitting edema    LABS:                        11.1   9.16  )-----------( 490      ( 19 May 2025 07:09 )             34.1     05-19    134  |  99  |  8   ----------------------------<  97  4.3   |  25  |  0.74    Ca    9.9      19 May 2025 07:09    TPro  6.9  /  Alb  2.6  /  TBili  0.4  /  DBili  x   /  AST  27  /  ALT  33  /  AlkPhos  129  05-19        POCT Blood Glucose.: 86 mg/dL (21 May 2025 08:05)  POCT Blood Glucose.: 104 mg/dL (20 May 2025 21:36)  POCT Blood Glucose.: 100 mg/dL (20 May 2025 17:01)  POCT Blood Glucose.: 146 mg/dL (20 May 2025 11:51)      Urinalysis Basic - ( 19 May 2025 07:09 )    Color: x / Appearance: x / SG: x / pH: x  Gluc: 97 mg/dL / Ketone: x  / Bili: x / Urobili: x   Blood: x / Protein: x / Nitrite: x   Leuk Esterase: x / RBC: x / WBC x   Sq Epi: x / Non Sq Epi: x / Bacteria: x        COVID-19 PCR: NotDetec (05-16-25 @ 16:10)      RADIOLOGY & ADDITIONAL TESTS:    Care Discussed with Consultants/Other Providers:

## 2025-05-21 NOTE — PROGRESS NOTE ADULT - ASSESSMENT
79yMale w/ HTN, HLD, T2DM, acute on chronic low back pain with recent MRI findings concerning for L1-L2 discitis/OM with epidural abscess, bilateral psoas abscesses now s/p T10-Pelvis PSF T12-L2 Laminectomy. Patient on  ertapenem with PICC line placement. Admitted to [facility] inpatient rehab on 5/16/25 for ADL, gait, and functional impairments.     Epidural abscess  Osteomyelitis of lumbar spine  Bilateral Psoas abscess s/p drain removal  - S/P Decompression, spine, lumbar, posterior approach, with fusion of posterior spinal column  - Continue Ertapenem x 6 weeks through 6/7/25  - S/p placement of PICC 5/14    HTN  - c/w losartan 25mg daily and spironolactone 25mg daily   - BP controlled     Hyponatremia   - mild hyponatremia, Na 134  - monitor BMP    HLD  - Atorvastatin 40 QHS    T2DM  - A1c 7.2% on 4/25  - pt reports he takes Farxiga at home   - switch metformin to Farxiga 10mg daily- 5/20  - continue ISS  - monitor FS    #Ileus/Pseudoobstruction   - s/p conservative management  - Resolved    #Constipation  - Miralax    # DVT ppx:  -heparin subq    discussed with rehab team during IDR

## 2025-05-21 NOTE — PROGRESS NOTE ADULT - ASSESSMENT
Mr. Adama Monsivais is a 79-year-old male patient with past medical history of HTN, HLD, DM (A1C 7.2), CKD, and chronic LBP who presented as a transfer from [facility] to to [facility] for orthopedic surgery after initial ED evaluation for acute on chronic back pain had MRI revealing discitis with epidural abscess and bilateral psoas abscesses. Patient is now s/p T10-Pelvis PSF with Dr. Boston with Plastic Surgery closure by Dr. Escamilla on 4/24/25. Patient tolerated the procedure well without any intraoperative complications. Patient tolerated physical therapy, is weight bearing as tolerated and pain was well-controlled. Of note patient had previously been pain seen by pain specialist in 2/2025 for chronic back pain with radiation to bilateral lateral thighs (R>L). MRI at that time showed degenerative changes and had epidural injections x2 (2/27, 3/11) with moderate pain relief. Pain began to worsen on 4/10. He had radiofrequency ablation performed on 4/11 and since had a severe worsening of pain and radiation to R lateral thigh. He also reported recent bowel/bladder "incontinence" requiring diaper due to his inability to get to the bathroom in time due to pain limitations but states urge and sensation remained intact. Infectious disease consulted and co-managed for antibiotic therapy, recommended Ertapenem 1g Q24h until 6/7/25. A PICC line was placed on 5/14/25 and IR psoas drains were placed bilaterally on 4/27/25 and removed on 5/6/25. 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 Seen by medical attending for continuity of care and management and cleared for safe discharge. As per surgeon, the patient is stable and ready for discharge. Patient transferred to [facility] on 5/16 for initiation of comprehensive rehabilitation program with 3 hours of therapies daily 5xweek.      #Lumbar epidural abscess with stenosis, diskitis, osteomyelitis, and kyphosis s/p decompressive laminectomy and fusion surgery  - Epidural abscess, osteomyelitis of lumbar spine, and bilateral Psoas abscess s/p drain removal      * Continue Ertapenem x 6 weeks through 6/7/25      * S/P placement of PICC 5/14      * Confirm PICC line placement with CXR on admission  - S/P Decompression, spine, lumbar, posterior approach, with fusion of posterior spinal column      * Surgical incision on mid-back 28cm vertical with sutures in place and left ROB      * Sutures/staples to be removed on post-op day #14, but changed to 5/22 per discussion with Plastic Surgery      * ROM restrictions: Avoid any heavy lifting, bending, squatting, or twisting motions.      * Avoid NSAIDs  - Activity limitations: Decreased social, vocational and leisure activities, decreased self care and ADLs, decreased mobility  - Deficits: lower extremity weakness and bowel/bladder dysfunction  - Comprehensive Multidisciplinary Rehab Program:      * 3 hours a day, 5 days a week.      * PT 2hr/day: Focused on improving strength, endurance, coordination, balance, functional mobility, and transfers      * OT 1hr/day: Focused on improving strength, fine motor skills, coordination, posture and ADLs.      #HTN  - Starting losartan 25 Qd 5/17 AM  - Spironolactone 50 --> 25mg 25 Qd on 5/17 AM    #Electrolyte imbalance with hypokalemia & hypophosphatemia  - 40mEq KCl powder Qd  - C/w Spironolactone 25 Qd as above  - Goal K level 4    #HLD  - Atorvastatin 40 QHS    #Diabetes with hyperglycemia  - A1c 7.2% on 4/25  - Metformin 500 Qd  - LENA for now  - Hospitalist follow up in AM    #GI/Bowel:  - GI ppx: 40 Qd  - Ileus/Pseudoobstruction (Resolved)      * S/p GI/surgery eval at [facility] with conservative mgmt and improvement       * Continue Miralax/Magnesium hydroxide PRN/ simethicone PRN    #Sleep:  - Melatonin qHS    #Pain Management:  - Tylenol 975 q6  - Methocarbamol 500mg BID  - Gabapentin 100 TID    #/Bladder:   - Bladder scan x1 on admission, SC PRN  - Encourage timed voids every 4 hours while awake    #Skin/Pressure Injury:  - Skin assessment on admission:       * Skin: R groin wound 5x2cm. Bl groin MAD. C/w clotrimazole.      * Monitor Incisions: Surgical incision on mid-back 28cm vertical with sutures in place. left ROB.      * Per dc summary, "Any sutures/staples to be removed on post-op day #14 at office visit," however, sx was 4/24.       * Reached out to plastic surgery team at [facility] for clarification.    #Diet:   - Diet Consistency/Modifications: Reg CC  - MTV and Omega 3    #DVT ppx:  - Initiate heparin SQ on arrival (discussed with orthopedic surgeon Dr. Boston)  - SCDs  - Last Doppler on 5/7/25 negative    #Patient Education  - Education provided on the following:      * Admitting diagnosis and functional implications      * Functional goals      * Bladder management      * Bowel management      * Skin care management      * Intensity of service and scheduling of rehab disciplines      * Plan of care and role of interdisciplinary team conference in discharge planning      * Reconciliation of medications from prior institution    #Restrictions/Precautions:  - Weight bearing status: WBAT BLLE  - ROM restrictions: Avoid any heavy lifting, bending, squatting, or twisting motions.  - Precautions: Fall, spine  *DO NOT take any NSAIDs (Motrin, Aleve, Advil, Ibuprofen, Celebrex, ect.) until instructed by surgical team.  ---------------  Outpatient Follow-up:    Baldev Boston)  Spine Surgery  [address] [place] [address], [address]  [place] [address]  Phone: [phone]  Fax: [phone]  Follow Up Time:      --------------

## 2025-05-21 NOTE — PROGRESS NOTE ADULT - SUBJECTIVE AND OBJECTIVE BOX
HPI:  Mr. Adama Monsivais is a 79-year-old male patient with past medical history of HTN, HLD, DM (A1C 7.2), CKD, and chronic LBP who presented as a transfer from 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 Patient is now s/p T10-Pelvis PSF with Dr. Boston with Plastic Surgery closure by Dr. Escamilla on 4/24/25. Patient tolerated the procedure well without any intraoperative complications. Patient tolerated physical therapy, is weight bearing as tolerated and pain was well-controlled. Of note patient had previously been pain seen by pain specialist in 2/2025 for chronic back pain with radiation to bilateral lateral thighs (R>L). MRI at that time showed degenerative changes and had epidural injections x2 (2/27, 3/11) with moderate pain relief. Pain began to worsen on 4/10. He had radiofrequency ablation performed on 4/11 and since had a severe worsening of pain and radiation to R lateral thigh. He also reported recent bowel/bladder "incontinence" requiring diaper due to his inability to get to the bathroom in time due to pain limitations but states urge and sensation remained intact. Infectious disease consulted and co-managed for antibiotic therapy, recommended Ertapenem 1g Q24h until 6/7/25. A PICC line was placed on 5/14/25 and IR psoas drains were placed bilaterally on 4/27/25 and removed on 5/6/25. 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 Seen by medical attending for continuity of care and management and cleared for safe discharge. As per surgeon, the patient is stable and ready for discharge. Patient transferred to [facility] IRF on 5/16 for initiation of comprehensive rehabilitation program with 3 hours of therapies daily 5xweek. (16 May 2025 13:33)    TDD: 5/27/25 Home  ___________________________________________________________________________    SUBJECTIVE/ROS  Patient was seen and evaluated at bedside today.  Reported no overnight events and is in no acute distress.  Reviewed wound and unremarkable again. Sutures to be removed on Thursday per plastic surgery.  Stable ESR and CRP as below noted.  Case was discussed at Interdisciplinary Team meeting yesterday.  A tentative discharge date is outlined above.  Patient is eager to continue participation on the recommended rehabilitation program.  Denies any CP, SOB, REAL, palpitations, fever, chills, body aches, cough, congestion, or any other symptoms at this time.   ___________________________________________________________________________      ___________________________________________________________________________    LAB                        11.1   9.16  )-----------( 490      ( 19 May 2025 07:09 )             34.1     05-19    134[L]  |  99  |  8   ----------------------------<  97  4.3   |  25  |  0.74    Ca    9.9      19 May 2025 07:09    TPro  6.9  /  Alb  2.6[L]  /  TBili  0.4  /  DBili  x   /  AST  27  /  ALT  33  /  AlkPhos  129[H]  05-19    LIVER FUNCTIONS - ( 19 May 2025 07:09 )  Alb: 2.6 g/dL / Pro: 6.9 g/dL / ALK PHOS: 129 U/L / ALT: 33 U/L / AST: 27 U/L / GGT: x           ESR Historical Values  Sedimentation Rate, Erythrocyte (05.19.25 @ 07:09)   Sedimentation Rate, Erythrocyte: 59 mm/hr  Sedimentation Rate, Erythrocyte (05.16.25 @ 17:08)   Sedimentation Rate, Erythrocyte: 52 mm/hr  Sedimentation Rate, Erythrocyte (04.26.25 @ 00:53)   Sedimentation Rate, Erythrocyte: 82 mm/hr  Sedimentation Rate, Erythrocyte (04.23.25 @ 11:52)   Sedimentation Rate, Erythrocyte: 120 mm/hr     CRP Historical Values  C-Reactive Protein (05.19.25 @ 07:09)   C-Reactive Protein: 6 mg/L  C-Reactive Protein (05.16.25 @ 17:08)   C-Reactive Protein: <3 mg/L  C-Reactive Protein (04.26.25 @ 00:53)   C-Reactive Protein: 185.2 mg/L  C-Reactive Protein (04.23.25 @ 11:52)   C-Reactive Protein: 137 mg/L    CAPILLARY BLOOD GLUCOSE  POCT Blood Glucose.: 104 mg/dL (20 May 2025 21:36)  POCT Blood Glucose.: 100 mg/dL (20 May 2025 17:01)  POCT Blood Glucose.: 146 mg/dL (20 May 2025 11:51)  POCT Blood Glucose.: 139 mg/dL (20 May 2025 09:48)  POCT Blood Glucose.: 85 mg/dL (20 May 2025 07:56)    ___________________________________________________________________________    MEDICATIONS  (STANDING):  acetaminophen     Tablet .. 975 milliGRAM(s) Oral every 6 hours  atorvastatin 40 milliGRAM(s) Oral at bedtime  bacitracin   Ointment 1 Application(s) Topical three times a day  clotrimazole 1% Cream 1 Application(s) Topical two times a day  dextrose 5%. 1000 milliLiter(s) (100 mL/Hr) IV Continuous <Continuous>  dextrose 5%. 1000 milliLiter(s) (50 mL/Hr) IV Continuous <Continuous>  dextrose 50% Injectable 25 Gram(s) IV Push once  dextrose 50% Injectable 12.5 Gram(s) IV Push once  dextrose 50% Injectable 25 Gram(s) IV Push once  ertapenem  IVPB 1000 milliGRAM(s) IV Intermittent every 24 hours  gabapentin 100 milliGRAM(s) Oral every 8 hours  glucagon  Injectable 1 milliGRAM(s) IntraMuscular once  heparin   Injectable 5000 Unit(s) SubCutaneous every 12 hours  insulin lispro (ADMELOG) corrective regimen sliding scale   SubCutaneous three times a day before meals  insulin lispro (ADMELOG) corrective regimen sliding scale   SubCutaneous at bedtime  losartan 25 milliGRAM(s) Oral daily  metFORMIN 500 milliGRAM(s) Oral daily  methocarbamol 500 milliGRAM(s) Oral every 12 hours  multivitamin 1 Tablet(s) Oral daily  omega-3-Acid Ethyl Esters 2 Gram(s) Oral two times a day  pantoprazole    Tablet 40 milliGRAM(s) Oral before breakfast  polyethylene glycol 3350 17 Gram(s) Oral at bedtime  potassium chloride   Powder 40 milliEquivalent(s) Oral daily  simethicone 80 milliGRAM(s) Chew two times a day  spironolactone 25 milliGRAM(s) Oral daily    MEDICATIONS  (PRN):  bisacodyl Suppository 10 milliGRAM(s) Rectal daily PRN Constipation  dextrose Oral Gel 15 Gram(s) Oral once PRN Blood Glucose LESS THAN 70 milliGRAM(s)/deciliter  dibucaine 1% Ointment 1 Application(s) Topical four times a day PRN for anal discomfort d/t hemorrhoids  magnesium hydroxide Suspension 30 milliLiter(s) Oral every 12 hours PRN Constipation    ___________________________________________________________________________    PHYSICAL EXAM:    Gen - NAD, Comfortable  HEENT - NCAT, EOM grossly intact  Pulm - CTAB, No wheeze/rhonchi/crackles  Cardiovascular - RRR  Abdomen - Soft, NT/ND, +BS  Extremities - 2+ edema in LLE and 3+ edema in RLE. No calf tenderness bl.  Neuro-     Cognitive - AAOx3     Communication - Fluent, No dysarthria     Cranial Nerves - CN grossly intact     Motor -                     LEFT    UE - ShAB 5/5, EF 5/5, EE 5/5, WE 5/5,  5/5                    RIGHT UE - ShAB 5/5, EF 4/5, EE 5/5, WE 5/5,  5/5                    LEFT    LE - HF 4/5, KE 5/5, DF 5/5, PF 5/5                    RIGHT LE - HF 2/5, KE 4/5, DF 5/5, PF 5/5        Sensory - Intact to LT in bl LEs and bl UEs     Reflexes - DTR Intact, No primitive reflexive  Psychiatric - Mood stable, Affect WNL  Skin: Surgical incision on mid-back 28cm vertical with sutures in place. Left ROB.  R groin wound 5x2cm. Bl groin MAD    ___________________________________________________________________________ HPI:  Mr. 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 Patient is now s/p T10-Pelvis PSF with Dr. Boston with Plastic Surgery closure by Dr. Escamilla on 4/24/25. Patient tolerated the procedure well without any intraoperative complications. Patient tolerated physical therapy, is weight bearing as tolerated and pain was well-controlled. Of note patient had previously been pain seen by pain specialist in 2/2025 for chronic back pain with radiation to bilateral lateral thighs (R>L). MRI at that time showed degenerative changes and had epidural injections x2 (2/27, 3/11) with moderate pain relief. Pain began to worsen on 4/10. He had radiofrequency ablation performed on 4/11 and since had a severe worsening of pain and radiation to R lateral thigh. He also reported recent bowel/bladder "incontinence" requiring diaper due to his inability to get to the bathroom in time due to pain limitations but states urge and sensation remained intact. Infectious disease consulted and co-managed for antibiotic therapy, recommended Ertapenem 1g Q24h until 6/7/25. A PICC line was placed on 5/14/25 and IR psoas drains were placed bilaterally on 4/27/25 and removed on 5/6/25. 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 Seen by medical attending for continuity of care and management and cleared for safe discharge. As per surgeon, the patient is stable and ready for discharge. Patient transferred to [facility] IRF on 5/16 for initiation of comprehensive rehabilitation program with 3 hours of therapies daily 5xweek. (16 May 2025 13:33)    TDD: 5/27/25 Home  ___________________________________________________________________________    SUBJECTIVE/ROS  Patient was seen and evaluated at bedside today.  Reported no overnight events and is in no acute distress.  Reviewed wound and unremarkable again. Sutures to be removed on Thursday per plastic surgery.  Stable ESR and CRP as below noted.  Case was discussed at Interdisciplinary Team meeting yesterday.  A tentative discharge date is outlined above.  Patient is eager to continue participation on the recommended rehabilitation program.  Denies any CP, SOB, REAL, palpitations, fever, chills, body aches, cough, congestion, or any other symptoms at this time.   ___________________________________________________________________________    Vital Signs Last 24 Hrs  T(C): 37.1 (21 May 2025 20:10), Max: 37.1 (21 May 2025 20:10)  T(F): 98.7 (21 May 2025 20:10), Max: 98.7 (21 May 2025 20:10)  HR: 87 (21 May 2025 20:10) (87 - 105)  BP: 143/78 (21 May 2025 20:10) (111/69 - 144/83)  RR: 17 (21 May 2025 20:10) (16 - 17)  SpO2: 99% (21 May 2025 20:10) (97% - 100%)    ___________________________________________________________________________    LAB                        11.1   9.16  )-----------( 490      ( 19 May 2025 07:09 )             34.1     05-19    134[L]  |  99  |  8   ----------------------------<  97  4.3   |  25  |  0.74    Ca    9.9      19 May 2025 07:09    TPro  6.9  /  Alb  2.6[L]  /  TBili  0.4  /  DBili  x   /  AST  27  /  ALT  33  /  AlkPhos  129[H]  05-19    LIVER FUNCTIONS - ( 19 May 2025 07:09 )  Alb: 2.6 g/dL / Pro: 6.9 g/dL / ALK PHOS: 129 U/L / ALT: 33 U/L / AST: 27 U/L / GGT: x           ESR Historical Values  Sedimentation Rate, Erythrocyte (05.19.25 @ 07:09)   Sedimentation Rate, Erythrocyte: 59 mm/hr  Sedimentation Rate, Erythrocyte (05.16.25 @ 17:08)   Sedimentation Rate, Erythrocyte: 52 mm/hr  Sedimentation Rate, Erythrocyte (04.26.25 @ 00:53)   Sedimentation Rate, Erythrocyte: 82 mm/hr  Sedimentation Rate, Erythrocyte (04.23.25 @ 11:52)   Sedimentation Rate, Erythrocyte: 120 mm/hr     CRP Historical Values  C-Reactive Protein (05.19.25 @ 07:09)   C-Reactive Protein: 6 mg/L  C-Reactive Protein (05.16.25 @ 17:08)   C-Reactive Protein: <3 mg/L  C-Reactive Protein (04.26.25 @ 00:53)   C-Reactive Protein: 185.2 mg/L  C-Reactive Protein (04.23.25 @ 11:52)   C-Reactive Protein: 137 mg/L    CAPILLARY BLOOD GLUCOSE  POCT Blood Glucose.: 104 mg/dL (20 May 2025 21:36)  POCT Blood Glucose.: 100 mg/dL (20 May 2025 17:01)  POCT Blood Glucose.: 146 mg/dL (20 May 2025 11:51)  POCT Blood Glucose.: 139 mg/dL (20 May 2025 09:48)  POCT Blood Glucose.: 85 mg/dL (20 May 2025 07:56)    ___________________________________________________________________________    MEDICATIONS  (STANDING):  acetaminophen     Tablet .. 975 milliGRAM(s) Oral every 6 hours  atorvastatin 40 milliGRAM(s) Oral at bedtime  bacitracin   Ointment 1 Application(s) Topical three times a day  clotrimazole 1% Cream 1 Application(s) Topical two times a day  dextrose 5%. 1000 milliLiter(s) (100 mL/Hr) IV Continuous <Continuous>  dextrose 5%. 1000 milliLiter(s) (50 mL/Hr) IV Continuous <Continuous>  dextrose 50% Injectable 25 Gram(s) IV Push once  dextrose 50% Injectable 12.5 Gram(s) IV Push once  dextrose 50% Injectable 25 Gram(s) IV Push once  ertapenem  IVPB 1000 milliGRAM(s) IV Intermittent every 24 hours  gabapentin 100 milliGRAM(s) Oral every 8 hours  glucagon  Injectable 1 milliGRAM(s) IntraMuscular once  heparin   Injectable 5000 Unit(s) SubCutaneous every 12 hours  insulin lispro (ADMELOG) corrective regimen sliding scale   SubCutaneous three times a day before meals  insulin lispro (ADMELOG) corrective regimen sliding scale   SubCutaneous at bedtime  losartan 25 milliGRAM(s) Oral daily  metFORMIN 500 milliGRAM(s) Oral daily  methocarbamol 500 milliGRAM(s) Oral every 12 hours  multivitamin 1 Tablet(s) Oral daily  omega-3-Acid Ethyl Esters 2 Gram(s) Oral two times a day  pantoprazole    Tablet 40 milliGRAM(s) Oral before breakfast  polyethylene glycol 3350 17 Gram(s) Oral at bedtime  potassium chloride   Powder 40 milliEquivalent(s) Oral daily  simethicone 80 milliGRAM(s) Chew two times a day  spironolactone 25 milliGRAM(s) Oral daily    MEDICATIONS  (PRN):  bisacodyl Suppository 10 milliGRAM(s) Rectal daily PRN Constipation  dextrose Oral Gel 15 Gram(s) Oral once PRN Blood Glucose LESS THAN 70 milliGRAM(s)/deciliter  dibucaine 1% Ointment 1 Application(s) Topical four times a day PRN for anal discomfort d/t hemorrhoids  magnesium hydroxide Suspension 30 milliLiter(s) Oral every 12 hours PRN Constipation    ___________________________________________________________________________    PHYSICAL EXAM:    Gen - NAD, Comfortable  HEENT - NCAT, EOM grossly intact  Pulm - CTAB, No wheeze/rhonchi/crackles  Cardiovascular - RRR  Abdomen - Soft, NT/ND, +BS  Extremities - 2+ edema in LLE and 3+ edema in RLE. No calf tenderness bl.  Neuro-     Cognitive - AAOx3     Communication - Fluent, No dysarthria     Cranial Nerves - CN grossly intact     Motor -                     LEFT    UE - ShAB 5/5, EF 5/5, EE 5/5, WE 5/5,  5/5                    RIGHT UE - ShAB 5/5, EF 4/5, EE 5/5, WE 5/5,  5/5                    LEFT    LE - HF 4/5, KE 5/5, DF 5/5, PF 5/5                    RIGHT LE - HF 2/5, KE 4/5, DF 5/5, PF 5/5        Sensory - Intact to LT in bl LEs and bl UEs     Reflexes - DTR Intact, No primitive reflexive  Psychiatric - Mood stable, Affect WNL  Skin: Surgical incision on mid-back 28cm vertical with sutures in place. Left ROB.  R groin wound 5x2cm. Bl groin MAD    ___________________________________________________________________________

## 2025-05-22 LAB
ALBUMIN SERPL ELPH-MCNC: 2.2 G/DL — LOW (ref 3.3–5)
ALP SERPL-CCNC: 110 U/L — SIGNIFICANT CHANGE UP (ref 40–120)
ALT FLD-CCNC: 28 U/L — SIGNIFICANT CHANGE UP (ref 10–45)
ANION GAP SERPL CALC-SCNC: 9 MMOL/L — SIGNIFICANT CHANGE UP (ref 5–17)
AST SERPL-CCNC: 27 U/L — SIGNIFICANT CHANGE UP (ref 10–40)
BASOPHILS # BLD AUTO: 0.05 K/UL — SIGNIFICANT CHANGE UP (ref 0–0.2)
BASOPHILS NFR BLD AUTO: 0.7 % — SIGNIFICANT CHANGE UP (ref 0–2)
BILIRUB SERPL-MCNC: 0.3 MG/DL — SIGNIFICANT CHANGE UP (ref 0.2–1.2)
BUN SERPL-MCNC: 9 MG/DL — SIGNIFICANT CHANGE UP (ref 7–23)
CALCIUM SERPL-MCNC: 9.8 MG/DL — SIGNIFICANT CHANGE UP (ref 8.4–10.5)
CHLORIDE SERPL-SCNC: 102 MMOL/L — SIGNIFICANT CHANGE UP (ref 96–108)
CO2 SERPL-SCNC: 22 MMOL/L — SIGNIFICANT CHANGE UP (ref 22–31)
CREAT SERPL-MCNC: 0.82 MG/DL — SIGNIFICANT CHANGE UP (ref 0.5–1.3)
EGFR: 89 ML/MIN/1.73M2 — SIGNIFICANT CHANGE UP
EGFR: 89 ML/MIN/1.73M2 — SIGNIFICANT CHANGE UP
EOSINOPHIL # BLD AUTO: 0.19 K/UL — SIGNIFICANT CHANGE UP (ref 0–0.5)
EOSINOPHIL NFR BLD AUTO: 2.5 % — SIGNIFICANT CHANGE UP (ref 0–6)
GLUCOSE BLDC GLUCOMTR-MCNC: 100 MG/DL — HIGH (ref 70–99)
GLUCOSE BLDC GLUCOMTR-MCNC: 195 MG/DL — HIGH (ref 70–99)
GLUCOSE SERPL-MCNC: 95 MG/DL — SIGNIFICANT CHANGE UP (ref 70–99)
HCT VFR BLD CALC: 34.1 % — LOW (ref 39–50)
HGB BLD-MCNC: 10.5 G/DL — LOW (ref 13–17)
IMM GRANULOCYTES NFR BLD AUTO: 1.9 % — HIGH (ref 0–0.9)
LYMPHOCYTES # BLD AUTO: 2.65 K/UL — SIGNIFICANT CHANGE UP (ref 1–3.3)
LYMPHOCYTES # BLD AUTO: 35.5 % — SIGNIFICANT CHANGE UP (ref 13–44)
MCHC RBC-ENTMCNC: 30.7 PG — SIGNIFICANT CHANGE UP (ref 27–34)
MCHC RBC-ENTMCNC: 30.8 G/DL — LOW (ref 32–36)
MCV RBC AUTO: 99.7 FL — SIGNIFICANT CHANGE UP (ref 80–100)
MONOCYTES # BLD AUTO: 0.62 K/UL — SIGNIFICANT CHANGE UP (ref 0–0.9)
MONOCYTES NFR BLD AUTO: 8.3 % — SIGNIFICANT CHANGE UP (ref 2–14)
NEUTROPHILS # BLD AUTO: 3.82 K/UL — SIGNIFICANT CHANGE UP (ref 1.8–7.4)
NEUTROPHILS NFR BLD AUTO: 51.1 % — SIGNIFICANT CHANGE UP (ref 43–77)
NRBC BLD AUTO-RTO: 0 /100 WBCS — SIGNIFICANT CHANGE UP (ref 0–0)
PLATELET # BLD AUTO: 455 K/UL — HIGH (ref 150–400)
POTASSIUM SERPL-MCNC: 4.7 MMOL/L — SIGNIFICANT CHANGE UP (ref 3.5–5.3)
POTASSIUM SERPL-SCNC: 4.7 MMOL/L — SIGNIFICANT CHANGE UP (ref 3.5–5.3)
PROT SERPL-MCNC: 6.4 G/DL — SIGNIFICANT CHANGE UP (ref 6–8.3)
RBC # BLD: 3.42 M/UL — LOW (ref 4.2–5.8)
RBC # FLD: 21 % — HIGH (ref 10.3–14.5)
SODIUM SERPL-SCNC: 133 MMOL/L — LOW (ref 135–145)
WBC # BLD: 7.47 K/UL — SIGNIFICANT CHANGE UP (ref 3.8–10.5)
WBC # FLD AUTO: 7.47 K/UL — SIGNIFICANT CHANGE UP (ref 3.8–10.5)

## 2025-05-22 PROCEDURE — 93010 ELECTROCARDIOGRAM REPORT: CPT

## 2025-05-22 PROCEDURE — 99233 SBSQ HOSP IP/OBS HIGH 50: CPT

## 2025-05-22 RX ORDER — SPIRONOLACTONE 25 MG
1 TABLET ORAL
Qty: 30 | Refills: 0
Start: 2025-05-22 | End: 2025-06-20

## 2025-05-22 RX ORDER — GABAPENTIN 400 MG/1
1 CAPSULE ORAL
Qty: 90 | Refills: 0
Start: 2025-05-22 | End: 2025-06-20

## 2025-05-22 RX ORDER — MAGNESIUM HYDROXIDE 400 MG/5ML
30 SUSPENSION ORAL
Qty: 0 | Refills: 0 | DISCHARGE
Start: 2025-05-22

## 2025-05-22 RX ORDER — SIMETHICONE 80 MG
1 TABLET,CHEWABLE ORAL
Qty: 0 | Refills: 0 | DISCHARGE
Start: 2025-05-22

## 2025-05-22 RX ORDER — DAPAGLIFLOZIN 5 MG/1
1 TABLET, FILM COATED ORAL
Qty: 30 | Refills: 0
Start: 2025-05-22 | End: 2025-06-20

## 2025-05-22 RX ORDER — ERTAPENEM SODIUM 1 G/1
1 INJECTION, POWDER, LYOPHILIZED, FOR SOLUTION INTRAMUSCULAR; INTRAVENOUS
Qty: 11 | Refills: 0
Start: 2025-05-22 | End: 2025-06-01

## 2025-05-22 RX ORDER — POLYETHYLENE GLYCOL 3350 17 G/17G
17 POWDER, FOR SOLUTION ORAL
Qty: 0 | Refills: 0 | DISCHARGE
Start: 2025-05-22

## 2025-05-22 RX ORDER — B1/B2/B3/B5/B6/B12/VIT C/FOLIC 500-0.5 MG
1 TABLET ORAL
Qty: 0 | Refills: 0 | DISCHARGE
Start: 2025-05-22

## 2025-05-22 RX ORDER — LOSARTAN POTASSIUM 100 MG/1
1 TABLET, FILM COATED ORAL
Qty: 30 | Refills: 0
Start: 2025-05-22 | End: 2025-06-20

## 2025-05-22 RX ORDER — DIBUCAINE 10 MG/G
1 OINTMENT TOPICAL
Qty: 1 | Refills: 0
Start: 2025-05-22 | End: 2025-06-20

## 2025-05-22 RX ORDER — INSULIN LISPRO 100 U/ML
INJECTION, SOLUTION INTRAVENOUS; SUBCUTANEOUS
Refills: 0 | Status: DISCONTINUED | OUTPATIENT
Start: 2025-05-22 | End: 2025-05-27

## 2025-05-22 RX ORDER — METHOCARBAMOL 500 MG/1
1 TABLET, FILM COATED ORAL
Qty: 60 | Refills: 0
Start: 2025-05-22 | End: 2025-06-20

## 2025-05-22 RX ORDER — ACETAMINOPHEN 500 MG/5ML
3 LIQUID (ML) ORAL
Qty: 0 | Refills: 0 | DISCHARGE
Start: 2025-05-22

## 2025-05-22 RX ORDER — OMEGA-3-ACID ETHYL ESTERS CAPSULES 1 G/1
2 CAPSULE, LIQUID FILLED ORAL
Qty: 120 | Refills: 0
Start: 2025-05-22 | End: 2025-06-20

## 2025-05-22 RX ORDER — ATORVASTATIN CALCIUM 80 MG/1
1 TABLET, FILM COATED ORAL
Qty: 30 | Refills: 0
Start: 2025-05-22 | End: 2025-06-20

## 2025-05-22 RX ORDER — METFORMIN HYDROCHLORIDE 850 MG/1
1 TABLET ORAL
Qty: 30 | Refills: 0
Start: 2025-05-22 | End: 2025-06-20

## 2025-05-22 RX ORDER — CLOTRIMAZOLE 1 G/100G
1 CREAM TOPICAL
Qty: 0 | Refills: 0 | DISCHARGE
Start: 2025-05-22

## 2025-05-22 RX ORDER — BACITRACIN 500 UNIT/G
1 OINTMENT (GRAM) TOPICAL
Qty: 0 | Refills: 0 | DISCHARGE
Start: 2025-05-22

## 2025-05-22 RX ORDER — ACETAMINOPHEN 500 MG/5ML
975 LIQUID (ML) ORAL EVERY 6 HOURS
Refills: 0 | Status: DISCONTINUED | OUTPATIENT
Start: 2025-05-22 | End: 2025-05-27

## 2025-05-22 RX ADMIN — ERTAPENEM SODIUM 120 MILLIGRAM(S): 1 INJECTION, POWDER, LYOPHILIZED, FOR SOLUTION INTRAMUSCULAR; INTRAVENOUS at 21:09

## 2025-05-22 RX ADMIN — Medication 80 MILLIGRAM(S): at 19:26

## 2025-05-22 RX ADMIN — Medication 975 MILLIGRAM(S): at 21:18

## 2025-05-22 RX ADMIN — HEPARIN SODIUM 5000 UNIT(S): 1000 INJECTION INTRAVENOUS; SUBCUTANEOUS at 18:17

## 2025-05-22 RX ADMIN — OMEGA-3-ACID ETHYL ESTERS CAPSULES 2 GRAM(S): 1 CAPSULE, LIQUID FILLED ORAL at 19:26

## 2025-05-22 RX ADMIN — Medication 975 MILLIGRAM(S): at 12:09

## 2025-05-22 RX ADMIN — INSULIN LISPRO 2: 100 INJECTION, SOLUTION INTRAVENOUS; SUBCUTANEOUS at 12:14

## 2025-05-22 RX ADMIN — Medication 1 APPLICATION(S): at 14:15

## 2025-05-22 RX ADMIN — Medication 25 MILLIGRAM(S): at 05:27

## 2025-05-22 RX ADMIN — DAPAGLIFLOZIN 10 MILLIGRAM(S): 5 TABLET, FILM COATED ORAL at 12:09

## 2025-05-22 RX ADMIN — METHOCARBAMOL 500 MILLIGRAM(S): 500 TABLET, FILM COATED ORAL at 19:26

## 2025-05-22 RX ADMIN — GABAPENTIN 100 MILLIGRAM(S): 400 CAPSULE ORAL at 05:27

## 2025-05-22 RX ADMIN — Medication 975 MILLIGRAM(S): at 05:25

## 2025-05-22 RX ADMIN — LOSARTAN POTASSIUM 25 MILLIGRAM(S): 100 TABLET, FILM COATED ORAL at 05:25

## 2025-05-22 RX ADMIN — Medication 975 MILLIGRAM(S): at 06:35

## 2025-05-22 RX ADMIN — OMEGA-3-ACID ETHYL ESTERS CAPSULES 2 GRAM(S): 1 CAPSULE, LIQUID FILLED ORAL at 05:25

## 2025-05-22 RX ADMIN — METHOCARBAMOL 500 MILLIGRAM(S): 500 TABLET, FILM COATED ORAL at 05:25

## 2025-05-22 RX ADMIN — HEPARIN SODIUM 5000 UNIT(S): 1000 INJECTION INTRAVENOUS; SUBCUTANEOUS at 05:25

## 2025-05-22 RX ADMIN — CLOTRIMAZOLE 1 APPLICATION(S): 1 CREAM TOPICAL at 18:14

## 2025-05-22 RX ADMIN — POLYETHYLENE GLYCOL 3350 17 GRAM(S): 17 POWDER, FOR SOLUTION ORAL at 21:09

## 2025-05-22 RX ADMIN — GABAPENTIN 100 MILLIGRAM(S): 400 CAPSULE ORAL at 21:11

## 2025-05-22 RX ADMIN — Medication 80 MILLIGRAM(S): at 05:27

## 2025-05-22 RX ADMIN — Medication 1 APPLICATION(S): at 13:29

## 2025-05-22 RX ADMIN — Medication 1 TABLET(S): at 12:11

## 2025-05-22 NOTE — PROGRESS NOTE ADULT - SUBJECTIVE AND OBJECTIVE BOX
HPI:  Mr. Adama Monsivais is a 79-year-old male patient with past medical history of HTN, HLD, DM (A1C 7.2), CKD, and chronic LBP who presented as a transfer from [facility] to to [facility] for orthopedic surgery after initial ED evaluation for acute on chronic back pain had MRI revealing discitis with epidural abscess and bilateral psoas abscesses. Patient is now s/p T10-Pelvis PSF with Dr. Boston with Plastic Surgery closure by Dr. Escamilla on 4/24/25. Patient tolerated the procedure well without any intraoperative complications. Patient tolerated physical therapy, is weight bearing as tolerated and pain was well-controlled. Of note patient had previously been pain seen by pain specialist in 2/2025 for chronic back pain with radiation to bilateral lateral thighs (R>L). MRI at that time showed degenerative changes and had epidural injections x2 (2/27, 3/11) with moderate pain relief. Pain began to worsen on 4/10. He had radiofrequency ablation performed on 4/11 and since had a severe worsening of pain and radiation to R lateral thigh. He also reported recent bowel/bladder "incontinence" requiring diaper due to his inability to get to the bathroom in time due to pain limitations but states urge and sensation remained intact. Infectious disease consulted and co-managed for antibiotic therapy, recommended Ertapenem 1g Q24h until 6/7/25. A PICC line was placed on 5/14/25 and IR psoas drains were placed bilaterally on 4/27/25 and removed on 5/6/25. 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 Seen by medical attending for continuity of care and management and cleared for safe discharge. As per surgeon, the patient is stable and ready for discharge. Patient transferred to [facility] IRF on 5/16 for initiation of comprehensive rehabilitation program with 3 hours of therapies daily 5xweek. (16 May 2025 13:33)    TDD: 5/27/25 Home  ___________________________________________________________________________    SUBJECTIVE/ROS  Patient was seen and evaluated at bedside today.  Reported no overnight events and is in no acute distress.  Reviewed wound and unremarkable again.   Sutures removed today per plastic surgery.  Stable ESR and CRP as below noted.  Case was discussed at Interdisciplinary Team meeting today.  Tentative discharge date remains as outlined above.  Patient is eager to continue participation on the recommended rehabilitation program.  Denies any CP, SOB, REAL, palpitations, fever, chills, body aches, cough, congestion, or any other symptoms at this time.   ___________________________________________________________________________    Vital Signs Last 24 Hrs  T(C): 37.1 (21 May 2025 20:10), Max: 37.1 (21 May 2025 20:10)  T(F): 98.7 (21 May 2025 20:10), Max: 98.7 (21 May 2025 20:10)  HR: 107 (22 May 2025 07:45) (87 - 107)  BP: 131/77 (22 May 2025 07:45) (131/77 - 143/78)  RR: 17 (22 May 2025 07:45) (17 - 17)  SpO2: 100% (22 May 2025 07:45) (99% - 100%)    Parameters below as of 22 May 2025 07:45  Patient On (Oxygen Delivery Method): room air  ___________________________________________________________________________    LAB                        10.5   7.47  )-----------( 455      ( 22 May 2025 06:42 )             34.1     05-22    133[L]  |  102  |  9   ----------------------------<  95  4.7   |  22  |  0.82    Ca    9.8      22 May 2025 06:42    TPro  6.4  /  Alb  2.2[L]  /  TBili  0.3  /  DBili  x   /  AST  27  /  ALT  28  /  AlkPhos  110  05-22    LIVER FUNCTIONS - ( 22 May 2025 06:42 )  Alb: 2.2 g/dL / Pro: 6.4 g/dL / ALK PHOS: 110 U/L / ALT: 28 U/L / AST: 27 U/L / GGT: x             ESR Historical Values  Sedimentation Rate, Erythrocyte (05.19.25 @ 07:09)   Sedimentation Rate, Erythrocyte: 59 mm/hr  Sedimentation Rate, Erythrocyte (05.16.25 @ 17:08)   Sedimentation Rate, Erythrocyte: 52 mm/hr  Sedimentation Rate, Erythrocyte (04.26.25 @ 00:53)   Sedimentation Rate, Erythrocyte: 82 mm/hr  Sedimentation Rate, Erythrocyte (04.23.25 @ 11:52)   Sedimentation Rate, Erythrocyte: 120 mm/hr     CRP Historical Values  C-Reactive Protein (05.19.25 @ 07:09)   C-Reactive Protein: 6 mg/L  C-Reactive Protein (05.16.25 @ 17:08)   C-Reactive Protein: <3 mg/L  C-Reactive Protein (04.26.25 @ 00:53)   C-Reactive Protein: 185.2 mg/L  C-Reactive Protein (04.23.25 @ 11:52)   C-Reactive Protein: 137 mg/L    CAPILLARY BLOOD GLUCOSE  POCT Blood Glucose.: 195 mg/dL (22 May 2025 11:46)  POCT Blood Glucose.: 100 mg/dL (22 May 2025 08:19)  POCT Blood Glucose.: 131 mg/dL (21 May 2025 21:25)    ___________________________________________________________________________    MEDICATIONS  (STANDING):  acetaminophen     Tablet .. 975 milliGRAM(s) Oral every 6 hours  atorvastatin 40 milliGRAM(s) Oral at bedtime  bacitracin   Ointment 1 Application(s) Topical three times a day  clotrimazole 1% Cream 1 Application(s) Topical two times a day  dextrose 5%. 1000 milliLiter(s) (100 mL/Hr) IV Continuous <Continuous>  dextrose 5%. 1000 milliLiter(s) (50 mL/Hr) IV Continuous <Continuous>  dextrose 50% Injectable 25 Gram(s) IV Push once  dextrose 50% Injectable 12.5 Gram(s) IV Push once  dextrose 50% Injectable 25 Gram(s) IV Push once  ertapenem  IVPB 1000 milliGRAM(s) IV Intermittent every 24 hours  gabapentin 100 milliGRAM(s) Oral every 8 hours  glucagon  Injectable 1 milliGRAM(s) IntraMuscular once  heparin   Injectable 5000 Unit(s) SubCutaneous every 12 hours  insulin lispro (ADMELOG) corrective regimen sliding scale   SubCutaneous three times a day before meals  insulin lispro (ADMELOG) corrective regimen sliding scale   SubCutaneous at bedtime  losartan 25 milliGRAM(s) Oral daily  metFORMIN 500 milliGRAM(s) Oral daily  methocarbamol 500 milliGRAM(s) Oral every 12 hours  multivitamin 1 Tablet(s) Oral daily  omega-3-Acid Ethyl Esters 2 Gram(s) Oral two times a day  pantoprazole    Tablet 40 milliGRAM(s) Oral before breakfast  polyethylene glycol 3350 17 Gram(s) Oral at bedtime  potassium chloride   Powder 40 milliEquivalent(s) Oral daily  simethicone 80 milliGRAM(s) Chew two times a day  spironolactone 25 milliGRAM(s) Oral daily    MEDICATIONS  (PRN):  bisacodyl Suppository 10 milliGRAM(s) Rectal daily PRN Constipation  dextrose Oral Gel 15 Gram(s) Oral once PRN Blood Glucose LESS THAN 70 milliGRAM(s)/deciliter  dibucaine 1% Ointment 1 Application(s) Topical four times a day PRN for anal discomfort d/t hemorrhoids  magnesium hydroxide Suspension 30 milliLiter(s) Oral every 12 hours PRN Constipation    ___________________________________________________________________________    PHYSICAL EXAM:    Gen - NAD, Comfortable  HEENT - NCAT, EOM grossly intact  Pulm - CTAB, No wheeze/rhonchi/crackles  Cardiovascular - RRR  Abdomen - Soft, NT/ND, +BS  Extremities - 2+ edema in LLE and 3+ edema in RLE. No calf tenderness bl.  Neuro-     Cognitive - AAOx3     Communication - Fluent, No dysarthria     Cranial Nerves - CN grossly intact     Motor -                     LEFT    UE - ShAB 5/5, EF 5/5, EE 5/5, WE 5/5,  5/5                    RIGHT UE - ShAB 5/5, EF 4/5, EE 5/5, WE 5/5,  5/5                    LEFT    LE - HF 4/5, KE 5/5, DF 5/5, PF 5/5                    RIGHT LE - HF 2/5, KE 4/5, DF 5/5, PF 5/5        Sensory - Intact to LT in bl LEs and bl UEs     Reflexes - DTR Intact, No primitive reflexive  Psychiatric - Mood stable, Affect WNL  Skin: Surgical incision on mid-back 28cm vertical without sutures. Left ROB.  R groin wound 5x2cm. Bl groin MAD    ___________________________________________________________________________

## 2025-05-22 NOTE — PROGRESS NOTE ADULT - ASSESSMENT
Mr. Adama Monsivais is a 79-year-old male patient with past medical history of HTN, HLD, DM (A1C 7.2), CKD, and chronic LBP who presented as a transfer from [facility] to to [facility] for orthopedic surgery after initial ED evaluation for acute on chronic back pain had MRI revealing discitis with epidural abscess and bilateral psoas abscesses. Patient is now s/p T10-Pelvis PSF with Dr. Boston with Plastic Surgery closure by Dr. Escamilla on 4/24/25. Patient tolerated the procedure well without any intraoperative complications. Patient tolerated physical therapy, is weight bearing as tolerated and pain was well-controlled. Of note patient had previously been pain seen by pain specialist in 2/2025 for chronic back pain with radiation to bilateral lateral thighs (R>L). MRI at that time showed degenerative changes and had epidural injections x2 (2/27, 3/11) with moderate pain relief. Pain began to worsen on 4/10. He had radiofrequency ablation performed on 4/11 and since had a severe worsening of pain and radiation to R lateral thigh. He also reported recent bowel/bladder "incontinence" requiring diaper due to his inability to get to the bathroom in time due to pain limitations but states urge and sensation remained intact. Infectious disease consulted and co-managed for antibiotic therapy, recommended Ertapenem 1g Q24h until 6/7/25. A PICC line was placed on 5/14/25 and IR psoas drains were placed bilaterally on 4/27/25 and removed on 5/6/25. 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 Seen by medical attending for continuity of care and management and cleared for safe discharge. As per surgeon, the patient is stable and ready for discharge. Patient transferred to [facility] IRF on 5/16 for initiation of comprehensive rehabilitation program with 3 hours of therapies daily 5xweek.      #Lumbar epidural abscess with stenosis, diskitis, osteomyelitis, and kyphosis s/p decompressive laminectomy and fusion surgery  - Epidural abscess, osteomyelitis of lumbar spine, and bilateral Psoas abscess s/p drain removal      * Continue Ertapenem x 6 weeks through 6/7/25      * S/P placement of PICC 5/14      * Confirm PICC line placement with CXR on admission  - S/P Decompression, spine, lumbar, posterior approach, with fusion of posterior spinal column      * Surgical incision on mid-back 28cm vertical with sutures in place and left RBO      * Sutures/staples to be removed on post-op day #14, but changed to 5/22 per discussion with Plastic Surgery      * ROM restrictions: Avoid any heavy lifting, bending, squatting, or twisting motions.      * Avoid NSAIDs  - Activity limitations: Decreased social, vocational and leisure activities, decreased self care and ADLs, decreased mobility  - Deficits: lower extremity weakness and bowel/bladder dysfunction  - Comprehensive Multidisciplinary Rehab Program:      * 3 hours a day, 5 days a week.      * PT 2hr/day: Focused on improving strength, endurance, coordination, balance, functional mobility, and transfers      * OT 1hr/day: Focused on improving strength, fine motor skills, coordination, posture and ADLs.      #HTN  - Starting losartan 25 Qd 5/17 AM  - Spironolactone 50 --> 25mg 25 Qd on 5/17 AM    #Electrolyte imbalance with hypokalemia & hypophosphatemia  - 40mEq KCl powder Qd  - C/w Spironolactone 25 Qd as above  - Goal K level 4    #HLD  - Atorvastatin 40 QHS    #Diabetes with hyperglycemia  - A1c 7.2% on 4/25  - Metformin 500 Qd  - LENA for now  - Hospitalist follow up in AM    #GI/Bowel:  - GI ppx: 40 Qd  - Ileus/Pseudoobstruction (Resolved)      * S/p GI/surgery eval at [facility] with conservative mgmt and improvement       * Continue Miralax/Magnesium hydroxide PRN/ simethicone PRN    #Sleep:  - Melatonin qHS    #Pain Management:  - Tylenol 975 q6  - Methocarbamol 500mg BID  - Gabapentin 100 TID    #/Bladder:   - Bladder scan x1 on admission, SC PRN  - Encourage timed voids every 4 hours while awake    #Skin/Pressure Injury:  - Skin assessment on admission:       * Skin: R groin wound 5x2cm. Bl groin MAD. C/w clotrimazole.      * Monitor Incisions: Surgical incision on mid-back 28cm vertical with sutures in place. left ROB.      * Per dc summary, "Any sutures/staples to be removed on post-op day #14 at office visit," however, sx was 4/24.       * Reached out to plastic surgery team at [facility] for clarification.    #Diet:   - Diet Consistency/Modifications: Reg CC  - MTV and Omega 3    #DVT ppx:  - Initiate heparin SQ on arrival (discussed with orthopedic surgeon Dr. Boston)  - SCDs  - Last Doppler on 5/7/25 negative    #Patient Education  - Education provided on the following:      * Admitting diagnosis and functional implications      * Functional goals      * Bladder management      * Bowel management      * Skin care management      * Intensity of service and scheduling of rehab disciplines      * Plan of care and role of interdisciplinary team conference in discharge planning      * Reconciliation of medications from prior institution    #Restrictions/Precautions:  - Weight bearing status: WBAT BLLE  - ROM restrictions: Avoid any heavy lifting, bending, squatting, or twisting motions.  - Precautions: Fall, spine  *DO NOT take any NSAIDs (Motrin, Aleve, Advil, Ibuprofen, Celebrex, ect.) until instructed by surgical team.  ---------------  Outpatient Follow-up:    Baldev Boston)  Spine Surgery  [address] [place] [address], [address]  [place] [address]  Phone: [phone]  Fax: [phone]  Follow Up Time:      --------------

## 2025-05-23 LAB — GLUCOSE BLDC GLUCOMTR-MCNC: 111 MG/DL — HIGH (ref 70–99)

## 2025-05-23 PROCEDURE — 99233 SBSQ HOSP IP/OBS HIGH 50: CPT

## 2025-05-23 PROCEDURE — 99232 SBSQ HOSP IP/OBS MODERATE 35: CPT

## 2025-05-23 RX ADMIN — OMEGA-3-ACID ETHYL ESTERS CAPSULES 2 GRAM(S): 1 CAPSULE, LIQUID FILLED ORAL at 17:26

## 2025-05-23 RX ADMIN — Medication 1 APPLICATION(S): at 21:37

## 2025-05-23 RX ADMIN — Medication 25 MILLIGRAM(S): at 05:15

## 2025-05-23 RX ADMIN — ERTAPENEM SODIUM 120 MILLIGRAM(S): 1 INJECTION, POWDER, LYOPHILIZED, FOR SOLUTION INTRAMUSCULAR; INTRAVENOUS at 21:37

## 2025-05-23 RX ADMIN — METHOCARBAMOL 500 MILLIGRAM(S): 500 TABLET, FILM COATED ORAL at 17:26

## 2025-05-23 RX ADMIN — Medication 1 APPLICATION(S): at 13:12

## 2025-05-23 RX ADMIN — ATORVASTATIN CALCIUM 40 MILLIGRAM(S): 80 TABLET, FILM COATED ORAL at 21:40

## 2025-05-23 RX ADMIN — CLOTRIMAZOLE 1 APPLICATION(S): 1 CREAM TOPICAL at 17:25

## 2025-05-23 RX ADMIN — HEPARIN SODIUM 5000 UNIT(S): 1000 INJECTION INTRAVENOUS; SUBCUTANEOUS at 05:16

## 2025-05-23 RX ADMIN — Medication 975 MILLIGRAM(S): at 13:09

## 2025-05-23 RX ADMIN — GABAPENTIN 100 MILLIGRAM(S): 400 CAPSULE ORAL at 21:37

## 2025-05-23 RX ADMIN — METHOCARBAMOL 500 MILLIGRAM(S): 500 TABLET, FILM COATED ORAL at 05:16

## 2025-05-23 RX ADMIN — POLYETHYLENE GLYCOL 3350 17 GRAM(S): 17 POWDER, FOR SOLUTION ORAL at 21:38

## 2025-05-23 RX ADMIN — DAPAGLIFLOZIN 10 MILLIGRAM(S): 5 TABLET, FILM COATED ORAL at 13:09

## 2025-05-23 RX ADMIN — GABAPENTIN 100 MILLIGRAM(S): 400 CAPSULE ORAL at 14:12

## 2025-05-23 RX ADMIN — LOSARTAN POTASSIUM 25 MILLIGRAM(S): 100 TABLET, FILM COATED ORAL at 05:17

## 2025-05-23 RX ADMIN — OMEGA-3-ACID ETHYL ESTERS CAPSULES 2 GRAM(S): 1 CAPSULE, LIQUID FILLED ORAL at 05:16

## 2025-05-23 RX ADMIN — Medication 80 MILLIGRAM(S): at 05:16

## 2025-05-23 RX ADMIN — Medication 975 MILLIGRAM(S): at 21:39

## 2025-05-23 RX ADMIN — Medication 80 MILLIGRAM(S): at 17:26

## 2025-05-23 RX ADMIN — GABAPENTIN 100 MILLIGRAM(S): 400 CAPSULE ORAL at 05:16

## 2025-05-23 RX ADMIN — CLOTRIMAZOLE 1 APPLICATION(S): 1 CREAM TOPICAL at 05:17

## 2025-05-23 RX ADMIN — Medication 40 MILLIGRAM(S): at 05:23

## 2025-05-23 RX ADMIN — Medication 1 TABLET(S): at 13:10

## 2025-05-23 RX ADMIN — HEPARIN SODIUM 5000 UNIT(S): 1000 INJECTION INTRAVENOUS; SUBCUTANEOUS at 17:25

## 2025-05-23 NOTE — PROGRESS NOTE ADULT - ASSESSMENT
Mr. Adama Monsivais is a 79-year-old male patient with past medical history of HTN, HLD, DM (A1C 7.2), CKD, and chronic LBP who presented as a transfer from [facility] to to [facility] for orthopedic surgery after initial ED evaluation for acute on chronic back pain had MRI revealing discitis with epidural abscess and bilateral psoas abscesses. Patient is now s/p T10-Pelvis PSF with Dr. Boston with Plastic Surgery closure by Dr. Escamilla on 4/24/25. Patient tolerated the procedure well without any intraoperative complications. Patient tolerated physical therapy, is weight bearing as tolerated and pain was well-controlled. Of note patient had previously been pain seen by pain specialist in 2/2025 for chronic back pain with radiation to bilateral lateral thighs (R>L). MRI at that time showed degenerative changes and had epidural injections x2 (2/27, 3/11) with moderate pain relief. Pain began to worsen on 4/10. He had radiofrequency ablation performed on 4/11 and since had a severe worsening of pain and radiation to R lateral thigh. He also reported recent bowel/bladder "incontinence" requiring diaper due to his inability to get to the bathroom in time due to pain limitations but states urge and sensation remained intact. Infectious disease consulted and co-managed for antibiotic therapy, recommended Ertapenem 1g Q24h until 6/7/25. A PICC line was placed on 5/14/25 and IR psoas drains were placed bilaterally on 4/27/25 and removed on 5/6/25. 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 Seen by medical attending for continuity of care and management and cleared for safe discharge. As per surgeon, the patient is stable and ready for discharge. Patient transferred to [facility] IRF on 5/16 for initiation of comprehensive rehabilitation program with 3 hours of therapies daily 5xweek.      #Lumbar epidural abscess with stenosis, diskitis, osteomyelitis, and kyphosis s/p decompressive laminectomy and fusion surgery  - Epidural abscess, osteomyelitis of lumbar spine, and bilateral Psoas abscess s/p drain removal      * Continue Ertapenem x 6 weeks through 6/7/25      * S/P placement of PICC 5/14      * Confirm PICC line placement with CXR on admission  - S/P Decompression, spine, lumbar, posterior approach, with fusion of posterior spinal column      * Surgical incision on mid-back 28cm vertical with sutures in place and left ROB      * Sutures/staples to be removed on post-op day #14, but changed to 5/22 per discussion with Plastic Surgery      * ROM restrictions: Avoid any heavy lifting, bending, squatting, or twisting motions.      * Avoid NSAIDs  - Activity limitations: Decreased social, vocational and leisure activities, decreased self care and ADLs, decreased mobility  - Deficits: lower extremity weakness and bowel/bladder dysfunction  - Comprehensive Multidisciplinary Rehab Program:      * 3 hours a day, 5 days a week.      * PT 2hr/day: Focused on improving strength, endurance, coordination, balance, functional mobility, and transfers      * OT 1hr/day: Focused on improving strength, fine motor skills, coordination, posture and ADLs.      #HTN  - Starting losartan 25 Qd 5/17 AM  - Spironolactone 50 --> 25mg 25 Qd on 5/17 AM    #Electrolyte imbalance with hypokalemia & hypophosphatemia  - 40mEq KCl powder Qd  - C/w Spironolactone 25 Qd as above  - Goal K level 4    #HLD  - Atorvastatin 40 QHS    #Diabetes with hyperglycemia  - A1c 7.2% on 4/25  - Metformin 500 Qd  - LENA for now  - Hospitalist follow up in AM    #GI/Bowel:  - GI ppx: 40 Qd  - Ileus/Pseudoobstruction (Resolved)      * S/p GI/surgery eval at [facility] with conservative mgmt and improvement       * Continue Miralax/Magnesium hydroxide PRN/ simethicone PRN    #Sleep:  - Melatonin qHS    #Pain Management:  - Tylenol 975 q6  - Methocarbamol 500mg BID  - Gabapentin 100 TID    #/Bladder:   - Bladder scan x1 on admission, SC PRN  - Encourage timed voids every 4 hours while awake    #Skin/Pressure Injury:  - Skin assessment on admission:       * Skin: R groin wound 5x2cm. Bl groin MAD. C/w clotrimazole.      * Monitor Incisions: Surgical incision on mid-back 28cm vertical with sutures in place. left ROB.      * Per dc summary, "Any sutures/staples to be removed on post-op day #14 at office visit," however, sx was 4/24.       * Reached out to plastic surgery team at [facility] for clarification.    #Diet:   - Diet Consistency/Modifications: Reg CC  - MTV and Omega 3    #DVT ppx:  - Initiate heparin SQ on arrival (discussed with orthopedic surgeon Dr. Boston)  - SCDs  - Last Doppler on 5/7/25 negative    #Patient Education  - Education provided on the following:      * Admitting diagnosis and functional implications      * Functional goals      * Bladder management      * Bowel management      * Skin care management      * Intensity of service and scheduling of rehab disciplines      * Plan of care and role of interdisciplinary team conference in discharge planning      * Reconciliation of medications from prior institution    #Restrictions/Precautions:  - Weight bearing status: WBAT BLLE  - ROM restrictions: Avoid any heavy lifting, bending, squatting, or twisting motions.  - Precautions: Fall, spine  *DO NOT take any NSAIDs (Motrin, Aleve, Advil, Ibuprofen, Celebrex, ect.) until instructed by surgical team.  ---------------  Outpatient Follow-up:    Baldev Boston)  Spine Surgery  [address] [place] [address], [address]  [place] [address]  Phone: [phone]  Fax: [phone]  Follow Up Time:      --------------

## 2025-05-23 NOTE — PROGRESS NOTE ADULT - ASSESSMENT
79yMale w/ HTN, HLD, T2DM, acute on chronic low back pain with recent MRI findings concerning for L1-L2 discitis/OM with epidural abscess, bilateral psoas abscesses now s/p T10-Pelvis PSF T12-L2 Laminectomy. Patient on  ertapenem with PICC line placement. Admitted to [place] acute inpatient rehab on 5/16/25 for ADL, gait, and functional impairments.     Epidural abscess  Osteomyelitis of lumbar spine  Bilateral Psoas abscess s/p drain removal  - S/P Decompression, spine, lumbar, posterior approach, with fusion of posterior spinal column  - Continue Ertapenem x 6 weeks through 6/7/25  - S/p placement of PICC 5/14    HTN  - c/w losartan 25mg daily and spironolactone 25mg daily   - BP controlled     Hyponatremia   - mild hyponatremia, Na 133  - if hyponatremia worsens, may need to hold spironolactone  - check serum/ urine osmo, urine Na  - monitor BMP    HLD  - Atorvastatin 40 QHS    T2DM  - A1c 7.2% on 4/25  - pt reports he takes Farxiga at home   - switch metformin to Farxiga 10mg daily- 5/20  - continue ISS  - monitor FS    #Ileus/Pseudoobstruction   - s/p conservative management  - Resolved    #Constipation  - Miralax    # DVT ppx:  -heparin subq    discussed with rehab team during IDR

## 2025-05-23 NOTE — PROGRESS NOTE ADULT - SUBJECTIVE AND OBJECTIVE BOX
HPI:  Mr. Adama Monsivais is a 79-year-old male patient with past medical history of HTN, HLD, DM (A1C 7.2), CKD, and chronic LBP who presented as a transfer from [facility] to to [facility] for orthopedic surgery after initial ED evaluation for acute on chronic back pain had MRI revealing discitis with epidural abscess and bilateral psoas abscesses. Patient is now s/p T10-Pelvis PSF with Dr. Boston with Plastic Surgery closure by Dr. Escamilla on 4/24/25. Patient tolerated the procedure well without any intraoperative complications. Patient tolerated physical therapy, is weight bearing as tolerated and pain was well-controlled. Of note patient had previously been pain seen by pain specialist in 2/2025 for chronic back pain with radiation to bilateral lateral thighs (R>L). MRI at that time showed degenerative changes and had epidural injections x2 (2/27, 3/11) with moderate pain relief. Pain began to worsen on 4/10. He had radiofrequency ablation performed on 4/11 and since had a severe worsening of pain and radiation to R lateral thigh. He also reported recent bowel/bladder "incontinence" requiring diaper due to his inability to get to the bathroom in time due to pain limitations but states urge and sensation remained intact. Infectious disease consulted and co-managed for antibiotic therapy, recommended Ertapenem 1g Q24h until 6/7/25. A PICC line was placed on 5/14/25 and IR psoas drains were placed bilaterally on 4/27/25 and removed on 5/6/25. 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 Seen by medical attending for continuity of care and management and cleared for safe discharge. As per surgeon, the patient is stable and ready for discharge. Patient transferred to [facility] IRF on 5/16 for initiation of comprehensive rehabilitation program with 3 hours of therapies daily 5xweek. (16 May 2025 13:33)    TDD: 5/27/25 Home  ___________________________________________________________________________    SUBJECTIVE/ROS  Patient was seen and evaluated at bedside today.  Reported no overnight events and is in no acute distress.  Reviewed wound and remains unremarkable.   Sutures removed yesterday per plastic surgery.  Case was discussed at Interdisciplinary Team meeting yesterday.  Tentative discharge date remains as outlined above.  Reviewed weekend coverage with patient.  Patient expressed understanding and felt comfortable.  Patient is eager to continue participation on the recommended rehabilitation program.  Denies any CP, SOB, REAL, palpitations, fever, chills, body aches, cough, congestion, or any other symptoms at this time.   ___________________________________________________________________________    Vital Signs Last 24 Hrs  T(C): 36.6 (23 May 2025 08:56), Max: 36.7 (22 May 2025 20:12)  T(F): 97.8 (23 May 2025 08:56), Max: 98 (22 May 2025 20:12)  HR: 106 (23 May 2025 08:56) (99 - 109)  BP: 155/76 (23 May 2025 08:56) (132/70 - 155/76)  RR: 16 (23 May 2025 08:56) (15 - 16)  SpO2: 100% (23 May 2025 08:56) (99% - 100%)    ___________________________________________________________________________    LAB                        10.5   7.47  )-----------( 455      ( 22 May 2025 06:42 )             34.1     05-22    133[L]  |  102  |  9   ----------------------------<  95  4.7   |  22  |  0.82    Ca    9.8      22 May 2025 06:42    TPro  6.4  /  Alb  2.2[L]  /  TBili  0.3  /  DBili  x   /  AST  27  /  ALT  28  /  AlkPhos  110  05-22    LIVER FUNCTIONS - ( 22 May 2025 06:42 )  Alb: 2.2 g/dL / Pro: 6.4 g/dL / ALK PHOS: 110 U/L / ALT: 28 U/L / AST: 27 U/L / GGT: x             ESR Historical Values  Sedimentation Rate, Erythrocyte (05.19.25 @ 07:09)   Sedimentation Rate, Erythrocyte: 59 mm/hr  Sedimentation Rate, Erythrocyte (05.16.25 @ 17:08)   Sedimentation Rate, Erythrocyte: 52 mm/hr  Sedimentation Rate, Erythrocyte (04.26.25 @ 00:53)   Sedimentation Rate, Erythrocyte: 82 mm/hr  Sedimentation Rate, Erythrocyte (04.23.25 @ 11:52)   Sedimentation Rate, Erythrocyte: 120 mm/hr     CRP Historical Values  C-Reactive Protein (05.19.25 @ 07:09)   C-Reactive Protein: 6 mg/L  C-Reactive Protein (05.16.25 @ 17:08)   C-Reactive Protein: <3 mg/L  C-Reactive Protein (04.26.25 @ 00:53)   C-Reactive Protein: 185.2 mg/L  C-Reactive Protein (04.23.25 @ 11:52)   C-Reactive Protein: 137 mg/L    CAPILLARY BLOOD GLUCOSE  POCT Blood Glucose.: 195 mg/dL (22 May 2025 11:46)  POCT Blood Glucose.: 100 mg/dL (22 May 2025 08:19)  POCT Blood Glucose.: 131 mg/dL (21 May 2025 21:25)    ___________________________________________________________________________    MEDICATIONS  (STANDING):  atorvastatin 40 milliGRAM(s) Oral at bedtime  bacitracin   Ointment 1 Application(s) Topical three times a day  chlorhexidine 2% Cloths 1 Application(s) Topical daily  clotrimazole 1% Cream 1 Application(s) Topical two times a day  dapagliflozin 10 milliGRAM(s) Oral daily  dextrose 5%. 1000 milliLiter(s) (100 mL/Hr) IV Continuous <Continuous>  dextrose 5%. 1000 milliLiter(s) (50 mL/Hr) IV Continuous <Continuous>  dextrose 50% Injectable 25 Gram(s) IV Push once  dextrose 50% Injectable 12.5 Gram(s) IV Push once  dextrose 50% Injectable 25 Gram(s) IV Push once  ertapenem  IVPB 1000 milliGRAM(s) IV Intermittent every 24 hours  gabapentin 100 milliGRAM(s) Oral every 8 hours  glucagon  Injectable 1 milliGRAM(s) IntraMuscular once  heparin   Injectable 5000 Unit(s) SubCutaneous every 12 hours  insulin lispro (ADMELOG) corrective regimen sliding scale   SubCutaneous before breakfast  losartan 25 milliGRAM(s) Oral daily  methocarbamol 500 milliGRAM(s) Oral every 12 hours  multivitamin 1 Tablet(s) Oral daily  omega-3-Acid Ethyl Esters 2 Gram(s) Oral two times a day  pantoprazole    Tablet 40 milliGRAM(s) Oral before breakfast  polyethylene glycol 3350 17 Gram(s) Oral at bedtime  simethicone 80 milliGRAM(s) Chew two times a day  spironolactone 25 milliGRAM(s) Oral daily    MEDICATIONS  (PRN):  acetaminophen     Tablet .. 975 milliGRAM(s) Oral every 6 hours PRN Mild Pain (1 - 3)  bisacodyl Suppository 10 milliGRAM(s) Rectal daily PRN Constipation  dextrose Oral Gel 15 Gram(s) Oral once PRN Blood Glucose LESS THAN 70 milliGRAM(s)/deciliter  dibucaine 1% Ointment 1 Application(s) Topical four times a day PRN for anal discomfort d/t hemorrhoids  magnesium hydroxide Suspension 30 milliLiter(s) Oral every 12 hours PRN Constipation    ___________________________________________________________________________    PHYSICAL EXAM:    Gen - NAD, Comfortable  HEENT - NCAT, EOM grossly intact  Pulm - CTAB, No wheeze/rhonchi/crackles  Cardiovascular - RRR  Abdomen - Soft, NT/ND, +BS  Extremities - 2+ edema in LLE and 3+ edema in RLE. No calf tenderness bl.  Neuro-     Cognitive - AAOx3     Communication - Fluent, No dysarthria     Cranial Nerves - CN grossly intact     Motor -                     LEFT    UE - ShAB 5/5, EF 5/5, EE 5/5, WE 5/5,  5/5                    RIGHT UE - ShAB 5/5, EF 4/5, EE 5/5, WE 5/5,  5/5                    LEFT    LE - HF 4/5, KE 5/5, DF 5/5, PF 5/5                    RIGHT LE - HF 2/5, KE 4/5, DF 5/5, PF 5/5        Sensory - Intact to LT in bl LEs and bl UEs     Reflexes - DTR Intact, No primitive reflexive  Psychiatric - Mood stable, Affect WNL  Skin: Surgical incision on mid-back 28cm vertical without sutures. Left ROB.  R groin wound 5x2cm. Bl groin MAD    ___________________________________________________________________________

## 2025-05-23 NOTE — PROGRESS NOTE ADULT - SUBJECTIVE AND OBJECTIVE BOX
Writer at [employer] team received PA request for Qvar    Per patients insurance plan [place] will only be covered if patient has had a documented trial and failure to ONE of the following medications preferred by plan:    fluticasone (Gen-Flovent Diskus)   fluticasone HFA (Gen-Flovent HFA)   Advair Diskus   Advair HFA   AirDuo Respiclick   Arnuity Ellipta SCN   Dulera   Pulmicort Flexhaler   Symbicort         As patient does not meet insurances criteria for coverage at this time, [employer] team will close this request and exit this encounter.    Please send new prescription for step therapy if appropriate.       Patient is a 79y old  Male who presents with a chief complaint of Lumbar epidural abscess with stenosis, diskitis, osteomyelitis, and kyphosis s/p decompressive laminectomy and fusion surgery (23 May 2025 10:28)      Subjective and overnight events:  Patient seen and examined at bedside. reports pain controlled. + muscle soreness due to therapy. no acute event overnight.     ALLERGIES:  No Known Allergies    MEDICATIONS  (STANDING):  atorvastatin 40 milliGRAM(s) Oral at bedtime  bacitracin   Ointment 1 Application(s) Topical three times a day  chlorhexidine 2% Cloths 1 Application(s) Topical daily  clotrimazole 1% Cream 1 Application(s) Topical two times a day  dapagliflozin 10 milliGRAM(s) Oral daily  dextrose 5%. 1000 milliLiter(s) (50 mL/Hr) IV Continuous <Continuous>  dextrose 5%. 1000 milliLiter(s) (100 mL/Hr) IV Continuous <Continuous>  dextrose 50% Injectable 25 Gram(s) IV Push once  dextrose 50% Injectable 12.5 Gram(s) IV Push once  dextrose 50% Injectable 25 Gram(s) IV Push once  ertapenem  IVPB 1000 milliGRAM(s) IV Intermittent every 24 hours  gabapentin 100 milliGRAM(s) Oral every 8 hours  glucagon  Injectable 1 milliGRAM(s) IntraMuscular once  heparin   Injectable 5000 Unit(s) SubCutaneous every 12 hours  insulin lispro (ADMELOG) corrective regimen sliding scale   SubCutaneous before breakfast  losartan 25 milliGRAM(s) Oral daily  methocarbamol 500 milliGRAM(s) Oral every 12 hours  multivitamin 1 Tablet(s) Oral daily  omega-3-Acid Ethyl Esters 2 Gram(s) Oral two times a day  pantoprazole    Tablet 40 milliGRAM(s) Oral before breakfast  polyethylene glycol 3350 17 Gram(s) Oral at bedtime  simethicone 80 milliGRAM(s) Chew two times a day  spironolactone 25 milliGRAM(s) Oral daily    MEDICATIONS  (PRN):  acetaminophen     Tablet .. 975 milliGRAM(s) Oral every 6 hours PRN Mild Pain (1 - 3)  bisacodyl Suppository 10 milliGRAM(s) Rectal daily PRN Constipation  dextrose Oral Gel 15 Gram(s) Oral once PRN Blood Glucose LESS THAN 70 milliGRAM(s)/deciliter  dibucaine 1% Ointment 1 Application(s) Topical four times a day PRN for anal discomfort d/t hemorrhoids  magnesium hydroxide Suspension 30 milliLiter(s) Oral every 12 hours PRN Constipation    Vital Signs Last 24 Hrs  T(F): 97.8 (23 May 2025 08:56), Max: 98 (22 May 2025 20:12)  HR: 106 (23 May 2025 08:56) (99 - 109)  BP: 155/76 (23 May 2025 08:56) (132/70 - 155/76)  RR: 16 (23 May 2025 08:56) (15 - 16)  SpO2: 100% (23 May 2025 08:56) (99% - 100%)  I&O's Summary    PHYSICAL EXAM:  General: NAD, A/O x 3  ENT: MMM  Neck: Supple, No JVD  Lungs: Clear to auscultation bilaterally  Cardio: RRR, S1/S2, No murmurs  Abdomen: Soft, Nontender, Nondistended; Bowel sounds present  Extremities: No calf tenderness, No pitting edema    LABS:                        10.5   7.47  )-----------( 455      ( 22 May 2025 06:42 )             34.1     05-22    133  |  102  |  9   ----------------------------<  95  4.7   |  22  |  0.82    Ca    9.8      22 May 2025 06:42    TPro  6.4  /  Alb  2.2  /  TBili  0.3  /  DBili  x   /  AST  27  /  ALT  28  /  AlkPhos  110  05-22              POCT Blood Glucose.: 111 mg/dL (23 May 2025 08:03)      Urinalysis Basic - ( 22 May 2025 06:42 )    Color: x / Appearance: x / SG: x / pH: x  Gluc: 95 mg/dL / Ketone: x  / Bili: x / Urobili: x   Blood: x / Protein: x / Nitrite: x   Leuk Esterase: x / RBC: x / WBC x   Sq Epi: x / Non Sq Epi: x / Bacteria: x        COVID-19 PCR: NotDetec (05-16-25 @ 16:10)      RADIOLOGY & ADDITIONAL TESTS:    Care Discussed with Consultants/Other Providers:

## 2025-05-24 LAB
ANION GAP SERPL CALC-SCNC: 9 MMOL/L — SIGNIFICANT CHANGE UP (ref 5–17)
BUN SERPL-MCNC: 10 MG/DL — SIGNIFICANT CHANGE UP (ref 7–23)
CALCIUM SERPL-MCNC: 10.2 MG/DL — SIGNIFICANT CHANGE UP (ref 8.4–10.5)
CHLORIDE SERPL-SCNC: 101 MMOL/L — SIGNIFICANT CHANGE UP (ref 96–108)
CO2 SERPL-SCNC: 25 MMOL/L — SIGNIFICANT CHANGE UP (ref 22–31)
CREAT SERPL-MCNC: 1.14 MG/DL — SIGNIFICANT CHANGE UP (ref 0.5–1.3)
CULTURE RESULTS: SIGNIFICANT CHANGE UP
EGFR: 65 ML/MIN/1.73M2 — SIGNIFICANT CHANGE UP
EGFR: 65 ML/MIN/1.73M2 — SIGNIFICANT CHANGE UP
GLUCOSE BLDC GLUCOMTR-MCNC: 163 MG/DL — HIGH (ref 70–99)
GLUCOSE BLDC GLUCOMTR-MCNC: 211 MG/DL — HIGH (ref 70–99)
GLUCOSE SERPL-MCNC: 107 MG/DL — HIGH (ref 70–99)
OSMOLALITY SERPL: 279 MOSMOL/KG — LOW (ref 280–301)
POTASSIUM SERPL-MCNC: 4 MMOL/L — SIGNIFICANT CHANGE UP (ref 3.5–5.3)
POTASSIUM SERPL-SCNC: 4 MMOL/L — SIGNIFICANT CHANGE UP (ref 3.5–5.3)
SODIUM SERPL-SCNC: 135 MMOL/L — SIGNIFICANT CHANGE UP (ref 135–145)
SODIUM UR-SCNC: 40 MMOL/L — SIGNIFICANT CHANGE UP
SPECIMEN SOURCE: SIGNIFICANT CHANGE UP

## 2025-05-24 PROCEDURE — 99232 SBSQ HOSP IP/OBS MODERATE 35: CPT

## 2025-05-24 RX ADMIN — Medication 1 TABLET(S): at 12:17

## 2025-05-24 RX ADMIN — METHOCARBAMOL 500 MILLIGRAM(S): 500 TABLET, FILM COATED ORAL at 17:47

## 2025-05-24 RX ADMIN — POLYETHYLENE GLYCOL 3350 17 GRAM(S): 17 POWDER, FOR SOLUTION ORAL at 22:09

## 2025-05-24 RX ADMIN — GABAPENTIN 100 MILLIGRAM(S): 400 CAPSULE ORAL at 06:02

## 2025-05-24 RX ADMIN — HEPARIN SODIUM 5000 UNIT(S): 1000 INJECTION INTRAVENOUS; SUBCUTANEOUS at 06:00

## 2025-05-24 RX ADMIN — INSULIN LISPRO 4: 100 INJECTION, SOLUTION INTRAVENOUS; SUBCUTANEOUS at 09:32

## 2025-05-24 RX ADMIN — CLOTRIMAZOLE 1 APPLICATION(S): 1 CREAM TOPICAL at 06:03

## 2025-05-24 RX ADMIN — Medication 80 MILLIGRAM(S): at 06:00

## 2025-05-24 RX ADMIN — OMEGA-3-ACID ETHYL ESTERS CAPSULES 2 GRAM(S): 1 CAPSULE, LIQUID FILLED ORAL at 05:59

## 2025-05-24 RX ADMIN — Medication 25 MILLIGRAM(S): at 06:00

## 2025-05-24 RX ADMIN — ATORVASTATIN CALCIUM 40 MILLIGRAM(S): 80 TABLET, FILM COATED ORAL at 22:09

## 2025-05-24 RX ADMIN — ERTAPENEM SODIUM 120 MILLIGRAM(S): 1 INJECTION, POWDER, LYOPHILIZED, FOR SOLUTION INTRAMUSCULAR; INTRAVENOUS at 22:08

## 2025-05-24 RX ADMIN — LOSARTAN POTASSIUM 25 MILLIGRAM(S): 100 TABLET, FILM COATED ORAL at 06:00

## 2025-05-24 RX ADMIN — GABAPENTIN 100 MILLIGRAM(S): 400 CAPSULE ORAL at 14:16

## 2025-05-24 RX ADMIN — Medication 80 MILLIGRAM(S): at 17:48

## 2025-05-24 RX ADMIN — OMEGA-3-ACID ETHYL ESTERS CAPSULES 2 GRAM(S): 1 CAPSULE, LIQUID FILLED ORAL at 17:48

## 2025-05-24 RX ADMIN — HEPARIN SODIUM 5000 UNIT(S): 1000 INJECTION INTRAVENOUS; SUBCUTANEOUS at 17:47

## 2025-05-24 RX ADMIN — Medication 1 APPLICATION(S): at 12:15

## 2025-05-24 RX ADMIN — Medication 40 MILLIGRAM(S): at 06:13

## 2025-05-24 RX ADMIN — Medication 1 APPLICATION(S): at 06:00

## 2025-05-24 RX ADMIN — METHOCARBAMOL 500 MILLIGRAM(S): 500 TABLET, FILM COATED ORAL at 05:59

## 2025-05-24 RX ADMIN — Medication 1 APPLICATION(S): at 22:09

## 2025-05-24 RX ADMIN — CLOTRIMAZOLE 1 APPLICATION(S): 1 CREAM TOPICAL at 17:47

## 2025-05-24 RX ADMIN — DAPAGLIFLOZIN 10 MILLIGRAM(S): 5 TABLET, FILM COATED ORAL at 12:15

## 2025-05-24 RX ADMIN — GABAPENTIN 100 MILLIGRAM(S): 400 CAPSULE ORAL at 22:09

## 2025-05-24 NOTE — PROGRESS NOTE ADULT - ASSESSMENT
Mr. Adama Monsivais is a 79-year-old male patient with past medical history of HTN, HLD, DM (A1C 7.2), CKD, and chronic LBP who presented as a transfer from [facility] to to [facility] for orthopedic surgery after initial ED evaluation for acute on chronic back pain had MRI revealing discitis with epidural abscess and bilateral psoas abscesses. Patient is now s/p T10-Pelvis PSF with Dr. Boston with Plastic Surgery closure by Dr. Escamilla on 4/24/25. Patient tolerated the procedure well without any intraoperative complications. Patient tolerated physical therapy, is weight bearing as tolerated and pain was well-controlled. Of note patient had previously been pain seen by pain specialist in 2/2025 for chronic back pain with radiation to bilateral lateral thighs (R>L). MRI at that time showed degenerative changes and had epidural injections x2 (2/27, 3/11) with moderate pain relief. Pain began to worsen on 4/10. He had radiofrequency ablation performed on 4/11 and since had a severe worsening of pain and radiation to R lateral thigh. He also reported recent bowel/bladder "incontinence" requiring diaper due to his inability to get to the bathroom in time due to pain limitations but states urge and sensation remained intact. Infectious disease consulted and co-managed for antibiotic therapy, recommended Ertapenem 1g Q24h until 6/7/25. A PICC line was placed on 5/14/25 and IR psoas drains were placed bilaterally on 4/27/25 and removed on 5/6/25. 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 Seen by medical attending for continuity of care and management and cleared for safe discharge. As per surgeon, the patient is stable and ready for discharge. Patient transferred to [facility] IRF on 5/16 for initiation of comprehensive rehabilitation program with 3 hours of therapies daily 5xweek.    will monitor HR  #Lumbar epidural abscess with stenosis, diskitis, osteomyelitis, and kyphosis s/p decompressive laminectomy and fusion surgery  - Epidural abscess, osteomyelitis of lumbar spine, and bilateral Psoas abscess s/p drain removal      * Continue Ertapenem x 6 weeks through 6/7/25      * S/P placement of PICC 5/14      * Confirm PICC line placement with CXR on admission  - S/P Decompression, spine, lumbar, posterior approach, with fusion of posterior spinal column      * Surgical incision on mid-back 28cm vertical with sutures in place and left ROB      * Sutures/staples to be removed on post-op day #14, but changed to 5/22 per discussion with Plastic Surgery      * ROM restrictions: Avoid any heavy lifting, bending, squatting, or twisting motions.      * Avoid NSAIDs  - Activity limitations: Decreased social, vocational and leisure activities, decreased self care and ADLs, decreased mobility  - Deficits: lower extremity weakness and bowel/bladder dysfunction  - Comprehensive Multidisciplinary Rehab Program:      * 3 hours a day, 5 days a week.      * PT 2hr/day: Focused on improving strength, endurance, coordination, balance, functional mobility, and transfers      * OT 1hr/day: Focused on improving strength, fine motor skills, coordination, posture and ADLs.      #HTN  - Starting losartan 25 Qd 5/17 AM  - Spironolactone 50 --> 25mg 25 Qd on 5/17 AM    #Electrolyte imbalance with hypokalemia & hypophosphatemia  - 40mEq KCl powder Qd  - C/w Spironolactone 25 Qd as above  - Goal K level 4    #HLD  - Atorvastatin 40 QHS    #Diabetes with hyperglycemia  - A1c 7.2% on 4/25  - Metformin 500 Qd  - ELNA for now  - Hospitalist follow up in AM    #GI/Bowel:  - GI ppx: 40 Qd  - Ileus/Pseudoobstruction (Resolved)      * S/p GI/surgery eval at [facility] with conservative mgmt and improvement       * Continue Miralax/Magnesium hydroxide PRN/ simethicone PRN    #Sleep:  - Melatonin qHS    #Pain Management:  - Tylenol 975 q6  - Methocarbamol 500mg BID  - Gabapentin 100 TID    #/Bladder:   - Bladder scan x1 on admission, SC PRN  - Encourage timed voids every 4 hours while awake    #Skin/Pressure Injury:  - Skin assessment on admission:       * Skin: R groin wound 5x2cm. Bl groin MAD. C/w clotrimazole.      * Monitor Incisions: Surgical incision on mid-back 28cm vertical with sutures in place. left ROB.      * Per dc summary, "Any sutures/staples to be removed on post-op day #14 at office visit," however, sx was 4/24.       * Reached out to plastic surgery team at [facility] for clarification.    #Diet:   - Diet Consistency/Modifications: Reg CC  - MTV and Omega 3    #DVT ppx:  - Initiate heparin SQ on arrival (discussed with orthopedic surgeon Dr. Boston)  - SCDs  - Last Doppler on 5/7/25 negative    #Patient Education  - Education provided on the following:      * Admitting diagnosis and functional implications      * Functional goals      * Bladder management      * Bowel management      * Skin care management      * Intensity of service and scheduling of rehab disciplines      * Plan of care and role of interdisciplinary team conference in discharge planning      * Reconciliation of medications from prior institution    #Restrictions/Precautions:  - Weight bearing status: WBAT BLLE  - ROM restrictions: Avoid any heavy lifting, bending, squatting, or twisting motions.  - Precautions: Fall, spine  *DO NOT take any NSAIDs (Motrin, Aleve, Advil, Ibuprofen, Celebrex, ect.) until instructed by surgical team.  ---------------  Outpatient Follow-up:    Baldev Boston)  Spine Surgery  [address] [place] [address], [address]  [place] [address]  Phone: [phone]  Fax: [phone]  Follow Up Time:      --------------

## 2025-05-24 NOTE — PROGRESS NOTE ADULT - SUBJECTIVE AND OBJECTIVE BOX
HPI:  Mr. Adama Monsivais is a 79-year-old male patient with past medical history of HTN, HLD, DM (A1C 7.2), CKD, and chronic LBP who presented as a transfer from [facility] to to [facility] for orthopedic surgery after initial ED evaluation for acute on chronic back pain had MRI revealing discitis with epidural abscess and bilateral psoas abscesses. Patient is now s/p T10-Pelvis PSF with Dr. Boston with Plastic Surgery closure by Dr. Escamilla on 4/24/25. Patient tolerated the procedure well without any intraoperative complications. Patient tolerated physical therapy, is weight bearing as tolerated and pain was well-controlled. Of note patient had previously been pain seen by pain specialist in 2/2025 for chronic back pain with radiation to bilateral lateral thighs (R>L). MRI at that time showed degenerative changes and had epidural injections x2 (2/27, 3/11) with moderate pain relief. Pain began to worsen on 4/10. He had radiofrequency ablation performed on 4/11 and since had a severe worsening of pain and radiation to R lateral thigh. He also reported recent bowel/bladder "incontinence" requiring diaper due to his inability to get to the bathroom in time due to pain limitations but states urge and sensation remained intact. Infectious disease consulted and co-managed for antibiotic therapy, recommended Ertapenem 1g Q24h until 6/7/25. A PICC line was placed on 5/14/25 and IR psoas drains were placed bilaterally on 4/27/25 and removed on 5/6/25. 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 Seen by medical attending for continuity of care and management and cleared for safe discharge. As per surgeon, the patient is stable and ready for discharge. Patient transferred to [facility] on 5/16 for initiation of comprehensive rehabilitation program with 3 hours of therapies daily 5xweek. (16 May 2025 13:33)    TDD: 5/27/25 Home  ___________________________________________________________________________    SUBJECTIVE/ROS  Patient was seen and evaluated at bedside today.  discussed about high HR but hospitalist not concerned  pt feels no palpaitations  ___________________________________________________________________________  MEDICATIONS  (STANDING):  atorvastatin 40 milliGRAM(s) Oral at bedtime  bacitracin   Ointment 1 Application(s) Topical three times a day  chlorhexidine 2% Cloths 1 Application(s) Topical daily  clotrimazole 1% Cream 1 Application(s) Topical two times a day  dapagliflozin 10 milliGRAM(s) Oral daily  dextrose 5%. 1000 milliLiter(s) (50 mL/Hr) IV Continuous <Continuous>  dextrose 5%. 1000 milliLiter(s) (100 mL/Hr) IV Continuous <Continuous>  dextrose 50% Injectable 25 Gram(s) IV Push once  dextrose 50% Injectable 12.5 Gram(s) IV Push once  dextrose 50% Injectable 25 Gram(s) IV Push once  ertapenem  IVPB 1000 milliGRAM(s) IV Intermittent every 24 hours  gabapentin 100 milliGRAM(s) Oral every 8 hours  glucagon  Injectable 1 milliGRAM(s) IntraMuscular once  heparin   Injectable 5000 Unit(s) SubCutaneous every 12 hours  insulin lispro (ADMELOG) corrective regimen sliding scale   SubCutaneous before breakfast  losartan 25 milliGRAM(s) Oral daily  methocarbamol 500 milliGRAM(s) Oral every 12 hours  multivitamin 1 Tablet(s) Oral daily  omega-3-Acid Ethyl Esters 2 Gram(s) Oral two times a day  pantoprazole    Tablet 40 milliGRAM(s) Oral before breakfast  polyethylene glycol 3350 17 Gram(s) Oral at bedtime  simethicone 80 milliGRAM(s) Chew two times a day  spironolactone 25 milliGRAM(s) Oral daily    MEDICATIONS  (PRN):  acetaminophen     Tablet .. 975 milliGRAM(s) Oral every 6 hours PRN Mild Pain (1 - 3)  bisacodyl Suppository 10 milliGRAM(s) Rectal daily PRN Constipation  dextrose Oral Gel 15 Gram(s) Oral once PRN Blood Glucose LESS THAN 70 milliGRAM(s)/deciliter  dibucaine 1% Ointment 1 Application(s) Topical four times a day PRN for anal discomfort d/t hemorrhoids  magnesium hydroxide Suspension 30 milliLiter(s) Oral every 12 hours PRN Constipation    Vital Signs Last 24 Hrs  Vital Signs Last 24 Hrs  T(C): 36.7 (24 May 2025 07:42), Max: 36.7 (24 May 2025 07:42)  T(F): 98.1 (24 May 2025 07:42), Max: 98.1 (24 May 2025 07:42)  HR: 108 (24 May 2025 07:42) (105 - 108)  BP: 147/73 (24 May 2025 07:42) (131/70 - 147/73)  BP(mean): --  RR: 16 (24 May 2025 07:42) (16 - 17)  SpO2: 100% (24 May 2025 07:42) (99% - 100%)    Parameters below as of 24 May 2025 07:42  Patient On (Oxygen Delivery Method): room air        ___________________________________________________________________________    PHYSICAL EXAM:    Gen - NAD, Comfortable  HEENT - NCAT, EOM grossly intact  Pulm - CTAB, No wheeze/rhonchi/crackles  Cardiovascular - RRR  Abdomen - Soft, NT/ND, +BS  Extremities - 2+ edema in LLE and 3+ edema in RLE. No calf tenderness bl.  Neuro-     Cognitive - AAOx3     Communication - Fluent, No dysarthria     Cranial Nerves - CN grossly intact     Motor -                     LEFT    UE - ShAB 5/5, EF 5/5, EE 5/5, WE 5/5,  5/5                    RIGHT UE - ShAB 5/5, EF 4/5, EE 5/5, WE 5/5,  5/5                    LEFT    LE - HF 4/5, KE 5/5, DF 5/5, PF 5/5                    RIGHT LE - HF 2/5, KE 4/5, DF 5/5, PF 5/5        Sensory - Intact to LT in bl LEs and bl UEs     Reflexes - DTR Intact, No primitive reflexive  Psychiatric - Mood stable, Affect WNL  Skin: Surgical incision on mid-back 28cm vertical without sutures. Left ROB.  R groin wound 5x2cm. Bl groin MAD    ___________________________________________________________________________

## 2025-05-25 LAB
GLUCOSE BLDC GLUCOMTR-MCNC: 108 MG/DL — HIGH (ref 70–99)
OSMOLALITY UR: 301 MOSM/KG — SIGNIFICANT CHANGE UP (ref 50–1200)

## 2025-05-25 PROCEDURE — 99232 SBSQ HOSP IP/OBS MODERATE 35: CPT

## 2025-05-25 RX ADMIN — METHOCARBAMOL 500 MILLIGRAM(S): 500 TABLET, FILM COATED ORAL at 09:00

## 2025-05-25 RX ADMIN — Medication 1 TABLET(S): at 12:45

## 2025-05-25 RX ADMIN — OMEGA-3-ACID ETHYL ESTERS CAPSULES 2 GRAM(S): 1 CAPSULE, LIQUID FILLED ORAL at 18:10

## 2025-05-25 RX ADMIN — Medication 25 MILLIGRAM(S): at 06:36

## 2025-05-25 RX ADMIN — CLOTRIMAZOLE 1 APPLICATION(S): 1 CREAM TOPICAL at 06:43

## 2025-05-25 RX ADMIN — HEPARIN SODIUM 5000 UNIT(S): 1000 INJECTION INTRAVENOUS; SUBCUTANEOUS at 06:34

## 2025-05-25 RX ADMIN — GABAPENTIN 100 MILLIGRAM(S): 400 CAPSULE ORAL at 22:08

## 2025-05-25 RX ADMIN — OMEGA-3-ACID ETHYL ESTERS CAPSULES 2 GRAM(S): 1 CAPSULE, LIQUID FILLED ORAL at 09:00

## 2025-05-25 RX ADMIN — Medication 1 APPLICATION(S): at 12:45

## 2025-05-25 RX ADMIN — ATORVASTATIN CALCIUM 40 MILLIGRAM(S): 80 TABLET, FILM COATED ORAL at 22:08

## 2025-05-25 RX ADMIN — Medication 40 MILLIGRAM(S): at 06:37

## 2025-05-25 RX ADMIN — LOSARTAN POTASSIUM 25 MILLIGRAM(S): 100 TABLET, FILM COATED ORAL at 06:37

## 2025-05-25 RX ADMIN — DAPAGLIFLOZIN 10 MILLIGRAM(S): 5 TABLET, FILM COATED ORAL at 12:43

## 2025-05-25 RX ADMIN — Medication 80 MILLIGRAM(S): at 18:10

## 2025-05-25 RX ADMIN — ERTAPENEM SODIUM 120 MILLIGRAM(S): 1 INJECTION, POWDER, LYOPHILIZED, FOR SOLUTION INTRAMUSCULAR; INTRAVENOUS at 22:09

## 2025-05-25 RX ADMIN — POLYETHYLENE GLYCOL 3350 17 GRAM(S): 17 POWDER, FOR SOLUTION ORAL at 22:08

## 2025-05-25 RX ADMIN — GABAPENTIN 100 MILLIGRAM(S): 400 CAPSULE ORAL at 06:39

## 2025-05-25 RX ADMIN — Medication 80 MILLIGRAM(S): at 06:36

## 2025-05-25 RX ADMIN — HEPARIN SODIUM 5000 UNIT(S): 1000 INJECTION INTRAVENOUS; SUBCUTANEOUS at 18:10

## 2025-05-25 RX ADMIN — METHOCARBAMOL 500 MILLIGRAM(S): 500 TABLET, FILM COATED ORAL at 18:10

## 2025-05-25 RX ADMIN — GABAPENTIN 100 MILLIGRAM(S): 400 CAPSULE ORAL at 13:40

## 2025-05-25 RX ADMIN — CLOTRIMAZOLE 1 APPLICATION(S): 1 CREAM TOPICAL at 18:16

## 2025-05-25 RX ADMIN — Medication 1 APPLICATION(S): at 06:43

## 2025-05-25 NOTE — PROGRESS NOTE ADULT - SUBJECTIVE AND OBJECTIVE BOX
Hospitalist: Mitesh Serrano DO    CHIEF COMPLAINT: Patient is a 79y old  male who presents with a chief complaint of Lumbar epidural abscess with stenosis, diskitis, osteomyelitis, and kyphosis s/p decompressive laminectomy and fusion surgery (24 May 2025 14:28)      SUBJECTIVE / OVERNIGHT EVENTS: Patient seen and examined. No acute events overnight. Pain well controlled and patient without any complaints.    MEDICATIONS  (STANDING):  atorvastatin 40 milliGRAM(s) Oral at bedtime  bacitracin   Ointment 1 Application(s) Topical three times a day  chlorhexidine 2% Cloths 1 Application(s) Topical daily  clotrimazole 1% Cream 1 Application(s) Topical two times a day  dapagliflozin 10 milliGRAM(s) Oral daily  dextrose 5%. 1000 milliLiter(s) (50 mL/Hr) IV Continuous <Continuous>  dextrose 5%. 1000 milliLiter(s) (100 mL/Hr) IV Continuous <Continuous>  dextrose 50% Injectable 25 Gram(s) IV Push once  dextrose 50% Injectable 12.5 Gram(s) IV Push once  dextrose 50% Injectable 25 Gram(s) IV Push once  ertapenem  IVPB 1000 milliGRAM(s) IV Intermittent every 24 hours  gabapentin 100 milliGRAM(s) Oral every 8 hours  glucagon  Injectable 1 milliGRAM(s) IntraMuscular once  heparin   Injectable 5000 Unit(s) SubCutaneous every 12 hours  insulin lispro (ADMELOG) corrective regimen sliding scale   SubCutaneous before breakfast  losartan 25 milliGRAM(s) Oral daily  methocarbamol 500 milliGRAM(s) Oral every 12 hours  multivitamin 1 Tablet(s) Oral daily  omega-3-Acid Ethyl Esters 2 Gram(s) Oral two times a day  pantoprazole    Tablet 40 milliGRAM(s) Oral before breakfast  polyethylene glycol 3350 17 Gram(s) Oral at bedtime  simethicone 80 milliGRAM(s) Chew two times a day  spironolactone 25 milliGRAM(s) Oral daily    MEDICATIONS  (PRN):  acetaminophen     Tablet .. 975 milliGRAM(s) Oral every 6 hours PRN Mild Pain (1 - 3)  bisacodyl Suppository 10 milliGRAM(s) Rectal daily PRN Constipation  dextrose Oral Gel 15 Gram(s) Oral once PRN Blood Glucose LESS THAN 70 milliGRAM(s)/deciliter  dibucaine 1% Ointment 1 Application(s) Topical four times a day PRN for anal discomfort d/t hemorrhoids  magnesium hydroxide Suspension 30 milliLiter(s) Oral every 12 hours PRN Constipation      VITALS:  T(F): 97.3 (05-25-25 @ 07:50), Max: 97.9 (05-24-25 @ 20:12)  HR: 96 (05-25-25 @ 07:50) (96 - 113)  BP: 129/74 (05-25-25 @ 07:50) (125/65 - 132/72)  RR: 17 (05-25-25 @ 07:50) (16 - 17)  SpO2: 100% (05-25-25 @ 07:50)      REVIEW OF SYSTEMS:  For ROV please refer back to H&P     PHYSICAL EXAM:  General: NAD, A/O x 3  ENT: MMM  Neck: Supple, No JVD  Lungs: Clear to auscultation bilaterally  Cardio: RRR, S1/S2, No murmurs  Abdomen: Soft, Nontender, Nondistended; Bowel sounds present  Extremities: No calf tenderness, No pitting edema    LABS:              x                    135  | 25   | 10           x     >-----------< x       ------------------------< 107                   x                    4.0  | 101  | 1.14                                         Ca 10.2  Mg x     Ph x          Urinalysis Basic - ( 24 May 2025 06:07 )    Color: x / Appearance: x / SG: x / pH: x  Gluc: 107 mg/dL / Ketone: x  / Bili: x / Urobili: x   Blood: x / Protein: x / Nitrite: x   Leuk Esterase: x / RBC: x / WBC x   Sq Epi: x / Non Sq Epi: x / Bacteria: x       CAPILLARY BLOOD GLUCOSE  POCT Blood Glucose.: 108 mg/dL (25 May 2025 07:33)        MICROBIOLOGY:  Urinalysis Basic - ( 24 May 2025 06:07 )    Color: x / Appearance: x / SG: x / pH: x  Gluc: 107 mg/dL / Ketone: x  / Bili: x / Urobili: x   Blood: x / Protein: x / Nitrite: x   Leuk Esterase: x / RBC: x / WBC x   Sq Epi: x / Non Sq Epi: x / Bacteria: x            RADIOLOGY & ADDITIONAL TESTS:    Imaging Personally Reviewed:    [X] Consultant(s) Notes Reviewed:  [X] Care Discussed with Consultants/Other Providers:

## 2025-05-25 NOTE — PROGRESS NOTE ADULT - ASSESSMENT
78 y/o man w/ HTN, HLD, T2DM, acute on chronic low back pain with recent MRI findings concerning for L1-L2 discitis/OM with epidural abscess, bilateral psoas abscesses now s/p T10-Pelvis PSF T12-L2 Laminectomy. Patient on  ertapenem with PICC line placement. Admitted to [facility] acute inpatient rehab on 5/16/25 for ADL, gait, and functional impairments.     Epidural abscess  Osteomyelitis of lumbar spine  Bilateral Psoas abscess s/p drain removal  - S/P Decompression, spine, lumbar, posterior approach, with fusion of posterior spinal column  - Continue Ertapenem x 6 weeks through 6/7/25  - S/p placement of PICC 5/14    HTN  - c/w losartan 25mg daily and spironolactone 25mg daily   - BP controlled     Hyponatremia   - mild hyponatremia, Na 133  - if hyponatremia worsens, may need to hold spironolactone  - check serum/ urine osmo, urine Na  - monitor BMP    HLD  - Atorvastatin 40 QHS    T2DM  - A1c 7.2% on 4/25  - pt reports he takes Farxiga at home   - switch metformin to Farxiga 10mg daily- 5/20  - continue ISS  - monitor FS    #Ileus/Pseudoobstruction   - s/p conservative management  - Resolved    #Constipation  - Miralax    # DVT ppx:  -heparin subq

## 2025-05-25 NOTE — PROGRESS NOTE ADULT - ASSESSMENT
Mr. Adama Monsivais is a 79-year-old male patient with past medical history of HTN, HLD, DM (A1C 7.2), CKD, and chronic LBP who presented as a transfer from [facility] to to [facility] for orthopedic surgery after initial ED evaluation for acute on chronic back pain had MRI revealing discitis with epidural abscess and bilateral psoas abscesses. Patient is now s/p T10-Pelvis PSF with Dr. Boston with Plastic Surgery closure by Dr. Escamilla on 4/24/25. Patient tolerated the procedure well without any intraoperative complications. Patient tolerated physical therapy, is weight bearing as tolerated and pain was well-controlled. Of note patient had previously been pain seen by pain specialist in 2/2025 for chronic back pain with radiation to bilateral lateral thighs (R>L). MRI at that time showed degenerative changes and had epidural injections x2 (2/27, 3/11) with moderate pain relief. Pain began to worsen on 4/10. He had radiofrequency ablation performed on 4/11 and since had a severe worsening of pain and radiation to R lateral thigh. He also reported recent bowel/bladder "incontinence" requiring diaper due to his inability to get to the bathroom in time due to pain limitations but states urge and sensation remained intact. Infectious disease consulted and co-managed for antibiotic therapy, recommended Ertapenem 1g Q24h until 6/7/25. A PICC line was placed on 5/14/25 and IR psoas drains were placed bilaterally on 4/27/25 and removed on 5/6/25. 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 Seen by medical attending for continuity of care and management and cleared for safe discharge. As per surgeon, the patient is stable and ready for discharge. Patient transferred to [facility] IRF on 5/16 for initiation of comprehensive rehabilitation program with 3 hours of therapies daily 5xweek.    will monitor HR  #Lumbar epidural abscess with stenosis, diskitis, osteomyelitis, and kyphosis s/p decompressive laminectomy and fusion surgery  - Epidural abscess, osteomyelitis of lumbar spine, and bilateral Psoas abscess s/p drain removal      * Continue Ertapenem x 6 weeks through 6/7/25      * S/P placement of PICC 5/14      * Confirm PICC line placement with CXR on admission  - S/P Decompression, spine, lumbar, posterior approach, with fusion of posterior spinal column      * Surgical incision on mid-back 28cm vertical with sutures in place and left ROB      * Sutures/staples to be removed on post-op day #14, but changed to 5/22 per discussion with Plastic Surgery      * ROM restrictions: Avoid any heavy lifting, bending, squatting, or twisting motions.      * Avoid NSAIDs  - Activity limitations: Decreased social, vocational and leisure activities, decreased self care and ADLs, decreased mobility  - Deficits: lower extremity weakness and bowel/bladder dysfunction  - Comprehensive Multidisciplinary Rehab Program:      * 3 hours a day, 5 days a week.      * PT 2hr/day: Focused on improving strength, endurance, coordination, balance, functional mobility, and transfers      * OT 1hr/day: Focused on improving strength, fine motor skills, coordination, posture and ADLs.      #HTN  - Starting losartan 25 Qd 5/17 AM  - Spironolactone 50 --> 25mg 25 Qd on 5/17 AM    #Electrolyte imbalance with hypokalemia & hypophosphatemia  - 40mEq KCl powder Qd  - C/w Spironolactone 25 Qd as above  - Goal K level 4    #HLD  - Atorvastatin 40 QHS    #Diabetes with hyperglycemia  - A1c 7.2% on 4/25  - Metformin 500 Qd  - LENA for now  - Hospitalist follow up in AM    #GI/Bowel:  - GI ppx: 40 Qd  - Ileus/Pseudoobstruction (Resolved)      * S/p GI/surgery eval at [facility] with conservative mgmt and improvement       * Continue Miralax/Magnesium hydroxide PRN/ simethicone PRN    #Sleep:  - Melatonin qHS    #Pain Management:  - Tylenol 975 q6  - Methocarbamol 500mg BID  - Gabapentin 100 TID    #/Bladder:   - Bladder scan x1 on admission, SC PRN  - Encourage timed voids every 4 hours while awake    #Skin/Pressure Injury:  - Skin assessment on admission:       * Skin: R groin wound 5x2cm. Bl groin MAD. C/w clotrimazole.      * Monitor Incisions: Surgical incision on mid-back 28cm vertical with sutures in place. left ROB.      * Per dc summary, "Any sutures/staples to be removed on post-op day #14 at office visit," however, sx was 4/24.       * Reached out to plastic surgery team at [facility] for clarification.    #Diet:   - Diet Consistency/Modifications: Reg CC  - MTV and Omega 3    #DVT ppx:  - Initiate heparin SQ on arrival (discussed with orthopedic surgeon Dr. Boston)  - SCDs  - Last Doppler on 5/7/25 negative    #Patient Education  - Education provided on the following:      * Admitting diagnosis and functional implications      * Functional goals      * Bladder management      * Bowel management      * Skin care management      * Intensity of service and scheduling of rehab disciplines      * Plan of care and role of interdisciplinary team conference in discharge planning      * Reconciliation of medications from prior institution    #Restrictions/Precautions:  - Weight bearing status: WBAT BLLE  - ROM restrictions: Avoid any heavy lifting, bending, squatting, or twisting motions.  - Precautions: Fall, spine  *DO NOT take any NSAIDs (Motrin, Aleve, Advil, Ibuprofen, Celebrex, ect.) until instructed by surgical team.  ---------------  Outpatient Follow-up:    Baldev Boston)  Spine Surgery  [address] [place] [address], [address]  [place] [address]  Phone: [phone]  Fax: [phone]  Follow Up Time:      --------------

## 2025-05-25 NOTE — PROGRESS NOTE ADULT - SUBJECTIVE AND OBJECTIVE BOX
HPI:  Mr. Adama Monsivais is a 79-year-old male patient with past medical history of HTN, HLD, DM (A1C 7.2), CKD, and chronic LBP who presented as a transfer from [facility] to to [facility] for orthopedic surgery after initial ED evaluation for acute on chronic back pain had MRI revealing discitis with epidural abscess and bilateral psoas abscesses. Patient is now s/p T10-Pelvis PSF with Dr. Boston with Plastic Surgery closure by Dr. Escamilla on 4/24/25. Patient tolerated the procedure well without any intraoperative complications. Patient tolerated physical therapy, is weight bearing as tolerated and pain was well-controlled. Of note patient had previously been pain seen by pain specialist in 2/2025 for chronic back pain with radiation to bilateral lateral thighs (R>L). MRI at that time showed degenerative changes and had epidural injections x2 (2/27, 3/11) with moderate pain relief. Pain began to worsen on 4/10. He had radiofrequency ablation performed on 4/11 and since had a severe worsening of pain and radiation to R lateral thigh. He also reported recent bowel/bladder "incontinence" requiring diaper due to his inability to get to the bathroom in time due to pain limitations but states urge and sensation remained intact. Infectious disease consulted and co-managed for antibiotic therapy, recommended Ertapenem 1g Q24h until 6/7/25. A PICC line was placed on 5/14/25 and IR psoas drains were placed bilaterally on 4/27/25 and removed on 5/6/25. 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 Seen by medical attending for continuity of care and management and cleared for safe discharge. As per surgeon, the patient is stable and ready for discharge. Patient transferred to [facility] IRF on 5/16 for initiation of comprehensive rehabilitation program with 3 hours of therapies daily 5xweek. (16 May 2025 13:33)    TDD: 5/27/25 Home  ___________________________________________________________________________    SUBJECTIVE/ROS  Patient was seen and evaluated at bedside today.  discussed about high HR but hospitalist not concerned  pt feels no palpaitations and HR trending down  ___________________________________________________________________________  MEDICATIONS  (STANDING):  atorvastatin 40 milliGRAM(s) Oral at bedtime  bacitracin   Ointment 1 Application(s) Topical three times a day  chlorhexidine 2% Cloths 1 Application(s) Topical daily  clotrimazole 1% Cream 1 Application(s) Topical two times a day  dapagliflozin 10 milliGRAM(s) Oral daily  dextrose 5%. 1000 milliLiter(s) (50 mL/Hr) IV Continuous <Continuous>  dextrose 5%. 1000 milliLiter(s) (100 mL/Hr) IV Continuous <Continuous>  dextrose 50% Injectable 25 Gram(s) IV Push once  dextrose 50% Injectable 12.5 Gram(s) IV Push once  dextrose 50% Injectable 25 Gram(s) IV Push once  ertapenem  IVPB 1000 milliGRAM(s) IV Intermittent every 24 hours  gabapentin 100 milliGRAM(s) Oral every 8 hours  glucagon  Injectable 1 milliGRAM(s) IntraMuscular once  heparin   Injectable 5000 Unit(s) SubCutaneous every 12 hours  insulin lispro (ADMELOG) corrective regimen sliding scale   SubCutaneous before breakfast  losartan 25 milliGRAM(s) Oral daily  methocarbamol 500 milliGRAM(s) Oral every 12 hours  multivitamin 1 Tablet(s) Oral daily  omega-3-Acid Ethyl Esters 2 Gram(s) Oral two times a day  pantoprazole    Tablet 40 milliGRAM(s) Oral before breakfast  polyethylene glycol 3350 17 Gram(s) Oral at bedtime  simethicone 80 milliGRAM(s) Chew two times a day  spironolactone 25 milliGRAM(s) Oral daily    MEDICATIONS  (PRN):  acetaminophen     Tablet .. 975 milliGRAM(s) Oral every 6 hours PRN Mild Pain (1 - 3)  bisacodyl Suppository 10 milliGRAM(s) Rectal daily PRN Constipation  dextrose Oral Gel 15 Gram(s) Oral once PRN Blood Glucose LESS THAN 70 milliGRAM(s)/deciliter  dibucaine 1% Ointment 1 Application(s) Topical four times a day PRN for anal discomfort d/t hemorrhoids  magnesium hydroxide Suspension 30 milliLiter(s) Oral every 12 hours PRN Constipation    Vital Signs Last 24 Hrs  Vital Signs Last 24 Hrs  T(C): 36.3 (25 May 2025 07:50), Max: 36.6 (24 May 2025 20:12)  T(F): 97.3 (25 May 2025 07:50), Max: 97.9 (24 May 2025 20:12)  HR: 96 (25 May 2025 07:50) (96 - 113)  BP: 129/74 (25 May 2025 07:50) (125/65 - 132/72)  BP(mean): --  RR: 17 (25 May 2025 07:50) (16 - 17)  SpO2: 100% (25 May 2025 07:50) (97% - 100%)    Parameters below as of 25 May 2025 07:50  Patient On (Oxygen Delivery Method): room air      Patient On (Oxygen Delivery Method): room air        ___________________________________________________________________________    PHYSICAL EXAM:    Gen - NAD, Comfortable  HEENT - NCAT, EOM grossly intact  Pulm - CTAB, No wheeze/rhonchi/crackles  Cardiovascular - RRR  Abdomen - Soft, NT/ND, +BS  Extremities - 2+ edema in LLE and 3+ edema in RLE. No calf tenderness bl.  Neuro-     Cognitive - AAOx3     Communication - Fluent, No dysarthria     Cranial Nerves - CN grossly intact     Motor -                     LEFT    UE - ShAB 5/5, EF 5/5, EE 5/5, WE 5/5,  5/5                    RIGHT UE - ShAB 5/5, EF 4/5, EE 5/5, WE 5/5,  5/5                    LEFT    LE - HF 4/5, KE 5/5, DF 5/5, PF 5/5                    RIGHT LE - HF 2/5, KE 4/5, DF 5/5, PF 5/5        Sensory - Intact to LT in bl LEs and bl UEs     Reflexes - DTR Intact, No primitive reflexive  Psychiatric - Mood stable, Affect WNL  Skin: Surgical incision on mid-back 28cm vertical without sutures. Left ROB.  R groin wound 5x2cm. Bl groin MAD    ___________________________________________________________________________

## 2025-05-26 LAB
BASOPHILS # BLD AUTO: 0 K/UL — SIGNIFICANT CHANGE UP (ref 0–0.2)
BASOPHILS NFR BLD AUTO: 0 % — SIGNIFICANT CHANGE UP (ref 0–2)
CRP SERPL-MCNC: 30 MG/L — HIGH
EOSINOPHIL # BLD AUTO: 0.09 K/UL — SIGNIFICANT CHANGE UP (ref 0–0.5)
EOSINOPHIL NFR BLD AUTO: 1 % — SIGNIFICANT CHANGE UP (ref 0–6)
ERYTHROCYTE [SEDIMENTATION RATE] IN BLOOD: 110 MM/HR — HIGH (ref 0–15)
GLUCOSE BLDC GLUCOMTR-MCNC: 176 MG/DL — HIGH (ref 70–99)
HCT VFR BLD CALC: 32.8 % — LOW (ref 39–50)
HGB BLD-MCNC: 10.4 G/DL — LOW (ref 13–17)
LYMPHOCYTES # BLD AUTO: 3.08 K/UL — SIGNIFICANT CHANGE UP (ref 1–3.3)
LYMPHOCYTES # BLD AUTO: 35 % — SIGNIFICANT CHANGE UP (ref 13–44)
MCHC RBC-ENTMCNC: 30.9 PG — SIGNIFICANT CHANGE UP (ref 27–34)
MCHC RBC-ENTMCNC: 31.7 G/DL — LOW (ref 32–36)
MCV RBC AUTO: 97.3 FL — SIGNIFICANT CHANGE UP (ref 80–100)
MONOCYTES # BLD AUTO: 0.62 K/UL — SIGNIFICANT CHANGE UP (ref 0–0.9)
MONOCYTES NFR BLD AUTO: 7 % — SIGNIFICANT CHANGE UP (ref 2–14)
NEUTROPHILS # BLD AUTO: 4.93 K/UL — SIGNIFICANT CHANGE UP (ref 1.8–7.4)
NEUTROPHILS NFR BLD AUTO: 56 % — SIGNIFICANT CHANGE UP (ref 43–77)
PLATELET # BLD AUTO: 610 K/UL — HIGH (ref 150–400)
RBC # BLD: 3.37 M/UL — LOW (ref 4.2–5.8)
RBC # FLD: 20 % — HIGH (ref 10.3–14.5)
WBC # BLD: 8.8 K/UL — SIGNIFICANT CHANGE UP (ref 3.8–10.5)
WBC # FLD AUTO: 8.8 K/UL — SIGNIFICANT CHANGE UP (ref 3.8–10.5)

## 2025-05-26 PROCEDURE — 99233 SBSQ HOSP IP/OBS HIGH 50: CPT

## 2025-05-26 PROCEDURE — 99232 SBSQ HOSP IP/OBS MODERATE 35: CPT

## 2025-05-26 RX ORDER — METHOCARBAMOL 500 MG/1
500 TABLET, FILM COATED ORAL
Refills: 0 | Status: DISCONTINUED | OUTPATIENT
Start: 2025-05-26 | End: 2025-05-27

## 2025-05-26 RX ORDER — ATORVASTATIN CALCIUM 80 MG/1
20 TABLET, FILM COATED ORAL AT BEDTIME
Refills: 0 | Status: DISCONTINUED | OUTPATIENT
Start: 2025-05-26 | End: 2025-05-27

## 2025-05-26 RX ORDER — SALINE 7; 19 G/118ML; G/118ML
1 ENEMA RECTAL ONCE
Refills: 0 | Status: DISCONTINUED | OUTPATIENT
Start: 2025-05-26 | End: 2025-05-26

## 2025-05-26 RX ADMIN — Medication 1 APPLICATION(S): at 11:20

## 2025-05-26 RX ADMIN — DAPAGLIFLOZIN 10 MILLIGRAM(S): 5 TABLET, FILM COATED ORAL at 11:19

## 2025-05-26 RX ADMIN — Medication 80 MILLIGRAM(S): at 17:13

## 2025-05-26 RX ADMIN — Medication 40 MILLIGRAM(S): at 06:01

## 2025-05-26 RX ADMIN — OMEGA-3-ACID ETHYL ESTERS CAPSULES 2 GRAM(S): 1 CAPSULE, LIQUID FILLED ORAL at 17:13

## 2025-05-26 RX ADMIN — GABAPENTIN 100 MILLIGRAM(S): 400 CAPSULE ORAL at 14:54

## 2025-05-26 RX ADMIN — OMEGA-3-ACID ETHYL ESTERS CAPSULES 2 GRAM(S): 1 CAPSULE, LIQUID FILLED ORAL at 05:54

## 2025-05-26 RX ADMIN — INSULIN LISPRO 2: 100 INJECTION, SOLUTION INTRAVENOUS; SUBCUTANEOUS at 08:29

## 2025-05-26 RX ADMIN — ATORVASTATIN CALCIUM 20 MILLIGRAM(S): 80 TABLET, FILM COATED ORAL at 21:14

## 2025-05-26 RX ADMIN — ERTAPENEM SODIUM 120 MILLIGRAM(S): 1 INJECTION, POWDER, LYOPHILIZED, FOR SOLUTION INTRAMUSCULAR; INTRAVENOUS at 21:14

## 2025-05-26 RX ADMIN — METHOCARBAMOL 500 MILLIGRAM(S): 500 TABLET, FILM COATED ORAL at 21:14

## 2025-05-26 RX ADMIN — METHOCARBAMOL 500 MILLIGRAM(S): 500 TABLET, FILM COATED ORAL at 05:55

## 2025-05-26 RX ADMIN — CLOTRIMAZOLE 1 APPLICATION(S): 1 CREAM TOPICAL at 17:19

## 2025-05-26 RX ADMIN — Medication 25 MILLIGRAM(S): at 05:56

## 2025-05-26 RX ADMIN — Medication 1 TABLET(S): at 11:19

## 2025-05-26 RX ADMIN — LOSARTAN POTASSIUM 25 MILLIGRAM(S): 100 TABLET, FILM COATED ORAL at 05:56

## 2025-05-26 RX ADMIN — HEPARIN SODIUM 5000 UNIT(S): 1000 INJECTION INTRAVENOUS; SUBCUTANEOUS at 05:55

## 2025-05-26 RX ADMIN — POLYETHYLENE GLYCOL 3350 17 GRAM(S): 17 POWDER, FOR SOLUTION ORAL at 21:14

## 2025-05-26 RX ADMIN — Medication 80 MILLIGRAM(S): at 05:55

## 2025-05-26 RX ADMIN — HEPARIN SODIUM 5000 UNIT(S): 1000 INJECTION INTRAVENOUS; SUBCUTANEOUS at 17:14

## 2025-05-26 RX ADMIN — GABAPENTIN 100 MILLIGRAM(S): 400 CAPSULE ORAL at 21:14

## 2025-05-26 RX ADMIN — CLOTRIMAZOLE 1 APPLICATION(S): 1 CREAM TOPICAL at 05:57

## 2025-05-26 RX ADMIN — GABAPENTIN 100 MILLIGRAM(S): 400 CAPSULE ORAL at 05:54

## 2025-05-26 NOTE — PROGRESS NOTE ADULT - SUBJECTIVE AND OBJECTIVE BOX
Hospitalist: Mitesh Serrano DO    CHIEF COMPLAINT: Patient is a 79y old  male who presents with a chief complaint of Lumbar epidural abscess with stenosis, diskitis, osteomyelitis, and kyphosis s/p decompressive laminectomy and fusion surgery (25 May 2025 13:51)      SUBJECTIVE / OVERNIGHT EVENTS: Patient seen and examined. No acute events overnight. Pain well controlled and patient without any complaints.    MEDICATIONS  (STANDING):  atorvastatin 40 milliGRAM(s) Oral at bedtime  bacitracin   Ointment 1 Application(s) Topical three times a day  chlorhexidine 2% Cloths 1 Application(s) Topical daily  clotrimazole 1% Cream 1 Application(s) Topical two times a day  dapagliflozin 10 milliGRAM(s) Oral daily  dextrose 5%. 1000 milliLiter(s) (50 mL/Hr) IV Continuous <Continuous>  dextrose 5%. 1000 milliLiter(s) (100 mL/Hr) IV Continuous <Continuous>  dextrose 50% Injectable 25 Gram(s) IV Push once  dextrose 50% Injectable 12.5 Gram(s) IV Push once  dextrose 50% Injectable 25 Gram(s) IV Push once  ertapenem  IVPB 1000 milliGRAM(s) IV Intermittent every 24 hours  gabapentin 100 milliGRAM(s) Oral every 8 hours  glucagon  Injectable 1 milliGRAM(s) IntraMuscular once  heparin   Injectable 5000 Unit(s) SubCutaneous every 12 hours  insulin lispro (ADMELOG) corrective regimen sliding scale   SubCutaneous before breakfast  losartan 25 milliGRAM(s) Oral daily  methocarbamol 500 milliGRAM(s) Oral every 12 hours  multivitamin 1 Tablet(s) Oral daily  omega-3-Acid Ethyl Esters 2 Gram(s) Oral two times a day  pantoprazole    Tablet 40 milliGRAM(s) Oral before breakfast  polyethylene glycol 3350 17 Gram(s) Oral at bedtime  simethicone 80 milliGRAM(s) Chew two times a day  spironolactone 25 milliGRAM(s) Oral daily    MEDICATIONS  (PRN):  acetaminophen     Tablet .. 975 milliGRAM(s) Oral every 6 hours PRN Mild Pain (1 - 3)  bisacodyl Suppository 10 milliGRAM(s) Rectal daily PRN Constipation  dextrose Oral Gel 15 Gram(s) Oral once PRN Blood Glucose LESS THAN 70 milliGRAM(s)/deciliter  dibucaine 1% Ointment 1 Application(s) Topical four times a day PRN for anal discomfort d/t hemorrhoids  magnesium hydroxide Suspension 30 milliLiter(s) Oral every 12 hours PRN Constipation      VITALS:  T(F): 98.1 (05-26-25 @ 07:49), Max: 98.6 (05-25-25 @ 20:18)  HR: 111 (05-26-25 @ 07:49) (100 - 111)  BP: 126/69 (05-26-25 @ 07:49) (107/60 - 136/70)  RR: 16 (05-26-25 @ 07:49) (16 - 16)  SpO2: 100% (05-26-25 @ 07:49)      REVIEW OF SYSTEMS:  For ROV please refer back to H&P     PHYSICAL EXAM:  General: NAD, A/O x 3  ENT: MMM  Neck: Supple, No JVD  Lungs: Clear to auscultation bilaterally  Cardio: RRR, S1/S2, No murmurs  Abdomen: Soft, Nontender, Nondistended; Bowel sounds present  Extremities: No calf tenderness, No pitting edema      LABS:              10.4                 x    | x    | x            8.80  >-----------< 610     ------------------------< x                     32.8                 x    | x    | x                                            Ca x     Mg x     Ph x              POCT Blood Glucose.: 176 mg/dL (26 May 2025 07:46)      RADIOLOGY & ADDITIONAL TESTS:    Imaging Personally Reviewed:    [X] Consultant(s) Notes Reviewed:  [X] Care Discussed with Consultants/Other Providers:

## 2025-05-26 NOTE — PROGRESS NOTE ADULT - ASSESSMENT
Mr. Adama Monsivais is a 79-year-old male patient with past medical history of HTN, HLD, DM (A1C 7.2), CKD, and chronic LBP who presented as a transfer from [facility] to to [facility] for orthopedic surgery after initial ED evaluation for acute on chronic back pain had MRI revealing discitis with epidural abscess and bilateral psoas abscesses. Patient is now s/p T10-Pelvis PSF with Dr. Boston with Plastic Surgery closure by Dr. Escamilla on 4/24/25. Patient tolerated the procedure well without any intraoperative complications. Patient tolerated physical therapy, is weight bearing as tolerated and pain was well-controlled. Of note patient had previously been pain seen by pain specialist in 2/2025 for chronic back pain with radiation to bilateral lateral thighs (R>L). MRI at that time showed degenerative changes and had epidural injections x2 (2/27, 3/11) with moderate pain relief. Pain began to worsen on 4/10. He had radiofrequency ablation performed on 4/11 and since had a severe worsening of pain and radiation to R lateral thigh. He also reported recent bowel/bladder "incontinence" requiring diaper due to his inability to get to the bathroom in time due to pain limitations but states urge and sensation remained intact. Infectious disease consulted and co-managed for antibiotic therapy, recommended Ertapenem 1g Q24h until 6/7/25. A PICC line was placed on 5/14/25 and IR psoas drains were placed bilaterally on 4/27/25 and removed on 5/6/25. 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 Seen by medical attending for continuity of care and management and cleared for safe discharge. As per surgeon, the patient is stable and ready for discharge. Patient transferred to [facility] IRF on 5/16 for initiation of comprehensive rehabilitation program with 3 hours of therapies daily 5xweek.    will monitor HR and no distress seen  HR management by hospitalist  will reduce lipitor to 20mg  will change robaxin to 8am to 8pm  #Lumbar epidural abscess with stenosis, diskitis, osteomyelitis, and kyphosis s/p decompressive laminectomy and fusion surgery  - Epidural abscess, osteomyelitis of lumbar spine, and bilateral Psoas abscess s/p drain removal      * Continue Ertapenem x 6 weeks through 6/7/25      * S/P placement of PICC 5/14      * Confirm PICC line placement with CXR on admission  - S/P Decompression, spine, lumbar, posterior approach, with fusion of posterior spinal column      * Surgical incision on mid-back 28cm vertical with sutures in place and left ROB      * Sutures/staples to be removed on post-op day #14, but changed to 5/22 per discussion with Plastic Surgery      * ROM restrictions: Avoid any heavy lifting, bending, squatting, or twisting motions.      * Avoid NSAIDs  - Activity limitations: Decreased social, vocational and leisure activities, decreased self care and ADLs, decreased mobility  - Deficits: lower extremity weakness and bowel/bladder dysfunction  - Comprehensive Multidisciplinary Rehab Program:      * 3 hours a day, 5 days a week.      * PT 2hr/day: Focused on improving strength, endurance, coordination, balance, functional mobility, and transfers      * OT 1hr/day: Focused on improving strength, fine motor skills, coordination, posture and ADLs.      #HTN  - Starting losartan 25 Qd 5/17 AM  - Spironolactone 50 --> 25mg 25 Qd on 5/17 AM    #Electrolyte imbalance with hypokalemia & hypophosphatemia  - 40mEq KCl powder Qd  - C/w Spironolactone 25 Qd as above  - Goal K level 4    #HLD  - Atorvastatin 40 QHS    #Diabetes with hyperglycemia  - A1c 7.2% on 4/25  - Metformin 500 Qd  - LENA for now  - Hospitalist follow up in AM    #GI/Bowel:  - GI ppx: 40 Qd  - Ileus/Pseudoobstruction (Resolved)      * S/p GI/surgery eval at [facility] with conservative mgmt and improvement       * Continue Miralax/Magnesium hydroxide PRN/ simethicone PRN    #Sleep:  - Melatonin qHS    #Pain Management:  - Tylenol 975 q6  - Methocarbamol 500mg BID  - Gabapentin 100 TID    #/Bladder:   - Bladder scan x1 on admission, SC PRN  - Encourage timed voids every 4 hours while awake    #Skin/Pressure Injury:  - Skin assessment on admission:       * Skin: R groin wound 5x2cm. Bl groin MAD. C/w clotrimazole.      * Monitor Incisions: Surgical incision on mid-back 28cm vertical with sutures in place. left ROB.      * Per dc summary, "Any sutures/staples to be removed on post-op day #14 at office visit," however, sx was 4/24.       * Reached out to plastic surgery team at [facility] for clarification.    #Diet:   - Diet Consistency/Modifications: Reg CC  - MTV and Omega 3    #DVT ppx:  - Initiate heparin SQ on arrival (discussed with orthopedic surgeon Dr. Boston)  - SCDs  - Last Doppler on 5/7/25 negative    #Patient Education  - Education provided on the following:      * Admitting diagnosis and functional implications      * Functional goals      * Bladder management      * Bowel management      * Skin care management      * Intensity of service and scheduling of rehab disciplines      * Plan of care and role of interdisciplinary team conference in discharge planning      * Reconciliation of medications from prior institution    #Restrictions/Precautions:  - Weight bearing status: WBAT BLLE  - ROM restrictions: Avoid any heavy lifting, bending, squatting, or twisting motions.  - Precautions: Fall, spine  *DO NOT take any NSAIDs (Motrin, Aleve, Advil, Ibuprofen, Celebrex, ect.) until instructed by surgical team.  ---------------  Outpatient Follow-up:    Baldev Boston)  Spine Surgery  [address] [facility], [address]  [place] [address]  Phone: [phone]  Fax: [phone]  Follow Up Time:      --------------

## 2025-05-26 NOTE — PROGRESS NOTE ADULT - SUBJECTIVE AND OBJECTIVE BOX
HPI:  Mr. Adama Monsivais is a 79-year-old male patient with past medical history of HTN, HLD, DM (A1C 7.2), CKD, and chronic LBP who presented as a transfer from [facility] to to [facility] for orthopedic surgery after initial ED evaluation for acute on chronic back pain had MRI revealing discitis with epidural abscess and bilateral psoas abscesses. Patient is now s/p T10-Pelvis PSF with Dr. Boston with Plastic Surgery closure by Dr. Escamilla on 4/24/25. Patient tolerated the procedure well without any intraoperative complications. Patient tolerated physical therapy, is weight bearing as tolerated and pain was well-controlled. Of note patient had previously been pain seen by pain specialist in 2/2025 for chronic back pain with radiation to bilateral lateral thighs (R>L). MRI at that time showed degenerative changes and had epidural injections x2 (2/27, 3/11) with moderate pain relief. Pain began to worsen on 4/10. He had radiofrequency ablation performed on 4/11 and since had a severe worsening of pain and radiation to R lateral thigh. He also reported recent bowel/bladder "incontinence" requiring diaper due to his inability to get to the bathroom in time due to pain limitations but states urge and sensation remained intact. Infectious disease consulted and co-managed for antibiotic therapy, recommended Ertapenem 1g Q24h until 6/7/25. A PICC line was placed on 5/14/25 and IR psoas drains were placed bilaterally on 4/27/25 and removed on 5/6/25. 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 Seen by medical attending for continuity of care and management and cleared for safe discharge. As per surgeon, the patient is stable and ready for discharge. Patient transferred to [facility] IRF on 5/16 for initiation of comprehensive rehabilitation program with 3 hours of therapies daily 5xweek. (16 May 2025 13:33)    TDD: 5/27/25 Home  ___________________________________________________________________________    SUBJECTIVE/ROS  Patient was seen and evaluated at bedside today.  discussed about high HR but hospitalist not concerned  discussed about home med  will reduce lipitor to 20mg and change robaxin to 8pm  ___________________________________________________________________________  MEDICATIONS  (STANDING):  atorvastatin 40 milliGRAM(s) Oral at bedtime  bacitracin   Ointment 1 Application(s) Topical three times a day  chlorhexidine 2% Cloths 1 Application(s) Topical daily  clotrimazole 1% Cream 1 Application(s) Topical two times a day  dapagliflozin 10 milliGRAM(s) Oral daily  dextrose 5%. 1000 milliLiter(s) (50 mL/Hr) IV Continuous <Continuous>  dextrose 5%. 1000 milliLiter(s) (100 mL/Hr) IV Continuous <Continuous>  dextrose 50% Injectable 25 Gram(s) IV Push once  dextrose 50% Injectable 12.5 Gram(s) IV Push once  dextrose 50% Injectable 25 Gram(s) IV Push once  ertapenem  IVPB 1000 milliGRAM(s) IV Intermittent every 24 hours  gabapentin 100 milliGRAM(s) Oral every 8 hours  glucagon  Injectable 1 milliGRAM(s) IntraMuscular once  heparin   Injectable 5000 Unit(s) SubCutaneous every 12 hours  insulin lispro (ADMELOG) corrective regimen sliding scale   SubCutaneous before breakfast  losartan 25 milliGRAM(s) Oral daily  methocarbamol 500 milliGRAM(s) Oral every 12 hours  multivitamin 1 Tablet(s) Oral daily  omega-3-Acid Ethyl Esters 2 Gram(s) Oral two times a day  pantoprazole    Tablet 40 milliGRAM(s) Oral before breakfast  polyethylene glycol 3350 17 Gram(s) Oral at bedtime  simethicone 80 milliGRAM(s) Chew two times a day  spironolactone 25 milliGRAM(s) Oral daily    MEDICATIONS  (PRN):  acetaminophen     Tablet .. 975 milliGRAM(s) Oral every 6 hours PRN Mild Pain (1 - 3)  bisacodyl Suppository 10 milliGRAM(s) Rectal daily PRN Constipation  dextrose Oral Gel 15 Gram(s) Oral once PRN Blood Glucose LESS THAN 70 milliGRAM(s)/deciliter  dibucaine 1% Ointment 1 Application(s) Topical four times a day PRN for anal discomfort d/t hemorrhoids  magnesium hydroxide Suspension 30 milliLiter(s) Oral every 12 hours PRN Constipation  Vital Signs Last 24 Hrs  Vital Signs Last 24 Hrs  T(C): 36.7 (26 May 2025 07:49), Max: 37 (25 May 2025 20:18)  T(F): 98.1 (26 May 2025 07:49), Max: 98.6 (25 May 2025 20:18)  HR: 111 (26 May 2025 07:49) (100 - 111)  BP: 126/69 (26 May 2025 07:49) (107/60 - 136/70)  BP(mean): --  RR: 16 (26 May 2025 07:49) (16 - 16)  SpO2: 100% (26 May 2025 07:49) (99% - 100%)    Parameters below as of 26 May 2025 07:49  Patient On (Oxygen Delivery Method): room air      Parameters below as of 25 May 2025 07:50  Patient On (Oxygen Delivery Method): room air      Patient On (Oxygen Delivery Method): room air        ___________________________________________________________________________    PHYSICAL EXAM:    Gen - NAD, Comfortable  HEENT - NCAT, EOM grossly intact  Pulm - CTAB, No wheeze/rhonchi/crackles  Cardiovascular - RRR  Abdomen - Soft, NT/ND, +BS  Extremities - 2+ edema in LLE and 3+ edema in RLE. No calf tenderness bl.  Neuro-     Cognitive - AAOx3     Communication - Fluent, No dysarthria     Cranial Nerves - CN grossly intact     Motor -                     LEFT    UE - ShAB 5/5, EF 5/5, EE 5/5, WE 5/5,  5/5                    RIGHT UE - ShAB 5/5, EF 4/5, EE 5/5, WE 5/5,  5/5                    LEFT    LE - HF 4/5, KE 5/5, DF 5/5, PF 5/5                    RIGHT LE - HF 2/5, KE 4/5, DF 5/5, PF 5/5        Sensory - Intact to LT in bl LEs and bl UEs     Reflexes - DTR Intact, No primitive reflexive  Psychiatric - Mood stable, Affect WNL  Skin: Surgical incision on mid-back 28cm vertical without sutures. Left ROB.  R groin wound 5x2cm. Bl groin MAD    ___________________________________________________________________________

## 2025-05-26 NOTE — PROGRESS NOTE ADULT - ASSESSMENT
78 y/o man w/ HTN, HLD, T2DM, acute on chronic low back pain with recent MRI findings concerning for L1-L2 discitis/OM with epidural abscess, bilateral psoas abscesses now s/p T10-Pelvis PSF T12-L2 Laminectomy. Patient on  ertapenem with PICC line placement. Admitted to [facility] inpatient rehab on 5/16/25 for ADL, gait, and functional impairments.     Epidural abscess  Osteomyelitis of lumbar spine  Bilateral Psoas abscess s/p drain removal  - S/P Decompression, spine, lumbar, posterior approach, with fusion of posterior spinal column  - Continue Ertapenem x 6 weeks through 6/7/25  - S/p placement of PICC 5/14    HTN  - c/w losartan 25mg daily and spironolactone 25mg daily   - BP controlled     Hyponatremia   - mild hyponatremia, Na 133  - if hyponatremia worsens, may need to hold spironolactone  - check serum/ urine osmo, urine Na  - monitor BMP    HLD  - Atorvastatin 40 QHS    T2DM  - A1c 7.2% on 4/25  - pt reports he takes Farxiga at home   - switch metformin to Farxiga 10mg daily- 5/20  - continue ISS  - monitor FS    #Ileus/Pseudoobstruction   - s/p conservative management  - Resolved    #Constipation  - Miralax    # DVT ppx:  -heparin subq

## 2025-05-27 VITALS
SYSTOLIC BLOOD PRESSURE: 150 MMHG | HEART RATE: 114 BPM | RESPIRATION RATE: 16 BRPM | TEMPERATURE: 98 F | DIASTOLIC BLOOD PRESSURE: 77 MMHG | OXYGEN SATURATION: 98 %

## 2025-05-27 LAB — GLUCOSE BLDC GLUCOMTR-MCNC: 119 MG/DL — HIGH (ref 70–99)

## 2025-05-27 PROCEDURE — ZZZZZ: CPT

## 2025-05-27 PROCEDURE — 71045 X-RAY EXAM CHEST 1 VIEW: CPT | Mod: 26

## 2025-05-27 PROCEDURE — 99232 SBSQ HOSP IP/OBS MODERATE 35: CPT

## 2025-05-27 RX ORDER — ERTAPENEM SODIUM 1 G/1
1 INJECTION, POWDER, LYOPHILIZED, FOR SOLUTION INTRAMUSCULAR; INTRAVENOUS
Qty: 11 | Refills: 0
Start: 2025-05-27 | End: 2025-06-06

## 2025-05-27 RX ADMIN — HEPARIN SODIUM 5000 UNIT(S): 1000 INJECTION INTRAVENOUS; SUBCUTANEOUS at 17:13

## 2025-05-27 RX ADMIN — Medication 1 TABLET(S): at 12:37

## 2025-05-27 RX ADMIN — Medication 80 MILLIGRAM(S): at 17:13

## 2025-05-27 RX ADMIN — HEPARIN SODIUM 5000 UNIT(S): 1000 INJECTION INTRAVENOUS; SUBCUTANEOUS at 05:25

## 2025-05-27 RX ADMIN — OMEGA-3-ACID ETHYL ESTERS CAPSULES 2 GRAM(S): 1 CAPSULE, LIQUID FILLED ORAL at 12:38

## 2025-05-27 RX ADMIN — GABAPENTIN 100 MILLIGRAM(S): 400 CAPSULE ORAL at 13:09

## 2025-05-27 RX ADMIN — Medication 40 MILLIGRAM(S): at 06:09

## 2025-05-27 RX ADMIN — DAPAGLIFLOZIN 10 MILLIGRAM(S): 5 TABLET, FILM COATED ORAL at 12:37

## 2025-05-27 RX ADMIN — CLOTRIMAZOLE 1 APPLICATION(S): 1 CREAM TOPICAL at 05:27

## 2025-05-27 RX ADMIN — Medication 80 MILLIGRAM(S): at 05:25

## 2025-05-27 RX ADMIN — GABAPENTIN 100 MILLIGRAM(S): 400 CAPSULE ORAL at 05:25

## 2025-05-27 RX ADMIN — ERTAPENEM SODIUM 120 MILLIGRAM(S): 1 INJECTION, POWDER, LYOPHILIZED, FOR SOLUTION INTRAMUSCULAR; INTRAVENOUS at 17:15

## 2025-05-27 RX ADMIN — Medication 25 MILLIGRAM(S): at 05:26

## 2025-05-27 RX ADMIN — LOSARTAN POTASSIUM 25 MILLIGRAM(S): 100 TABLET, FILM COATED ORAL at 05:25

## 2025-05-27 NOTE — PROGRESS NOTE ADULT - NUTRITIONAL ASSESSMENT
This patient has been assessed with a concern for Malnutrition and has been determined to have a diagnosis/diagnoses of Severe protein-calorie malnutrition.    This patient is being managed with:   Diet Regular-  Consistent Carbohydrate {No Snacks} (CSTCHO)  [employer] Vegetarian (Accepts Fish)  Supplement Feeding Modality:  Oral  Glucerna Shake Cans or Servings Per Day:  1       Frequency:  Daily  Entered: May 17 2025 12:50PM  

## 2025-05-27 NOTE — PROGRESS NOTE ADULT - ASSESSMENT
Mr. Adama Monsivais is a 79-year-old male patient with past medical history of HTN, HLD, DM (A1C 7.2), CKD, and chronic LBP who presented as a transfer from [facility] to to [facility] for orthopedic surgery after initial ED evaluation for acute on chronic back pain had MRI revealing discitis with epidural abscess and bilateral psoas abscesses. Patient is now s/p T10-Pelvis PSF with Dr. Boston with Plastic Surgery closure by Dr. Escamilla on 4/24/25. Patient tolerated the procedure well without any intraoperative complications. Patient tolerated physical therapy, is weight bearing as tolerated and pain was well-controlled. Of note patient had previously been pain seen by pain specialist in 2/2025 for chronic back pain with radiation to bilateral lateral thighs (R>L). MRI at that time showed degenerative changes and had epidural injections x2 (2/27, 3/11) with moderate pain relief. Pain began to worsen on 4/10. He had radiofrequency ablation performed on 4/11 and since had a severe worsening of pain and radiation to R lateral thigh. He also reported recent bowel/bladder "incontinence" requiring diaper due to his inability to get to the bathroom in time due to pain limitations but states urge and sensation remained intact. Infectious disease consulted and co-managed for antibiotic therapy, recommended Ertapenem 1g Q24h until 6/7/25. A PICC line was placed on 5/14/25 and IR psoas drains were placed bilaterally on 4/27/25 and removed on 5/6/25. 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 Seen by medical attending for continuity of care and management and cleared for safe discharge. As per surgeon, the patient is stable and ready for discharge. Patient transferred to [facility] IRF on 5/16 for initiation of comprehensive rehabilitation program with 3 hours of therapies daily 5xweek.    will monitor HR and no distress seen  HR management by hospitalist  will reduce lipitor to 20mg  will change robaxin to 8am to 8pm  #Lumbar epidural abscess with stenosis, diskitis, osteomyelitis, and kyphosis s/p decompressive laminectomy and fusion surgery  - Epidural abscess, osteomyelitis of lumbar spine, and bilateral Psoas abscess s/p drain removal      * Continue Ertapenem x 6 weeks through 6/7/25      * S/P placement of PICC 5/14      * Confirm PICC line placement with CXR on admission  - S/P Decompression, spine, lumbar, posterior approach, with fusion of posterior spinal column      * Surgical incision on mid-back 28cm vertical with sutures in place and left ROB      * Sutures/staples to be removed on post-op day #14, but changed to 5/22 per discussion with Plastic Surgery      * ROM restrictions: Avoid any heavy lifting, bending, squatting, or twisting motions.      * Avoid NSAIDs  - Activity limitations: Decreased social, vocational and leisure activities, decreased self care and ADLs, decreased mobility  - Deficits: lower extremity weakness and bowel/bladder dysfunction  - Comprehensive Multidisciplinary Rehab Program:      * 3 hours a day, 5 days a week.      * PT 2hr/day: Focused on improving strength, endurance, coordination, balance, functional mobility, and transfers      * OT 1hr/day: Focused on improving strength, fine motor skills, coordination, posture and ADLs.      #HTN  - Starting losartan 25 Qd 5/17 AM  - Spironolactone 50 --> 25mg 25 Qd on 5/17 AM    #Electrolyte imbalance with hypokalemia & hypophosphatemia  - 40mEq KCl powder Qd  - C/w Spironolactone 25 Qd as above  - Goal K level 4    #HLD  - Atorvastatin 40 QHS    #Diabetes with hyperglycemia  - A1c 7.2% on 4/25  - Metformin 500 Qd  - LENA for now  - Hospitalist follow up in AM    #GI/Bowel:  - GI ppx: 40 Qd  - Ileus/Pseudoobstruction (Resolved)      * S/p GI/surgery eval at [facility] with conservative mgmt and improvement       * Continue Miralax/Magnesium hydroxide PRN/ simethicone PRN    #Sleep:  - Melatonin qHS    #Pain Management:  - Tylenol 975 q6  - Methocarbamol 500mg BID  - Gabapentin 100 TID    #/Bladder:   - Bladder scan x1 on admission, SC PRN  - Encourage timed voids every 4 hours while awake    #Skin/Pressure Injury:  - Skin assessment on admission:       * Skin: R groin wound 5x2cm. Bl groin MAD. C/w clotrimazole.      * Monitor Incisions: Surgical incision on mid-back 28cm vertical with sutures in place. left ROB.      * Per dc summary, "Any sutures/staples to be removed on post-op day #14 at office visit," however, sx was 4/24.       * Reached out to plastic surgery team at [facility] for clarification.    #Diet:   - Diet Consistency/Modifications: Reg CC  - MTV and Omega 3    #DVT ppx:  - Initiate heparin SQ on arrival (discussed with orthopedic surgeon Dr. Boston)  - SCDs  - Last Doppler on 5/7/25 negative    #Patient Education  - Education provided on the following:      * Admitting diagnosis and functional implications      * Functional goals      * Bladder management      * Bowel management      * Skin care management      * Intensity of service and scheduling of rehab disciplines      * Plan of care and role of interdisciplinary team conference in discharge planning      * Reconciliation of medications from prior institution    #Restrictions/Precautions:  - Weight bearing status: WBAT BLLE  - ROM restrictions: Avoid any heavy lifting, bending, squatting, or twisting motions.  - Precautions: Fall, spine  *DO NOT take any NSAIDs (Motrin, Aleve, Advil, Ibuprofen, Celebrex, ect.) until instructed by surgical team.  ---------------  Outpatient Follow-up:    Baldev Boston)  Spine Surgery  [address], [address]  [place] [address]  Phone: [phone]  Fax: [phone]  Follow Up Time:      -------------- Mr. Adama Monsivais is a 79-year-old male patient with past medical history of HTN, HLD, DM (A1C 7.2), CKD, and chronic LBP who presented as a transfer from [facility] to to [facility] for orthopedic surgery after initial ED evaluation for acute on chronic back pain had MRI revealing discitis with epidural abscess and bilateral psoas abscesses. Patient is now s/p T10-Pelvis PSF with Dr. Boston with Plastic Surgery closure by Dr. Escamilla on 4/24/25. Patient tolerated the procedure well without any intraoperative complications. Patient tolerated physical therapy, is weight bearing as tolerated and pain was well-controlled. Of note patient had previously been pain seen by pain specialist in 2/2025 for chronic back pain with radiation to bilateral lateral thighs (R>L). MRI at that time showed degenerative changes and had epidural injections x2 (2/27, 3/11) with moderate pain relief. Pain began to worsen on 4/10. He had radiofrequency ablation performed on 4/11 and since had a severe worsening of pain and radiation to R lateral thigh. He also reported recent bowel/bladder "incontinence" requiring diaper due to his inability to get to the bathroom in time due to pain limitations but states urge and sensation remained intact. Infectious disease consulted and co-managed for antibiotic therapy, recommended Ertapenem 1g Q24h until 6/7/25. A PICC line was placed on 5/14/25 and IR psoas drains were placed bilaterally on 4/27/25 and removed on 5/6/25. 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 Seen by medical attending for continuity of care and management and cleared for safe discharge. As per surgeon, the patient is stable and ready for discharge. Patient transferred to [facility] on 5/16 for initiation of comprehensive rehabilitation program with 3 hours of therapies daily 5xweek.    will monitor HR and no distress seen  HR management by hospitalist  will reduce Lipitor to 20mg  will change Robaxin to 8am to 8pm  #Lumbar epidural abscess with stenosis, diskitis, osteomyelitis, and kyphosis s/p decompressive laminectomy and fusion surgery  - Epidural abscess, osteomyelitis of lumbar spine, and bilateral Psoas abscess s/p drain removal      * Continue Ertapenem x 6 weeks through 6/7/25      * S/P placement of PICC 5/14      * Confirm PICC line placement with CXR on admission  - S/P Decompression, spine, lumbar, posterior approach, with fusion of posterior spinal column      * Surgical incision on mid-back 28cm vertical with sutures in place and left ROB      * Sutures/staples to be removed on post-op day #14, but changed to 5/22 per discussion with Plastic Surgery      * ROM restrictions: Avoid any heavy lifting, bending, squatting, or twisting motions.      * Avoid NSAIDs  - Activity limitations: Decreased social, vocational and leisure activities, decreased self care and ADLs, decreased mobility  - Deficits: lower extremity weakness and bowel/bladder dysfunction  - Continue Rehab Program as outpatient  - Discharge home today    #HTN  - Starting losartan 25 Qd 5/17 AM  - Spironolactone 50 --> 25mg 25 Qd on 5/17 AM    #Electrolyte imbalance with hypokalemia & hypophosphatemia  - 40mEq KCl powder Qd  - C/w Spironolactone 25 Qd as above  - Goal K level 4    #HLD  - Atorvastatin 40 QHS    #Diabetes with hyperglycemia  - A1c 7.2% on 4/25  - Metformin 500 Qd  - LENA for now  - Hospitalist follow up in AM    #GI/Bowel:  - GI ppx: 40 Qd  - Ileus/Pseudoobstruction (Resolved)      * S/p GI/surgery eval at [facility] with conservative mgmt and improvement       * Continue Miralax/Magnesium hydroxide PRN/ simethicone PRN    #Sleep:  - Melatonin qHS    #Pain Management:  - Tylenol 975 q6  - Methocarbamol 500mg BID  - Gabapentin 100 TID    #/Bladder:   - Bladder scan x1 on admission, SC PRN  - Encourage timed voids every 4 hours while awake    #Skin/Pressure Injury:  - Skin assessment on admission:       * Skin: R groin wound 5x2cm. Bl groin MAD. C/w clotrimazole.      * Monitor Incisions: Surgical incision on mid-back 28cm vertical with sutures in place. left ROB.      * Per dc summary, "Any sutures/staples to be removed on post-op day #14 at office visit," however, sx was 4/24.       * Reached out to plastic surgery team at [facility] for clarification.    #Diet:   - Diet Consistency/Modifications: Reg CC  - MTV and Omega 3    #DVT ppx:  - Initiate heparin SQ on arrival (discussed with orthopedic surgeon Dr. Boston)  - SCDs  - Last Doppler on 5/7/25 negative    #Patient Education  - Education provided on the following:      * Admitting diagnosis and functional implications      * Functional goals      * Bladder management      * Bowel management      * Skin care management      * Intensity of service and scheduling of rehab disciplines      * Plan of care and role of interdisciplinary team conference in discharge planning      * Reconciliation of medications from prior institution    #Restrictions/Precautions:  - Weight bearing status: WBAT BLLE  - ROM restrictions: Avoid any heavy lifting, bending, squatting, or twisting motions.  - Precautions: Fall, spine  *DO NOT take any NSAIDs (Motrin, Aleve, Advil, Ibuprofen, Celebrex, ect.) until instructed by surgical team.  ---------------  Outpatient Follow-up:    Baldev Boston)  Spine Surgery  [address] [place] [address], [address]  [place] [address]  Phone: [phone]  Fax: [phone]  Follow Up Time:      --------------

## 2025-05-27 NOTE — PROGRESS NOTE ADULT - SUBJECTIVE AND OBJECTIVE BOX
Patient is a 79y old  Male who presents with a chief complaint of Lumbar epidural abscess with stenosis, diskitis, osteomyelitis, and kyphosis s/p decompressive laminectomy and fusion surgery (26 May 2025 12:53)    Patient seen and examined at bedside. No acute overnight events. No complaints at time of evaluation. Wants to go home    ALLERGIES:  No Known Allergies    MEDICATIONS  (STANDING):  atorvastatin 20 milliGRAM(s) Oral at bedtime  bacitracin   Ointment 1 Application(s) Topical three times a day  chlorhexidine 2% Cloths 1 Application(s) Topical daily  clotrimazole 1% Cream 1 Application(s) Topical two times a day  dapagliflozin 10 milliGRAM(s) Oral daily  dextrose 5%. 1000 milliLiter(s) (100 mL/Hr) IV Continuous <Continuous>  dextrose 5%. 1000 milliLiter(s) (50 mL/Hr) IV Continuous <Continuous>  dextrose 50% Injectable 25 Gram(s) IV Push once  dextrose 50% Injectable 12.5 Gram(s) IV Push once  dextrose 50% Injectable 25 Gram(s) IV Push once  ertapenem  IVPB 1000 milliGRAM(s) IV Intermittent every 24 hours  gabapentin 100 milliGRAM(s) Oral every 8 hours  glucagon  Injectable 1 milliGRAM(s) IntraMuscular once  heparin   Injectable 5000 Unit(s) SubCutaneous every 12 hours  insulin lispro (ADMELOG) corrective regimen sliding scale   SubCutaneous before breakfast  losartan 25 milliGRAM(s) Oral daily  methocarbamol 500 milliGRAM(s) Oral <User Schedule>  multivitamin 1 Tablet(s) Oral daily  omega-3-Acid Ethyl Esters 2 Gram(s) Oral two times a day  pantoprazole    Tablet 40 milliGRAM(s) Oral before breakfast  polyethylene glycol 3350 17 Gram(s) Oral at bedtime  simethicone 80 milliGRAM(s) Chew two times a day  spironolactone 25 milliGRAM(s) Oral daily    MEDICATIONS  (PRN):  acetaminophen     Tablet .. 975 milliGRAM(s) Oral every 6 hours PRN Mild Pain (1 - 3)  bisacodyl Suppository 10 milliGRAM(s) Rectal daily PRN Constipation  dextrose Oral Gel 15 Gram(s) Oral once PRN Blood Glucose LESS THAN 70 milliGRAM(s)/deciliter  dibucaine 1% Ointment 1 Application(s) Topical four times a day PRN for anal discomfort d/t hemorrhoids  magnesium hydroxide Suspension 30 milliLiter(s) Oral every 12 hours PRN Constipation    Vital Signs Last 24 Hrs  T(F): 98.8 (27 May 2025 09:05), Max: 98.8 (27 May 2025 09:05)  HR: 103 (27 May 2025 09:05) (103 - 105)  BP: 132/71 (27 May 2025 09:05) (119/69 - 132/71)  RR: 17 (27 May 2025 09:05) (16 - 17)  SpO2: 99% (27 May 2025 09:05) (98% - 99%)  I&O's Summary    BMI (kg/m2): 22.8 (05-23-25 @ 10:28)    PHYSICAL EXAM:  General: NAD, A/O x 3  ENT: MMM, no tonsilar exudate  Neck: Supple, No JVD  Lungs: Clear to auscultation bilaterally, no wheezes. Good air entry bilaterally   Cardio: RRR, S1/S2, No murmurs  Abdomen: Soft, Nontender, Nondistended; Bowel sounds present  Extremities: No calf tenderness, No pitting edema. LUE PICC line    LABS:                        10.4   8.80  )-----------( 610      ( 26 May 2025 07:37 )             32.8       POCT Blood Glucose.: 119 mg/dL (27 May 2025 07:26)    COVID-19 PCR: NotDetec (05-16-25 @ 16:10)    RADIOLOGY & ADDITIONAL TESTS:     Care Discussed with Consultants/Other Providers:

## 2025-05-27 NOTE — CHART NOTE - NSCHARTNOTEFT_GEN_A_CORE
Nutrition Follow Up Note  Hospital Course   (Per Electronic Medical Record)    Source:  Patient [X]  Medical Record [X]      Diet:   Consistent Carbohydrate Diet w/ Thin Liquids (IDDSI Level 0)  Tolerates Diet Consistency Well  No Chewing/Swallowing Difficulties  No Recent Nausea, Vomiting, Diarrhea or Constipation (as Per Patient)  Consumes % of Meals (as Per Documentation) - States Good PO Intake/Appetite (Per Patient)  on Glucerna 8oz PO Daily (Provides 220kcal-10grams of Protein)  Patient Takes Nutrition Supplement Well    Enteral/Parenteral Nutrition: Not Applicable    Current Weight: 128lb on 5/23  Obtain New Weight to Confirm  Obtain Weights Weekly     Pertinent Medications: MEDICATIONS  (STANDING):  atorvastatin 20 milliGRAM(s) Oral at bedtime  bacitracin   Ointment 1 Application(s) Topical three times a day  chlorhexidine 2% Cloths 1 Application(s) Topical daily  clotrimazole 1% Cream 1 Application(s) Topical two times a day  dapagliflozin 10 milliGRAM(s) Oral daily  dextrose 5%. 1000 milliLiter(s) (100 mL/Hr) IV Continuous <Continuous>  dextrose 5%. 1000 milliLiter(s) (50 mL/Hr) IV Continuous <Continuous>  dextrose 50% Injectable 25 Gram(s) IV Push once  dextrose 50% Injectable 12.5 Gram(s) IV Push once  dextrose 50% Injectable 25 Gram(s) IV Push once  ertapenem  IVPB 1000 milliGRAM(s) IV Intermittent every 24 hours  gabapentin 100 milliGRAM(s) Oral every 8 hours  glucagon  Injectable 1 milliGRAM(s) IntraMuscular once  heparin   Injectable 5000 Unit(s) SubCutaneous every 12 hours  insulin lispro (ADMELOG) corrective regimen sliding scale   SubCutaneous before breakfast  losartan 25 milliGRAM(s) Oral daily  methocarbamol 500 milliGRAM(s) Oral <User Schedule>  multivitamin 1 Tablet(s) Oral daily  omega-3-Acid Ethyl Esters 2 Gram(s) Oral two times a day  pantoprazole    Tablet 40 milliGRAM(s) Oral before breakfast  polyethylene glycol 3350 17 Gram(s) Oral at bedtime  simethicone 80 milliGRAM(s) Chew two times a day  spironolactone 25 milliGRAM(s) Oral daily    MEDICATIONS  (PRN):  acetaminophen     Tablet .. 975 milliGRAM(s) Oral every 6 hours PRN Mild Pain (1 - 3)  bisacodyl Suppository 10 milliGRAM(s) Rectal daily PRN Constipation  dextrose Oral Gel 15 Gram(s) Oral once PRN Blood Glucose LESS THAN 70 milliGRAM(s)/deciliter  dibucaine 1% Ointment 1 Application(s) Topical four times a day PRN for anal discomfort d/t hemorrhoids  magnesium hydroxide Suspension 30 milliLiter(s) Oral every 12 hours PRN Constipation    Pertinent Labs:   05-22 Alb 2.2 g/dL[L]    POCT (over Last 3 Days) - Ranging from 108-176    Skin: No Pressure Ulcers  Surgical Incision on T-Spine  (as Per Nursing Flow Sheet)     Edema: None Noted Recently (as Per Documentation)     Last Bowel Movement: on 5/26    Estimated Needs:   [X] No Change Since Previous Assessment    Previous Nutrition Diagnosis:   Severe Malnutrition     Nutrition Diagnosis is [X] Ongoing - Continues on Nutrition Supplement & Patient Takes Nutrition Supplement     New Nutrition Diagnosis: [X] Not Applicable    Interventions:   1. Recommend Continue Nutrition Plan of Care     Monitoring & Evaluation:   [X] Weights (as Needed/Available)  [X] PO Intake   [X] Skin Integrity   [X] Follow Up (Per Protocol)  [X] Tolerance to Diet Prescription   [X] Other: Labs & POCT    Registered Dietitian/Nutritionist Remains Available.  Gene Fofana RDN, CDN  Senior Dietitian    Phone# [phone]

## 2025-05-27 NOTE — PROGRESS NOTE ADULT - SUBJECTIVE AND OBJECTIVE BOX
HPI:  Mr. Adama Monsivais is a 79-year-old male patient with past medical history of HTN, HLD, DM (A1C 7.2), CKD, and chronic LBP who presented as a transfer from [facility] to to [facility] for orthopedic surgery after initial ED evaluation for acute on chronic back pain had MRI revealing discitis with epidural abscess and bilateral psoas abscesses. Patient is now s/p T10-Pelvis PSF with Dr. Boston with Plastic Surgery closure by Dr. Escamilla on 4/24/25. Patient tolerated the procedure well without any intraoperative complications. Patient tolerated physical therapy, is weight bearing as tolerated and pain was well-controlled. Of note patient had previously been pain seen by pain specialist in 2/2025 for chronic back pain with radiation to bilateral lateral thighs (R>L). MRI at that time showed degenerative changes and had epidural injections x2 (2/27, 3/11) with moderate pain relief. Pain began to worsen on 4/10. He had radiofrequency ablation performed on 4/11 and since had a severe worsening of pain and radiation to R lateral thigh. He also reported recent bowel/bladder "incontinence" requiring diaper due to his inability to get to the bathroom in time due to pain limitations but states urge and sensation remained intact. Infectious disease consulted and co-managed for antibiotic therapy, recommended Ertapenem 1g Q24h until 6/7/25. A PICC line was placed on 5/14/25 and IR psoas drains were placed bilaterally on 4/27/25 and removed on 5/6/25. 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 Seen by medical attending for continuity of care and management and cleared for safe discharge. As per surgeon, the patient is stable and ready for discharge. Patient transferred to [facility] IRF on 5/16 for initiation of comprehensive rehabilitation program with 3 hours of therapies daily 5xweek. (16 May 2025 13:33)    TDD: 5/27/25 Home  ___________________________________________________________________________    SUBJECTIVE/ROS  Patient was seen and evaluated at bedside. Denies any new symptoms or concerns over the weekend.  Denies, cough, fever, chills, abdominal pain, constipation/diarrhea, or new/worsening weakness or sensory changes.  Discussed plan for discharge including home antibiotic administration.  Patient expressed understanding and agreement with plan.  ___________________________________________________________________________    VITALS  T(C): 37.1 (05-27-25 @ 09:05), Max: 37.1 (05-27-25 @ 09:05)  T(F): 98.8 (05-27-25 @ 09:05), Max: 98.8 (05-27-25 @ 09:05)  HR: 103 (05-27-25 @ 09:05) (103 - 105)  BP: 132/71 (05-27-25 @ 09:05) (119/69 - 132/71)  ABP: --  ABP(mean): --  RR: 17 (05-27-25 @ 09:05) (16 - 17)  SpO2: 99% (05-27-25 @ 09:05) (98% - 99%)    ___________________________________________________________________________    MEDICATIONS  (STANDING):  atorvastatin 20 milliGRAM(s) Oral at bedtime  bacitracin   Ointment 1 Application(s) Topical three times a day  chlorhexidine 2% Cloths 1 Application(s) Topical daily  clotrimazole 1% Cream 1 Application(s) Topical two times a day  dapagliflozin 10 milliGRAM(s) Oral daily  dextrose 5%. 1000 milliLiter(s) (100 mL/Hr) IV Continuous <Continuous>  dextrose 5%. 1000 milliLiter(s) (50 mL/Hr) IV Continuous <Continuous>  dextrose 50% Injectable 25 Gram(s) IV Push once  dextrose 50% Injectable 12.5 Gram(s) IV Push once  dextrose 50% Injectable 25 Gram(s) IV Push once  ertapenem  IVPB 1000 milliGRAM(s) IV Intermittent every 24 hours  gabapentin 100 milliGRAM(s) Oral every 8 hours  glucagon  Injectable 1 milliGRAM(s) IntraMuscular once  heparin   Injectable 5000 Unit(s) SubCutaneous every 12 hours  insulin lispro (ADMELOG) corrective regimen sliding scale   SubCutaneous before breakfast  losartan 25 milliGRAM(s) Oral daily  methocarbamol 500 milliGRAM(s) Oral <User Schedule>  multivitamin 1 Tablet(s) Oral daily  omega-3-Acid Ethyl Esters 2 Gram(s) Oral two times a day  pantoprazole    Tablet 40 milliGRAM(s) Oral before breakfast  polyethylene glycol 3350 17 Gram(s) Oral at bedtime  simethicone 80 milliGRAM(s) Chew two times a day  spironolactone 25 milliGRAM(s) Oral daily    MEDICATIONS  (PRN):  acetaminophen     Tablet .. 975 milliGRAM(s) Oral every 6 hours PRN Mild Pain (1 - 3)  bisacodyl Suppository 10 milliGRAM(s) Rectal daily PRN Constipation  dextrose Oral Gel 15 Gram(s) Oral once PRN Blood Glucose LESS THAN 70 milliGRAM(s)/deciliter  dibucaine 1% Ointment 1 Application(s) Topical four times a day PRN for anal discomfort d/t hemorrhoids  magnesium hydroxide Suspension 30 milliLiter(s) Oral every 12 hours PRN Constipation      ___________________________________________________________________________    PHYSICAL EXAM:    Gen - NAD, Comfortable  HEENT - NCAT, EOM grossly intact  Pulm - CTAB, No wheeze/rhonchi/crackles bl  Cardiovascular - RRR  Abdomen - ND  Extremities - No calf tenderness bl.  Neuro-     Cognitive - Alert and oriented     Communication - Fluent, No dysarthria     Cranial Nerves - CN grossly intact     Motor -                     LEFT    UE - ShAB 5/5, EF 5/5, EE 5/5, WE 5/5,  5/5                    RIGHT UE - ShAB 5/5, EF 4/5, EE 5/5, WE 5/5,  5/5                    LEFT    LE - HF 4/5, KE 5/5, DF 5/5, PF 5/5                    RIGHT LE - HF 3/5, KE 4/5, DF 5/5, PF 5/5        Sensory - Intact to LT in bl LEs and bl UEs  Skin: Surgical incision on mid-back 28cm vertical ROB.  R groin wound 5x2cm. Bl groin MAD    ___________________________________________________________________________ HPI:  Mr. Adama Monsivais is a 79-year-old male patient with past medical history of HTN, HLD, DM (A1C 7.2), CKD, and chronic LBP who presented as a transfer from [facility] to to [facility] for orthopedic surgery after initial ED evaluation for acute on chronic back pain had MRI revealing discitis with epidural abscess and bilateral psoas abscesses. Patient is now s/p T10-Pelvis PSF with Dr. Boston with Plastic Surgery closure by Dr. Escamilla on 4/24/25. Patient tolerated the procedure well without any intraoperative complications. Patient tolerated physical therapy, is weight bearing as tolerated and pain was well-controlled. Of note patient had previously been pain seen by pain specialist in 2/2025 for chronic back pain with radiation to bilateral lateral thighs (R>L). MRI at that time showed degenerative changes and had epidural injections x2 (2/27, 3/11) with moderate pain relief. Pain began to worsen on 4/10. He had radiofrequency ablation performed on 4/11 and since had a severe worsening of pain and radiation to R lateral thigh. He also reported recent bowel/bladder "incontinence" requiring diaper due to his inability to get to the bathroom in time due to pain limitations but states urge and sensation remained intact. Infectious disease consulted and co-managed for antibiotic therapy, recommended Ertapenem 1g Q24h until 6/7/25. A PICC line was placed on 5/14/25 and IR psoas drains were placed bilaterally on 4/27/25 and removed on 5/6/25. 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 Seen by medical attending for continuity of care and management and cleared for safe discharge. As per surgeon, the patient is stable and ready for discharge. Patient transferred to [facility] IRF on 5/16 for initiation of comprehensive rehabilitation program with 3 hours of therapies daily 5xweek. (16 May 2025 13:33)    ___________________________________________________________________________    SUBJECTIVE/ROS  Patient was seen and evaluated at bedside.   Denies any new symptoms or concerns over the weekend.  Denies, cough, fever, chills, abdominal pain, constipation/diarrhea, or new/worsening weakness or sensory changes.  Discussed plan for discharge including home antibiotic administration.  CXR ordered to assess proper PICC line placement. It was confirmatory of appropriateness.  Patient expressed understanding and agreement with plan.  ___________________________________________________________________________    XR CHEST PORTABLE  PROCEDURE DATE:  05/27/2025    INTERPRETATION:  AP semierect chest on May 27, 2025 at 11:16 AM. Patient had left PICC line insertion.  Heart size normal. Lungs are clear. Thoracolumbar spinal hardware again noted.  Above findings are similar to May 16.  Presently there is a left PICC line inserted with tip in the lower superior vena cava.  IMPRESSION: Left PICC line inserted as above.  ___________________________________________________________________________    VITALS  T(C): 37.1 (05-27-25 @ 09:05), Max: 37.1 (05-27-25 @ 09:05)  T(F): 98.8 (05-27-25 @ 09:05), Max: 98.8 (05-27-25 @ 09:05)  HR: 103 (05-27-25 @ 09:05) (103 - 105)  BP: 132/71 (05-27-25 @ 09:05) (119/69 - 132/71)  RR: 17 (05-27-25 @ 09:05) (16 - 17)  SpO2: 99% (05-27-25 @ 09:05) (98% - 99%)    ___________________________________________________________________________    LAB                        10.4   8.80  )-----------( 610      ( 26 May 2025 07:37 )             32.8       ___________________________________________________________________________    MEDICATIONS  (STANDING):  atorvastatin 20 milliGRAM(s) Oral at bedtime  bacitracin   Ointment 1 Application(s) Topical three times a day  chlorhexidine 2% Cloths 1 Application(s) Topical daily  clotrimazole 1% Cream 1 Application(s) Topical two times a day  dapagliflozin 10 milliGRAM(s) Oral daily  dextrose 5%. 1000 milliLiter(s) (100 mL/Hr) IV Continuous <Continuous>  dextrose 5%. 1000 milliLiter(s) (50 mL/Hr) IV Continuous <Continuous>  dextrose 50% Injectable 25 Gram(s) IV Push once  dextrose 50% Injectable 12.5 Gram(s) IV Push once  dextrose 50% Injectable 25 Gram(s) IV Push once  ertapenem  IVPB 1000 milliGRAM(s) IV Intermittent every 24 hours  gabapentin 100 milliGRAM(s) Oral every 8 hours  glucagon  Injectable 1 milliGRAM(s) IntraMuscular once  heparin   Injectable 5000 Unit(s) SubCutaneous every 12 hours  insulin lispro (ADMELOG) corrective regimen sliding scale   SubCutaneous before breakfast  losartan 25 milliGRAM(s) Oral daily  methocarbamol 500 milliGRAM(s) Oral <User Schedule>  multivitamin 1 Tablet(s) Oral daily  omega-3-Acid Ethyl Esters 2 Gram(s) Oral two times a day  pantoprazole    Tablet 40 milliGRAM(s) Oral before breakfast  polyethylene glycol 3350 17 Gram(s) Oral at bedtime  simethicone 80 milliGRAM(s) Chew two times a day  spironolactone 25 milliGRAM(s) Oral daily    MEDICATIONS  (PRN):  acetaminophen     Tablet .. 975 milliGRAM(s) Oral every 6 hours PRN Mild Pain (1 - 3)  bisacodyl Suppository 10 milliGRAM(s) Rectal daily PRN Constipation  dextrose Oral Gel 15 Gram(s) Oral once PRN Blood Glucose LESS THAN 70 milliGRAM(s)/deciliter  dibucaine 1% Ointment 1 Application(s) Topical four times a day PRN for anal discomfort d/t hemorrhoids  magnesium hydroxide Suspension 30 milliLiter(s) Oral every 12 hours PRN Constipation      ___________________________________________________________________________    PHYSICAL EXAM:    Gen - NAD, Comfortable  HEENT - NCAT, EOM grossly intact  Pulm - CTAB, No wheeze/rhonchi/crackles bl  Cardiovascular - RRR  Abdomen - ND  Extremities - No calf tenderness bl.  Neuro-     Cognitive - Alert and oriented     Communication - Fluent, No dysarthria     Cranial Nerves - CN grossly intact     Motor -                     LEFT    UE - ShAB 5/5, EF 5/5, EE 5/5, WE 5/5,  5/5                    RIGHT UE - ShAB 5/5, EF 4/5, EE 5/5, WE 5/5,  5/5                    LEFT    LE - HF 4/5, KE 5/5, DF 5/5, PF 5/5                    RIGHT LE - HF 3/5, KE 4/5, DF 5/5, PF 5/5        Sensory - Intact to LT in bl LEs and bl UEs  Skin: Surgical incision on mid-back 28cm vertical ROB.  R groin wound 5x2cm. Bl groin MAD    ___________________________________________________________________________

## 2025-05-27 NOTE — PROGRESS NOTE ADULT - PROVIDER SPECIALTY LIST ADULT
Hospitalist
Physiatry
Hospitalist
Physiatry
Hospitalist
Physiatry
Physiatry
Hospitalist
Hospitalist
Physiatry
Physiatry
Hospitalist
Hospitalist
Physiatry

## 2025-05-27 NOTE — PROGRESS NOTE ADULT - ASSESSMENT
80 y/o man w/ HTN, HLD, T2DM, acute on chronic low back pain with recent MRI findings concerning for L1-L2 discitis/OM with epidural abscess, bilateral psoas abscesses now s/p T10-Pelvis PSF T12-L2 Laminectomy. Patient on  ertapenem with PICC line placement. Admitted to [place] acute inpatient rehab on 5/16/25 for ADL, gait, and functional impairments.     Epidural abscess  Osteomyelitis of lumbar spine  Bilateral Psoas abscess s/p drain removal  - S/P Decompression, spine, lumbar, posterior approach, with fusion of posterior spinal column  - Continue Ertapenem x 6 weeks through 6/7/25  - S/p placement of PICC 5/14    HTN  - c/w losartan 25mg daily and spironolactone 25mg daily   - BP controlled     Hyponatremia   - mild hyponatremia, Na 133  - if hyponatremia worsens, may need to hold spironolactone  - check serum/ urine osmo, urine Na  - monitor BMP    HLD  - Atorvastatin 40 QHS    T2DM  - A1c 7.2% on 4/25  - pt reports he takes Farxiga at home   - switch metformin to Farxiga 10mg daily- 5/20  - continue ISS  - monitor FS    #Ileus/Pseudoobstruction   - s/p conservative management  - Resolved    #Constipation  - Miralax    # DVT ppx:  -heparin subq

## 2025-06-03 ENCOUNTER — NON-APPOINTMENT (OUTPATIENT)
Age: 80
End: 2025-06-03

## 2025-06-05 ENCOUNTER — NON-APPOINTMENT (OUTPATIENT)
Age: 80
End: 2025-06-05

## 2025-06-11 ENCOUNTER — APPOINTMENT (OUTPATIENT)
Dept: ORTHOPEDIC SURGERY | Facility: CLINIC | Age: 80
End: 2025-06-11

## 2025-06-11 VITALS — WEIGHT: 145 LBS | BODY MASS INDEX: 24.16 KG/M2 | HEIGHT: 65 IN

## 2025-06-11 PROBLEM — M46.46 DISCITIS OF LUMBAR REGION: Status: ACTIVE | Noted: 2025-06-11

## 2025-06-11 PROCEDURE — 99024 POSTOP FOLLOW-UP VISIT: CPT

## 2025-06-11 PROCEDURE — 72082 X-RAY EXAM ENTIRE SPI 2/3 VW: CPT

## 2025-06-20 PROCEDURE — 85025 COMPLETE CBC W/AUTO DIFF WBC: CPT

## 2025-06-20 PROCEDURE — 97116 GAIT TRAINING THERAPY: CPT | Mod: GP

## 2025-06-20 PROCEDURE — 80053 COMPREHEN METABOLIC PANEL: CPT

## 2025-06-20 PROCEDURE — 80048 BASIC METABOLIC PNL TOTAL CA: CPT

## 2025-06-20 PROCEDURE — 82962 GLUCOSE BLOOD TEST: CPT

## 2025-06-20 PROCEDURE — 71045 X-RAY EXAM CHEST 1 VIEW: CPT

## 2025-06-20 PROCEDURE — 97112 NEUROMUSCULAR REEDUCATION: CPT | Mod: GP

## 2025-06-20 PROCEDURE — 83935 ASSAY OF URINE OSMOLALITY: CPT

## 2025-06-20 PROCEDURE — 87635 SARS-COV-2 COVID-19 AMP PRB: CPT

## 2025-06-20 PROCEDURE — 86140 C-REACTIVE PROTEIN: CPT

## 2025-06-20 PROCEDURE — 83930 ASSAY OF BLOOD OSMOLALITY: CPT

## 2025-06-20 PROCEDURE — 97535 SELF CARE MNGMENT TRAINING: CPT | Mod: GO

## 2025-06-20 PROCEDURE — 97165 OT EVAL LOW COMPLEX 30 MIN: CPT | Mod: GO

## 2025-06-20 PROCEDURE — 36415 COLL VENOUS BLD VENIPUNCTURE: CPT

## 2025-06-20 PROCEDURE — 97530 THERAPEUTIC ACTIVITIES: CPT | Mod: GP

## 2025-06-20 PROCEDURE — 97110 THERAPEUTIC EXERCISES: CPT | Mod: GP

## 2025-06-20 PROCEDURE — 84300 ASSAY OF URINE SODIUM: CPT

## 2025-06-20 PROCEDURE — 97161 PT EVAL LOW COMPLEX 20 MIN: CPT | Mod: GP

## 2025-06-20 PROCEDURE — 85652 RBC SED RATE AUTOMATED: CPT

## 2025-06-20 PROCEDURE — 93005 ELECTROCARDIOGRAM TRACING: CPT

## 2025-07-07 ENCOUNTER — NON-APPOINTMENT (OUTPATIENT)
Age: 80
End: 2025-07-07

## 2025-07-07 ENCOUNTER — APPOINTMENT (OUTPATIENT)
Dept: PHYSICAL MEDICINE AND REHAB | Facility: CLINIC | Age: 80
End: 2025-07-07
Payer: MEDICARE

## 2025-07-07 PROCEDURE — 99213 OFFICE O/P EST LOW 20 MIN: CPT

## 2025-07-09 RX ORDER — GABAPENTIN 100 MG/1
100 CAPSULE ORAL
Qty: 90 | Refills: 0 | Status: ACTIVE | COMMUNITY
Start: 2025-07-09 | End: 1900-01-01

## 2025-07-09 NOTE — ED PROVIDER NOTE - NS ED MD EM SELECTION
EKG 07/07/2025      Atilio Oconnell [id number] Detailed

## 2025-07-14 PROBLEM — M40.05 POSTURAL KYPHOSIS OF LUMBAR REGION: Status: ACTIVE | Noted: 2025-07-14

## 2025-07-14 PROBLEM — Z98.1 S/P LAMINECTOMY WITH SPINAL FUSION: Status: ACTIVE | Noted: 2025-07-14

## 2025-07-14 PROBLEM — M46.26 OSTEOMYELITIS OF LUMBAR SPINE: Status: ACTIVE | Noted: 2025-07-14

## 2025-07-23 ENCOUNTER — APPOINTMENT (OUTPATIENT)
Dept: ORTHOPEDIC SURGERY | Facility: CLINIC | Age: 80
End: 2025-07-23
Payer: MEDICARE

## 2025-07-23 VITALS — BODY MASS INDEX: 24.16 KG/M2 | WEIGHT: 145 LBS | HEIGHT: 65 IN

## 2025-07-23 DIAGNOSIS — M48.061 SPINAL STENOSIS, LUMBAR REGION WITHOUT NEUROGENIC CLAUDICATION: ICD-10-CM

## 2025-07-23 PROCEDURE — 99024 POSTOP FOLLOW-UP VISIT: CPT

## 2025-07-23 PROCEDURE — 72082 X-RAY EXAM ENTIRE SPI 2/3 VW: CPT

## 2025-07-28 ENCOUNTER — APPOINTMENT (OUTPATIENT)
Dept: INFECTIOUS DISEASE | Facility: CLINIC | Age: 80
End: 2025-07-28
Payer: MEDICARE

## 2025-07-28 VITALS
DIASTOLIC BLOOD PRESSURE: 68 MMHG | HEART RATE: 86 BPM | TEMPERATURE: 98.2 F | SYSTOLIC BLOOD PRESSURE: 121 MMHG | WEIGHT: 136 LBS | BODY MASS INDEX: 22.66 KG/M2 | OXYGEN SATURATION: 97 % | HEIGHT: 65 IN

## 2025-07-28 DIAGNOSIS — K68.12 PSOAS MUSCLE ABSCESS: ICD-10-CM

## 2025-07-28 DIAGNOSIS — G06.1 INTRASPINAL ABSCESS AND GRANULOMA: ICD-10-CM

## 2025-07-28 DIAGNOSIS — M46.46 DISCITIS, UNSPECIFIED, LUMBAR REGION: ICD-10-CM

## 2025-07-28 PROCEDURE — 99214 OFFICE O/P EST MOD 30 MIN: CPT

## 2025-07-29 PROBLEM — M46.46 DISCITIS OF LUMBAR REGION: Status: ACTIVE | Noted: 2025-07-29

## 2025-07-29 PROBLEM — K68.12 PSOAS ABSCESS: Status: ACTIVE | Noted: 2025-07-29

## 2025-07-29 PROBLEM — G06.1 ABSCESS IN EPIDURAL SPACE OF LUMBAR SPINE: Status: ACTIVE | Noted: 2025-07-14

## 2025-07-29 LAB
BASOPHILS # BLD AUTO: 0.04 K/UL
BASOPHILS NFR BLD AUTO: 0.5 %
CRP SERPL-MCNC: <3 MG/L
EOSINOPHIL # BLD AUTO: 0.13 K/UL
EOSINOPHIL NFR BLD AUTO: 1.7 %
HCT VFR BLD CALC: 37.2 %
HGB BLD-MCNC: 11.9 G/DL
IMM GRANULOCYTES NFR BLD AUTO: 0.3 %
LYMPHOCYTES # BLD AUTO: 2.49 K/UL
LYMPHOCYTES NFR BLD AUTO: 33.3 %
MAN DIFF?: NORMAL
MCHC RBC-ENTMCNC: 30.4 PG
MCHC RBC-ENTMCNC: 32 G/DL
MCV RBC AUTO: 95.1 FL
MONOCYTES # BLD AUTO: 0.57 K/UL
MONOCYTES NFR BLD AUTO: 7.6 %
NEUTROPHILS # BLD AUTO: 4.23 K/UL
NEUTROPHILS NFR BLD AUTO: 56.6 %
PLATELET # BLD AUTO: 444 K/UL
RBC # BLD: 3.91 M/UL
RBC # FLD: 15.9 %
WBC # FLD AUTO: 7.48 K/UL

## 2025-07-31 ENCOUNTER — APPOINTMENT (OUTPATIENT)
Dept: MRI IMAGING | Facility: CLINIC | Age: 80
End: 2025-07-31

## 2025-08-11 ENCOUNTER — APPOINTMENT (OUTPATIENT)
Dept: ORTHOPEDIC SURGERY | Facility: CLINIC | Age: 80
End: 2025-08-11

## 2025-08-11 ENCOUNTER — NON-APPOINTMENT (OUTPATIENT)
Age: 80
End: 2025-08-11

## 2025-08-11 VITALS — WEIGHT: 136 LBS | BODY MASS INDEX: 22.66 KG/M2 | HEIGHT: 65 IN

## 2025-08-11 DIAGNOSIS — G06.2 EXTRADURAL AND SUBDURAL ABSCESS, UNSPECIFIED: ICD-10-CM

## 2025-08-11 PROCEDURE — 99212 OFFICE O/P EST SF 10 MIN: CPT

## 2025-08-13 PROBLEM — G06.2 EPIDURAL ABSCESS: Status: ACTIVE | Noted: 2025-06-11

## 2025-09-05 RX ORDER — GABAPENTIN 100 MG/1
100 CAPSULE ORAL
Qty: 90 | Refills: 0 | Status: ACTIVE | COMMUNITY
Start: 2025-09-05 | End: 1900-01-01

## (undated) DEVICE — ELCTR AQUAMANTYS BIPOLAR SEALER 6.0

## (undated) DEVICE — GLV 7.5 PROTEXIS (BLUE)

## (undated) DEVICE — DRAPE STERILE-Z PATIENT

## (undated) DEVICE — Device

## (undated) DEVICE — PACK SCOLIOSIS LIJ

## (undated) DEVICE — SOL IRR POUR NS 0.9% 1000ML

## (undated) DEVICE — VENODYNE/SCD SLEEVE CALF MEDIUM

## (undated) DEVICE — MIDAS REX LEGEND BALL FLUTED SM BORE 4.0MM X 10CM

## (undated) DEVICE — SPHERE MARKER (5 SPHERES)

## (undated) DEVICE — DRAIN RESERVOIR FOR JACKSON PRATT 100CC CARDINAL

## (undated) DEVICE — WOUND IRR SURGIPHOR

## (undated) DEVICE — NDL HYPO SAFE 21G X 1.5" (GREEN)

## (undated) DEVICE — MIDAS REX LEGEND BALL FLUTED LG BORE 6.0MM X 14CM

## (undated) DEVICE — FOLEY TRAY 14FR 5CC LF UMETER CLOSED

## (undated) DEVICE — LABELS BLANK W PEN

## (undated) DEVICE — GLV 7.5 PROTEXIS (WHITE)

## (undated) DEVICE — TUBING ATS SUCTION LINE

## (undated) DEVICE — DRAIN JACKSON PRATT 3 SPRING RESERVOIR W 10FR PVC DRAIN

## (undated) DEVICE — ELCTR GROUNDING PAD ADULT COVIDIEN

## (undated) DEVICE — POSITIONER JACKSON TABLE PRONEVIEW CUSHION, CHEST, HIP, THIGH PADS

## (undated) DEVICE — DRAPE C ARM C-ARMOUR

## (undated) DEVICE — PACK NEURO

## (undated) DEVICE — DRAPE COVER SNAP 36X30"

## (undated) DEVICE — ELCTR BOVIE TIP BLADE INSULATED 2.75" EDGE

## (undated) DEVICE — DRAPE SURGICAL #1010

## (undated) DEVICE — SUCTION YANKAUER NO CONTROL VENT

## (undated) DEVICE — SOL IRR POUR H2O 500ML

## (undated) DEVICE — PREP DURAPREP 26CC

## (undated) DEVICE — SPONGE DISSECTOR PEANUT

## (undated) DEVICE — TUBING FOR SMOKE EVACUATOR (PURPLE END)

## (undated) DEVICE — DRSG CURITY GAUZE SPONGE 4 X 4" 12-PLY

## (undated) DEVICE — ELCTR BOVIE PENCIL BLADE 10FT

## (undated) DEVICE — CLIPPER BLADE NEURO (BLUE)